# Patient Record
Sex: FEMALE | Race: WHITE | NOT HISPANIC OR LATINO | Employment: FULL TIME | URBAN - METROPOLITAN AREA
[De-identification: names, ages, dates, MRNs, and addresses within clinical notes are randomized per-mention and may not be internally consistent; named-entity substitution may affect disease eponyms.]

---

## 2017-02-02 ENCOUNTER — OFFICE VISIT (OUTPATIENT)
Dept: NEUROLOGY | Facility: MEDICAL CENTER | Age: 32
End: 2017-02-02
Payer: COMMERCIAL

## 2017-02-02 VITALS
WEIGHT: 203 LBS | BODY MASS INDEX: 35.97 KG/M2 | RESPIRATION RATE: 16 BRPM | DIASTOLIC BLOOD PRESSURE: 60 MMHG | SYSTOLIC BLOOD PRESSURE: 100 MMHG | HEART RATE: 73 BPM | TEMPERATURE: 98.8 F | OXYGEN SATURATION: 96 % | HEIGHT: 63 IN

## 2017-02-02 DIAGNOSIS — M54.9 CHRONIC MIDLINE BACK PAIN, UNSPECIFIED BACK LOCATION: ICD-10-CM

## 2017-02-02 DIAGNOSIS — G89.29 CHRONIC MIDLINE BACK PAIN, UNSPECIFIED BACK LOCATION: ICD-10-CM

## 2017-02-02 PROCEDURE — 64615 CHEMODENERV MUSC MIGRAINE: CPT | Performed by: NURSE PRACTITIONER

## 2017-02-02 RX ORDER — GABAPENTIN 100 MG/1
200 CAPSULE ORAL 3 TIMES DAILY
Refills: 11 | COMMUNITY
Start: 2017-01-05 | End: 2018-01-04

## 2017-02-02 ASSESSMENT — ENCOUNTER SYMPTOMS
DEPRESSION: 0
SORE THROAT: 0
DIZZINESS: 0
NERVOUS/ANXIOUS: 0
CONSTITUTIONAL NEGATIVE: 1
VOMITING: 0
HEADACHES: 1
DIARRHEA: 0
BACK PAIN: 1
ABDOMINAL PAIN: 0
COUGH: 0
NECK PAIN: 1
DOUBLE VISION: 0
NAUSEA: 0
SEIZURES: 0

## 2017-02-02 ASSESSMENT — PATIENT HEALTH QUESTIONNAIRE - PHQ9: CLINICAL INTERPRETATION OF PHQ2 SCORE: 2

## 2017-02-02 NOTE — MR AVS SNAPSHOT
"        Rehana Meade   2017 11:00 AM   Office Visit   MRN: [id number]    Department:  [facility]   Dept Phone:  [phone]    Description:  Female : 1985   Provider:  JORDAN Mauro           Reason for Visit     Botox Injection Migraine without aura and with status migrainosus, not intractable      Allergies as of 2017     Allergen Noted Reactions    Biaxin [Clarithromycin] 2014   Hives    Penicillin G Potassium 2007   Vomiting    Tape 10/28/2009   Rash    Ativan 2015   Itching    Imitrex [Sumatriptan] 07/15/2015   Unspecified    Make my head pins an d needles    Latex 2014   Itching    Maxalt [Rizatriptan] 2015       Tremors, confusion      Morphine 2014   Hives    Pt unable to recall if allergic    Reglan [Metoclopramide] 2015       Shaking       You were diagnosed with     Chronic migraine   [724413]         Vital Signs     Blood Pressure Pulse Temperature Respirations Height Weight    100/60 mmHg 73 37.1 °C (98.8 °F) 16 1.6 m (5' 3\") 92.08 kg (203 lb)    Body Mass Index Oxygen Saturation Smoking Status             35.97 kg/m2 96% Never Smoker          Basic Information     Date Of Birth Sex Race Ethnicity Preferred Language    1985 Female White Non- English      Your appointments     2017 11:40 AM   BOTOX with CHUN MauroPANDERS   [facility] (--)    [address], [address]  [facility] [address] [phone]              Problem List              ICD-10-CM Priority Class Noted - Resolved    Migraine without aura G43.009   Unknown - Present    Indication for care in labor or delivery O75.9   2014 - Present    Encounter for sterilization Z30.2   2014 - Present    Paresthesia R20.2   2015 - Present    Aseptic meningitis G03.0   2015 - Present    Anxiety F41.9   2015 - Present    Asthma J45.909   2015 - Present    Headache R51   9/3/2015 - Present  "    Aseptic meningitis G03.0   9/3/2015 - Present    Migraines G43.909   9/3/2015 - Present      Health Maintenance        Date Due Completion Dates    IMM PNEUMOCOCCAL 19-64 (ADULT) MEDIUM RISK SERIES (1 of 1 - PPSV23) 12/10/2004 ---    PAP SMEAR 12/10/2006 ---    IMM INFLUENZA (1) 9/1/2016 ---    IMM DTaP/Tdap/Td Vaccine (2 - Td) 10/11/2022 10/11/2012            Current Immunizations     Tdap Vaccine 10/11/2012  9:00 PM      Below and/or attached are the medications your provider expects you to take. Review all of your home medications and newly ordered medications with your provider and/or pharmacist. Follow medication instructions as directed by your provider and/or pharmacist. Please keep your medication list with you and share with your provider. Update the information when medications are discontinued, doses are changed, or new medications (including over-the-counter products) are added; and carry medication information at all times in the event of emergency situations     Allergies:  BIAXIN - Hives     PENICILLIN G POTASSIUM - Vomiting     TAPE - Rash     ATIVAN - Itching     IMITREX - Unspecified     LATEX - Itching     MAXALT - (reactions not documented)     MORPHINE - Hives     REGLAN - (reactions not documented)               Medications  Valid as of: February 02, 2017 -  1:15 PM    Generic Name Brand Name Tablet Size Instructions for use    Butalbital-APAP-Caffeine (Tab) FIORICET -40 MG Take 1 Tab by mouth every four hours as needed for Headache.        Gabapentin (Cap) NEURONTIN 100 MG Take 100 mg by mouth.        .                 Medicines prescribed today were sent to:     [id number] - [place] - [id number] [place] [facility] [place] [address]    Phone: [phone] Fax: [phone]    Open 24 Hours?: No      Medication refill instructions:       If your prescription bottle indicates you have medication refills left, it is not necessary to call your provider’s office. Please  contact your pharmacy and they will refill your medication.    If your prescription bottle indicates you do not have any refills left, you may request refills at any time through one of the following ways: The online [employer] system (except Urgent Care), by calling your provider’s office, or by asking your pharmacy to contact your provider’s office with a refill request. Medication refills are processed only during regular business hours and may not be available until the next business day. Your provider may request additional information or to have a follow-up visit with you prior to refilling your medication.   *Please Note: Medication refills are assigned a new Rx number when refilled electronically. Your pharmacy may indicate that no refills were authorized even though a new prescription for the same medication is available at the pharmacy. Please request the medicine by name with the pharmacy before contacting your provider for a refill.           [employer] Access Code: [email or web address]  Expires: 2/28/2017 10:25 AM    [employer]  A secure, online tool to manage your health information     [employer]® is a secure, online tool that connects you to your personalized health information from the privacy of your home -- day or night - making it very easy for you to manage your healthcare. Once the activation process is completed, you can even access your medical information using the [employer] minerva, which is available for free in the Apple Minerva store or Google Play store.     [employer] provides the following levels of access (as shown below):   My Chart Features   [facility] Doctor [facility]  Specialists [facility]  Urgent  Care Non-[facility]  Primary Care  Doctor   Email your healthcare team securely and privately 24/7 X X X    Manage appointments: schedule your next appointment; view details of past/upcoming appointments X      Request prescription refills. X      View recent personal medical records, including  lab and immunizations X X X X   View health record, including health history, allergies, medications X X X X   Read reports about your outpatient visits, procedures, consult and ER notes X X X X   See your discharge summary, which is a recap of your hospital and/or ER visit that includes your diagnosis, lab results, and care plan. X X       How to register for [employer]:  1. Go to  [email or web address].  2. Click on the Sign Up Now box, which takes you to the New Member Sign Up page. You will need to provide the following information:  a. Enter your [employer] Access Code exactly as it appears at the top of this page. (You will not need to use this code after you’ve completed the sign-up process. If you do not sign up before the expiration date, you must request a new code.)   b. Enter your date of birth.   c. Enter your home email address.   d. Click Submit, and follow the next screen’s instructions.  3. Create a [employer] ID. This will be your [employer] login ID and cannot be changed, so think of one that is secure and easy to remember.  4. Create a [employer] password. You can change your password at any time.  5. Enter your Password Reset Question and Answer. This can be used at a later time if you forget your password.   6. Enter your e-mail address. This allows you to receive e-mail notifications when new information is available in [employer].  7. Click Sign Up. You can now view your health information.    For assistance activating your [employer] account, call [phone]

## 2017-02-02 NOTE — PROGRESS NOTES
"Subjective:      Rehana Meade is a 31 y.o. female who presents with Botox Injection          HPI  Here today for her 2nd round of Botox.  She wishes to have her back pain evaluated.    Feels that her back pain is a radiating pain from her neck to at least the mid-back.      Current Outpatient Prescriptions   Medication Sig Dispense Refill   • gabapentin (NEURONTIN) 100 MG Cap Take 100 mg by mouth.  11   • acetaminophen/caffeine/butalbital 325-40-50 mg (FIORICET) -40 MG Tab Take 1 Tab by mouth every four hours as needed for Headache. 30 Tab 0     No current facility-administered medications for this visit.       Review of Systems   Constitutional: Negative.    HENT: Negative for hearing loss, nosebleeds and sore throat.         No recent head injury.   Eyes: Negative for double vision.        No new loss of vision.   Respiratory: Negative for cough.         No recent lung infections.   Cardiovascular: Negative for chest pain.   Gastrointestinal: Negative for nausea, vomiting, abdominal pain and diarrhea.   Genitourinary: Negative.    Musculoskeletal: Positive for back pain and neck pain.   Skin: Negative.    Neurological: Positive for headaches. Negative for dizziness and seizures.   Endo/Heme/Allergies:        No history of endocrine dysfunction.  No new problems.   Psychiatric/Behavioral: Negative for depression. The patient is not nervous/anxious.         No recent mood changes.          Objective:     /60 mmHg  Pulse 73  Temp(Src) 37.1 °C (98.8 °F)  Resp 16  Ht 1.6 m (5' 3\")  Wt 92.08 kg (203 lb)  BMI 35.97 kg/m2  SpO2 96%     Physical Exam   Constitutional: She is oriented to person, place, and time. She appears well-developed and well-nourished. No distress.   HENT:   Head: Normocephalic and atraumatic.   Nose: Nose normal.   Eyes: EOM are normal.   Neck: Normal range of motion.   Cardiovascular: Normal rate and regular rhythm.    Pulmonary/Chest: Effort normal.   Neurological: " She is alert and oriented to person, place, and time. She exhibits normal muscle tone. Gait normal.   No observable changes in neurologic status.  See initial new patient examination for details.    Skin: Skin is warm.   Psychiatric: She has a normal mood and affect.           Assessment/Plan:     Chronic Migraine:  Botox therapy has reduced patient’s migraines by more than 7 days and/or 100 hours per month.    I treated this patient in clinic today with BotoxA 155 units according to the dosing/injection paradigm currently mandated by the FDA for the management of chronic migraine. Specifically, I injected 5 units to the procerus, 5 units to the corrugators bilaterally, a total of 20 units to the frontalis musculature, 20 units to the temporalis bilaterally, 15 units to the occipitalis bilaterally, 10 units to the cervical paraspinals bilaterally and 15 units to the trapezius musculature bilaterally. The remainder of the Botox 200 units mixed but not administered was discarded as wastage per FDA guidelines.    Referral placed for evaluation back pain.    Return for follow-up in 3 months for 3rd set of Botox.

## 2017-03-17 LAB
ALBUMIN SERPL BCP-MCNC: 4.4 G/DL (ref 3.2–4.9)
ALBUMIN/GLOB SERPL: 1.2 G/DL
ALP SERPL-CCNC: 104 U/L (ref 30–99)
ALT SERPL-CCNC: 7 U/L (ref 2–50)
ANION GAP SERPL CALC-SCNC: 12 MMOL/L (ref 0–11.9)
AST SERPL-CCNC: 11 U/L (ref 12–45)
BASOPHILS # BLD AUTO: 0.5 % (ref 0–1.8)
BASOPHILS # BLD: 0.06 K/UL (ref 0–0.12)
BILIRUB SERPL-MCNC: 0.4 MG/DL (ref 0.1–1.5)
BUN SERPL-MCNC: 14 MG/DL (ref 8–22)
CALCIUM SERPL-MCNC: 9.3 MG/DL (ref 8.5–10.5)
CHLORIDE SERPL-SCNC: 102 MMOL/L (ref 96–112)
CO2 SERPL-SCNC: 21 MMOL/L (ref 20–33)
CREAT SERPL-MCNC: 0.68 MG/DL (ref 0.5–1.4)
EOSINOPHIL # BLD AUTO: 0.21 K/UL (ref 0–0.51)
EOSINOPHIL NFR BLD: 1.6 % (ref 0–6.9)
ERYTHROCYTE [DISTWIDTH] IN BLOOD BY AUTOMATED COUNT: 45 FL (ref 35.9–50)
GFR SERPL CREATININE-BSD FRML MDRD: >60 ML/MIN/1.73 M 2
GLOBULIN SER CALC-MCNC: 3.6 G/DL (ref 1.9–3.5)
GLUCOSE SERPL-MCNC: 84 MG/DL (ref 65–99)
HCT VFR BLD AUTO: 44.5 % (ref 37–47)
HGB BLD-MCNC: 14.7 G/DL (ref 12–16)
IMM GRANULOCYTES # BLD AUTO: 0.03 K/UL (ref 0–0.11)
IMM GRANULOCYTES NFR BLD AUTO: 0.2 % (ref 0–0.9)
LIPASE SERPL-CCNC: 18 U/L (ref 11–82)
LYMPHOCYTES # BLD AUTO: 3.59 K/UL (ref 1–4.8)
LYMPHOCYTES NFR BLD: 27.4 % (ref 22–41)
MCH RBC QN AUTO: 30 PG (ref 27–33)
MCHC RBC AUTO-ENTMCNC: 33 G/DL (ref 33.6–35)
MCV RBC AUTO: 90.8 FL (ref 81.4–97.8)
MONOCYTES # BLD AUTO: 0.9 K/UL (ref 0–0.85)
MONOCYTES NFR BLD AUTO: 6.9 % (ref 0–13.4)
NEUTROPHILS # BLD AUTO: 8.32 K/UL (ref 2–7.15)
NEUTROPHILS NFR BLD: 63.4 % (ref 44–72)
NRBC # BLD AUTO: 0 K/UL
NRBC BLD AUTO-RTO: 0 /100 WBC
PLATELET # BLD AUTO: 315 K/UL (ref 164–446)
PMV BLD AUTO: 9.5 FL (ref 9–12.9)
POTASSIUM SERPL-SCNC: 3.9 MMOL/L (ref 3.6–5.5)
PROT SERPL-MCNC: 8 G/DL (ref 6–8.2)
RBC # BLD AUTO: 4.9 M/UL (ref 4.2–5.4)
SODIUM SERPL-SCNC: 135 MMOL/L (ref 135–145)
WBC # BLD AUTO: 13.1 K/UL (ref 4.8–10.8)

## 2017-03-17 PROCEDURE — 85025 COMPLETE CBC W/AUTO DIFF WBC: CPT

## 2017-03-17 PROCEDURE — 84703 CHORIONIC GONADOTROPIN ASSAY: CPT

## 2017-03-17 PROCEDURE — 99285 EMERGENCY DEPT VISIT HI MDM: CPT

## 2017-03-17 PROCEDURE — 80053 COMPREHEN METABOLIC PANEL: CPT

## 2017-03-17 PROCEDURE — 83690 ASSAY OF LIPASE: CPT

## 2017-03-18 ENCOUNTER — HOSPITAL ENCOUNTER (EMERGENCY)
Facility: MEDICAL CENTER | Age: 32
End: 2017-03-18
Attending: EMERGENCY MEDICINE
Payer: COMMERCIAL

## 2017-03-18 ENCOUNTER — APPOINTMENT (OUTPATIENT)
Dept: RADIOLOGY | Facility: MEDICAL CENTER | Age: 32
End: 2017-03-18
Attending: EMERGENCY MEDICINE
Payer: COMMERCIAL

## 2017-03-18 VITALS
RESPIRATION RATE: 19 BRPM | DIASTOLIC BLOOD PRESSURE: 74 MMHG | HEART RATE: 80 BPM | HEIGHT: 63 IN | SYSTOLIC BLOOD PRESSURE: 120 MMHG | TEMPERATURE: 98.9 F | OXYGEN SATURATION: 96 % | BODY MASS INDEX: 35.97 KG/M2 | WEIGHT: 203 LBS

## 2017-03-18 DIAGNOSIS — N30.00 ACUTE CYSTITIS WITHOUT HEMATURIA: ICD-10-CM

## 2017-03-18 DIAGNOSIS — R10.32 LEFT LOWER QUADRANT PAIN: ICD-10-CM

## 2017-03-18 DIAGNOSIS — N83.202 CYST OF LEFT OVARY: ICD-10-CM

## 2017-03-18 LAB
APPEARANCE UR: ABNORMAL
BACTERIA #/AREA URNS HPF: ABNORMAL /HPF
BILIRUB UR QL STRIP.AUTO: NEGATIVE
COLOR UR: YELLOW
CULTURE IF INDICATED INDCX: YES UA CULTURE
EPI CELLS #/AREA URNS HPF: ABNORMAL /HPF
GLUCOSE UR STRIP.AUTO-MCNC: NEGATIVE MG/DL
HCG SERPL QL: NEGATIVE
KETONES UR STRIP.AUTO-MCNC: NEGATIVE MG/DL
LEUKOCYTE ESTERASE UR QL STRIP.AUTO: ABNORMAL
MICRO URNS: ABNORMAL
MUCOUS THREADS #/AREA URNS HPF: ABNORMAL /HPF
NITRITE UR QL STRIP.AUTO: NEGATIVE
PH UR STRIP.AUTO: 6 [PH]
PROT UR QL STRIP: NEGATIVE MG/DL
RBC # URNS HPF: ABNORMAL /HPF
RBC UR QL AUTO: NEGATIVE
SP GR UR STRIP.AUTO: 1.03
WBC #/AREA URNS HPF: ABNORMAL /HPF

## 2017-03-18 PROCEDURE — A9270 NON-COVERED ITEM OR SERVICE: HCPCS | Performed by: EMERGENCY MEDICINE

## 2017-03-18 PROCEDURE — 87086 URINE CULTURE/COLONY COUNT: CPT

## 2017-03-18 PROCEDURE — 700102 HCHG RX REV CODE 250 W/ 637 OVERRIDE(OP): Performed by: EMERGENCY MEDICINE

## 2017-03-18 PROCEDURE — 96375 TX/PRO/DX INJ NEW DRUG ADDON: CPT

## 2017-03-18 PROCEDURE — 700111 HCHG RX REV CODE 636 W/ 250 OVERRIDE (IP): Performed by: EMERGENCY MEDICINE

## 2017-03-18 PROCEDURE — 76830 TRANSVAGINAL US NON-OB: CPT

## 2017-03-18 PROCEDURE — 96374 THER/PROPH/DIAG INJ IV PUSH: CPT

## 2017-03-18 PROCEDURE — 81001 URINALYSIS AUTO W/SCOPE: CPT

## 2017-03-18 RX ORDER — CIPROFLOXACIN 500 MG/1
500 TABLET, FILM COATED ORAL ONCE
Status: COMPLETED | OUTPATIENT
Start: 2017-03-18 | End: 2017-03-18

## 2017-03-18 RX ORDER — ONDANSETRON 2 MG/ML
4 INJECTION INTRAMUSCULAR; INTRAVENOUS ONCE
Status: COMPLETED | OUTPATIENT
Start: 2017-03-18 | End: 2017-03-18

## 2017-03-18 RX ORDER — CIPROFLOXACIN 500 MG/1
500 TABLET, FILM COATED ORAL 2 TIMES DAILY
Qty: 14 TAB | Refills: 0 | Status: SHIPPED | OUTPATIENT
Start: 2017-03-18 | End: 2017-03-23

## 2017-03-18 RX ORDER — OXYCODONE HYDROCHLORIDE AND ACETAMINOPHEN 5; 325 MG/1; MG/1
1-2 TABLET ORAL EVERY 4 HOURS PRN
Qty: 20 TAB | Refills: 0 | Status: SHIPPED | OUTPATIENT
Start: 2017-03-18 | End: 2017-04-12

## 2017-03-18 RX ADMIN — CIPROFLOXACIN HYDROCHLORIDE 500 MG: 500 TABLET, FILM COATED ORAL at 08:12

## 2017-03-18 RX ADMIN — ONDANSETRON 4 MG: 2 INJECTION, SOLUTION INTRAMUSCULAR; INTRAVENOUS at 05:03

## 2017-03-18 RX ADMIN — HYDROMORPHONE HYDROCHLORIDE 1 MG: 1 INJECTION, SOLUTION INTRAMUSCULAR; INTRAVENOUS; SUBCUTANEOUS at 05:03

## 2017-03-18 NOTE — DISCHARGE INSTRUCTIONS
Abdominal Pain (Nonspecific)  Your exam might not show the exact reason you have abdominal pain. Since there are many different causes of abdominal pain, another checkup and more tests may be needed. It is very important to follow up for lasting (persistent) or worsening symptoms. A possible cause of abdominal pain in any person who still has his or her appendix is acute appendicitis. Appendicitis is often hard to diagnose. Normal blood tests, urine tests, ultrasound, and CT scans do not completely rule out early appendicitis or other causes of abdominal pain. Sometimes, only the changes that happen over time will allow appendicitis and other causes of abdominal pain to be determined. Other potential problems that may require surgery may also take time to become more apparent. Because of this, it is important that you follow all of the instructions below.  HOME CARE INSTRUCTIONS   · Rest as much as possible.   · Do not eat solid food until your pain is gone.   · While adults or children have pain: A diet of water, weak decaffeinated tea, broth or bouillon, gelatin, oral rehydration solutions (ORS), frozen ice pops, or ice chips may be helpful.   · When pain is gone in adults or children: Start a light diet (dry toast, crackers, applesauce, or white rice). Increase the diet slowly as long as it does not bother you. Eat no dairy products (including cheese and eggs) and no spicy, fatty, fried, or high-fiber foods.   · Use no alcohol, caffeine, or cigarettes.   · Take your regular medicines unless your caregiver told you not to.   · Take any prescribed medicine as directed.   · Only take over-the-counter or prescription medicines for pain, discomfort, or fever as directed by your caregiver. Do not give aspirin to children.   If your caregiver has given you a follow-up appointment, it is very important to keep that appointment. Not keeping the appointment could result in a permanent injury and/or lasting (chronic) pain  and/or disability. If there is any problem keeping the appointment, you must call to reschedule.   SEEK IMMEDIATE MEDICAL CARE IF:   · Your pain is not gone in 24 hours.   · Your pain becomes worse, changes location, or feels different.   · You or your child has an oral temperature above 102° F (38.9° C), not controlled by medicine.   · Your baby is older than 3 months with a rectal temperature of 102° F (38.9° C) or higher.   · Your baby is 3 months old or younger with a rectal temperature of 100.4° F (38° C) or higher.   · You have shaking chills.   · You keep throwing up (vomiting) or cannot drink liquids.   · There is blood in your vomit or you see blood in your bowel movements.   · Your bowel movements become dark or black.   · You have frequent bowel movements.   · Your bowel movements stop (become blocked) or you cannot pass gas.   · You have bloody, frequent, or painful urination.   · You have yellow discoloration in the skin or whites of the eyes.   · Your stomach becomes bloated or bigger.   · You have dizziness or fainting.   · You have chest or back pain.   MAKE SURE YOU:   · Understand these instructions.   · Will watch your condition.   · Will get help right away if you are not doing well or get worse.   Document Released: 12/18/2006 Document Revised: 03/11/2013 Document Reviewed: 11/15/2010  [employer]® Patient Information ©2013 [employer].      Urinary Tract Infection  Urinary tract infections (UTIs) can develop anywhere along your urinary tract. Your urinary tract is your body's drainage system for removing wastes and extra water. Your urinary tract includes two kidneys, two ureters, a bladder, and a urethra. Your kidneys are a pair of bean-shaped organs. Each kidney is about the size of your fist. They are located below your ribs, one on each side of your spine.  CAUSES  Infections are caused by microbes, which are microscopic organisms, including fungi, viruses, and bacteria. These organisms are  so small that they can only be seen through a microscope. Bacteria are the microbes that most commonly cause UTIs.  SYMPTOMS   Symptoms of UTIs may vary by age and gender of the patient and by the location of the infection. Symptoms in young women typically include a frequent and intense urge to urinate and a painful, burning feeling in the bladder or urethra during urination. Older women and men are more likely to be tired, shaky, and weak and have muscle aches and abdominal pain. A fever may mean the infection is in your kidneys. Other symptoms of a kidney infection include pain in your back or sides below the ribs, nausea, and vomiting.  DIAGNOSIS  To diagnose a UTI, your caregiver will ask you about your symptoms. Your caregiver also will ask to provide a urine sample. The urine sample will be tested for bacteria and white blood cells. White blood cells are made by your body to help fight infection.  TREATMENT   Typically, UTIs can be treated with medication. Because most UTIs are caused by a bacterial infection, they usually can be treated with the use of antibiotics. The choice of antibiotic and length of treatment depend on your symptoms and the type of bacteria causing your infection.  HOME CARE INSTRUCTIONS  · If you were prescribed antibiotics, take them exactly as your caregiver instructs you. Finish the medication even if you feel better after you have only taken some of the medication.  · Drink enough water and fluids to keep your urine clear or pale yellow.  · Avoid caffeine, tea, and carbonated beverages. They tend to irritate your bladder.  · Empty your bladder often. Avoid holding urine for long periods of time.  · Empty your bladder before and after sexual intercourse.  · After a bowel movement, women should cleanse from front to back. Use each tissue only once.  SEEK MEDICAL CARE IF:   · You have back pain.  · You develop a fever.  · Your symptoms do not begin to resolve within 3 days.  SEEK  "IMMEDIATE MEDICAL CARE IF:   · You have severe back pain or lower abdominal pain.  · You develop chills.  · You have nausea or vomiting.  · You have continued burning or discomfort with urination.  MAKE SURE YOU:   · Understand these instructions.  · Will watch your condition.  · Will get help right away if you are not doing well or get worse.     This information is not intended to replace advice given to you by your health care provider. Make sure you discuss any questions you have with your health care provider.     Document Released: 09/27/2006 Document Revised: 01/08/2016 Document Reviewed: 01/25/2013  [employer] Patient Education ©2016 [employer].      Ovarian Cyst  An ovarian cyst is a fluid-filled sac that forms on an ovary. The ovaries are small organs that produce eggs in women. Various types of cysts can form on the ovaries. Most are not cancerous. Many do not cause problems, and they often go away on their own. Some may cause symptoms and require treatment. Common types of ovarian cysts include:  · Functional cysts--These cysts may occur every month during the menstrual cycle. This is normal. The cysts usually go away with the next menstrual cycle if the woman does not get pregnant. Usually, there are no symptoms with a functional cyst.  · Endometrioma cysts--These cysts form from the tissue that lines the uterus. They are also called \"chocolate cysts\" because they become filled with blood that turns brown. This type of cyst can cause pain in the lower abdomen during intercourse and with your menstrual period.  · Cystadenoma cysts--This type develops from the cells on the outside of the ovary. These cysts can get very big and cause lower abdomen pain and pain with intercourse. This type of cyst can twist on itself, cut off its blood supply, and cause severe pain. It can also easily rupture and cause a lot of pain.  · Dermoid cysts--This type of cyst is sometimes found in both ovaries. These " cysts may contain different kinds of body tissue, such as skin, teeth, hair, or cartilage. They usually do not cause symptoms unless they get very big.  · Theca lutein cysts--These cysts occur when too much of a certain hormone (human chorionic gonadotropin) is produced and overstimulates the ovaries to produce an egg. This is most common after procedures used to assist with the conception of a baby (in vitro fertilization).  CAUSES   · Fertility drugs can cause a condition in which multiple large cysts are formed on the ovaries. This is called ovarian hyperstimulation syndrome.  · A condition called polycystic ovary syndrome can cause hormonal imbalances that can lead to nonfunctional ovarian cysts.  SIGNS AND SYMPTOMS   Many ovarian cysts do not cause symptoms. If symptoms are present, they may include:  · Pelvic pain or pressure.  · Pain in the lower abdomen.  · Pain during sexual intercourse.  · Increasing girth (swelling) of the abdomen.  · Abnormal menstrual periods.  · Increasing pain with menstrual periods.  · Stopping having menstrual periods without being pregnant.  DIAGNOSIS   These cysts are commonly found during a routine or annual pelvic exam. Tests may be ordered to find out more about the cyst. These tests may include:  · Ultrasound.  · X-ray of the pelvis.  · CT scan.  · MRI.  · Blood tests.  TREATMENT   Many ovarian cysts go away on their own without treatment. Your health care provider may want to check your cyst regularly for 2-3 months to see if it changes. For women in menopause, it is particularly important to monitor a cyst closely because of the higher rate of ovarian cancer in menopausal women. When treatment is needed, it may include any of the following:  · A procedure to drain the cyst (aspiration). This may be done using a long needle and ultrasound. It can also be done through a laparoscopic procedure. This involves using a thin, lighted tube with a tiny camera on the end (laparoscope)  inserted through a small incision.  · Surgery to remove the whole cyst. This may be done using laparoscopic surgery or an open surgery involving a larger incision in the lower abdomen.  · Hormone treatment or birth control pills. These methods are sometimes used to help dissolve a cyst.  HOME CARE INSTRUCTIONS   · Only take over-the-counter or prescription medicines as directed by your health care provider.  · Follow up with your health care provider as directed.  · Get regular pelvic exams and Pap tests.  SEEK MEDICAL CARE IF:   · Your periods are late, irregular, or painful, or they stop.  · Your pelvic pain or abdominal pain does not go away.  · Your abdomen becomes larger or swollen.  · You have pressure on your bladder or trouble emptying your bladder completely.  · You have pain during sexual intercourse.  · You have feelings of fullness, pressure, or discomfort in your stomach.  · You lose weight for no apparent reason.  · You feel generally ill.  · You become constipated.  · You lose your appetite.  · You develop acne.  · You have an increase in body and facial hair.  · You are gaining weight, without changing your exercise and eating habits.  · You think you are pregnant.  SEEK IMMEDIATE MEDICAL CARE IF:   · You have increasing abdominal pain.  · You feel sick to your stomach (nauseous), and you throw up (vomit).  · You develop a fever that comes on suddenly.  · You have abdominal pain during a bowel movement.  · Your menstrual periods become heavier than usual.  MAKE SURE YOU:  · Understand these instructions.  · Will watch your condition.  · Will get help right away if you are not doing well or get worse.     This information is not intended to replace advice given to you by your health care provider. Make sure you discuss any questions you have with your health care provider.     Document Released: 12/18/2006 Document Revised: 12/23/2014 Document Reviewed: 08/25/2014  [employer] Patient  Education ©2016 [employer].

## 2017-03-18 NOTE — ED PROVIDER NOTES
ED Provider Note    Scribed for Jose De La Torre M.D. by Ashley Jorge. 3/18/2017  4:38 AM    Primary care provider: Mack Bernard M.D.  Means of arrival: Walk-in   History obtained from: Patient   History limited by: None     CHIEF COMPLAINT  Chief Complaint   Patient presents with   • Abdominal Pain     lower left, started one hour ago    • Nausea       HPI  Rehana Meade is a 31 y.o. female who presents to the Emergency Department for left lower quadrant abdominal pain onset yesterday afternoon at around 5:00 pm. She describes this pain as sharp in quality. Per patient, she has been vomiting every morning for the past week. She has a history of tubal coagulation and denies known pregnancy. The patient also reports a history of ovarian cysts. Patient denies fevers, chills, diarrhea, dysuria, vaginal bleeding/discharge, chest pain, shortness of breath.      REVIEW OF SYSTEMS  Pertinent positives include abdominal pain, nausea, vomiting.   Pertinent negatives include no fevers, chills, diarrhea, dysuria, vaginal bleeding/discharge, chest pain, shortness of breath.    All other systems reviewed and negative.      C    PAST MEDICAL HISTORY   has a past medical history of Other specified symptom associated with female genital organs; Unspecified disorder of thyroid; ASTHMA; Anxiety associated with depression; Migraine without aura, without mention of intractable migraine without mention of status migrainosus; Anesthesia; and Drug-seeking behavior.    SURGICAL HISTORY   has past surgical history that includes septoplasty (10/28/2009); somnoplasty (10/28/2009); nasal polypectomy (10/28/2009); and tubal coagulation laparoscopic bilateral (7/11/2014).    SOCIAL HISTORY  Social History   Substance Use Topics   • Smoking status: Never Smoker    • Smokeless tobacco: Never Used   • Alcohol Use: Yes      History   Drug Use No       FAMILY HISTORY  Noncontributory.      CURRENT MEDICATIONS  Home Medications   "   No current facility-administered medications on file prior to encounter.     Current Outpatient Prescriptions on File Prior to Encounter   Medication Sig Dispense Refill   • gabapentin (NEURONTIN) 100 MG Cap Take 100 mg by mouth.  11   • acetaminophen/caffeine/butalbital 325-40-50 mg (FIORICET) -40 MG Tab Take 1 Tab by mouth every four hours as needed for Headache. 30 Tab 0              ALLERGIES  Allergies   Allergen Reactions   • Biaxin [Clarithromycin] Hives   • Penicillin G Potassium Vomiting   • Tape Rash   • Ativan Itching   • Imitrex [Sumatriptan] Unspecified     Make my head pins an d needles   • Latex Itching   • Maxalt [Rizatriptan]      Tremors, confusion     • Morphine Hives     Pt unable to recall if allergic   • Reglan [Metoclopramide]      Shaking        PHYSICAL EXAM  VITAL SIGNS: /74 mmHg  Pulse 84  Temp(Src) 37.2 °C (98.9 °F)  Resp 22  Ht 1.6 m (5' 3\")  Wt 92.08 kg (203 lb)  BMI 35.97 kg/m2  SpO2 94%    Nursing note and vitals reviewed.  Constitutional: Well-developed and well-nourished. Mild distress secondary to pain. Obese.    HENT: Head is normocephalic and atraumatic. Oropharynx is clear and moist without exudate or erythema.   Eyes: Pupils are equal, round, and reactive to light. Conjunctiva are normal.   Cardiovascular: Normal rate and regular rhythm. No murmur heard. Normal radial pulses.  Pulmonary/Chest: Breath sounds normal. No wheezes or rales.   Abdominal: Soft, non-distended. Tenderness in the left lower quadrant just above the pelvic rim. Normal active bowel sounds. No guarding or peritoneal signs. No palpable abdominal aortic aneurysm. No masses. No tenderness at McBurney's point.   Musculoskeletal: Extremities exhibit normal range of motion without edema or tenderness.   Neurological: Awake, alert and oriented to person, place, and time. No focal deficits noted.  Skin: Skin is warm and dry. No rash.  Psychiatric: Normal mood and affect. Appropriate for " clinical situation    DIAGNOSTIC STUDIES / PROCEDURES    LABS  Results for orders placed or performed during the hospital encounter of 03/18/17   CBC WITH DIFFERENTIAL   Result Value Ref Range    WBC 13.1 (H) 4.8 - 10.8 K/uL    RBC 4.90 4.20 - 5.40 M/uL    Hemoglobin 14.7 12.0 - 16.0 g/dL    Hematocrit 44.5 37.0 - 47.0 %    MCV 90.8 81.4 - 97.8 fL    MCH 30.0 27.0 - 33.0 pg    MCHC 33.0 (L) 33.6 - 35.0 g/dL    RDW 45.0 35.9 - 50.0 fL    Platelet Count 315 164 - 446 K/uL    MPV 9.5 9.0 - 12.9 fL    Neutrophils-Polys 63.40 44.00 - 72.00 %    Lymphocytes 27.40 22.00 - 41.00 %    Monocytes 6.90 0.00 - 13.40 %    Eosinophils 1.60 0.00 - 6.90 %    Basophils 0.50 0.00 - 1.80 %    Immature Granulocytes 0.20 0.00 - 0.90 %    Nucleated RBC 0.00 /100 WBC    Neutrophils (Absolute) 8.32 (H) 2.00 - 7.15 K/uL    Lymphs (Absolute) 3.59 1.00 - 4.80 K/uL    Monos (Absolute) 0.90 (H) 0.00 - 0.85 K/uL    Eos (Absolute) 0.21 0.00 - 0.51 K/uL    Baso (Absolute) 0.06 0.00 - 0.12 K/uL    Immature Granulocytes (abs) 0.03 0.00 - 0.11 K/uL    NRBC (Absolute) 0.00 K/uL   COMP METABOLIC PANEL   Result Value Ref Range    Sodium 135 135 - 145 mmol/L    Potassium 3.9 3.6 - 5.5 mmol/L    Chloride 102 96 - 112 mmol/L    Co2 21 20 - 33 mmol/L    Anion Gap 12.0 (H) 0.0 - 11.9    Glucose 84 65 - 99 mg/dL    Bun 14 8 - 22 mg/dL    Creatinine 0.68 0.50 - 1.40 mg/dL    Calcium 9.3 8.5 - 10.5 mg/dL    AST(SGOT) 11 (L) 12 - 45 U/L    ALT(SGPT) 7 2 - 50 U/L    Alkaline Phosphatase 104 (H) 30 - 99 U/L    Total Bilirubin 0.4 0.1 - 1.5 mg/dL    Albumin 4.4 3.2 - 4.9 g/dL    Total Protein 8.0 6.0 - 8.2 g/dL    Globulin 3.6 (H) 1.9 - 3.5 g/dL    A-G Ratio 1.2 g/dL   LIPASE   Result Value Ref Range    Lipase 18 11 - 82 U/L   ESTIMATED GFR   Result Value Ref Range    GFR If African American >60 >60 mL/min/1.73 m 2    GFR If Non African American >60 >60 mL/min/1.73 m 2   HCG QUAL SERUM   Result Value Ref Range    Beta-Hcg Qualitative Serum Negative Negative    URINALYSIS,CULTURE IF INDICATED   Result Value Ref Range    Micro Urine Req Microscopic     Color Yellow     Character Sl Cloudy (A)     Specific Gravity 1.026 <1.035    Ph 6.0 5.0-8.0    Glucose Negative Negative mg/dL    Ketones Negative Negative mg/dL    Protein Negative Negative mg/dL    Bilirubin Negative Negative    Nitrite Negative Negative    Leukocyte Esterase Large (A) Negative    Occult Blood Negative Negative    Culture Indicated Yes UA Culture   URINE MICROSCOPIC (W/UA)   Result Value Ref Range    WBC 0-2 /hpf    RBC 2-5 (A) /hpf    Bacteria Moderate (A) None /hpf    Epithelial Cells Few /hpf    Mucous Threads Moderate /hpf   All labs reviewed by me.    RADIOLOGY  US-GYN-PELVIS TRANSVAGINAL   Final Result      2 cm left intraovarian cyst is complex with nodularity and internal echoes. This could be a hemorrhagic cyst but given the nodularity follow-up is recommended in 1.5 cycles to confirm resolution      Small endometrial cavity fluid may indicate hemorrhage. No mass is seen      No sonographic evidence of ovarian torsion      The radiologist's interpretation of all radiological studies have been reviewed by me.    COURSE & MEDICAL DECISION MAKING  Nursing notes, VS, PMSFHx reviewed in chart.     Review of past medical records shows the patient was last seen in the ED in December 2016 for a migraine.     4:38 AM - Patient seen and examined at bedside. Patient will be treated with 1 mg Dilaudid IV and 4 mg Zofran IV. Ordered for a pelvis transvaginal US and labs to evaluate her symptoms. The differential diagnoses include but are not limited to: ovarian cyst, ectopic pregnancy, urinary tract infection, kidney stone. I discussed that the patient can have an ectopic pregnancy even if she has had a tubal ligation. The plan of care was discussed with the patient and I answered all of her questions at this time. The patient understands and is agreeable with this plan of care.       8:10 AM Patient  reevaluated at bedside. Laboratory results reveal a urinary tract infection. The patient also has a left ovarian cyst. She will be prescribed Percocet and Cipro. She is to follow up with her gynecologist. Discussed plan for discharge; I advised the patient to return to the [facility] ED with any new or worsening symptoms. Patient was given the opportunity for questions. I addressed all questions or concerns at this time and they verbalize agreement to the plan of care. R     Narcotics: 150 (Value from NarxCheck System.)    Sedatives: 210 (Value from NarxCheck System.)    Stimulants: 0 (Value from NarxCheck System.)     NARxSCORES can range from 000 to 999. This first two digits represent the composite percentile risk based on an overall analysis of prescription drug use. The third digit represents the number of active prescriptions. The distribution of scores in the population is such that approximately 75% fall below 200, 95% fall below 500 and 99% fall below 650.     SELECT THE LINK BELOW TO REVIEW THE N[employer]xCheck REPORT  or  SELECT THE 'ACCEPT' BUTTON TO CONTINUTE AFTER REVIEWING THE SCORES          I reviewed prescription monitoring program for patient's narcotic use before prescribing a scheduled drug.The patient will not drink alcohol nor drive with prescribed medications. The patient will return for new or worsening symptoms and is stable at the time of discharge.    The patient is referred to a primary physician for blood pressure management, diabetic screening, and for all other preventative health concerns.    DISPOSITION:  Patient will be discharged home in stable condition.    FOLLOW UP:  [facility], Emergency Dept  [address] [address] [phone]    If symptoms worsen    Mack Bernard M.D.  [address] [address]  [phone]    Schedule an appointment as soon as possible for a visit      OUTPATIENT MEDICATIONS:  New Prescriptions    CIPROFLOXACIN  (CIPRO) 500 MG TAB    Take 1 Tab by mouth 2 times a day for 5 days.    OXYCODONE-ACETAMINOPHEN (PERCOCET) 5-325 MG TAB    Take 1-2 Tabs by mouth every four hours as needed.     FINAL IMPRESSION  1. Cyst of left ovary    2. Left lower quadrant pain    3. Acute cystitis without hematuria       Ashley HOLT (Scribarmen), am scribing for, and in the presence of, Jose De La Torre M.D..    Electronically signed by: Ashley Jorge (Justina), 3/18/2017    IJose M.D. personally performed the services described in this documentation, as scribed by Ashley Jorge in my presence, and it is both accurate and complete.    The note accurately reflects work and decisions made by me.  Jose De La Torre  3/18/2017  11:59 AM

## 2017-03-18 NOTE — ED NOTES
Discharge instructions given. Verbalizes understanding. Left with all belongings. Walked to [facility].

## 2017-03-18 NOTE — ED NOTES
30 y/o female to triage   Chief Complaint   Patient presents with   • Abdominal Pain     lower left, started one hour ago    • Nausea     Pt denies dysuria, denies pregnancy

## 2017-03-18 NOTE — ED AVS SNAPSHOT
Tiragiu Access Code: [email or web address]  Expires: 3/29/2017 12:29 PM    Tiragiu  A secure, online tool to manage your health information     [employer]’s Tiragiu® is a secure, online tool that connects you to your personalized health information from the privacy of your home -- day or night - making it very easy for you to manage your healthcare. Once the activation process is completed, you can even access your medical information using the Tiragiu minerva, which is available for free in the [employer] Minerva store or Google Play store.     Tiragiu provides the following levels of access (as shown below):   My Chart Features   [facility] Primary Care Doctor [facility]  Specialists [facility]  Urgent  Care Non-[facility]  Primary Care  Doctor   Email your healthcare team securely and privately 24/7 X X X X   Manage appointments: schedule your next appointment; view details of past/upcoming appointments X      Request prescription refills. X      View recent personal medical records, including lab and immunizations X X X X   View health record, including health history, allergies, medications X X X X   Read reports about your outpatient visits, procedures, consult and ER notes X X X X   See your discharge summary, which is a recap of your hospital and/or ER visit that includes your diagnosis, lab results, and care plan. X X       How to register for Tiragiu:  1. Go to  [email or web address].  2. Click on the Sign Up Now box, which takes you to the New Member Sign Up page. You will need to provide the following information:  a. Enter your Tiragiu Access Code exactly as it appears at the top of this page. (You will not need to use this code after you’ve completed the sign-up process. If you do not sign up before the expiration date, you must request a new code.)   b. Enter your date of birth.   c. Enter your home email address.   d. Click Submit, and follow the next screen’s instructions.  3. Create a Tiragiu ID. This will be your Tiragiu  login ID and cannot be changed, so think of one that is secure and easy to remember.  4. Create a [employer] password. You can change your password at any time.  5. Enter your Password Reset Question and Answer. This can be used at a later time if you forget your password.   6. Enter your e-mail address. This allows you to receive e-mail notifications when new information is available in [employer].  7. Click Sign Up. You can now view your health information.    For assistance activating your [employer] account, call [phone]

## 2017-03-18 NOTE — ED AVS SNAPSHOT
Home Care Instructions                                                                                                                Rehana Meade   MRN: [id number]    Department:  [facility], Emergency Dept   Date of Visit:  3/17/2017            [facility], Emergency Dept    [address] [place] [id number]    Phone:  [phone]      You were seen by     Jose De La Torre M.D.      Your Diagnosis Was     Cyst of left ovary     N83.202       These are the medications you received during your hospitalization from 03/17/2017 1732 to 03/18/2017 0808     Date/Time Order Dose Route Action    03/18/2017 0503 HYDROmorphone (DILAUDID) injection 1 mg 1 mg Intravenous Given    03/18/2017 0503 ondansetron (ZOFRAN) syringe/vial injection 4 mg 4 mg Intravenous Given      Follow-up Information     1. Follow up with [facility], Emergency Dept.    Specialty:  Emergency Medicine    Why:  If symptoms worsen    Contact information    [address] [place] [id number] [phone]        2. Schedule an appointment as soon as possible for a visit with Mack Bernard M.D..    Specialty:  Family Medicine    Contact information    [address] [address]  [phone]        Medication Information     Review all of your home medications and newly ordered medications with your primary doctor and/or pharmacist as soon as possible. Follow medication instructions as directed by your doctor and/or pharmacist.     Please keep your complete medication list with you and share with your physician. Update the information when medications are discontinued, doses are changed, or new medications (including over-the-counter products) are added; and carry medication information at all times in the event of emergency situations.               Medication List      START taking these medications        Instructions    Morning Afternoon Evening Bedtime    ciprofloxacin 500 MG Tabs   Commonly known as:  CIPRO        Take 1 Tab by mouth 2 times a day for 5 days.   Dose:  500 mg                        oxycodone-acetaminophen 5-325 MG Tabs   Commonly known as:  PERCOCET        Take 1-2 Tabs by mouth every four hours as needed.   Dose:  1-2 Tab                          ASK your doctor about these medications        Instructions    Morning Afternoon Evening Bedtime    acetaminophen/caffeine/butalbital 325-40-50 mg -40 MG Tabs   Commonly known as:  FIORICET        Take 1 Tab by mouth every four hours as needed for Headache.   Dose:  1 Tab                        gabapentin 100 MG Caps   Commonly known as:  NEURONTIN        Take 100 mg by mouth.   Dose:  100 mg                             Where to Get Your Medications      You can get these medications from any pharmacy     Bring a paper prescription for each of these medications    - ciprofloxacin 500 MG Tabs  - oxycodone-acetaminophen 5-325 MG Tabs            Procedures and tests performed during your visit     CARDIAC MONITORING    CBC WITH DIFFERENTIAL    COMP METABOLIC PANEL    ESTIMATED GFR    HCG QUAL SERUM    LIPASE    O2 Protocol    SALINE LOCK    URINALYSIS,CULTURE IF INDICATED    URINE CULTURE(NEW)    URINE MICROSCOPIC (W/UA)    US-GYN-PELVIS TRANSVAGINAL        Discharge Instructions       Abdominal Pain (Nonspecific)  Your exam might not show the exact reason you have abdominal pain. Since there are many different causes of abdominal pain, another checkup and more tests may be needed. It is very important to follow up for lasting (persistent) or worsening symptoms. A possible cause of abdominal pain in any person who still has his or her appendix is acute appendicitis. Appendicitis is often hard to diagnose. Normal blood tests, urine tests, ultrasound, and CT scans do not completely rule out early appendicitis or other causes of abdominal pain. Sometimes, only the changes that happen over time will allow  appendicitis and other causes of abdominal pain to be determined. Other potential problems that may require surgery may also take time to become more apparent. Because of this, it is important that you follow all of the instructions below.  HOME CARE INSTRUCTIONS   · Rest as much as possible.   · Do not eat solid food until your pain is gone.   · While adults or children have pain: A diet of water, weak decaffeinated tea, broth or bouillon, gelatin, oral rehydration solutions (ORS), frozen ice pops, or ice chips may be helpful.   · When pain is gone in adults or children: Start a light diet (dry toast, crackers, applesauce, or white rice). Increase the diet slowly as long as it does not bother you. Eat no dairy products (including cheese and eggs) and no spicy, fatty, fried, or high-fiber foods.   · Use no alcohol, caffeine, or cigarettes.   · Take your regular medicines unless your caregiver told you not to.   · Take any prescribed medicine as directed.   · Only take over-the-counter or prescription medicines for pain, discomfort, or fever as directed by your caregiver. Do not give aspirin to children.   If your caregiver has given you a follow-up appointment, it is very important to keep that appointment. Not keeping the appointment could result in a permanent injury and/or lasting (chronic) pain and/or disability. If there is any problem keeping the appointment, you must call to reschedule.   SEEK IMMEDIATE MEDICAL CARE IF:   · Your pain is not gone in 24 hours.   · Your pain becomes worse, changes location, or feels different.   · You or your child has an oral temperature above 102° F (38.9° C), not controlled by medicine.   · Your baby is older than 3 months with a rectal temperature of 102° F (38.9° C) or higher.   · Your baby is 3 months old or younger with a rectal temperature of 100.4° F (38° C) or higher.   · You have shaking chills.   · You keep throwing up (vomiting) or cannot drink liquids.   · There is  blood in your vomit or you see blood in your bowel movements.   · Your bowel movements become dark or black.   · You have frequent bowel movements.   · Your bowel movements stop (become blocked) or you cannot pass gas.   · You have bloody, frequent, or painful urination.   · You have yellow discoloration in the skin or whites of the eyes.   · Your stomach becomes bloated or bigger.   · You have dizziness or fainting.   · You have chest or back pain.   MAKE SURE YOU:   · Understand these instructions.   · Will watch your condition.   · Will get help right away if you are not doing well or get worse.   Document Released: 12/18/2006 Document Revised: 03/11/2013 Document Reviewed: 11/15/2010  ExitCare® Patient Information ©2013 [employer].      Urinary Tract Infection  Urinary tract infections (UTIs) can develop anywhere along your urinary tract. Your urinary tract is your body's drainage system for removing wastes and extra water. Your urinary tract includes two kidneys, two ureters, a bladder, and a urethra. Your kidneys are a pair of bean-shaped organs. Each kidney is about the size of your fist. They are located below your ribs, one on each side of your spine.  CAUSES  Infections are caused by microbes, which are microscopic organisms, including fungi, viruses, and bacteria. These organisms are so small that they can only be seen through a microscope. Bacteria are the microbes that most commonly cause UTIs.  SYMPTOMS   Symptoms of UTIs may vary by age and gender of the patient and by the location of the infection. Symptoms in young women typically include a frequent and intense urge to urinate and a painful, burning feeling in the bladder or urethra during urination. Older women and men are more likely to be tired, shaky, and weak and have muscle aches and abdominal pain. A fever may mean the infection is in your kidneys. Other symptoms of a kidney infection include pain in your back or sides below the ribs,  nausea, and vomiting.  DIAGNOSIS  To diagnose a UTI, your caregiver will ask you about your symptoms. Your caregiver also will ask to provide a urine sample. The urine sample will be tested for bacteria and white blood cells. White blood cells are made by your body to help fight infection.  TREATMENT   Typically, UTIs can be treated with medication. Because most UTIs are caused by a bacterial infection, they usually can be treated with the use of antibiotics. The choice of antibiotic and length of treatment depend on your symptoms and the type of bacteria causing your infection.  HOME CARE INSTRUCTIONS  · If you were prescribed antibiotics, take them exactly as your caregiver instructs you. Finish the medication even if you feel better after you have only taken some of the medication.  · Drink enough water and fluids to keep your urine clear or pale yellow.  · Avoid caffeine, tea, and carbonated beverages. They tend to irritate your bladder.  · Empty your bladder often. Avoid holding urine for long periods of time.  · Empty your bladder before and after sexual intercourse.  · After a bowel movement, women should cleanse from front to back. Use each tissue only once.  SEEK MEDICAL CARE IF:   · You have back pain.  · You develop a fever.  · Your symptoms do not begin to resolve within 3 days.  SEEK IMMEDIATE MEDICAL CARE IF:   · You have severe back pain or lower abdominal pain.  · You develop chills.  · You have nausea or vomiting.  · You have continued burning or discomfort with urination.  MAKE SURE YOU:   · Understand these instructions.  · Will watch your condition.  · Will get help right away if you are not doing well or get worse.     This information is not intended to replace advice given to you by your health care provider. Make sure you discuss any questions you have with your health care provider.     Document Released: 09/27/2006 Document Revised: 01/08/2016 Document Reviewed: 01/25/2013  [employer]  "Interactive Patient Education ©2016 [employer]      Ovarian Cyst  An ovarian cyst is a fluid-filled sac that forms on an ovary. The ovaries are small organs that produce eggs in women. Various types of cysts can form on the ovaries. Most are not cancerous. Many do not cause problems, and they often go away on their own. Some may cause symptoms and require treatment. Common types of ovarian cysts include:  · Functional cysts--These cysts may occur every month during the menstrual cycle. This is normal. The cysts usually go away with the next menstrual cycle if the woman does not get pregnant. Usually, there are no symptoms with a functional cyst.  · Endometrioma cysts--These cysts form from the tissue that lines the uterus. They are also called \"chocolate cysts\" because they become filled with blood that turns brown. This type of cyst can cause pain in the lower abdomen during intercourse and with your menstrual period.  · Cystadenoma cysts--This type develops from the cells on the outside of the ovary. These cysts can get very big and cause lower abdomen pain and pain with intercourse. This type of cyst can twist on itself, cut off its blood supply, and cause severe pain. It can also easily rupture and cause a lot of pain.  · Dermoid cysts--This type of cyst is sometimes found in both ovaries. These cysts may contain different kinds of body tissue, such as skin, teeth, hair, or cartilage. They usually do not cause symptoms unless they get very big.  · Theca lutein cysts--These cysts occur when too much of a certain hormone (human chorionic gonadotropin) is produced and overstimulates the ovaries to produce an egg. This is most common after procedures used to assist with the conception of a baby (in vitro fertilization).  CAUSES   · Fertility drugs can cause a condition in which multiple large cysts are formed on the ovaries. This is called ovarian hyperstimulation syndrome.  · A condition called polycystic ovary " syndrome can cause hormonal imbalances that can lead to nonfunctional ovarian cysts.  SIGNS AND SYMPTOMS   Many ovarian cysts do not cause symptoms. If symptoms are present, they may include:  · Pelvic pain or pressure.  · Pain in the lower abdomen.  · Pain during sexual intercourse.  · Increasing girth (swelling) of the abdomen.  · Abnormal menstrual periods.  · Increasing pain with menstrual periods.  · Stopping having menstrual periods without being pregnant.  DIAGNOSIS   These cysts are commonly found during a routine or annual pelvic exam. Tests may be ordered to find out more about the cyst. These tests may include:  · Ultrasound.  · X-ray of the pelvis.  · CT scan.  · MRI.  · Blood tests.  TREATMENT   Many ovarian cysts go away on their own without treatment. Your health care provider may want to check your cyst regularly for 2-3 months to see if it changes. For women in menopause, it is particularly important to monitor a cyst closely because of the higher rate of ovarian cancer in menopausal women. When treatment is needed, it may include any of the following:  · A procedure to drain the cyst (aspiration). This may be done using a long needle and ultrasound. It can also be done through a laparoscopic procedure. This involves using a thin, lighted tube with a tiny camera on the end (laparoscope) inserted through a small incision.  · Surgery to remove the whole cyst. This may be done using laparoscopic surgery or an open surgery involving a larger incision in the lower abdomen.  · Hormone treatment or birth control pills. These methods are sometimes used to help dissolve a cyst.  HOME CARE INSTRUCTIONS   · Only take over-the-counter or prescription medicines as directed by your health care provider.  · Follow up with your health care provider as directed.  · Get regular pelvic exams and Pap tests.  SEEK MEDICAL CARE IF:   · Your periods are late, irregular, or painful, or they stop.  · Your pelvic pain or  abdominal pain does not go away.  · Your abdomen becomes larger or swollen.  · You have pressure on your bladder or trouble emptying your bladder completely.  · You have pain during sexual intercourse.  · You have feelings of fullness, pressure, or discomfort in your stomach.  · You lose weight for no apparent reason.  · You feel generally ill.  · You become constipated.  · You lose your appetite.  · You develop acne.  · You have an increase in body and facial hair.  · You are gaining weight, without changing your exercise and eating habits.  · You think you are pregnant.  SEEK IMMEDIATE MEDICAL CARE IF:   · You have increasing abdominal pain.  · You feel sick to your stomach (nauseous), and you throw up (vomit).  · You develop a fever that comes on suddenly.  · You have abdominal pain during a bowel movement.  · Your menstrual periods become heavier than usual.  MAKE SURE YOU:  · Understand these instructions.  · Will watch your condition.  · Will get help right away if you are not doing well or get worse.     This information is not intended to replace advice given to you by your health care provider. Make sure you discuss any questions you have with your health care provider.     Document Released: 12/18/2006 Document Revised: 12/23/2014 Document Reviewed: 08/25/2014  [employer] Interactive Patient Education ©2016 [employer]            Patient Information     Patient Information    Following emergency treatment: all patient requiring follow-up care must return either to a private physician or a clinic if your condition worsens before you are able to obtain further medical attention, please return to the emergency room.     Billing Information    At [facility], we work to make the billing process streamlined for our patients.  Our Representatives are here to answer any questions you may have regarding your hospital bill.  If you have insurance coverage and have supplied your insurance information to us, we  will submit a claim to your insurer on your behalf.  Should you have any questions regarding your bill, we can be reached online or by phone as follows:  Online: You are able pay your bills online or live chat with our representatives about any billing questions you may have. We are here to help Monday - Friday from 8:00am to 7:30pm and 9:00am - 12:00pm on Saturdays.  Please visit [email or web address]  for more information.   Phone:  [phone] or [phone]    Please note that your emergency physician, surgeon, pathologist, radiologist, anesthesiologist, and other specialists are not employed by [facility] and will therefore bill separately for their services.  Please contact them directly for any questions concerning their bills at the numbers below:     Emergency Physician Services:  [phone]  [facility]:  [phone]  [facility]:  [phone]  [facility]:  [phone]    1. Your final bill may vary from the amount quoted upon discharge if all procedures are not complete at that time, or if your doctor has additional procedures of which we are not aware. You will receive an additional bill if you return to the Emergency Department at [facility] for suture removal regardless of the facility of which the sutures were placed.     2. Please arrange for settlement of this account at the emergency registration.    3. All self-pay accounts are due in full at the time of treatment.  If you are unable to meet this obligation then payment is expected within 4-5 days.     4. If you have had radiology studies (CT, X-ray, Ultrasound, MRI), you have received a preliminary result during your emergency department visit. Please contact the radiology department [phone] to receive a copy of your final result. Please discuss the Final result with your primary physician or with the follow up physician provided.     Crisis  Hotline:  [place] Crisis Hotline:  8-314-YDJSJOO or 1-718.550.5974  [place] Crisis Hotline:    1-789.675.2418 or 240-902-1726         ED Discharge Follow Up Questions    1. In order to provide you with very good care, we would like to follow up with a phone call in the next few days.  May we have your permission to contact you?     YES /  NO    2. What is the best phone number to call you? (       )_____-__________    3. What is the best time to call you?      Morning  /  Afternoon  /  Evening                   Patient Signature:  ____________________________________________________________    Date:  ____________________________________________________________      Your appointments     Apr 27, 2017 11:40 AM   BOTOX with JORDAN Mauro   [facility] (--)    [address], [address]  [place] [id number] [phone]

## 2017-03-18 NOTE — ED AVS SNAPSHOT
3/18/2017          Rehana Meade  [address]  [place] [address]    Dear Rehana:    Counts include 234 beds at the [facility] wants to ensure your discharge home is safe and you or your loved ones have had all your questions answered regarding your care after you leave the hospital.    You may receive a telephone call within two days of your discharge.  This call is to make certain you understand your discharge instructions as well as ensure we provided you with the best care possible during your stay with us.     The call will only last approximately 3-5 minutes and will be done by a nurse.    Once again, we want to ensure your discharge home is safe and that you have a clear understanding of any next steps in your care.  If you have any questions or concerns, please do not hesitate to contact us, we are here for you.  Thank you for choosing [facility] for your healthcare needs.    Sincerely,    Arben Estes    [facility]

## 2017-03-20 LAB
BACTERIA UR CULT: NORMAL
SIGNIFICANT IND 70042: NORMAL
SITE SITE: NORMAL
SOURCE SOURCE: NORMAL

## 2017-03-22 ENCOUNTER — HOSPITAL ENCOUNTER (OUTPATIENT)
Dept: LAB | Facility: MEDICAL CENTER | Age: 32
End: 2017-03-22
Attending: NURSE PRACTITIONER
Payer: COMMERCIAL

## 2017-03-22 LAB — B-HCG SERPL-ACNC: <0.6 MIU/ML (ref 0–5)

## 2017-03-22 PROCEDURE — 84702 CHORIONIC GONADOTROPIN TEST: CPT

## 2017-03-22 PROCEDURE — 36415 COLL VENOUS BLD VENIPUNCTURE: CPT

## 2017-03-23 ENCOUNTER — HOSPITAL ENCOUNTER (OUTPATIENT)
Dept: RADIOLOGY | Facility: MEDICAL CENTER | Age: 32
End: 2017-03-23
Attending: PHYSICAL MEDICINE & REHABILITATION
Payer: COMMERCIAL

## 2017-03-23 VITALS
WEIGHT: 200 LBS | TEMPERATURE: 98.2 F | OXYGEN SATURATION: 96 % | HEIGHT: 63 IN | BODY MASS INDEX: 35.44 KG/M2 | DIASTOLIC BLOOD PRESSURE: 74 MMHG | HEART RATE: 75 BPM | RESPIRATION RATE: 16 BRPM | SYSTOLIC BLOOD PRESSURE: 108 MMHG

## 2017-03-23 DIAGNOSIS — G43.719 INTRACTABLE CHRONIC MIGRAINE WITHOUT AURA AND WITHOUT STATUS MIGRAINOSUS: ICD-10-CM

## 2017-03-23 PROCEDURE — 700117 HCHG RX CONTRAST REV CODE 255

## 2017-03-23 PROCEDURE — 700101 HCHG RX REV CODE 250

## 2017-03-23 PROCEDURE — A9579 GAD-BASE MR CONTRAST NOS,1ML: HCPCS

## 2017-03-23 PROCEDURE — 700111 HCHG RX REV CODE 636 W/ 250 OVERRIDE (IP)

## 2017-03-23 PROCEDURE — 01922 ANES N-INVAS IMG/RADJ THER: CPT

## 2017-03-23 RX ADMIN — GADODIAMIDE 20 ML: 287 INJECTION INTRAVENOUS at 08:09

## 2017-03-23 ASSESSMENT — PAIN SCALES - GENERAL
PAINLEVEL_OUTOF10: 0

## 2017-03-23 NOTE — DISCHARGE INSTRUCTIONS
MRI ADULT DISCHARGE INSTRUCTIONS    You have been medicated today for your scan. Please follow the instructions below to ensure your safe recovery. If you have any questions or problems, feel free to call us at [phone] or [phone].     1.   Have someone stay with you to assist you as needed.    2.   Do not drive or operate any mechanical devices.    3.   Do not perform any activity that requires concentration. Make no major decisions over the next 24 hours.     4.   Be careful changing positions from laying down to sitting or standing, as you may become dizzy.     5.   Do not drink alcohol for 48 hours.    6.   There are no restrictions for eating your normal meals. Drink fluids.    7.   You may continue your usual medications for pain, tranquilizers, muscle relaxants or sedatives when awake.     8.   Tomorrow, you may continue your normal daily activities.     9.   Pressure dressing on 10 - 15 minutes. If swelling or bleeding occurs when removed, continue placing direct pressure on injection site for another 5 minutes, or until bleeding stops.     I have been informed of and understand the above discharge instructions.

## 2017-03-23 NOTE — IP AVS SNAPSHOT
" <p align=\"LEFT\"><IMG SRC=\"//EMRWB/blob$/Images/[email or web address]\" alt=\"Image\" WIDTH=\"50%\" HEIGHT=\"200\" BORDER=\"\"></p>      `           Rehana Meade   MRN: [id number]    Department:  [facility] MRI [address]   Date of Visit:              `  Discharge Instructions       MRI ADULT DISCHARGE INSTRUCTIONS    You have been medicated today for your scan. Please follow the instructions below to ensure your safe recovery. If you have any questions or problems, feel free to call us at [phone] or [phone].     1.   Have someone stay with you to assist you as needed.    2.   Do not drive or operate any mechanical devices.    3.   Do not perform any activity that requires concentration. Make no major decisions over the next 24 hours.     4.   Be careful changing positions from laying down to sitting or standing, as you may become dizzy.     5.   Do not drink alcohol for 48 hours.    6.   There are no restrictions for eating your normal meals. Drink fluids.    7.   You may continue your usual medications for pain, tranquilizers, muscle relaxants or sedatives when awake.     8.   Tomorrow, you may continue your normal daily activities.     9.   Pressure dressing on 10 - 15 minutes. If swelling or bleeding occurs when removed, continue placing direct pressure on injection site for another 5 minutes, or until bleeding stops.     I have been informed of and understand the above discharge instructions.      `       Diet / Nutrition:    Follow any diet instructions given to you by your doctor or the dietician, including how much salt (sodium) you are allowed each day.    If you are overweight, talk to your doctor about a weight reduction plan.    Activity:    Remain physically active following your doctor's instructions about exercise and activity.    Rest often.     Any time you become even a little tired or short of breath, SIT DOWN and rest.    Worsening Symptoms:    Report any of the following signs and symptoms to the " doctor's office immediately:    *Pain of jaw, arm, or neck  *Chest pain not relieved by medication                               *Dizziness or loss of consciousness  *Difficulty breathing even when at rest   *More tired than usual                                       *Bleeding drainage or swelling of surgical site  *Swelling of feet, ankles, legs or stomach                 *Fever (>100ºF)  *Pink or blood tinged sputum  *Weight gain (3lbs/day or 5lbs /week)           *Shock from internal defibrillator (if applicable)  *Palpitations or irregular heartbeats                *Cool and/or numb extremities    Stroke Awareness    Common Risk Factors for Stroke include:    Age  Atrial Fibrillation  Carotid Artery Stenosis  Diabetes Mellitus  Excessive alcohol consumption  High blood pressure  Overweight   Physical inactivity  Smoking    Warning signs and symptoms of a stroke include:    *Sudden numbness or weakness of the face, arm or leg (especially on one side of the body).  *Sudden confusion, trouble speaking or understanding.  *Sudden trouble seeing in one or both eyes.  *Sudden trouble walking, dizziness, loss of balance or coordination.Sudden severe headache with no known cause.    It is very important to get treatment quickly when a stroke occurs. If you experience any of the above warning signs, call 911 immediately.                    `     Quit Smoking / Tobacco Use:    I understand the use of any tobacco products increases my chance of suffering from future heart disease or stroke and could cause other illnesses which may shorten my life. Quitting the use of tobacco products is the single most important thing I can do to improve my health. For further information on smoking / tobacco cessation call a Toll Free Quit Line at 1-108.788.4858 (*[employer] [place]) or 1-567.910.2178 (American Lung Association) or you can access the web based program at www.lungusa.org.    [place] Tobacco Users Help Line:  (820)  870-4678       Toll Free: 8-207-043-5286  Quit Tobacco Program [facility] (285)[phone]    Crisis Hotline:    [place] Crisis Hotline:  4-314-PPBGLTI or 1-373.436.1604    [place] Crisis Hotline:    1-130.737.2874 or 127-181-7449    Discharge Survey:   Thank you for choosing [facility]. We hope we did everything we could to make your hospital stay a pleasant one. You may be receiving a phone survey and we would appreciate your time and participation in answering the questions. Your input is very valuable to us in our efforts to improve our service to our patients and their families.        My signature on this form indicates that:    1. I have reviewed and understand the above information.  2. My questions regarding this information have been answered to my satisfaction.  3. I have formulated a plan with my discharge nurse to obtain my prescribed medications for home.                   `           Patient or Caregiver Signature:  ____________________________________________________________    Date:  ____________________________________________________________       `

## 2017-04-11 ENCOUNTER — TELEPHONE (OUTPATIENT)
Dept: NEUROLOGY | Facility: MEDICAL CENTER | Age: 32
End: 2017-04-11

## 2017-04-11 ENCOUNTER — HOSPITAL ENCOUNTER (EMERGENCY)
Facility: MEDICAL CENTER | Age: 32
End: 2017-04-12
Attending: EMERGENCY MEDICINE
Payer: COMMERCIAL

## 2017-04-11 DIAGNOSIS — R51.9 ACUTE NONINTRACTABLE HEADACHE, UNSPECIFIED HEADACHE TYPE: Primary | ICD-10-CM

## 2017-04-11 DIAGNOSIS — G89.29 OTHER CHRONIC PAIN: ICD-10-CM

## 2017-04-11 PROCEDURE — 96374 THER/PROPH/DIAG INJ IV PUSH: CPT

## 2017-04-11 PROCEDURE — 99284 EMERGENCY DEPT VISIT MOD MDM: CPT

## 2017-04-11 PROCEDURE — 700111 HCHG RX REV CODE 636 W/ 250 OVERRIDE (IP): Performed by: EMERGENCY MEDICINE

## 2017-04-11 PROCEDURE — 96361 HYDRATE IV INFUSION ADD-ON: CPT

## 2017-04-11 PROCEDURE — 96375 TX/PRO/DX INJ NEW DRUG ADDON: CPT

## 2017-04-11 PROCEDURE — 700105 HCHG RX REV CODE 258: Performed by: EMERGENCY MEDICINE

## 2017-04-11 RX ORDER — KETOROLAC TROMETHAMINE 30 MG/ML
30 INJECTION, SOLUTION INTRAMUSCULAR; INTRAVENOUS ONCE
Status: COMPLETED | OUTPATIENT
Start: 2017-04-11 | End: 2017-04-11

## 2017-04-11 RX ORDER — SODIUM CHLORIDE 9 MG/ML
1000 INJECTION, SOLUTION INTRAVENOUS ONCE
Status: COMPLETED | OUTPATIENT
Start: 2017-04-11 | End: 2017-04-12

## 2017-04-11 RX ORDER — DEXAMETHASONE SODIUM PHOSPHATE 4 MG/ML
10 INJECTION, SOLUTION INTRA-ARTICULAR; INTRALESIONAL; INTRAMUSCULAR; INTRAVENOUS; SOFT TISSUE ONCE
Status: COMPLETED | OUTPATIENT
Start: 2017-04-11 | End: 2017-04-11

## 2017-04-11 RX ORDER — DIPHENHYDRAMINE HYDROCHLORIDE 50 MG/ML
25 INJECTION INTRAMUSCULAR; INTRAVENOUS ONCE
Status: COMPLETED | OUTPATIENT
Start: 2017-04-11 | End: 2017-04-11

## 2017-04-11 RX ADMIN — DIPHENHYDRAMINE HYDROCHLORIDE 25 MG: 50 INJECTION, SOLUTION INTRAMUSCULAR; INTRAVENOUS at 23:27

## 2017-04-11 RX ADMIN — DEXAMETHASONE SODIUM PHOSPHATE 10 MG: 4 INJECTION, SOLUTION INTRAMUSCULAR; INTRAVENOUS at 23:27

## 2017-04-11 RX ADMIN — PROCHLORPERAZINE EDISYLATE 10 MG: 5 INJECTION INTRAMUSCULAR; INTRAVENOUS at 23:27

## 2017-04-11 RX ADMIN — SODIUM CHLORIDE 1000 ML: 9 INJECTION, SOLUTION INTRAVENOUS at 23:27

## 2017-04-11 RX ADMIN — KETOROLAC TROMETHAMINE 30 MG: 30 INJECTION, SOLUTION INTRAMUSCULAR; INTRAVENOUS at 23:27

## 2017-04-11 ASSESSMENT — LIFESTYLE VARIABLES: DO YOU DRINK ALCOHOL: NO

## 2017-04-11 ASSESSMENT — PAIN SCALES - GENERAL: PAINLEVEL_OUTOF10: 9

## 2017-04-11 NOTE — TELEPHONE ENCOUNTER
Message  Received: Today       Patrick Adame M.D.  Lelia Munoz, [employer] Ass't       Caller: Unspecified (Today, 1:10 PM)                     She can go to the ED. There is nothing I can do for her at the office       Pt was informed.

## 2017-04-11 NOTE — TELEPHONE ENCOUNTER
Message  Received: Today       Patrick Adame M.D.  Lelia Munoz, [employer] Ass't       Caller: Unspecified (Today, 1:10 PM)                     I can offer her a nerve block       States does not want to get ONB. States she's had problems with her occipital nerves since she got the ONB the very first time. States ONB are very painful. Wants to know another option.

## 2017-04-11 NOTE — ED AVS SNAPSHOT
NetVision Access Code: [email or web address]  Expires: 4/26/2017 12:11 PM    NetVision  A secure, online tool to manage your health information     [employer]’s NetVision® is a secure, online tool that connects you to your personalized health information from the privacy of your home -- day or night - making it very easy for you to manage your healthcare. Once the activation process is completed, you can even access your medical information using the NetVision minerva, which is available for free in the Apple Minerva store or Google Play store.     NetVision provides the following levels of access (as shown below):   My Chart Features   [facility] Primary Care Doctor [facility]  Specialists [facility]  Urgent  Care Non-[facility]  Primary Care  Doctor   Email your healthcare team securely and privately 24/7 X X X X   Manage appointments: schedule your next appointment; view details of past/upcoming appointments X      Request prescription refills. X      View recent personal medical records, including lab and immunizations X X X X   View health record, including health history, allergies, medications X X X X   Read reports about your outpatient visits, procedures, consult and ER notes X X X X   See your discharge summary, which is a recap of your hospital and/or ER visit that includes your diagnosis, lab results, and care plan. X X       How to register for NetVision:  1. Go to  [email or web address].  2. Click on the Sign Up Now box, which takes you to the New Member Sign Up page. You will need to provide the following information:  a. Enter your NetVision Access Code exactly as it appears at the top of this page. (You will not need to use this code after you’ve completed the sign-up process. If you do not sign up before the expiration date, you must request a new code.)   b. Enter your date of birth.   c. Enter your home email address.   d. Click Submit, and follow the next screen’s instructions.  3. Create a NetVision ID. This will be your NetVision  login ID and cannot be changed, so think of one that is secure and easy to remember.  4. Create a [employer] password. You can change your password at any time.  5. Enter your Password Reset Question and Answer. This can be used at a later time if you forget your password.   6. Enter your e-mail address. This allows you to receive e-mail notifications when new information is available in [employer].  7. Click Sign Up. You can now view your health information.    For assistance activating your [employer] account, call [phone]

## 2017-04-11 NOTE — ED AVS SNAPSHOT
4/12/2017          Rehana Meade  [address]  [place] [address]    Dear Rehana:    [facility] wants to ensure your discharge home is safe and you or your loved ones have had all your questions answered regarding your care after you leave the hospital.    You may receive a telephone call within two days of your discharge.  This call is to make certain you understand your discharge instructions as well as ensure we provided you with the best care possible during your stay with us.     The call will only last approximately 3-5 minutes and will be done by a nurse.    Once again, we want to ensure your discharge home is safe and that you have a clear understanding of any next steps in your care.  If you have any questions or concerns, please do not hesitate to contact us, we are here for you.  Thank you for choosing [facility] for your healthcare needs.    Sincerely,    Arben Estes    [facility]

## 2017-04-11 NOTE — TELEPHONE ENCOUNTER
Pt calling states 9:30 this morning, she started having shooting pain in the head, all occipital having sharp pain. Numbness in the face and both arms. Tingly like sleep feeling. Pt states has had 2 sets of botox injections and has not felt any relief. Can't move her head, look down. Her last botox injection was on 2/2/17.

## 2017-04-11 NOTE — ED AVS SNAPSHOT
Home Care Instructions                                                                                                                Rehana Meade   MRN: [id number]    Department:  [facility], Emergency Dept   Date of Visit:  4/11/2017            [facility], Emergency Dept    [address] [address]    Phone:  [phone]      You were seen by     Mirta Lees D.O.      Your Diagnosis Was     Acute nonintractable headache, unspecified headache type     R51       These are the medications you received during your hospitalization from 04/11/2017 1911 to 04/12/2017 0101     Date/Time Order Dose Route Action    04/11/2017 2327 NS infusion 1,000 mL 1,000 mL Intravenous New Bag    04/11/2017 2327 prochlorperazine (COMPAZINE) injection 10 mg 10 mg Intravenous Given    04/11/2017 2327 diphenhydrAMINE (BENADRYL) injection 25 mg 25 mg Intravenous Given    04/11/2017 2327 dexamethasone (DECADRON) injection 10 mg 10 mg Intravenous Given    04/11/2017 2327 ketorolac (TORADOL) injection 30 mg 30 mg Intravenous Given      Follow-up Information     1. Follow up with Mack Bernard M.D. In 1 day.    Specialty:  Family Medicine    Contact information    [address] [address] [phone]          2. Follow up with JORDAN Mauro In 1 day.    Specialty:  Pediatrics    Contact information    [address] [facility] [address]  [facility] [address] [phone]        Medication Information     Review all of your home medications and newly ordered medications with your primary doctor and/or pharmacist as soon as possible. Follow medication instructions as directed by your doctor and/or pharmacist.     Please keep your complete medication list with you and share with your physician. Update the information when medications are discontinued, doses are changed, or new medications (including over-the-counter products) are added; and carry medication  information at all times in the event of emergency situations.               Medication List      ASK your doctor about these medications        Instructions    Morning Afternoon Evening Bedtime    acetaminophen/caffeine/butalbital 325-40-50 mg -40 MG Tabs   Commonly known as:  FIORICET        Take 1 Tab by mouth every four hours as needed for Headache.   Dose:  1 Tab                        gabapentin 100 MG Caps   Commonly known as:  NEURONTIN        Take 100 mg by mouth.   Dose:  100 mg                        oxycodone-acetaminophen 5-325 MG Tabs   Commonly known as:  PERCOCET        Take 1-2 Tabs by mouth every four hours as needed.   Dose:  1-2 Tab                                Procedures and tests performed during your visit     Cardiac Monitoring    IV Saline Lock    Pulse Ox        Discharge Instructions       Follow-up with primary care and/or neurology this week for reevaluation, medication management and close blood pressure monitoring.    Continue home medications as previously indicated, including Fioricet for breakthrough headaches.  Encourage oral fluid hydration. Activity as tolerated.    Return to the emergency department for intractable headache, altered mental status, focal weakness, fever, vomiting or other new concerns.    General Headache Without Cause  A general headache is pain or discomfort felt around the head or neck area. The cause may not be found.   HOME CARE   · Keep all doctor visits.  · Only take medicines as told by your doctor.  · Lie down in a dark, quiet room when you have a headache.  · Keep a journal to find out if certain things bring on headaches. For example, write down:  ¨ What you eat and drink.  ¨ How much sleep you get.  ¨ Any change to your diet or medicines.  · Relax by getting a massage or doing other relaxing activities.  · Put ice or heat packs on the head and neck area as told by your doctor.  · Lessen stress.  · Sit up straight. Do not tighten (tense) your  muscles.  · Quit smoking if you smoke.  · Lessen how much alcohol you drink.  · Lessen how much caffeine you drink, or stop drinking caffeine.  · Eat and sleep on a regular schedule.  · Get 7 to 9 hours of sleep, or as told by your doctor.  · Keep lights dim if bright lights bother you or make your headaches worse.  GET HELP RIGHT AWAY IF:   · Your headache becomes really bad.  · You have a fever.  · You have a stiff neck.  · You have trouble seeing.  · Your muscles are weak, or you lose muscle control.  · You lose your balance or have trouble walking.  · You feel like you will pass out (faint), or you pass out.  · You have really bad symptoms that are different than your first symptoms.  · You have problems with the medicines given to you by your doctor.  · Your medicines do not work.  · Your headache feels different than the other headaches.  · You feel sick to your stomach (nauseous) or throw up (vomit).     This information is not intended to replace advice given to you by your health care provider. Make sure you discuss any questions you have with your health care provider.     Document Released: 09/26/2009 Document Revised: 05/03/2016 Document Reviewed: 12/07/2012  [employer] Interactive Patient Education ©2016 [employer]            Patient Information     Patient Information    Following emergency treatment: all patient requiring follow-up care must return either to a private physician or a clinic if your condition worsens before you are able to obtain further medical attention, please return to the emergency room.     Billing Information    At [facility], we work to make the billing process streamlined for our patients.  Our Representatives are here to answer any questions you may have regarding your hospital bill.  If you have insurance coverage and have supplied your insurance information to us, we will submit a claim to your insurer on your behalf.  Should you have any questions regarding your bill,  we can be reached online or by phone as follows:  Online: You are able pay your bills online or live chat with our representatives about any billing questions you may have. We are here to help Monday - Friday from 8:00am to 7:30pm and 9:00am - 12:00pm on Saturdays.  Please visit [email or web address]  for more information.   Phone:  [phone] or [phone]    Please note that your emergency physician, surgeon, pathologist, radiologist, anesthesiologist, and other specialists are not employed by [facility] and will therefore bill separately for their services.  Please contact them directly for any questions concerning their bills at the numbers below:     Emergency Physician Services:  [phone]  [facility]:  [phone]  [facility]:  [phone]  [facility]:  [phone]    1. Your final bill may vary from the amount quoted upon discharge if all procedures are not complete at that time, or if your doctor has additional procedures of which we are not aware. You will receive an additional bill if you return to the Emergency Department at [facility] for suture removal regardless of the facility of which the sutures were placed.     2. Please arrange for settlement of this account at the emergency registration.    3. All self-pay accounts are due in full at the time of treatment.  If you are unable to meet this obligation then payment is expected within 4-5 days.     4. If you have had radiology studies (CT, X-ray, Ultrasound, MRI), you have received a preliminary result during your emergency department visit. Please contact the radiology department [phone] to receive a copy of your final result. Please discuss the Final result with your primary physician or with the follow up physician provided.     Crisis Hotline:  [place] Crisis Hotline:  4-659-XISFFHG or 1-849.232.5310  [place] Crisis Hotline:    1-625.573.4864 or  202.155.6893         ED Discharge Follow Up Questions    1. In order to provide you with very good care, we would like to follow up with a phone call in the next few days.  May we have your permission to contact you?     YES /  NO    2. What is the best phone number to call you? (       )_____-__________    3. What is the best time to call you?      Morning  /  Afternoon  /  Evening                   Patient Signature:  ____________________________________________________________    Date:  ____________________________________________________________      Your appointments     Apr 27, 2017 11:40 AM   BOTOX with JORDAN Mauro   [facility]rology (--)    [address], [address]  [facility] [id number] [phone]

## 2017-04-12 ENCOUNTER — HOSPITAL ENCOUNTER (INPATIENT)
Facility: MEDICAL CENTER | Age: 32
LOS: 3 days | DRG: 103 | End: 2017-04-15
Attending: EMERGENCY MEDICINE | Admitting: INTERNAL MEDICINE
Payer: COMMERCIAL

## 2017-04-12 ENCOUNTER — RESOLUTE PROFESSIONAL BILLING HOSPITAL PROF FEE (OUTPATIENT)
Dept: HOSPITALIST | Facility: MEDICAL CENTER | Age: 32
End: 2017-04-12
Payer: COMMERCIAL

## 2017-04-12 ENCOUNTER — TELEPHONE (OUTPATIENT)
Dept: NEUROLOGY | Facility: MEDICAL CENTER | Age: 32
End: 2017-04-12

## 2017-04-12 VITALS
WEIGHT: 205.69 LBS | OXYGEN SATURATION: 91 % | HEIGHT: 63 IN | TEMPERATURE: 98.3 F | BODY MASS INDEX: 36.45 KG/M2 | HEART RATE: 83 BPM | DIASTOLIC BLOOD PRESSURE: 83 MMHG | SYSTOLIC BLOOD PRESSURE: 126 MMHG | RESPIRATION RATE: 15 BRPM

## 2017-04-12 DIAGNOSIS — R51.9 CHRONIC INTRACTABLE HEADACHE, UNSPECIFIED HEADACHE TYPE: ICD-10-CM

## 2017-04-12 DIAGNOSIS — G89.29 CHRONIC INTRACTABLE HEADACHE, UNSPECIFIED HEADACHE TYPE: ICD-10-CM

## 2017-04-12 LAB
ALBUMIN SERPL BCP-MCNC: 4 G/DL (ref 3.2–4.9)
ALBUMIN/GLOB SERPL: 1.1 G/DL
ALP SERPL-CCNC: 103 U/L (ref 30–99)
ALT SERPL-CCNC: 12 U/L (ref 2–50)
ANION GAP SERPL CALC-SCNC: 12 MMOL/L (ref 0–11.9)
AST SERPL-CCNC: 11 U/L (ref 12–45)
BASOPHILS # BLD AUTO: 0.4 % (ref 0–1.8)
BASOPHILS # BLD: 0.06 K/UL (ref 0–0.12)
BILIRUB SERPL-MCNC: 0.4 MG/DL (ref 0.1–1.5)
BUN SERPL-MCNC: 10 MG/DL (ref 8–22)
CALCIUM SERPL-MCNC: 9.6 MG/DL (ref 8.5–10.5)
CHLORIDE SERPL-SCNC: 105 MMOL/L (ref 96–112)
CO2 SERPL-SCNC: 23 MMOL/L (ref 20–33)
CREAT SERPL-MCNC: 0.64 MG/DL (ref 0.5–1.4)
EOSINOPHIL # BLD AUTO: 0.09 K/UL (ref 0–0.51)
EOSINOPHIL NFR BLD: 0.6 % (ref 0–6.9)
ERYTHROCYTE [DISTWIDTH] IN BLOOD BY AUTOMATED COUNT: 45.1 FL (ref 35.9–50)
GFR SERPL CREATININE-BSD FRML MDRD: >60 ML/MIN/1.73 M 2
GLOBULIN SER CALC-MCNC: 3.6 G/DL (ref 1.9–3.5)
GLUCOSE SERPL-MCNC: 84 MG/DL (ref 65–99)
HCT VFR BLD AUTO: 43.1 % (ref 37–47)
HGB BLD-MCNC: 14.3 G/DL (ref 12–16)
IMM GRANULOCYTES # BLD AUTO: 0.05 K/UL (ref 0–0.11)
IMM GRANULOCYTES NFR BLD AUTO: 0.3 % (ref 0–0.9)
LYMPHOCYTES # BLD AUTO: 3.97 K/UL (ref 1–4.8)
LYMPHOCYTES NFR BLD: 26 % (ref 22–41)
MCH RBC QN AUTO: 29.6 PG (ref 27–33)
MCHC RBC AUTO-ENTMCNC: 33.2 G/DL (ref 33.6–35)
MCV RBC AUTO: 89.2 FL (ref 81.4–97.8)
MONOCYTES # BLD AUTO: 0.96 K/UL (ref 0–0.85)
MONOCYTES NFR BLD AUTO: 6.3 % (ref 0–13.4)
NEUTROPHILS # BLD AUTO: 10.13 K/UL (ref 2–7.15)
NEUTROPHILS NFR BLD: 66.4 % (ref 44–72)
NRBC # BLD AUTO: 0 K/UL
NRBC BLD AUTO-RTO: 0 /100 WBC
PLATELET # BLD AUTO: 341 K/UL (ref 164–446)
PMV BLD AUTO: 9.6 FL (ref 9–12.9)
POTASSIUM SERPL-SCNC: 3.4 MMOL/L (ref 3.6–5.5)
PROT SERPL-MCNC: 7.6 G/DL (ref 6–8.2)
RBC # BLD AUTO: 4.83 M/UL (ref 4.2–5.4)
SODIUM SERPL-SCNC: 140 MMOL/L (ref 135–145)
WBC # BLD AUTO: 15.3 K/UL (ref 4.8–10.8)

## 2017-04-12 PROCEDURE — 80053 COMPREHEN METABOLIC PANEL: CPT

## 2017-04-12 PROCEDURE — 99285 EMERGENCY DEPT VISIT HI MDM: CPT

## 2017-04-12 PROCEDURE — 770006 HCHG ROOM/CARE - MED/SURG/GYN SEMI*

## 2017-04-12 PROCEDURE — 700105 HCHG RX REV CODE 258: Performed by: EMERGENCY MEDICINE

## 2017-04-12 PROCEDURE — 700111 HCHG RX REV CODE 636 W/ 250 OVERRIDE (IP): Performed by: EMERGENCY MEDICINE

## 2017-04-12 PROCEDURE — 700101 HCHG RX REV CODE 250: Performed by: EMERGENCY MEDICINE

## 2017-04-12 PROCEDURE — 96374 THER/PROPH/DIAG INJ IV PUSH: CPT

## 2017-04-12 PROCEDURE — 99222 1ST HOSP IP/OBS MODERATE 55: CPT | Performed by: INTERNAL MEDICINE

## 2017-04-12 PROCEDURE — 009U3ZX DRAINAGE OF SPINAL CANAL, PERCUTANEOUS APPROACH, DIAGNOSTIC: ICD-10-PCS | Performed by: EMERGENCY MEDICINE

## 2017-04-12 PROCEDURE — 62270 DX LMBR SPI PNXR: CPT

## 2017-04-12 PROCEDURE — 96375 TX/PRO/DX INJ NEW DRUG ADDON: CPT

## 2017-04-12 PROCEDURE — 85025 COMPLETE CBC W/AUTO DIFF WBC: CPT

## 2017-04-12 RX ORDER — PROMETHAZINE HYDROCHLORIDE 25 MG/1
12.5-25 TABLET ORAL EVERY 4 HOURS PRN
Status: DISCONTINUED | OUTPATIENT
Start: 2017-04-12 | End: 2017-04-15 | Stop reason: HOSPADM

## 2017-04-12 RX ORDER — BISACODYL 10 MG
10 SUPPOSITORY, RECTAL RECTAL
Status: DISCONTINUED | OUTPATIENT
Start: 2017-04-12 | End: 2017-04-15 | Stop reason: HOSPADM

## 2017-04-12 RX ORDER — GABAPENTIN 100 MG/1
100 CAPSULE ORAL 3 TIMES DAILY
Status: DISCONTINUED | OUTPATIENT
Start: 2017-04-13 | End: 2017-04-13

## 2017-04-12 RX ORDER — PROMETHAZINE HYDROCHLORIDE 25 MG/1
12.5-25 SUPPOSITORY RECTAL EVERY 4 HOURS PRN
Status: DISCONTINUED | OUTPATIENT
Start: 2017-04-12 | End: 2017-04-15 | Stop reason: HOSPADM

## 2017-04-12 RX ORDER — BUTALBITAL, ACETAMINOPHEN AND CAFFEINE 50; 325; 40 MG/1; MG/1; MG/1
1 TABLET ORAL EVERY 4 HOURS PRN
Status: DISCONTINUED | OUTPATIENT
Start: 2017-04-12 | End: 2017-04-15 | Stop reason: HOSPADM

## 2017-04-12 RX ORDER — ONDANSETRON 2 MG/ML
4 INJECTION INTRAMUSCULAR; INTRAVENOUS ONCE
Status: COMPLETED | OUTPATIENT
Start: 2017-04-12 | End: 2017-04-12

## 2017-04-12 RX ORDER — ONDANSETRON 4 MG/1
4 TABLET, ORALLY DISINTEGRATING ORAL EVERY 4 HOURS PRN
Status: DISCONTINUED | OUTPATIENT
Start: 2017-04-12 | End: 2017-04-15 | Stop reason: HOSPADM

## 2017-04-12 RX ORDER — ONDANSETRON 2 MG/ML
4 INJECTION INTRAMUSCULAR; INTRAVENOUS EVERY 4 HOURS PRN
Status: DISCONTINUED | OUTPATIENT
Start: 2017-04-12 | End: 2017-04-15 | Stop reason: HOSPADM

## 2017-04-12 RX ORDER — POLYETHYLENE GLYCOL 3350 17 G/17G
1 POWDER, FOR SOLUTION ORAL
Status: DISCONTINUED | OUTPATIENT
Start: 2017-04-12 | End: 2017-04-15 | Stop reason: HOSPADM

## 2017-04-12 RX ORDER — OXYCODONE HYDROCHLORIDE AND ACETAMINOPHEN 5; 325 MG/1; MG/1
1-2 TABLET ORAL EVERY 4 HOURS PRN
Status: DISCONTINUED | OUTPATIENT
Start: 2017-04-12 | End: 2017-04-15 | Stop reason: HOSPADM

## 2017-04-12 RX ORDER — SODIUM CHLORIDE 9 MG/ML
1000 INJECTION, SOLUTION INTRAVENOUS ONCE
Status: COMPLETED | OUTPATIENT
Start: 2017-04-12 | End: 2017-04-12

## 2017-04-12 RX ORDER — LIDOCAINE HYDROCHLORIDE 10 MG/ML
20 INJECTION, SOLUTION INFILTRATION; PERINEURAL ONCE
Status: COMPLETED | OUTPATIENT
Start: 2017-04-13 | End: 2017-04-13

## 2017-04-12 RX ORDER — AMOXICILLIN 250 MG
2 CAPSULE ORAL 2 TIMES DAILY
Status: DISCONTINUED | OUTPATIENT
Start: 2017-04-13 | End: 2017-04-15 | Stop reason: HOSPADM

## 2017-04-12 RX ADMIN — KETAMINE HYDROCHLORIDE 25 MG: 10 INJECTION, SOLUTION INTRAMUSCULAR; INTRAVENOUS at 21:52

## 2017-04-12 RX ADMIN — SODIUM CHLORIDE 1000 ML: 9 INJECTION, SOLUTION INTRAVENOUS at 21:33

## 2017-04-12 RX ADMIN — ONDANSETRON 4 MG: 2 INJECTION INTRAMUSCULAR; INTRAVENOUS at 22:18

## 2017-04-12 ASSESSMENT — PAIN SCALES - GENERAL
PAINLEVEL_OUTOF10: 7
PAINLEVEL_OUTOF10: 9
PAINLEVEL_OUTOF10: 2
PAINLEVEL_OUTOF10: 9

## 2017-04-12 ASSESSMENT — LIFESTYLE VARIABLES: DO YOU DRINK ALCOHOL: NO

## 2017-04-12 NOTE — IP AVS SNAPSHOT
" <p align=\"LEFT\"><IMG SRC=\"//EMRWB/blob$/Images/[email or web address]\" alt=\"Image\" WIDTH=\"50%\" HEIGHT=\"200\" BORDER=\"\"></p>                   Name:Rehana Meade  Medical Record Number:[id number]  CSN: [id number]    YOB: 1985   Age: 31 y.o.  Sex: female  HT:1.6 m (5' 2.99\") WT: 87.9 kg (193 lb 12.6 oz)          Admit Date: 4/12/2017     Discharge Date:   Today's Date: 4/15/2017  Attending Doctor:  Frederic Camarillo M.D.                  Allergies:  Biaxin; Penicillin g potassium; Tape; Ativan; Imitrex; Latex; Maxalt; Morphine; and Reglan          Your appointments     Apr 18, 2017  3:20 PM   Follow Up Visit with Patrick Adame M.D.   [facility] Neurology (--)    [address] [place] [address] [phone]           You will be receiving a confirmation call a few days before your appointment from our automated call confirmation system.            Apr 27, 2017 11:40 AM   BOTOX with JORDAN Mauro   [facility] (--)    [address] [place] [address] [phone]              Follow-up Information     1. Follow up with Mack Bernard M.D. In 2 weeks.    Specialty:  Family Medicine    Contact information    [address] [address] [phone]          2. Follow up with Patrick Adame M.D. In 2 weeks.    Specialty:  Neurology    Contact information    [address] [place] [address] [phone]          3. Follow up with Orthopedics, spine clinic as scheduled. .         Medication List      Take these Medications        Instructions    acetaminophen/caffeine/butalbital 325-40-50 mg -40 MG Tabs   Commonly known as:  FIORICET    Take 1 Tab by mouth every four hours as needed for Headache.   Dose:  1 Tab       acetaZOLAMIDE  MG Cp12   Commonly known as:  DIAMOX    Take 1 Cap by mouth every 12 hours.   Dose:  500 mg       amitriptyline 25 MG Tabs   Commonly known as:  ELAVIL    Take 1 Tab by mouth " every evening.   Dose:  25 mg       gabapentin 100 MG Caps   Commonly known as:  NEURONTIN    Take 200 mg by mouth 3 times a day.   Dose:  200 mg       lidocaine 5 % Ptch   Commonly known as:  LIDODERM    Apply 1 Patch to skin as directed every 24 hours for 3 days.   Dose:  1 Patch       sertraline 50 MG Tabs   Commonly known as:  ZOLOFT    Take 50 mg by mouth every day.   Dose:  50 mg

## 2017-04-12 NOTE — TELEPHONE ENCOUNTER
Message  Received: Today       Patrick Adame M.D.  Lelia Munoz, [employer] Ass't       Caller: Unspecified (Today, 1:40 PM)                     Her only option is to be admitted to the hospital for IV medications. We can do that     Thx       Called [facility] at [address] bed control, to have pt be admitted directly. Pt informed.

## 2017-04-12 NOTE — DISCHARGE INSTRUCTIONS
Follow-up with primary care and/or neurology this week for reevaluation, medication management and close blood pressure monitoring.    Continue home medications as previously indicated, including Fioricet for breakthrough headaches.  Encourage oral fluid hydration. Activity as tolerated.    Return to the emergency department for intractable headache, altered mental status, focal weakness, fever, vomiting or other new concerns.    General Headache Without Cause  A general headache is pain or discomfort felt around the head or neck area. The cause may not be found.   HOME CARE   · Keep all doctor visits.  · Only take medicines as told by your doctor.  · Lie down in a dark, quiet room when you have a headache.  · Keep a journal to find out if certain things bring on headaches. For example, write down:  ¨ What you eat and drink.  ¨ How much sleep you get.  ¨ Any change to your diet or medicines.  · Relax by getting a massage or doing other relaxing activities.  · Put ice or heat packs on the head and neck area as told by your doctor.  · Lessen stress.  · Sit up straight. Do not tighten (tense) your muscles.  · Quit smoking if you smoke.  · Lessen how much alcohol you drink.  · Lessen how much caffeine you drink, or stop drinking caffeine.  · Eat and sleep on a regular schedule.  · Get 7 to 9 hours of sleep, or as told by your doctor.  · Keep lights dim if bright lights bother you or make your headaches worse.  GET HELP RIGHT AWAY IF:   · Your headache becomes really bad.  · You have a fever.  · You have a stiff neck.  · You have trouble seeing.  · Your muscles are weak, or you lose muscle control.  · You lose your balance or have trouble walking.  · You feel like you will pass out (faint), or you pass out.  · You have really bad symptoms that are different than your first symptoms.  · You have problems with the medicines given to you by your doctor.  · Your medicines do not work.  · Your headache feels different than  the other headaches.  · You feel sick to your stomach (nauseous) or throw up (vomit).     This information is not intended to replace advice given to you by your health care provider. Make sure you discuss any questions you have with your health care provider.     Document Released: 09/26/2009 Document Revised: 05/03/2016 Document Reviewed: 12/07/2012  [employer] Interactive Patient Education ©2016 [employer]

## 2017-04-12 NOTE — IP AVS SNAPSHOT
" Home Care Instructions                                                                                                                  Name:Rehana Meade  Medical Record Number:[id number]  CSN: [id number]    YOB: 1985   Age: 31 y.o.  Sex: female  HT:1.6 m (5' 2.99\") WT: 87.9 kg (193 lb 12.6 oz)          Admit Date: 4/12/2017     Discharge Date:   Today's Date: 4/15/2017  Attending Doctor:  Frederic Camarillo M.D.                  Allergies:  Biaxin; Penicillin g potassium; Tape; Ativan; Imitrex; Latex; Maxalt; Morphine; and Reglan            Discharge Instructions       Discharge Instructions    Discharged to home by car with relative. Discharged via wheelchair, hospital escort: Yes.  Special equipment needed: Not Applicable    Be sure to schedule a follow-up appointment with your primary care doctor or any specialists as instructed.     Discharge Plan:   Diet Plan: Discussed  Activity Level: Discussed  Confirmed Follow up Appointment: Patient to Call and Schedule Appointment  Influenza Vaccine Indication: Patient Refuses    I understand that a diet low in cholesterol, fat, and sodium is recommended for good health. Unless I have been given specific instructions below for another diet, I accept this instruction as my diet prescription.   Other diet: Regular    Special Instructions: None    · Is patient discharged on Warfarin / Coumadin?   No     · Is patient Post Blood Transfusion?  No    Depression / Suicide Risk    As you are discharged from this [facility] facility, it is important to learn how to keep safe from harming yourself.    Recognize the warning signs:  · Abrupt changes in personality, positive or negative- including increase in energy   · Giving away possessions  · Change in eating patterns- significant weight changes-  positive or negative  · Change in sleeping patterns- unable to sleep or sleeping all the time   · Unwillingness or inability to communicate  · Depression  · Unusual " sadness, discouragement and loneliness  · Talk of wanting to die  · Neglect of personal appearance   · Rebelliousness- reckless behavior  · Withdrawal from people/activities they love  · Confusion- inability to concentrate     If you or a loved one observes any of these behaviors or has concerns about self-harm, here's what you can do:  · Talk about it- your feelings and reasons for harming yourself  · Remove any means that you might use to hurt yourself (examples: pills, rope, extension cords, firearm)  · Get professional help from the community (Mental Health, Substance Abuse, psychological counseling)  · Do not be alone:Call your Safe Contact- someone whom you trust who will be there for you.  · Call your local CRISIS HOTLINE 765-4709 or 885-992-5463  · Call your local Children's Mobile Crisis Response Team [place] (239) 691-7161 or www.FashionQlub  · Call the toll free National Suicide Prevention Hotlines   · National Suicide Prevention Lifeline 509-911-EECF (6187)  · Proficiency Line Network 800-SUICIDE (371-4187)    Migraine Headache  A migraine headache is an intense, throbbing pain on one or both sides of your head. A migraine can last for 30 minutes to several hours.  CAUSES   The exact cause of a migraine headache is not always known. However, a migraine may be caused when nerves in the brain become irritated and release chemicals that cause inflammation. This causes pain.  Certain things may also trigger migraines, such as:  · Alcohol.  · Smoking.  · Stress.  · Menstruation.  · Aged cheeses.  · Foods or drinks that contain nitrates, glutamate, aspartame, or tyramine.  · Lack of sleep.  · Chocolate.  · Caffeine.  · Hunger.  · Physical exertion.  · Fatigue.  · Medicines used to treat chest pain (nitroglycerine), birth control pills, estrogen, and some blood pressure medicines.  SIGNS AND SYMPTOMS  · Pain on one or both sides of your head.  · Pulsating or throbbing pain.  · Severe pain that  prevents daily activities.  · Pain that is aggravated by any physical activity.  · Nausea, vomiting, or both.  · Dizziness.  · Pain with exposure to bright lights, loud noises, or activity.  · General sensitivity to bright lights, loud noises, or smells.  Before you get a migraine, you may get warning signs that a migraine is coming (aura). An aura may include:  · Seeing flashing lights.  · Seeing bright spots, halos, or zigzag lines.  · Having tunnel vision or blurred vision.  · Having feelings of numbness or tingling.  · Having trouble talking.  · Having muscle weakness.  DIAGNOSIS   A migraine headache is often diagnosed based on:  · Symptoms.  · Physical exam.  · A CT scan or MRI of your head. These imaging tests cannot diagnose migraines, but they can help rule out other causes of headaches.  TREATMENT  Medicines may be given for pain and nausea. Medicines can also be given to help prevent recurrent migraines.   HOME CARE INSTRUCTIONS  · Only take over-the-counter or prescription medicines for pain or discomfort as directed by your health care provider. The use of long-term narcotics is not recommended.  · Lie down in a dark, quiet room when you have a migraine.  · Keep a journal to find out what may trigger your migraine headaches. For example, write down:  ¨ What you eat and drink.  ¨ How much sleep you get.  ¨ Any change to your diet or medicines.  · Limit alcohol consumption.  · Quit smoking if you smoke.  · Get 7-9 hours of sleep, or as recommended by your health care provider.  · Limit stress.  · Keep lights dim if bright lights bother you and make your migraines worse.  SEEK IMMEDIATE MEDICAL CARE IF:   · Your migraine becomes severe.  · You have a fever.  · You have a stiff neck.  · You have vision loss.  · You have muscular weakness or loss of muscle control.  · You start losing your balance or have trouble walking.  · You feel faint or pass out.  · You have severe symptoms that are different from your  first symptoms.  MAKE SURE YOU:   · Understand these instructions.  · Will watch your condition.  · Will get help right away if you are not doing well or get worse.     This information is not intended to replace advice given to you by your health care provider. Make sure you discuss any questions you have with your health care provider.     Document Released: 12/18/2006 Document Revised: 01/08/2016 Document Reviewed: 08/25/2014  [employer] Interactive Patient Education ©2016 [employer]    Pseudotumor Cerebri  Pseudotumor cerebri, also called idiopathic intracranial hypertension, is a condition that occurs due to increased pressure within your skull.  Although some of the symptoms resemble those of a brain tumor, it is not a brain tumor. Symptoms occur when the increased pressure in your skull compresses brain structures. For example, pressure on the nerve responsible for vision (optic nerve) causes it to swell, resulting in visual symptoms.  Pseudotumor cerebri tends to occur in obese women younger than 45 years of age. However, men and children can also develop this condition.  SYMPTOMS   Symptoms of pseudotumor cerebri occur due to increased pressure within the skull. Symptoms may include:   · Headaches.  · Nausea and vomiting.  · Dizziness.  · High blood pressure.    · Ringing in the ears.  · Double or blurred vision.  · Brief episodes of complete loss of vision.  · Pain in the back, neck, or shoulders.  DIAGNOSIS   Pseudotumor cerebri is diagnosed through:  · A detailed eye exam, which can reveal a swollen optic nerve, as well as identifying issues such as blind spots in the vision.  · An MRI or CT scan to rule out other disorders that can cause similar symptoms, such as brain tumors.  · A spinal tap (lumbar puncture), which can demonstrate increased pressure within the skull.  TREATMENT   There are several ways that pseudotumor cerebri is treated, including:  · Medicines to decrease the production of spinal  fluid and lower the pressure within your skull.  · Medicines to prevent or treat headaches.  · Surgery to create an opening in your optic nerve to allow excess fluid to drain out.  · Surgery to place drains (shunts) in your brain to remove excess fluid.  HOME CARE INSTRUCTIONS   · Take all medicines as directed by your health care provider.  · Go to all of your follow-up appointments.  · Lose weight if you are overweight.  SEEK MEDICAL CARE IF:  · Any symptoms come back.  · You develop trouble with hearing, vision, balance, or your sense of smell.  · You cannot eat or drink what you need.  · You are more weak or tired than usual.    · You are losing weight without trying.  SEEK IMMEDIATE MEDICAL CARE IF:  · You have new symptoms such as vision problems or difficulty walking.    · You have a seizure.    · You have trouble breathing.    · You have a fever.        This information is not intended to replace advice given to you by your health care provider. Make sure you discuss any questions you have with your health care provider.     Document Released: 12/23/2014 Document Reviewed: 12/23/2014  [employer] Interactive Patient Education ©2016 [employer]      Discharge Instructions    Discharged to home by car with relative. Discharged via wheelchair, hospital escort: Yes.  Special equipment needed: Not Applicable    Be sure to schedule a follow-up appointment with your primary care doctor or any specialists as instructed.     Discharge Plan:   Diet Plan: Discussed  Activity Level: Discussed  Confirmed Follow up Appointment: Patient to Call and Schedule Appointment  Influenza Vaccine Indication: Patient Refuses    I understand that a diet low in cholesterol, fat, and sodium is recommended for good health. Unless I have been given specific instructions below for another diet, I accept this instruction as my diet prescription.   Other diet: Regular, as tolerated    Special Instructions: None    · Is patient discharged on  Warfarin / Coumadin?   No     · Is patient Post Blood Transfusion?  No    Migraine Headache  A migraine headache is an intense, throbbing pain on one or both sides of your head. A migraine can last for 30 minutes to several hours.  CAUSES   The exact cause of a migraine headache is not always known. However, a migraine may be caused when nerves in the brain become irritated and release chemicals that cause inflammation. This causes pain.  Certain things may also trigger migraines, such as:  · Alcohol.  · Smoking.  · Stress.  · Menstruation.  · Aged cheeses.  · Foods or drinks that contain nitrates, glutamate, aspartame, or tyramine.  · Lack of sleep.  · Chocolate.  · Caffeine.  · Hunger.  · Physical exertion.  · Fatigue.  · Medicines used to treat chest pain (nitroglycerine), birth control pills, estrogen, and some blood pressure medicines.  SIGNS AND SYMPTOMS  · Pain on one or both sides of your head.  · Pulsating or throbbing pain.  · Severe pain that prevents daily activities.  · Pain that is aggravated by any physical activity.  · Nausea, vomiting, or both.  · Dizziness.  · Pain with exposure to bright lights, loud noises, or activity.  · General sensitivity to bright lights, loud noises, or smells.  Before you get a migraine, you may get warning signs that a migraine is coming (aura). An aura may include:  · Seeing flashing lights.  · Seeing bright spots, halos, or zigzag lines.  · Having tunnel vision or blurred vision.  · Having feelings of numbness or tingling.  · Having trouble talking.  · Having muscle weakness.  DIAGNOSIS   A migraine headache is often diagnosed based on:  · Symptoms.  · Physical exam.  · A CT scan or MRI of your head. These imaging tests cannot diagnose migraines, but they can help rule out other causes of headaches.  TREATMENT  Medicines may be given for pain and nausea. Medicines can also be given to help prevent recurrent migraines.   HOME CARE INSTRUCTIONS  · Only take  over-the-counter or prescription medicines for pain or discomfort as directed by your health care provider. The use of long-term narcotics is not recommended.  · Lie down in a dark, quiet room when you have a migraine.  · Keep a journal to find out what may trigger your migraine headaches. For example, write down:  ¨ What you eat and drink.  ¨ How much sleep you get.  ¨ Any change to your diet or medicines.  · Limit alcohol consumption.  · Quit smoking if you smoke.  · Get 7-9 hours of sleep, or as recommended by your health care provider.  · Limit stress.  · Keep lights dim if bright lights bother you and make your migraines worse.  SEEK IMMEDIATE MEDICAL CARE IF:   · Your migraine becomes severe.  · You have a fever.  · You have a stiff neck.  · You have vision loss.  · You have muscular weakness or loss of muscle control.  · You start losing your balance or have trouble walking.  · You feel faint or pass out.  · You have severe symptoms that are different from your first symptoms.  MAKE SURE YOU:   · Understand these instructions.  · Will watch your condition.  · Will get help right away if you are not doing well or get worse.     This information is not intended to replace advice given to you by your health care provider. Make sure you discuss any questions you have with your health care provider.     Document Released: 12/18/2006 Document Revised: 01/08/2016 Document Reviewed: 08/25/2014  [employer] Interactive Patient Education ©2016 [employer]      Depression / Suicide Risk    As you are discharged from this [facility] facility, it is important to learn how to keep safe from harming yourself.    Recognize the warning signs:  · Abrupt changes in personality, positive or negative- including increase in energy   · Giving away possessions  · Change in eating patterns- significant weight changes-  positive or negative  · Change in sleeping patterns- unable to sleep or sleeping all the time   · Unwillingness or  inability to communicate  · Depression  · Unusual sadness, discouragement and loneliness  · Talk of wanting to die  · Neglect of personal appearance   · Rebelliousness- reckless behavior  · Withdrawal from people/activities they love  · Confusion- inability to concentrate     If you or a loved one observes any of these behaviors or has concerns about self-harm, here's what you can do:  · Talk about it- your feelings and reasons for harming yourself  · Remove any means that you might use to hurt yourself (examples: pills, rope, extension cords, firearm)  · Get professional help from the community (Mental Health, Substance Abuse, psychological counseling)  · Do not be alone:Call your Safe Contact- someone whom you trust who will be there for you.  · Call your local CRISIS HOTLINE 065-4232 or 503-409-4612  · Call your local Children's Mobile Crisis Response Team [place] (442) 432-0532 or www.Revolut  · Call the toll free National Suicide Prevention Hotlines   · National Suicide Prevention Lifeline 719-179-UDDK (5144)  · National Simple Lifeforms Line Network 800-SUICIDE (053-9837)        Your appointments     Apr 18, 2017  3:20 PM   Follow Up Visit with Patrick Adame M.D.   [facility] (--)    [address], [address] [address] [phone]           You will be receiving a confirmation call a few days before your appointment from our automated call confirmation system.            Apr 27, 2017 11:40 AM   BOTOX with JORDAN Mauro   [facility] (--)    [address], [address]  [facility] [address] [phone]              Follow-up Information     1. Follow up with Mack Bernard M.D. In 2 weeks.    Specialty:  Family Medicine    Contact information    [address]  [address] [address] [phone]          2. Follow up with Patrick Adame M.D. In 2 weeks.    Specialty:  Neurology    Contact information    [address] [facility] [address] [place] [address]  [address]  [phone]          3. Follow up with Orthopedics, spine clinic as scheduled. .         Discharge Medication Instructions:    Below are the medications your physician expects you to take upon discharge:    Review all your home medications and newly ordered medications with your doctor and/or pharmacist. Follow medication instructions as directed by your doctor and/or pharmacist.    Please keep your medication list with you and share with your physician.               Medication List      START taking these medications        Instructions    Morning Afternoon Evening Bedtime    acetaZOLAMIDE  MG Cp12   Last time this was given:  500 mg on 4/15/2017  9:40 AM   Commonly known as:  DIAMOX        Take 1 Cap by mouth every 12 hours.   Dose:  500 mg                        amitriptyline 25 MG Tabs   Commonly known as:  ELAVIL        Take 1 Tab by mouth every evening.   Dose:  25 mg                        lidocaine 5 % Ptch   Last time this was given:  1 Patch on 4/15/2017 10:55 AM   Commonly known as:  LIDODERM        Apply 1 Patch to skin as directed every 24 hours for 3 days.   Dose:  1 Patch                          CONTINUE taking these medications        Instructions    Morning Afternoon Evening Bedtime    acetaminophen/caffeine/butalbital 325-40-50 mg -40 MG Tabs   Commonly known as:  FIORICET        Take 1 Tab by mouth every four hours as needed for Headache.   Dose:  1 Tab                        gabapentin 100 MG Caps   Last time this was given:  200 mg on 4/15/2017  9:40 AM   Commonly known as:  NEURONTIN        Take 200 mg by mouth 3 times a day.   Dose:  200 mg                        sertraline 50 MG Tabs   Last time this was given:  50 mg on 4/15/2017  9:40 AM   Commonly known as:  ZOLOFT        Take 50 mg by mouth every day.   Dose:  50 mg                             Where to Get Your Medications      Information about where to get these medications is not yet available     ! Ask your  nurse or doctor about these medications    - acetaminophen/caffeine/butalbital 325-40-50 mg -40 MG Tabs  - acetaZOLAMIDE  MG Cp12  - amitriptyline 25 MG Tabs  - lidocaine 5 % Ptch            Orders for after discharge     DISCHARGE PATIENT    Complete by:  As directed              Instructions           Diet / Nutrition:    Follow any diet instructions given to you by your doctor or the dietician, including how much salt (sodium) you are allowed each day.    If you are overweight, talk to your doctor about a weight reduction plan.    Activity:    Remain physically active following your doctor's instructions about exercise and activity.    Rest often.     Any time you become even a little tired or short of breath, SIT DOWN and rest.    Worsening Symptoms:    Report any of the following signs and symptoms to the doctor's office immediately:    *Pain of jaw, arm, or neck  *Chest pain not relieved by medication                               *Dizziness or loss of consciousness  *Difficulty breathing even when at rest   *More tired than usual                                       *Bleeding drainage or swelling of surgical site  *Swelling of feet, ankles, legs or stomach                 *Fever (>100ºF)  *Pink or blood tinged sputum  *Weight gain (3lbs/day or 5lbs /week)           *Shock from internal defibrillator (if applicable)  *Palpitations or irregular heartbeats                *Cool and/or numb extremities    Stroke Awareness    Common Risk Factors for Stroke include:    Age  Atrial Fibrillation  Carotid Artery Stenosis  Diabetes Mellitus  Excessive alcohol consumption  High blood pressure  Overweight   Physical inactivity  Smoking    Warning signs and symptoms of a stroke include:    *Sudden numbness or weakness of the face, arm or leg (especially on one side of the body).  *Sudden confusion, trouble speaking or understanding.  *Sudden trouble seeing in one or both eyes.  *Sudden trouble walking,  dizziness, loss of balance or coordination.Sudden severe headache with no known cause.    It is very important to get treatment quickly when a stroke occurs. If you experience any of the above warning signs, call 911 immediately.                   Disclaimer         Quit Smoking / Tobacco Use:    I understand the use of any tobacco products increases my chance of suffering from future heart disease or stroke and could cause other illnesses which may shorten my life. Quitting the use of tobacco products is the single most important thing I can do to improve my health. For further information on smoking / tobacco cessation call a Toll Free Quit Line at 1-411.448.7850 (*National Cancer Oxly) or 1-662.387.2365 (American Lung Association) or you can access the web based program at www.lungCarZen.org.    [place] Tobacco Users Help Line:  (413) 173-3399       Toll Free: 1-698.311.4619  Quit Tobacco Program [facility] (725)934-9034    Crisis Hotline:    [place] Crisis Hotline:  6-754-KINRULN or 1-967.333.3851    [place] Crisis Hotline:    1-478.610.6365 or 469-664-5426    Discharge Survey:   Thank you for choosing [facility]. We hope we did everything we could to make your hospital stay a pleasant one. You may be receiving a phone survey and we would appreciate your time and participation in answering the questions. Your input is very valuable to us in our efforts to improve our service to our patients and their families.        My signature on this form indicates that:    1. I have reviewed and understand the above information.  2. My questions regarding this information have been answered to my satisfaction.  3. I have formulated a plan with my discharge nurse to obtain my prescribed medications for home.                  Disclaimer         __________________________________                     __________       ________                       Patient Signature                                                  Date                    Time

## 2017-04-12 NOTE — IP AVS SNAPSHOT
4/15/2017    Rehana Meade  [address] [address]  [place] [address]    Dear Rehana:    [facility] wants to ensure your discharge home is safe and you or your loved ones have had all of your questions answered regarding your care after you leave the hospital.    Below is a list of resources and contact information should you have any questions regarding your hospital stay, follow-up instructions, or active medical symptoms.    Questions or Concerns Regarding… Contact   Medical Questions Related to Your Discharge  (7 days a week, 8am-5pm) Contact a Nurse Care Coordinator   [phone]   Medical Questions Not Related to Your Discharge  (24 hours a day / 7 days a week)  Contact the Nurse Health Line   [phone]    Medications or Discharge Instructions Refer to your discharge packet   or contact your [facility] Primary Care Provider   [phone]   Follow-up Appointment(s) Schedule your appointment via [employer]   or contact Scheduling [phone]   Billing Review your statement via [employer]  or contact Billing [phone]   Medical Records Review your records via [employer]   or contact Medical Records [phone]     You may receive a telephone call within two days of discharge. This call is to make certain you understand your discharge instructions and have the opportunity to have any questions answered. You can also easily access your medical information, test results and upcoming appointments via the [employer] free online health management tool. You can learn more and sign up at [employer]. For assistance setting up your [employer] account, please call [phone].    Once again, we want to ensure your discharge home is safe and that you have a clear understanding of any next steps in your care. If you have any questions or concerns, please do not hesitate to contact us, we are here for you. Thank you for choosing [facility] for your healthcare needs.    Sincerely,    Your [facility] Healthcare Team

## 2017-04-12 NOTE — IP AVS SNAPSHOT
[employer] Access Code: [email or web address]  Expires: 4/26/2017 12:11 PM    [employer]  A secure, online tool to manage your health information     [employer]’s [employer]® is a secure, online tool that connects you to your personalized health information from the privacy of your home -- day or night - making it very easy for you to manage your healthcare. Once the activation process is completed, you can even access your medical information using the [employer] minerva, which is available for free in the Apple Minerva store or Google Play store.     [employer] provides the following levels of access (as shown below):   My Chart Features   [facility] Primary Care Doctor [facility]  Specialists [facility]  Urgent  Care Non-[facility]  Primary Care  Doctor   Email your healthcare team securely and privately 24/7 X X X X   Manage appointments: schedule your next appointment; view details of past/upcoming appointments X      Request prescription refills. X      View recent personal medical records, including lab and immunizations X X X X   View health record, including health history, allergies, medications X X X X   Read reports about your outpatient visits, procedures, consult and ER notes X X X X   See your discharge summary, which is a recap of your hospital and/or ER visit that includes your diagnosis, lab results, and care plan. X X       How to register for [employer]:  1. Go to  [email or web address].  2. Click on the Sign Up Now box, which takes you to the New Member Sign Up page. You will need to provide the following information:  a. Enter your [employer] Access Code exactly as it appears at the top of this page. (You will not need to use this code after you’ve completed the sign-up process. If you do not sign up before the expiration date, you must request a new code.)   b. Enter your date of birth.   c. Enter your home email address.   d. Click Submit, and follow the next screen’s instructions.  3. Create a [employer] ID. This will be your [employer]  login ID and cannot be changed, so think of one that is secure and easy to remember.  4. Create a [employer] password. You can change your password at any time.  5. Enter your Password Reset Question and Answer. This can be used at a later time if you forget your password.   6. Enter your e-mail address. This allows you to receive e-mail notifications when new information is available in [employer].  7. Click Sign Up. You can now view your health information.    For assistance activating your [employer] account, call [phone]

## 2017-04-12 NOTE — TELEPHONE ENCOUNTER
Long called back states the hospitalist Dr. Ramos would like the pt to go to the ER. Called pt and informed her. She verbalized understanding.

## 2017-04-12 NOTE — ED PROVIDER NOTES
ED Provider Note    CHIEF COMPLAINT  Chief Complaint   Patient presents with   • Head Ache   • Neck Pain       HPI  Rehana Meade is a 31 y.o. female who presents with headache. Patient with history of chronic daily headaches, migraines for years, worse since having meningitis 2 years ago. Patient is followed by neurology as well as pain management and treatment includes Botox injections. Headache worse while at work today, 8 out of 10, constant, throbbing posterior headache that extends down her neck and upper back. Malaise and fatigue without fever or chills. Nausea without vomiting. No focal weakness despite intermittent extremity paresthesias. Reported slurred speech. Mild photophobia. Patient describes similar symptoms with headaches previously. Fioricet today without relief. Patient denies any new trauma or head injury. Denies fever. Denies recent travel. Denies contacts.    REVIEW OF SYSTEMS  See HPI for further details. All other systems are negative.     PAST MEDICAL HISTORY   has a past medical history of Other specified symptom associated with female genital organs; Unspecified disorder of thyroid; ASTHMA; Anxiety associated with depression; Migraine without aura, without mention of intractable migraine without mention of status migrainosus; Anesthesia; and Drug-seeking behavior.    SOCIAL HISTORY  Social History     Social History Main Topics   • Smoking status: Never Smoker    • Smokeless tobacco: Never Used   • Alcohol Use: Yes   • Drug Use: No   • Sexual Activity:     Partners: Male       SURGICAL HISTORY   has past surgical history that includes septoplasty (10/28/2009); somnoplasty (10/28/2009); nasal polypectomy (10/28/2009); and tubal coagulation laparoscopic bilateral (7/11/2014).    CURRENT MEDICATIONS  Home Medications     Reviewed by Camille Maldonado R.N. (Registered Nurse) on 04/11/17 at 2595  Med List Status: Partial    Medication Last Dose Status     "acetaminophen/caffeine/butalbital 325-40-50 mg (FIORICET) -40 MG Tab 12/8/2016 Active    gabapentin (NEURONTIN) 100 MG Cap  Active    oxycodone-acetaminophen (PERCOCET) 5-325 MG Tab  Active                ALLERGIES  Allergies   Allergen Reactions   • Biaxin [Clarithromycin] Hives   • Penicillin G Potassium Vomiting   • Tape Rash   • Ativan Itching   • Imitrex [Sumatriptan] Unspecified     Make my head pins an d needles   • Latex Itching   • Maxalt [Rizatriptan]      Tremors, confusion     • Morphine Hives     Pt unable to recall if allergic   • Reglan [Metoclopramide]      Shaking        PHYSICAL EXAM  VITAL SIGNS: /83 mmHg  Pulse 83  Temp(Src) 36.8 °C (98.3 °F)  Resp 15  Ht 1.6 m (5' 2.99\")  Wt 93.3 kg (205 lb 11 oz)  BMI 36.45 kg/m2  SpO2 91%  Pulse ox interpretation: I interpret this pulse ox as normal.  Constitutional: Alert in no apparent distress.  HENT: Normocephalic, atraumatic. Bilateral external ears normal, Nose normal. Moist mucous membranes.    Eyes: Pupils are equal and reactive, Conjunctiva normal. EOMI. No nystagmus.  Neck: Normal range of motion, Supple. Diffuse tenderness to palpation bilateral cervical, trapezius and upper thoracic paravertebral musculature. No evidence of meningeal irritation.  Lymphatic: No lymphadenopathy noted.   Cardiovascular: Normal peripheral perfusion.  Thorax & Lungs: Nonlabored respirations.  Skin: Warm, Dry, No erythema, No rash.   Musculoskeletal: Good range of motion in all major joints.   Neurologic: Alert , no gross focal deficit noted. Cranial nerves II through XII intact bilaterally, no slurred speech or facial droop. 5 out of 5 strength in 4 extremities. Sensation intact to light touch. Straight leg raise intact bilaterally. Heel-to-shin intact bilaterally.  Psychiatric: Flat affect. Cooperative. Judgment normal.      COURSE & MEDICAL DECISION MAKING  Nursing notes and vital signs were reviewed. (See chart for details)  The patients records " were reviewed, history was obtained from the patient ;     Medical record review: Multiple previous emergency department visits for headache, resolves with variation of headache cocktail. Patient is followed by neurology, Botox injections February/2016 by Amy Sahu.    ED evaluation is most consistent with acute exacerbation of chronic daily headaches. Physical exam is unremarkable and patient is neurologically intact. Multiple vague neurologic symptoms reported, not reproducible on exam, and reportedly recurrent with such daily headaches. Vital signs are stable without fever or tachycardia. Blood pressure is well controlled. No clinical evidence for meningitis, subarachnoid hemorrhage, stroke or other mass effect. Patient's pain improved significantly after IV fluid, Compazine, Benadryl, Decadron and Toradol. She sleepy but arousable and conversive, remains appropriate and nonfocal. She is tolerating oral fluids without nausea or vomiting. She relates independently.    Patient is stable for discharge at this time, anticipatory guidance provided, presumably indication as previously indicated including Fioricet for breakthrough headache, close follow-up is encouraged with neurology and primary care, and strict ED return instructions have been detailed. Patient is agreeable to the disposition and plan.    Patient's blood pressure was elevated in the emergency department, and has been referred to primary care for close monitoring.      FINAL IMPRESSION  (R51) Acute nonintractable headache, unspecified headache type, acute on chronic  (primary encounter diagnosis)  (G89.29) Other chronic pain      Electronically signed by: Mirta Lees, 4/12/2017 1:00 AM      This dictation was created using voice recognition software. The accuracy of the dictation is limited to the abilities of the software. I expect there may be some errors of grammar and possibly content. The nursing notes were reviewed and certain aspects  of this information were incorporated into this note.

## 2017-04-12 NOTE — ED NOTES
Discharge instructions given to patient, a verbal understanding of all instructions was stated. IV removed, cathlon intact, site without s/s of infection. Pt preferred to walk out accompanied by sister. VSS, all belongings accounted for.

## 2017-04-12 NOTE — ED NOTES
Assumed care of this pt. Pt. Ambulated to [address] with a steady, but slow and cautious gait. Pt. Assisted to the bathroom to obtain urine sample prior to being hooked up to monitor.

## 2017-04-12 NOTE — ED NOTES
Patient ambulatory to ED triage with complaints of headache and neck pain. She states pain started in occipital region today and has now spread to entire head and down neck. She states it is painful to move neck from side to side or turn head. She states pain is radiating down bilateral arm. She is also reporting some tingling in legs. She was told to present to ED by Dr. Adame her neurologist. She has been having issues with chronic pain since having meninginitis 18 months ago. Denies recent fever or illness.   Primary Neurologist Dr. Adame or Rebecca, pain specialist Dr. Barcenas.     Pt educated on ED process and asked to wait in lobby. Patient educated on importance of alerting staff to new or worsening symptoms or concerns.

## 2017-04-12 NOTE — TELEPHONE ENCOUNTER
Pt was seen in the ED yesterday, calling again today. States she was treated for migraine but it did nothing. She is still having pain, feels like it's getting worse. Having pain in spine, neck and head.

## 2017-04-13 PROBLEM — D72.829 LEUKOCYTOSIS: Status: ACTIVE | Noted: 2017-04-13

## 2017-04-13 LAB
BURR CELLS/RBC NFR CSF MANUAL: 0 %
CLARITY CSF: CLEAR
COLOR CSF: COLORLESS
COLOR SPUN CSF: COLORLESS
GLUCOSE CSF-MCNC: 63 MG/DL (ref 40–80)
GRAM STN SPEC: NORMAL
LYMPHOCYTES NFR CSF: 57 %
MONONUC CELLS NFR CSF: 43 %
PROT CSF-MCNC: 38 MG/DL (ref 15–45)
RBC # CSF: 0 CELLS/UL
SIGNIFICANT IND 70042: NORMAL
SITE SITE: NORMAL
SOURCE SOURCE: NORMAL
SPECIMEN VOL CSF: 1.3 ML
TUBE # CSF: 4
TUBE # CSF: 4
WBC # CSF: 1 CELLS/UL (ref 0–10)

## 2017-04-13 PROCEDURE — 99232 SBSQ HOSP IP/OBS MODERATE 35: CPT | Performed by: HOSPITALIST

## 2017-04-13 PROCEDURE — A9270 NON-COVERED ITEM OR SERVICE: HCPCS | Performed by: HOSPITALIST

## 2017-04-13 PROCEDURE — 700102 HCHG RX REV CODE 250 W/ 637 OVERRIDE(OP): Performed by: INTERNAL MEDICINE

## 2017-04-13 PROCEDURE — 84157 ASSAY OF PROTEIN OTHER: CPT

## 2017-04-13 PROCEDURE — 700105 HCHG RX REV CODE 258: Performed by: PSYCHIATRY & NEUROLOGY

## 2017-04-13 PROCEDURE — 700111 HCHG RX REV CODE 636 W/ 250 OVERRIDE (IP): Performed by: INTERNAL MEDICINE

## 2017-04-13 PROCEDURE — 700102 HCHG RX REV CODE 250 W/ 637 OVERRIDE(OP): Performed by: HOSPITALIST

## 2017-04-13 PROCEDURE — 89051 BODY FLUID CELL COUNT: CPT

## 2017-04-13 PROCEDURE — 700111 HCHG RX REV CODE 636 W/ 250 OVERRIDE (IP): Performed by: PSYCHIATRY & NEUROLOGY

## 2017-04-13 PROCEDURE — 770006 HCHG ROOM/CARE - MED/SURG/GYN SEMI*

## 2017-04-13 PROCEDURE — 82945 GLUCOSE OTHER FLUID: CPT

## 2017-04-13 PROCEDURE — 87070 CULTURE OTHR SPECIMN AEROBIC: CPT

## 2017-04-13 PROCEDURE — A9270 NON-COVERED ITEM OR SERVICE: HCPCS | Performed by: INTERNAL MEDICINE

## 2017-04-13 PROCEDURE — 700101 HCHG RX REV CODE 250: Performed by: EMERGENCY MEDICINE

## 2017-04-13 PROCEDURE — 87205 SMEAR GRAM STAIN: CPT

## 2017-04-13 RX ORDER — ACETAZOLAMIDE 500 MG/1
500 CAPSULE, EXTENDED RELEASE ORAL EVERY 12 HOURS
Status: DISCONTINUED | OUTPATIENT
Start: 2017-04-13 | End: 2017-04-15 | Stop reason: HOSPADM

## 2017-04-13 RX ORDER — KETOROLAC TROMETHAMINE 30 MG/ML
30 INJECTION, SOLUTION INTRAMUSCULAR; INTRAVENOUS EVERY 6 HOURS PRN
Status: DISCONTINUED | OUTPATIENT
Start: 2017-04-13 | End: 2017-04-15 | Stop reason: HOSPADM

## 2017-04-13 RX ORDER — GABAPENTIN 100 MG/1
200 CAPSULE ORAL 3 TIMES DAILY
Status: DISCONTINUED | OUTPATIENT
Start: 2017-04-13 | End: 2017-04-15 | Stop reason: HOSPADM

## 2017-04-13 RX ORDER — METHYLPREDNISOLONE SODIUM SUCCINATE 125 MG/2ML
125 INJECTION, POWDER, LYOPHILIZED, FOR SOLUTION INTRAMUSCULAR; INTRAVENOUS DAILY
Status: COMPLETED | OUTPATIENT
Start: 2017-04-13 | End: 2017-04-14

## 2017-04-13 RX ORDER — DIPHENHYDRAMINE HCL 25 MG
50 TABLET ORAL EVERY 6 HOURS PRN
Status: DISCONTINUED | OUTPATIENT
Start: 2017-04-13 | End: 2017-04-15 | Stop reason: HOSPADM

## 2017-04-13 RX ORDER — MAGNESIUM SULFATE HEPTAHYDRATE 40 MG/ML
2 INJECTION, SOLUTION INTRAVENOUS DAILY
Status: COMPLETED | OUTPATIENT
Start: 2017-04-13 | End: 2017-04-14

## 2017-04-13 RX ADMIN — KETOROLAC TROMETHAMINE 30 MG: 30 INJECTION, SOLUTION INTRAMUSCULAR at 17:25

## 2017-04-13 RX ADMIN — OXYCODONE HYDROCHLORIDE AND ACETAMINOPHEN 2 TABLET: 5; 325 TABLET ORAL at 07:09

## 2017-04-13 RX ADMIN — ONDANSETRON 4 MG: 2 INJECTION INTRAMUSCULAR; INTRAVENOUS at 21:27

## 2017-04-13 RX ADMIN — STANDARDIZED SENNA CONCENTRATE AND DOCUSATE SODIUM 2 TABLET: 8.6; 5 TABLET, FILM COATED ORAL at 01:17

## 2017-04-13 RX ADMIN — GABAPENTIN 200 MG: 100 CAPSULE ORAL at 14:51

## 2017-04-13 RX ADMIN — ACETAZOLAMIDE 500 MG: 500 CAPSULE, EXTENDED RELEASE ORAL at 08:12

## 2017-04-13 RX ADMIN — OXYCODONE HYDROCHLORIDE AND ACETAMINOPHEN 2 TABLET: 5; 325 TABLET ORAL at 01:17

## 2017-04-13 RX ADMIN — LIDOCAINE HYDROCHLORIDE 20 ML: 10 INJECTION, SOLUTION INFILTRATION; PERINEURAL at 00:15

## 2017-04-13 RX ADMIN — STANDARDIZED SENNA CONCENTRATE AND DOCUSATE SODIUM 2 TABLET: 8.6; 5 TABLET, FILM COATED ORAL at 20:31

## 2017-04-13 RX ADMIN — DIPHENHYDRAMINE HCL 50 MG: 25 TABLET ORAL at 19:23

## 2017-04-13 RX ADMIN — ACETAZOLAMIDE 500 MG: 500 CAPSULE, EXTENDED RELEASE ORAL at 02:54

## 2017-04-13 RX ADMIN — GABAPENTIN 200 MG: 100 CAPSULE ORAL at 20:31

## 2017-04-13 RX ADMIN — OXYCODONE HYDROCHLORIDE AND ACETAMINOPHEN 2 TABLET: 5; 325 TABLET ORAL at 11:26

## 2017-04-13 RX ADMIN — GABAPENTIN 200 MG: 100 CAPSULE ORAL at 08:12

## 2017-04-13 RX ADMIN — VALPROATE SODIUM 500 MG: 100 INJECTION, SOLUTION INTRAVENOUS at 17:41

## 2017-04-13 RX ADMIN — ACETAZOLAMIDE 500 MG: 500 CAPSULE, EXTENDED RELEASE ORAL at 20:31

## 2017-04-13 RX ADMIN — KETOROLAC TROMETHAMINE 30 MG: 30 INJECTION, SOLUTION INTRAMUSCULAR at 23:46

## 2017-04-13 RX ADMIN — MAGNESIUM SULFATE IN WATER 2 G: 40 INJECTION, SOLUTION INTRAVENOUS at 20:36

## 2017-04-13 RX ADMIN — STANDARDIZED SENNA CONCENTRATE AND DOCUSATE SODIUM 2 TABLET: 8.6; 5 TABLET, FILM COATED ORAL at 08:12

## 2017-04-13 RX ADMIN — METHYLPREDNISOLONE SODIUM SUCCINATE 125 MG: 125 INJECTION, POWDER, FOR SOLUTION INTRAMUSCULAR; INTRAVENOUS at 17:25

## 2017-04-13 RX ADMIN — SERTRALINE 50 MG: 50 TABLET, FILM COATED ORAL at 08:12

## 2017-04-13 ASSESSMENT — LIFESTYLE VARIABLES
ALCOHOL_USE: YES
TOTAL SCORE: 0
EVER FELT BAD OR GUILTY ABOUT YOUR DRINKING: NO
HAVE PEOPLE ANNOYED YOU BY CRITICIZING YOUR DRINKING: NO
SUBSTANCE_ABUSE: 0
EVER HAD A DRINK FIRST THING IN THE MORNING TO STEADY YOUR NERVES TO GET RID OF A HANGOVER: NO
AVERAGE NUMBER OF DAYS PER WEEK YOU HAVE A DRINK CONTAINING ALCOHOL: 1
HAVE YOU EVER FELT YOU SHOULD CUT DOWN ON YOUR DRINKING: NO
ON A TYPICAL DAY WHEN YOU DRINK ALCOHOL HOW MANY DRINKS DO YOU HAVE: 2
TOTAL SCORE: 0
CONSUMPTION TOTAL: NEGATIVE
HOW MANY TIMES IN THE PAST YEAR HAVE YOU HAD 5 OR MORE DRINKS IN A DAY: 0
EVER_SMOKED: NEVER
TOTAL SCORE: 0

## 2017-04-13 ASSESSMENT — ENCOUNTER SYMPTOMS
BACK PAIN: 1
MYALGIAS: 1
PHOTOPHOBIA: 0
ABDOMINAL PAIN: 0
TINGLING: 1
WEAKNESS: 0
HEADACHES: 1
FEVER: 0
ROS GI COMMENTS: DECREASED APPETITE.
HALLUCINATIONS: 0
DOUBLE VISION: 0
SHORTNESS OF BREATH: 0
SENSORY CHANGE: 0
BLURRED VISION: 0
FOCAL WEAKNESS: 0
SPEECH CHANGE: 0
CHILLS: 0
VOMITING: 0
COUGH: 0

## 2017-04-13 ASSESSMENT — PAIN SCALES - GENERAL
PAINLEVEL_OUTOF10: 7
PAINLEVEL_OUTOF10: 5
PAINLEVEL_OUTOF10: 6
PAINLEVEL_OUTOF10: 4
PAINLEVEL_OUTOF10: 6
PAINLEVEL_OUTOF10: 8
PAINLEVEL_OUTOF10: 7
PAINLEVEL_OUTOF10: 9
PAINLEVEL_OUTOF10: 4
PAINLEVEL_OUTOF10: 6
PAINLEVEL_OUTOF10: 7

## 2017-04-13 ASSESSMENT — COPD QUESTIONNAIRES
HAVE YOU SMOKED AT LEAST 100 CIGARETTES IN YOUR ENTIRE LIFE: NO/DON'T KNOW
DO YOU EVER COUGH UP ANY MUCUS OR PHLEGM?: NO/ONLY WITH OCCASIONAL COLDS OR INFECTIONS
COPD SCREENING SCORE: 0
DURING THE PAST 4 WEEKS HOW MUCH DID YOU FEEL SHORT OF BREATH: NONE/LITTLE OF THE TIME

## 2017-04-13 NOTE — PROGRESS NOTES
Assumed patient care at 0700. Received report from night shift. Assessment completed. POC discussed with pt, verbalizes understanding. A&Ox 4. Pt states pain 5/10 pain in head and neck, cold wash clothes provided. Denies nausea. Personal possessions and call light placed within reach. Pt denies any additional needs at this time.

## 2017-04-13 NOTE — PROGRESS NOTES
Hospital Medicine Progress Note, Adult, Complex               Author: Frederic Camarillo Date & Time created: 4/13/2017  9:00 AM     CC: 32 yo female hx chronic HAs,  admitted with severe HAs- worsening, notes present past several months.     Interval History:    LP CSF cell count unremarkable- slightly increased opening pressure noted 27.   Pain 7/10 .    Review of Systems:  Review of Systems   Constitutional: Negative for fever and chills.   HENT: Negative for hearing loss.    Eyes: Negative for blurred vision, double vision and photophobia.   Respiratory: Negative for cough and shortness of breath.    Cardiovascular: Negative for chest pain and leg swelling.   Gastrointestinal: Negative for vomiting and abdominal pain.        Decreased appetite.    Musculoskeletal: Positive for myalgias and back pain.   Neurological: Positive for tingling (extremities. ) and headaches. Negative for sensory change, speech change, focal weakness and weakness.   Psychiatric/Behavioral: Negative for hallucinations and substance abuse.       Physical Exam:  Physical Exam   Constitutional: She is oriented to person, place, and time. No distress.   Eyes: EOM are normal. No scleral icterus.   Neck: Neck supple.   Cardiovascular: Normal rate and regular rhythm.    No murmur heard.  Pulmonary/Chest: No stridor. She has no wheezes. She has no rales. She exhibits no tenderness.   Abdominal: Soft. Bowel sounds are normal. She exhibits no distension. There is no tenderness.   Musculoskeletal: She exhibits no edema.   Neurological: She is alert and oriented to person, place, and time. No cranial nerve deficit.   Skin: Skin is warm and dry. She is not diaphoretic.   Psychiatric: She has a normal mood and affect. Her behavior is normal.       Labs:        Invalid input(s): [id number]      Recent Labs      04/12/17   2100   SODIUM  140   POTASSIUM  3.4*   CHLORIDE  105   CO2  23   BUN  10   CREATININE  0.64   CALCIUM  9.6     Recent Labs       17   2100   ALTSGPT  12   ASTSGOT  11*   ALKPHOSPHAT  103*   TBILIRUBIN  0.4   GLUCOSE  84     Recent Labs      17   2100   RBC  4.83   HEMOGLOBIN  14.3   HEMATOCRIT  43.1   PLATELETCT  341     Recent Labs      17   2100   WBC  15.3*   NEUTSPOLYS  66.40   LYMPHOCYTES  26.00   MONOCYTES  6.30   EOSINOPHILS  0.60   BASOPHILS  0.40   ASTSGOT  11*   ALTSGPT  12   ALKPHOSPHAT  103*   TBILIRUBIN  0.4           Hemodynamics:  Temp (24hrs), Av.9 °C (98.4 °F), Min:36.5 °C (97.7 °F), Max:37.2 °C (98.9 °F)  Temperature: 37.2 °C (98.9 °F)  Pulse  Av.8  Min: 83  Max: 100Heart Rate (Monitored): 87  Blood Pressure: 104/54 mmHg, NIBP: 117/77 mmHg     Respiratory:    Respiration: 17, Pulse Oximetry: 94 %           Fluids:    Intake/Output Summary (Last 24 hours) at 17 0900  Last data filed at 17 0100   Gross per 24 hour   Intake     60 ml   Output      0 ml   Net     60 ml     Weight: 93.9 kg (207 lb 0.2 oz)  GI/Nutrition:  Orders Placed This Encounter   Procedures   • Diet Order     Standing Status: Standing      Number of Occurrences: 1      Standing Expiration Date:      Order Specific Question:  Diet:     Answer:  Regular [1]     Order Specific Question:  Miscellaneous modifications:     Answer:  Gluten Free per PT [10]     Medical Decision Making, by Problem:  Active Hospital Problems    Diagnosis   • *Intractable headache [R51]- suspect migraine HA flare.  CSF - no evid for meningitis.  Mildly elev csf opening pressure- ? Pseudotumor cerebri   Diamox, neurontin prn fioricet, percocet.   Consult neurology    • Leukocytosis [D72.829]-afeb- no acute symptoms of infxn.   • Asthma [J45.909]stable.   • Anxiety [F41.9]  Hypokalemia   Oral kcl.       Labs reviewed, Medications reviewed and Radiology images reviewed  Mahmood catheter: No Mahmood      DVT Prophylaxis: Not indicated at this time, ambulatory

## 2017-04-13 NOTE — ED PROVIDER NOTES
"ED Provider Note    Scribed for Raysa Tse M.D. by Sherly Lewis. 4/12/2017, 9:00 PM.    Primary care provider: Mack Bernard M.D.  Means of arrival: Walk-in  History obtained from: Patient  History limited by: None    CHIEF COMPLAINT  Chief Complaint   Patient presents with   • Headache     seen here last night for the same.  pt called neurologist today and was told to come back here for further testing d/t pain continuing after treatment last night.  pt reports neurologist wants to be consulted today   • Neck Pain     pt report menigitis 18 months ago and under went treatment.     • Back Pain       HPI  Rehana Meade is a 31 y.o. female with a history of viral meningitis who presents to the Emergency Department for headache, neck pain, and pain down her spine. She states that the pain fluctuates between a 6-10 on the pain scale but has been progressively worsening to the point where she was unable to move her head yesterday.The pain that she is feeling at this time is different from her previous history due to the addition of malaise and weakness on top of her other symptoms. The patient presented to the ED yesterday with similar symptoms but was recommended to return by her neurologist after treatment did not help to alleviate the pain.  Her  states that since the viral meningitis, the patient has a \"full body shut down\" when she has headaches and she is totally incapacitated. The patient endorses tingling in all four extremities and intermittent dizziness as well.  She has previously had two rounds of Botox with no alleviation from the pain.     The patient notes intermittent, sharp pains in her flank that feel as though she is being stabbed. However, this pain resolves itself and does not last long enough for the patient to be seen for it.     REVIEW OF SYSTEMS  Pertinent positives include history of viral meningitis, headache, neck pain, back pain, weakness, malaise, dizziness, tingling " "in four extremities. Pertinent negatives include no alleviation from pain.  All other systems reviewed and negative.  C    PAST MEDICAL HISTORY   has a past medical history of Other specified symptom associated with female genital organs; Unspecified disorder of thyroid; ASTHMA; Anxiety associated with depression; Migraine without aura, without mention of intractable migraine without mention of status migrainosus; Anesthesia; and Drug-seeking behavior.    SURGICAL HISTORY   has past surgical history that includes septoplasty (10/28/2009); somnoplasty (10/28/2009); nasal polypectomy (10/28/2009); and tubal coagulation laparoscopic bilateral (7/11/2014).    SOCIAL HISTORY  Social History   Substance Use Topics   • Smoking status: Never Smoker    • Smokeless tobacco: Never Used   • Alcohol Use: Yes      Comment: rarely      History   Drug Use No     FAMILY HISTORY  History reviewed. No pertinent family history.    CURRENT MEDICATIONS  Home Medications     Reviewed by Alysia Stallworth R.N. (Registered Nurse) on 04/13/17 at 0120  Med List Status: Complete    Medication Last Dose Status    acetaminophen/caffeine/butalbital 325-40-50 mg (FIORICET) -40 MG Tab as needed Active    gabapentin (NEURONTIN) 100 MG Cap 4/11/2017 Active    sertraline (ZOLOFT) 50 MG Tab 4/11/2017 Active              ALLERGIES  Allergies   Allergen Reactions   • Biaxin [Clarithromycin] Hives   • Penicillin G Potassium Vomiting   • Tape Rash   • Ativan Itching   • Imitrex [Sumatriptan] Unspecified     Make my head pins an d needles   • Latex Itching   • Maxalt [Rizatriptan]      Tremors, confusion     • Morphine Hives     Pt unable to recall if allergic   • Reglan [Metoclopramide]      Shaking        PHYSICAL EXAM  VITAL SIGNS: /80 mmHg  Pulse 83  Temp(Src) 37 °C (98.6 °F) (Temporal)  Resp 21  Ht 1.6 m (5' 3\")  Wt 93.9 kg (207 lb 0.2 oz)  BMI 36.68 kg/m2  SpO2 97%  LMP 03/31/2017    Constitutional: Alert in no apparent " distress.  HENT: No signs of trauma, Bilateral external ears normal, Nose normal.   Eyes: Pupils are equal and reactive, Conjunctiva normal, Non-icteric.   Neck: Normal range of motion, No tenderness, Supple, No stridor.   Cardiovascular: Regular rate and rhythm, no murmurs.   Thorax & Lungs: Normal breath sounds, No respiratory distress, No wheezing, No chest tenderness.   Abdomen: Bowel sounds normal, Soft, No tenderness, No masses, No peritoneal signs.  Skin: Warm, Dry, No erythema, No rash.   Musculoskeletal:  No major deformities noted.   Neurologic: Alert, moving all extremities without difficulty, no focal deficits.    LABS  Results for orders placed or performed during the hospital encounter of 04/12/17   CBC WITH DIFFERENTIAL   Result Value Ref Range    WBC 15.3 (H) 4.8 - 10.8 K/uL    RBC 4.83 4.20 - 5.40 M/uL    Hemoglobin 14.3 12.0 - 16.0 g/dL    Hematocrit 43.1 37.0 - 47.0 %    MCV 89.2 81.4 - 97.8 fL    MCH 29.6 27.0 - 33.0 pg    MCHC 33.2 (L) 33.6 - 35.0 g/dL    RDW 45.1 35.9 - 50.0 fL    Platelet Count 341 164 - 446 K/uL    MPV 9.6 9.0 - 12.9 fL    Neutrophils-Polys 66.40 44.00 - 72.00 %    Lymphocytes 26.00 22.00 - 41.00 %    Monocytes 6.30 0.00 - 13.40 %    Eosinophils 0.60 0.00 - 6.90 %    Basophils 0.40 0.00 - 1.80 %    Immature Granulocytes 0.30 0.00 - 0.90 %    Nucleated RBC 0.00 /100 WBC    Neutrophils (Absolute) 10.13 (H) 2.00 - 7.15 K/uL    Lymphs (Absolute) 3.97 1.00 - 4.80 K/uL    Monos (Absolute) 0.96 (H) 0.00 - 0.85 K/uL    Eos (Absolute) 0.09 0.00 - 0.51 K/uL    Baso (Absolute) 0.06 0.00 - 0.12 K/uL    Immature Granulocytes (abs) 0.05 0.00 - 0.11 K/uL    NRBC (Absolute) 0.00 K/uL   COMP METABOLIC PANEL   Result Value Ref Range    Sodium 140 135 - 145 mmol/L    Potassium 3.4 (L) 3.6 - 5.5 mmol/L    Chloride 105 96 - 112 mmol/L    Co2 23 20 - 33 mmol/L    Anion Gap 12.0 (H) 0.0 - 11.9    Glucose 84 65 - 99 mg/dL    Bun 10 8 - 22 mg/dL    Creatinine 0.64 0.50 - 1.40 mg/dL    Calcium 9.6 8.5  - 10.5 mg/dL    AST(SGOT) 11 (L) 12 - 45 U/L    ALT(SGPT) 12 2 - 50 U/L    Alkaline Phosphatase 103 (H) 30 - 99 U/L    Total Bilirubin 0.4 0.1 - 1.5 mg/dL    Albumin 4.0 3.2 - 4.9 g/dL    Total Protein 7.6 6.0 - 8.2 g/dL    Globulin 3.6 (H) 1.9 - 3.5 g/dL    A-G Ratio 1.1 g/dL   CSF PROTEIN   Result Value Ref Range    Total Protein, CSF 38 15 - 45 mg/dL   CSF GLUCOSE   Result Value Ref Range    Glucose CSF 63 40 - 80 mg/dL   CSF CELL COUNT   Result Value Ref Range    Number Of Tubes 4     Volume 1.3 mL    Color-Body Fluid Colorless     Character-Body Fluid Clear     Supernatant Appearance Colorless     Total RBC Count 0 cells/uL    Crenated RBC 0 %    Total WBC Count 1 0 - 10 cells/uL    Lymphs 57 %    Mononuclear Cells - CSF 43 %    CSF Tube Number 4    ESTIMATED GFR   Result Value Ref Range    GFR If African American >60 >60 mL/min/1.73 m 2    GFR If Non African American >60 >60 mL/min/1.73 m 2   GRAM STAIN   Result Value Ref Range    Significant Indicator .     Source CSF     Site TAP     Gram Stain Result No organisms seen.      All labs reviewed by me.    COURSE & MEDICAL DECISION MAKING  Pertinent Labs & Imaging studies reviewed. (See chart for details)    9:00 PM - Patient seen and examined at bedside. Patient will be treated with IV fluids secondary to dehydration. Ordered CBC, CMP, CSF Culture, CSF Protein, CSF Glucose, CSF Cell count to evaluate her symptoms.     11:29 PM- I discussed the patient's case and the above findings with Dr. Ponce (hospitalist) who will see and admit the patient. Care is transferred at this time.    11:44 PM I completed a lumbar puncture procedure as detailed below.     Lumbar Puncture Procedure Note    Indication: Severe Headache     Consent: The patient was counseled regarding the procedure, it's indications, risks, potential complications and alternatives and any questions were answered. Consent was obtained.    Procedure: The patient was placed in the left lateral decubitus  position and the appropriate landmarks were identified. The area was prepped and draped in the usual sterile fashion. Anesthesia was obtained using 5 cc of 1% Lidocaine without epinephrine. A spinal needle was inserted at the L2- L3 level with the stylet in place until spinal fluid was returned. Opening pressure was 27cm H2O. At this point 4.0 cc of clear cerebral spinal fluid was obtained and sent for appropriate testing. The stylet was then replaced and the needle was withdrawn. A sterile dressing was placed over the site and the patient was placed in the supine position.    The patient tolerated the procedure well.    Complications: None  Patient reports that her headache felt improved following the LP.       Decision Making:  This is a 31 y.o. year old female who presents with acute on chronic headache. She has had this headache for many months at this point she is followed by neurology. She was seen last night for the same and she has been seen multiple times for this she gets some temporary relief in the hospital but then this comes right back. She does not take narcotic pain medication. She had migraines before she had this viral meningitis since then she has been essentially debilitated by these headaches. She sent by her neurologist office for further pain control and admission for neurology consultation. On exam she is very well-appearing pleasant she does not have any meningismus she is afebrile. Her labs show slightly elevated white blood cell count CMP is essentially normal. She has had prior imaging studies have been negative. LP was performed to look for pseudotumor cerebri or meningitis. She did have a slightly elevated opening pressure 27 cm of water which is 270 mm of water which is slightly high. She also felt significantly better after the LP which is potentially diagnostic for pseudotumor cerebri. She was given a dose of ketamine which also helped momentarily for her headache. Her pain after the  LP went from an 8-4. She is much more comfortable she'll be admitted to the hospital service for neurology consultation in the morning. LP results do not reveal evidence of meningitis.    DISPOSITION:  Patient will be admitted to Dr. Vigil in guarded condition.    FINAL IMPRESSION  1. Chronic intractable headache, unspecified headache type             This dictation has been created using voice recognition software and/or scribes. The accuracy of the dictation is limited by the abilities of the software and the expertise of the scribes. I expect there may be some errors of grammar and possibly content. I made every attempt to manually correct the errors within my dictation. However, errors related to voice recognition software and/or scribes may still exist and should be interpreted within the appropriate context.     ISherly (Scribe), am scribing for, and in the presence of, Raysa Tse M.D..    Electronically signed by: Sherly Lewis (Scribe), 4/12/2017    IRaysa M.D. personally performed the services described in this documentation, as scribed by Sherly Lewis in my presence, and it is both accurate and complete.    The note accurately reflects work and decisions made by me.  Raysa Tse  4/13/2017  3:41 AM

## 2017-04-13 NOTE — PROGRESS NOTES
Pt c/o room being too hot. Room thermostat was reduced to 65F and ice pack was provided to patient. H/A pain is tolerable at 4/10; able to sleep well.

## 2017-04-13 NOTE — CARE PLAN
Problem: Communication  Goal: The ability to communicate needs accurately and effectively will improve  Outcome: PROGRESSING AS EXPECTED  Pt and  were updated on POC and oriented to room. Both verbalized understanding.     Problem: Safety  Goal: Will remain free from falls  Outcome: PROGRESSING AS EXPECTED  Safety measures in place; call light w/in reach, bed alarm on, floor clear, bed locked & low position, and pt educated to call for assistance.

## 2017-04-13 NOTE — H&P
CHIEF COMPLAINT:  Intractable headache.    HISTORY OF PRESENT ILLNESS:  This is a 31-year-old female with history of   migraines and apparently had a viral meningitis in September of 2015 and she   has been having persistent/constant headache since September of 2016 where she   had seen a neurologist as an outpatient.  At some point, she received Botox   injection, which afforded relief, but after that the headache recurs.  She has   daily headaches and migraines for years.  According to her, the pain is   always around 7/10.  Sometimes it goes down to 4/10, but it is pretty much   everyday.  Patient did not notice any triggering factors like lights or   sounds.  She tried all pain medications, but only one that works for her is   Fioricet, but in spite of that just recently it seems like it has not been   working well for her.  Her headache usually was in the occipital part of her   head and down to the cervical spine area, but yesterday she started having a   headache, which starts from her occipital area all the way down to her   lumbosacral spine.  This is something new for her.  So, she decided to come to   the hospital tonight.  It has been persistent throughout the whole day.    Patient denies any fever.  No nausea, no vomiting.  No blurring of vision.  On   top of her headache, she also noticed numbness over the upper extremities,   which has been going on since last year.    PAST MEDICAL HISTORY:  Migraines and viral meningitis.    PAST SURGICAL HISTORY:  She had polypectomy in her nose and tubal ligation and   deviated septum surgery.    SOCIAL HISTORY:  Denies smoking.  Drinks alcohol occasionally.  Denies illicit   drug use.    ALLERGIES:  TO BIAXIN, PENICILLIN, TAPE, ATIVAN, IMITREX, LATEX, MORPHINE,   REGLAN, AND MAXALT.    FAMILY HISTORY:  No diabetes, no cancer.    REVIEW OF SYSTEMS:  All other systems reviewed were negative.    HOME MEDICATIONS:  1.  Fioricet 1 tablet every 4 hours as needed for  headache.  2.  Gabapentin 200 mg 3 times a day.  3.  Zoloft 50 mg daily.    PHYSICAL EXAMINATION:  VITAL SIGNS:  Blood pressure is 122/80, pulse of 92, respiratory rate 16,   temperature of 37 and oxygen is 97% on room air.  GENERAL:  The patient is lying in bed comfortably, not in distress.  She is   cooperative.  HEENT:  Normocephalic and atraumatic.  Eyes:  Pupils are reactive to light,   anicteric sclerae.  Pinkish palpebral conjunctivae.  Oral mucosa, no oral   lesions noted, moist mucosa.  NECK:  No JVD, no lymphadenopathy, no thyromegaly.  CHEST AND LUNGS:  Equal expansion.  Clear to auscultation bilaterally.  No   crackles, no wheezing, no tenderness to palpation.  CARDIOVASCULAR SYSTEM:  Regular rate and rhythm.  S1 and S2 heard.  No murmurs   noted.  GASTROINTESTINAL:  Positive bowel sounds.  No tenderness.  No   hepatosplenomegaly.  EXTREMITIES:  Pulses are palpable in both upper and lower extremities.  No   edema noted.  NEUROLOGIC:  Cranial nerves II-XII are intact.  Alert and oriented x3.  Motor   exam is 5/5 in both upper and lower extremities.  Sensation is intact.    LABORATORY DATA:  WBC is 15.3, hemoglobin 14.3, hematocrit 43.1, and platelet   count of 341.  Sodium 140, potassium 3.4, chloride 105, CO2 of 23, anion gap   of 12, glucose 84, BUN 10, creatinine 0.64, and alkaline phosphatase of 103.    ASSESSMENT AND PLAN:  1.  Intractable headache.  Rule out aseptic meningitis.  LP result is pending.    I will not cover her with any antibiotics yet.  We will repeat CBC in the   morning.  Continue pain control with Fioricet, might need to give some   morphine or Dilaudid or fentanyl tonight if the patient's headache persists.  2.  Deep venous thrombosis prophylaxis.  I will put her on SCDs.    MEDICAL DECISION MAKING:  Less than 2 midnights.       ____________________________________     MD ALENA Alarcon / ZAK    DD:  04/13/2017 00:45:11  DT:  04/13/2017 01:22:28    D#:  [id number]  Job#:   [id number]

## 2017-04-13 NOTE — ED NOTES
Pt educated on LP procedure, pt verbalizes understanding on risks.  MD verbalizes to start ketamine gtt prior to procedure.  RNs at bedside with patient.

## 2017-04-13 NOTE — ED NOTES
Pt ambulated to triage with   Chief Complaint   Patient presents with   • Headache     seen here last night for the same.  pt called neurologist today and was told to come back here for further testing d/t pain continuing after treatment last night.  pt reports neurologist wants to be consulted today   • Neck Pain     pt report menigitis 18 months ago and under went treatment.     • Back Pain     Pt reports dizziness, N/T bilateral arms and legs.  Pt Informed regarding triage process and verbalized understanding to inform triage tech or RN for any changes in condition. Placed in lobby.

## 2017-04-13 NOTE — PROGRESS NOTES
Pt arrived to floor via gurney w/ Transport. Received bedside report from ER RN and assumed care of patient at 0057. Labs and orders noted. Assessment performed. Patient is alert and oriented x4 with no signs of labored breathing or distress. Patient updated on POC and oriented to unit; verbalizes understanding. Safety precautions in place including patient call light within reach, bed alarm on, personal possessions nearby, bed in low position and locked, hourly rounding in practice, and non-skid socks in place.

## 2017-04-13 NOTE — ED NOTES
Pt reports headche, back/spine pain, N/T bilateral UE and LE.  Pt is AOx4.  Pt told by neurologist to f/u in ER for further evaluation.

## 2017-04-14 ENCOUNTER — PATIENT OUTREACH (OUTPATIENT)
Dept: HEALTH INFORMATION MANAGEMENT | Facility: OTHER | Age: 32
End: 2017-04-14

## 2017-04-14 ENCOUNTER — TELEPHONE (OUTPATIENT)
Dept: NEUROLOGY | Facility: MEDICAL CENTER | Age: 32
End: 2017-04-14

## 2017-04-14 PROCEDURE — 99232 SBSQ HOSP IP/OBS MODERATE 35: CPT | Performed by: HOSPITALIST

## 2017-04-14 PROCEDURE — A9270 NON-COVERED ITEM OR SERVICE: HCPCS | Performed by: HOSPITALIST

## 2017-04-14 PROCEDURE — 700111 HCHG RX REV CODE 636 W/ 250 OVERRIDE (IP): Performed by: PSYCHIATRY & NEUROLOGY

## 2017-04-14 PROCEDURE — 700102 HCHG RX REV CODE 250 W/ 637 OVERRIDE(OP): Performed by: INTERNAL MEDICINE

## 2017-04-14 PROCEDURE — 770006 HCHG ROOM/CARE - MED/SURG/GYN SEMI*

## 2017-04-14 PROCEDURE — 700105 HCHG RX REV CODE 258: Performed by: PSYCHIATRY & NEUROLOGY

## 2017-04-14 PROCEDURE — 700111 HCHG RX REV CODE 636 W/ 250 OVERRIDE (IP): Performed by: INTERNAL MEDICINE

## 2017-04-14 PROCEDURE — A9270 NON-COVERED ITEM OR SERVICE: HCPCS | Performed by: INTERNAL MEDICINE

## 2017-04-14 PROCEDURE — 700102 HCHG RX REV CODE 250 W/ 637 OVERRIDE(OP): Performed by: HOSPITALIST

## 2017-04-14 RX ORDER — TOPIRAMATE 25 MG/1
25 TABLET ORAL
Status: DISCONTINUED | OUTPATIENT
Start: 2017-04-14 | End: 2017-04-15

## 2017-04-14 RX ORDER — TOPIRAMATE 25 MG/1
25 TABLET ORAL
Qty: 60 TAB | Refills: 1 | Status: SHIPPED | OUTPATIENT
Start: 2017-04-14 | End: 2017-04-15

## 2017-04-14 RX ORDER — ACETAZOLAMIDE 500 MG/1
500 CAPSULE, EXTENDED RELEASE ORAL EVERY 12 HOURS
Qty: 60 CAP | Refills: 1 | Status: ON HOLD | OUTPATIENT
Start: 2017-04-14 | End: 2018-03-26

## 2017-04-14 RX ORDER — BUTALBITAL, ACETAMINOPHEN AND CAFFEINE 50; 325; 40 MG/1; MG/1; MG/1
1 TABLET ORAL EVERY 4 HOURS PRN
Qty: 30 TAB | Refills: 0 | Status: SHIPPED | OUTPATIENT
Start: 2017-04-14 | End: 2018-01-04

## 2017-04-14 RX ADMIN — GABAPENTIN 200 MG: 100 CAPSULE ORAL at 08:32

## 2017-04-14 RX ADMIN — ACETAZOLAMIDE 500 MG: 500 CAPSULE, EXTENDED RELEASE ORAL at 19:42

## 2017-04-14 RX ADMIN — OXYCODONE HYDROCHLORIDE AND ACETAMINOPHEN 1 TABLET: 5; 325 TABLET ORAL at 21:18

## 2017-04-14 RX ADMIN — ACETAZOLAMIDE 500 MG: 500 CAPSULE, EXTENDED RELEASE ORAL at 08:32

## 2017-04-14 RX ADMIN — KETOROLAC TROMETHAMINE 30 MG: 30 INJECTION, SOLUTION INTRAMUSCULAR at 13:13

## 2017-04-14 RX ADMIN — VALPROATE SODIUM 500 MG: 100 INJECTION, SOLUTION INTRAVENOUS at 09:31

## 2017-04-14 RX ADMIN — TOPIRAMATE 25 MG: 25 TABLET, FILM COATED ORAL at 19:42

## 2017-04-14 RX ADMIN — KETOROLAC TROMETHAMINE 30 MG: 30 INJECTION, SOLUTION INTRAMUSCULAR at 19:42

## 2017-04-14 RX ADMIN — DIPHENHYDRAMINE HCL 50 MG: 25 TABLET ORAL at 08:31

## 2017-04-14 RX ADMIN — METHYLPREDNISOLONE SODIUM SUCCINATE 125 MG: 125 INJECTION, POWDER, FOR SOLUTION INTRAMUSCULAR; INTRAVENOUS at 18:06

## 2017-04-14 RX ADMIN — SERTRALINE 50 MG: 50 TABLET, FILM COATED ORAL at 08:32

## 2017-04-14 RX ADMIN — MAGNESIUM SULFATE IN WATER 2 G: 40 INJECTION, SOLUTION INTRAVENOUS at 18:15

## 2017-04-14 RX ADMIN — KETOROLAC TROMETHAMINE 30 MG: 30 INJECTION, SOLUTION INTRAMUSCULAR at 06:40

## 2017-04-14 RX ADMIN — GABAPENTIN 200 MG: 100 CAPSULE ORAL at 15:59

## 2017-04-14 RX ADMIN — ONDANSETRON 4 MG: 2 INJECTION INTRAMUSCULAR; INTRAVENOUS at 19:52

## 2017-04-14 RX ADMIN — GABAPENTIN 200 MG: 100 CAPSULE ORAL at 19:41

## 2017-04-14 RX ADMIN — STANDARDIZED SENNA CONCENTRATE AND DOCUSATE SODIUM 2 TABLET: 8.6; 5 TABLET, FILM COATED ORAL at 08:31

## 2017-04-14 ASSESSMENT — PAIN SCALES - GENERAL
PAINLEVEL_OUTOF10: 7
PAINLEVEL_OUTOF10: 6
PAINLEVEL_OUTOF10: 7
PAINLEVEL_OUTOF10: 7
PAINLEVEL_OUTOF10: 6
PAINLEVEL_OUTOF10: 7
PAINLEVEL_OUTOF10: 7
PAINLEVEL_OUTOF10: 8
PAINLEVEL_OUTOF10: 6
PAINLEVEL_OUTOF10: 0
PAINLEVEL_OUTOF10: 7

## 2017-04-14 ASSESSMENT — ENCOUNTER SYMPTOMS
CHILLS: 0
BLURRED VISION: 0
PHOTOPHOBIA: 0
COUGH: 0
VOMITING: 0
MYALGIAS: 1
ROS GI COMMENTS: DECREASED APPETITE.
SPEECH CHANGE: 0
TINGLING: 1
DOUBLE VISION: 0
FOCAL WEAKNESS: 0
ABDOMINAL PAIN: 0
HEADACHES: 1
SHORTNESS OF BREATH: 0
BACK PAIN: 1
FEVER: 0
HALLUCINATIONS: 0

## 2017-04-14 ASSESSMENT — LIFESTYLE VARIABLES
EVER_SMOKED: NEVER
SUBSTANCE_ABUSE: 0

## 2017-04-14 NOTE — PROGRESS NOTES
Pt receiving IV Decadron, within 15 min of infusion pt reported feeling itchy and redness around iv site, Infusion was stopped and MD called. Order for Benadryl 50mg PRN was received. MD instructed to continue IV Decadron at 1/2 the infusion rate that was ordered. Benadryl administered, pt stated that itching is resolving, Decadron started at 25mL/ hr.

## 2017-04-14 NOTE — TELEPHONE ENCOUNTER
Message  Received: Today       Patrick Adame M.D.  Lelia Munoz, [facility]       Caller: Unspecified (Today, 1:45 PM)                     She should be seen in clinic and then we decide what to do.     Thx       Pt was informed, I gave pt an appt for the 18th at 3:20 for f/v.

## 2017-04-14 NOTE — TELEPHONE ENCOUNTER
Pt is still admitted, calling states had LP done. Opening pressure of 270 mm of water. Pt is prescribed diamox. But states is still in a lot of pain. The hospitalist told the pt to give it couple of months for the medication to decrease the pressure. But pt states is in a lot pain still. Does not want to put up with all this pain. Asking has an appt with Jana on 4/27/17 for botox. Should she continue with botox treatments ?

## 2017-04-14 NOTE — CARE PLAN
Problem: Communication  Goal: The ability to communicate needs accurately and effectively will improve  RN encouraged pt to verbalize needs, be involved in POC. Pt verbalizes that she is comfortable with current POC.     Problem: Pain Management  Goal: Pain level will decrease to patient’s comfort goal  Pt reports pain decreased with use of ketorolac. Pain went from 7/10 to 3/10. RN reassessing pain at minimum Q2H, medicating as ordered.

## 2017-04-14 NOTE — PROGRESS NOTES
Hospital Medicine Progress Note, Adult, Complex               Author: Frederic Camarillo Date & Time created: 4/14/2017  4:47 PM     CC: 30 yo female hx chronic HAs,  admitted with severe HAs- worsening, notes present past several months.     Interval History:    Feeling little better this morning- worsened HA, back pain this afternoon.     Review of Systems:  Review of Systems   Constitutional: Negative for fever and chills.   HENT: Negative for hearing loss.    Eyes: Negative for blurred vision, double vision and photophobia.   Respiratory: Negative for cough and shortness of breath.    Cardiovascular: Negative for chest pain and leg swelling.   Gastrointestinal: Negative for vomiting and abdominal pain.        Decreased appetite.    Musculoskeletal: Positive for myalgias and back pain.   Neurological: Positive for tingling (extremities. ) and headaches. Negative for speech change and focal weakness.   Psychiatric/Behavioral: Negative for hallucinations and substance abuse.       Physical Exam:  Physical Exam   Constitutional: She is oriented to person, place, and time. No distress.   Neck: Neck supple.   Cardiovascular: Normal rate and regular rhythm.    No murmur heard.  Pulmonary/Chest: No stridor. She has no wheezes. She has no rales.   Abdominal: Soft. Bowel sounds are normal. She exhibits no distension. There is no tenderness.   Musculoskeletal: She exhibits no edema.   Neurological: She is alert and oriented to person, place, and time. No cranial nerve deficit.   Skin: Skin is warm and dry. She is not diaphoretic.       Labs:        Invalid input(s): [id number]      Recent Labs      04/12/17   2100   SODIUM  140   POTASSIUM  3.4*   CHLORIDE  105   CO2  23   BUN  10   CREATININE  0.64   CALCIUM  9.6     Recent Labs      04/12/17   2100   ALTSGPT  12   ASTSGOT  11*   ALKPHOSPHAT  103*   TBILIRUBIN  0.4   GLUCOSE  84     Recent Labs      04/12/17   2100   RBC  4.83   HEMOGLOBIN  14.3   HEMATOCRIT  43.1    PLATELETCT  341     Recent Labs      17   2100   WBC  15.3*   NEUTSPOLYS  66.40   LYMPHOCYTES  26.00   MONOCYTES  6.30   EOSINOPHILS  0.60   BASOPHILS  0.40   ASTSGOT  11*   ALTSGPT  12   ALKPHOSPHAT  103*   TBILIRUBIN  0.4           Hemodynamics:  Temp (24hrs), Av.8 °C (98.2 °F), Min:36.6 °C (97.9 °F), Max:36.9 °C (98.4 °F)  Temperature: 36.6 °C (97.9 °F)  Pulse  Av  Min: 70  Max: 100   Blood Pressure: 110/65 mmHg     Respiratory:    Respiration: 17, Pulse Oximetry: 96 %           Fluids:    Intake/Output Summary (Last 24 hours) at 17 1647  Last data filed at 17 1300   Gross per 24 hour   Intake    360 ml   Output      0 ml   Net    360 ml        GI/Nutrition:  Orders Placed This Encounter   Procedures   • Diet Order     Standing Status: Standing      Number of Occurrences: 1      Standing Expiration Date:      Order Specific Question:  Diet:     Answer:  Regular [1]     Order Specific Question:  Miscellaneous modifications:     Answer:  Gluten Free per PT [10]     Medical Decision Making, by Problem:  Active Hospital Problems    Diagnosis   • *Intractable headache [R51]- suspect migraine HA flare.  CSF - no evid for meningitis.  Mildly elev csf opening pressure- ? Pseudotumor cerebri   Diamox  Iv solumedrol  Add topamax  Neurontin prn fioricet, percocet-- dw dr. Patrick   • Leukocytosis [D72.829]-afeb- no acute symptoms of infxn.   • Asthma [J45.909]stable.   • Anxiety [F41.9]  Hypokalemia   Oral kcl.     Hold discharge due to recurrent worsening HA.     Labs reviewed, Medications reviewed and Radiology images reviewed  Mahmood catheter: No Mahmood      DVT Prophylaxis: Not indicated at this time, ambulatory

## 2017-04-14 NOTE — CONSULTS
DATE OF SERVICE:  04/13/2017    REQUESTING PHYSICIAN:  Dr. Frederic Camarillo.    REASON FOR CONSULTATION:  Intractable headache.    HISTORY OF PRESENT ILLNESS:  The patient is a 31-year-old female with past   medical history significant for migraine headache, who presented to the   emergency room complaining of intractable headache.  She tells me she has   chronic daily headache since September 2016, she has a persistent daily   headache.  She is followed by Dr. Adame and Dr. Fernandez at the outpatient   neurology clinic and apparently has tried different number of preventive   medication as well as Botox injection and a greater occipital nerve block, but   none seems to have worked for her.  She presented to the emergency room last   night with a headache with intensity of 7/10.  Her headaches usually starts   from posterior head region with extension into the frontal head region.  She   does not complain of photophobia or phonophobia, but occasionally has nausea.    She says Fioricet is working, but recently that did not work for her.  She is   aware of analgesic overuse headache and tells me she does not overuse   Fioricet.  In the emergency room, she underwent a spinal tap which revealed   opening pressure of 270 mm of water.  She says after a spinal tap, her   headache improved slightly, but today it returned to baseline, intensity of   7/10.  Patient does not complain of visual impairment or blurriness.    PAST MEDICAL HISTORY:  Significant for chronic migrainous headache and   obesity.    MEDICATIONS:  Home medications include Fioricet as needed, gabapentin for   headache prevention and Zoloft for depression.    ALLERGIES:  BIAXIN, ATIVAN, IMITREX, MORPHINE, REGLAN, MAXALT.    FAMILY HISTORY:  Reviewed and noncontributory.    REVIEW OF SYSTEMS:  As above.  All other systems are normal.  Please also see   Dr. Atkins's review of system in H and P dated 04/13/2017.    PHYSICAL EXAMINATION:  VITAL SIGNS:  On examination  today, heart rate 100, blood pressure 104/54,   respirations 17, O2 sat 94% on room air, temperature 37.2.  NECK:  Supple, no carotid bruit, no stiffness or rigidity.  CARDIOVASCULAR:  Regular rate and rhythm.  LUNGS:  Clear.  ABDOMEN:  Soft, obese, nontender.  EXTREMITIES:  No cyanosis or clubbing.  NEUROLOGIC:  She is awake, alert, fully oriented.  Speech and memory within   normal limits.  Facial motor and sensory are intact.  Extraocular movements   are full.  Visual fields are full to confrontation.  Funduscopic exam revealed   sharp disks bilaterally.  Hearing is intact to finger rub bilaterally.    Tongue in midline and protrudes symmetrically.  Palate elevates symmetrically.    Shoulder shrugs are normal.  Motor examination revealed normal strength to   direct testing of both upper and lower extremity, proximal and distal.    Sensation intact to light touch, temperature, and pinprick.  Coordination   intact to finger-to-nose and heel-to-shin testing.  Deep tendon reflexes are   1+ and equal.  Plantar reflexes are downgoing.    ASSESSMENT AND PLAN:  A 31-year-old female with status migrainosus as well as   possible pseudotumor cerebri.  She had opening pressure of 270 mm of water.    She was started on Diamox that she will continue.  I will start her on Depacon   intravenous 500 mg twice a day, Toradol 30 mg intravenous every 6 hours   p.r.n. pain, magnesium sulfate 2 g daily intravenous as well as Solu-Medrol   125 mg daily for 2 days to hopefully abort her current headache.  She needs to   continue with her Diamox and have formal ophthalmological examination to make   sure she does not develop papilledema and subsequently visual impairment.  At   the present time, her funduscopic exam does not reveal papilledema on my   examination.  She will continue with Percocet as an abortive treatment as well   and if her headache is stable, she can be discharged to have followup with   her primary neurologist with  referral to neurosurgery for possible   ventriculoperitoneal shunt placement if indicated or optic nerve fenestration   if she does not respond to medical treatment with Diamox.       ____________________________________     MD KELSEY Ramires    DD:  04/13/2017 19:19:49  DT:  04/13/2017 20:09:02    D#:  [id number]  Job#:  [id number]

## 2017-04-14 NOTE — PROGRESS NOTES
NEUROLOGY PROGRESS NOTE      Background:  31 y.o. female was admitted on 4/12/2017  8:28 PM for Intractable headache.  She is being followed by Neurology for     SUBJECTIVE: H/A is better, currently rate 3/10. Complaint of back pain where her LP was done.    NEUROLOGICAL EXAM:   NECK:  Supple, no carotid bruit, no stiffness or rigidity.  CARDIOVASCULAR:  Regular rate and rhythm.  LUNGS:  Clear.  ABDOMEN:  Soft, obese, nontender.  EXTREMITIES:  No cyanosis or clubbing.  NEUROLOGIC:  She is awake, alert, fully oriented.  Speech and memory within    normal limits.  Facial motor and sensory are intact.  Extraocular movements    are full.  Visual fields are full to confrontation.  Funduscopic exam revealed   sharp disks bilaterally.  Hearing is intact to finger rub bilaterally.     Tongue in midline and protrudes symmetrically.  Palate elevates symmetrically.    Shoulder shrugs are normal.  Motor examination revealed normal strength to    direct testing of both upper and lower extremity, proximal and distal.     Sensation intact to light touch, temperature, and pinprick.  Coordination    intact to finger-to-nose and heel-to-shin testing.  Deep tendon reflexes are    1+ and equal.  Plantar reflexes are downgoing.      OBJECTIVE:        MEDICATIONS:  Current Facility-Administered Medications   Medication Dose   • topiramate (TOPAMAX) tablet 25 mg  25 mg   • gabapentin (NEURONTIN) capsule 200 mg  200 mg   • sertraline (ZOLOFT) tablet 50 mg  50 mg   • acetaZOLAMIDE SR (DIAMOX) capsule 500 mg  500 mg   • methylPREDNISolone sod succ (SOLU-MEDROL) 125 MG injection 125 mg  125 mg   • magnesium sulfate IVPB premix 2 g  2 g   • ketorolac (TORADOL) injection  30 mg   • diphenhydrAMINE (BENADRYL) tablet/capsule 50 mg  50 mg   • oxycodone-acetaminophen (PERCOCET) 5-325 MG per tablet 1-2 Tab  1-2 Tab   • acetaminophen/caffeine/butalbital 325-40-50 mg (FIORICET) -40 MG per tablet 1 Tab  1 Tab   • senna-docusate (PERICOLACE or  "SENOKOT S) 8.6-50 MG per tablet 2 Tab  2 Tab    And   • polyethylene glycol/lytes (MIRALAX) PACKET 1 Packet  1 Packet    And   • magnesium hydroxide (MILK OF MAGNESIA) suspension 30 mL  30 mL    And   • bisacodyl (DULCOLAX) suppository 10 mg  10 mg   • ondansetron (ZOFRAN) syringe/vial injection 4 mg  4 mg   • ondansetron (ZOFRAN ODT) dispertab 4 mg  4 mg   • promethazine (PHENERGAN) tablet 12.5-25 mg  12.5-25 mg   • promethazine (PHENERGAN) suppository 12.5-25 mg  12.5-25 mg   • prochlorperazine (COMPAZINE) injection 5-10 mg  5-10 mg       Blood pressure 110/65, pulse 70, temperature 36.6 °C (97.9 °F), temperature source Temporal, resp. rate 17, height 1.6 m (5' 2.99\"), weight 93.9 kg (207 lb 0.2 oz), last menstrual period 03/31/2017, SpO2 96 %, not currently breastfeeding.        Recent Labs      04/12/17   2100   WBC  15.3*   RBC  4.83   HEMOGLOBIN  14.3   HEMATOCRIT  43.1   MCV  89.2   MCH  29.6   MCHC  33.2*   RDW  45.1   PLATELETCT  341   MPV  9.6     Recent Labs      04/12/17   2100   SODIUM  140   POTASSIUM  3.4*   CHLORIDE  105   CO2  23   GLUCOSE  84   BUN  10       No results found for this or any previous visit.     ASSESSMENT AND PLAN:   A 31-year-old female with status migrainosus as well as    possible pseudotumor cerebri.  She had opening pressure of 270 mm of water.     She will continue with Diamox. She needs to  have formal ophthalmological examination to make sure she does not develop papilledema and subsequently visual impairment.She can be discharged to have followup with    her primary neurologist with referral to neurosurgery for possible    ventriculoperitoneal shunt placement   if she does not respond to medical treatment with Diamox.      "

## 2017-04-14 NOTE — PROGRESS NOTES
Received report, assumed care of pt at 1900. Pt in bed, fall precautions in place, bed alarm refused, RN educated pt on fall risks, call light within reach. Pt instructed to use call light when assistance is needed. RN and CNA numbers provided. VSS. A&O x4. Pain 4/10, hot pack given till pain meds are due. Hourly rounding in place, will continue to monitor pt closely.

## 2017-04-14 NOTE — PROGRESS NOTES
Assumed patient care at 0700. Received report from night shift. Assessment completed. POC discussed with pt, verbalizes understanding. A&Ox4. Pt states pain 6/10, cold pack provided, denies nausea. Bed alarm refused despite education. Personal possessions and call light placed within reach. Pt denies any additional needs at this time.

## 2017-04-15 VITALS
HEART RATE: 63 BPM | RESPIRATION RATE: 16 BRPM | TEMPERATURE: 98.1 F | OXYGEN SATURATION: 95 % | HEIGHT: 63 IN | SYSTOLIC BLOOD PRESSURE: 98 MMHG | WEIGHT: 193.78 LBS | BODY MASS INDEX: 34.34 KG/M2 | DIASTOLIC BLOOD PRESSURE: 54 MMHG

## 2017-04-15 PROCEDURE — 700111 HCHG RX REV CODE 636 W/ 250 OVERRIDE (IP): Performed by: PSYCHIATRY & NEUROLOGY

## 2017-04-15 PROCEDURE — 99239 HOSP IP/OBS DSCHRG MGMT >30: CPT | Performed by: HOSPITALIST

## 2017-04-15 PROCEDURE — 700101 HCHG RX REV CODE 250: Performed by: HOSPITALIST

## 2017-04-15 PROCEDURE — A9270 NON-COVERED ITEM OR SERVICE: HCPCS | Performed by: INTERNAL MEDICINE

## 2017-04-15 PROCEDURE — 700102 HCHG RX REV CODE 250 W/ 637 OVERRIDE(OP): Performed by: INTERNAL MEDICINE

## 2017-04-15 RX ORDER — AMITRIPTYLINE HYDROCHLORIDE 25 MG/1
25 TABLET, FILM COATED ORAL EVERY EVENING
Status: DISCONTINUED | OUTPATIENT
Start: 2017-04-15 | End: 2017-04-15 | Stop reason: HOSPADM

## 2017-04-15 RX ORDER — LIDOCAINE 50 MG/G
1 PATCH TOPICAL EVERY 24 HOURS
Qty: 3 PATCH | Refills: 0 | Status: SHIPPED | OUTPATIENT
Start: 2017-04-15 | End: 2017-04-18

## 2017-04-15 RX ORDER — AMITRIPTYLINE HYDROCHLORIDE 25 MG/1
25 TABLET, FILM COATED ORAL EVERY EVENING
Qty: 30 TAB | Refills: 1 | Status: SHIPPED | OUTPATIENT
Start: 2017-04-15 | End: 2018-01-04

## 2017-04-15 RX ORDER — LIDOCAINE 50 MG/G
1 PATCH TOPICAL EVERY 24 HOURS
Status: DISCONTINUED | OUTPATIENT
Start: 2017-04-15 | End: 2017-04-15 | Stop reason: HOSPADM

## 2017-04-15 RX ADMIN — KETOROLAC TROMETHAMINE 30 MG: 30 INJECTION, SOLUTION INTRAMUSCULAR at 04:03

## 2017-04-15 RX ADMIN — GABAPENTIN 200 MG: 100 CAPSULE ORAL at 09:40

## 2017-04-15 RX ADMIN — LIDOCAINE 1 PATCH: 50 PATCH CUTANEOUS at 10:55

## 2017-04-15 RX ADMIN — KETOROLAC TROMETHAMINE 30 MG: 30 INJECTION, SOLUTION INTRAMUSCULAR at 10:06

## 2017-04-15 RX ADMIN — ACETAZOLAMIDE 500 MG: 500 CAPSULE, EXTENDED RELEASE ORAL at 09:40

## 2017-04-15 RX ADMIN — SERTRALINE 50 MG: 50 TABLET, FILM COATED ORAL at 09:40

## 2017-04-15 ASSESSMENT — ENCOUNTER SYMPTOMS
SHORTNESS OF BREATH: 0
DOUBLE VISION: 0
ABDOMINAL PAIN: 0
FEVER: 0
BLURRED VISION: 0
COUGH: 0
VOMITING: 0
PHOTOPHOBIA: 0
MYALGIAS: 1
HALLUCINATIONS: 0
BACK PAIN: 1
CHILLS: 0
TINGLING: 1
SPEECH CHANGE: 0
HEADACHES: 1
ROS GI COMMENTS: DECREASED APPETITE.
FOCAL WEAKNESS: 0

## 2017-04-15 ASSESSMENT — PAIN SCALES - GENERAL
PAINLEVEL_OUTOF10: 5
PAINLEVEL_OUTOF10: 7
PAINLEVEL_OUTOF10: 5
PAINLEVEL_OUTOF10: 7

## 2017-04-15 ASSESSMENT — LIFESTYLE VARIABLES: SUBSTANCE_ABUSE: 0

## 2017-04-15 NOTE — PROGRESS NOTES
C/o eye pressure pain, offered pain medicine, cold compress and kept the room dim/dark, needs attended, will continue to monitor.

## 2017-04-15 NOTE — CARE PLAN
Problem: Knowledge Deficit  Goal: Knowledge of disease process/condition, treatment plan, diagnostic tests, and medications will improve  Intervention: Explain information regarding disease process/condition, treatment plan, diagnostic tests, and medications and document in education  Patient educated about medications and potential side effects.      Problem: Pain Management  Goal: Pain level will decrease to patient’s comfort goal  Intervention: Follow pain managment plan developed in collaboration with patient and Interdisciplinary Team  Patient medicated per MAR for pain management.

## 2017-04-15 NOTE — PROGRESS NOTES
Received alert and oriented x 4, v/s stable, still c/o on and off blurry/double vision, head ache all over her head,  denies light headedness, mild dizziness when getting up to the bathroom, call light within reach, up self, instructed to call for assistance especially when getting dizzy ambulating to the bathroom, needs attended, will continue to monitor.

## 2017-04-15 NOTE — CARE PLAN
Problem: Discharge Barriers/Planning  Goal: Patient’s continuum of care needs will be met  Intervention: Involve patient and significant other/support system in setting and prioritizing goals for hospital stay and discharge  Possible discharge home today after seen by her neurologist.      Problem: Pain Management  Goal: Pain level will decrease to patient’s comfort goal  Intervention: Follow pain managment plan developed in collaboration with patient and Interdisciplinary Team  Still c/o head ache and eye pain, due med given and ice compress.

## 2017-04-15 NOTE — PROGRESS NOTES
Received report from night RN, assumed care at 0700. Pt A&OX4, able to specify needs. Pt with complaints of pain to head and lower back, medicated per MAR. Pt with complaints of blurry vision, Q4H neuro checks in place. Pt denies dizziness, N&V. Bed in lowest and locked position, non-skid socks in place. POC discussed, communication board updated. Call light in reach, hourly rounding in place.

## 2017-04-15 NOTE — PROGRESS NOTES
Still noting a headache and spine pain.  Does not appear to be photophobic.  PERRL, VF are full, motor is =.  She has had kidney stones, will d/c topamax and start elavil 25 mg q hs.

## 2017-04-15 NOTE — PROGRESS NOTES
Hospital Medicine Progress Note, Adult, Complex               Author: Frederic Camarillo Date & Time created: 4/15/2017  9:17 AM     CC: 30 yo female hx chronic HAs,  admitted with severe HAs- worsening, notes present past several months.     Interval History:    Feeling little better this morning- worsened HA, back pain this afternoon.     Review of Systems:  Review of Systems   Constitutional: Negative for fever and chills.   HENT: Negative for hearing loss.    Eyes: Negative for blurred vision, double vision and photophobia.   Respiratory: Negative for cough and shortness of breath.    Cardiovascular: Negative for chest pain and leg swelling.   Gastrointestinal: Negative for vomiting and abdominal pain.        Decreased appetite.    Musculoskeletal: Positive for myalgias and back pain.   Neurological: Positive for tingling (extremities. ) and headaches. Negative for speech change and focal weakness.   Psychiatric/Behavioral: Negative for hallucinations and substance abuse.       Physical Exam:  Physical Exam   Constitutional: She is oriented to person, place, and time. No distress.   Neck: Neck supple.   Cardiovascular: Normal rate and regular rhythm.    No murmur heard.  Pulmonary/Chest: No stridor. She has no wheezes. She has no rales.   Abdominal: Soft. Bowel sounds are normal. She exhibits no distension. There is no tenderness.   Musculoskeletal: She exhibits no edema.   Neurological: She is alert and oriented to person, place, and time. No cranial nerve deficit.   Skin: Skin is warm and dry. She is not diaphoretic.       Labs:        Invalid input(s): [id number]      Recent Labs      04/12/17   2100   SODIUM  140   POTASSIUM  3.4*   CHLORIDE  105   CO2  23   BUN  10   CREATININE  0.64   CALCIUM  9.6     Recent Labs      04/12/17   2100   ALTSGPT  12   ASTSGOT  11*   ALKPHOSPHAT  103*   TBILIRUBIN  0.4   GLUCOSE  84     Recent Labs      04/12/17   2100   RBC  4.83   HEMOGLOBIN  14.3   HEMATOCRIT  43.1    PLATELETCT  341     Recent Labs      17   2100   WBC  15.3*   NEUTSPOLYS  66.40   LYMPHOCYTES  26.00   MONOCYTES  6.30   EOSINOPHILS  0.60   BASOPHILS  0.40   ASTSGOT  11*   ALTSGPT  12   ALKPHOSPHAT  103*   TBILIRUBIN  0.4           Hemodynamics:  Temp (24hrs), Av.4 °C (97.6 °F), Min:36.1 °C (97 °F), Max:36.8 °C (98.2 °F)  Temperature: 36.1 °C (97 °F)  Pulse  Av.3  Min: 70  Max: 100   Blood Pressure: 107/55 mmHg     Respiratory:    Respiration: 18, Pulse Oximetry: 95 %           Fluids:    Intake/Output Summary (Last 24 hours) at 04/15/17 0917  Last data filed at 17 1300   Gross per 24 hour   Intake    120 ml   Output      0 ml   Net    120 ml     Weight: 87.9 kg (193 lb 12.6 oz)  GI/Nutrition:  Orders Placed This Encounter   Procedures   • Diet Order     Standing Status: Standing      Number of Occurrences: 1      Standing Expiration Date:      Order Specific Question:  Diet:     Answer:  Regular [1]     Order Specific Question:  Miscellaneous modifications:     Answer:  Gluten Free per PT [10]     Medical Decision Making, by Problem:  Active Hospital Problems    Diagnosis   • *Intractable headache [R51]- suspect migraine HA flare.  CSF - no evid for meningitis.  Mildly elev csf opening pressure- ? Pseudotumor cerebri   Diamox  Iv solumedrol  Add topamax  Neurontin prn fioricet, percocet-- dw dr. Patrick   • Leukocytosis [D72.829]-afeb- no acute symptoms of infxn.   • Asthma [J45.909]stable.   • Anxiety [F41.9]  Hypokalemia   Oral kcl.     Hold discharge due to recurrent worsening HA.     Labs reviewed, Medications reviewed and Radiology images reviewed  Mahmood catheter: No Mahmood      DVT Prophylaxis: Not indicated at this time, ambulatory

## 2017-04-15 NOTE — DISCHARGE INSTRUCTIONS
Discharge Instructions    Discharged to home by car with relative. Discharged via wheelchair, hospital escort: Yes.  Special equipment needed: Not Applicable    Be sure to schedule a follow-up appointment with your primary care doctor or any specialists as instructed.     Discharge Plan:   Diet Plan: Discussed  Activity Level: Discussed  Confirmed Follow up Appointment: Patient to Call and Schedule Appointment  Influenza Vaccine Indication: Patient Refuses    I understand that a diet low in cholesterol, fat, and sodium is recommended for good health. Unless I have been given specific instructions below for another diet, I accept this instruction as my diet prescription.   Other diet: Regular    Special Instructions: None    · Is patient discharged on Warfarin / Coumadin?   No     · Is patient Post Blood Transfusion?  No    Depression / Suicide Risk    As you are discharged from this [facility] facility, it is important to learn how to keep safe from harming yourself.    Recognize the warning signs:  · Abrupt changes in personality, positive or negative- including increase in energy   · Giving away possessions  · Change in eating patterns- significant weight changes-  positive or negative  · Change in sleeping patterns- unable to sleep or sleeping all the time   · Unwillingness or inability to communicate  · Depression  · Unusual sadness, discouragement and loneliness  · Talk of wanting to die  · Neglect of personal appearance   · Rebelliousness- reckless behavior  · Withdrawal from people/activities they love  · Confusion- inability to concentrate     If you or a loved one observes any of these behaviors or has concerns about self-harm, here's what you can do:  · Talk about it- your feelings and reasons for harming yourself  · Remove any means that you might use to hurt yourself (examples: pills, rope, extension cords, firearm)  · Get professional help from the community (Mental Health, Substance Abuse,  psychological counseling)  · Do not be alone:Call your Safe Contact- someone whom you trust who will be there for you.  · Call your local CRISIS HOTLINE 159-0249 or 611-171-1141  · Call your local Children's Mobile Crisis Response Team [place] (032) 148-0055 or www.[employer]  · Call the toll free National Suicide Prevention Hotlines   · National Suicide Prevention Lifeline 707-259-UVKG (4078)  · RPost Line Network 800-SUICIDE (969-7188)    Migraine Headache  A migraine headache is an intense, throbbing pain on one or both sides of your head. A migraine can last for 30 minutes to several hours.  CAUSES   The exact cause of a migraine headache is not always known. However, a migraine may be caused when nerves in the brain become irritated and release chemicals that cause inflammation. This causes pain.  Certain things may also trigger migraines, such as:  · Alcohol.  · Smoking.  · Stress.  · Menstruation.  · Aged cheeses.  · Foods or drinks that contain nitrates, glutamate, aspartame, or tyramine.  · Lack of sleep.  · Chocolate.  · Caffeine.  · Hunger.  · Physical exertion.  · Fatigue.  · Medicines used to treat chest pain (nitroglycerine), birth control pills, estrogen, and some blood pressure medicines.  SIGNS AND SYMPTOMS  · Pain on one or both sides of your head.  · Pulsating or throbbing pain.  · Severe pain that prevents daily activities.  · Pain that is aggravated by any physical activity.  · Nausea, vomiting, or both.  · Dizziness.  · Pain with exposure to bright lights, loud noises, or activity.  · General sensitivity to bright lights, loud noises, or smells.  Before you get a migraine, you may get warning signs that a migraine is coming (aura). An aura may include:  · Seeing flashing lights.  · Seeing bright spots, halos, or zigzag lines.  · Having tunnel vision or blurred vision.  · Having feelings of numbness or tingling.  · Having trouble talking.  · Having muscle weakness.  DIAGNOSIS    A migraine headache is often diagnosed based on:  · Symptoms.  · Physical exam.  · A CT scan or MRI of your head. These imaging tests cannot diagnose migraines, but they can help rule out other causes of headaches.  TREATMENT  Medicines may be given for pain and nausea. Medicines can also be given to help prevent recurrent migraines.   HOME CARE INSTRUCTIONS  · Only take over-the-counter or prescription medicines for pain or discomfort as directed by your health care provider. The use of long-term narcotics is not recommended.  · Lie down in a dark, quiet room when you have a migraine.  · Keep a journal to find out what may trigger your migraine headaches. For example, write down:  ¨ What you eat and drink.  ¨ How much sleep you get.  ¨ Any change to your diet or medicines.  · Limit alcohol consumption.  · Quit smoking if you smoke.  · Get 7-9 hours of sleep, or as recommended by your health care provider.  · Limit stress.  · Keep lights dim if bright lights bother you and make your migraines worse.  SEEK IMMEDIATE MEDICAL CARE IF:   · Your migraine becomes severe.  · You have a fever.  · You have a stiff neck.  · You have vision loss.  · You have muscular weakness or loss of muscle control.  · You start losing your balance or have trouble walking.  · You feel faint or pass out.  · You have severe symptoms that are different from your first symptoms.  MAKE SURE YOU:   · Understand these instructions.  · Will watch your condition.  · Will get help right away if you are not doing well or get worse.     This information is not intended to replace advice given to you by your health care provider. Make sure you discuss any questions you have with your health care provider.     Document Released: 12/18/2006 Document Revised: 01/08/2016 Document Reviewed: 08/25/2014  [employer] Interactive Patient Education ©2016 [employer]    Pseudotumor Cerebri  Pseudotumor cerebri, also called idiopathic intracranial hypertension, is  a condition that occurs due to increased pressure within your skull.  Although some of the symptoms resemble those of a brain tumor, it is not a brain tumor. Symptoms occur when the increased pressure in your skull compresses brain structures. For example, pressure on the nerve responsible for vision (optic nerve) causes it to swell, resulting in visual symptoms.  Pseudotumor cerebri tends to occur in obese women younger than 45 years of age. However, men and children can also develop this condition.  SYMPTOMS   Symptoms of pseudotumor cerebri occur due to increased pressure within the skull. Symptoms may include:   · Headaches.  · Nausea and vomiting.  · Dizziness.  · High blood pressure.    · Ringing in the ears.  · Double or blurred vision.  · Brief episodes of complete loss of vision.  · Pain in the back, neck, or shoulders.  DIAGNOSIS   Pseudotumor cerebri is diagnosed through:  · A detailed eye exam, which can reveal a swollen optic nerve, as well as identifying issues such as blind spots in the vision.  · An MRI or CT scan to rule out other disorders that can cause similar symptoms, such as brain tumors.  · A spinal tap (lumbar puncture), which can demonstrate increased pressure within the skull.  TREATMENT   There are several ways that pseudotumor cerebri is treated, including:  · Medicines to decrease the production of spinal fluid and lower the pressure within your skull.  · Medicines to prevent or treat headaches.  · Surgery to create an opening in your optic nerve to allow excess fluid to drain out.  · Surgery to place drains (shunts) in your brain to remove excess fluid.  HOME CARE INSTRUCTIONS   · Take all medicines as directed by your health care provider.  · Go to all of your follow-up appointments.  · Lose weight if you are overweight.  SEEK MEDICAL CARE IF:  · Any symptoms come back.  · You develop trouble with hearing, vision, balance, or your sense of smell.  · You cannot eat or drink what you  need.  · You are more weak or tired than usual.    · You are losing weight without trying.  SEEK IMMEDIATE MEDICAL CARE IF:  · You have new symptoms such as vision problems or difficulty walking.    · You have a seizure.    · You have trouble breathing.    · You have a fever.        This information is not intended to replace advice given to you by your health care provider. Make sure you discuss any questions you have with your health care provider.     Document Released: 12/23/2014 Document Reviewed: 12/23/2014  [employer] Interactive Patient Education ©2016 [employer]      Discharge Instructions    Discharged to home by car with relative. Discharged via wheelchair, hospital escort: Yes.  Special equipment needed: Not Applicable    Be sure to schedule a follow-up appointment with your primary care doctor or any specialists as instructed.     Discharge Plan:   Diet Plan: Discussed  Activity Level: Discussed  Confirmed Follow up Appointment: Patient to Call and Schedule Appointment  Influenza Vaccine Indication: Patient Refuses    I understand that a diet low in cholesterol, fat, and sodium is recommended for good health. Unless I have been given specific instructions below for another diet, I accept this instruction as my diet prescription.   Other diet: Regular, as tolerated    Special Instructions: None    · Is patient discharged on Warfarin / Coumadin?   No     · Is patient Post Blood Transfusion?  No    Migraine Headache  A migraine headache is an intense, throbbing pain on one or both sides of your head. A migraine can last for 30 minutes to several hours.  CAUSES   The exact cause of a migraine headache is not always known. However, a migraine may be caused when nerves in the brain become irritated and release chemicals that cause inflammation. This causes pain.  Certain things may also trigger migraines, such as:  · Alcohol.  · Smoking.  · Stress.  · Menstruation.  · Aged cheeses.  · Foods or drinks that  contain nitrates, glutamate, aspartame, or tyramine.  · Lack of sleep.  · Chocolate.  · Caffeine.  · Hunger.  · Physical exertion.  · Fatigue.  · Medicines used to treat chest pain (nitroglycerine), birth control pills, estrogen, and some blood pressure medicines.  SIGNS AND SYMPTOMS  · Pain on one or both sides of your head.  · Pulsating or throbbing pain.  · Severe pain that prevents daily activities.  · Pain that is aggravated by any physical activity.  · Nausea, vomiting, or both.  · Dizziness.  · Pain with exposure to bright lights, loud noises, or activity.  · General sensitivity to bright lights, loud noises, or smells.  Before you get a migraine, you may get warning signs that a migraine is coming (aura). An aura may include:  · Seeing flashing lights.  · Seeing bright spots, halos, or zigzag lines.  · Having tunnel vision or blurred vision.  · Having feelings of numbness or tingling.  · Having trouble talking.  · Having muscle weakness.  DIAGNOSIS   A migraine headache is often diagnosed based on:  · Symptoms.  · Physical exam.  · A CT scan or MRI of your head. These imaging tests cannot diagnose migraines, but they can help rule out other causes of headaches.  TREATMENT  Medicines may be given for pain and nausea. Medicines can also be given to help prevent recurrent migraines.   HOME CARE INSTRUCTIONS  · Only take over-the-counter or prescription medicines for pain or discomfort as directed by your health care provider. The use of long-term narcotics is not recommended.  · Lie down in a dark, quiet room when you have a migraine.  · Keep a journal to find out what may trigger your migraine headaches. For example, write down:  ¨ What you eat and drink.  ¨ How much sleep you get.  ¨ Any change to your diet or medicines.  · Limit alcohol consumption.  · Quit smoking if you smoke.  · Get 7-9 hours of sleep, or as recommended by your health care provider.  · Limit stress.  · Keep lights dim if bright lights  bother you and make your migraines worse.  SEEK IMMEDIATE MEDICAL CARE IF:   · Your migraine becomes severe.  · You have a fever.  · You have a stiff neck.  · You have vision loss.  · You have muscular weakness or loss of muscle control.  · You start losing your balance or have trouble walking.  · You feel faint or pass out.  · You have severe symptoms that are different from your first symptoms.  MAKE SURE YOU:   · Understand these instructions.  · Will watch your condition.  · Will get help right away if you are not doing well or get worse.     This information is not intended to replace advice given to you by your health care provider. Make sure you discuss any questions you have with your health care provider.     Document Released: 12/18/2006 Document Revised: 01/08/2016 Document Reviewed: 08/25/2014  [employer] Interactive Patient Education ©2016 [employer]      Depression / Suicide Risk    As you are discharged from this [facility] facility, it is important to learn how to keep safe from harming yourself.    Recognize the warning signs:  · Abrupt changes in personality, positive or negative- including increase in energy   · Giving away possessions  · Change in eating patterns- significant weight changes-  positive or negative  · Change in sleeping patterns- unable to sleep or sleeping all the time   · Unwillingness or inability to communicate  · Depression  · Unusual sadness, discouragement and loneliness  · Talk of wanting to die  · Neglect of personal appearance   · Rebelliousness- reckless behavior  · Withdrawal from people/activities they love  · Confusion- inability to concentrate     If you or a loved one observes any of these behaviors or has concerns about self-harm, here's what you can do:  · Talk about it- your feelings and reasons for harming yourself  · Remove any means that you might use to hurt yourself (examples: pills, rope, extension cords, firearm)  · Get professional help from the  community (Mental Health, Substance Abuse, psychological counseling)  · Do not be alone:Call your Safe Contact- someone whom you trust who will be there for you.  · Call your local CRISIS HOTLINE 093-9067 or 793-007-1818  · Call your local Children's Mobile Crisis Response Team [place] (513) 958-3616 or www.[employer]  · Call the toll free National Suicide Prevention Hotlines   · National Suicide Prevention Lifeline 061-362-OBAD (6401)  · National Hope Line Network 800-SUICIDE (389-0708)

## 2017-04-16 ENCOUNTER — PATIENT OUTREACH (OUTPATIENT)
Dept: HEALTH INFORMATION MANAGEMENT | Facility: OTHER | Age: 32
End: 2017-04-16

## 2017-04-16 LAB
BACTERIA CSF CULT: NORMAL
GRAM STN SPEC: NORMAL
SIGNIFICANT IND 70042: NORMAL
SITE SITE: NORMAL
SOURCE SOURCE: NORMAL

## 2017-04-16 NOTE — PROGRESS NOTES
· Placed discharge outreach phone call to patient s/p hospital discharge 4/15/17.  Left voicemail with my contact information and instructions to return my phone call.    · 4/16/17 at 3:32 PM--Received phone call from patient, discharge outreach completed.

## 2017-04-17 ENCOUNTER — TELEPHONE (OUTPATIENT)
Dept: NEUROLOGY | Facility: MEDICAL CENTER | Age: 32
End: 2017-04-17

## 2017-04-17 PROCEDURE — 99285 EMERGENCY DEPT VISIT HI MDM: CPT

## 2017-04-17 ASSESSMENT — PAIN SCALES - GENERAL: PAINLEVEL_OUTOF10: 8

## 2017-04-17 NOTE — TELEPHONE ENCOUNTER
Message  Received: Today       Patrick Adame M.D.  Lelia Munoz, [facility] [facility]       Caller: Unspecified (Today, 12:03 PM)                     I don't have  any input. Patient can go back to the ED if she is feeling so bad. Nothing I can do      Pt informed, will comply with instructions given.

## 2017-04-17 NOTE — DISCHARGE SUMMARY
DATE OF ADMISSION:  04/12/2017    DATE OF DISCHARGE:  04/15/2017    ADMISSION DIAGNOSES:  1.  Intractable headache, possible migraine headache flare-up, rule out   meningitis.  2.  History of migraines.  3.  History of viral meningitis in 09/2015.    DISCHARGE DIAGNOSES:  1.  Intractable headaches, improved with adjustments made to her migraine   medication regimen.  2.  Chronic low back pain, to follow up with [facility].  3.  Meningitis, ruled out.    IMAGING TESTS:  None.    CONSULTS:  Jaylin Lehman MD and Dr. Fernandez with neurology.    HOSPITAL COURSE:  Patient is a 31-year-old female with history of viral   meningitis in 09/2015, migraine headaches, chronic pain, was admitted with   severe intractable headache.  She reports she has had for the most part   constant headaches since 09/2016, had a follow up with Dr. Adame, neurology,   received Botox injection without sustained relief.  Patient was admitted to   the medical floor, treated with IV fluids, p.r.n. antiemetics.  Dr. Lehman   has consulted, placed on IV Solu-Medrol, Depacon, magnesium and Toradol with   p.r.n. Fioricet.  She was continued on Neurontin for prophylaxis with addition   of Topamax.  Patient did have an elevated opening pressure on lumbar puncture   with a value of 27 cm of water.  There is concern for pseudotumor cerebri.    She was started on oral acetazolamide, it is recommended by neurology to   continue.  She did have complaints of back pain.  Previously had been   recommended to follow up with [facility], worsened following her spinal tap.    The site is without redness, induration, mild bruising, no leakage.  She is   to follow up outpatient with  ___ for repeat spinal tap when her symptoms   improve, to reassess opening pressure.  In addition, future referral   outpatient Dr. Webster, neuro-ophthalmology.    I discussed the case with Dr. Fernandez.    PLAN:  Switch from Topamax to Elavil, given concern for development of  kidney   stones.  We will plan to discharge home with improvement in her headache   symptoms.  To follow up outpatient with Dr. Adame as well as Elvia Car.    DISCHARGE INSTRUCTIONS:  Discharge home.    DIET:  As tolerated.    DISCHARGE MEDICATIONS:  1.  Diamox 500 mg b.i.d.  2.  Elavil 25 mg every evening.  3.  Lidoderm patch to lower back every 24 hours for 3 days as needed.  4.  Fioricet one tab every 4 hours p.r.n. moderate-to-severe headache.  5.  Neurontin 100 mg 2 tabs t.i.d.  6.  Zoloft 50 mg daily.    FOLLOWUP:  Follow up with [facility] as instructed in 2-3 weeks, with Dr. Adame of neurology in approximately 2 weeks and Dr. Mack Bernard in   approximately 2 weeks.    Total discharge time approximately 40 minutes.       ____________________________________     GUERRERO RODRIGES MD    QBV / ZAK    DD:  04/15/2017 16:20:22  DT:  04/16/2017 16:16:51    D#:  [id number]  Job#:  [id number]

## 2017-04-17 NOTE — TELEPHONE ENCOUNTER
Pt calling, states is at work today, but having problems with balance, depth perception, dizziness, running into walls. Feels pressure behind eyes. Is seeing floaties in eyes. Pt was put on Diamox, and Elavil. Was taken off the topamax by Dr. Fernandez while admitted in the hospital as has history of kidney stones. Pt has a f/v scheduled for tomorrow at 3:20 but would like your in put on this matter. Also if requesting a referral to Dr. Webster's office.

## 2017-04-18 ENCOUNTER — HOSPITAL ENCOUNTER (EMERGENCY)
Facility: MEDICAL CENTER | Age: 32
End: 2017-04-18
Attending: EMERGENCY MEDICINE
Payer: COMMERCIAL

## 2017-04-18 ENCOUNTER — TELEPHONE (OUTPATIENT)
Dept: NEUROLOGY | Facility: MEDICAL CENTER | Age: 32
End: 2017-04-18

## 2017-04-18 ENCOUNTER — APPOINTMENT (OUTPATIENT)
Dept: NEUROLOGY | Facility: MEDICAL CENTER | Age: 32
End: 2017-04-18
Payer: COMMERCIAL

## 2017-04-18 VITALS
WEIGHT: 205.69 LBS | DIASTOLIC BLOOD PRESSURE: 72 MMHG | RESPIRATION RATE: 16 BRPM | TEMPERATURE: 98.6 F | HEART RATE: 77 BPM | SYSTOLIC BLOOD PRESSURE: 118 MMHG | OXYGEN SATURATION: 100 % | HEIGHT: 63 IN | BODY MASS INDEX: 36.45 KG/M2

## 2017-04-18 DIAGNOSIS — R51.9 HEADACHE, UNSPECIFIED HEADACHE TYPE: ICD-10-CM

## 2017-04-18 LAB
ALBUMIN SERPL BCP-MCNC: 4 G/DL (ref 3.2–4.9)
ALBUMIN/GLOB SERPL: 1 G/DL
ALP SERPL-CCNC: 102 U/L (ref 30–99)
ALT SERPL-CCNC: 9 U/L (ref 2–50)
ANION GAP SERPL CALC-SCNC: 8 MMOL/L (ref 0–11.9)
AST SERPL-CCNC: 12 U/L (ref 12–45)
BASOPHILS # BLD AUTO: 0.6 % (ref 0–1.8)
BASOPHILS # BLD: 0.09 K/UL (ref 0–0.12)
BILIRUB SERPL-MCNC: 0.7 MG/DL (ref 0.1–1.5)
BUN SERPL-MCNC: 17 MG/DL (ref 8–22)
CALCIUM SERPL-MCNC: 9.3 MG/DL (ref 8.5–10.5)
CHLORIDE SERPL-SCNC: 108 MMOL/L (ref 96–112)
CO2 SERPL-SCNC: 18 MMOL/L (ref 20–33)
CREAT SERPL-MCNC: 0.86 MG/DL (ref 0.5–1.4)
EOSINOPHIL # BLD AUTO: 0.41 K/UL (ref 0–0.51)
EOSINOPHIL NFR BLD: 2.7 % (ref 0–6.9)
ERYTHROCYTE [DISTWIDTH] IN BLOOD BY AUTOMATED COUNT: 45 FL (ref 35.9–50)
GFR SERPL CREATININE-BSD FRML MDRD: >60 ML/MIN/1.73 M 2
GLOBULIN SER CALC-MCNC: 3.9 G/DL (ref 1.9–3.5)
GLUCOSE SERPL-MCNC: 91 MG/DL (ref 65–99)
HCT VFR BLD AUTO: 47.3 % (ref 37–47)
HGB BLD-MCNC: 15.7 G/DL (ref 12–16)
IMM GRANULOCYTES # BLD AUTO: 0.08 K/UL (ref 0–0.11)
IMM GRANULOCYTES NFR BLD AUTO: 0.5 % (ref 0–0.9)
LYMPHOCYTES # BLD AUTO: 3.82 K/UL (ref 1–4.8)
LYMPHOCYTES NFR BLD: 25 % (ref 22–41)
MCH RBC QN AUTO: 29.6 PG (ref 27–33)
MCHC RBC AUTO-ENTMCNC: 33.2 G/DL (ref 33.6–35)
MCV RBC AUTO: 89.2 FL (ref 81.4–97.8)
MONOCYTES # BLD AUTO: 1.1 K/UL (ref 0–0.85)
MONOCYTES NFR BLD AUTO: 7.2 % (ref 0–13.4)
NEUTROPHILS # BLD AUTO: 9.8 K/UL (ref 2–7.15)
NEUTROPHILS NFR BLD: 64 % (ref 44–72)
NRBC # BLD AUTO: 0 K/UL
NRBC BLD AUTO-RTO: 0 /100 WBC
PLATELET # BLD AUTO: 346 K/UL (ref 164–446)
PMV BLD AUTO: 9.9 FL (ref 9–12.9)
POTASSIUM SERPL-SCNC: 4.1 MMOL/L (ref 3.6–5.5)
PROT SERPL-MCNC: 7.9 G/DL (ref 6–8.2)
RBC # BLD AUTO: 5.3 M/UL (ref 4.2–5.4)
SODIUM SERPL-SCNC: 134 MMOL/L (ref 135–145)
WBC # BLD AUTO: 15.3 K/UL (ref 4.8–10.8)

## 2017-04-18 PROCEDURE — 96374 THER/PROPH/DIAG INJ IV PUSH: CPT

## 2017-04-18 PROCEDURE — 700111 HCHG RX REV CODE 636 W/ 250 OVERRIDE (IP): Performed by: EMERGENCY MEDICINE

## 2017-04-18 PROCEDURE — 80053 COMPREHEN METABOLIC PANEL: CPT

## 2017-04-18 PROCEDURE — 96375 TX/PRO/DX INJ NEW DRUG ADDON: CPT

## 2017-04-18 PROCEDURE — 36415 COLL VENOUS BLD VENIPUNCTURE: CPT

## 2017-04-18 PROCEDURE — 85025 COMPLETE CBC W/AUTO DIFF WBC: CPT

## 2017-04-18 RX ORDER — ONDANSETRON 2 MG/ML
4 INJECTION INTRAMUSCULAR; INTRAVENOUS ONCE
Status: COMPLETED | OUTPATIENT
Start: 2017-04-18 | End: 2017-04-18

## 2017-04-18 RX ORDER — KETOROLAC TROMETHAMINE 30 MG/ML
30 INJECTION, SOLUTION INTRAMUSCULAR; INTRAVENOUS ONCE
Status: COMPLETED | OUTPATIENT
Start: 2017-04-18 | End: 2017-04-18

## 2017-04-18 RX ORDER — HYDROMORPHONE HYDROCHLORIDE 2 MG/1
2-4 TABLET ORAL EVERY 4 HOURS PRN
Qty: 11 TAB | Refills: 0 | Status: SHIPPED | OUTPATIENT
Start: 2017-04-18 | End: 2018-01-04

## 2017-04-18 RX ORDER — LIDOCAINE 50 MG/G
1 PATCH TOPICAL EVERY 24 HOURS
COMMUNITY
End: 2018-01-04

## 2017-04-18 RX ADMIN — ONDANSETRON 4 MG: 2 INJECTION INTRAMUSCULAR; INTRAVENOUS at 06:03

## 2017-04-18 RX ADMIN — KETOROLAC TROMETHAMINE 30 MG: 30 INJECTION, SOLUTION INTRAMUSCULAR at 06:03

## 2017-04-18 RX ADMIN — HYDROMORPHONE HYDROCHLORIDE 1 MG: 1 INJECTION, SOLUTION INTRAMUSCULAR; INTRAVENOUS; SUBCUTANEOUS at 06:03

## 2017-04-18 ASSESSMENT — PAIN SCALES - GENERAL: PAINLEVEL_OUTOF10: 7

## 2017-04-18 ASSESSMENT — LIFESTYLE VARIABLES: DO YOU DRINK ALCOHOL: NO

## 2017-04-18 NOTE — ED NOTES
Consent for lumbar puncture signed by pt. Spinal tap procedure kit at bedside. Call light within reach. Will continue to monitor.

## 2017-04-18 NOTE — ED NOTES
"Med rec updated and complete  Allergies reviewed  Pt states \"No antibiotic in the last 30 days\".  Pt states \"No vitamins or OTC'S\".      "

## 2017-04-18 NOTE — ED AVS SNAPSHOT
Home Care Instructions                                                                                                                Rehana Meade   MRN: [id number]    Department:  [facility], Emergency Dept   Date of Visit:  4/17/2017            [facility], Emergency Dept    [address]    [place] [address]    Phone:  [phone]      You were seen by     James Diamond M.D.      Your Diagnosis Was     Headache, unspecified headache type     R51       These are the medications you received during your hospitalization from 04/17/2017 1732 to 04/18/2017 1015     Date/Time Order Dose Route Action    04/18/2017 0603 HYDROmorphone (DILAUDID) injection 1 mg 1 mg Intravenous Given    04/18/2017 0603 ondansetron (ZOFRAN) syringe/vial injection 4 mg 4 mg Intravenous Given    04/18/2017 0603 ketorolac (TORADOL) injection 30 mg Intravenous Given      Follow-up Information     1. Follow up with Patrick Adame M.D.. Schedule an appointment as soon as possible for a visit today.    Specialty:  Neurology    Contact information    [address] [address] [phone]        Medication Information     Review all of your home medications and newly ordered medications with your primary doctor and/or pharmacist as soon as possible. Follow medication instructions as directed by your doctor and/or pharmacist.     Please keep your complete medication list with you and share with your physician. Update the information when medications are discontinued, doses are changed, or new medications (including over-the-counter products) are added; and carry medication information at all times in the event of emergency situations.               Medication List      START taking these medications        Instructions    Morning Afternoon Evening Bedtime    HYDROmorphone 2 MG Tabs   Commonly known as:  DILAUDID        Take 1-2 Tabs by mouth every four hours as needed  (FOR PAIN) for up to 11 doses.   Dose:  2-4 mg                          ASK your doctor about these medications        Instructions    Morning Afternoon Evening Bedtime    acetaminophen/caffeine/butalbital 325-40-50 mg -40 MG Tabs   Commonly known as:  FIORICET        Take 1 Tab by mouth every four hours as needed for Headache.   Dose:  1 Tab                        acetaZOLAMIDE  MG Cp12   Commonly known as:  DIAMOX        Take 1 Cap by mouth every 12 hours.   Dose:  500 mg                        amitriptyline 25 MG Tabs   Commonly known as:  ELAVIL        Take 1 Tab by mouth every evening.   Dose:  25 mg                        gabapentin 100 MG Caps   Commonly known as:  NEURONTIN        Take 200 mg by mouth 3 times a day.   Dose:  200 mg                        lidocaine 5 % Ptch   Commonly known as:  LIDODERM        Apply 1 Patch to skin as directed every 24 hours. Pt uses for lower back   Dose:  1 Patch                        sertraline 50 MG Tabs   Commonly known as:  ZOLOFT        Take 50 mg by mouth every day.   Dose:  50 mg                             Where to Get Your Medications      You can get these medications from any pharmacy     Bring a paper prescription for each of these medications    - HYDROmorphone 2 MG Tabs            Procedures and tests performed during your visit     CBC WITH DIFFERENTIAL    COMP METABOLIC PANEL    CONSENT FOR NON-SURGERY W/ SED    ED CONSULT (OTHER)    ESTIMATED GFR    NURSING COMMUNICATION        Discharge Instructions       General Headache Without Cause  A general headache is pain or discomfort felt around the head or neck area. The cause may not be found.   HOME CARE   · Keep all doctor visits.  · Only take medicines as told by your doctor.  · Lie down in a dark, quiet room when you have a headache.  · Keep a journal to find out if certain things bring on headaches. For example, write down:  ¨ What you eat and drink.  ¨ How much sleep you get.  ¨ Any change  to your diet or medicines.  · Relax by getting a massage or doing other relaxing activities.  · Put ice or heat packs on the head and neck area as told by your doctor.  · Lessen stress.  · Sit up straight. Do not tighten (tense) your muscles.  · Quit smoking if you smoke.  · Lessen how much alcohol you drink.  · Lessen how much caffeine you drink, or stop drinking caffeine.  · Eat and sleep on a regular schedule.  · Get 7 to 9 hours of sleep, or as told by your doctor.  · Keep lights dim if bright lights bother you or make your headaches worse.  GET HELP RIGHT AWAY IF:   · Your headache becomes really bad.  · You have a fever.  · You have a stiff neck.  · You have trouble seeing.  · Your muscles are weak, or you lose muscle control.  · You lose your balance or have trouble walking.  · You feel like you will pass out (faint), or you pass out.  · You have really bad symptoms that are different than your first symptoms.  · You have problems with the medicines given to you by your doctor.  · Your medicines do not work.  · Your headache feels different than the other headaches.  · You feel sick to your stomach (nauseous) or throw up (vomit).     This information is not intended to replace advice given to you by your health care provider. Make sure you discuss any questions you have with your health care provider.     Document Released: 09/26/2009 Document Revised: 05/03/2016 Document Reviewed: 12/07/2012  [employer] Interactive Patient Education ©2016 [employer]  Return if fever, vomiting or if no better in 12 hours.          Patient Information     Patient Information    Following emergency treatment: all patient requiring follow-up care must return either to a private physician or a clinic if your condition worsens before you are able to obtain further medical attention, please return to the emergency room.     Billing Information    At [facility], we work to make the billing process streamlined for our patients.   Our Representatives are here to answer any questions you may have regarding your hospital bill.  If you have insurance coverage and have supplied your insurance information to us, we will submit a claim to your insurer on your behalf.  Should you have any questions regarding your bill, we can be reached online or by phone as follows:  Online: You are able pay your bills online or live chat with our representatives about any billing questions you may have. We are here to help Monday - Friday from 8:00am to 7:30pm and 9:00am - 12:00pm on Saturdays.  Please visit [email or web address]  for more information.   Phone:  [phone] or [phone]    Please note that your emergency physician, surgeon, pathologist, radiologist, anesthesiologist, and other specialists are not employed by [facility] and will therefore bill separately for their services.  Please contact them directly for any questions concerning their bills at the numbers below:     Emergency Physician Services:  [phone]  [facility]:  [phone]  [facility]:  [phone]  [facility]:  [phone]    1. Your final bill may vary from the amount quoted upon discharge if all procedures are not complete at that time, or if your doctor has additional procedures of which we are not aware. You will receive an additional bill if you return to the Emergency Department at [facility] for suture removal regardless of the facility of which the sutures were placed.     2. Please arrange for settlement of this account at the emergency registration.    3. All self-pay accounts are due in full at the time of treatment.  If you are unable to meet this obligation then payment is expected within 4-5 days.     4. If you have had radiology studies (CT, X-ray, Ultrasound, MRI), you have received a preliminary result during your emergency department visit. Please contact the radiology  department [phone] to receive a copy of your final result. Please discuss the Final result with your primary physician or with the follow up physician provided.     Crisis Hotline:  [place] Crisis Hotline:  6-958-ISLOSXG or 1-619.243.1249  [place] Crisis Hotline:    1-687.128.7533 or 643-882-8084         ED Discharge Follow Up Questions    1. In order to provide you with very good care, we would like to follow up with a phone call in the next few days.  May we have your permission to contact you?     YES /  NO    2. What is the best phone number to call you? (       )_____-__________    3. What is the best time to call you?      Morning  /  Afternoon  /  Evening                   Patient Signature:  ____________________________________________________________    Date:  ____________________________________________________________      Your appointments     Apr 18, 2017  3:20 PM   Follow Up Visit with Patrick Adame M.D.   [facility] (--)    [address], [address] [id number] [phone]           You will be receiving a confirmation call a few days before your appointment from our automated call confirmation system.            Apr 27, 2017 11:40 AM   BOTOX with JORDAN Mauro   [facility] (--)    [address], [address]  [facility] [place] [address] [phone]

## 2017-04-18 NOTE — ED AVS SNAPSHOT
RentShare Access Code: [email or web address]  Expires: 4/26/2017 12:11 PM    RentShare  A secure, online tool to manage your health information     [email or web address]’s RentShare® is a secure, online tool that connects you to your personalized health information from the privacy of your home -- day or night - making it very easy for you to manage your healthcare. Once the activation process is completed, you can even access your medical information using the RentShare minerva, which is available for free in the Apple Minerva store or Google Play store.     RentShare provides the following levels of access (as shown below):   My Chart Features   [facility] Primary Care Doctor [facility]  Specialists [facility]  Urgent  Care Non-[facility]  Primary Care  Doctor   Email your healthcare team securely and privately 24/7 X X X X   Manage appointments: schedule your next appointment; view details of past/upcoming appointments X      Request prescription refills. X      View recent personal medical records, including lab and immunizations X X X X   View health record, including health history, allergies, medications X X X X   Read reports about your outpatient visits, procedures, consult and ER notes X X X X   See your discharge summary, which is a recap of your hospital and/or ER visit that includes your diagnosis, lab results, and care plan. X X       How to register for RentShare:  1. Go to  [email or web address].  2. Click on the Sign Up Now box, which takes you to the New Member Sign Up page. You will need to provide the following information:  a. Enter your RentShare Access Code exactly as it appears at the top of this page. (You will not need to use this code after you’ve completed the sign-up process. If you do not sign up before the expiration date, you must request a new code.)   b. Enter your date of birth.   c. Enter your home email address.   d. Click Submit, and follow the next screen’s instructions.  3. Create a RentShare ID. This will be your RentShare  login ID and cannot be changed, so think of one that is secure and easy to remember.  4. Create a [employer] password. You can change your password at any time.  5. Enter your Password Reset Question and Answer. This can be used at a later time if you forget your password.   6. Enter your e-mail address. This allows you to receive e-mail notifications when new information is available in [employer].  7. Click Sign Up. You can now view your health information.    For assistance activating your [employer] account, call [phone]

## 2017-04-18 NOTE — ED NOTES
Pt. Updated on the plan of care to be seen by ERP. Pt. States no additional needs at this time. Will continue to monitor.

## 2017-04-18 NOTE — ED AVS SNAPSHOT
4/18/2017    Rehana Meade  [address]  [place] [address]    Dear Rehana:    [facility] wants to ensure your discharge home is safe and you or your loved ones have had all of your questions answered regarding your care after you leave the hospital.    Below is a list of resources and contact information should you have any questions regarding your hospital stay, follow-up instructions, or active medical symptoms.    Questions or Concerns Regarding… Contact   Medical Questions Related to Your Discharge  (7 days a week, 8am-5pm) Contact a Nurse Care Coordinator   [phone]   Medical Questions Not Related to Your Discharge  (24 hours a day / 7 days a week)  Contact the Nurse Health Line   [phone]    Medications or Discharge Instructions Refer to your discharge packet   or contact your [facility] Primary Care Provider   [phone]   Follow-up Appointment(s) Schedule your appointment via TVtrip   or contact Scheduling [phone]   Billing Review your statement via TVtrip  or contact Billing [phone]   Medical Records Review your records via TVtrip   or contact Medical Records [phone]     You may receive a telephone call within two days of discharge. This call is to make certain you understand your discharge instructions and have the opportunity to have any questions answered. You can also easily access your medical information, test results and upcoming appointments via the TVtrip free online health management tool. You can learn more and sign up at [employer]/TVtrip. For assistance setting up your TVtrip account, please call [phone].    Once again, we want to ensure your discharge home is safe and that you have a clear understanding of any next steps in your care. If you have any questions or concerns, please do not hesitate to contact us, we are here for you. Thank you for choosing [facility] for your healthcare needs.    Sincerely,    Your [facility] Healthcare Team

## 2017-04-18 NOTE — ED NOTES
Amb to triage w/ c/o headache, spine pain, dizziness, ataxia, spots in her vision.  Pt admitted for same on 4/12, discharged on 4/15.  Reports symptoms have worsened.  Spoke w/ her neurologist today and was told to come back to the ER.

## 2017-04-18 NOTE — TELEPHONE ENCOUNTER
Message  Received: Today       Patrick Adame M.D.  Lelia Munoz, [facility]       Caller: Unspecified (Today, 10:42 AM)                     I cannot help her in the clinic. I Have no explanation for her multiple symptoms. Her MRI of the brain was normal. She was admitted to the hospital for several days and she didn't improve. There is nothing I can do for her in the clinic. If money is an issue, she should not come to see me today. She will be unhappy and frustrated.     Thx      Left detailed VM for pt informing her of Dr. Adame's advise. Left my direct # should pt have any further questions.

## 2017-04-18 NOTE — TELEPHONE ENCOUNTER
Pt calling, crying feeling very frustrated. States waited to be seen at the ER for 12 hrs, waited more to see a physician at the ER. States was told by Dr. Adame to go to ER if tests need to be run will be able to get them done. But ER just told her no tests need to be run. Pt paid $125 at the ER and has an appt today with DR. Adame will have to pay $40 co-pay she wants to know if she will get a game plan on what to do next ? She is exhausted and very frustrated.

## 2017-04-18 NOTE — ED NOTES
Pt. Updated on the plan of care for a spinal tap per Dr. Diamond. Call light within reach. Will continue to monitor.

## 2017-04-18 NOTE — ED PROVIDER NOTES
ED Provider Note    CHIEF COMPLAINT  Chief Complaint   Patient presents with   • Dizziness   • Headache   • Back Pain   • Numbness     arms & legs       HPI  Rehana Meade is a 31 y.o. female who presents headache, since September, headaches for the last 6 months, slight neck pain. Headache is been worse for the last week. Pain severe dull gradual. Occasionally gets spots before her eyes. Evidently she's had a long extensive history of headaches and workups in the past. Evidently she had a spinal tap 5 days ago, results of that are unknown. Evidently there was a workup for intracranial hypertension  She has an appointment to see a neurologist Dr. Adame later today.  She     was discharged from here a few days ago with the following dictation:Dr. Lehman    has consulted, placed on IV Solu-Medrol, Depacon, magnesium and Toradol with    p.r.n. Fioricet.  She was continued on Neurontin for prophylaxis with addition   of Topamax.  Patient did have an elevated opening pressure on lumbar puncture   with a value of 27 cm of water.  There is concern for pseudotumor cerebri.     She was started on oral acetazolamide, it is recommended by neurology to    continue.  She did have complaints of back pain      REVIEW OF SYSTEMS  See HPI for further details. History of chronic headaches, asthma and anxiety migraine headaches Denies other G.I., G.U.. endrocine, cardiovascular, respriatory or neurological problems. All other systems are negative.     PAST MEDICAL HISTORY  Past Medical History   Diagnosis Date   • Other specified symptom associated with female genital organs    • Unspecified disorder of thyroid      cyst   • ASTHMA    • Anxiety associated with depression    • Migraine without aura, without mention of intractable migraine without mention of status migrainosus    • Anesthesia      woke up combative   • Drug-seeking behavior        FAMILY HISTORY  History reviewed. No pertinent family history.    SOCIAL  "HISTORY  Social History     Social History   • Marital Status:      Spouse Name: N/A   • Number of Children: N/A   • Years of Education: N/A     Social History Main Topics   • Smoking status: Never Smoker    • Smokeless tobacco: Never Used   • Alcohol Use: Yes      Comment: rarely   • Drug Use: No   • Sexual Activity:     Partners: Male     Other Topics Concern   • None     Social History Narrative       SURGICAL HISTORY  Past Surgical History   Procedure Laterality Date   • Septoplasty  10/28/2009     Performed by ANNETTE FORDE at [facility]   • Somnoplasty  10/28/2009     Performed by ANNETTE FORDE at [facility]   • Nasal polypectomy  10/28/2009     Performed by ANNETTE FORDE at [facility]   • Tubal coagulation laparoscopic bilateral  7/11/2014     Performed by Julian Parker M.D. at SURGERY [facility] ORS       CURRENT MEDICATIONS  Home Medications     Reviewed by Camille Maldonado R.N. (Registered Nurse) on 04/18/17 at 0235  Med List Status: Partial    Medication Last Dose Status    acetaminophen/caffeine/butalbital 325-40-50 mg (FIORICET) -40 MG Tab  Active    acetaZOLAMIDE SR (DIAMOX) 500 MG CAPSULE SR 12 HR  Active    amitriptyline (ELAVIL) 25 MG Tab  Active    gabapentin (NEURONTIN) 100 MG Cap 4/11/2017 Active    lidocaine (LIDODERM) 5 % Patch  Active    sertraline (ZOLOFT) 50 MG Tab 4/11/2017 Active                ALLERGIES  Allergies   Allergen Reactions   • Biaxin [Clarithromycin] Hives   • Penicillin G Potassium Vomiting   • Tape Rash   • Ativan Itching   • Imitrex [Sumatriptan] Unspecified     Make my head pins an d needles   • Latex Itching   • Maxalt [Rizatriptan]      Tremors, confusion     • Morphine Hives     Pt unable to recall if allergic   • Reglan [Metoclopramide]      Shaking        PHYSICAL EXAM  VITAL SIGNS: /72 mmHg  Pulse 90  Temp(Src) 37 °C (98.6 °F)  Resp 16  Ht 1.6 m (5' 3\")  Wt 93.3 kg (205 lb 11 " oz)  BMI 36.45 kg/m2  SpO2 98%  LMP 03/31/2017  Constitutional: Well developed, Well nourished, No acute distress, Non-toxic appearance.   HENT: Normocephalic, Atraumatic, Bilateral external ears normal, Oropharynx moist, No oral exudates, Nose normal.   Eyes: PERRL, EOMI, Conjunctiva normal, No discharge.   Neck: Normal range of motion, No tenderness, Supple, No stridor.   Lymphatic: No lymphadenopathy noted.   Cardiovascular: Normal heart rate, Normal rhythm, No murmurs, No rubs, No gallops.   Thorax & Lungs: Normal breath sounds, No respiratory distress, No wheezing, No chest tenderness.   Abdomen:  No tenderness, no guarding no rigidity and the abdomen is soft.  No masses, No pulsatile masses.  Skin: Warm, Dry, No erythema, No rash.   Back: No tenderness, No CVA tenderness.   Extremities: Intact distal pulses, No edema, No tenderness, No cyanosis, No clubbing.   Musculoskeletal: Good range of motion in all major joints. No tenderness to palpation or major deformities noted.   Neurologic: Alert & oriented x 3, Normal motor function, Normal sensory function, No focal deficits noted. Gait his normal speech is normal vision is normal     Psychiatric: Anxiety depression because her headaches      RADIOLOGY/PROCEDURES      COURSE & MEDICAL DECISION MAKING  Pertinent Labs & Imaging studies reviewed. (See chart for details) and white count elevated to 15.3, hematocrit 47.3 platelet count normal. There is no shift, electrolytes normal renal function and normal liver function normal    She has been having headaches for several months, recent admission here with concern for pseudotumor cerebri, opening pressure on spinal tap was 27 cm of water.. I will talk with her neurologist, Dr. Adame about further disposition. In the emergency room she is given analgesics.   Her white count is significantly elevated today as it was, several days ago. She did have a recent lumbar puncture, possibly causing and meningitis.   Her  white count is elevated to 15.3 however his bed at this level previously.   . She has an appointment today with Dr. Adame at 3:20 PM. I will talk with him about further disposition      I will call her neurologist about further disposition. Grade I have talked with Dr. Fernandez. She has had a extensive workup, is being treated with Acetasol [place]. And has an appointment to see Dr. Adame in a few hours, 3:30 PM today. Meanwhile given further analgesics, I have checked with   FINAL IMPRESSION  1.   1. Headache, unspecified headache type        2.   3.     Disposition  Discharge instructions are understood. This patient is to return if fever vomiting or no better in 12 hours. Follow up with the neurologist, Dr. Adame at 3:30 PM today. Information sheets on headache  Electronically signed by: James Diamond, 4/18/2017 5:54 AM

## 2017-04-18 NOTE — TELEPHONE ENCOUNTER
Pt called back, she's asking if we will put in a referral for her to see Dr. Webster. I informed her to get it from her PCP. Also pt stated she will not be getting botox anymore states it does not help after the injections she is excrusiating pain and she does not think botox is helping with her headaches any way. She does not think it worth her going through all this pain.

## 2017-04-18 NOTE — ED NOTES
Pt. Resting in [facility] at this time. Pt. Updated on the plan of care to be seen by ERP and to have labs resulted. Call light within reach. Will continue to monitor.

## 2017-04-18 NOTE — DISCHARGE INSTRUCTIONS
General Headache Without Cause  A general headache is pain or discomfort felt around the head or neck area. The cause may not be found.   HOME CARE   · Keep all doctor visits.  · Only take medicines as told by your doctor.  · Lie down in a dark, quiet room when you have a headache.  · Keep a journal to find out if certain things bring on headaches. For example, write down:  ¨ What you eat and drink.  ¨ How much sleep you get.  ¨ Any change to your diet or medicines.  · Relax by getting a massage or doing other relaxing activities.  · Put ice or heat packs on the head and neck area as told by your doctor.  · Lessen stress.  · Sit up straight. Do not tighten (tense) your muscles.  · Quit smoking if you smoke.  · Lessen how much alcohol you drink.  · Lessen how much caffeine you drink, or stop drinking caffeine.  · Eat and sleep on a regular schedule.  · Get 7 to 9 hours of sleep, or as told by your doctor.  · Keep lights dim if bright lights bother you or make your headaches worse.  GET HELP RIGHT AWAY IF:   · Your headache becomes really bad.  · You have a fever.  · You have a stiff neck.  · You have trouble seeing.  · Your muscles are weak, or you lose muscle control.  · You lose your balance or have trouble walking.  · You feel like you will pass out (faint), or you pass out.  · You have really bad symptoms that are different than your first symptoms.  · You have problems with the medicines given to you by your doctor.  · Your medicines do not work.  · Your headache feels different than the other headaches.  · You feel sick to your stomach (nauseous) or throw up (vomit).     This information is not intended to replace advice given to you by your health care provider. Make sure you discuss any questions you have with your health care provider.     Document Released: 09/26/2009 Document Revised: 05/03/2016 Document Reviewed: 12/07/2012  [employer] Patient Education ©2016 [employer]  Return if fever,  vomiting or if no better in 12 hours.

## 2017-04-18 NOTE — ED NOTES
Pt. Updated on the plan of care to be seen by ERP. Pt. States no additional needs at this time. Call light within reach. Will continue to monitor.

## 2017-04-18 NOTE — ED NOTES
Assumed care of this pt. Pt. Has been seen multiple times in the ER in the past week. Pt. States she was admitted to the hospital last week as well, but that nothing came of it. Pt. States continued dizziness and issues with vision. Pt. Hooked up to monitoring equipment. Will continue to monitor.

## 2017-04-18 NOTE — ED NOTES
"DC instructions and prescription given to pt; verbalized understanding. Pt crying, states is upset that she \"wasted\" so much time here. Asked pt if there was anything this RN could do for her, pt states that she is just upset.   "

## 2017-04-21 ENCOUNTER — TELEPHONE (OUTPATIENT)
Dept: NEUROLOGY | Facility: MEDICAL CENTER | Age: 32
End: 2017-04-21

## 2017-04-21 DIAGNOSIS — R11.0 NAUSEA: ICD-10-CM

## 2017-04-21 RX ORDER — ONDANSETRON 8 MG/1
8 TABLET, ORALLY DISINTEGRATING ORAL EVERY 8 HOURS PRN
Qty: 15 TAB | Refills: 3 | Status: SHIPPED | OUTPATIENT
Start: 2017-04-21 | End: 2018-01-04

## 2017-04-21 NOTE — TELEPHONE ENCOUNTER
Pt calling, states every time she takes Diamox it was causing her nausea but today she is vomiting. She is requesting something to be called in to her pharmacy. I informed pt that Dr. Adame is travelling and if she can get it from her PCP. Pt states she'd rather not involve her PCP as they try to treat her, she has informed them that she has neurologists that she follows.

## 2017-04-27 ENCOUNTER — HOSPITAL ENCOUNTER (OUTPATIENT)
Dept: LAB | Facility: MEDICAL CENTER | Age: 32
End: 2017-04-27
Attending: SPECIALIST
Payer: COMMERCIAL

## 2017-04-27 LAB
ALBUMIN SERPL BCP-MCNC: 4.2 G/DL (ref 3.2–4.9)
ALBUMIN/GLOB SERPL: 1.2 G/DL
ALP SERPL-CCNC: 102 U/L (ref 30–99)
ALT SERPL-CCNC: 10 U/L (ref 2–50)
ANION GAP SERPL CALC-SCNC: 7 MMOL/L (ref 0–11.9)
AST SERPL-CCNC: 13 U/L (ref 12–45)
BASOPHILS # BLD AUTO: 0.6 % (ref 0–1.8)
BASOPHILS # BLD: 0.06 K/UL (ref 0–0.12)
BILIRUB SERPL-MCNC: 0.4 MG/DL (ref 0.1–1.5)
BUN SERPL-MCNC: 17 MG/DL (ref 8–22)
CALCIUM SERPL-MCNC: 8.9 MG/DL (ref 8.5–10.5)
CHLORIDE SERPL-SCNC: 112 MMOL/L (ref 96–112)
CO2 SERPL-SCNC: 17 MMOL/L (ref 20–33)
CREAT SERPL-MCNC: 0.75 MG/DL (ref 0.5–1.4)
EOSINOPHIL # BLD AUTO: 0.3 K/UL (ref 0–0.51)
EOSINOPHIL NFR BLD: 3.2 % (ref 0–6.9)
ERYTHROCYTE [DISTWIDTH] IN BLOOD BY AUTOMATED COUNT: 47.9 FL (ref 35.9–50)
GFR SERPL CREATININE-BSD FRML MDRD: >60 ML/MIN/1.73 M 2
GLOBULIN SER CALC-MCNC: 3.4 G/DL (ref 1.9–3.5)
GLUCOSE SERPL-MCNC: 82 MG/DL (ref 65–99)
HCT VFR BLD AUTO: 44.8 % (ref 37–47)
HGB BLD-MCNC: 14.4 G/DL (ref 12–16)
IMM GRANULOCYTES # BLD AUTO: 0.04 K/UL (ref 0–0.11)
IMM GRANULOCYTES NFR BLD AUTO: 0.4 % (ref 0–0.9)
LYMPHOCYTES # BLD AUTO: 2.09 K/UL (ref 1–4.8)
LYMPHOCYTES NFR BLD: 22.2 % (ref 22–41)
MCH RBC QN AUTO: 29.6 PG (ref 27–33)
MCHC RBC AUTO-ENTMCNC: 32.1 G/DL (ref 33.6–35)
MCV RBC AUTO: 92 FL (ref 81.4–97.8)
MONOCYTES # BLD AUTO: 0.54 K/UL (ref 0–0.85)
MONOCYTES NFR BLD AUTO: 5.7 % (ref 0–13.4)
NEUTROPHILS # BLD AUTO: 6.4 K/UL (ref 2–7.15)
NEUTROPHILS NFR BLD: 67.9 % (ref 44–72)
NRBC # BLD AUTO: 0 K/UL
NRBC BLD AUTO-RTO: 0 /100 WBC
PLATELET # BLD AUTO: 307 K/UL (ref 164–446)
PMV BLD AUTO: 10 FL (ref 9–12.9)
POTASSIUM SERPL-SCNC: 3.5 MMOL/L (ref 3.6–5.5)
PROT SERPL-MCNC: 7.6 G/DL (ref 6–8.2)
RBC # BLD AUTO: 4.87 M/UL (ref 4.2–5.4)
SODIUM SERPL-SCNC: 136 MMOL/L (ref 135–145)
WBC # BLD AUTO: 9.4 K/UL (ref 4.8–10.8)

## 2017-04-27 PROCEDURE — 36415 COLL VENOUS BLD VENIPUNCTURE: CPT

## 2017-04-27 PROCEDURE — 80053 COMPREHEN METABOLIC PANEL: CPT

## 2017-04-27 PROCEDURE — 85025 COMPLETE CBC W/AUTO DIFF WBC: CPT

## 2017-05-08 ENCOUNTER — HOSPITAL ENCOUNTER (OUTPATIENT)
Dept: LAB | Facility: MEDICAL CENTER | Age: 32
End: 2017-05-08
Attending: NEUROLOGICAL SURGERY
Payer: COMMERCIAL

## 2017-05-08 LAB
APTT PPP: 23.8 SEC (ref 24.7–36)
INR PPP: 1.02 (ref 0.87–1.13)
PLATELET # BLD AUTO: 328 K/UL (ref 164–446)
PROTHROMBIN TIME: 13.7 SEC (ref 12–14.6)

## 2017-05-08 PROCEDURE — 85049 AUTOMATED PLATELET COUNT: CPT

## 2017-05-08 PROCEDURE — 85730 THROMBOPLASTIN TIME PARTIAL: CPT

## 2017-05-08 PROCEDURE — 36415 COLL VENOUS BLD VENIPUNCTURE: CPT

## 2017-05-08 PROCEDURE — 85610 PROTHROMBIN TIME: CPT

## 2017-05-09 ENCOUNTER — APPOINTMENT (OUTPATIENT)
Dept: NEUROLOGY | Facility: MEDICAL CENTER | Age: 32
End: 2017-05-09
Payer: COMMERCIAL

## 2017-05-09 ENCOUNTER — HOSPITAL ENCOUNTER (OUTPATIENT)
Dept: RADIOLOGY | Facility: MEDICAL CENTER | Age: 32
End: 2017-05-09
Attending: NEUROLOGICAL SURGERY
Payer: COMMERCIAL

## 2017-05-09 DIAGNOSIS — G93.2 BENIGN INTRACRANIAL HYPERTENSION: ICD-10-CM

## 2017-05-09 LAB
GRAM STN SPEC: NORMAL
SIGNIFICANT IND 70042: NORMAL
SITE SITE: NORMAL
SOURCE SOURCE: NORMAL

## 2017-05-09 PROCEDURE — 62270 DX LMBR SPI PNXR: CPT

## 2017-05-09 PROCEDURE — 87070 CULTURE OTHR SPECIMN AEROBIC: CPT

## 2017-05-09 PROCEDURE — 87205 SMEAR GRAM STAIN: CPT

## 2017-06-20 ENCOUNTER — HOSPITAL ENCOUNTER (OUTPATIENT)
Dept: RADIOLOGY | Facility: MEDICAL CENTER | Age: 32
End: 2017-06-20
Attending: PSYCHIATRY & NEUROLOGY
Payer: COMMERCIAL

## 2017-06-20 DIAGNOSIS — G93.2 IDIOPATHIC INTRACRANIAL HYPERTENSION: ICD-10-CM

## 2017-06-20 DIAGNOSIS — H47.333 PSEUDOPAPILLEDEMA OF BOTH OPTIC DISCS: ICD-10-CM

## 2017-06-20 PROCEDURE — A9579 GAD-BASE MR CONTRAST NOS,1ML: HCPCS | Performed by: PSYCHIATRY & NEUROLOGY

## 2017-06-20 PROCEDURE — 70543 MRI ORBT/FAC/NCK W/O &W/DYE: CPT

## 2017-06-20 PROCEDURE — 70544 MR ANGIOGRAPHY HEAD W/O DYE: CPT

## 2017-06-20 PROCEDURE — 700117 HCHG RX CONTRAST REV CODE 255: Performed by: PSYCHIATRY & NEUROLOGY

## 2017-06-21 RX ADMIN — GADODIAMIDE 20 ML: 287 INJECTION INTRAVENOUS at 08:32

## 2017-07-06 ENCOUNTER — APPOINTMENT (OUTPATIENT)
Dept: NEUROLOGY | Facility: MEDICAL CENTER | Age: 32
End: 2017-07-06
Payer: COMMERCIAL

## 2017-08-03 ENCOUNTER — HOSPITAL ENCOUNTER (OUTPATIENT)
Facility: MEDICAL CENTER | Age: 32
End: 2017-08-03
Attending: PSYCHIATRY & NEUROLOGY
Payer: COMMERCIAL

## 2017-08-03 ENCOUNTER — HOSPITAL ENCOUNTER (OUTPATIENT)
Dept: RADIOLOGY | Facility: MEDICAL CENTER | Age: 32
End: 2017-08-03
Attending: PSYCHIATRY & NEUROLOGY
Payer: COMMERCIAL

## 2017-08-03 DIAGNOSIS — G93.2 BENIGN INTRACRANIAL HYPERTENSION: ICD-10-CM

## 2017-08-03 LAB
ALBUMIN SERPL BCP-MCNC: 4.4 G/DL (ref 3.2–4.9)
ALBUMIN/GLOB SERPL: 1.3 G/DL
ALP SERPL-CCNC: 102 U/L (ref 30–99)
ALT SERPL-CCNC: 8 U/L (ref 2–50)
ANION GAP SERPL CALC-SCNC: 10 MMOL/L (ref 0–11.9)
AST SERPL-CCNC: 9 U/L (ref 12–45)
BILIRUB SERPL-MCNC: 0.4 MG/DL (ref 0.1–1.5)
BUN SERPL-MCNC: 11 MG/DL (ref 8–22)
BURR CELLS/RBC NFR CSF MANUAL: 0 %
CALCIUM SERPL-MCNC: 9.5 MG/DL (ref 8.5–10.5)
CHLORIDE SERPL-SCNC: 110 MMOL/L (ref 96–112)
CLARITY CSF: CLEAR
CO2 SERPL-SCNC: 16 MMOL/L (ref 20–33)
COLOR CSF: COLORLESS
COLOR SPUN CSF: COLORLESS
CREAT SERPL-MCNC: 0.77 MG/DL (ref 0.5–1.4)
CRYPTOC AG SPEC QL LA: NORMAL
CYTOLOGY REG CYTOL: NORMAL
GFR SERPL CREATININE-BSD FRML MDRD: >60 ML/MIN/1.73 M 2
GLOBULIN SER CALC-MCNC: 3.3 G/DL (ref 1.9–3.5)
GLUCOSE CSF-MCNC: 62 MG/DL (ref 40–80)
GLUCOSE SERPL-MCNC: 78 MG/DL (ref 65–99)
GRAM STN SPEC: NORMAL
LYMPHOCYTES NFR CSF: 72 %
MONONUC CELLS NFR CSF: 22 %
NEUTROPHILS NFR CSF: 6 %
POTASSIUM SERPL-SCNC: 3.3 MMOL/L (ref 3.6–5.5)
PROT CSF-MCNC: 66 MG/DL (ref 15–45)
PROT SERPL-MCNC: 7.7 G/DL (ref 6–8.2)
RBC # CSF: 33 CELLS/UL
SIGNIFICANT IND 70042: NORMAL
SIGNIFICANT IND 70042: NORMAL
SITE SITE: NORMAL
SITE SITE: NORMAL
SODIUM SERPL-SCNC: 136 MMOL/L (ref 135–145)
SOURCE SOURCE: NORMAL
SOURCE SOURCE: NORMAL
SPECIMEN VOL CSF: 11 ML
TUBE # CSF: 3
TUBE # CSF: 4
WBC # CSF: 1 CELLS/UL (ref 0–10)

## 2017-08-03 PROCEDURE — 62270 DX LMBR SPI PNXR: CPT

## 2017-08-03 PROCEDURE — 80053 COMPREHEN METABOLIC PANEL: CPT

## 2017-08-03 PROCEDURE — 82040 ASSAY OF SERUM ALBUMIN: CPT

## 2017-08-03 PROCEDURE — 84157 ASSAY OF PROTEIN OTHER: CPT

## 2017-08-03 PROCEDURE — 88313 SPECIAL STAINS GROUP 2: CPT

## 2017-08-03 PROCEDURE — 88108 CYTOPATH CONCENTRATE TECH: CPT

## 2017-08-03 PROCEDURE — 86592 SYPHILIS TEST NON-TREP QUAL: CPT

## 2017-08-03 PROCEDURE — 82042 OTHER SOURCE ALBUMIN QUAN EA: CPT

## 2017-08-03 PROCEDURE — 86403 PARTICLE AGGLUT ANTBDY SCRN: CPT

## 2017-08-03 PROCEDURE — 87116 MYCOBACTERIA CULTURE: CPT

## 2017-08-03 PROCEDURE — 82945 GLUCOSE OTHER FLUID: CPT

## 2017-08-03 PROCEDURE — 87206 SMEAR FLUORESCENT/ACID STAI: CPT

## 2017-08-03 PROCEDURE — 87102 FUNGUS ISOLATION CULTURE: CPT

## 2017-08-03 PROCEDURE — 82164 ANGIOTENSIN I ENZYME TEST: CPT

## 2017-08-03 PROCEDURE — 82784 ASSAY IGA/IGD/IGG/IGM EACH: CPT

## 2017-08-03 PROCEDURE — 87205 SMEAR GRAM STAIN: CPT

## 2017-08-03 PROCEDURE — 89051 BODY FLUID CELL COUNT: CPT

## 2017-08-03 PROCEDURE — 87070 CULTURE OTHR SPECIMN AEROBIC: CPT

## 2017-08-03 PROCEDURE — 83916 OLIGOCLONAL BANDS: CPT

## 2017-08-04 LAB
RHODAMINE-AURAMINE STN SPEC: NORMAL
SIGNIFICANT IND 70042: NORMAL
SITE SITE: NORMAL
SOURCE SOURCE: NORMAL

## 2017-08-04 NOTE — OR SURGEON
Immediate Post- Operative Note    PostOp Diagnosis: increased intracranial pressure      Procedure(s): fluoro guided lumbar puncture      Estimated Blood Loss: Less than 5 ml        Complications: None            8/4/2017     7:52 AM     Himanshu Martinez

## 2017-08-05 NOTE — CONSULTS
DATE OF SERVICE:  08/03/2017    Dear Fadia:    The patient is a 31-year-old right handed female who returns to [facility] for reevaluation on 08/03/2017.  The patient is 31 years of age and   has a history of clinically improved idiopathic intracranial hypertension and   clinically improved presumed bilateral papilledema.  Currently, the patient is   taking Diamox 500 mg p.o. b.i.d. and Topamax 50 mg p.o. b.i.d.  The patient   complains of a persistent headache graded at 2-3 on a scale of 0-10.  The   patient denies any change in central vision, color vision, and peripheral   vision.    ALLERGIES:  REGLAN, BIAXIN, MORPHINE, ATIVAN, AND PENICILLIN.    MEDICATIONS:  Diamox 500 mg p.o. b.i.d., Topamax 50 mg p.o. b.i.d., gabapentin   200 mg p.o. t.i.d., and Elavil 50 mg p.o. at bedtime.    PAST MEDICAL HISTORY:  There is no history of hypertension, diabetes mellitus,   thyroid disease, malignancy, and stroke.    PAST SURGICAL HISTORY:  Unchanged in comparison to previous examination.    PAST FAMILY HISTORY:  Mother with a history of bipolar disorder.  Father with   a history of hypertension.    SOCIAL HISTORY:  The patient denies smoking or drinking.    REVIEW OF SYSTEMS:  The patient has a history of tinnitus, shortness of   breath, easy fatigability, scalp pain and tenderness, multiple allergies, and   sinusitis.  Patient also takes medications for depression.    PHYSICAL EXAMINATION:  MENTAL STATUS EXAMINATION:  Her speech was fluent.  Insight, comprehension,   and judgment were normal.  Cranial nerves, corneal sensation and facial   sensation were intact. There was symmetric eyelid closure and facial grimace.    The lower cranial nerves were normal.    FUNDUSCOPIC EXAMINATION:  There was no evidence of papilledema.    MOTOR EXAMINATION:  Strength, muscle tone, and bulk were normal in both the   upper and lower extremities.  Deep tendon reflexes were symmetric throughout.    Gait and station, normal gait  and station.    LABORATORY DATA:  Laboratory studies were obtained on 07/31/2017.  The CBC,   comprehensive metabolic panel, hCG, PT, PTT, and INR were all normal.    LUMBAR PUNCTURE:  A lumbar puncture was performed by Dr. Martinez.  The opening   pressure was measured by Dr. Khoury with the patient lying in the left   lateral decubitus position, the opening pressure was low normal at 90 mm of   water.  Approximately 8 mL of clear cerebrospinal fluid was obtained.    ASSESSMENT:  1.  Clinically improved idiopathic intracranial hypertension.  2.  Clinically improved presumed bilateral papilledema.  3.  Clinically improved persistent daily headaches following treatment with   Diamox and Topamax.  4.  Small-angle exotropia.  5.  Mild convergence insufficiency.  6.  Opening pressure 90 mm of water on 08/03/2017.  7.  Nonsmoker.    RECOMMENDATIONS:  1.  Decrease the Diamox to 500 mg p.o. daily.  2.  Maintain the Topamax 50 mg p.o. b.i.d.  3.  Maintain the exercise program.  4.  Maintain the weight-reducing diet.  5.  Risks versus benefits.  6.  Followup in neuro-ophthalmology clinic in 7 months.    ADDENDUM:  The patient notified the office on 08/04/2017, complaining of a   post lumbar puncture headache, and it was worse when the patient stood up and   ameliorated when the patient laid down.  The patient was advised to   discontinue the Diamox and begin treatment with the Excedrin migraine 1 p.o.   t.i.d.  It is hopeful that the post lumbar puncture headache will resolve   within the next 1-2 days.       ____________________________________     HEATHER KHOURY DO    [facility] / [facility]    DD:  08/04/2017 15:11:55  DT:  08/04/2017 16:34:03    D#:  [id number]  Job#:  [id number]

## 2017-08-06 LAB
ACE CSF-CCNC: 1.2 U/L (ref 0–2.5)
ALB CSF/SERPL: 8 RATIO (ref 0–9)
ALBUMIN CSF-MCNC: 30 MG/DL (ref 0–35)
ALBUMIN SERPL-MCNC: 3730 MG/DL (ref 3500–5200)
BACTERIA CSF CULT: NORMAL
GRAM STN SPEC: NORMAL
IGG CSF-MCNC: 3.8 MG/DL (ref 0–6)
IGG SERPL-MCNC: 1270 MG/DL (ref 768–1632)
IGG SYNTH RATE SER+CSF CALC-MRATE: <0 MG/D
IGG/ALB CLEAR SER+CSF-RTO: 0.37 RATIO (ref 0.28–0.66)
IGG/ALB CSF: 0.13 RATIO (ref 0.09–0.25)
OLIGOCLONAL BANDS CSF ELPH-IMP: NEGATIVE
OLIGOCLONAL BANDS CSF ELPH-IMP: NORMAL
OLIGOCLONAL BANDS CSF IEF: 0 BANDS (ref 0–1)
SIGNIFICANT IND 70042: NORMAL
SITE SITE: NORMAL
SOURCE SOURCE: NORMAL
VDRL CSF QL: NON REACTIVE

## 2017-09-06 LAB
FUNGUS SPEC CULT: NORMAL
SIGNIFICANT IND 70042: NORMAL
SITE SITE: NORMAL
SOURCE SOURCE: NORMAL

## 2017-09-29 LAB
MYCOBACTERIUM SPEC CULT: NORMAL
RHODAMINE-AURAMINE STN SPEC: NORMAL
SIGNIFICANT IND 70042: NORMAL
SITE SITE: NORMAL
SOURCE SOURCE: NORMAL

## 2018-01-02 ENCOUNTER — HOSPITAL ENCOUNTER (OUTPATIENT)
Dept: LAB | Facility: MEDICAL CENTER | Age: 33
End: 2018-01-02
Attending: PSYCHIATRY & NEUROLOGY
Payer: COMMERCIAL

## 2018-01-02 LAB
ALBUMIN SERPL BCP-MCNC: 4.2 G/DL (ref 3.2–4.9)
ALBUMIN/GLOB SERPL: 1.3 G/DL
ALP SERPL-CCNC: 80 U/L (ref 30–99)
ALT SERPL-CCNC: 8 U/L (ref 2–50)
ANION GAP SERPL CALC-SCNC: 9 MMOL/L (ref 0–11.9)
AST SERPL-CCNC: 12 U/L (ref 12–45)
BASOPHILS # BLD AUTO: 0.6 % (ref 0–1.8)
BASOPHILS # BLD: 0.07 K/UL (ref 0–0.12)
BILIRUB SERPL-MCNC: 0.3 MG/DL (ref 0.1–1.5)
BUN SERPL-MCNC: 15 MG/DL (ref 8–22)
CALCIUM SERPL-MCNC: 9.2 MG/DL (ref 8.5–10.5)
CHLORIDE SERPL-SCNC: 108 MMOL/L (ref 96–112)
CO2 SERPL-SCNC: 19 MMOL/L (ref 20–33)
CREAT SERPL-MCNC: 0.89 MG/DL (ref 0.5–1.4)
CRP SERPL HS-MCNC: 0.38 MG/DL (ref 0–0.75)
EOSINOPHIL # BLD AUTO: 0.2 K/UL (ref 0–0.51)
EOSINOPHIL NFR BLD: 1.6 % (ref 0–6.9)
ERYTHROCYTE [DISTWIDTH] IN BLOOD BY AUTOMATED COUNT: 45.2 FL (ref 35.9–50)
ERYTHROCYTE [SEDIMENTATION RATE] IN BLOOD BY WESTERGREN METHOD: 12 MM/HOUR (ref 0–20)
FOLATE SERPL-MCNC: 9 NG/ML
GFR SERPL CREATININE-BSD FRML MDRD: >60 ML/MIN/1.73 M 2
GLOBULIN SER CALC-MCNC: 3.2 G/DL (ref 1.9–3.5)
GLUCOSE SERPL-MCNC: 87 MG/DL (ref 65–99)
HCT VFR BLD AUTO: 44 % (ref 37–47)
HGB BLD-MCNC: 14.1 G/DL (ref 12–16)
IMM GRANULOCYTES # BLD AUTO: 0.03 K/UL (ref 0–0.11)
IMM GRANULOCYTES NFR BLD AUTO: 0.2 % (ref 0–0.9)
LYMPHOCYTES # BLD AUTO: 3.41 K/UL (ref 1–4.8)
LYMPHOCYTES NFR BLD: 27.5 % (ref 22–41)
MCH RBC QN AUTO: 29.6 PG (ref 27–33)
MCHC RBC AUTO-ENTMCNC: 32 G/DL (ref 33.6–35)
MCV RBC AUTO: 92.2 FL (ref 81.4–97.8)
MONOCYTES # BLD AUTO: 0.82 K/UL (ref 0–0.85)
MONOCYTES NFR BLD AUTO: 6.6 % (ref 0–13.4)
NEUTROPHILS # BLD AUTO: 7.86 K/UL (ref 2–7.15)
NEUTROPHILS NFR BLD: 63.5 % (ref 44–72)
NRBC # BLD AUTO: 0 K/UL
NRBC BLD-RTO: 0 /100 WBC
PLATELET # BLD AUTO: 294 K/UL (ref 164–446)
PMV BLD AUTO: 9.9 FL (ref 9–12.9)
POTASSIUM SERPL-SCNC: 4 MMOL/L (ref 3.6–5.5)
PROT SERPL-MCNC: 7.4 G/DL (ref 6–8.2)
RBC # BLD AUTO: 4.77 M/UL (ref 4.2–5.4)
SODIUM SERPL-SCNC: 136 MMOL/L (ref 135–145)
TREPONEMA PALLIDUM IGG+IGM AB [PRESENCE] IN SERUM OR PLASMA BY IMMUNOASSAY: NON REACTIVE
VIT B12 SERPL-MCNC: 270 PG/ML (ref 211–911)
WBC # BLD AUTO: 12.4 K/UL (ref 4.8–10.8)

## 2018-01-02 PROCEDURE — 85610 PROTHROMBIN TIME: CPT

## 2018-01-02 PROCEDURE — 82746 ASSAY OF FOLIC ACID SERUM: CPT

## 2018-01-02 PROCEDURE — 85652 RBC SED RATE AUTOMATED: CPT

## 2018-01-02 PROCEDURE — 36415 COLL VENOUS BLD VENIPUNCTURE: CPT

## 2018-01-02 PROCEDURE — 82607 VITAMIN B-12: CPT

## 2018-01-02 PROCEDURE — 86140 C-REACTIVE PROTEIN: CPT

## 2018-01-02 PROCEDURE — 86780 TREPONEMA PALLIDUM: CPT

## 2018-01-02 PROCEDURE — 85730 THROMBOPLASTIN TIME PARTIAL: CPT

## 2018-01-02 PROCEDURE — 80053 COMPREHEN METABOLIC PANEL: CPT

## 2018-01-02 PROCEDURE — 86038 ANTINUCLEAR ANTIBODIES: CPT

## 2018-01-02 PROCEDURE — 85025 COMPLETE CBC W/AUTO DIFF WBC: CPT

## 2018-01-02 PROCEDURE — 85613 RUSSELL VIPER VENOM DILUTED: CPT

## 2018-01-02 PROCEDURE — 86147 CARDIOLIPIN ANTIBODY EA IG: CPT

## 2018-01-03 ENCOUNTER — HOSPITAL ENCOUNTER (OUTPATIENT)
Dept: RADIOLOGY | Facility: MEDICAL CENTER | Age: 33
End: 2018-01-03
Attending: PSYCHIATRY & NEUROLOGY
Payer: COMMERCIAL

## 2018-01-03 ENCOUNTER — HOSPITAL ENCOUNTER (OUTPATIENT)
Dept: CARDIOLOGY | Facility: MEDICAL CENTER | Age: 33
End: 2018-01-03
Attending: PSYCHIATRY & NEUROLOGY
Payer: COMMERCIAL

## 2018-01-03 DIAGNOSIS — G45.3 AMAUROSIS FUGAX: ICD-10-CM

## 2018-01-03 LAB
APTT PPP: 21.9 SEC (ref 24.7–36)
INR PPP: 1.03 (ref 0.87–1.13)
LA PPP-IMP: ABNORMAL
LV EJECT FRACT  99904: 60
LV EJECT FRACT MOD 2C 99903: 62.5
LV EJECT FRACT MOD 4C 99902: 59.02
LV EJECT FRACT MOD BP 99901: 60.13
PROTHROMBIN TIME: 13.2 SEC (ref 12–14.6)
SCREEN DRVVT: 39.8 SEC (ref 28–48)

## 2018-01-03 PROCEDURE — 93880 EXTRACRANIAL BILAT STUDY: CPT

## 2018-01-03 PROCEDURE — 93306 TTE W/DOPPLER COMPLETE: CPT

## 2018-01-03 PROCEDURE — 93306 TTE W/DOPPLER COMPLETE: CPT | Mod: 26 | Performed by: INTERNAL MEDICINE

## 2018-01-04 ENCOUNTER — APPOINTMENT (OUTPATIENT)
Dept: RADIOLOGY | Facility: MEDICAL CENTER | Age: 33
End: 2018-01-04
Attending: EMERGENCY MEDICINE
Payer: COMMERCIAL

## 2018-01-04 ENCOUNTER — HOSPITAL ENCOUNTER (EMERGENCY)
Facility: MEDICAL CENTER | Age: 33
End: 2018-01-05
Attending: EMERGENCY MEDICINE
Payer: COMMERCIAL

## 2018-01-04 ENCOUNTER — APPOINTMENT (OUTPATIENT)
Dept: RADIOLOGY | Facility: MEDICAL CENTER | Age: 33
End: 2018-01-04
Payer: COMMERCIAL

## 2018-01-04 DIAGNOSIS — R06.02 SHORTNESS OF BREATH: ICD-10-CM

## 2018-01-04 LAB
ALBUMIN SERPL BCP-MCNC: 4.6 G/DL (ref 3.2–4.9)
ALBUMIN/GLOB SERPL: 1.2 G/DL
ALP SERPL-CCNC: 102 U/L (ref 30–99)
ALT SERPL-CCNC: 7 U/L (ref 2–50)
ANION GAP SERPL CALC-SCNC: 9 MMOL/L (ref 0–11.9)
APTT PPP: 30.3 SEC (ref 24.7–36)
AST SERPL-CCNC: 10 U/L (ref 12–45)
BASOPHILS # BLD AUTO: 0.7 % (ref 0–1.8)
BASOPHILS # BLD: 0.09 K/UL (ref 0–0.12)
BILIRUB SERPL-MCNC: 0.4 MG/DL (ref 0.1–1.5)
BNP SERPL-MCNC: 6 PG/ML (ref 0–100)
BUN SERPL-MCNC: 14 MG/DL (ref 8–22)
CALCIUM SERPL-MCNC: 9.6 MG/DL (ref 8.5–10.5)
CARDIOLIPIN IGA SER IA-ACNC: 2 APL (ref 0–11)
CARDIOLIPIN IGG SER IA-ACNC: 2 GPL (ref 0–14)
CARDIOLIPIN IGM SER IA-ACNC: 0 MPL (ref 0–12)
CHLORIDE SERPL-SCNC: 109 MMOL/L (ref 96–112)
CO2 SERPL-SCNC: 16 MMOL/L (ref 20–33)
CREAT SERPL-MCNC: 0.68 MG/DL (ref 0.5–1.4)
EOSINOPHIL # BLD AUTO: 0.31 K/UL (ref 0–0.51)
EOSINOPHIL NFR BLD: 2.4 % (ref 0–6.9)
ERYTHROCYTE [DISTWIDTH] IN BLOOD BY AUTOMATED COUNT: 46 FL (ref 35.9–50)
GFR SERPL CREATININE-BSD FRML MDRD: >60 ML/MIN/1.73 M 2
GLOBULIN SER CALC-MCNC: 3.7 G/DL (ref 1.9–3.5)
GLUCOSE SERPL-MCNC: 83 MG/DL (ref 65–99)
HCG SERPL QL: NEGATIVE
HCT VFR BLD AUTO: 48.6 % (ref 37–47)
HGB BLD-MCNC: 15.7 G/DL (ref 12–16)
IMM GRANULOCYTES # BLD AUTO: 0.04 K/UL (ref 0–0.11)
IMM GRANULOCYTES NFR BLD AUTO: 0.3 % (ref 0–0.9)
INR PPP: 0.98 (ref 0.87–1.13)
LIPASE SERPL-CCNC: 30 U/L (ref 11–82)
LYMPHOCYTES # BLD AUTO: 3.58 K/UL (ref 1–4.8)
LYMPHOCYTES NFR BLD: 27.3 % (ref 22–41)
MCH RBC QN AUTO: 29.7 PG (ref 27–33)
MCHC RBC AUTO-ENTMCNC: 32.3 G/DL (ref 33.6–35)
MCV RBC AUTO: 91.9 FL (ref 81.4–97.8)
MONOCYTES # BLD AUTO: 0.86 K/UL (ref 0–0.85)
MONOCYTES NFR BLD AUTO: 6.5 % (ref 0–13.4)
NEUTROPHILS # BLD AUTO: 8.25 K/UL (ref 2–7.15)
NEUTROPHILS NFR BLD: 62.8 % (ref 44–72)
NRBC # BLD AUTO: 0 K/UL
NRBC BLD-RTO: 0 /100 WBC
NUCLEAR IGG SER QL IA: NORMAL
PLATELET # BLD AUTO: 318 K/UL (ref 164–446)
PMV BLD AUTO: 9.6 FL (ref 9–12.9)
POTASSIUM SERPL-SCNC: 3.4 MMOL/L (ref 3.6–5.5)
PROT SERPL-MCNC: 8.3 G/DL (ref 6–8.2)
PROTHROMBIN TIME: 12.7 SEC (ref 12–14.6)
RBC # BLD AUTO: 5.29 M/UL (ref 4.2–5.4)
SODIUM SERPL-SCNC: 134 MMOL/L (ref 135–145)
TROPONIN I SERPL-MCNC: <0.01 NG/ML (ref 0–0.04)
WBC # BLD AUTO: 13.1 K/UL (ref 4.8–10.8)

## 2018-01-04 PROCEDURE — 84703 CHORIONIC GONADOTROPIN ASSAY: CPT

## 2018-01-04 PROCEDURE — 85610 PROTHROMBIN TIME: CPT

## 2018-01-04 PROCEDURE — 93005 ELECTROCARDIOGRAM TRACING: CPT | Performed by: EMERGENCY MEDICINE

## 2018-01-04 PROCEDURE — 71045 X-RAY EXAM CHEST 1 VIEW: CPT | Performed by: EMERGENCY MEDICINE

## 2018-01-04 PROCEDURE — 85730 THROMBOPLASTIN TIME PARTIAL: CPT

## 2018-01-04 PROCEDURE — 87086 URINE CULTURE/COLONY COUNT: CPT

## 2018-01-04 PROCEDURE — 83690 ASSAY OF LIPASE: CPT

## 2018-01-04 PROCEDURE — 80053 COMPREHEN METABOLIC PANEL: CPT

## 2018-01-04 PROCEDURE — 93005 ELECTROCARDIOGRAM TRACING: CPT

## 2018-01-04 PROCEDURE — 87040 BLOOD CULTURE FOR BACTERIA: CPT

## 2018-01-04 PROCEDURE — 36415 COLL VENOUS BLD VENIPUNCTURE: CPT

## 2018-01-04 PROCEDURE — 71045 X-RAY EXAM CHEST 1 VIEW: CPT

## 2018-01-04 PROCEDURE — 99284 EMERGENCY DEPT VISIT MOD MDM: CPT

## 2018-01-04 PROCEDURE — 81001 URINALYSIS AUTO W/SCOPE: CPT

## 2018-01-04 PROCEDURE — 83880 ASSAY OF NATRIURETIC PEPTIDE: CPT

## 2018-01-04 PROCEDURE — 96374 THER/PROPH/DIAG INJ IV PUSH: CPT

## 2018-01-04 PROCEDURE — 700111 HCHG RX REV CODE 636 W/ 250 OVERRIDE (IP): Performed by: EMERGENCY MEDICINE

## 2018-01-04 PROCEDURE — 84484 ASSAY OF TROPONIN QUANT: CPT

## 2018-01-04 PROCEDURE — 85025 COMPLETE CBC W/AUTO DIFF WBC: CPT

## 2018-01-04 RX ORDER — KETOROLAC TROMETHAMINE 30 MG/ML
10 INJECTION, SOLUTION INTRAMUSCULAR; INTRAVENOUS ONCE
Status: COMPLETED | OUTPATIENT
Start: 2018-01-04 | End: 2018-01-04

## 2018-01-04 RX ORDER — TOPIRAMATE 50 MG/1
50 TABLET, FILM COATED ORAL DAILY
COMMUNITY
End: 2018-02-15

## 2018-01-04 RX ORDER — TOPIRAMATE 100 MG/1
100 TABLET, FILM COATED ORAL
COMMUNITY
End: 2018-02-10 | Stop reason: SDUPTHER

## 2018-01-04 RX ADMIN — KETOROLAC TROMETHAMINE 10 MG: 30 INJECTION, SOLUTION INTRAMUSCULAR at 23:23

## 2018-01-04 ASSESSMENT — PAIN SCALES - GENERAL: PAINLEVEL_OUTOF10: 7

## 2018-01-05 VITALS
HEART RATE: 98 BPM | BODY MASS INDEX: 34.22 KG/M2 | OXYGEN SATURATION: 99 % | TEMPERATURE: 97.9 F | RESPIRATION RATE: 17 BRPM | DIASTOLIC BLOOD PRESSURE: 59 MMHG | WEIGHT: 193.12 LBS | HEIGHT: 63 IN | SYSTOLIC BLOOD PRESSURE: 106 MMHG

## 2018-01-05 LAB
APPEARANCE UR: ABNORMAL
BACTERIA #/AREA URNS HPF: ABNORMAL /HPF
BILIRUB UR QL STRIP.AUTO: NEGATIVE
COLOR UR: YELLOW
CULTURE IF INDICATED INDCX: YES UA CULTURE
EKG IMPRESSION: NORMAL
EPI CELLS #/AREA URNS HPF: ABNORMAL /HPF
GLUCOSE UR STRIP.AUTO-MCNC: NEGATIVE MG/DL
HYALINE CASTS #/AREA URNS LPF: ABNORMAL /LPF
KETONES UR STRIP.AUTO-MCNC: NEGATIVE MG/DL
LEUKOCYTE ESTERASE UR QL STRIP.AUTO: ABNORMAL
MICRO URNS: ABNORMAL
NITRITE UR QL STRIP.AUTO: NEGATIVE
PH UR STRIP.AUTO: 5 [PH]
PROT UR QL STRIP: NEGATIVE MG/DL
RBC # URNS HPF: ABNORMAL /HPF
RBC UR QL AUTO: NEGATIVE
SP GR UR STRIP.AUTO: 1.01
UROBILINOGEN UR STRIP.AUTO-MCNC: 0.2 MG/DL
WBC #/AREA URNS HPF: ABNORMAL /HPF

## 2018-01-05 PROCEDURE — 700105 HCHG RX REV CODE 258: Performed by: EMERGENCY MEDICINE

## 2018-01-05 PROCEDURE — 700117 HCHG RX CONTRAST REV CODE 255: Performed by: EMERGENCY MEDICINE

## 2018-01-05 PROCEDURE — 71275 CT ANGIOGRAPHY CHEST: CPT

## 2018-01-05 RX ORDER — SODIUM CHLORIDE 9 MG/ML
1000 INJECTION, SOLUTION INTRAVENOUS ONCE
Status: COMPLETED | OUTPATIENT
Start: 2018-01-05 | End: 2018-01-05

## 2018-01-05 RX ADMIN — IOHEXOL 50 ML: 350 INJECTION, SOLUTION INTRAVENOUS at 00:21

## 2018-01-05 RX ADMIN — SODIUM CHLORIDE 1000 ML: 9 INJECTION, SOLUTION INTRAVENOUS at 00:20

## 2018-01-05 NOTE — ED PROVIDER NOTES
ED Provider Note     Scribed for Bisi Lyons M.D. by Sallie Barrera. 1/4/2018, 9:50 PM.    Primary Care Provider: Mack Bernard M.D.  Means of arrival: walk in   History obtained from: patient   History limited by: none    CHIEF COMPLAINT  Chief Complaint   Patient presents with   • Shortness of Breath   • Back Pain     HPI  Rehana Meade is a 32 y.o. female who presents to the Emergency Department for shortness of breath and back pain onset 5 hours prior to examination.  She notes that her back pain does not radiate but she describes it as sharp and stabbing and 7/10 severity.  The patient denies any smoking, long trips or plane rides.  The patient denies any hormonal birth control.  The patient denies any asthma, chest pain, bruising, leg pain or swelling. She notes that last weekShe had a transient change in her vision that has since resolved, she was evaluated by her primary care physician for coagulopathy out of concern that she may have had a thromboembolic event. She states that her vision is normal at this time. The patient was recently diagnosed with pseudotumor cerebri, endorses chronic headaches that are unchanged today.    REVIEW OF SYSTEMS  Pertinent positives include shortness of breath, back pain, headache. Pertinent negatives include no bruising, leg pain, swelling, chest pain. See HPI for further details. As above, all other systems negative.  C    PAST MEDICAL HISTORY   has a past medical history of Anesthesia; Anxiety associated with depression; ASTHMA; Drug-seeking behavior; Migraine without aura, without mention of intractable migraine without mention of status migrainosus; Other specified symptom associated with female genital organs; and Unspecified disorder of thyroid.    SOCIAL HISTORY  Social History   Substance Use Topics   • Smoking status: Never Smoker   • Smokeless tobacco: Never Used   • Alcohol use Yes      Comment: rarely      History   Drug Use  "No     SURGICAL HISTORY   has a past surgical history that includes septoplasty (10/28/2009); somnoplasty (10/28/2009); nasal polypectomy (10/28/2009); and tubal coagulation laparoscopic bilateral (7/11/2014).     CURRENT MEDICATIONS  Home Medications     Reviewed by Viridiana Torre R.N. (Registered Nurse) on 01/04/18 at 2132  Med List Status: Partial   Medication Last Dose Status   acetaZOLAMIDE SR (DIAMOX) 500 MG CAPSULE SR 12 HR 4/17/2017 Active   topiramate (TOPAMAX) 100 MG Tab  Active   topiramate (TOPAMAX) 50 MG tablet  Active              ALLERGIES  Allergies   Allergen Reactions   • Biaxin [Clarithromycin] Hives   • Penicillin G Potassium Vomiting   • Tape Rash   • Ativan Itching   • Imitrex [Sumatriptan] Unspecified     Make my head pins and needles   • Latex Itching   • Maxalt [Rizatriptan]      Tremors, confusion     • Morphine Hives   • Reglan [Metoclopramide]      Shaking      PHYSICAL EXAM  Vital Signs: /73   Pulse 76   Temp 36.6 °C (97.9 °F)   Resp 18   Ht 1.6 m (5' 3\")   Wt 87.6 kg (193 lb 2 oz)   LMP 01/15/2017 (Exact Date)   SpO2 100%   BMI 34.21 kg/m²   Constitutional: Alert, no acute distress  HENT: Normocephalic, atraumatic, moist mucus membranes  Eyes: Pupils equal and reactive, normal conjunctiva, non-icteric  Neck: Supple, normal range of motion, no stridor  Cardiovascular: Regular rhythm, Normal peripheral perfusion, no cyanosis   Pulmonary: No respiratory distress, normal work of breathing, no accessory muscule usage  Abdomen: Soft, non tender, no peritoneal signs, bowel sounds are present.   Skin: Warm, dry, no rashes or lesions  Back: No pain with active range of motion  Musculoskeletal: Normal range of motion in all extremities, no swelling or deformity noted  Neurologic: Alert, oriented, normal motor function, no speech deficits  Psychiatric: Normal and appropriate mood and affect    LABS  Labs Reviewed   CBC WITH DIFFERENTIAL - Abnormal; Notable for the following:     " "   Result Value    WBC 13.1 (*)     Hematocrit 48.6 (*)     MCHC 32.3 (*)     Neutrophils (Absolute) 8.25 (*)     Monos (Absolute) 0.86 (*)     All other components within normal limits    Narrative:     Indicate which anticoagulants the patient is on:->UNKNOWN   COMP METABOLIC PANEL - Abnormal; Notable for the following:     Sodium 134 (*)     Potassium 3.4 (*)     Co2 16 (*)     AST(SGOT) 10 (*)     Alkaline Phosphatase 102 (*)     Total Protein 8.3 (*)     Globulin 3.7 (*)     All other components within normal limits    Narrative:     Indicate which anticoagulants the patient is on:->UNKNOWN   URINALYSIS,CULTURE IF INDICATED - Abnormal; Notable for the following:     Character Cloudy (*)     Leukocyte Esterase Moderate (*)     All other components within normal limits   URINE MICROSCOPIC (W/UA) - Abnormal; Notable for the following:     WBC 20-50 (*)     Bacteria Moderate (*)     Epithelial Cells Moderate (*)     Hyaline Cast 6-10 (*)     All other components within normal limits   BTYPE NATRIURETIC PEPTIDE    Narrative:     Indicate which anticoagulants the patient is on:->UNKNOWN   BLOOD CULTURE    Narrative:     Per Hospital Policy: Only change Specimen Src: to \"Line\" if  specified by physician order.   TROPONIN    Narrative:     Indicate which anticoagulants the patient is on:->UNKNOWN   PROTHROMBIN TIME    Narrative:     Indicate which anticoagulants the patient is on:->UNKNOWN   APTT    Narrative:     Indicate which anticoagulants the patient is on:->UNKNOWN   LIPASE    Narrative:     Indicate which anticoagulants the patient is on:->UNKNOWN   ESTIMATED GFR    Narrative:     Indicate which anticoagulants the patient is on:->UNKNOWN   HCG QUAL SERUM   URINE CULTURE(NEW)   BLOOD CULTURE     All labs reviewed by me.    Radiology results show:   CT-CTA CHEST PULMONARY ARTERY W/ RECONS   Final Result      Unremarkable findings.            DX-CHEST-LIMITED (1 VIEW)   Final Result      No evidence of acute " cardiopulmonary process.        COURSE & MEDICAL DECISION MAKING  Nursing notes, VS, PMSFHx reviewed in chart.    EKG with a rate of 65, normal sinus rhythm, no ST elevations or depressions, no T-wave inversions, no ectopy    9:50 PM - Patient seen and examined at bedside. Patient will be treated with Toraldol 10mg IV. Ordered chest xray, CT-CTA chest pulmonary artery, urinalysis and culture if indicated, beta HCG qualitative serum, BNP, blood culture, CBC with differential, CMP, troponin, PTT, APTT, lipase, estimated GFR, EKG to evaluate her symptoms.     Fluid bolus initiated for lightheadedness while walking and hypotension with blood pressure of 81/55. On reassessment orthostatic vital signs are within normal limits after receiving fluid bolus, lightheadedness has resolved.    Decision Making:  This is a 32 y.o. year old who presents with chest pain and back pain. She has a normal physical exam, normal pulmonary exam without wheezing or coarse breath sounds. She has no leg swelling, no leg pain. Abdominal exam is benign.    On laboratory evaluation she does have moderate bacteria in her urine, she also has moderate epithelial cells suspect this is a contaminant. HCG is negative ruling out pregnancy and pregnancy related complications. BNP within normal limits without evidence of heart failure. Troponin is undetectable, EKG without acute ischemic changes, chest x-ray within normal limits, no evidence of cardiac etiology. Patient has HEART score of 0 indicating low risk of major adverse cardiac event. No significant electrolyte abnormalities. Lipase within normal limits without evidence of pancreatitis. CBC significant for slightly elevated white blood count at 13.1 without bandemia nor elevated neutrophils.    Due to her complaint of pleuritic chest pain as well as her possible thromboembolic event last week I elected to order a CTA to evaluate for pulmonary embolism. No PE noted, no patchy opacities concerning  for pneumonia, no pleural effusion.    At this time, I do not believe she requires inpatient evaluation or further inpatient diagnostics. She did feel lightheaded with a transient episode of hypotension when she stood abruptly to go to CT. She was treated with a liter of fluids, repeat orthostatic vital signs are within normal limits. Suspect she may have a dehydration component.    Plan at this time is for discharge home. She is instructed to follow-up with [facility] cardiology for complete recheck. Return precautions given including recurrent chest pain, shortness of breath, difficulty walking or any further concerns. Additionally she will contact her primary care physician for complete recheck.    Discharge home in stable condition    FINAL IMPRESSION  1. Shortness of breath          ISallie (Scribe), am scribing for, and in the presence of, Bisi Lyons M.D..    Electronically signed by: Sallie Barrera (Scribe), 1/4/2018    IBisi M.D. personally performed the services described in this documentation, as scribed by aSllie Barrera in my presence, and it is both accurate and complete.    The note accurately reflects work and decisions made by me.  Bisi Lyons  1/5/2018  1:58 AM

## 2018-01-05 NOTE — ED NOTES
Report sudden onset of left upper back and shoulder pain. C/o fo worsening chronic HA and dizziness, noticed blue fingers today. Denies use of hormonal birthcontrol , non smoker, denies leg swelling or pain. Recently evaled by neuro for vision changes.

## 2018-01-05 NOTE — DISCHARGE INSTRUCTIONS
Please follow-up with your primary care physician for complete recheck. Return to the emergency department if he develops any new or worsening symptoms including worsening shortness of breath, worsening pain, lightheadedness, or any further concerns. Please contact the cardiologist for a close follow-up appointment.    Chest Pain, Nonspecific  It is often hard to give a specific diagnosis for the cause of chest pain. There is always a chance that your pain could be related to something serious, like a heart attack or a blood clot in the lungs. You need to follow up with your caregiver for further evaluation. More lab tests or other studies such as X-rays, electrocardiography, stress testing, or cardiac imaging may be needed to find the cause of your pain.  Most of the time, nonspecific chest pain improves within 2 to 3 days with rest and mild pain medicine. For the next few days, avoid physical exertion or activities that bring on pain. Do not smoke. Avoid drinking alcohol. Call your caregiver for routine follow-up as advised.   SEEK IMMEDIATE MEDICAL CARE IF:  · You develop increased chest pain or pain that radiates to the arm, neck, jaw, back, or abdomen.   · You develop shortness of breath, increased coughing, or you start coughing up blood.   · You have severe back or abdominal pain, nausea, or vomiting.   · You develop severe weakness, fainting, fever, or chills.   Document Released: 12/18/2006 Document Revised: 03/11/2013 Document Reviewed: 06/06/2008  [employer]® Patient Information ©2013 [employer]

## 2018-01-05 NOTE — ED NOTES
"PT ambulated to triage c/o sudden onset SOB and upper lt chest/back pain.   Chief Complaint   Patient presents with   • Shortness of Breath   • Back Pain     Blood pressure 108/73, pulse 76, temperature 36.6 °C (97.9 °F), resp. rate 18, height 1.6 m (5' 3\"), weight 87.6 kg (193 lb 2 oz), SpO2 100 %.      "

## 2018-01-07 LAB
BACTERIA UR CULT: ABNORMAL
BACTERIA UR CULT: ABNORMAL
SIGNIFICANT IND 70042: ABNORMAL
SITE SITE: ABNORMAL
SOURCE SOURCE: ABNORMAL

## 2018-01-07 NOTE — ED NOTES
"ED Positive Culture Follow-up/Notification Note:    Date: 1/1/18     Patient seen in the ED on 1/4/2018 for:   1. Shortness of breath       Discharge Medication List as of 1/5/2018 12:59 AM          Allergies: Biaxin [clarithromycin]; Penicillin g potassium; Tape; Ativan; Imitrex [sumatriptan]; Latex; Maxalt [rizatriptan]; Morphine; and Reglan [metoclopramide]     Final cultures:   Results     Procedure Component Value Units Date/Time    URINE CULTURE(NEW) [[id number]]  (Abnormal) Collected:  01/04/18 2350    Order Status:  Completed Specimen:  Urine Updated:  01/07/18 0717     Significant Indicator POS (POS)     Source UR     Site --     Urine Culture Mixed skin [place] >100,000 cfu/mL (A)     Urine Culture -- (A)     Yeast  <10,000 cfu/mL      BLOOD CULTURE [[id number]] Collected:  01/04/18 1810    Order Status:  Completed Specimen:  Blood from Peripheral Updated:  01/05/18 0732     Significant Indicator NEG     Source BLD     Site PERIPHERAL     Blood Culture --     No Growth    Note: Blood cultures are incubated for 5 days and  are monitored continuously.Positive blood cultures  are called to the RN and reported as soon as  they are identified.      Narrative:       Per Hospital Policy: Only change Specimen Src: to \"Line\" if  specified by physician order.    URINALYSIS,CULTURE IF INDICATED [[id number]]  (Abnormal) Collected:  01/04/18 2350    Order Status:  Completed Specimen:  Urine Updated:  01/05/18 0001     Color Yellow     Character Cloudy (A)     Specific Gravity 1.013     Ph 5.0     Glucose Negative mg/dL      Ketones Negative mg/dL      Protein Negative mg/dL      Bilirubin Negative     Urobilinogen, Urine 0.2     Nitrite Negative     Leukocyte Esterase Moderate (A)     Occult Blood Negative     Micro Urine Req Microscopic     Culture Indicated Yes UA Culture     BLOOD CULTURE [[id number]]     Order Status:  Canceled Specimen:  Blood from Peripheral           Plan:   The urine culture has grown yeast.  There " is no clear documentation of signs and symptoms typically associated with urinary tract infection, thus I think this likely represents colonization.  Antimicrobials are not indicated at this time.    Jp Draden

## 2018-01-08 ENCOUNTER — TELEPHONE (OUTPATIENT)
Dept: CARDIOLOGY | Facility: MEDICAL CENTER | Age: 33
End: 2018-01-08

## 2018-01-08 ENCOUNTER — OFFICE VISIT (OUTPATIENT)
Dept: CARDIOLOGY | Facility: MEDICAL CENTER | Age: 33
End: 2018-01-08
Payer: COMMERCIAL

## 2018-01-08 VITALS
SYSTOLIC BLOOD PRESSURE: 112 MMHG | OXYGEN SATURATION: 100 % | HEIGHT: 63 IN | BODY MASS INDEX: 34.2 KG/M2 | DIASTOLIC BLOOD PRESSURE: 78 MMHG | WEIGHT: 193 LBS | HEART RATE: 74 BPM

## 2018-01-08 DIAGNOSIS — R07.89 OTHER CHEST PAIN: ICD-10-CM

## 2018-01-08 DIAGNOSIS — G45.3 AF (AMAUROSIS FUGAX): ICD-10-CM

## 2018-01-08 PROCEDURE — 99205 OFFICE O/P NEW HI 60 MIN: CPT | Performed by: INTERNAL MEDICINE

## 2018-01-08 ASSESSMENT — ENCOUNTER SYMPTOMS
PND: 0
SHORTNESS OF BREATH: 0
MUSCULOSKELETAL NEGATIVE: 1
CLAUDICATION: 0
DIZZINESS: 0
LOSS OF CONSCIOUSNESS: 0
WEAKNESS: 0
CARDIOVASCULAR NEGATIVE: 1
WHEEZING: 0
GASTROINTESTINAL NEGATIVE: 1
HEMOPTYSIS: 0
EYES NEGATIVE: 1
BRUISES/BLEEDS EASILY: 0
RESPIRATORY NEGATIVE: 1
CONSTITUTIONAL NEGATIVE: 1
PALPITATIONS: 0
SPUTUM PRODUCTION: 0
CHILLS: 0
COUGH: 0
FEVER: 0
NEUROLOGICAL NEGATIVE: 1
SORE THROAT: 0
STRIDOR: 0
ORTHOPNEA: 0

## 2018-01-08 NOTE — LETTER
[facility]   [address] [place] [address]  [place] [address]  Phone: [phone]  Fax: [phone]              Rehana Meade  1985    Encounter Date: 1/8/2018    Brayan Paiz M.D.          PROGRESS NOTE:  Subjective:   Rehana Meade is a 32 y.o. female who presents today As a new consultation for chest pain. She developed a chest pain syndrome described as sharp like sensation in her chest with radiation to the back. She was seen in the ER and ruled out by serial enzymes CT PE and EKG. She was discharged with no diagnosis and this resolved after about 24 hours.  Of incidental note and finding she was also diagnosed with a TIA or amaurosis fugax that she lost vision in her right eye. This self resolved. She has been diagnosed with benign idiopathic intracranial hypertension. She is on Diamox for this reason. She had a echocardiogram with bubble study which was negative but no GABY.    Chief Complaint: TIA/Chest pain    Past Medical History:   Diagnosis Date   • Anesthesia     woke up combative   • Anxiety associated with depression    • ASTHMA    • Drug-seeking behavior    • Migraine without aura, without mention of intractable migraine without mention of status migrainosus    • Other specified symptom associated with female genital organs    • Unspecified disorder of thyroid     cyst     Past Surgical History:   Procedure Laterality Date   • TUBAL COAGULATION LAPAROSCOPIC BILATERAL  7/11/2014    Performed by Julian Parker M.D. at SURGERY SAME DAY [facility] ORS   • SEPTOPLASTY  10/28/2009    Performed by ANNETTE FORDE at SURGERY [facility] ORS   • SOMNOPLASTY  10/28/2009    Performed by ANNETTE FORDE at SURGERY [facility] ORS   • NASAL POLYPECTOMY  10/28/2009    Performed by ANNETTE FORDE at SURGERY [facility]     History reviewed. No pertinent family history.  History   Smoking Status   • Never Smoker   Smokeless  "Tobacco   • Never Used     Allergies   Allergen Reactions   • Biaxin [Clarithromycin] Hives   • Penicillin G Potassium Vomiting   • Tape Rash   • Ativan Itching   • Imitrex [Sumatriptan] Unspecified     Make my head pins and needles   • Latex Itching   • Maxalt [Rizatriptan]      Tremors, confusion     • Morphine Hives   • Reglan [Metoclopramide]      Shaking      Outpatient Encounter Prescriptions as of 1/8/2018   Medication Sig Dispense Refill   • topiramate (TOPAMAX) 50 MG tablet Take 50 mg by mouth every day.     • topiramate (TOPAMAX) 100 MG Tab Take 100 mg by mouth every bedtime.     • acetaZOLAMIDE SR (DIAMOX) 500 MG CAPSULE SR 12 HR Take 1 Cap by mouth every 12 hours. 60 Cap 1     No facility-administered encounter medications on file as of 1/8/2018.      Review of Systems   Constitutional: Negative.  Negative for chills, fever and malaise/fatigue.   HENT: Negative.  Negative for sore throat.    Eyes: Negative.    Respiratory: Negative.  Negative for cough, hemoptysis, sputum production, shortness of breath, wheezing and stridor.    Cardiovascular: Negative.  Negative for chest pain, palpitations, orthopnea, claudication, leg swelling and PND.   Gastrointestinal: Negative.    Genitourinary: Negative.    Musculoskeletal: Negative.    Skin: Negative.    Neurological: Negative.  Negative for dizziness, loss of consciousness and weakness.   Endo/Heme/Allergies: Negative.  Does not bruise/bleed easily.   All other systems reviewed and are negative.       Objective:   /78   Pulse 74   Ht 1.6 m (5' 3\")   Wt 87.5 kg (193 lb)   SpO2 100%   BMI 34.19 kg/m²      Physical Exam   Constitutional: She is oriented to person, place, and time. She appears well-developed and well-nourished. No distress.   HENT:   Head: Normocephalic.   Mouth/Throat: Oropharynx is clear and moist.   Eyes: EOM are normal. Pupils are equal, round, and reactive to light. Right eye exhibits no discharge. Left eye exhibits no discharge. " No scleral icterus.   Neck: Normal range of motion. Neck supple. No JVD present. No tracheal deviation present.   Cardiovascular: Normal rate, regular rhythm, S1 normal, S2 normal, normal heart sounds, intact distal pulses and normal pulses.  Exam reveals no gallop, no S3, no S4 and no friction rub.    No murmur heard.   No systolic murmur is present    No diastolic murmur is present   Pulses:       Carotid pulses are 2+ on the right side, and 2+ on the left side.       Radial pulses are 2+ on the right side, and 2+ on the left side.        Dorsalis pedis pulses are 2+ on the right side, and 2+ on the left side.        Posterior tibial pulses are 2+ on the right side, and 2+ on the left side.   Pulmonary/Chest: Effort normal and breath sounds normal. No respiratory distress. She has no wheezes. She has no rales.   Abdominal: Soft. Bowel sounds are normal. She exhibits no distension and no mass. There is no tenderness. There is no rebound and no guarding.   Musculoskeletal: She exhibits no edema.   Neurological: She is alert and oriented to person, place, and time. No cranial nerve deficit.   Skin: Skin is warm and dry. She is not diaphoretic. No pallor.   Psychiatric: She has a normal mood and affect. Her behavior is normal. Judgment and thought content normal.   Nursing note and vitals reviewed.      Assessment:     1. Other chest pain     2. AF (amaurosis fugax)         Medical Decision Making:  Today's Assessment / Status / Plan:     89-year-old female with chest pain which self resolved after 24 hours with negative biomarkers. The  Did offer a treadmill test which she declined at this point which is reasonable. For her  TIA-like presentation we discussed the role of a GABY and need to rule out intracardiac shunts given her age. We will schedule her for a GABY within the next week preferably on Thursday.    Thank for you allowing me to take part in your patient's care, please call should you have any questions or  would like to discuss this patient.      Mack Bernard M.D.  [address] [address]  VIA Facsimile: [phone]

## 2018-01-08 NOTE — PROGRESS NOTES
Subjective:   Rehana Meade is a 32 y.o. female who presents today As a new consultation for chest pain. She developed a chest pain syndrome described as sharp like sensation in her chest with radiation to the back. She was seen in the ER and ruled out by serial enzymes CT PE and EKG. She was discharged with no diagnosis and this resolved after about 24 hours.  Of incidental note and finding she was also diagnosed with a TIA or amaurosis fugax that she lost vision in her right eye. This self resolved. She has been diagnosed with benign idiopathic intracranial hypertension. She is on Diamox for this reason. She had a echocardiogram with bubble study which was negative but no GABY.    Chief Complaint: TIA/Chest pain    Past Medical History:   Diagnosis Date   • Anesthesia     woke up combative   • Anxiety associated with depression    • ASTHMA    • Drug-seeking behavior    • Migraine without aura, without mention of intractable migraine without mention of status migrainosus    • Other specified symptom associated with female genital organs    • Unspecified disorder of thyroid     cyst     Past Surgical History:   Procedure Laterality Date   • TUBAL COAGULATION LAPAROSCOPIC BILATERAL  7/11/2014    Performed by Julian Parker M.D. at SURGERY SAME DAY [facility] ER ORS   • SEPTOPLASTY  10/28/2009    Performed by ANNETTE FORDE at SURGERY [facility] ORS   • SOMNOPLASTY  10/28/2009    Performed by ANNETTE FORDE at SURGERY [facility]   • NASAL POLYPECTOMY  10/28/2009    Performed by ANNETTE FORDE at SURGERY [facility]     History reviewed. No pertinent family history.  History   Smoking Status   • Never Smoker   Smokeless Tobacco   • Never Used     Allergies   Allergen Reactions   • Biaxin [Clarithromycin] Hives   • Penicillin G Potassium Vomiting   • Tape Rash   • Ativan Itching   • Imitrex [Sumatriptan] Unspecified     Make my head pins and needles   • Latex Itching   • Maxalt [Rizatriptan]   "    Tremors, confusion     • Morphine Hives   • Reglan [Metoclopramide]      Shaking      Outpatient Encounter Prescriptions as of 1/8/2018   Medication Sig Dispense Refill   • topiramate (TOPAMAX) 50 MG tablet Take 50 mg by mouth every day.     • topiramate (TOPAMAX) 100 MG Tab Take 100 mg by mouth every bedtime.     • acetaZOLAMIDE SR (DIAMOX) 500 MG CAPSULE SR 12 HR Take 1 Cap by mouth every 12 hours. 60 Cap 1     No facility-administered encounter medications on file as of 1/8/2018.      Review of Systems   Constitutional: Negative.  Negative for chills, fever and malaise/fatigue.   HENT: Negative.  Negative for sore throat.    Eyes: Negative.    Respiratory: Negative.  Negative for cough, hemoptysis, sputum production, shortness of breath, wheezing and stridor.    Cardiovascular: Negative.  Negative for chest pain, palpitations, orthopnea, claudication, leg swelling and PND.   Gastrointestinal: Negative.    Genitourinary: Negative.    Musculoskeletal: Negative.    Skin: Negative.    Neurological: Negative.  Negative for dizziness, loss of consciousness and weakness.   Endo/Heme/Allergies: Negative.  Does not bruise/bleed easily.   All other systems reviewed and are negative.       Objective:   /78   Pulse 74   Ht 1.6 m (5' 3\")   Wt 87.5 kg (193 lb)   SpO2 100%   BMI 34.19 kg/m²     Physical Exam   Constitutional: She is oriented to person, place, and time. She appears well-developed and well-nourished. No distress.   HENT:   Head: Normocephalic.   Mouth/Throat: Oropharynx is clear and moist.   Eyes: EOM are normal. Pupils are equal, round, and reactive to light. Right eye exhibits no discharge. Left eye exhibits no discharge. No scleral icterus.   Neck: Normal range of motion. Neck supple. No JVD present. No tracheal deviation present.   Cardiovascular: Normal rate, regular rhythm, S1 normal, S2 normal, normal heart sounds, intact distal pulses and normal pulses.  Exam reveals no gallop, no S3, no S4 " and no friction rub.    No murmur heard.   No systolic murmur is present    No diastolic murmur is present   Pulses:       Carotid pulses are 2+ on the right side, and 2+ on the left side.       Radial pulses are 2+ on the right side, and 2+ on the left side.        Dorsalis pedis pulses are 2+ on the right side, and 2+ on the left side.        Posterior tibial pulses are 2+ on the right side, and 2+ on the left side.   Pulmonary/Chest: Effort normal and breath sounds normal. No respiratory distress. She has no wheezes. She has no rales.   Abdominal: Soft. Bowel sounds are normal. She exhibits no distension and no mass. There is no tenderness. There is no rebound and no guarding.   Musculoskeletal: She exhibits no edema.   Neurological: She is alert and oriented to person, place, and time. No cranial nerve deficit.   Skin: Skin is warm and dry. She is not diaphoretic. No pallor.   Psychiatric: She has a normal mood and affect. Her behavior is normal. Judgment and thought content normal.   Nursing note and vitals reviewed.      Assessment:     1. Other chest pain     2. AF (amaurosis fugax)         Medical Decision Making:  Today's Assessment / Status / Plan:     89-year-old female with chest pain which self resolved after 24 hours with negative biomarkers. The  Did offer a treadmill test which she declined at this point which is reasonable. For her  TIA-like presentation we discussed the role of a GABY and need to rule out intracardiac shunts given her age. We will schedule her for a GABY within the next week preferably on Thursday.    Thank for you allowing me to take part in your patient's care, please call should you have any questions or would like to discuss this patient.

## 2018-01-08 NOTE — TELEPHONE ENCOUNTER
Patient is scheduled on 1-11-18 for a GABY w/ Dr. Paiz. No meds to stop. Patient was told to check in at 6:00am for a 7:30 procedure. H&P was done on 1-8-18 by Dr. Paiz.

## 2018-01-09 LAB
BACTERIA BLD CULT: NORMAL
SIGNIFICANT IND 70042: NORMAL
SITE SITE: NORMAL
SOURCE SOURCE: NORMAL

## 2018-01-11 ENCOUNTER — HOSPITAL ENCOUNTER (OUTPATIENT)
Facility: MEDICAL CENTER | Age: 33
End: 2018-01-11
Attending: INTERNAL MEDICINE | Admitting: INTERNAL MEDICINE
Payer: COMMERCIAL

## 2018-01-11 ENCOUNTER — TELEPHONE (OUTPATIENT)
Dept: CARDIOLOGY | Facility: MEDICAL CENTER | Age: 33
End: 2018-01-11

## 2018-01-11 VITALS
TEMPERATURE: 98.2 F | HEIGHT: 63 IN | BODY MASS INDEX: 32.43 KG/M2 | RESPIRATION RATE: 14 BRPM | DIASTOLIC BLOOD PRESSURE: 66 MMHG | HEART RATE: 74 BPM | WEIGHT: 183 LBS | SYSTOLIC BLOOD PRESSURE: 110 MMHG | OXYGEN SATURATION: 100 %

## 2018-01-11 LAB — LV EJECT FRACT  99904: 55

## 2018-01-11 PROCEDURE — 93321 DOPPLER ECHO F-UP/LMTD STD: CPT

## 2018-01-11 PROCEDURE — 700111 HCHG RX REV CODE 636 W/ 250 OVERRIDE (IP)

## 2018-01-11 PROCEDURE — 93312 ECHO TRANSESOPHAGEAL: CPT

## 2018-01-11 PROCEDURE — 93325 DOPPLER ECHO COLOR FLOW MAPG: CPT

## 2018-01-11 PROCEDURE — 160002 HCHG RECOVERY MINUTES (STAT)

## 2018-01-11 RX ORDER — SODIUM CHLORIDE, SODIUM LACTATE, POTASSIUM CHLORIDE, CALCIUM CHLORIDE 600; 310; 30; 20 MG/100ML; MG/100ML; MG/100ML; MG/100ML
INJECTION, SOLUTION INTRAVENOUS CONTINUOUS
Status: DISCONTINUED | OUTPATIENT
Start: 2018-01-11 | End: 2018-01-11 | Stop reason: HOSPADM

## 2018-01-11 ASSESSMENT — PAIN SCALES - GENERAL
PAINLEVEL_OUTOF10: 0

## 2018-01-11 NOTE — DISCHARGE INSTRUCTIONS
0820:   ACTIVITY: Rest and take it easy for the first 24 hours.  A responsible adult is recommended to remain with you during that time.  It is normal to feel sleepy.  We encourage you to not do anything that requires balance, judgment or coordination.    MILD FLU-LIKE SYMPTOMS ARE NORMAL. YOU MAY EXPERIENCE GENERALIZED MUSCLE ACHES, THROAT IRRITATION, HEADACHE AND/OR SOME NAUSEA.    FOR 24 HOURS DO NOT:  Drive, operate machinery or run household appliances.  Drink beer or alcoholic beverages.   Make important decisions or sign legal documents.      DIET: To avoid nausea, slowly advance diet as tolerated, avoiding spicy or greasy foods for the first day.  Add more substantial food to your diet according to your physician's instructions.  Babies can be fed formula or breast milk as soon as they are hungry.  INCREASE FLUIDS AND FIBER TO AVOID CONSTIPATION.      FOLLOW-UP APPOINTMENT:  A follow-up appointment should be arranged with your doctor ; call to schedule.    You should CALL YOUR PHYSICIAN if you develop:  Fever greater than 101 degrees F.  Pain not relieved by medication, or persistent nausea or vomiting.  Excessive bleeding (blood soaking through dressing) or unexpected drainage from the wound.  Extreme redness or swelling around the incision site, drainage of pus or foul smelling drainage.  Inability to urinate or empty your bladder within 8 hours.  Problems with breathing or chest pain.    You should call 911 if you develop problems with breathing or chest pain.  If you are unable to contact your doctor or surgical center, you should go to the nearest emergency room or urgent care center.      Physician's telephone #: [phone]    If any questions arise, call your doctor.  If your doctor is not available, please feel free to call the Surgical Center at [phone].  The Center is open Monday through Friday from 7AM to 7PM.  You can also call the [employer] HOTLINE open 24 hours/day, 7 days/week and speak to a  nurse at [phone], or toll free at [phone].    A registered nurse may call you a few days after your surgery to see how you are doing after your procedure.    MEDICATIONS: Resume taking daily medication.  Take prescribed pain medication with food.  If no medication is prescribed, you may take non-aspirin pain medication if needed.  PAIN MEDICATION CAN BE VERY CONSTIPATING.  Take a stool softener or laxative such as senokot, pericolace, or milk of magnesia if needed.    If your physician has prescribed pain medication that includes Acetaminophen (Tylenol), do not take additional Acetaminophen (Tylenol) while taking the prescribed medication.    Depression / Suicide Risk    As you are discharged from this [facility] facility, it is important to learn how to keep safe from harming yourself.    Recognize the warning signs:  · Abrupt changes in personality, positive or negative- including increase in energy   · Giving away possessions  · Change in eating patterns- significant weight changes-  positive or negative  · Change in sleeping patterns- unable to sleep or sleeping all the time   · Unwillingness or inability to communicate  · Depression  · Unusual sadness, discouragement and loneliness  · Talk of wanting to die  · Neglect of personal appearance   · Rebelliousness- reckless behavior  · Withdrawal from people/activities they love  · Confusion- inability to concentrate     If you or a loved one observes any of these behaviors or has concerns about self-harm, here's what you can do:  · Talk about it- your feelings and reasons for harming yourself  · Remove any means that you might use to hurt yourself (examples: pills, rope, extension cords, firearm)  · Get professional help from the community (Mental Health, Substance Abuse, psychological counseling)  · Do not be alone:Call your Safe Contact- someone whom you trust who will be there for you.  · Call your local CRISIS HOTLINE 530-7518 or  443.166.3831  · Call your local Children's Mobile Crisis Response Team [place] (506) 622-2892 or www.[employer]  · Call the toll free National Suicide Prevention Hotlines   · National Suicide Prevention Lifeline 353-723-TXZO (3172)  · [employer] Line Network 800-SUICIDE (121-9885)      Transesophageal Echocardiogram    Transesophageal echocardiography (GABY) is a picture test of your heart using sound waves. The pictures taken can give very detailed pictures of your heart. This can help your doctor see if there are problems with your heart. GABY can check:  · If your heart has blood clots in it.  · How well your heart valves are working.  · If you have an infection on the inside of your heart.  · Some of the major arteries of your heart.  · If your heart valve is working after a repair.  · Your heart before a procedure that uses a shock to your heart to get the rhythm back to normal.  BEFORE THE PROCEDURE  · Do not eat or drink for 6 hours before the procedure or as told by your doctor.  · Make plans to have someone drive you home after the procedure. Do not drive yourself home.  · An IV tube will be put in your arm.  PROCEDURE  · You will be given a medicine to help you relax (sedative). It will be given through the IV tube.  · A numbing medicine will be sprayed or gargled in the back of your throat to help numb it.  · The tip of the probe is placed into the back of your mouth. You will be asked to swallow. This helps to pass the probe into your esophagus.  · Once the tip of the probe is in the right place, your doctor can take pictures of your heart.  · You may feel pressure at the back of your throat.  AFTER THE PROCEDURE  · You will be taken to a recovery area so the sedative can wear off.  · Your throat may be sore and scratchy. This will go away slowly over time.  · You will go home when you are fully awake and able to swallow liquids.  · You should have someone stay with you for the next 24  hours.  · Do not drive or operate machinery for the next 24 hours.     This information is not intended to replace advice given to you by your health care provider. Make sure you discuss any questions you have with your health care provider.     Document Released: 10/15/2010 Document Revised: 12/23/2014 Document Reviewed: 06/19/2014  Elsevier Interactive Patient Education ©2016 [employer]

## 2018-01-11 NOTE — OR NURSING
0812: Patient from cath lab to PPU via gurney S/P GABY. Patient is awake and crying. VSS. No c/o pain or nausea at this time. Will monitor closely. Vital signs per MD order.  0820: VSS. Patient sleeping.  0845: DC instructions given. Questions answered. Family at bedside. No c/o pain or nausea. Patient wide awake. VSS. Patient met criteria for discharge.

## 2018-01-12 NOTE — TELEPHONE ENCOUNTER
recent GABY   Received: Today   Message Contents   Kolby Aly Ass't  MARYAM Doyle RO pt wants to know if she needs to come in on 1/16/18 as she was seen on 1/8/18. She did just have a GABY done today but she is unsure if she needs to come in for the results or if they are normal and can be given over the phone. She did request a call back at your earliest convenience.     Thanks,   Dewayne [phone]      Returned patient call. Pt informed that it is OK to cancel 1/16 appt. Pt reassured I would do that for her. Pt wants to know how she will get the GABY results. Pt advised we would follow up with her once results available.

## 2018-01-23 PROCEDURE — 99284 EMERGENCY DEPT VISIT MOD MDM: CPT

## 2018-01-24 ENCOUNTER — HOSPITAL ENCOUNTER (EMERGENCY)
Facility: MEDICAL CENTER | Age: 33
End: 2018-01-24
Attending: EMERGENCY MEDICINE
Payer: COMMERCIAL

## 2018-01-24 VITALS
HEART RATE: 66 BPM | TEMPERATURE: 98.3 F | RESPIRATION RATE: 16 BRPM | WEIGHT: 193 LBS | DIASTOLIC BLOOD PRESSURE: 70 MMHG | BODY MASS INDEX: 34.19 KG/M2 | OXYGEN SATURATION: 96 % | SYSTOLIC BLOOD PRESSURE: 114 MMHG

## 2018-01-24 DIAGNOSIS — M62.40 ABNORMALLY INCREASED MUSCLE CONTRACTION: ICD-10-CM

## 2018-01-24 DIAGNOSIS — R51.9 CHRONIC INTRACTABLE HEADACHE, UNSPECIFIED HEADACHE TYPE: ICD-10-CM

## 2018-01-24 DIAGNOSIS — G89.29 CHRONIC INTRACTABLE HEADACHE, UNSPECIFIED HEADACHE TYPE: ICD-10-CM

## 2018-01-24 LAB
ALBUMIN SERPL BCP-MCNC: 4.2 G/DL (ref 3.2–4.9)
ALBUMIN/GLOB SERPL: 1.4 G/DL
ALP SERPL-CCNC: 84 U/L (ref 30–99)
ALT SERPL-CCNC: 6 U/L (ref 2–50)
ANION GAP SERPL CALC-SCNC: 11 MMOL/L (ref 0–11.9)
AST SERPL-CCNC: 11 U/L (ref 12–45)
BASOPHILS # BLD AUTO: 0.9 % (ref 0–1.8)
BASOPHILS # BLD: 0.09 K/UL (ref 0–0.12)
BILIRUB SERPL-MCNC: 0.5 MG/DL (ref 0.1–1.5)
BUN SERPL-MCNC: 14 MG/DL (ref 8–22)
CALCIUM SERPL-MCNC: 8.9 MG/DL (ref 8.5–10.5)
CHLORIDE SERPL-SCNC: 111 MMOL/L (ref 96–112)
CK SERPL-CCNC: 34 U/L (ref 0–154)
CO2 SERPL-SCNC: 18 MMOL/L (ref 20–33)
CREAT SERPL-MCNC: 0.75 MG/DL (ref 0.5–1.4)
EOSINOPHIL # BLD AUTO: 0.29 K/UL (ref 0–0.51)
EOSINOPHIL NFR BLD: 2.9 % (ref 0–6.9)
ERYTHROCYTE [DISTWIDTH] IN BLOOD BY AUTOMATED COUNT: 45.8 FL (ref 35.9–50)
GLOBULIN SER CALC-MCNC: 3 G/DL (ref 1.9–3.5)
GLUCOSE SERPL-MCNC: 85 MG/DL (ref 65–99)
HCT VFR BLD AUTO: 42.5 % (ref 37–47)
HGB BLD-MCNC: 14 G/DL (ref 12–16)
IMM GRANULOCYTES # BLD AUTO: 0.03 K/UL (ref 0–0.11)
IMM GRANULOCYTES NFR BLD AUTO: 0.3 % (ref 0–0.9)
LYMPHOCYTES # BLD AUTO: 3.02 K/UL (ref 1–4.8)
LYMPHOCYTES NFR BLD: 30.1 % (ref 22–41)
MCH RBC QN AUTO: 30.1 PG (ref 27–33)
MCHC RBC AUTO-ENTMCNC: 32.9 G/DL (ref 33.6–35)
MCV RBC AUTO: 91.4 FL (ref 81.4–97.8)
MONOCYTES # BLD AUTO: 0.73 K/UL (ref 0–0.85)
MONOCYTES NFR BLD AUTO: 7.3 % (ref 0–13.4)
NEUTROPHILS # BLD AUTO: 5.87 K/UL (ref 2–7.15)
NEUTROPHILS NFR BLD: 58.5 % (ref 44–72)
NRBC # BLD AUTO: 0 K/UL
NRBC BLD-RTO: 0 /100 WBC
PLATELET # BLD AUTO: 295 K/UL (ref 164–446)
PMV BLD AUTO: 10 FL (ref 9–12.9)
POTASSIUM SERPL-SCNC: 3.4 MMOL/L (ref 3.6–5.5)
PROT SERPL-MCNC: 7.2 G/DL (ref 6–8.2)
RBC # BLD AUTO: 4.65 M/UL (ref 4.2–5.4)
SODIUM SERPL-SCNC: 140 MMOL/L (ref 135–145)
WBC # BLD AUTO: 10 K/UL (ref 4.8–10.8)

## 2018-01-24 PROCEDURE — 80053 COMPREHEN METABOLIC PANEL: CPT

## 2018-01-24 PROCEDURE — A9270 NON-COVERED ITEM OR SERVICE: HCPCS | Performed by: EMERGENCY MEDICINE

## 2018-01-24 PROCEDURE — 85025 COMPLETE CBC W/AUTO DIFF WBC: CPT

## 2018-01-24 PROCEDURE — 82550 ASSAY OF CK (CPK): CPT

## 2018-01-24 PROCEDURE — 700102 HCHG RX REV CODE 250 W/ 637 OVERRIDE(OP): Performed by: EMERGENCY MEDICINE

## 2018-01-24 RX ORDER — OXYCODONE HYDROCHLORIDE AND ACETAMINOPHEN 5; 325 MG/1; MG/1
1 TABLET ORAL ONCE
Status: COMPLETED | OUTPATIENT
Start: 2018-01-24 | End: 2018-01-24

## 2018-01-24 RX ADMIN — OXYCODONE HYDROCHLORIDE AND ACETAMINOPHEN 1 TABLET: 5; 325 TABLET ORAL at 05:27

## 2018-01-24 ASSESSMENT — ENCOUNTER SYMPTOMS
SENSORY CHANGE: 0
HEADACHES: 1
COUGH: 0
MYALGIAS: 1
FEVER: 0
VOMITING: 0

## 2018-01-24 NOTE — ED NOTES
Discharged home with  pt to follow up with neurology. Pt has apt on 29 th. Return to ed with worsening symptoms.

## 2018-01-24 NOTE — ED NOTES
Pt states she suffers from neurological disorders, works with a neurologist. 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 States she causes various neurological issues. Pt has equal strength bilaterally, c/o severe headache as a result of this evenings episode.

## 2018-01-24 NOTE — ED PROVIDER NOTES
"ED Provider Note    ED Provider Note      Primary care provider: Mack Bernard M.D.  Means of arrival: EMS  History obtained from: Patient  History limited by: None    CHIEF COMPLAINT  Chief Complaint   Patient presents with   • Other     pt states she was laying in bed and was unable to move, or speak. pt came in by [facility]. pt seen here recently for anxiety. pt upset she \"came in by ambulance and now im sitting in the freaking lobby\"       HPI  Rehana Meade is a 32 y.o. female who presents to the Emergency Department via EMS after a brief concerning episode. Her boyfriend, describes this episode. This episode lasted approximately 10 minutes. He states he went upstairs because he heard her gasping. When he arrived, the patient was curled up in a \"fetal position on the bed. She had her arms and legs contracted in, she was able to look at him and not in answering some of his questions but was unable to speak and unable to move\". He called EMS. Prior to their arrival, patient was able to again to speak, move and release the muscle contractions. This occurred approximately 10:30 PM. She never had it before. She's had a chronic headache for many weeks. She's recently been diagnosed with pseudotumor cerebri. Patient states she's had multiple MRIs and CTs of her brain recently she's been seen by both Dr. Fernandez as well as Dr. Webster. She has an appointment with Angela Wu tomorrow at the headache clinic. She takes Topamax and Diamox for her pseudotumor cerebri. She's lost 20 pounds, intentionally and is still working on it, planning to lose more. She's had multiple strange neurologic events since 2015. Most recently, she was evaluated for sudden transient loss of vision in her right eye. She was seen in the emergency room, complained of chest pain at the time and underwent CTA which was negative. She's also been seen by cardiology and had a transesophageal echocardiogram as well as a standard " echocardiogram and ultrasound of her bilateral carotids already this year. Currently, symptoms are resolved and the patient feels back to normal.    REVIEW OF SYSTEMS  Review of Systems   Constitutional: Negative for fever.   HENT: Negative for congestion.    Respiratory: Negative for cough.    Cardiovascular: Negative for chest pain.   Gastrointestinal: Negative for vomiting.   Musculoskeletal: Positive for myalgias.   Neurological: Positive for headaches. Negative for sensory change.   All other systems reviewed and are negative.      PAST MEDICAL HISTORY   has a past medical history of Anesthesia; Anxiety associated with depression; ASTHMA; Drug-seeking behavior; Migraine without aura, without mention of intractable migraine without mention of status migrainosus; Other specified symptom associated with female genital organs; and Unspecified disorder of thyroid.    SURGICAL HISTORY   has a past surgical history that includes septoplasty (10/28/2009); somnoplasty (10/28/2009); nasal polypectomy (10/28/2009); and tubal coagulation laparoscopic bilateral (7/11/2014).    SOCIAL HISTORY  Social History   Substance Use Topics   • Smoking status: Never Smoker   • Smokeless tobacco: Never Used   • Alcohol use Yes      Comment: rarely      History   Drug Use No       FAMILY HISTORY  No family history on file.    CURRENT MEDICATIONS  Home Medications     Reviewed by Zehra Batres R.N. (Registered Nurse) on 01/24/18 at 0432  Med List Status: <None>   Medication Last Dose Status   acetaZOLAMIDE SR (DIAMOX) 500 MG CAPSULE SR 12 HR 1/10/2018 Active   topiramate (TOPAMAX) 100 MG Tab 1/10/2018 Active   topiramate (TOPAMAX) 50 MG tablet 1/10/2018 Active                ALLERGIES  Allergies   Allergen Reactions   • Biaxin [Clarithromycin] Hives   • Penicillin G Potassium Vomiting   • Tape Rash   • Ativan Itching   • Imitrex [Sumatriptan] Unspecified     Make my head pins and needles   • Latex Itching   • Maxalt [Rizatriptan]       Tremors, confusion     • Morphine Hives   • Reglan [Metoclopramide]      Shaking        PHYSICAL EXAM  VITAL SIGNS: /70   Pulse 66   Temp 36.8 °C (98.3 °F)   Resp 16   Wt 87.5 kg (193 lb)   SpO2 96%   BMI 34.19 kg/m²   Vitals reviewed.  Constitutional: Patient is oriented to person, place, and time. Appears well-developed and well-nourished. No distress.    Head: Normocephalic and atraumatic.   Ears: Normal external ears bilaterally.   Mouth/Throat: Oropharynx is clear and moist  Eyes: Conjunctivae are normal. Pupils are equal, round, and reactive to light.   Neck: Normal range of motion. Neck supple.  Cardiovascular: Normal rate, regular rhythm and normal heart sounds. Normal peripheral pulses.  Pulmonary/Chest: Effort normal and breath sounds normal. No respiratory distress, no wheezes, rhonchi, or rales.   Abdominal: Soft. Bowel sounds are normal. There is no tenderness, rebound or guarding, or peritoneal signs.  Musculoskeletal: No edema and no tenderness.   Lymphadenopathy: No cervical adenopathy.   Neurological: No focal deficits.  normal speech. Normal cognition. He appears to have normal and equal strength of her bilateral upper and lower extremities and normal sensation.  Skin: Skin is warm and dry. No erythema. No pallor.   Psychiatric: Patient has a normal mood and affect.     LABS  Results for orders placed or performed during the hospital encounter of 01/24/18   CREATINE KINASE   Result Value Ref Range    CPK Total 34 0 - 154 U/L   CMP   Result Value Ref Range    Sodium 140 135 - 145 mmol/L    Potassium 3.4 (L) 3.6 - 5.5 mmol/L    Chloride 111 96 - 112 mmol/L    Co2 18 (L) 20 - 33 mmol/L    Anion Gap 11.0 0.0 - 11.9    Glucose 85 65 - 99 mg/dL    Bun 14 8 - 22 mg/dL    Creatinine 0.75 0.50 - 1.40 mg/dL    Calcium 8.9 8.5 - 10.5 mg/dL    AST(SGOT) 11 (L) 12 - 45 U/L    ALT(SGPT) 6 2 - 50 U/L    Alkaline Phosphatase 84 30 - 99 U/L    Total Bilirubin 0.5 0.1 - 1.5 mg/dL    Albumin 4.2 3.2 -  4.9 g/dL    Total Protein 7.2 6.0 - 8.2 g/dL    Globulin 3.0 1.9 - 3.5 g/dL    A-G Ratio 1.4 g/dL   CBC WITH DIFFERENTIAL   Result Value Ref Range    WBC 10.0 4.8 - 10.8 K/uL    RBC 4.65 4.20 - 5.40 M/uL    Hemoglobin 14.0 12.0 - 16.0 g/dL    Hematocrit 42.5 37.0 - 47.0 %    MCV 91.4 81.4 - 97.8 fL    MCH 30.1 27.0 - 33.0 pg    MCHC 32.9 (L) 33.6 - 35.0 g/dL    RDW 45.8 35.9 - 50.0 fL    Platelet Count 295 164 - 446 K/uL    MPV 10.0 9.0 - 12.9 fL    Neutrophils-Polys 58.50 44.00 - 72.00 %    Lymphocytes 30.10 22.00 - 41.00 %    Monocytes 7.30 0.00 - 13.40 %    Eosinophils 2.90 0.00 - 6.90 %    Basophils 0.90 0.00 - 1.80 %    Immature Granulocytes 0.30 0.00 - 0.90 %    Nucleated RBC 0.00 /100 WBC    Neutrophils (Absolute) 5.87 2.00 - 7.15 K/uL    Lymphs (Absolute) 3.02 1.00 - 4.80 K/uL    Monos (Absolute) 0.73 0.00 - 0.85 K/uL    Eos (Absolute) 0.29 0.00 - 0.51 K/uL    Baso (Absolute) 0.09 0.00 - 0.12 K/uL    Immature Granulocytes (abs) 0.03 0.00 - 0.11 K/uL    NRBC (Absolute) 0.00 K/uL   ESTIMATED GFR   Result Value Ref Range    GFR If African American >60 >60 mL/min/1.73 m 2    GFR If Non African American >60 >60 mL/min/1.73 m 2       All labs reviewed by me.    COURSE & MEDICAL DECISION MAKING  Pertinent Labs & Imaging studies reviewed. (See chart for details)    Obtained and reviewed past medical records. Patient last seen in the emergency department on January 4 for shortness of breath and back pain. She underwent CTA which was negative. He's been seen by Dr. Paiz, cardiology as well. Patient's undergone extensive workup including GABY, CTA chest for evaluation of pulmonary embolism, carotid duplex, complete echocardiogram in 2018. Last year, she had a lumbar puncture, MRI of her orbits, face, neck and an MRV of her head. She's had MRI with and without contrast of her brain x2 as well as a pelvic ultrasound. She has spinal headache and subsequently had a blood patch done. He had a CT of her head x2.    4:01 AM -  "Patient seen and examined at bedside. Patient's symptoms are resolved. She has had extensive workup in the last year. It's unclear, was causing her symptoms. It may be related to seizure disorder. I've advised evaluation of electrolytes, CPK and labs prior to talking with her neurologist.     20 3 AM, patient's reevaluated. She continues to have headache which has been a chronic problem for her for the last several weeks. She is requesting that I speak with her \"neurologist\", if they have any recommendations, she would like some addressed before she was discharged.    8:46 AM stress with Dr. Webster who does know this patient. He states that she just saw her on 2 January. States that she \"does not have recurrent pseudotumor cerebri and her papilledema is stable\". She has been seen for nocturnal spasms in the past. At this time, he has no recommendations except for her to follow up with neurology. And for us to call neurology for any recommendations that they might have. Dr. Fernandez paged.    9 AM, discussed with Dr. Fernandez. Presumed, that he turned over care to Dr. Webster. He hasn't seen the patient since July. He has no recommendations at this time. I advised him he has an appointment with Angela Wu this week. He recommends keeping that appointment this is at the headache clinic and if Angela Wu feels seizure evaluation is appropriate, she can intact him.    9:20 AM, patient's reevaluated the bedside. I have discussed my conversation with both of her neurologist. No recommendations at this time. Dr. Fernandez does encourage her to follow up with Angela Wu tomorrow at which point, if she feels seizure workup is necessary, she can contact him. Patient's well-appearing and nontoxic. Her Symptoms continue to be absent here in the ED. She'll be discharged to home in stable condition.        FINAL IMPRESSION  1. Abnormally increased muscle contraction    2. Chronic intractable headache, unspecified headache type     "

## 2018-01-24 NOTE — DISCHARGE INSTRUCTIONS
Abnormal muscle Spasms, contrations  A tremor is trembling or shaking that you cannot control. Most tremors affect the hands or arms. Tremors can also affect the head, vocal cords, face, and other parts of the body. There are many types of tremors. Common types include:   · Essential tremor. These usually occur in people over the age of 40. It may run in families and can happen in otherwise healthy people.    · Resting tremor. These occur when the muscles are at rest, such as when your hands are resting in your lap. People with Parkinson disease often have resting tremors.    · Postural tremor. These occur when you try to hold a pose, such as keeping your hands outstretched.    · Kinetic tremor. These occur during purposeful movement, such as trying to touch a finger to your nose.    · Task-specific tremor. These may occur when you perform tasks such as handwriting, speaking, or standing.    · Psychogenic tremor. These dramatically lessen or disappear when you are distracted. They can happen in people of all ages.    Some types of tremors have no known cause. Tremors can also be a symptom of nervous system problems (neurological disorders) that may occur with aging. Some tremors go away with treatment while others do not.   HOME CARE INSTRUCTIONS  Watch your tremor for any changes. The following actions may help to lessen any discomfort you are feeling:  · Take medicines only as directed by your health care provider.    · Limit alcohol intake to no more than 1 drink per day for nonpregnant women and 2 drinks per day for men. One drink equals 12 oz of beer, 5 oz of wine, or 1½ oz of hard liquor.  · Do not use any tobacco products, including cigarettes, chewing tobacco, or electronic cigarettes. If you need help quitting, ask your health care provider.    · Avoid extreme heat or cold.     · Limit the amount of caffeine you consume as directed by your health care provider.    · Try to get 8 hours of sleep each  night.  · Find ways to manage your stress, such as meditation or yoga.  · Keep all follow-up visits as directed by your health care provider. This is important.  SEEK MEDICAL CARE IF:  · You start having a tremor after starting a new medicine.  · You have tremor with other symptoms such as:  ¨ Numbness.  ¨ Tingling.  ¨ Pain.  ¨ Weakness.  · Your tremor gets worse.  · Your tremor interferes with your day-to-day life.     This information is not intended to replace advice given to you by your health care provider. Make sure you discuss any questions you have with your health care provider.     Document Released: 12/08/2003 Document Revised: 01/08/2016 Document Reviewed: 06/15/2015  [employer] Interactive Patient Education ©2016 [employer]

## 2018-01-24 NOTE — ED NOTES
"Chief Complaint   Patient presents with   • Other     pt states she was laying in bed and was unable to move, or speak. pt came in by [facility]. pt seen here recently for anxiety. pt upset she \"came in by ambulance and now im sitting in the [employer] lobby\"     Pt a&ox4. Pt crying upset that she didn't get a room on arrival. No neuro deficits  "

## 2018-01-24 NOTE — ED NOTES
"Chief Complaint   Patient presents with   • Other     pt states she was laying in bed and was unable to move, or speak. pt came in by [facility]. pt seen here recently for anxiety. pt upset she \"came in by ambulance and now im sitting in the freaking lobby\"       "

## 2018-01-24 NOTE — ED NOTES
Lab called second time for CK results, the tech is looking for the blood sample stating the machine went down. When she locates the sample and has it running she will call this RN.

## 2018-01-25 ENCOUNTER — PATIENT OUTREACH (OUTPATIENT)
Dept: HEALTH INFORMATION MANAGEMENT | Facility: OTHER | Age: 33
End: 2018-01-25

## 2018-01-29 ENCOUNTER — OFFICE VISIT (OUTPATIENT)
Dept: NEUROLOGY | Facility: MEDICAL CENTER | Age: 33
End: 2018-01-29
Payer: COMMERCIAL

## 2018-01-29 VITALS
WEIGHT: 190 LBS | SYSTOLIC BLOOD PRESSURE: 110 MMHG | BODY MASS INDEX: 33.66 KG/M2 | DIASTOLIC BLOOD PRESSURE: 64 MMHG | HEIGHT: 63 IN | TEMPERATURE: 99.9 F | OXYGEN SATURATION: 98 % | HEART RATE: 91 BPM

## 2018-01-29 DIAGNOSIS — R46.89 SPELL OF ABNORMAL BEHAVIOR: ICD-10-CM

## 2018-01-29 PROCEDURE — 99215 OFFICE O/P EST HI 40 MIN: CPT | Performed by: PHYSICIAN ASSISTANT

## 2018-01-29 ASSESSMENT — PATIENT HEALTH QUESTIONNAIRE - PHQ9
SUM OF ALL RESPONSES TO PHQ QUESTIONS 1-9: 15
5. POOR APPETITE OR OVEREATING: 3 - NEARLY EVERY DAY
CLINICAL INTERPRETATION OF PHQ2 SCORE: 3

## 2018-01-29 NOTE — PROGRESS NOTES
Subjective:      Rehana Meade is a 32 y.o. female who presents with Follow-Up (TIA)    Pt has a hx of migraines, meningitis and pseudotumor, however, she is here for TIA and also is complaining of daily headache.  She had a hx of losing vision once which was considered concerning for TIA which is why she originally made appointment.  She does have migraines though and I think as her vision complaints occurred with a severe headache it is possible it was migraine related.    With regard to the episode that just occurred resulting in an ER admission.   Pt remembers  coming in the room asking her if she is OK but not being able to talk.  Prior to event she was reading her tablet - searching for something on the computer - and remembers suddenly feeling like her jaw was clenching and her body began to draw up and tighten.  She remembers having a sensation of shaking around the time too.  She also had a hx a day or two prior to this event of feeling a lot of body jerking - felt like electrical pulse running through her body.  Also notes since event that her jaw is very sore on both sides.     present today to provide some history.  He heard her gasping up in her bed.  He called out to her but there was no response.  When he walked into the room she was lying in a bed staring straight ahead and was breathing heavily.  She didn't respond to questions verbally but she did nod yes or no.  She was frozen in a contracture.  Her eyes were wide open.  Episode lasted about 15 minutes.  She then suddenly took a deep breath, stretched her whole body out and began answering questions.  She was very irritable which lasted several hours after the event.      Possible provoking factors for seizure:  Denies menstruation.  Hospital said she was slightly dehydrated at time of admission but patient had not been doing anything to cause dehydration.  Labs show normal sodium.  No alcohol ingested prior to event.   "Slept  Only about 5 hours the night prior which is unusual for her as she usually sleeps < 8 hours. (she reviewed her ifit sleep log which is on her phone).      PMH:  Pseudotumor cerebri controlled with diamox per Dr. Webster, chronic daily headache, migraines, meningitis (which patient said happened prior to all other problems with headache),    Medications:  topamax 50 mg AM and 100 mg PM, diamox 500 mg BID, diclofenac ER tablet for bad headache/migraine    Allergies reviewed    (PCP) Primary Doctor:  Mack Bernard is PCP    Social: lives in [place] with her  and 4 kids  Works  -  full time              HPI    ROS       Objective:     /64   Pulse 91   Temp 37.7 °C (99.9 °F)   Ht 1.6 m (5' 2.99\")   Wt 86.2 kg (190 lb)   SpO2 98%   BMI 33.67 kg/m²      Physical Exam    Well developed, well nourished - vital signs reviewed  Alert and Oriented x 3, Affect Appropriate, Fund of knowledge within normal limits, Memory intact  Cranial Nerves: PERRL, EOMI without nystagmus or opthalmoplegia, face symmetric in strength and sensation, no facial droop, tongue midline without atrophy or fasiculations, shoulder shrug is normal bilaterally, speech clear and fluent no aphasia  Motor:  Upper and lower extremities 5/5, equal bilaterally.  No drift.  Sensation: Light touch equal and intact in all extremities, No sensory deficits  DTRs: no spasticity  Cerebellar:  Finger to nose intact.  No dysmetria.  No tremor.  Gait: normal gait without ataxia.  Negative Romberg          Assessment/Plan:     Stroke mimic such as seizure much more likely than TIA.      EEg ordered.    F/u with me for migraines and with another provider in our office for seizures.      Total time with this visit:   40  Minutes face-to-face with patient. More than 50% of this visit was spent educating patient on their illness and/or coordinating care, as detailed above          "

## 2018-01-30 ENCOUNTER — HOSPITAL ENCOUNTER (EMERGENCY)
Facility: MEDICAL CENTER | Age: 33
End: 2018-01-31
Attending: EMERGENCY MEDICINE
Payer: COMMERCIAL

## 2018-01-30 ENCOUNTER — TELEPHONE (OUTPATIENT)
Dept: NEUROLOGY | Facility: MEDICAL CENTER | Age: 33
End: 2018-01-30

## 2018-01-30 VITALS
OXYGEN SATURATION: 96 % | DIASTOLIC BLOOD PRESSURE: 64 MMHG | WEIGHT: 190.48 LBS | HEIGHT: 63 IN | SYSTOLIC BLOOD PRESSURE: 110 MMHG | BODY MASS INDEX: 33.75 KG/M2 | TEMPERATURE: 99.1 F | HEART RATE: 74 BPM | RESPIRATION RATE: 16 BRPM

## 2018-01-30 DIAGNOSIS — G43.809 OTHER MIGRAINE WITHOUT STATUS MIGRAINOSUS, NOT INTRACTABLE: ICD-10-CM

## 2018-01-30 LAB
ALBUMIN SERPL BCP-MCNC: 4.3 G/DL (ref 3.2–4.9)
ALBUMIN/GLOB SERPL: 1.3 G/DL
ALP SERPL-CCNC: 102 U/L (ref 30–99)
ALT SERPL-CCNC: <5 U/L (ref 2–50)
ANION GAP SERPL CALC-SCNC: 3 MMOL/L (ref 0–11.9)
AST SERPL-CCNC: 10 U/L (ref 12–45)
BASOPHILS # BLD AUTO: 0.8 % (ref 0–1.8)
BASOPHILS # BLD: 0.1 K/UL (ref 0–0.12)
BILIRUB SERPL-MCNC: 0.4 MG/DL (ref 0.1–1.5)
BUN SERPL-MCNC: 11 MG/DL (ref 8–22)
CALCIUM SERPL-MCNC: 9.1 MG/DL (ref 8.5–10.5)
CHLORIDE SERPL-SCNC: 111 MMOL/L (ref 96–112)
CO2 SERPL-SCNC: 23 MMOL/L (ref 20–33)
CREAT SERPL-MCNC: 0.68 MG/DL (ref 0.5–1.4)
EOSINOPHIL # BLD AUTO: 0.26 K/UL (ref 0–0.51)
EOSINOPHIL NFR BLD: 2 % (ref 0–6.9)
ERYTHROCYTE [DISTWIDTH] IN BLOOD BY AUTOMATED COUNT: 45.9 FL (ref 35.9–50)
GLOBULIN SER CALC-MCNC: 3.4 G/DL (ref 1.9–3.5)
GLUCOSE SERPL-MCNC: 79 MG/DL (ref 65–99)
HCT VFR BLD AUTO: 45.8 % (ref 37–47)
HGB BLD-MCNC: 14.7 G/DL (ref 12–16)
IMM GRANULOCYTES # BLD AUTO: 0.04 K/UL (ref 0–0.11)
IMM GRANULOCYTES NFR BLD AUTO: 0.3 % (ref 0–0.9)
LYMPHOCYTES # BLD AUTO: 3.86 K/UL (ref 1–4.8)
LYMPHOCYTES NFR BLD: 30.1 % (ref 22–41)
MCH RBC QN AUTO: 29.6 PG (ref 27–33)
MCHC RBC AUTO-ENTMCNC: 32.1 G/DL (ref 33.6–35)
MCV RBC AUTO: 92.3 FL (ref 81.4–97.8)
MONOCYTES # BLD AUTO: 0.75 K/UL (ref 0–0.85)
MONOCYTES NFR BLD AUTO: 5.8 % (ref 0–13.4)
NEUTROPHILS # BLD AUTO: 7.83 K/UL (ref 2–7.15)
NEUTROPHILS NFR BLD: 61 % (ref 44–72)
NRBC # BLD AUTO: 0 K/UL
NRBC BLD-RTO: 0 /100 WBC
PLATELET # BLD AUTO: 279 K/UL (ref 164–446)
PMV BLD AUTO: 9.8 FL (ref 9–12.9)
POTASSIUM SERPL-SCNC: 3.2 MMOL/L (ref 3.6–5.5)
PROT SERPL-MCNC: 7.7 G/DL (ref 6–8.2)
RBC # BLD AUTO: 4.96 M/UL (ref 4.2–5.4)
SODIUM SERPL-SCNC: 137 MMOL/L (ref 135–145)
WBC # BLD AUTO: 12.8 K/UL (ref 4.8–10.8)

## 2018-01-30 PROCEDURE — 85025 COMPLETE CBC W/AUTO DIFF WBC: CPT

## 2018-01-30 PROCEDURE — 80053 COMPREHEN METABOLIC PANEL: CPT

## 2018-01-30 PROCEDURE — 36415 COLL VENOUS BLD VENIPUNCTURE: CPT

## 2018-01-30 PROCEDURE — 700111 HCHG RX REV CODE 636 W/ 250 OVERRIDE (IP): Performed by: EMERGENCY MEDICINE

## 2018-01-30 PROCEDURE — 96374 THER/PROPH/DIAG INJ IV PUSH: CPT

## 2018-01-30 PROCEDURE — 99284 EMERGENCY DEPT VISIT MOD MDM: CPT

## 2018-01-30 PROCEDURE — 96375 TX/PRO/DX INJ NEW DRUG ADDON: CPT

## 2018-01-30 PROCEDURE — 700105 HCHG RX REV CODE 258: Performed by: EMERGENCY MEDICINE

## 2018-01-30 RX ORDER — ONDANSETRON 2 MG/ML
4 INJECTION INTRAMUSCULAR; INTRAVENOUS ONCE
Status: COMPLETED | OUTPATIENT
Start: 2018-01-30 | End: 2018-01-30

## 2018-01-30 RX ORDER — DIPHENHYDRAMINE HYDROCHLORIDE 50 MG/ML
25 INJECTION INTRAMUSCULAR; INTRAVENOUS ONCE
Status: COMPLETED | OUTPATIENT
Start: 2018-01-30 | End: 2018-01-30

## 2018-01-30 RX ORDER — SODIUM CHLORIDE 9 MG/ML
1000 INJECTION, SOLUTION INTRAVENOUS ONCE
Status: COMPLETED | OUTPATIENT
Start: 2018-01-30 | End: 2018-01-30

## 2018-01-30 RX ORDER — KETOROLAC TROMETHAMINE 30 MG/ML
30 INJECTION, SOLUTION INTRAMUSCULAR; INTRAVENOUS ONCE
Status: COMPLETED | OUTPATIENT
Start: 2018-01-30 | End: 2018-01-30

## 2018-01-30 RX ADMIN — SODIUM CHLORIDE 1000 ML: 9 INJECTION, SOLUTION INTRAVENOUS at 22:25

## 2018-01-30 RX ADMIN — ONDANSETRON 4 MG: 2 INJECTION INTRAMUSCULAR; INTRAVENOUS at 22:26

## 2018-01-30 RX ADMIN — KETOROLAC TROMETHAMINE 30 MG: 30 INJECTION, SOLUTION INTRAMUSCULAR at 22:26

## 2018-01-30 RX ADMIN — DIPHENHYDRAMINE HYDROCHLORIDE 25 MG: 50 INJECTION, SOLUTION INTRAMUSCULAR; INTRAVENOUS at 22:26

## 2018-01-30 ASSESSMENT — PAIN SCALES - GENERAL: PAINLEVEL_OUTOF10: 8

## 2018-01-30 NOTE — TELEPHONE ENCOUNTER
"Message   Received: Today   Message Contents   Katia Wu P.A.-C.  Lelia Munoz, Med Ass't   Caller: Unspecified (Today, 10:06 AM)             Please call her and advise that she needs to go to ER to be evaluated by neurology.     She was referred to me for \"TIA\" and this is clearly not a TIA     Pt was informed, she states she totally forgot that she was told to do this. She will call her  and have him drive her to the ER.   "

## 2018-01-30 NOTE — TELEPHONE ENCOUNTER
Patient called to report a seizure.      When change was noticed- last night midnight   Frequency- once last night  Type of seizure- unknown    Current Medications- topamax and diamox      Any Medication changes?- no     Illness/Fever? no  Menses? no  Stress? no  Sleep Deprivation? no  Driving? no    Caller: self  Call Back #: [phone]  OK to leave detailed message: yes    Pt calling states had a seizure last night, around midnight. Found self in a fetal position, heard daughter crying tried to get up could not get up or wake up  was stuttering was hard to speak. Was covered in sweat.

## 2018-01-31 ENCOUNTER — TELEPHONE (OUTPATIENT)
Dept: NEUROLOGY | Facility: MEDICAL CENTER | Age: 33
End: 2018-01-31

## 2018-01-31 ASSESSMENT — ENCOUNTER SYMPTOMS
FEVER: 0
SEIZURES: 1
ABDOMINAL PAIN: 0
COUGH: 0

## 2018-01-31 NOTE — ED PROVIDER NOTES
"ED Provider Note    ED Provider Note          CHIEF COMPLAINT  Chief Complaint   Patient presents with   • Seizure     patient states \"I think I had another seizure last night. I woke up last night when I heard my daughter coughing and felt like I couldn't move and was super agitated and couldn't go back to sleep\". patient reports similiar episodes last week.    • Sent by MD Dr. Wu       HPI  Rehana Meade is a 32 y.o. female who presents to the Emergency Department for concern of a seizure. She says she is followed by Angela Wu the physician's assistant and was told to come here for a \"urgent neurology consult and an EEG. She is being worked up for headaches versus TIAs versus seizures. She has been seen both in emergency department as well as her neurologist multiple times. She said that she had any other weird episode last night where she woke up in the middle the night and felt like she could not move. And then couldn't go back to sleep. She had similar episodes earlier in the week. At that time she did see her neurologist and did have a pretty large workup for concern for TIA. She had workup done that ruled out any concern for that. She said she's had a headache for nonstop that she has not had a headache everyday in 2 months.    REVIEW OF SYSTEMS  Review of Systems   Constitutional: Negative for fever.   HENT: Negative for congestion.    Respiratory: Negative for cough.    Cardiovascular: Negative for chest pain.   Gastrointestinal: Negative for abdominal pain.   Genitourinary: Negative for difficulty urinating.   Neurological: Positive for seizures.       PAST MEDICAL HISTORY   has a past medical history of Anesthesia; Anxiety associated with depression; ASTHMA; Drug-seeking behavior; Migraine without aura, without mention of intractable migraine without mention of status migrainosus; Other specified symptom associated with female genital organs; Stroke (CMS-HCC); and Unspecified disorder " "of thyroid.    SURGICAL HISTORY   has a past surgical history that includes septoplasty (10/28/2009); somnoplasty (10/28/2009); nasal polypectomy (10/28/2009); and tubal coagulation laparoscopic bilateral (7/11/2014).    SOCIAL HISTORY  Social History   Substance Use Topics   • Smoking status: Never Smoker   • Smokeless tobacco: Never Used   • Alcohol use Yes      Comment: rarely      History   Drug Use No       FAMILY HISTORY  History reviewed. No pertinent family history.    CURRENT MEDICATIONS  Reviewed.  See Encounter Summary.     ALLERGIES  Allergies   Allergen Reactions   • Biaxin [Clarithromycin] Hives   • Penicillin G Potassium Vomiting   • Tape Rash   • Ativan Itching   • Imitrex [Sumatriptan] Unspecified     Make my head pins and needles   • Latex Itching   • Maxalt [Rizatriptan]      Tremors, confusion     • Morphine Hives   • Reglan [Metoclopramide]      Shaking        PHYSICAL EXAM  VITAL SIGNS: /64   Pulse 74   Temp 37.3 °C (99.1 °F)   Resp 16   Ht 1.6 m (5' 2.99\")   Wt 86.4 kg (190 lb 7.6 oz)   LMP 01/12/2018   SpO2 96%   BMI 33.75 kg/m²   Physical Exam   Constitutional: She is oriented to person, place, and time.   HENT:   Head: Normocephalic and atraumatic.   Eyes: Conjunctivae are normal. Pupils are equal, round, and reactive to light.   Neck: Normal range of motion.   Cardiovascular: Normal rate.    Pulmonary/Chest: Effort normal and breath sounds normal.   Abdominal: Soft.   Musculoskeletal: Normal range of motion.   Neurological: She is oriented to person, place, and time.   Skin: Skin is warm. She is not diaphoretic.   Psychiatric: She has a normal mood and affect.           DIAGNOSTIC STUDIES / PROCEDURES     LABS  Labs Reviewed   CBC WITH DIFFERENTIAL - Abnormal; Notable for the following:        Result Value    WBC 12.8 (*)     MCHC 32.1 (*)     Neutrophils (Absolute) 7.83 (*)     All other components within normal limits   COMP METABOLIC PANEL - Abnormal; Notable for the " following:     Potassium 3.2 (*)     AST(SGOT) 10 (*)     Alkaline Phosphatase 102 (*)     All other components within normal limits   ESTIMATED GFR       All labs were reviewed by me.      COURSE & MEDICAL DECISION MAKING  Pertinent Labs & Imaging studies reviewed. (See chart for details)    9:30 PM - Patient seen and examined at bedside.     Differential Diagnosis: Seizure disorder, night terrors, cataplexy, migraine with aura, atypical headaches    Decision Making:  This is a 32 y.o. year old female who presents with concern of a seizure the other night. She presents here neurologically intact. She says she does have a headache this is her chronic and consistent headache. This is similar to her past headaches. I do not think she needs any advanced imaging that she is otherwise neurologically intact. She'll be given a migraine cocktail this time involving IV fluids as a treatment for a headache, Toradol, Benadryl and Zofran. Reevaluation her headache is feeling much better. I did speak with neurology on-call and they stated that there is no need for her to get an emergent EEG and that they did not instruct her to do that and that she can follow up with them in clinic.    DISPOSITION:  Patient will be discharged home in stable condition.    FOLLOW UP:  Katia Wu P.A.-C.  [address] [place] [address] [id number] [phone]    Schedule an appointment as soon as possible for a visit          FINAL IMPRESSION  1. Other migraine without status migrainosus, not intractable

## 2018-01-31 NOTE — ED NOTES
Upon arrival to room pt A&O x4, smiling and talking with visitors, pt able to recall all events of seizure, NAD at this time.

## 2018-01-31 NOTE — TELEPHONE ENCOUNTER
Pt calling, states she went to the ER yesterday they did nothing and sent her home after waiting in the ER for 8 hours. Pt very upset. She states they told her that she waited too long so now they can not do anything about the episode she had. Pt states was given something for her headache but she still has the headache today. Also she is scheduled for an EEG but not till May.

## 2018-02-01 ENCOUNTER — PATIENT OUTREACH (OUTPATIENT)
Dept: HEALTH INFORMATION MANAGEMENT | Facility: OTHER | Age: 33
End: 2018-02-01

## 2018-02-06 NOTE — TELEPHONE ENCOUNTER
"Message   Received: 6 days ago   Message Contents   Katia Wu P.A.-C.  Lelia Munoz, Med Ass't   Caller: Unspecified (6 days ago, 11:31 AM)             Give her first available appt with Jana and tell her to go to ER immediately if it happens again.     She was supposed to go to ER for \"seizure\" not for headache.      Pt was informed.   "

## 2018-02-07 ENCOUNTER — TELEPHONE (OUTPATIENT)
Dept: NEUROLOGY | Facility: MEDICAL CENTER | Age: 33
End: 2018-02-07

## 2018-02-07 NOTE — TELEPHONE ENCOUNTER
Katia Wu P.A.-C.   You 8 minutes ago (3:41 PM)      Yes - she doesn't need to see me.  Jana and the EEG are the priority. (Routing comment)     Pt was informed.

## 2018-02-07 NOTE — TELEPHONE ENCOUNTER
Pt calling, states for the past 4 days has been having vivid dreams, wakes up and is foggy feels as if still in the dream for about 15 minutes after waking. Every morning is foggy and  says out of breath agitated and feels as if coming out of a seizure last couple of days. thought was dreaming and is sleeping.  Pt has an EEG scheduled for tomorrow. Also has an appt with Jana on 3/8/18 and with you the same day. Would you want pt to cancel the appt with you?

## 2018-02-08 ENCOUNTER — NON-PROVIDER VISIT (OUTPATIENT)
Dept: NEUROLOGY | Facility: MEDICAL CENTER | Age: 33
End: 2018-02-08
Payer: COMMERCIAL

## 2018-02-08 DIAGNOSIS — R46.89 SPELL OF ABNORMAL BEHAVIOR: ICD-10-CM

## 2018-02-08 PROCEDURE — 95951 PR EEG MONITORING/VIDEORECORD: CPT | Mod: 52 | Performed by: PSYCHIATRY & NEUROLOGY

## 2018-02-09 ENCOUNTER — TELEPHONE (OUTPATIENT)
Dept: NEUROLOGY | Facility: MEDICAL CENTER | Age: 33
End: 2018-02-09

## 2018-02-09 NOTE — PROGRESS NOTES
VIDEO ELECTROENCEPHALOGRAM REPORT      Referring provider: YULIA Walters.     DOS:  2/8/2018 (total recording of 25 minutes).     INDICATION:  Rehana Meade 32 y.o. female presenting with migraine, spell of feeling paralyzed. Rule out seizure.     CURRENT ANTIEPILEPTIC REGIMEN: Topiramate 50 mg in am and 100 mg qhs.     TECHNIQUE: 30 channel video electroencephalogram (EEG) was performed in accordance with the international 10-20 system. The study was reviewed in bipolar and referential montages. The recording examined the patient during wakeful and drowsy state(s).     DESCRIPTION OF THE RECORD:  During the wakefulness, the background showed a symmetrical 12 Hz alpha activity posteriorly with amplitude of 70 mV.  There was reactivity to eye closure/opening.  A normal anterior-posterior gradient was noted with faster beta frequencies seen anteriorly.  During drowsiness, theta frequencies were seen.      ACTIVATION PROCEDURES:   Hyperventilation was performed by the patient for a total of 3 minutes. The technician performing the test noted good effort. This technique failed to produce significant electroencephalographic changes.    Intermittent Photic stimulation was performed in a stepwise fashion from 1 to 30 Hz and elicited a normal response (photic driving), most noticeable in the posterior leads.      ICTAL AND/OR INTERICTAL FINDINGS:   Frequent left temporal sharps. Intermittent left temporal slowing. No clinical events or seizures were reported or recorded during the study.     EKG: sampling of the EKG recording demonstrated sinus rhythm.       INTERPRETATION:  This is an abnormal video EEG recording in the awake and drowsy state(s). Frequent left temporal sharps and intermittent left temporal slowing. The findings increase risk for seizures and suggest underlying area of cortical irritability and structural abnormality. Clinical and radiological correlation is recommended.      Judd Austin  MD  Medical Director, Epilepsy and Neurodiagnostics.   Clinical  of Neurology [facility].   Diplomate in Neurology, Epilepsy, and Electrodiagnostic Medicine.   Office: [phone]  Fax: [phone]

## 2018-02-09 NOTE — TELEPHONE ENCOUNTER
Pt left a message with Carie this morning, Jana is not consulting on this pt as she has not seen the pt yet. Pt was asked if she has gone to the ER? She states has not gone as the episodes are happening at night, time goes by in between before she wakes up and she had EEG done so she was waiting on the results.

## 2018-02-09 NOTE — TELEPHONE ENCOUNTER
Patient called to report a seizure.      When change was noticed- 1:40am (apple watch shows a spiked heart rate at this time.)  Frequency- 1  Type of seizure- Unknown    Current Medications- Topamax 50- 100mg, Diamox 500mg BID      Any Medication changes?- no  Date- n/a    Illness/Fever? Not that she's aware of  Menses? nope  Stress? No more than normal  Sleep Deprivation? No, although did have an EEG with lack of sleep yesterday.  Driving? yes    Caller: Rehana  Call Back #: [phone]  OK to leave detailed message: yes

## 2018-02-09 NOTE — PROCEDURES
VIDEO ELECTROENCEPHALOGRAM REPORT        Referring provider: YULIA Walters.      DOS:  2/8/2018 (total recording of 25 minutes).      INDICATION:  Rehana Meade 32 y.o. female presenting with migraine, spell of feeling paralyzed. Rule out seizure.      CURRENT ANTIEPILEPTIC REGIMEN: Topiramate 50 mg in am and 100 mg qhs.      TECHNIQUE: 30 channel video electroencephalogram (EEG) was performed in accordance with the international 10-20 system. The study was reviewed in bipolar and referential montages. The recording examined the patient during wakeful and drowsy state(s).      DESCRIPTION OF THE RECORD:  During the wakefulness, the background showed a symmetrical 12 Hz alpha activity posteriorly with amplitude of 70 mV.  There was reactivity to eye closure/opening.  A normal anterior-posterior gradient was noted with faster beta frequencies seen anteriorly.  During drowsiness, theta frequencies were seen.        ACTIVATION PROCEDURES:   Hyperventilation was performed by the patient for a total of 3 minutes. The technician performing the test noted good effort. This technique failed to produce significant electroencephalographic changes.     Intermittent Photic stimulation was performed in a stepwise fashion from 1 to 30 Hz and elicited a normal response (photic driving), most noticeable in the posterior leads.        ICTAL AND/OR INTERICTAL FINDINGS:   Frequent left temporal sharps. Intermittent left temporal slowing. No clinical events or seizures were reported or recorded during the study.      EKG: sampling of the EKG recording demonstrated sinus rhythm.         INTERPRETATION:  This is an abnormal video EEG recording in the awake and drowsy state(s). Frequent left temporal sharps and intermittent left temporal slowing. The findings increase risk for seizures and suggest underlying area of cortical irritability and structural abnormality. Clinical and radiological correlation is  recommended.        Judd Austin MD  Medical Director, Epilepsy and Neurodiagnostics.   Clinical  of Neurology [facility].   Diplomate in Neurology, Epilepsy, and Electrodiagnostic Medicine.   Office: [phone]  Fax: [phone]       TANYA / ZAK    DD:  02/09/2018 11:37:29  DT:  02/09/2018 11:42:26    D#:  [id number]  Job#:  [id number]    cc: Katia BENDER

## 2018-02-10 DIAGNOSIS — R94.01 ABNORMAL EEG: ICD-10-CM

## 2018-02-10 DIAGNOSIS — R46.89 SPELL OF ABNORMAL BEHAVIOR: ICD-10-CM

## 2018-02-10 RX ORDER — TOPIRAMATE 100 MG/1
100 TABLET, FILM COATED ORAL 2 TIMES DAILY
Qty: 60 TAB | Refills: 3 | Status: SHIPPED | OUTPATIENT
Start: 2018-02-10 | End: 2019-12-04

## 2018-02-10 NOTE — TELEPHONE ENCOUNTER
Message   Received: Today   Message Contents   Katia Wu P.A.-C.  Lelia Munoz, Med Ass't   Caller: Unspecified (Today,  8:51 AM)             While she is waiting to see jana let's increase her topamax from 50 mg AM and 100 mg PM to 100 mg BID.       Let her know the EEG did not show seizure but was abnormal in a way that shows seizure is more likely.      Informed pt, she is requesting a VEEG to be ordered asking if you would like to order it now as she wants this done ASAP or should she wait for her appt with Jana 3/8/18?

## 2018-02-11 ENCOUNTER — HOSPITAL ENCOUNTER (EMERGENCY)
Facility: MEDICAL CENTER | Age: 33
End: 2018-02-11
Attending: EMERGENCY MEDICINE
Payer: COMMERCIAL

## 2018-02-11 VITALS
OXYGEN SATURATION: 99 % | SYSTOLIC BLOOD PRESSURE: 114 MMHG | HEIGHT: 63 IN | RESPIRATION RATE: 16 BRPM | TEMPERATURE: 97.7 F | BODY MASS INDEX: 33.66 KG/M2 | WEIGHT: 190 LBS | DIASTOLIC BLOOD PRESSURE: 82 MMHG | HEART RATE: 76 BPM

## 2018-02-11 DIAGNOSIS — R56.9 NOCTURNAL SEIZURES (HCC): ICD-10-CM

## 2018-02-11 LAB
ANION GAP SERPL CALC-SCNC: 8 MMOL/L (ref 0–11.9)
BASOPHILS # BLD AUTO: 0.7 % (ref 0–1.8)
BASOPHILS # BLD: 0.05 K/UL (ref 0–0.12)
BUN SERPL-MCNC: 14 MG/DL (ref 8–22)
CALCIUM SERPL-MCNC: 9.1 MG/DL (ref 8.5–10.5)
CHLORIDE SERPL-SCNC: 109 MMOL/L (ref 96–112)
CO2 SERPL-SCNC: 19 MMOL/L (ref 20–33)
CREAT SERPL-MCNC: 0.88 MG/DL (ref 0.5–1.4)
EKG IMPRESSION: NORMAL
EOSINOPHIL # BLD AUTO: 0.23 K/UL (ref 0–0.51)
EOSINOPHIL NFR BLD: 3.1 % (ref 0–6.9)
ERYTHROCYTE [DISTWIDTH] IN BLOOD BY AUTOMATED COUNT: 44.4 FL (ref 35.9–50)
GLUCOSE SERPL-MCNC: 88 MG/DL (ref 65–99)
HCT VFR BLD AUTO: 42.4 % (ref 37–47)
HGB BLD-MCNC: 14.2 G/DL (ref 12–16)
IMM GRANULOCYTES # BLD AUTO: 0.03 K/UL (ref 0–0.11)
IMM GRANULOCYTES NFR BLD AUTO: 0.4 % (ref 0–0.9)
LYMPHOCYTES # BLD AUTO: 2.1 K/UL (ref 1–4.8)
LYMPHOCYTES NFR BLD: 28.1 % (ref 22–41)
MCH RBC QN AUTO: 30.5 PG (ref 27–33)
MCHC RBC AUTO-ENTMCNC: 33.5 G/DL (ref 33.6–35)
MCV RBC AUTO: 91 FL (ref 81.4–97.8)
MONOCYTES # BLD AUTO: 0.55 K/UL (ref 0–0.85)
MONOCYTES NFR BLD AUTO: 7.4 % (ref 0–13.4)
NEUTROPHILS # BLD AUTO: 4.51 K/UL (ref 2–7.15)
NEUTROPHILS NFR BLD: 60.3 % (ref 44–72)
NRBC # BLD AUTO: 0 K/UL
NRBC BLD-RTO: 0 /100 WBC
PLATELET # BLD AUTO: 283 K/UL (ref 164–446)
PMV BLD AUTO: 9.8 FL (ref 9–12.9)
POTASSIUM SERPL-SCNC: 3.5 MMOL/L (ref 3.6–5.5)
RBC # BLD AUTO: 4.66 M/UL (ref 4.2–5.4)
SODIUM SERPL-SCNC: 136 MMOL/L (ref 135–145)
WBC # BLD AUTO: 7.5 K/UL (ref 4.8–10.8)

## 2018-02-11 PROCEDURE — 93005 ELECTROCARDIOGRAM TRACING: CPT | Performed by: EMERGENCY MEDICINE

## 2018-02-11 PROCEDURE — 85025 COMPLETE CBC W/AUTO DIFF WBC: CPT

## 2018-02-11 PROCEDURE — 80048 BASIC METABOLIC PNL TOTAL CA: CPT

## 2018-02-11 PROCEDURE — 99284 EMERGENCY DEPT VISIT MOD MDM: CPT

## 2018-02-11 ASSESSMENT — PAIN SCALES - GENERAL: PAINLEVEL_OUTOF10: 0

## 2018-02-11 NOTE — DISCHARGE INSTRUCTIONS
Seizure, Adult  A seizure is abnormal electrical activity in the brain. Seizures usually last from 30 seconds to 2 minutes. There are various types of seizures.  Before a seizure, you may have a warning sensation (aura) that a seizure is about to occur. An aura may include the following symptoms:   · Fear or anxiety.  · Nausea.  · Feeling like the room is spinning (vertigo).  · Vision changes, such as seeing flashing lights or spots.  Common symptoms during a seizure include:  · A change in attention or behavior (altered mental status).  · Convulsions with rhythmic jerking movements.  · Drooling.  · Rapid eye movements.  · Grunting.  · Loss of bladder and bowel control.  · Bitter taste in the mouth.  · Tongue biting.  After a seizure, you may feel confused and sleepy. You may also have an injury resulting from convulsions during the seizure.  HOME CARE INSTRUCTIONS   · If you are given medicines, take them exactly as prescribed by your health care provider.  · Keep all follow-up appointments as directed by your health care provider.  · Do not swim or drive or engage in risky activity during which a seizure could cause further injury to you or others until your health care provider says it is OK.  · Get adequate rest.  · Teach friends and family what to do if you have a seizure. They should:  ¨ Lay you on the ground to prevent a fall.  ¨ Put a cushion under your head.  ¨ Loosen any tight clothing around your neck.  ¨ Turn you on your side. If vomiting occurs, this helps keep your airway clear.  ¨ Stay with you until you recover.  ¨ Know whether or not you need emergency care.  SEEK IMMEDIATE MEDICAL CARE IF:  · The seizure lasts longer than 5 minutes.  · The seizure is severe or you do not wake up immediately after the seizure.  · You have an altered mental status after the seizure.  · You are having more frequent or worsening seizures.  Someone should drive you to the emergency department or call local emergency  services (911 in [place]  MAKE SURE YOU:  · Understand these instructions.  · Will watch your condition.  · Will get help right away if you are not doing well or get worse.     This information is not intended to replace advice given to you by your health care provider. Make sure you discuss any questions you have with your health care provider.     Document Released: 12/15/2001 Document Revised: 01/08/2016 Document Reviewed: 07/30/2014  [employer] Interactive Patient Education ©2016 [employer]

## 2018-02-11 NOTE — ED TRIAGE NOTES
Pt arrived via ems, per ems pt has hx of atypical seizures and has been working with neuro to find out the cause. Today pt believes she had one and under the advise from neuro she called 911. U/a  Pt sitting up caox3 speaking in full sentences no distress, maintaining patent airway, no sob, no cp, no abd.

## 2018-02-12 ENCOUNTER — PATIENT OUTREACH (OUTPATIENT)
Dept: HEALTH INFORMATION MANAGEMENT | Facility: OTHER | Age: 33
End: 2018-02-12

## 2018-02-12 NOTE — TELEPHONE ENCOUNTER
Message   Received: 2 days ago   Message Contents   JEN Strauss, Med Ass't   Caller: Unspecified (3 days ago,  8:51 AM)             ordered      Pt was informed.

## 2018-02-12 NOTE — ED PROVIDER NOTES
ED Provider Note    CHIEF COMPLAINT  Chief Complaint   Patient presents with   • Seizure       HPI  Rehana Meade is a 32 y.o. female who presents to emergency room today with complaints of seizure. A she states she woke up this morning with fogginess and felt like she had her usual nocturnal seizure. She's had workup and seen by neurologist had EEG studies apparently positive for this per patient. She is currently on Topamax recently increased the dose on Friday, 2 days prior to ER visit. Currently her symptoms are resolved she denies chest pain shows restful fever chills or changes to bilateral complexes time. She contacted her physician was told to come to the emergency room.    REVIEW OF SYSTEMS  See HPI for further details. All other systems are negative.     PAST MEDICAL HISTORY  Past Medical History:   Diagnosis Date   • Anesthesia     woke up combative   • Anxiety associated with depression    • ASTHMA    • Drug-seeking behavior    • Migraine without aura, without mention of intractable migraine without mention of status migrainosus    • Other specified symptom associated with female genital organs    • Stroke (CMS-HCC)    • Unspecified disorder of thyroid     cyst       FAMILY HISTORY  [unfilled]    SOCIAL HISTORY  Social History     Social History   • Marital status:      Spouse name: N/A   • Number of children: N/A   • Years of education: N/A     Social History Main Topics   • Smoking status: Never Smoker   • Smokeless tobacco: Never Used   • Alcohol use Yes      Comment: rarely   • Drug use: No   • Sexual activity: Yes     Partners: Male     Other Topics Concern   • Not on file     Social History Narrative   • No narrative on file       SURGICAL HISTORY  Past Surgical History:   Procedure Laterality Date   • TUBAL COAGULATION LAPAROSCOPIC BILATERAL  7/11/2014    Performed by Julian Parker M.D. at SURGERY [facility]   • SEPTOPLASTY  10/28/2009    Performed by ANNETTE FORDE  "at SURGERY [facility]   • SOMNOPLASTY  10/28/2009    Performed by ANNETTE FORDE at SURGERY [facility]   • NASAL POLYPECTOMY  10/28/2009    Performed by ANNETTE FORDE at SURGERY [facility]       CURRENT MEDICATIONS  Home Medications    **Home medications have not yet been reviewed for this encounter**         ALLERGIES  Allergies   Allergen Reactions   • Biaxin [Clarithromycin] Hives   • Penicillin G Potassium Vomiting   • Tape Rash   • Amitriptyline    • Ativan Itching   • Imitrex [Sumatriptan] Unspecified     Make my head pins and needles   • Latex Itching   • Maxalt [Rizatriptan]      Tremors, confusion     • Morphine Hives   • Reglan [Metoclopramide]      Shaking        PHYSICAL EXAM  VITAL SIGNS: /82   Pulse 76   Temp 36.5 °C (97.7 °F)   Resp 16   Ht 1.6 m (5' 3\")   Wt 86.2 kg (190 lb)   LMP 01/12/2018   SpO2 99%   BMI 33.66 kg/m²  Room air O2: 100    Constitutional: Well developed, Well nourished, No acute distress, Non-toxic appearance.   HENT: Normocephalic, Atraumatic, Bilateral external ears normal, Oropharynx moist, No oral exudates, Nose normal.   Eyes: PERRLA, EOMI, Conjunctiva normal, No discharge.   Neck: Normal range of motion, No tenderness, Supple, No stridor.   Lymphatic: No lymphadenopathy noted.   Cardiovascular: Normal heart rate, Normal rhythm, No murmurs, No rubs, No gallops.   Thorax & Lungs: Normal breath sounds, No respiratory distress, No wheezing, No chest tenderness.   Abdomen: Bowel sounds normal, Soft, No tenderness, No masses, No pulsatile masses.   Skin: Warm, Dry, No erythema, No rash.   Back: No tenderness, No CVA tenderness.   Extremities: Intact distal pulses, No edema, No tenderness, No cyanosis, No clubbing.   Musculoskeletal: Good range of motion in all major joints. No tenderness to palpation or major deformities noted.   Neurologic: Alert & oriented x 3, Normal motor function, Normal sensory function, No focal deficits noted. "   Psychiatric: Affect normal, Judgment normal, Mood normal.     EKG  Normal sinus rhythm at 70 beats per minute, no ST elevation or depression, no widening of the QRS complex, good R wave progression, WV intervals are normal, no diagnostic Q waves noted, this is a 12-lead EKG there is no previous EKG available at this time for comparison.    RADIOLOGY/PROCEDURES  No orders to display         COURSE & MEDICAL DECISION MAKING  Pertinent Labs & Imaging studies reviewed. (See chart for details)  Patient is asymptomatic and discussed her case with our neurologist on-call Dr. Paula. Because patient just recently had increase on her seizure medication 2 days ago she should stay the course on this medication at least for the next 7 days if she continues to have symptoms she may need to be increased 150 mg twice a day per recognition neurology. If patient should've increased seizure activity turn promptly to the emergency room. We discussed driving restrictions. Patient will follow up with her primary care physician next 24-48 hrs.. She verbalizes understanding above instructions and discharged in stable and asymptomatic improved condition as above to home.    FINAL IMPRESSION  1. Acute nocturnal seizure  2.   3.         Electronically signed by: Que Sanchez, 2/11/2018 7:35 PM

## 2018-02-12 NOTE — PROGRESS NOTES
Placed discharge outreach phone call to patient s/p ER discharge 2/11/18.  Left voicemail providing my contact information and instructions to call with any questions or concerns.

## 2018-02-14 ENCOUNTER — TELEPHONE (OUTPATIENT)
Dept: NEUROLOGY | Facility: MEDICAL CENTER | Age: 33
End: 2018-02-14

## 2018-02-14 NOTE — TELEPHONE ENCOUNTER
Pt calling today, states had 5 episodes in last 8 days. Yesterday she was confused and brain was foggy. Has a headache. States affecting daily life, unable to function. Every day getting more confused and agitated. Thinks maybe having more seizures at night and is not aware of having them. Pt is scheduled for the amb EEG 6/18,19, and 20. Pt was given a f/v with Dr. Steinberg for 2/15/18 at 11 am.

## 2018-02-15 ENCOUNTER — OFFICE VISIT (OUTPATIENT)
Dept: NEUROLOGY | Facility: MEDICAL CENTER | Age: 33
End: 2018-02-15
Payer: COMMERCIAL

## 2018-02-15 ENCOUNTER — HOSPITAL ENCOUNTER (OUTPATIENT)
Dept: LAB | Facility: MEDICAL CENTER | Age: 33
End: 2018-02-15
Attending: PSYCHIATRY & NEUROLOGY
Payer: COMMERCIAL

## 2018-02-15 VITALS
SYSTOLIC BLOOD PRESSURE: 106 MMHG | HEIGHT: 63 IN | WEIGHT: 188 LBS | OXYGEN SATURATION: 100 % | HEART RATE: 72 BPM | TEMPERATURE: 98.8 F | DIASTOLIC BLOOD PRESSURE: 64 MMHG | BODY MASS INDEX: 33.31 KG/M2

## 2018-02-15 DIAGNOSIS — G45.3 AF (AMAUROSIS FUGAX): ICD-10-CM

## 2018-02-15 DIAGNOSIS — G43.001 MIGRAINE WITHOUT AURA AND WITH STATUS MIGRAINOSUS, NOT INTRACTABLE: ICD-10-CM

## 2018-02-15 DIAGNOSIS — G40.909 SEIZURE CEREBRAL (HCC): ICD-10-CM

## 2018-02-15 LAB
CRP SERPL HS-MCNC: 0.26 MG/DL (ref 0–0.75)
ERYTHROCYTE [SEDIMENTATION RATE] IN BLOOD BY WESTERGREN METHOD: 15 MM/HOUR (ref 0–20)
RHEUMATOID FACT SER IA-ACNC: <10 IU/ML (ref 0–14)
THYROPEROXIDASE AB SERPL-ACNC: 1 IU/ML (ref 0–9)

## 2018-02-15 PROCEDURE — 85652 RBC SED RATE AUTOMATED: CPT

## 2018-02-15 PROCEDURE — 83516 IMMUNOASSAY NONANTIBODY: CPT

## 2018-02-15 PROCEDURE — 86376 MICROSOMAL ANTIBODY EACH: CPT

## 2018-02-15 PROCEDURE — 86431 RHEUMATOID FACTOR QUANT: CPT

## 2018-02-15 PROCEDURE — 84207 ASSAY OF VITAMIN B-6: CPT

## 2018-02-15 PROCEDURE — 86800 THYROGLOBULIN ANTIBODY: CPT

## 2018-02-15 PROCEDURE — 36415 COLL VENOUS BLD VENIPUNCTURE: CPT

## 2018-02-15 PROCEDURE — 86140 C-REACTIVE PROTEIN: CPT

## 2018-02-15 PROCEDURE — 86235 NUCLEAR ANTIGEN ANTIBODY: CPT

## 2018-02-15 PROCEDURE — 99214 OFFICE O/P EST MOD 30 MIN: CPT | Performed by: PSYCHIATRY & NEUROLOGY

## 2018-02-15 RX ORDER — LEVETIRACETAM 500 MG/1
500 TABLET ORAL 2 TIMES DAILY
Qty: 60 TAB | Refills: 3 | Status: SHIPPED | OUTPATIENT
Start: 2018-02-15 | End: 2018-02-28

## 2018-02-15 ASSESSMENT — PATIENT HEALTH QUESTIONNAIRE - PHQ9: CLINICAL INTERPRETATION OF PHQ2 SCORE: 0

## 2018-02-15 NOTE — PROGRESS NOTES
NEUROLOGY NOTE    Referring Physician  Mack Bernard M.D.      CHIEF COMPLAINT:    Started having seizure like spells-- could not move, could not talk in the night-- confused afterwards--Jan 2018  Seizures worsened since then      Chief Complaint   Patient presents with   • Follow-Up     abnormal EEG       PRESENT ILLNESS:         Started having seizure like spells-- could not move, could not talk in the night-- confused afterwards--Jan 2018    Seizures worsened since then      Pseudotumor cerebri?-- following by Dr Webster--- transient right vision loss --- woke up and felt better-- ( Dr Webster did not agree)-- Initial opening pressure 27 cmH2O --- then initial opening pressure  Was around 9cm H2O  Has been on Diamox April 0217      Has bad headache 2016      Rehana Meade is a 32 y.o. female who presents to emergency room today with complaints of seizure. A she states she woke up this morning with fogginess and felt like she had her usual nocturnal seizure. She's had workup and seen by neurologist had EEG studies apparently positive for this per patient. She is currently on Topamax recently increased the dose on Friday, 2 days prior to ER visit. Currently her symptoms are resolved she denies chest pain shows restful fever chills or changes to bilateral complexes time. She contacted her physician was told to come to the emergency room.       PAST MEDICAL HISTORY:  Past Medical History:   Diagnosis Date   • Anesthesia     woke up combative   • Anxiety associated with depression    • ASTHMA    • Drug-seeking behavior    • Migraine without aura, without mention of intractable migraine without mention of status migrainosus    • Other specified symptom associated with female genital organs    • Stroke (CMS-HCC)    • Unspecified disorder of thyroid     cyst       PAST SURGICAL HISTORY:  Past Surgical History:   Procedure Laterality Date   • TUBAL COAGULATION LAPAROSCOPIC BILATERAL  7/11/2014    Performed by Julian Parker M.D. at SURGERY SAME DAY [facility]   • SEPTOPLASTY  10/28/2009    Performed by ANNETTE FORDE at SURGERY [facility]   • SOMNOPLASTY  10/28/2009    Performed by ANNETTE FORDE at SURGERY [facility]   • NASAL POLYPECTOMY  10/28/2009    Performed by ANNETTE FORDE at SURGERY [facility]       FAMILY HISTORY:  History reviewed. No pertinent family history.    SOCIAL HISTORY:  Social History     Social History   • Marital status:      Spouse name: N/A   • Number of children: N/A   • Years of education: N/A     Occupational History   • Not on file.     Social History Main Topics   • Smoking status: Never Smoker   • Smokeless tobacco: Never Used   • Alcohol use Yes      Comment: rarely   • Drug use: No   • Sexual activity: Yes     Partners: Male     Other Topics Concern   • Not on file     Social History Narrative   • No narrative on file     ALLERGIES:  Allergies   Allergen Reactions   • Biaxin [Clarithromycin] Hives   • Penicillin G Potassium Vomiting   • Tape Rash   • Amitriptyline    • Ativan Itching   • Imitrex [Sumatriptan] Unspecified     Make my head pins and needles   • Latex Itching   • Maxalt [Rizatriptan]      Tremors, confusion     • Morphine Hives   • Reglan [Metoclopramide]      Shaking      TOBHX  History   Smoking Status   • Never Smoker   Smokeless Tobacco   • Never Used     ALCHX  History   Alcohol Use   • Yes     Comment: rarely     DRUGHX  History   Drug Use No           MEDICATIONS:  Current Outpatient Prescriptions   Medication   • topiramate (TOPAMAX) 100 MG Tab   • acetaZOLAMIDE SR (DIAMOX) 500 MG CAPSULE SR 12 HR   • topiramate (TOPAMAX) 50 MG tablet     No current facility-administered medications for this visit.        REVIEW OF SYSTEM:    Constitutional: Denies fevers, Denies weight changes   Eyes: Denies changes in vision, no eye pain   Ears/Nose/Throat/Mouth: Denies nasal congestion or sore throat   Cardiovascular: Denies chest  "pain or palpitations   Respiratory: Denies SOB.   Gastrointestinal/Hepatic: Denies abdominal pain, nausea, vomiting, diarrhea, constipation or GI bleeding   Genitourinary: Denies bladder dysfunction, dysuria or frequency   Musculoskeletal/Rheum: Denies joint pain and swelling   Skin/Breast: Denies rash, denies breast lumps or discharge   Neurological: seizure,    Psychiatric: denies mood disorder   Endocrine: denies hx of diabetes or thyroid dysfunction   Heme/Oncology/Lymph Nodes: Denies enlarged lymph nodes, denies brusing or known bleeding disorder   Allergic/Immunologic: Denies hx of allergies   Tubal ligation      PHYSICAL AND NEUROLOGICAL EXMAINATIONS:  VITAL SIGNS: /64   Pulse 72   Temp 37.1 °C (98.8 °F)   Ht 1.6 m (5' 2.99\")   Wt 85.3 kg (188 lb)   SpO2 100%   BMI 33.31 kg/m²   CURRENT WEIGHT:   BMI: Body mass index is 33.31 kg/m².  PREVIOUS WEIGHTS:  Wt Readings from Last 25 Encounters:   02/15/18 85.3 kg (188 lb)   02/11/18 86.2 kg (190 lb)   01/30/18 86.4 kg (190 lb 7.6 oz)   01/29/18 86.2 kg (190 lb)   01/23/18 87.5 kg (193 lb)   01/11/18 83 kg (183 lb)   01/08/18 87.5 kg (193 lb)   01/04/18 87.6 kg (193 lb 2 oz)   04/17/17 93.3 kg (205 lb 11 oz)   04/15/17 87.9 kg (193 lb 12.6 oz)   04/11/17 93.3 kg (205 lb 11 oz)   03/23/17 90.7 kg (200 lb)   03/17/17 92.1 kg (203 lb)   02/02/17 92.1 kg (203 lb)   12/08/16 91.1 kg (200 lb 13.4 oz)   11/10/16 90.7 kg (200 lb)   10/21/16 90.3 kg (199 lb)   10/20/16 90.2 kg (198 lb 12.8 oz)   10/05/16 89.4 kg (197 lb)   09/20/16 89.8 kg (198 lb)   05/10/16 86.2 kg (190 lb 0.6 oz)   12/20/15 81.6 kg (180 lb)   09/02/15 81.6 kg (180 lb)   08/28/15 81.6 kg (180 lb)   08/25/15 81.6 kg (180 lb)       General appearance of patient: WDWN(+) NAD(+)    EYES  o Fundus : Papilledem(-) Exudates(-) Hemorrhage(-)  Nervous System  Orientation to time, place and person(+)  Memory normal(-)  Language: aphasia(-)  Knowledge: past(+) Current(+)  Attention(+)  Cranial Nerves  • " Nerve 2: intact  • Nerve 3,4,6: intact  • Nerve 5 : intact  • Nerve 7: intact  • Nerve 8: intact  • Nerve 9 & 10: intact  • Nerve 11: intact  • Nerve 12: intact  Muscle Power and muscle tone: symmetric, normal in upper and lower  Sensory System: Pin sensation intact(+)  Reflexes: symmetric throughout  Cerebellar Function FNP normal   Gait : Steady(+) TandemGait steady(+)  Heart and Vascular  Peripheral Vasucular system : Edema (-) Swelling(-)  RHB, Breathing sound clear  abdomen bowel sound normoactive  Extremities freely moveable  Joints no contracture       NEUROIMAGING: I reviewed the MRI/CT of brain             IMPRESSION:    1. Seizures like spells since Jan 2018  2. Borderline Pseudotumor cerebri 2017  3. Intractable Headache since Sep 2016  4. Viral meningitis Sep 2015-- and memory problems since       PLAN/RECOMMENDATIONS:      We could offer   5 days inpatient intensive video EEG  MRI of brain  Blood test    Continue   Topamax 100mg bid and Diamox 500mg bid    Will add   Keppra 250mg two times per day for one week, then up to 500mg two times per day        EEG  INTERPRETATION: By Dr Austin  This is an abnormal video EEG recording in the awake and drowsy state(s). Frequent left temporal sharps and intermittent left temporal slowing. The findings increase risk for seizures and suggest underlying area of cortical irritability and structural abnormality. Clinical and radiological correlation is recommended.    SIGNATURE:  Mikayla Steinberg    CC:  Mack Bernard M.D.      2017

## 2018-02-15 NOTE — PATIENT INSTRUCTIONS
IMPRESSION:    1. Seizures like spells since Jan 2018  2. Borderline Pseudotumor cerebri 2017  3. Intractable Headache since Sep 2016  4. Viral meningitis Sep 2015-- and memory problems since       PLAN/RECOMMENDATIONS:      We could offer   5 days inpatient intensive video EEG  MRI of brain  Blood test    Continue   Topamax 100mg bid and Diamox 500mg bid    Will add   Keppra 250mg two times per day for one week, then up to 500mg two times per day        EEG  INTERPRETATION: By Dr Austin  This is an abnormal video EEG recording in the awake and drowsy state(s). Frequent left temporal sharps and intermittent left temporal slowing. The findings increase risk for seizures and suggest underlying area of cortical irritability and structural abnormality. Clinical and radiological correlation is recommended.    SIGNATURE:  Mikayla Steinberg    CC:  Mack Bernard M.D.      2017

## 2018-02-16 ENCOUNTER — TELEPHONE (OUTPATIENT)
Dept: NEUROLOGY | Facility: MEDICAL CENTER | Age: 33
End: 2018-02-16

## 2018-02-16 LAB — THYROGLOB AB SERPL-ACNC: <0.9 IU/ML (ref 0–4)

## 2018-02-17 LAB
ENA SS-B IGG SER IA-ACNC: 0 AU/ML (ref 0–40)
GAD65 AB SER IA-ACNC: <5 IU/ML (ref 0–5)
SSA52 R0ENA AB IGG Q0420: 3 AU/ML (ref 0–40)
SSA60 R0ENA AB IGG Q0419: 2 AU/ML (ref 0–40)

## 2018-02-20 ENCOUNTER — TELEPHONE (OUTPATIENT)
Dept: NEUROLOGY | Facility: MEDICAL CENTER | Age: 33
End: 2018-02-20

## 2018-02-21 NOTE — TELEPHONE ENCOUNTER
Patient called stating was seen on Thursday and started on Keppra 500 mg bid.  Every since she started the medication she has been having full body painful spasms, which are very annoying.    Please advise    Thank you

## 2018-02-22 LAB — VIT B6 SERPL-MCNC: 14.2 NMOL/L (ref 20–125)

## 2018-02-22 RX ORDER — LEVETIRACETAM 250 MG/1
125 TABLET ORAL 2 TIMES DAILY
Qty: 30 TAB | Refills: 3 | Status: SHIPPED | OUTPATIENT
Start: 2018-02-22 | End: 2018-02-28

## 2018-02-22 NOTE — TELEPHONE ENCOUNTER
Message   Received: Today   Message Contents   Mikayla Steinberg M.D. sent to Lelia Munoz, Med Ass't   Caller: Unspecified (2 days ago,  4:01 PM)             Reduce keppra further to 125mg bid then      Message   Received: Today   Message Contents   Mikayla Steinberg M.D. sent to [facility]   Caller: Unspecified (2 days ago,  4:01 PM)             DONE     Pt informed.

## 2018-02-24 ENCOUNTER — HOSPITAL ENCOUNTER (EMERGENCY)
Facility: MEDICAL CENTER | Age: 33
End: 2018-02-24
Attending: EMERGENCY MEDICINE
Payer: COMMERCIAL

## 2018-02-24 ENCOUNTER — HOSPITAL ENCOUNTER (OUTPATIENT)
Dept: RADIOLOGY | Facility: MEDICAL CENTER | Age: 33
End: 2018-02-24
Attending: PSYCHIATRY & NEUROLOGY
Payer: COMMERCIAL

## 2018-02-24 VITALS
DIASTOLIC BLOOD PRESSURE: 58 MMHG | RESPIRATION RATE: 14 BRPM | BODY MASS INDEX: 31.39 KG/M2 | HEIGHT: 67 IN | WEIGHT: 200 LBS | HEART RATE: 68 BPM | OXYGEN SATURATION: 98 % | SYSTOLIC BLOOD PRESSURE: 112 MMHG | TEMPERATURE: 98.2 F

## 2018-02-24 DIAGNOSIS — G43.001 MIGRAINE WITHOUT AURA AND WITH STATUS MIGRAINOSUS, NOT INTRACTABLE: ICD-10-CM

## 2018-02-24 DIAGNOSIS — R56.9 SEIZURE (HCC): ICD-10-CM

## 2018-02-24 DIAGNOSIS — G45.3 AF (AMAUROSIS FUGAX): ICD-10-CM

## 2018-02-24 DIAGNOSIS — G40.909 SEIZURE CEREBRAL (HCC): ICD-10-CM

## 2018-02-24 PROCEDURE — 70551 MRI BRAIN STEM W/O DYE: CPT

## 2018-02-24 PROCEDURE — 99283 EMERGENCY DEPT VISIT LOW MDM: CPT

## 2018-02-24 RX ORDER — LORAZEPAM 1 MG/1
1 TABLET ORAL ONCE
Status: DISCONTINUED | OUTPATIENT
Start: 2018-02-24 | End: 2018-02-24 | Stop reason: HOSPADM

## 2018-02-24 ASSESSMENT — LIFESTYLE VARIABLES: DO YOU DRINK ALCOHOL: NO

## 2018-02-24 ASSESSMENT — PAIN SCALES - GENERAL
PAINLEVEL_OUTOF10: 0
PAINLEVEL_OUTOF10: 8

## 2018-02-24 NOTE — ED TRIAGE NOTES
Pt arrived to ER post MRI less than 20 mins ago reporting she felt like she was going to have a seizure.  Pt AAOx4, not postictal, pt having full body spasms jerking arms and legs sporadically. Pt on Keppra, pt reports new onset of seizure starting in Jan/2018

## 2018-02-25 ENCOUNTER — PATIENT OUTREACH (OUTPATIENT)
Dept: HEALTH INFORMATION MANAGEMENT | Facility: OTHER | Age: 33
End: 2018-02-25

## 2018-02-25 NOTE — PROGRESS NOTES
Placed discharge outreach phone call to patient s/p ER discharge 2/24/18.  Left voicemail providing my contact information and instructions to call with any questions or concerns.

## 2018-02-25 NOTE — ED NOTES
Pt discharged to self, no distress, no prescriptions.  Pt able to teach back follow up care and home safety care.

## 2018-02-25 NOTE — ED PROVIDER NOTES
ED Provider Note    Scribed for Dr. Gabriel Siu M.D. by Mercedes Srinivasan. 2/24/2018  4:13 PM    Primary care provider: Mack Bernard M.D.  Means of arrival: [facility]   History obtained from: patient   History limited by: none     CHIEF COMPLAINT  Chief Complaint   Patient presents with   • Seizure     pt reports being in MRI and feeling like she was about to have a seizure       HPI  Rehana Meade is a 32 y.o. female with a history of cerebral seizures who presents to the Emergency Department via gurney secondary to experiencing a seizure 20 minutes prior to arrival. Patient reports associated tingling in bilateral lower extremities. She reports that she was having an MRI performed when she suddenly experienced a seizure for a short period of time. She reports that she only experiences seizure episodes at night and believes the MRI exacerbated her symptoms. Patient reports taking Keppra 250 mg, Diamox 500 mg, and Topamax 500 mg daily. She began having seizures in January, 2018 and is currently being worked up for them by a Dr. Steinberg (neurology). No complaints of cough, fever, and rhinorrhea. No other acute complaints or concerns.     REVIEW OF SYSTEMS  Pertinent positives include seizures, tingling in bilateral lower extremities. Pertinent negatives include no  cough, fever, and rhinorrhea.   As above, all other systems reviewed and are negative.   See HPI for further details.   C    PAST MEDICAL HISTORY   has a past medical history of Anesthesia; Anxiety associated with depression; ASTHMA; Drug-seeking behavior; Migraine without aura, without mention of intractable migraine without mention of status migrainosus; Other specified symptom associated with female genital organs; Seizure cerebral (2/15/2018); Stroke (CMS-[facility]); and Unspecified disorder of thyroid.    SURGICAL HISTORY   has a past surgical history that includes septoplasty (10/28/2009); somnoplasty (10/28/2009); nasal polypectomy (10/28/2009);  "and tubal coagulation laparoscopic bilateral (7/11/2014).    SOCIAL HISTORY  Social History   Substance Use Topics   • Smoking status: Never Smoker   • Smokeless tobacco: Never Used   • Alcohol use Yes      Comment: rarely      History   Drug Use No       FAMILY HISTORY  No family history noted    CURRENT MEDICATIONS  Keppra 250 mg, Diamox 500 mg, and Topamax 500 mg daily    ALLERGIES  Allergies   Allergen Reactions   • Biaxin [Clarithromycin] Hives   • Penicillin G Potassium Vomiting   • Tape Rash   • Amitriptyline    • Ativan Anxiety     Hallucinations    • Imitrex [Sumatriptan] Unspecified     Make my head pins and needles   • Latex Itching   • Maxalt [Rizatriptan]      Tremors, confusion     • Morphine Hives   • Reglan [Metoclopramide]      Shaking        PHYSICAL EXAM  VITAL SIGNS: /75   Pulse 76   Temp 36.8 °C (98.2 °F)   Resp 16   Ht 1.702 m (5' 7\")   Wt 90.7 kg (200 lb)   LMP 02/12/2018 (Exact Date) Comment: tubal ligation   SpO2 98%   BMI 31.32 kg/m²     Constitutional: Well developed, Well nourished, mild distress, Non-toxic appearance.   HENT: Normocephalic, Atraumatic, Bilateral external ears normal, Oropharynx moist, No oral exudates.   Eyes: PERRLA, EOMI, Conjunctiva normal, No discharge.   Neck: No tenderness, Supple, No stridor.   Lymphatic: No lymphadenopathy noted.   Cardiovascular: Normal heart rate, Normal rhythm.   Thorax & Lungs: Clear to auscultation bilaterally, No respiratory distress, No wheezing, No crackles.   Abdomen: Soft, No tenderness, No masses, No pulsatile masses.   Skin: Warm, Dry, No erythema, No rash.   Extremities:, No edema No cyanosis.   Musculoskeletal: No tenderness to palpation or major deformities noted.  Intact distal pulses  Neurologic: Awake, alert. Moves all extremities spontaneously. Full body spasms for a couple of seconds periodically throughout exam.   Psychiatric: Affect normal, Judgment normal, Mood normal.     COURSE & MEDICAL DECISION " MAKING  Pertinent Labs & Imaging studies reviewed. (See chart for details)    4:13 PM - Patient seen and examined at bedside. Patient will be treated with Ativan 1 mg. The differential diagnoses include but are not limited to: Seizures, anxiety     4:22 PM- Spoke with radiology who reports the patient's preliminary result of her MRI performed earlier today was unremarkable.     4:38 PM- Paged neurology for a consult with the patient.     4:45 PM- Spoke with Dr. Steinberg (neurology) who agrees to follow the patient. He states he would like for the patient to follow the plan they have already established.     5:25 PM- Updated by patient's nurse that the patient has ambulated to the restroom without experiencing a seizure.    6:03 PM- Re-evaluation: Patient is resting comfortably. She is stable for discharge. Instructed the patient on return to ED precautions and she agrees to be discharged home.     Decision Making:  I am uncertain of the significance of the spasmodic activity she is having I don't think this is seizure activity, I think is likely related to an anxiety component perhaps precipitated by MRI she has workup scheduled is to continue with that after discussion with the neurologist.    The patient will return for new or worsening symptoms and is stable at the time of discharge.    DISPOSITION:  Patient will be discharged home in stable condition.    FOLLOW UP:  Mikayla Steinberg M.D.  [address] [id number] [phone]          FINAL IMPRESSION  1. Seizure (CMS-[facility])          Mercedes HOLT (Scribe), am scribing for, and in the presence of, Gabriel Siu M.D..    Electronically signed by: Mercedes Schmidt), 2/24/2018    Gabriel HOLT M.D. personally performed the services described in this documentation, as scribed by Mercedes Srinivasan in my presence, and it is both accurate and complete.    The note accurately reflects work and decisions made by me.  Gabriel Siu  2/24/2018  8:54  PM

## 2018-02-27 ENCOUNTER — TELEPHONE (OUTPATIENT)
Dept: NEUROLOGY | Facility: MEDICAL CENTER | Age: 33
End: 2018-02-27

## 2018-02-27 NOTE — TELEPHONE ENCOUNTER
Results for ETELVINA BROWN (MRN [id number]) as of 2/27/2018 15:12   Ref. Range 2/15/2018 12:10   Vitamin B6 Latest Ref Range: 20.0 - 125.0 nmol/L 14.2 (L)       ________________________________________________________________________    Vit B6 is low please take  Daily 25 milligrams of vitamin B6 by mouth with multivitamins.  Of note, too much B6 could be toxic to the nerve too  ________________________________________________________________________

## 2018-02-28 ENCOUNTER — OFFICE VISIT (OUTPATIENT)
Dept: NEUROLOGY | Facility: MEDICAL CENTER | Age: 33
End: 2018-02-28
Payer: COMMERCIAL

## 2018-02-28 VITALS
DIASTOLIC BLOOD PRESSURE: 64 MMHG | BODY MASS INDEX: 35.48 KG/M2 | HEART RATE: 70 BPM | OXYGEN SATURATION: 99 % | WEIGHT: 192.79 LBS | TEMPERATURE: 98.4 F | HEIGHT: 62 IN | RESPIRATION RATE: 16 BRPM | SYSTOLIC BLOOD PRESSURE: 110 MMHG

## 2018-02-28 DIAGNOSIS — G40.909 SEIZURE CEREBRAL (HCC): ICD-10-CM

## 2018-02-28 DIAGNOSIS — G43.009 MIGRAINE WITHOUT AURA AND WITHOUT STATUS MIGRAINOSUS, NOT INTRACTABLE: ICD-10-CM

## 2018-02-28 PROCEDURE — 99214 OFFICE O/P EST MOD 30 MIN: CPT | Performed by: PSYCHIATRY & NEUROLOGY

## 2018-02-28 RX ORDER — ZONISAMIDE 100 MG/1
100 CAPSULE ORAL DAILY
Qty: 30 CAP | Refills: 3 | Status: ON HOLD | OUTPATIENT
Start: 2018-02-28 | End: 2018-03-26

## 2018-02-28 ASSESSMENT — PATIENT HEALTH QUESTIONNAIRE - PHQ9: CLINICAL INTERPRETATION OF PHQ2 SCORE: 0

## 2018-02-28 NOTE — PROGRESS NOTES
NEUROLOGY NOTE    Referring Physician  Mack Bernard M.D.      CHIEF COMPLAINT:    Started having seizure like spells-- could not move, could not talk in the night-- confused afterwards--Jan 2018  Seizures worsened since then      Another seizure happened after MRI of brain, that was the reason the patient came to us today  Became confused afterward    Chief Complaint   Patient presents with   • Follow-Up     Migraines       PRESENT ILLNESS:         Started having seizure like spells-- could not move, could not talk in the night-- confused afterwards--Jan 2018    Seizures worsened since then      Pseudotumor cerebri?-- following by Dr Webster--- transient right vision loss --- woke up and felt better-- ( Dr Webster did not agree the diagnosis of pseudotumor cereberi)-- Initial opening pressure 27 cmH2O --- then initial opening pressure  Was around 9cm H2O  Has been on Diamox April 0217      Has bad headache 2016      Rehana Meade is a 32 y.o. female who presents to emergency room today with complaints of seizure. A she states she woke up this morning with fogginess and felt like she had her usual nocturnal seizure. She's had workup and seen by neurologist had EEG studies apparently positive for this per patient. She is currently on Topamax recently increased the dose on Friday, 2 days prior to ER visit. Currently her symptoms are resolved she denies chest pain shows restful fever chills or changes to bilateral complexes time. She contacted her physician was told to come to the emergency room.       Rehana Meade is a 32 y.o. female with a history of cerebral seizures who presents to the Emergency Department via gurney secondary to experiencing a seizure 20 minutes prior to arrival. Patient reports associated tingling in bilateral lower extremities. She reports that she was having an MRI performed when she suddenly experienced a seizure for a short period of time. She reports that she  only experiences seizure episodes at night and believes the MRI exacerbated her symptoms. Patient reports taking Keppra 125 mg two times per days, Diamox 500 mg, and Topamax 100 mg bid daily. She began having seizures in January, 2018 and is currently being worked up for them by a Dr. Steinberg (neurology).      PAST MEDICAL HISTORY:  Past Medical History:   Diagnosis Date   • Anesthesia     woke up combative   • Anxiety associated with depression    • ASTHMA    • Drug-seeking behavior    • Migraine without aura, without mention of intractable migraine without mention of status migrainosus    • Other specified symptom associated with female genital organs    • Seizure cerebral 2/15/2018   • Stroke (CMS-[facility])    • Unspecified disorder of thyroid     cyst       PAST SURGICAL HISTORY:  Past Surgical History:   Procedure Laterality Date   • TUBAL COAGULATION LAPAROSCOPIC BILATERAL  7/11/2014    Performed by Julian Parker M.D. at SURGERY SAME DAY [facility] ORS   • SEPTOPLASTY  10/28/2009    Performed by ANNETTE FORDE at SURGERY [facility] ORS   • SOMNOPLASTY  10/28/2009    Performed by ANNETTE FORDE at SURGERY [facility] ORS   • NASAL POLYPECTOMY  10/28/2009    Performed by ANNETTE FORDE at SURGERY [facility] ORS       FAMILY HISTORY:  No family history on file.    SOCIAL HISTORY:  Social History     Social History   • Marital status:      Spouse name: N/A   • Number of children: N/A   • Years of education: N/A     Occupational History   • Not on file.     Social History Main Topics   • Smoking status: Never Smoker   • Smokeless tobacco: Never Used   • Alcohol use Yes      Comment: rarely   • Drug use: No   • Sexual activity: Yes     Partners: Male     Other Topics Concern   • Not on file     Social History Narrative   • No narrative on file     ALLERGIES:  Allergies   Allergen Reactions   • Biaxin [Clarithromycin] Hives   • Penicillin G Potassium Vomiting   • Tape Rash   • Amitriptyline    • Ativan  "Anxiety     Hallucinations    • Imitrex [Sumatriptan] Unspecified     Make my head pins and needles   • Latex Itching   • Maxalt [Rizatriptan]      Tremors, confusion     • Morphine Hives   • Reglan [Metoclopramide]      Shaking      TOBHX  History   Smoking Status   • Never Smoker   Smokeless Tobacco   • Never Used     ALCHX  History   Alcohol Use   • Yes     Comment: rarely     DRUGHX  History   Drug Use No           MEDICATIONS:  Current Outpatient Prescriptions   Medication   • levETIRAcetam (KEPPRA) 250 MG tablet   • topiramate (TOPAMAX) 100 MG Tab   • acetaZOLAMIDE SR (DIAMOX) 500 MG CAPSULE SR 12 HR   • levETIRAcetam (KEPPRA) 500 MG Tab     No current facility-administered medications for this visit.        REVIEW OF SYSTEM:    Constitutional: Denies fevers, Denies weight changes   Eyes: Denies changes in vision, no eye pain   Ears/Nose/Throat/Mouth: Denies nasal congestion or sore throat   Cardiovascular: Denies chest pain or palpitations   Respiratory: Denies SOB.   Gastrointestinal/Hepatic: Denies abdominal pain, nausea, vomiting, diarrhea, constipation or GI bleeding   Genitourinary: Denies bladder dysfunction, dysuria or frequency   Musculoskeletal/Rheum: Denies joint pain and swelling   Skin/Breast: Denies rash, denies breast lumps or discharge   Neurological: seizure,    Psychiatric: denies mood disorder   Endocrine: denies hx of diabetes or thyroid dysfunction   Heme/Oncology/Lymph Nodes: Denies enlarged lymph nodes, denies brusing or known bleeding disorder   Allergic/Immunologic: Denies hx of allergies   Tubal ligation      PHYSICAL AND NEUROLOGICAL EXMAINATIONS:  VITAL SIGNS: /64   Pulse 70   Temp 36.9 °C (98.4 °F)   Resp 16   Ht 1.575 m (5' 2\")   Wt 87.5 kg (192 lb 12.7 oz)   LMP 02/12/2018 (Exact Date) Comment: tubal ligation   SpO2 99%   BMI 35.26 kg/m²   CURRENT WEIGHT:   BMI: Body mass index is 35.26 kg/m².  PREVIOUS WEIGHTS:  Wt Readings from Last 25 Encounters:   02/28/18 87.5 " kg (192 lb 12.7 oz)   02/24/18 90.7 kg (200 lb)   02/15/18 85.3 kg (188 lb)   02/11/18 86.2 kg (190 lb)   01/30/18 86.4 kg (190 lb 7.6 oz)   01/29/18 86.2 kg (190 lb)   01/23/18 87.5 kg (193 lb)   01/11/18 83 kg (183 lb)   01/08/18 87.5 kg (193 lb)   01/04/18 87.6 kg (193 lb 2 oz)   04/17/17 93.3 kg (205 lb 11 oz)   04/15/17 87.9 kg (193 lb 12.6 oz)   04/11/17 93.3 kg (205 lb 11 oz)   03/23/17 90.7 kg (200 lb)   03/17/17 92.1 kg (203 lb)   02/02/17 92.1 kg (203 lb)   12/08/16 91.1 kg (200 lb 13.4 oz)   11/10/16 90.7 kg (200 lb)   10/21/16 90.3 kg (199 lb)   10/20/16 90.2 kg (198 lb 12.8 oz)   10/05/16 89.4 kg (197 lb)   09/20/16 89.8 kg (198 lb)   05/10/16 86.2 kg (190 lb 0.6 oz)   12/20/15 81.6 kg (180 lb)   09/02/15 81.6 kg (180 lb)       General appearance of patient: WDWN(+) NAD(+)    EYES  o Fundus : Papilledem(-) Exudates(-) Hemorrhage(-)  Nervous System  Orientation to time, place and person(+)  Memory normal(-)  Language: aphasia(-)  Knowledge: past(+) Current(+)  Attention(+)  Cranial Nerves  • Nerve 2: intact  • Nerve 3,4,6: intact  • Nerve 5 : intact  • Nerve 7: intact  • Nerve 8: intact  • Nerve 9 & 10: intact  • Nerve 11: intact  • Nerve 12: intact  Muscle Power and muscle tone: symmetric, normal in upper and lower  Sensory System: Pin sensation intact(+)  Reflexes: symmetric throughout  Cerebellar Function FNP normal   Gait : Steady(+) TandemGait steady(+)  Heart and Vascular  Peripheral Vasucular system : Edema (-) Swelling(-)  RHB, Breathing sound clear  abdomen bowel sound normoactive  Extremities freely moveable  Joints no contracture       NEUROIMAGING: I reviewed the MRI/CT of brain             IMPRESSION:    1. Seizures like spells since Jan 2018  2. Borderline Pseudotumor cerebri 2017  3. Intractable Headache since Sep 2016  4. Viral meningitis Sep 2015-- and memory problems since       PLAN/RECOMMENDATIONS:      We could offer   5 days inpatient intensive video EEG      Continue   Topamax  100mg bid and Diamox 500mg bid    Could not tolerate keppra 250mg bid because of spasm  This time, we will try Zonegran 100mg before sleep and stop Keppra        Results for ETELVINA BROWN (MRN [id number]) as of 2/27/2018 15:12    Ref. Range 2/15/2018 12:10   Vitamin B6 Latest Ref Range: 20.0 - 125.0 nmol/L 14.2 (L)         ________________________________________________________________________     Vit B6 is low please take  Daily 25 milligrams of vitamin B6 by mouth with multivitamins.  Of note, too much B6 could be toxic to the nerve too  ________________________________________________________________________         MRI of brain 2/2018  1. 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    2. No evidence of mass lesion, heterotopic gray matter, gross cortical dysplasia, or mesial temporal sclerosis.          EEG  INTERPRETATION: By Dr Austin  This is an abnormal video EEG recording in the awake and drowsy state(s). Frequent left temporal sharps and intermittent left temporal slowing. The findings increase risk for seizures and suggest underlying area of cortical irritability and structural abnormality. Clinical and radiological correlation is recommended.    SIGNATURE:  Mikayla Steinberg    CC:  Mack Bernard M.D.      2017

## 2018-02-28 NOTE — PATIENT INSTRUCTIONS
IMPRESSION:    1. Seizures like spells since Jan 2018  2. Borderline Pseudotumor cerebri 2017  3. Intractable Headache since Sep 2016  4. Viral meningitis Sep 2015-- and memory problems since       PLAN/RECOMMENDATIONS:      We could offer   5 days inpatient intensive video EEG      Continue   Topamax 100mg bid and Diamox 500mg bid    Could not tolerate keppra 250mg bid because of spasm  This time, we will try Zonegran 100mg before sleep and stop Keppra        Results for ETELVINA BROWN (MRN [id number]) as of 2/27/2018 15:12    Ref. Range 2/15/2018 12:10   Vitamin B6 Latest Ref Range: 20.0 - 125.0 nmol/L 14.2 (L)         ________________________________________________________________________     Vit B6 is low please take  Daily 25 milligrams of vitamin B6 by mouth with multivitamins.  Of note, too much B6 could be toxic to the nerve too  ________________________________________________________________________         MRI of brain 2/2018  1. 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    2. No evidence of mass lesion, heterotopic gray matter, gross cortical dysplasia, or mesial temporal sclerosis.

## 2018-03-05 ENCOUNTER — TELEPHONE (OUTPATIENT)
Dept: NEUROLOGY | Facility: MEDICAL CENTER | Age: 33
End: 2018-03-05

## 2018-03-05 NOTE — TELEPHONE ENCOUNTER
Patient called stating Zonegran is not helping her. She still is having full body seizures everyday.    Please advise    Thank you

## 2018-03-06 NOTE — TELEPHONE ENCOUNTER
Correction patient is having full body spasms not seizures. She call this am requesting pain medication for her headaches.    Please advise    Thank you

## 2018-03-07 NOTE — TELEPHONE ENCOUNTER
Mikayla Steinberg M.D.  Acacia Hood, Med Ass't   Caller: Unspecified (2 days ago, 10:41 AM)             We are waiting for 5 days inpatient intensive video EEG   Pain medication is better managed by pain specialist   I could offer the patient referral to pain specialist...if that is fine with him?      Spoke to patient she would like referral to pain management.    Thank you

## 2018-03-09 DIAGNOSIS — G44.011 INTRACTABLE EPISODIC CLUSTER HEADACHE: ICD-10-CM

## 2018-03-15 ENCOUNTER — TELEPHONE (OUTPATIENT)
Dept: NEUROLOGY | Facility: MEDICAL CENTER | Age: 33
End: 2018-03-15

## 2018-03-23 NOTE — PROGRESS NOTES
Patient is a direct admit from home on 3/26/2018 at 0600.   Dr Steinberg is admitting the patient and has written orders.   ADT is signed and held, needs to be released when the patient arrives on the unit.

## 2018-03-26 ENCOUNTER — HOSPITAL ENCOUNTER (INPATIENT)
Facility: MEDICAL CENTER | Age: 33
LOS: 4 days | DRG: 101 | End: 2018-03-30
Attending: PSYCHIATRY & NEUROLOGY | Admitting: PSYCHIATRY & NEUROLOGY
Payer: COMMERCIAL

## 2018-03-26 PROCEDURE — A9270 NON-COVERED ITEM OR SERVICE: HCPCS | Performed by: PSYCHIATRY & NEUROLOGY

## 2018-03-26 PROCEDURE — 95951 EEG: CPT

## 2018-03-26 PROCEDURE — 770020 HCHG ROOM/CARE - TELE (206)

## 2018-03-26 PROCEDURE — 302135 SEQUENTIAL COMPRESSION MACHINE: Performed by: PSYCHIATRY & NEUROLOGY

## 2018-03-26 PROCEDURE — 700102 HCHG RX REV CODE 250 W/ 637 OVERRIDE(OP): Performed by: PSYCHIATRY & NEUROLOGY

## 2018-03-26 RX ORDER — BUTALBITAL, ACETAMINOPHEN AND CAFFEINE 50; 325; 40 MG/1; MG/1; MG/1
1 TABLET ORAL EVERY 6 HOURS PRN
Status: DISCONTINUED | OUTPATIENT
Start: 2018-03-26 | End: 2018-03-28

## 2018-03-26 RX ORDER — ACETAZOLAMIDE 500 MG/1
500 CAPSULE, EXTENDED RELEASE ORAL EVERY EVENING
COMMUNITY
End: 2019-02-01

## 2018-03-26 RX ORDER — ACETAZOLAMIDE 500 MG/1
500 CAPSULE, EXTENDED RELEASE ORAL EVERY 12 HOURS
Status: DISCONTINUED | OUTPATIENT
Start: 2018-03-26 | End: 2018-03-30 | Stop reason: HOSPADM

## 2018-03-26 RX ORDER — ZONISAMIDE 100 MG/1
300 CAPSULE ORAL EVERY EVENING
COMMUNITY
End: 2022-06-01

## 2018-03-26 RX ORDER — TOPIRAMATE 25 MG/1
100 TABLET ORAL 2 TIMES DAILY
Status: DISCONTINUED | OUTPATIENT
Start: 2018-03-26 | End: 2018-03-30 | Stop reason: HOSPADM

## 2018-03-26 RX ADMIN — BUTALBITAL, ACETAMINOPHEN, AND CAFFEINE 1 TABLET: 50; 325; 40 TABLET ORAL at 16:15

## 2018-03-26 RX ADMIN — TOPIRAMATE 100 MG: 25 TABLET, FILM COATED ORAL at 20:32

## 2018-03-26 RX ADMIN — ACETAZOLAMIDE 500 MG: 500 CAPSULE, EXTENDED RELEASE ORAL at 20:33

## 2018-03-26 ASSESSMENT — PAIN SCALES - GENERAL
PAINLEVEL_OUTOF10: 5
PAINLEVEL_OUTOF10: 4

## 2018-03-26 ASSESSMENT — PATIENT HEALTH QUESTIONNAIRE - PHQ9
1. LITTLE INTEREST OR PLEASURE IN DOING THINGS: NOT AT ALL
SUM OF ALL RESPONSES TO PHQ9 QUESTIONS 1 AND 2: 0
2. FEELING DOWN, DEPRESSED, IRRITABLE, OR HOPELESS: NOT AT ALL

## 2018-03-26 ASSESSMENT — LIFESTYLE VARIABLES
EVER_SMOKED: NEVER
ALCOHOL_USE: NO

## 2018-03-26 NOTE — CARE PLAN
Problem: Safety  Goal: Will remain free from falls  Outcome: PROGRESSING AS EXPECTED  Pt is instructed to call when in need of assistance   Bed alarm, treaded socks, and hourly rounding in place       Problem: Knowledge Deficit  Goal: Knowledge of disease process/condition, treatment plan, diagnostic tests, and medications will improve  Outcome: PROGRESSING AS EXPECTED  Pt educated on the POC during her stay.   Pt verbalized understanding.

## 2018-03-26 NOTE — PROGRESS NOTES
Med rec completed by interview with patient at bedside  No abx in past 30 days  Allergies reviewed

## 2018-03-26 NOTE — H&P
NEUROLOGY NOTE      CHIEF COMPLAINT:    Started having seizure like spells-- could not move, could not talk in the night-- confused afterwards--Jan 2018  Seizures worsened since then     one seizure happened after MRI of brain, that was the reason the patient came to us today  Became confused afterward    PRESENT ILLNESS:       Started having seizure like spells-- could not move, could not talk in the night-- confused afterwards--Jan 2018     Seizures worsened since then        Pseudotumor cerebri?-- following by Dr Webster--- transient right vision loss --- woke up and felt better-- ( Dr Webster did not agree the diagnosis of pseudotumor cereberi)-- Initial opening pressure 27 cmH2O ---initial opening pressure  recent opening pressure was around 9cm H2O  Has been on Diamox April 0217        Started having headache 2016        Rehana Meade is a 32 y.o. female who presents to emergency room today with complaints of seizure. A she states she woke up this morning with fogginess and felt like she had her usual nocturnal seizure. She's had workup and seen by neurologist had EEG studies apparently positive for this per patient. She is currently on Topamax recently increased the dose on Friday, 2 days prior to ER visit. Currently her symptoms are resolved she denies chest pain shows restful fever chills or changes to bilateral complexes time. She contacted her physician was told to come to the emergency room.       PAST MEDICAL HISTORY:  Past Medical History:   Diagnosis Date   • Anesthesia     woke up combative   • Anxiety associated with depression    • ASTHMA    • Drug-seeking behavior    • Migraine without aura, without mention of intractable migraine without mention of status migrainosus    • Other specified symptom associated with female genital organs    • Seizure cerebral 2/15/2018   • Stroke (CMS-HCC)    • Unspecified disorder of thyroid     cyst       PAST SURGICAL HISTORY:  Past Surgical History:    Procedure Laterality Date   • TUBAL COAGULATION LAPAROSCOPIC BILATERAL  7/11/2014    Performed by Julian Parker M.D. at SURGERY SAME DAY [facility] ER ORS   • SEPTOPLASTY  10/28/2009    Performed by ANNETTE FORDE at SURGERY [facility] ORS   • SOMNOPLASTY  10/28/2009    Performed by ANNETTE FORDE at SURGERY [facility] ORS   • NASAL POLYPECTOMY  10/28/2009    Performed by ANNETTE FORDE at SURGERY [facility] ORS       FAMILY HISTORY:  No family history on file.    SOCIAL HISTORY:  Social History     Social History   • Marital status:      Spouse name: N/A   • Number of children: N/A   • Years of education: N/A     Occupational History   • Not on file.     Social History Main Topics   • Smoking status: Never Smoker   • Smokeless tobacco: Never Used   • Alcohol use Yes      Comment: rarely   • Drug use: No   • Sexual activity: Yes     Partners: Male     Other Topics Concern   • Not on file     Social History Narrative   • No narrative on file     ALLERGIES:  Allergies   Allergen Reactions   • Biaxin [Clarithromycin] Hives   • Penicillin G Potassium Vomiting   • Tape Rash   • Amitriptyline    • Ativan Anxiety     Hallucinations    • Imitrex [Sumatriptan] Unspecified     Make my head pins and needles   • Latex Itching   • Maxalt [Rizatriptan]      Tremors, confusion     • Morphine Hives   • Reglan [Metoclopramide]      Shaking      TOBHX  History   Smoking Status   • Never Smoker   Smokeless Tobacco   • Never Used     ALCHX  History   Alcohol Use   • Yes     Comment: rarely     DRUGHX  History   Drug Use No           MEDICATIONS:  Current Facility-Administered Medications   Medication Dose   • acetaZOLAMIDE SR (DIAMOX) capsule 500 mg  500 mg   • topiramate (TOPAMAX) tablet 100 mg  100 mg       REVIEW OF SYSTEM:    Constitutional: Denies fevers, Denies weight changes   Eyes: Denies changes in vision, no eye pain   Ears/Nose/Throat/Mouth: Denies nasal congestion or sore throat   Cardiovascular: Denies  "chest pain or palpitations   Respiratory: Denies SOB.   Gastrointestinal/Hepatic: Denies abdominal pain, nausea, vomiting, diarrhea, constipation or GI bleeding   Genitourinary: Denies bladder dysfunction, dysuria or frequency   Musculoskeletal/Rheum: Denies joint pain and swelling   Skin/Breast: Denies rash, denies breast lumps or discharge   Neurological: seizure,    Psychiatric: denies mood disorder   Endocrine: denies hx of diabetes or thyroid dysfunction   Heme/Oncology/Lymph Nodes: Denies enlarged lymph nodes, denies brusing or known bleeding disorder   Allergic/Immunologic: Denies hx of allergies   Tubal ligation    PHYSICAL AND NEUROLOGICAL EXMAINATIONS:  VITAL SIGNS: /73   Pulse 81   Temp 36.2 °C (97.1 °F)   Resp 17   Ht 1.6 m (5' 3\")   Wt 85.7 kg (188 lb 15 oz)   SpO2 99%   BMI 33.47 kg/m²   CURRENT WEIGHT:   BMI: Body mass index is 33.47 kg/m².  PREVIOUS WEIGHTS:  Wt Readings from Last 25 Encounters:   03/26/18 85.7 kg (188 lb 15 oz)   02/28/18 87.5 kg (192 lb 12.7 oz)   02/24/18 90.7 kg (200 lb)   02/15/18 85.3 kg (188 lb)   02/11/18 86.2 kg (190 lb)   01/30/18 86.4 kg (190 lb 7.6 oz)   01/29/18 86.2 kg (190 lb)   01/23/18 87.5 kg (193 lb)   01/11/18 83 kg (183 lb)   01/08/18 87.5 kg (193 lb)   01/04/18 87.6 kg (193 lb 2 oz)   04/17/17 93.3 kg (205 lb 11 oz)   04/15/17 87.9 kg (193 lb 12.6 oz)   04/11/17 93.3 kg (205 lb 11 oz)   03/23/17 90.7 kg (200 lb)   03/17/17 92.1 kg (203 lb)   02/02/17 92.1 kg (203 lb)   12/08/16 91.1 kg (200 lb 13.4 oz)   11/10/16 90.7 kg (200 lb)   10/21/16 90.3 kg (199 lb)   10/20/16 90.2 kg (198 lb 12.8 oz)   10/05/16 89.4 kg (197 lb)   09/20/16 89.8 kg (198 lb)   05/10/16 86.2 kg (190 lb 0.6 oz)   12/20/15 81.6 kg (180 lb)       General appearance of patient: WDWN(+) NAD(+)    EYES  o Fundus : Papilledem(-) Exudates(-) Hemorrhage(-)  Nervous System  Orientation to time, place and person(+)  Memory normal(+)  Language: aphasia(-)  Knowledge: past(+) " Current(+)  Attention(+)  Cranial Nerves  • Nerve 2: intact  • Nerve 3,4,6: intact  • Nerve 5 : intact  • Nerve 7: intact  • Nerve 8: intact  • Nerve 9 & 10: intact  • Nerve 11: intact  • Nerve 12: intact  Muscle Power and muscle tone: symmetric, normal in upper and lower  Sensory System: Pin sensation intact(+)  Reflexes: symmetric throughout  Cerebellar Function FNP normal   Gait : Steady(+) TandemGait steady(+)  Heart and Vascular  Peripheral Vasucular system : Edema (-) Swelling(-)  RHB, Breathing sound clear  abdomen bowel sound normoactive  Extremities freely moveable  Joints no contracture       NEUROIMAGING: I reviewed the MRI/CT of brain       LAB:        IMPRESSION:     1. Seizures like spells since Jan 2018  2. Borderline Pseudotumor cerebri 2017  3. Intractable Headache since Sep 2016  4. Viral meningitis Sep 2015-- and memory problems since         PLAN/RECOMMENDATIONS:        We will start 5 days inpatient intensive video EEG        Continue   Topamax 100mg bid and Diamox 500mg bid     Could not tolerate keppra 250mg bid because of spasm  This time, we will try Zonegran 100mg before sleep and stop Keppra           Results for STEPHANIE ETELVINAJUAN ALBERTO MATTA (MRN [id number]) as of 2/27/2018 15:12    Ref. Range 2/15/2018 12:10   Vitamin B6 Latest Ref Range: 20.0 - 125.0 nmol/L 14.2 (L)         SIGNATURE:  Mikayla Steinberg

## 2018-03-26 NOTE — PROGRESS NOTES
Attention seizure pad order . Neuro staff will need to supply from their own supply as our department does not carry this item.

## 2018-03-27 PROCEDURE — 770020 HCHG ROOM/CARE - TELE (206)

## 2018-03-27 PROCEDURE — 95951 HCHG EEG-VIDEO-24HR: CPT

## 2018-03-27 PROCEDURE — A9270 NON-COVERED ITEM OR SERVICE: HCPCS | Performed by: PSYCHIATRY & NEUROLOGY

## 2018-03-27 PROCEDURE — 700102 HCHG RX REV CODE 250 W/ 637 OVERRIDE(OP): Performed by: PSYCHIATRY & NEUROLOGY

## 2018-03-27 RX ADMIN — ACETAZOLAMIDE 500 MG: 500 CAPSULE, EXTENDED RELEASE ORAL at 21:08

## 2018-03-27 RX ADMIN — BUTALBITAL, ACETAMINOPHEN, AND CAFFEINE 1 TABLET: 50; 325; 40 TABLET ORAL at 21:08

## 2018-03-27 RX ADMIN — TOPIRAMATE 100 MG: 25 TABLET, FILM COATED ORAL at 08:23

## 2018-03-27 RX ADMIN — TOPIRAMATE 100 MG: 25 TABLET, FILM COATED ORAL at 21:08

## 2018-03-27 ASSESSMENT — ENCOUNTER SYMPTOMS
FEVER: 0
FALLS: 1
MEMORY LOSS: 0
HEADACHES: 1
BLOOD IN STOOL: 1
EYE DISCHARGE: 1

## 2018-03-27 ASSESSMENT — PAIN SCALES - GENERAL
PAINLEVEL_OUTOF10: 6
PAINLEVEL_OUTOF10: 5
PAINLEVEL_OUTOF10: 5

## 2018-03-27 NOTE — PROGRESS NOTES
Pt is AAOx4.  Moves all extremities 5/5.  Complains of numbness and tingling in hands and feet.  Up with standby assist, steady gait.  Pt complains of 5/10 pain.  States that this headache is constant and her baseline.  Pt denies nausea/vomiting.  Tolerating regular diet.  Pt voiding without difficulty.  POC discussed, pt verbalizes understanding.  Bed alarm on.  Call light within reach, bed in lowest position.

## 2018-03-27 NOTE — PROGRESS NOTES
Pt is A&Ox4. Denies any n/v. States n/t in hands & feet. Baseline per pt. Pt states headache of 5/10. Medicated appropriately per MAR. Pt updated on POC. All needs met at this time. Pt is instructed to call when in need of assistance. Bed in lowest and locked position. Call light within reach. Bed alarm and hourly rounding in place.

## 2018-03-27 NOTE — PROGRESS NOTES
IMPRESSION:     1. Seizures like spells since Jan 2018  2. Borderline Pseudotumor cerebri 2017  3. Intractable Headache since Sep 2016  4. Viral meningitis Sep 2015-- and memory problems since         PLAN/RECOMMENDATIONS:    This morning, Fiorcet was given PRN for headache   today, patient's request noticed, the patient would like to have something other than fiorcet for her headache  Will address that issue tomorrow    No major events of passing out yet  Will continue video EEG    Vitals:    03/27/18 0400 03/27/18 0715 03/27/18 1100 03/27/18 1401   BP: 113/66 (!) 98/64 (!) 99/66    Pulse: 68 67 (!) 125 95   Resp: 16 18 17    Temp: 36.6 °C (97.8 °F) 36.7 °C (98.1 °F) 36.9 °C (98.4 °F)    SpO2: 95% 97% 98%    Weight:       Height:         Physical Exam   Constitutional: She is oriented to person, place, and time and well-developed, well-nourished, and in no distress.   HENT:   Head: Normocephalic.   Eyes: Pupils are equal, round, and reactive to light.   Abdominal: Soft.   Musculoskeletal: Normal range of motion.   Neurological: She is alert and oriented to person, place, and time.   Skin: Skin is warm.     Review of Systems   Constitutional: Negative for fever.   Eyes: Positive for discharge.   Gastrointestinal: Positive for blood in stool.   Genitourinary: Negative for hematuria.   Musculoskeletal: Positive for falls.   Neurological: Positive for headaches.   Psychiatric/Behavioral: Negative for memory loss.

## 2018-03-28 PROCEDURE — A9270 NON-COVERED ITEM OR SERVICE: HCPCS | Performed by: PSYCHIATRY & NEUROLOGY

## 2018-03-28 PROCEDURE — 95951 HCHG EEG-VIDEO-24HR: CPT

## 2018-03-28 PROCEDURE — 770020 HCHG ROOM/CARE - TELE (206)

## 2018-03-28 PROCEDURE — 700102 HCHG RX REV CODE 250 W/ 637 OVERRIDE(OP): Performed by: PSYCHIATRY & NEUROLOGY

## 2018-03-28 RX ORDER — HYDROCODONE BITARTRATE AND ACETAMINOPHEN 7.5; 325 MG/1; MG/1
1 TABLET ORAL EVERY 6 HOURS PRN
Status: DISCONTINUED | OUTPATIENT
Start: 2018-03-28 | End: 2018-03-30 | Stop reason: HOSPADM

## 2018-03-28 RX ADMIN — ACETAZOLAMIDE 500 MG: 500 CAPSULE, EXTENDED RELEASE ORAL at 20:00

## 2018-03-28 RX ADMIN — TOPIRAMATE 100 MG: 25 TABLET, FILM COATED ORAL at 20:00

## 2018-03-28 RX ADMIN — HYDROCODONE BITARTRATE AND ACETAMINOPHEN 1 TABLET: 7.5; 325 TABLET ORAL at 20:02

## 2018-03-28 RX ADMIN — HYDROCODONE BITARTRATE AND ACETAMINOPHEN 1 TABLET: 7.5; 325 TABLET ORAL at 13:15

## 2018-03-28 RX ADMIN — TOPIRAMATE 100 MG: 25 TABLET, FILM COATED ORAL at 08:21

## 2018-03-28 ASSESSMENT — PAIN SCALES - GENERAL
PAINLEVEL_OUTOF10: 0
PAINLEVEL_OUTOF10: 4
PAINLEVEL_OUTOF10: 6
PAINLEVEL_OUTOF10: 5
PAINLEVEL_OUTOF10: 2
PAINLEVEL_OUTOF10: 0
PAINLEVEL_OUTOF10: 6

## 2018-03-28 ASSESSMENT — ENCOUNTER SYMPTOMS
MEMORY LOSS: 0
EYE DISCHARGE: 1
FALLS: 1
FEVER: 0
BLOOD IN STOOL: 1
HEADACHES: 1

## 2018-03-28 NOTE — PROGRESS NOTES
Received bedside report from [facility] RONAL Martinez at 1905. Plan of care discussed. Safety measures in place. Pt resting in bed, no complaints as of this time.     Assessment completed at 1945. Pt A&Ox4, c/o 6/10 headache and requested if she can have anything besides fioricet, but otherwise denied SOB, N/V, or lightheadedness. Numbness and tingling also baseline per pt report. Assisted pt to the bathroom, gait steady, no complaints of dizziness. Family at bedside.    Dr. Idris woodruff at 1946 as well as called cellphone number at 1958 and left a voicemail. Did not hear a call back from either numbers. Informed the pt, pt said she'll just take the fioricet for now. Fioricet administered along with other evening scheduled medications at 2108. Plan of care discussed, educated pt on the importance of using the call light prior to getting up and when needing assistance. Will reassess pain per unit protocol. No other needs identified by pt at this time. Fall precautions and seizure precautions in place. Personal belongings within reach. Will continue to monitor.

## 2018-03-28 NOTE — PROGRESS NOTES
IMPRESSION:     1. Seizures like spells since Jan 2018  2. Borderline Pseudotumor cerebri 2017  3. Intractable Headache since Sep 2016  4. Viral meningitis Sep 2015-- and memory problems since         PLAN/RECOMMENDATIONS:    Fiorcet did not help her headache, Vicodin PRN for headache was given today    Mentioned that, AM 1-3 , her wrist sleep monitor recorded non-sleep event  However, the patient could not recall what happened to her    We will keep her till Friday Evening in order to record more events    No major events of passing out yet  Will continue video EEG    Vitals:    03/27/18 2000 03/28/18 0000 03/28/18 0400 03/28/18 0800   BP: 101/59 103/62 (!) 96/66 107/57   Pulse: 83 67 (!) 58 76   Resp: 16 16 16 16   Temp: 36.8 °C (98.3 °F) 37.3 °C (99.2 °F) 36.6 °C (97.8 °F) 36.3 °C (97.3 °F)   SpO2: 93% 94% 96% 93%   Weight:       Height:         Physical Exam   Constitutional: She is oriented to person, place, and time and well-developed, well-nourished, and in no distress.   HENT:   Head: Normocephalic.   Eyes: Pupils are equal, round, and reactive to light.   Abdominal: Soft.   Musculoskeletal: Normal range of motion.   Neurological: She is alert and oriented to person, place, and time.   Skin: Skin is warm.     Review of Systems   Constitutional: Negative for fever.   Eyes: Positive for discharge.   Gastrointestinal: Positive for blood in stool.   Genitourinary: Negative for hematuria.   Musculoskeletal: Positive for falls.   Neurological: Positive for headaches.   Psychiatric/Behavioral: Negative for memory loss.

## 2018-03-28 NOTE — PROGRESS NOTES
12 hour chart check done.    Bedside report given to dayshift RONAL Martinez. Plan of care discussed. All questions addressed. No complaints or needs from patient as of this time. Safety measures in place.

## 2018-03-28 NOTE — CARE PLAN
Problem: Communication  Goal: The ability to communicate needs accurately and effectively will improve  Outcome: MET Date Met: 03/28/18

## 2018-03-29 PROCEDURE — 95951 HCHG EEG-VIDEO-24HR: CPT

## 2018-03-29 PROCEDURE — 700102 HCHG RX REV CODE 250 W/ 637 OVERRIDE(OP): Performed by: PSYCHIATRY & NEUROLOGY

## 2018-03-29 PROCEDURE — A9270 NON-COVERED ITEM OR SERVICE: HCPCS | Performed by: PSYCHIATRY & NEUROLOGY

## 2018-03-29 PROCEDURE — 770020 HCHG ROOM/CARE - TELE (206)

## 2018-03-29 RX ADMIN — ACETAZOLAMIDE 500 MG: 500 CAPSULE, EXTENDED RELEASE ORAL at 20:03

## 2018-03-29 RX ADMIN — HYDROCODONE BITARTRATE AND ACETAMINOPHEN 1 TABLET: 7.5; 325 TABLET ORAL at 05:33

## 2018-03-29 RX ADMIN — TOPIRAMATE 100 MG: 25 TABLET, FILM COATED ORAL at 20:02

## 2018-03-29 RX ADMIN — HYDROCODONE BITARTRATE AND ACETAMINOPHEN 1 TABLET: 7.5; 325 TABLET ORAL at 15:18

## 2018-03-29 RX ADMIN — TOPIRAMATE 100 MG: 25 TABLET, FILM COATED ORAL at 08:05

## 2018-03-29 ASSESSMENT — ENCOUNTER SYMPTOMS
HEADACHES: 1
FALLS: 1
FEVER: 0
BLOOD IN STOOL: 1
MEMORY LOSS: 0
EYE DISCHARGE: 1

## 2018-03-29 ASSESSMENT — PAIN SCALES - GENERAL
PAINLEVEL_OUTOF10: 1
PAINLEVEL_OUTOF10: 1
PAINLEVEL_OUTOF10: 0
PAINLEVEL_OUTOF10: 3
PAINLEVEL_OUTOF10: 6
PAINLEVEL_OUTOF10: 1

## 2018-03-29 NOTE — CARE PLAN
Problem: Venous Thromboembolism (VTW)/Deep Vein Thrombosis (DVT) Prevention:  Goal: Patient will participate in Venous Thrombosis (VTE)/Deep Vein Thrombosis (DVT)Prevention Measures    Intervention: Ensure patient wears graduated elastic stockings (DARIANA hose) and/or SCDs, if ordered, when in bed or chair (Remove at least once per shift for skin check)  SCDs on, pt compliant with use      Problem: Mobility  Goal: Risk for activity intolerance will decrease  Outcome: MET Date Met: 03/29/18  No activity intolerance

## 2018-03-30 VITALS
HEART RATE: 74 BPM | TEMPERATURE: 98.7 F | WEIGHT: 188.93 LBS | OXYGEN SATURATION: 97 % | RESPIRATION RATE: 16 BRPM | HEIGHT: 63 IN | SYSTOLIC BLOOD PRESSURE: 106 MMHG | DIASTOLIC BLOOD PRESSURE: 66 MMHG | BODY MASS INDEX: 33.48 KG/M2

## 2018-03-30 PROCEDURE — 700102 HCHG RX REV CODE 250 W/ 637 OVERRIDE(OP): Performed by: PSYCHIATRY & NEUROLOGY

## 2018-03-30 PROCEDURE — A9270 NON-COVERED ITEM OR SERVICE: HCPCS | Performed by: PSYCHIATRY & NEUROLOGY

## 2018-03-30 RX ADMIN — HYDROCODONE BITARTRATE AND ACETAMINOPHEN 1 TABLET: 7.5; 325 TABLET ORAL at 08:34

## 2018-03-30 RX ADMIN — TOPIRAMATE 100 MG: 25 TABLET, FILM COATED ORAL at 08:34

## 2018-03-30 RX ADMIN — HYDROCODONE BITARTRATE AND ACETAMINOPHEN 1 TABLET: 7.5; 325 TABLET ORAL at 14:07

## 2018-03-30 ASSESSMENT — PAIN SCALES - GENERAL
PAINLEVEL_OUTOF10: 9
PAINLEVEL_OUTOF10: 0
PAINLEVEL_OUTOF10: 0
PAINLEVEL_OUTOF10: 7
PAINLEVEL_OUTOF10: 8
PAINLEVEL_OUTOF10: 0

## 2018-03-30 NOTE — CARE PLAN
Problem: Communication  Goal: The ability to communicate needs accurately and effectively will improve  Outcome: PROGRESSING AS EXPECTED  Verbalizes understanding to use call light.

## 2018-03-30 NOTE — CARE PLAN
Problem: Safety  Goal: Will remain free from injury  Outcome: PROGRESSING AS EXPECTED  EEG room with camera monitoring. Alarms on and functioning.Calls out appropriately.

## 2018-03-30 NOTE — DISCHARGE INSTRUCTIONS
Discharge Instructions    Discharged to home by car with relative. Discharged via wheelchair, hospital escort: Yes.  Special equipment needed: Not Applicable    Be sure to schedule a follow-up appointment with your primary care doctor or any specialists as instructed.     Discharge Plan:   Influenza Vaccine Indication: Patient Refuses    I understand that a diet low in cholesterol, fat, and sodium is recommended for good health. Unless I have been given specific instructions below for another diet, I accept this instruction as my diet prescription.   Other diet: regular    Special Instructions: None    · Is patient discharged on Warfarin / Coumadin?   No     Depression / Suicide Risk    As you are discharged from this [facility] facility, it is important to learn how to keep safe from harming yourself.    Recognize the warning signs:  · Abrupt changes in personality, positive or negative- including increase in energy   · Giving away possessions  · Change in eating patterns- significant weight changes-  positive or negative  · Change in sleeping patterns- unable to sleep or sleeping all the time   · Unwillingness or inability to communicate  · Depression  · Unusual sadness, discouragement and loneliness  · Talk of wanting to die  · Neglect of personal appearance   · Rebelliousness- reckless behavior  · Withdrawal from people/activities they love  · Confusion- inability to concentrate     If you or a loved one observes any of these behaviors or has concerns about self-harm, here's what you can do:  · Talk about it- your feelings and reasons for harming yourself  · Remove any means that you might use to hurt yourself (examples: pills, rope, extension cords, firearm)  · Get professional help from the community (Mental Health, Substance Abuse, psychological counseling)  · Do not be alone:Call your Safe Contact- someone whom you trust who will be there for you.  · Call your local CRISIS HOTLINE 048-0491 or  072-895-5202  · Call your local Children's Mobile Crisis Response Team [place] (593) 426-6526 or www.[employer]  · Call the toll free National Suicide Prevention Hotlines   · National Suicide Prevention Lifeline 315-272-OEZO (4196)  · National [facility] Line Network 800-SUICIDE (496-6720)

## 2018-03-30 NOTE — PROGRESS NOTES
IMPRESSION:     1. Seizures like spells since Jan 2018  2. Borderline Pseudotumor cerebri 2017  3. Intractable Headache since Sep 2016  4. Viral meningitis Sep 2015-- and memory problems since         PLAN/RECOMMENDATIONS:    Fiorcet did not help her headache, Vicodin PRN for headache was given today    Mentioned that, AM 1-3 , her wrist sleep monitor recorded non-sleep event  However, the patient could not recall what happened to her-- review of this video EEG, nothing remarkable was found    We will keep her till Friday Evening in order to record more events    No major events of passing out yet  Will continue video EEG    Vitals:    03/29/18 0710 03/29/18 1110 03/29/18 1520 03/29/18 2000   BP: 101/58 101/67 101/54 101/54   Pulse: 85 79 88 76   Resp: 17 16 17 16   Temp: 36.8 °C (98.3 °F) 36.4 °C (97.6 °F) 36.8 °C (98.2 °F) 36.3 °C (97.3 °F)   SpO2: 96% 98% 97% 100%   Weight:       Height:         Physical Exam   Constitutional: She is oriented to person, place, and time and well-developed, well-nourished, and in no distress.   HENT:   Head: Normocephalic.   Eyes: Pupils are equal, round, and reactive to light.   Abdominal: Soft.   Musculoskeletal: Normal range of motion.   Neurological: She is alert and oriented to person, place, and time.   Skin: Skin is warm.     Review of Systems   Constitutional: Negative for fever.   Eyes: Positive for discharge.   Gastrointestinal: Positive for blood in stool.   Genitourinary: Negative for hematuria.   Musculoskeletal: Positive for falls.   Neurological: Positive for headaches.   Psychiatric/Behavioral: Negative for memory loss.

## 2018-03-30 NOTE — PROGRESS NOTES
Patient resting quietly in bed, no S/S of distress, AA&Ox4. Continuous EEG in place, no pain at this time. Bed alarm on, call light within reach,  pt calls appropriately and does not get out of bed. Bed in lowest position, bed locked, RN and CNA numbers provided, no further needs at this time. No changes from EPIC. Hourly rounding in place.

## 2018-03-30 NOTE — CARE PLAN
Problem: Safety  Goal: Will remain free from injury  Outcome: PROGRESSING AS EXPECTED  Patient remains free from injury. Patient uses call light when necessary. Nursing staff assists with ambulation. Patient educated on injury prevention.   Goal: Will remain free from falls  Outcome: PROGRESSING AS EXPECTED  Fall precautions implemented. Treaded socks and bed alarm in place. Patient remains free from falls.

## 2018-03-30 NOTE — PROGRESS NOTES
Patient being discharge home with no needs.Appointments and medications discussed with   during rounding.

## 2018-03-30 NOTE — PROGRESS NOTES
Discussed discharge , appointments and scripts. Patient to call 's office for follow up appointment.

## 2018-03-30 NOTE — PROGRESS NOTES
DISCHARGE DIANGOSIS:     1. Seizures like spells since Jan 2018  2. Borderline Pseudotumor cerebri 2017  3. Intractable Headache since Sep 2016  4. Viral meningitis Sep 2015-- and memory problems since      MANAGEMENT      Hospital Course    video EEG was successfully provided  Patient did not have prolonged passing out spells recorded though      Will resume home mediciation  There were no complication during this video EEG  Patient remained cooperative during this video EEG    Intractable Headache was noticed      Contact us next week in outpatient regarding headache management  Tried botox, routine ONB -- did not help  May try steroid free ONB in outpatient          INTERPRETATION:        ________________________________________________________________________     This intensive video scalp EEG  from 3/26/2018 to 3/30/2018 is consistent with mild nonspecific cortical dysfunction - not localizing      This nonspecific abnormalities could be secondary to metabolic, toxic, polypharmacy or even post-ictal     There are no epileptiform discharges and no electrographic seizures in this record ( despite multiple events of software recorded)     ________________________________________________________________________

## 2018-04-08 NOTE — PROCEDURES
DATE OF SERVICE:  03/30/2018    This is an inpatient video EEG from 03/26/2018 to 03/30/2018.    5 DAYS OF INTENSIVE VIDEO ELECTROENCEPHALOGRAM REPORT        This Video EEG was done from 3/26/2018 to 3/30/2018        INDICATION: 1. Seizures like spells since Jan 2018  2. Borderline Pseudotumor cerebri 2017  3. Intractable Headache since Sep 2016  4. Viral meningitis Sep 2015-- and memory problems since         TECHNIQUE:  Routine electroencephalogram (EEG) was performed in accordance with the international 10-20 system. The study was reviewed in bipolar and referential montages. The recording examined the patient during wakeful and drowsy state(s).      DESCRIPTION OF THE RECORD:     On 3/26/2018 Background rhythm during awake stage shows well-organized, well-developed, average voltage 10 to 11 hertz alpha activity in the posterior regions.  It blocks with eye opening and it is bilaterally synchronous and symmetrical.  No spike-and-wave discharges or any lateralizing abnormalities are seen.  There are some nonspecific generalized sharp bursts. There are sharp  over the right in sleep( probably sleep activities still ) Photic stimulation did not produce any abnormalities. Hyperventilation did not produce any abnormalities.  No abnormalities were found during the procedure. Stage II sleep was achieved.     On 3/27/2018 Background rhythm during awake stage shows well-organized, well-developed, average voltage 10 to 11 hertz alpha activity in the posterior regions.  It blocks with eye opening and it is bilaterally synchronous and symmetrical.  No spike-and-wave discharges or any lateralizing abnormalities are seen.  There are some nonspecific generalized sharp bursts. There are sharp  over the right in sleep( probably sleep activities still ) Stage II sleep was achieved.     On 3/28/2018 Background rhythm during awake stage shows well-organized, well-developed, average voltage 10 to 11 hertz alpha activity in  the posterior regions.  It blocks with eye opening and it is bilaterally synchronous and symmetrical.  No spike-and-wave discharges or any lateralizing abnormalities are seen.  There are some nonspecific generalized sharp bursts. There are sharp  over the right in sleep( probably sleep activities still ) Stage II sleep was achieved.     On 3/29/2018 Background rhythm during awake stage shows well-organized, well-developed, average voltage 10 to 11 hertz alpha activity in the posterior regions.  It blocks with eye opening and it is bilaterally synchronous and symmetrical.  No spike-and-wave discharges or any lateralizing abnormalities are seen.  There are some nonspecific generalized sharp bursts. There are sharp  over the right in sleep( probably sleep activities still ) Stage II sleep was achieved.     On 3/30/2018 Background rhythm during awake stage shows well-organized, well-developed, average voltage 10 to 11 hertz alpha activity in the posterior regions.  It blocks with eye opening and it is bilaterally synchronous and symmetrical.  No spike-and-wave discharges or any lateralizing abnormalities are seen.  There are some nonspecific generalized sharp bursts. There are sharp  over the right in sleep( probably sleep activities still ) Stage II sleep was achieved.     ACTIVATION PROCEDURES:          ICTAL AND/OR INTERICTAL FINDINGS:          EKG: sampling of the EKG recording demonstrated sinus rhythm.          INTERPRETATION:        ________________________________________________________________________     This intensive video scalp EEG  from 3/26/2018 to 3/30/2018 is consistent with mild nonspecific cortical dysfunction - not localizing      This nonspecific abnormalities could be secondary to metabolic, toxic, polypharmacy or even post-ictal     There are no epileptiform discharges and no electrographic seizures in this record ( despite multiple events of software  recorded)     ________________________________________________________________________              ________________________________________________________________________        Billing documentation reflecting total daily recordings:   3/26/2018 (22 hrs and 29 mins)  3/27/2018 (24 hrs)  3/28/2018 (24 hrs)  3/29/2018 (24 hrs)  3/30/2018 (8 hrs and 15 mins)  ________________________________________________________________________     nonspecific generalized sharp bursts.                 Sleep activity           ____________________________________     MD JANET BOWSER    DD:  04/08/2018 12:46:07  DT:  04/08/2018 13:27:28    D#:  [id number]  Job#:  [id number]

## 2018-04-08 NOTE — EEG PROGRESS NOTE
5 DAYS OF INTENSIVE VIDEO ELECTROENCEPHALOGRAM REPORT      This Video EEG was done from 3/26/2018 to 3/30/2018      INDICATION: 1. Seizures like spells since Jan 2018  2. Borderline Pseudotumor cerebri 2017  3. Intractable Headache since Sep 2016  4. Viral meningitis Sep 2015-- and memory problems since       TECHNIQUE:  Routine electroencephalogram (EEG) was performed in accordance with the international 10-20 system. The study was reviewed in bipolar and referential montages. The recording examined the patient during wakeful and drowsy state(s).     DESCRIPTION OF THE RECORD:    On 3/26/2018 Background rhythm during awake stage shows well-organized, well-developed, average voltage 10 to 11 hertz alpha activity in the posterior regions.  It blocks with eye opening and it is bilaterally synchronous and symmetrical.  No spike-and-wave discharges or any lateralizing abnormalities are seen.  There are some nonspecific generalized sharp bursts. There are sharp  over the right in sleep( probably sleep activities still ) Photic stimulation did not produce any abnormalities. Hyperventilation did not produce any abnormalities.  No abnormalities were found during the procedure. Stage II sleep was achieved.    On 3/27/2018 Background rhythm during awake stage shows well-organized, well-developed, average voltage 10 to 11 hertz alpha activity in the posterior regions.  It blocks with eye opening and it is bilaterally synchronous and symmetrical.  No spike-and-wave discharges or any lateralizing abnormalities are seen.  There are some nonspecific generalized sharp bursts. There are sharp  over the right in sleep( probably sleep activities still ) Stage II sleep was achieved.    On 3/28/2018 Background rhythm during awake stage shows well-organized, well-developed, average voltage 10 to 11 hertz alpha activity in the posterior regions.  It blocks with eye opening and it is bilaterally synchronous and symmetrical.  No  spike-and-wave discharges or any lateralizing abnormalities are seen.  There are some nonspecific generalized sharp bursts. There are sharp  over the right in sleep( probably sleep activities still ) Stage II sleep was achieved.    On 3/29/2018 Background rhythm during awake stage shows well-organized, well-developed, average voltage 10 to 11 hertz alpha activity in the posterior regions.  It blocks with eye opening and it is bilaterally synchronous and symmetrical.  No spike-and-wave discharges or any lateralizing abnormalities are seen.  There are some nonspecific generalized sharp bursts. There are sharp  over the right in sleep( probably sleep activities still ) Stage II sleep was achieved.    On 3/30/2018 Background rhythm during awake stage shows well-organized, well-developed, average voltage 10 to 11 hertz alpha activity in the posterior regions.  It blocks with eye opening and it is bilaterally synchronous and symmetrical.  No spike-and-wave discharges or any lateralizing abnormalities are seen.  There are some nonspecific generalized sharp bursts. There are sharp  over the right in sleep( probably sleep activities still ) Stage II sleep was achieved.    ACTIVATION PROCEDURES:        ICTAL AND/OR INTERICTAL FINDINGS:         EKG: sampling of the EKG recording demonstrated sinus rhythm.        INTERPRETATION:      ________________________________________________________________________    This intensive video scalp EEG  from 3/26/2018 to 3/30/2018 is consistent with mild nonspecific cortical dysfunction - not localizing     This nonspecific abnormalities could be secondary to metabolic, toxic, polypharmacy or even post-ictal    There are no epileptiform discharges and no electrographic seizures in this record ( despite multiple events of software  recorded)    ________________________________________________________________________          ________________________________________________________________________      Billing documentation reflecting total daily recordings:   3/26/2018 (22 hrs and 29 mins)  3/27/2018 (24 hrs)  3/28/2018 (24 hrs)  3/29/2018 (24 hrs)  3/30/2018 (8 hrs and 15 mins)  ________________________________________________________________________    nonspecific generalized sharp bursts.               Sleep activity

## 2018-04-16 ENCOUNTER — TELEPHONE (OUTPATIENT)
Dept: NEUROLOGY | Facility: MEDICAL CENTER | Age: 33
End: 2018-04-16

## 2018-04-17 ENCOUNTER — TELEPHONE (OUTPATIENT)
Dept: NEUROLOGY | Facility: MEDICAL CENTER | Age: 33
End: 2018-04-17

## 2018-04-17 NOTE — TELEPHONE ENCOUNTER
Mikayla Steinberg M.D.  Acacia Hood, Med Ass't   Caller: Unspecified (Today, 10:15 AM)             5 days of video EEG-- mildly abnormal   We could discuss more tomorrow     Spoke to patient gave above information.

## 2018-04-17 NOTE — TELEPHONE ENCOUNTER
Patient called frustrated she cannot get her EEG results despite multiple calls. I scheduled her for appointment tomorrow @ 8:00am.

## 2018-04-18 ENCOUNTER — OFFICE VISIT (OUTPATIENT)
Dept: NEUROLOGY | Facility: MEDICAL CENTER | Age: 33
End: 2018-04-18
Payer: COMMERCIAL

## 2018-04-18 VITALS
HEIGHT: 62 IN | DIASTOLIC BLOOD PRESSURE: 60 MMHG | WEIGHT: 187.61 LBS | HEART RATE: 78 BPM | SYSTOLIC BLOOD PRESSURE: 110 MMHG | BODY MASS INDEX: 34.52 KG/M2

## 2018-04-18 DIAGNOSIS — R25.2 SPASM: ICD-10-CM

## 2018-04-18 PROCEDURE — 99214 OFFICE O/P EST MOD 30 MIN: CPT | Performed by: PSYCHIATRY & NEUROLOGY

## 2018-04-18 NOTE — PATIENT INSTRUCTIONS
"    Today, The patient is very emotional and sad that no one in her family understands her  Also, she is very interested in pursuing the issue of Chiari I malformation    IMPRESSION:    1. Spasms, Seizures like spells since Jan 2018-- status post 5 days of video EEG 3/2018-- no definite seizures were recorded  2. Hx of Borderline Pseudotumor cerebri 2017  3. Intractable Headache since Sep 2016  4. Viral meningitis Sep 2015-- and memory problems since 2015      PLAN/RECOMMENDATIONS:              Explain to the patient  I would advise to treat her headache and spasm as migraine variant---   We could offer her more occipital nerve blocks-- without and with steroid, she is not interested at this time, she tried Botox (2 times) and ONB with steroid (3 times) without benefits  We could offer her a referral to pain specialist  We could also offer her a referral to [place] [facility]--- the patient is more interested in this advice  We could also offer her a psychiatric consultation--- the patient is not interested on this    We do explain to the patient   All tests are not perfect, despite uneventful 5 days video EEG, the spasm spells the patient describe might have unremarkable scalp EEG  Regarding the MRI of brain report-- the \"Low-lying cerebellar tonsils consistent with Chiari I malformation\" I explained to the patient, this is a common finding, I do not believe this subtle Chiari I malformation is the cause of her headache--- I do tell the patient that doctors other than me may believe in different ways. It is a good idea to listen to opinions of other doctors too.       2/2018      3/2017               Results for ETELVINA BROWN (MRN [id number]) as of 2/27/2018 15:12    Ref. Range 2/15/2018 12:10   Vitamin B6 Latest Ref Range: 20.0 - 125.0 nmol/L 14.2 (L)         INTERPRETATION OF EEG by me:        ________________________________________________________________________     This intensive video scalp " EEG  from 3/26/2018 to 3/30/2018 is consistent with mild nonspecific cortical dysfunction - not localizing      This nonspecific abnormalities could be secondary to metabolic, toxic, polypharmacy or even post-ictal     There are no epileptiform discharges and no electrographic seizures in this record ( despite multiple events of software recorded)     ________________________________________________________________________             MRI of brain 2/2018  1. 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    2. No evidence of mass lesion, heterotopic gray matter, gross cortical dysplasia, or mesial temporal sclerosis.      Routine EEG  INTERPRETATION: By Dr Austni  This is an abnormal video EEG recording in the awake and drowsy state(s). Frequent left temporal sharps and intermittent left temporal slowing. The findings increase risk for seizures and suggest underlying area of cortical irritability and structural abnormality. Clinical and radiological correlation is recommended.    SIGNATURE:  Mikayla Steinberg    CC:  Mack Bernard M.D.

## 2018-04-18 NOTE — PROGRESS NOTES
NEUROLOGY NOTE    Referring Physician  Mack Bernard M.D.      CHIEF COMPLAINT:    Started having seizure like spells-- could not move, could not talk in the night-- confused afterwards--Jan 2018  Seizures worsened since then      Another seizure happened after MRI of brain, that was the reason the patient came to us today  Became confused afterward    Chief Complaint   Patient presents with   • Migraine     follow up       PRESENT ILLNESS:         Started having seizure like spells-- could not move, could not talk in the night-- confused afterwards--Jan 2018    Seizures worsened since then      Pseudotumor cerebri?-- following by Dr Webster--- transient right vision loss --- woke up and felt better-- ( Dr Webster did not agree the diagnosis of pseudotumor cereberi)-- Initial opening pressure 27 cmH2O --- then initial opening pressure  Was around 9cm H2O  Has been on Diamox April 0217      Has bad headache 2016      Rehana Meade is a 32 y.o. female who presents to emergency room today with complaints of seizure. A she states she woke up this morning with fogginess and felt like she had her usual nocturnal seizure. She's had workup and seen by neurologist had EEG studies apparently positive for this per patient. She is currently on Topamax recently increased the dose on Friday, 2 days prior to ER visit. Currently her symptoms are resolved she denies chest pain shows restful fever chills or changes to bilateral complexes time. She contacted her physician was told to come to the emergency room.       Rehana Meade is a 32 y.o. female with a history of cerebral seizures who presents to the Emergency Department via gurney secondary to experiencing a seizure 20 minutes prior to arrival. Patient reports associated tingling in bilateral lower extremities. She reports that she was having an MRI performed when she suddenly experienced a seizure for a short period of time. She reports that she only  experiences seizure episodes at night and believes the MRI exacerbated her symptoms. Patient reports taking Keppra 125 mg two times per days, Diamox 500 mg, and Topamax 100 mg bid daily. She began having seizures in January, 2018 and is currently being worked up for them by a Dr. Steinberg (neurology).      PAST MEDICAL HISTORY:  Past Medical History:   Diagnosis Date   • Anesthesia     woke up combative   • Anxiety associated with depression    • ASTHMA    • Drug-seeking behavior    • Migraine without aura, without mention of intractable migraine without mention of status migrainosus    • Other specified symptom associated with female genital organs    • Seizure cerebral 2/15/2018   • Stroke (CMS-HCC)    • Unspecified disorder of thyroid     cyst       PAST SURGICAL HISTORY:  Past Surgical History:   Procedure Laterality Date   • TUBAL COAGULATION LAPAROSCOPIC BILATERAL  7/11/2014    Performed by Julian Parker M.D. at SURGERY SAME DAY [facility] ORS   • SEPTOPLASTY  10/28/2009    Performed by ANNETTE FORDE at SURGERY [facility] ORS   • SOMNOPLASTY  10/28/2009    Performed by ANNETTE FORDE at SURGERY [facility] ORS   • NASAL POLYPECTOMY  10/28/2009    Performed by ANNETTE FORDE at SURGERY [facility] ORS       FAMILY HISTORY:  No family history on file.    SOCIAL HISTORY:  Social History     Social History   • Marital status:      Spouse name: N/A   • Number of children: N/A   • Years of education: N/A     Occupational History   • Not on file.     Social History Main Topics   • Smoking status: Never Smoker   • Smokeless tobacco: Never Used   • Alcohol use Yes      Comment: rarely   • Drug use: No   • Sexual activity: Yes     Partners: Male     Other Topics Concern   • Not on file     Social History Narrative   • No narrative on file     ALLERGIES:  Allergies   Allergen Reactions   • Biaxin [Clarithromycin] Hives   • Penicillin G Potassium Vomiting   • Tape Rash   • Amitriptyline      Suicidal   •  "Ativan Anxiety     Hallucinations    • Imitrex [Sumatriptan] Unspecified     \"Makes my head pins and needles\"   • Latex Itching   • Maxalt [Rizatriptan]      Tremors, confusion     • Morphine Hives and Itching   • Reglan [Metoclopramide]      Muscle spasms     TOBHX  History   Smoking Status   • Never Smoker   Smokeless Tobacco   • Never Used     ALCHX  History   Alcohol Use   • Yes     Comment: rarely     DRUGHX  History   Drug Use No           MEDICATIONS:  Current Outpatient Prescriptions   Medication   • acetaZOLAMIDE SR (DIAMOX) 500 MG CAPSULE SR 12 HR   • zonisamide (ZONEGRAN) 100 MG Cap   • multivitamin (THERAGRAN) Tab   • topiramate (TOPAMAX) 100 MG Tab     No current facility-administered medications for this visit.        REVIEW OF SYSTEM:    Constitutional: Denies fevers, Denies weight changes   Eyes: Denies changes in vision, no eye pain   Ears/Nose/Throat/Mouth: Denies nasal congestion or sore throat   Cardiovascular: Denies chest pain or palpitations   Respiratory: Denies SOB.   Gastrointestinal/Hepatic: Denies abdominal pain, nausea, vomiting, diarrhea, constipation or GI bleeding   Genitourinary: Denies bladder dysfunction, dysuria or frequency   Musculoskeletal/Rheum: Denies joint pain and swelling   Skin/Breast: Denies rash, denies breast lumps or discharge   Neurological: seizure,    Psychiatric: denies mood disorder   Endocrine: denies hx of diabetes or thyroid dysfunction   Heme/Oncology/Lymph Nodes: Denies enlarged lymph nodes, denies brusing or known bleeding disorder   Allergic/Immunologic: Denies hx of allergies   Tubal ligation      PHYSICAL AND NEUROLOGICAL EXMAINATIONS:  VITAL SIGNS: /60   Pulse 78   Ht 1.575 m (5' 2\")   Wt 85.1 kg (187 lb 9.8 oz)   BMI 34.31 kg/m²   CURRENT WEIGHT:   BMI: Body mass index is 34.31 kg/m².  PREVIOUS WEIGHTS:  Wt Readings from Last 25 Encounters:   04/18/18 85.1 kg (187 lb 9.8 oz)   03/26/18 85.7 kg (188 lb 15 oz)   02/28/18 87.5 kg (192 lb 12.7 " oz)   02/24/18 90.7 kg (200 lb)   02/15/18 85.3 kg (188 lb)   02/11/18 86.2 kg (190 lb)   01/30/18 86.4 kg (190 lb 7.6 oz)   01/29/18 86.2 kg (190 lb)   01/23/18 87.5 kg (193 lb)   01/11/18 83 kg (183 lb)   01/08/18 87.5 kg (193 lb)   01/04/18 87.6 kg (193 lb 2 oz)   04/17/17 93.3 kg (205 lb 11 oz)   04/15/17 87.9 kg (193 lb 12.6 oz)   04/11/17 93.3 kg (205 lb 11 oz)   03/23/17 90.7 kg (200 lb)   03/17/17 92.1 kg (203 lb)   02/02/17 92.1 kg (203 lb)   12/08/16 91.1 kg (200 lb 13.4 oz)   11/10/16 90.7 kg (200 lb)   10/21/16 90.3 kg (199 lb)   10/20/16 90.2 kg (198 lb 12.8 oz)   10/05/16 89.4 kg (197 lb)   09/20/16 89.8 kg (198 lb)   05/10/16 86.2 kg (190 lb 0.6 oz)       General appearance of patient: WDWN(+) NAD(+)    EYES  o Fundus : Papilledem(-) Exudates(-) Hemorrhage(-)  Nervous System  Orientation to time, place and person(+)  Memory normal(-)  Language: aphasia(-)  Knowledge: past(+) Current(+)  Attention(+)  Cranial Nerves  • Nerve 2: intact  • Nerve 3,4,6: intact  • Nerve 5 : intact  • Nerve 7: intact  • Nerve 8: intact  • Nerve 9 & 10: intact  • Nerve 11: intact  • Nerve 12: intact  Muscle Power and muscle tone: symmetric, normal in upper and lower  Sensory System: Pin sensation intact(+)  Reflexes: symmetric throughout  Cerebellar Function FNP normal   Gait : Steady(+) TandemGait steady(+)  Heart and Vascular  Peripheral Vasucular system : Edema (-) Swelling(-)  RHB, Breathing sound clear  abdomen bowel sound normoactive  Extremities freely moveable  Joints no contracture       NEUROIMAGING: I reviewed the MRI/CT of brain         Today, The patient is very emotional and sad that no one in her family understands her  Also, she is very interested in pursuing the issue of Chiari I malformation    IMPRESSION:    1. Spasms, Seizures like spells since Jan 2018-- status post 5 days of video EEG 3/2018-- no definite seizures were recorded  2. Hx of Borderline Pseudotumor cerebri 2017  3. Intractable Headache since  "Sep 2016  4. Viral meningitis Sep 2015-- and memory problems since 2015      PLAN/RECOMMENDATIONS:              Explain to the patient  I would advise to treat her headache and spasm as migraine variant---   We could offer her more occipital nerve blocks-- without and with steroid, she is not interested at this time, she tried Botox (2 times) and ONB with steroid (3 times) without benefits  We could offer her a referral to pain specialist  We could also offer her a referral to [place] [facility]--- the patient is more interested in this advice  We could also offer her a psychiatric consultation--- the patient is not interested on this    We do explain to the patient   All tests are not perfect, despite uneventful 5 days video EEG, the spasm spells the patient describe might have unremarkable scalp EEG  Regarding the MRI of brain report-- the \"Low-lying cerebellar tonsils consistent with Chiari I malformation\" I explained to the patient, this is a common finding, I do not believe this subtle Chiari I malformation is the cause of her headache--- I do tell the patient that doctors other than me may believe in different ways. It is a good idea to listen to opinions of other doctors too.       2/2018      3/2017               Results for ETELVINA BROWN (MRN [id number]) as of 2/27/2018 15:12    Ref. Range 2/15/2018 12:10   Vitamin B6 Latest Ref Range: 20.0 - 125.0 nmol/L 14.2 (L)         INTERPRETATION OF EEG by me:        ________________________________________________________________________     This intensive video scalp EEG  from 3/26/2018 to 3/30/2018 is consistent with mild nonspecific cortical dysfunction - not localizing      This nonspecific abnormalities could be secondary to metabolic, toxic, polypharmacy or even post-ictal     There are no epileptiform discharges and no electrographic seizures in this record ( despite multiple events of software " recorded)     ________________________________________________________________________             MRI of brain 2/2018  1. 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    2. No evidence of mass lesion, heterotopic gray matter, gross cortical dysplasia, or mesial temporal sclerosis.      Routine EEG  INTERPRETATION: By Dr Austin  This is an abnormal video EEG recording in the awake and drowsy state(s). Frequent left temporal sharps and intermittent left temporal slowing. The findings increase risk for seizures and suggest underlying area of cortical irritability and structural abnormality. Clinical and radiological correlation is recommended.    SIGNATURE:  Mikayla Steinberg    CC:  Mack Bernard M.D.

## 2018-05-13 ENCOUNTER — HOSPITAL ENCOUNTER (OUTPATIENT)
Dept: RADIOLOGY | Facility: MEDICAL CENTER | Age: 33
End: 2018-05-13
Attending: NEUROLOGICAL SURGERY
Payer: COMMERCIAL

## 2018-05-13 DIAGNOSIS — G93.5 COMPRESSION OF BRAIN (HCC): ICD-10-CM

## 2018-05-13 PROCEDURE — 70551 MRI BRAIN STEM W/O DYE: CPT

## 2018-05-13 PROCEDURE — 72141 MRI NECK SPINE W/O DYE: CPT

## 2018-05-13 PROCEDURE — 72148 MRI LUMBAR SPINE W/O DYE: CPT

## 2018-05-13 PROCEDURE — 72146 MRI CHEST SPINE W/O DYE: CPT

## 2018-06-12 ENCOUNTER — TELEPHONE (OUTPATIENT)
Dept: NEUROLOGY | Facility: MEDICAL CENTER | Age: 33
End: 2018-06-12

## 2018-06-12 NOTE — TELEPHONE ENCOUNTER
Patient called  Basilia she does not want to go to [place]. She would like to be referred to a Neurologist out side of [facility].    Please refer     Thank you

## 2018-06-13 DIAGNOSIS — R42 SPELL OF DIZZINESS: ICD-10-CM

## 2018-09-23 ENCOUNTER — OFFICE VISIT (OUTPATIENT)
Dept: URGENT CARE | Facility: PHYSICIAN GROUP | Age: 33
End: 2018-09-23
Payer: COMMERCIAL

## 2018-09-23 VITALS
SYSTOLIC BLOOD PRESSURE: 118 MMHG | OXYGEN SATURATION: 99 % | HEIGHT: 62 IN | HEART RATE: 97 BPM | WEIGHT: 188 LBS | DIASTOLIC BLOOD PRESSURE: 70 MMHG | RESPIRATION RATE: 16 BRPM | TEMPERATURE: 98 F | BODY MASS INDEX: 34.6 KG/M2

## 2018-09-23 DIAGNOSIS — M79.602 LEFT ARM PAIN: ICD-10-CM

## 2018-09-23 PROCEDURE — 99214 OFFICE O/P EST MOD 30 MIN: CPT | Performed by: NURSE PRACTITIONER

## 2018-09-23 ASSESSMENT — ENCOUNTER SYMPTOMS
CHILLS: 0
FEVER: 0

## 2018-09-23 NOTE — PROGRESS NOTES
Subjective:      Rehana Meade is a 32 y.o. female who presents with Arm Pain (left arm pain, down to fingers. x 3 days )     Past Medical History:   Diagnosis Date   • Anesthesia     woke up combative   • Anxiety associated with depression    • ASTHMA    • Drug-seeking behavior    • Migraine without aura, without mention of intractable migraine without mention of status migrainosus    • Other specified symptom associated with female genital organs    • Seizure cerebral 2/15/2018   • Stroke (HCC)    • Unspecified disorder of thyroid     cyst     Social History     Social History   • Marital status:      Spouse name: N/A   • Number of children: N/A   • Years of education: N/A     Occupational History   • Not on file.     Social History Main Topics   • Smoking status: Never Smoker   • Smokeless tobacco: Never Used   • Alcohol use Yes      Comment: rarely   • Drug use: No   • Sexual activity: Yes     Partners: Male     Other Topics Concern   • Not on file     Social History Narrative   • No narrative on file     History reviewed. No pertinent family history.  Allergies: Biaxin [clarithromycin]; Penicillin g potassium; Tape; Amitriptyline; Ativan; Imitrex [sumatriptan]; Latex; Maxalt [rizatriptan]; Morphine; and Reglan [metoclopramide]    Patient is a 32-year-old female who presents with complaint of localized pain to the left upper arm that periodically radiates down the left arm to her fingers. Symptoms started over the last 2 days. Pain is reproduced with ranges of motion such as right ear duction, gripping, and lifting; patient states the arm aches constantly.  She denies any pain in her neck or shoulder, denies any strenuous activity or known injury. Patient is concerned for a DVT.          Arm Pain    Incident onset: 3 days ago. The injury mechanism is unknown. Pain location: left arm. The quality of the pain is described as aching. Radiates to: hand and fingers. The pain is mild. The pain has  "been constant since the incident. Pertinent negatives include no chest pain. Nothing aggravates the symptoms. She has tried nothing for the symptoms.       Review of Systems   Constitutional: Positive for malaise/fatigue. Negative for chills and fever.   Cardiovascular: Negative for chest pain.   Musculoskeletal:        Left arm pain   Skin: Negative.    All other systems reviewed and are negative.         Objective:     /70 (BP Location: Right arm, Patient Position: Sitting, BP Cuff Size: Adult)   Pulse 97   Temp 36.7 °C (98 °F) (Temporal)   Resp 16   Ht 1.575 m (5' 2\")   Wt 85.3 kg (188 lb)   SpO2 99%   BMI 34.39 kg/m²      Physical Exam   Constitutional: She is oriented to person, place, and time. She appears well-developed and well-nourished.   Cardiovascular: Normal rate and regular rhythm.    Pulmonary/Chest: Effort normal and breath sounds normal.   Musculoskeletal:   Full ROM in the left shoulder.  5/5 and equal, strength is 5/5 and equal. Pain is reproduced with Adduction across the midline. There is redness to the upper arm, however patient also has a warm pack on the skin.    Neurological: She is alert and oriented to person, place, and time.   Skin: Skin is warm. Capillary refill takes less than 2 seconds.   Psychiatric: She has a normal mood and affect. Her behavior is normal. Judgment and thought content normal.   Vitals reviewed.    EKG: NSR; no acute ST elevation or depression. No ischemic changes.      US ext: pending     Assessment/Plan:   Left arm pain  Ice  -ibuprofen  -rest  -follow up with PCP for persistent sypmtoms  There are no diagnoses linked to this encounter.      "

## 2018-09-24 ENCOUNTER — HOSPITAL ENCOUNTER (OUTPATIENT)
Dept: RADIOLOGY | Facility: MEDICAL CENTER | Age: 33
End: 2018-09-24
Attending: NURSE PRACTITIONER
Payer: COMMERCIAL

## 2018-09-24 ENCOUNTER — TELEPHONE (OUTPATIENT)
Dept: URGENT CARE | Facility: PHYSICIAN GROUP | Age: 33
End: 2018-09-24

## 2018-09-24 DIAGNOSIS — M79.602 LEFT ARM PAIN: ICD-10-CM

## 2018-09-24 PROCEDURE — 93971 EXTREMITY STUDY: CPT | Mod: LT

## 2018-09-24 NOTE — TELEPHONE ENCOUNTER
Patient notified by phone of ultrasound result. Result was within normal limits. Discussed with patient that I think this is muscular in origin. Recommended ibuprofen, ice, rest, with  Follow up in 5-7 days for persistent or worsening of symptoms. Patient verbalized understanding and agreement with plan of care .

## 2018-09-28 ENCOUNTER — HOSPITAL ENCOUNTER (OUTPATIENT)
Dept: LAB | Facility: MEDICAL CENTER | Age: 33
End: 2018-09-28
Attending: NURSE PRACTITIONER
Payer: COMMERCIAL

## 2018-11-30 ENCOUNTER — OFFICE VISIT (OUTPATIENT)
Dept: URGENT CARE | Facility: PHYSICIAN GROUP | Age: 33
End: 2018-11-30
Payer: COMMERCIAL

## 2018-11-30 VITALS
WEIGHT: 178 LBS | DIASTOLIC BLOOD PRESSURE: 70 MMHG | BODY MASS INDEX: 31.54 KG/M2 | RESPIRATION RATE: 16 BRPM | HEART RATE: 78 BPM | TEMPERATURE: 98.7 F | HEIGHT: 63 IN | OXYGEN SATURATION: 97 % | SYSTOLIC BLOOD PRESSURE: 118 MMHG

## 2018-11-30 DIAGNOSIS — N30.01 ACUTE CYSTITIS WITH HEMATURIA: ICD-10-CM

## 2018-11-30 DIAGNOSIS — R30.0 DYSURIA: ICD-10-CM

## 2018-11-30 LAB
APPEARANCE UR: CLEAR
BILIRUB UR STRIP-MCNC: NEGATIVE MG/DL
COLOR UR AUTO: YELLOW
GLUCOSE UR STRIP.AUTO-MCNC: NEGATIVE MG/DL
INT CON NEG: NEGATIVE
INT CON POS: POSITIVE
KETONES UR STRIP.AUTO-MCNC: NEGATIVE MG/DL
LEUKOCYTE ESTERASE UR QL STRIP.AUTO: NORMAL
NITRITE UR QL STRIP.AUTO: NEGATIVE
PH UR STRIP.AUTO: 7.5 [PH] (ref 5–8)
POC URINE PREGNANCY TEST: NEGATIVE
PROT UR QL STRIP: 30 MG/DL
RBC UR QL AUTO: NORMAL
SP GR UR STRIP.AUTO: 1.02
UROBILINOGEN UR STRIP-MCNC: 0.2 MG/DL

## 2018-11-30 PROCEDURE — 81002 URINALYSIS NONAUTO W/O SCOPE: CPT | Performed by: PHYSICIAN ASSISTANT

## 2018-11-30 PROCEDURE — 81025 URINE PREGNANCY TEST: CPT | Performed by: PHYSICIAN ASSISTANT

## 2018-11-30 PROCEDURE — 99214 OFFICE O/P EST MOD 30 MIN: CPT | Performed by: PHYSICIAN ASSISTANT

## 2018-11-30 RX ORDER — NITROFURANTOIN 25; 75 MG/1; MG/1
100 CAPSULE ORAL EVERY 12 HOURS
Qty: 10 CAP | Refills: 0 | Status: SHIPPED | OUTPATIENT
Start: 2018-11-30 | End: 2018-12-05

## 2018-11-30 ASSESSMENT — ENCOUNTER SYMPTOMS
VOMITING: 0
CONSTITUTIONAL NEGATIVE: 1
ABDOMINAL PAIN: 0
NAUSEA: 0
RESPIRATORY NEGATIVE: 1
DIARRHEA: 0
CHILLS: 0
FLANK PAIN: 1

## 2018-11-30 NOTE — PROGRESS NOTES
"Subjective:      Rehana Meade is a 32 y.o. female who presents with UTI (x 2 days )            Dysuria    This is a new problem. The current episode started in the past 7 days. The problem occurs every urination. The quality of the pain is described as burning. The pain is mild. There has been no fever. The fever has been present for less than 1 day. There is no history of pyelonephritis. Associated symptoms include flank pain, frequency and urgency. Pertinent negatives include no chills, discharge, hematuria, hesitancy, nausea or vomiting. She has tried nothing for the symptoms. The treatment provided no relief. There is no history of kidney stones or recurrent UTIs.       PMH:  has a past medical history of Anesthesia; Anxiety associated with depression; ASTHMA; Drug-seeking behavior; Migraine without aura, without mention of intractable migraine without mention of status migrainosus; Other specified symptom associated with female genital organs; Seizure cerebral (2/15/2018); Stroke (HCC); and Unspecified disorder of thyroid. She also has no past medical history of CAD (coronary artery disease); COPD; or Diabetes.  MEDS:   Current Outpatient Prescriptions:   •  nitrofurantoin monohydr macro (MACROBID) 100 MG Cap, Take 1 Cap by mouth every 12 hours for 5 days., Disp: 10 Cap, Rfl: 0  •  acetaZOLAMIDE SR (DIAMOX) 500 MG CAPSULE SR 12 HR, Take 500 mg by mouth every evening., Disp: , Rfl:   •  zonisamide (ZONEGRAN) 100 MG Cap, Take 100 mg by mouth every evening., Disp: , Rfl:   •  multivitamin (THERAGRAN) Tab, Take 1 Tab by mouth every day., Disp: , Rfl:   •  topiramate (TOPAMAX) 100 MG Tab, Take 1 Tab by mouth 2 times a day., Disp: 60 Tab, Rfl: 3  ALLERGIES:   Allergies   Allergen Reactions   • Biaxin [Clarithromycin] Hives   • Penicillin G Potassium Vomiting   • Tape Rash   • Amitriptyline      Suicidal   • Ativan Anxiety     Hallucinations    • Imitrex [Sumatriptan] Unspecified     \"Makes my head pins " "and needles\"   • Latex Itching   • Maxalt [Rizatriptan]      Tremors, confusion     • Morphine Hives and Itching   • Reglan [Metoclopramide]      Muscle spasms     SURGHX:   Past Surgical History:   Procedure Laterality Date   • TUBAL COAGULATION LAPAROSCOPIC BILATERAL  7/11/2014    Performed by Julian Parker M.D. at SURGERY SAME DAY [facility] ORS   • SEPTOPLASTY  10/28/2009    Performed by ANNETTE FORDE at SURGERY [facility] ORS   • SOMNOPLASTY  10/28/2009    Performed by ANNETTE FORDE at SURGERY [facility] ORS   • NASAL POLYPECTOMY  10/28/2009    Performed by ANNETTE FORDE at SURGERY [facility] ORS     SOCHX:  reports that she has never smoked. She has never used smokeless tobacco. She reports that she drinks alcohol. She reports that she does not use drugs.  FH: family history is not on file.    Review of Systems   Constitutional: Negative.  Negative for chills.   Respiratory: Negative.    Gastrointestinal: Negative for abdominal pain, diarrhea, nausea and vomiting.   Genitourinary: Positive for dysuria, flank pain, frequency and urgency. Negative for hematuria and hesitancy.       Medications, Allergies, and current problem list reviewed today in Epic     Objective:     /70   Pulse 78   Temp 37.1 °C (98.7 °F) (Temporal)   Resp 16   Ht 1.6 m (5' 3\")   Wt 80.7 kg (178 lb)   SpO2 97%   BMI 31.53 kg/m²      Physical Exam   Constitutional: She is oriented to person, place, and time. She appears well-developed and well-nourished. No distress.   HENT:   Head: Normocephalic and atraumatic.   Eyes: Conjunctivae and EOM are normal.   Neck: Normal range of motion. Neck supple.   Cardiovascular: Normal rate, regular rhythm and normal heart sounds.    Pulmonary/Chest: Effort normal and breath sounds normal. No respiratory distress. She has no wheezes.   Abdominal:   No CVA tenderness or flank pain bilaterally   Neurological: She is alert and oriented to person, place, and time.   Skin: Skin is " warm and dry. She is not diaphoretic.   Psychiatric: She has a normal mood and affect. Her behavior is normal. Judgment and thought content normal.   Nursing note and vitals reviewed.              Assessment/Plan:     1. Acute cystitis with hematuria  nitrofurantoin monohydr macro (MACROBID) 100 MG Cap   2. Dysuria  POCT Urinalysis    POCT PREGNANCY     Urinalysis: Positive leukocytes  Pregnancy: Negative  Take full course of antibiotic  Significantly increase fluids  OTC Motrin or Azo as needed  Cranberry as tolerated  Return to clinic or go to ED if symptoms worsen or persist. Indications for ED discussed at length. Patient voices understanding. Follow-up with your primary care provider in 3-5 days. Red flags discussed. All side effects of medication discussed including allergic response, GI upset, tendon injury, etc.    Please note that this dictation was created using voice recognition software. I have made every reasonable attempt to correct obvious errors, but I expect that there are errors of grammar and possibly content that I did not discover before finalizing the note.

## 2019-01-21 ENCOUNTER — HOSPITAL ENCOUNTER (OUTPATIENT)
Dept: HOSPITAL 8 - CFH | Age: 34
Discharge: HOME | End: 2019-01-21
Attending: PSYCHIATRY & NEUROLOGY
Payer: COMMERCIAL

## 2019-01-21 DIAGNOSIS — M47.812: Primary | ICD-10-CM

## 2019-01-21 PROCEDURE — 72141 MRI NECK SPINE W/O DYE: CPT

## 2019-01-29 ENCOUNTER — HOSPITAL ENCOUNTER (OUTPATIENT)
Dept: LAB | Facility: MEDICAL CENTER | Age: 34
End: 2019-01-29
Attending: FAMILY MEDICINE
Payer: COMMERCIAL

## 2019-01-29 LAB
25(OH)D3 SERPL-MCNC: 18 NG/ML (ref 30–100)
ALBUMIN SERPL BCP-MCNC: 4.4 G/DL (ref 3.2–4.9)
ALBUMIN/GLOB SERPL: 1.4 G/DL
ALP SERPL-CCNC: 94 U/L (ref 30–99)
ALT SERPL-CCNC: 12 U/L (ref 2–50)
ANION GAP SERPL CALC-SCNC: 7 MMOL/L (ref 0–11.9)
AST SERPL-CCNC: 13 U/L (ref 12–45)
BASOPHILS # BLD AUTO: 0.8 % (ref 0–1.8)
BASOPHILS # BLD: 0.07 K/UL (ref 0–0.12)
BILIRUB SERPL-MCNC: 0.2 MG/DL (ref 0.1–1.5)
BUN SERPL-MCNC: 13 MG/DL (ref 8–22)
CALCIUM SERPL-MCNC: 9.9 MG/DL (ref 8.5–10.5)
CHLORIDE SERPL-SCNC: 109 MMOL/L (ref 96–112)
CHOLEST SERPL-MCNC: 193 MG/DL (ref 100–199)
CO2 SERPL-SCNC: 22 MMOL/L (ref 20–33)
CREAT SERPL-MCNC: 0.86 MG/DL (ref 0.5–1.4)
EOSINOPHIL # BLD AUTO: 0.39 K/UL (ref 0–0.51)
EOSINOPHIL NFR BLD: 4.4 % (ref 0–6.9)
ERYTHROCYTE [DISTWIDTH] IN BLOOD BY AUTOMATED COUNT: 47.1 FL (ref 35.9–50)
FOLATE SERPL-MCNC: 20.9 NG/ML
GLOBULIN SER CALC-MCNC: 3.1 G/DL (ref 1.9–3.5)
GLUCOSE SERPL-MCNC: 94 MG/DL (ref 65–99)
HCT VFR BLD AUTO: 46.5 % (ref 37–47)
HDLC SERPL-MCNC: 80 MG/DL
HGB BLD-MCNC: 14.5 G/DL (ref 12–16)
IMM GRANULOCYTES # BLD AUTO: 0.02 K/UL (ref 0–0.11)
IMM GRANULOCYTES NFR BLD AUTO: 0.2 % (ref 0–0.9)
LDLC SERPL CALC-MCNC: 105 MG/DL
LYMPHOCYTES # BLD AUTO: 1.85 K/UL (ref 1–4.8)
LYMPHOCYTES NFR BLD: 20.8 % (ref 22–41)
MCH RBC QN AUTO: 29.9 PG (ref 27–33)
MCHC RBC AUTO-ENTMCNC: 31.2 G/DL (ref 33.6–35)
MCV RBC AUTO: 95.9 FL (ref 81.4–97.8)
MONOCYTES # BLD AUTO: 0.64 K/UL (ref 0–0.85)
MONOCYTES NFR BLD AUTO: 7.2 % (ref 0–13.4)
NEUTROPHILS # BLD AUTO: 5.93 K/UL (ref 2–7.15)
NEUTROPHILS NFR BLD: 66.6 % (ref 44–72)
NRBC # BLD AUTO: 0 K/UL
NRBC BLD-RTO: 0 /100 WBC
PLATELET # BLD AUTO: 311 K/UL (ref 164–446)
PMV BLD AUTO: 10 FL (ref 9–12.9)
POTASSIUM SERPL-SCNC: 3.9 MMOL/L (ref 3.6–5.5)
PROT SERPL-MCNC: 7.5 G/DL (ref 6–8.2)
RBC # BLD AUTO: 4.85 M/UL (ref 4.2–5.4)
SODIUM SERPL-SCNC: 138 MMOL/L (ref 135–145)
T3FREE SERPL-MCNC: 2.66 PG/ML (ref 2.4–4.2)
T4 FREE SERPL-MCNC: 0.69 NG/DL (ref 0.53–1.43)
TRIGL SERPL-MCNC: 42 MG/DL (ref 0–149)
TSH SERPL DL<=0.005 MIU/L-ACNC: 1.54 UIU/ML (ref 0.38–5.33)
VIT B12 SERPL-MCNC: 386 PG/ML (ref 211–911)
WBC # BLD AUTO: 8.9 K/UL (ref 4.8–10.8)

## 2019-01-29 PROCEDURE — 84481 FREE ASSAY (FT-3): CPT

## 2019-01-29 PROCEDURE — 82607 VITAMIN B-12: CPT

## 2019-01-29 PROCEDURE — 80061 LIPID PANEL: CPT

## 2019-01-29 PROCEDURE — 85025 COMPLETE CBC W/AUTO DIFF WBC: CPT

## 2019-01-29 PROCEDURE — 84443 ASSAY THYROID STIM HORMONE: CPT

## 2019-01-29 PROCEDURE — 84439 ASSAY OF FREE THYROXINE: CPT

## 2019-01-29 PROCEDURE — 80053 COMPREHEN METABOLIC PANEL: CPT

## 2019-01-29 PROCEDURE — 86800 THYROGLOBULIN ANTIBODY: CPT

## 2019-01-29 PROCEDURE — 82746 ASSAY OF FOLIC ACID SERUM: CPT

## 2019-01-29 PROCEDURE — 36415 COLL VENOUS BLD VENIPUNCTURE: CPT

## 2019-01-29 PROCEDURE — 82306 VITAMIN D 25 HYDROXY: CPT

## 2019-02-01 ENCOUNTER — HOSPITAL ENCOUNTER (EMERGENCY)
Facility: MEDICAL CENTER | Age: 34
End: 2019-02-02
Attending: EMERGENCY MEDICINE
Payer: COMMERCIAL

## 2019-02-01 DIAGNOSIS — T78.40XA ALLERGIC REACTION, INITIAL ENCOUNTER: ICD-10-CM

## 2019-02-01 LAB — THYROGLOB AB SERPL-ACNC: <0.9 IU/ML (ref 0–4)

## 2019-02-01 PROCEDURE — 99284 EMERGENCY DEPT VISIT MOD MDM: CPT

## 2019-02-01 PROCEDURE — 96375 TX/PRO/DX INJ NEW DRUG ADDON: CPT

## 2019-02-01 PROCEDURE — 96374 THER/PROPH/DIAG INJ IV PUSH: CPT

## 2019-02-01 PROCEDURE — 700111 HCHG RX REV CODE 636 W/ 250 OVERRIDE (IP): Performed by: EMERGENCY MEDICINE

## 2019-02-01 RX ORDER — METHYLPREDNISOLONE SODIUM SUCCINATE 125 MG/2ML
125 INJECTION, POWDER, LYOPHILIZED, FOR SOLUTION INTRAMUSCULAR; INTRAVENOUS ONCE
Status: COMPLETED | OUTPATIENT
Start: 2019-02-01 | End: 2019-02-01

## 2019-02-01 RX ORDER — DIPHENHYDRAMINE HYDROCHLORIDE 50 MG/ML
25 INJECTION INTRAMUSCULAR; INTRAVENOUS ONCE
Status: COMPLETED | OUTPATIENT
Start: 2019-02-01 | End: 2019-02-01

## 2019-02-01 RX ORDER — ERENUMAB-AOOE 70 MG/ML
140 INJECTION SUBCUTANEOUS
Refills: 11 | Status: SHIPPED | COMMUNITY
Start: 2018-11-05 | End: 2022-09-21

## 2019-02-01 RX ADMIN — DIPHENHYDRAMINE HYDROCHLORIDE 25 MG: 50 INJECTION, SOLUTION INTRAMUSCULAR; INTRAVENOUS at 23:02

## 2019-02-01 RX ADMIN — METHYLPREDNISOLONE SODIUM SUCCINATE 125 MG: 125 INJECTION, POWDER, FOR SOLUTION INTRAMUSCULAR; INTRAVENOUS at 23:03

## 2019-02-02 VITALS
DIASTOLIC BLOOD PRESSURE: 84 MMHG | BODY MASS INDEX: 34.49 KG/M2 | SYSTOLIC BLOOD PRESSURE: 132 MMHG | HEIGHT: 63 IN | TEMPERATURE: 97 F | HEART RATE: 79 BPM | OXYGEN SATURATION: 92 % | WEIGHT: 194.67 LBS | RESPIRATION RATE: 16 BRPM

## 2019-02-02 RX ORDER — PREDNISONE 20 MG/1
60 TABLET ORAL DAILY
Qty: 3 TAB | Refills: 0 | Status: SHIPPED | OUTPATIENT
Start: 2019-02-02 | End: 2019-02-03

## 2019-02-02 RX ORDER — CIPROFLOXACIN 500 MG/1
500 TABLET, FILM COATED ORAL 2 TIMES DAILY
Qty: 14 TAB | Refills: 0 | Status: SHIPPED | OUTPATIENT
Start: 2019-02-02 | End: 2019-02-09

## 2019-02-02 NOTE — ED NOTES
Pt appears to be resting quietly. Even, easy respirations noted. Does not appear to be distressed, will continue to monitor.

## 2019-02-02 NOTE — ED TRIAGE NOTES
"Chief Complaint   Patient presents with   • Allergic Reaction     diagnosed with UTI by PCP, taking unknown antibiotic x24 hours, now \"tongue hurts and having a hard time swallowing\"     Patient reports recently diagnosed with UTI, PCP prescribed \"some sulfa based antibiotic\" patient reports above symptoms.  Patient A&O, speaking in full sentences, managing oral secretions.      Explained wait time and triage process to pt. Pt placed back out in lobby, told to notify ED tech or triage RN of any changes, verbalized understanding.    "

## 2019-02-02 NOTE — ED PROVIDER NOTES
ED Provider Note    HPI: Patient is a 33-year-old female who presented to the emergency department February 1, 2019 at 9:22 PM with a chief complaint of tongue swelling.    Patient started on a sulfa containing antibiotic (possibly Bactrim) for UTI.  The patient notes she is having a hard time swallowing and feels like the base of her tongue is somewhat swollen.  No shortness of breath no nausea no vomiting no abdominal pain.  No change in bladder or bowel habits.  No rash or itching no other somatic complaints    Review of Systems: Positive for dysphagia/difficulty swallowing, tongue swelling negative for change in bladder or bowel habits chest pain shortness of breath nausea vomiting abdominal pain.  Review of systems reviewed with patient, all other systems negative    Past medical/surgical history: Allergic reaction    Medications:     Allergies: Biaxin penicillin G tape amitriptyline and Ativan Imitrex latex Maxalt morphine Reglan    Social History:       Physical exam: Constitutional: Pleasant female awake alert appears slightly anxious  Vital signs: Temperature 97.0 pulse 90 respirations 16 blood pressure 104/65 pulse oximetry 100%  EYES: PERRL, EOMI, Conjunctivae and sclera normal, eyelids normal bilaterally.  Neck: Trachea midline. No cervical masses seen or palpated. Normal range of motion, supple. No meningeal signs elicited.  Cardiac: Regular rate and rhythm. S1-S2 present. No S3 or S4 present. No murmurs, rubs, or gallops heard. No edema or varicosities were seen.   Lungs: Clear to auscultation with good aeration throughout. No wheezes, rales, or rhonchi heard. Patient's chest wall moved symmetrically with each respiratory effort. Patient was not making use of accessory muscles of respiration in breathing.  Abdomen: Soft nontender to palpation. No rebound or guarding elicited. No organomegaly identified. No pulsatile abdominal masses identified.   Musculoskeletal:  no  pain with palpitation or movement  of muscle, bone or joint , no obvious musculoskeletal deformities identified.  Neurologic: alert and awake answers questions appropriately. Moves all four extremities independently, no gross focal abnormalities identified. Normal strength and motor.  Skin: no rash or lesion seen, no palpable dermatologic lesions identified.  ENT exam: Based the patient's tongue appears to be minimally swollen.  Airway appeared to be widely patent.  No evidence of retropharyngeal or parapharyngeal swelling is seen.  No ear drainage seen.  Mastoids normal bilateral    Medical decision making: Patient immediately given 125 mg of Solu-Medrol and 25 mg of diphenhydramine IV.    Patient held in the department for 3 hours.  She gradually felt better.  Repeat exam showed the apparent swelling at the base of the tongue markedly diminished with the airway widely patent and the anatomy appear normal.  Patient wished to be discharged.  She will be discharged with a prescription for 1 more dose of steroids to be taken tomorrow.  I also wrote the patient for ciprofloxacin and told her to discontinue the other antibiotic.  The patient will follow up with her primary care provider for general care.  She is given usual discharge instructions for allergic reaction.  She is carefully counseled return to ED immediately for any shortness of breath difficulty swallowing or other problems    Patient verbalized understanding of these instructions and states she will comply    Impression allergic reaction

## 2019-05-29 ENCOUNTER — OFFICE VISIT (OUTPATIENT)
Dept: URGENT CARE | Facility: PHYSICIAN GROUP | Age: 34
End: 2019-05-29
Payer: COMMERCIAL

## 2019-05-29 VITALS
HEIGHT: 63 IN | WEIGHT: 184 LBS | SYSTOLIC BLOOD PRESSURE: 102 MMHG | HEART RATE: 78 BPM | OXYGEN SATURATION: 98 % | BODY MASS INDEX: 32.6 KG/M2 | TEMPERATURE: 98.6 F | DIASTOLIC BLOOD PRESSURE: 60 MMHG | RESPIRATION RATE: 16 BRPM

## 2019-05-29 DIAGNOSIS — H00.012 HORDEOLUM EXTERNUM OF RIGHT LOWER EYELID: ICD-10-CM

## 2019-05-29 DIAGNOSIS — H10.31 ACUTE CONJUNCTIVITIS OF RIGHT EYE, UNSPECIFIED ACUTE CONJUNCTIVITIS TYPE: ICD-10-CM

## 2019-05-29 PROCEDURE — 99214 OFFICE O/P EST MOD 30 MIN: CPT | Performed by: NURSE PRACTITIONER

## 2019-05-29 RX ORDER — POLYMYXIN B SULFATE AND TRIMETHOPRIM 1; 10000 MG/ML; [USP'U]/ML
1 SOLUTION OPHTHALMIC EVERY 4 HOURS
Qty: 10 ML | Refills: 0 | Status: SHIPPED | OUTPATIENT
Start: 2019-05-29 | End: 2019-05-29

## 2019-05-29 RX ORDER — TOBRAMYCIN 3 MG/ML
1 SOLUTION/ DROPS OPHTHALMIC EVERY 4 HOURS
Qty: 1 BOTTLE | Refills: 0 | Status: SHIPPED | OUTPATIENT
Start: 2019-05-29 | End: 2019-11-29

## 2019-05-29 ASSESSMENT — ENCOUNTER SYMPTOMS
FOREIGN BODY SENSATION: 0
NAUSEA: 0
SORE THROAT: 0
EYE DISCHARGE: 0
EYE ITCHING: 1
PHOTOPHOBIA: 0
BLURRED VISION: 0
SHORTNESS OF BREATH: 0
MYALGIAS: 0
EYE REDNESS: 1
VOMITING: 0
DIZZINESS: 0
FEVER: 0
EYE PAIN: 1
CHILLS: 0

## 2019-05-29 NOTE — PROGRESS NOTES
"Subjective:   Rehana Meade is a 33 y.o. female who presents for Eye Problem (Eyelid edema and pain right eye x 3 days)        Eye Problem    The right eye is affected. This is a new problem. Episode onset: 3 days. The problem occurs constantly. The problem has been unchanged. There was no injury mechanism. The pain is at a severity of 1/10. The pain is mild (when blinking). There is no known exposure to pink eye. She does not wear contacts. Associated symptoms include eye redness and itching. Pertinent negatives include no blurred vision, eye discharge, fever, foreign body sensation, nausea, photophobia, recent URI or vomiting. She has tried nothing for the symptoms. The treatment provided no relief.     Review of Systems   Constitutional: Negative for chills and fever.   HENT: Negative for sore throat.    Eyes: Positive for pain (with blinking), redness and itching. Negative for blurred vision, photophobia and discharge.   Respiratory: Negative for shortness of breath.    Cardiovascular: Negative for chest pain.   Gastrointestinal: Negative for nausea and vomiting.   Genitourinary: Negative for hematuria.   Musculoskeletal: Negative for myalgias.   Skin: Negative for rash.   Neurological: Negative for dizziness.     Allergies   Allergen Reactions   • Biaxin [Clarithromycin] Hives   • Penicillin G Potassium Vomiting   • Tape Rash   • Amitriptyline      Suicidal   • Ativan Anxiety     Hallucinations    • Imitrex [Sumatriptan] Unspecified     \"Makes my head pins and needles\"   • Latex Itching   • Maxalt [Rizatriptan]      Tremors, confusion     • Morphine Hives and Itching   • Reglan [Metoclopramide]      Muscle spasms      Objective:   /60   Pulse 78   Temp 37 °C (98.6 °F) (Temporal)   Resp 16   Ht 1.6 m (5' 3\")   Wt 83.5 kg (184 lb)   SpO2 98%   BMI 32.59 kg/m²   Physical Exam   Constitutional: She is oriented to person, place, and time. She appears well-developed and well-nourished. No " distress.   HENT:   Head: Normocephalic and atraumatic.   Right Ear: Tympanic membrane normal.   Left Ear: Tympanic membrane normal.   Nose: Nose normal. Right sinus exhibits no maxillary sinus tenderness and no frontal sinus tenderness. Left sinus exhibits no maxillary sinus tenderness and no frontal sinus tenderness.   Mouth/Throat: Uvula is midline, oropharynx is clear and moist and mucous membranes are normal. No posterior oropharyngeal edema, posterior oropharyngeal erythema or tonsillar abscesses. No tonsillar exudate.   Eyes: Pupils are equal, round, and reactive to light. EOM and lids are normal. Right eye exhibits hordeolum. Right eye exhibits no discharge. Left eye exhibits no discharge. Right conjunctiva is injected. Right eye exhibits normal extraocular motion and no nystagmus.   Cardiovascular: Normal rate and regular rhythm.    No murmur heard.  Pulmonary/Chest: Effort normal and breath sounds normal. No respiratory distress.   Abdominal: Soft. She exhibits no distension. There is no tenderness.   Neurological: She is alert and oriented to person, place, and time. She has normal reflexes. No sensory deficit.   Skin: Skin is warm, dry and intact.   Psychiatric: She has a normal mood and affect.         Assessment/Plan:   /  1. Hordeolum externum of right lower eyelid  tobramycin (TOBREX) 0.3 % Solution ophthalmic solution    DISCONTINUED: polymixin-trimethoprim (POLYTRIM) 83658-6.1 UNIT/ML-% Solution   2. Acute conjunctivitis of right eye, unspecified acute conjunctivitis type  tobramycin (TOBREX) 0.3 % Solution ophthalmic solution    DISCONTINUED: polymixin-trimethoprim (POLYTRIM) 15372-2.1 UNIT/ML-% Solution     Warm compresses to affected eye(s) 3 times daily  Hand hygiene discussed  Wash all recently used linens and towels in hot water  Do not touch dropper to eye (s)  Differential diagnosis, natural history, supportive care, and indications for immediate follow-up discussed.

## 2019-11-29 ENCOUNTER — OFFICE VISIT (OUTPATIENT)
Dept: URGENT CARE | Facility: PHYSICIAN GROUP | Age: 34
End: 2019-11-29
Payer: COMMERCIAL

## 2019-11-29 VITALS
DIASTOLIC BLOOD PRESSURE: 70 MMHG | TEMPERATURE: 97.9 F | HEART RATE: 89 BPM | BODY MASS INDEX: 31.82 KG/M2 | RESPIRATION RATE: 16 BRPM | HEIGHT: 63 IN | SYSTOLIC BLOOD PRESSURE: 118 MMHG | OXYGEN SATURATION: 97 % | WEIGHT: 179.6 LBS

## 2019-11-29 DIAGNOSIS — J32.4 PANSINUSITIS, UNSPECIFIED CHRONICITY: ICD-10-CM

## 2019-11-29 DIAGNOSIS — R09.82 PND (POST-NASAL DRIP): ICD-10-CM

## 2019-11-29 PROCEDURE — 99214 OFFICE O/P EST MOD 30 MIN: CPT | Performed by: NURSE PRACTITIONER

## 2019-11-29 RX ORDER — FLUTICASONE PROPIONATE 50 MCG
2 SPRAY, SUSPENSION (ML) NASAL DAILY
Qty: 1 BOTTLE | Refills: 0 | Status: SHIPPED | OUTPATIENT
Start: 2019-11-29 | End: 2019-12-04

## 2019-11-29 ASSESSMENT — ENCOUNTER SYMPTOMS
SORE THROAT: 0
SWOLLEN GLANDS: 0
HEADACHES: 0
COUGH: 0
NECK PAIN: 0
SHORTNESS OF BREATH: 0
CHILLS: 1
SINUS PRESSURE: 1
DIAPHORESIS: 0
HOARSE VOICE: 0

## 2019-11-29 NOTE — PATIENT INSTRUCTIONS

## 2019-11-29 NOTE — PROGRESS NOTES
"Subjective:      Rehana Meade is a 33 y.o. female who presents with Nasal Congestion (sinus infection, ear aches, sinus pressure, x3 days )    Reviewed past medical, surgical and family history. Reviewed prescription and OTC medications with patient in electronic health record today      Allergies   Allergen Reactions   • Sulfa Drugs Anaphylaxis   • Biaxin [Clarithromycin] Hives   • Penicillin G Potassium Vomiting   • Tape Rash   • Amitriptyline      Suicidal   • Ativan Anxiety     Hallucinations    • Imitrex [Sumatriptan] Unspecified     \"Makes my head pins and needles\"   • Latex Itching   • Maxalt [Rizatriptan]      Tremors, confusion     • Morphine Hives and Itching   • Reglan [Metoclopramide]      Muscle spasms             Sinusitis   This is a new (\" I have a sinus infection and need antibiotics to get better\" ) problem. Episode onset: 3 days. The problem has been gradually worsening since onset. Maximum temperature: fever - 3 days ago prior to onset of symptoms. no fever since then. The fever has been present for 1 to 2 days. Her pain is at a severity of 7/10. The pain is severe. Associated symptoms include chills, congestion and sinus pressure. Pertinent negatives include no coughing, diaphoresis, ear pain, headaches, hoarse voice, neck pain, shortness of breath, sneezing, sore throat or swollen glands. Past treatments include acetaminophen. The treatment provided mild relief.       Review of Systems   Constitutional: Positive for chills. Negative for diaphoresis.   HENT: Positive for congestion and sinus pressure. Negative for ear pain, hoarse voice, sneezing and sore throat.    Respiratory: Negative for cough and shortness of breath.    Musculoskeletal: Negative for neck pain.   Neurological: Negative for headaches.          Objective:     /70 (BP Location: Right arm, Patient Position: Sitting, BP Cuff Size: Adult)   Pulse 89   Temp 36.6 °C (97.9 °F) (Temporal)   Resp 16   Ht 1.6 m (5' " "3\")   Wt 81.5 kg (179 lb 9.6 oz)   SpO2 97%   BMI 31.81 kg/m²      Physical Exam  Vitals signs and nursing note reviewed.   Constitutional:       General: She is not in acute distress.     Appearance: She is well-developed. She is not diaphoretic.   HENT:      Head: Normocephalic.      Right Ear: Hearing, tympanic membrane, ear canal and external ear normal.      Left Ear: Hearing, tympanic membrane, ear canal and external ear normal.      Nose: No mucosal edema or rhinorrhea.      Right Sinus: Frontal sinus tenderness present. No maxillary sinus tenderness.      Left Sinus: Frontal sinus tenderness present. No maxillary sinus tenderness.      Mouth/Throat:      Pharynx: Uvula midline. Oropharyngeal exudate present.   Eyes:      Conjunctiva/sclera:      Right eye: Right conjunctiva is injected.      Left eye: Left conjunctiva is injected.      Pupils: Pupils are equal, round, and reactive to light.   Neck:      Musculoskeletal: Full passive range of motion without pain, normal range of motion and neck supple.      Trachea: Trachea and phonation normal.   Cardiovascular:      Rate and Rhythm: Normal rate and regular rhythm.      Chest Wall: PMI is not displaced.      Pulses: Normal pulses.   Pulmonary:      Effort: Pulmonary effort is normal.      Breath sounds: Normal breath sounds.   Lymphadenopathy:      Cervical: No cervical adenopathy.      Upper Body:      Right upper body: No supraclavicular adenopathy.      Left upper body: No supraclavicular adenopathy.   Skin:     General: Skin is warm and dry.   Neurological:      Mental Status: She is alert and oriented to person, place, and time.      Gait: Gait normal.   Psychiatric:         Speech: Speech normal.         Behavior: Behavior normal.                 Assessment/Plan:     1. Pansinusitis, unspecified chronicity  fluticasone (FLONASE) 50 MCG/ACT nasal spray   2. PND (post-nasal drip)         FU with PCP or return to Urgent Care in 5-7 days if no " improvement in symptoms, sooner if increased or worsening symptoms.   Humidifier at noc may be beneficial.   OTC antihistamine of choice - non-drowsy. Take as directed by   Keep well hydrated  Discussed that I felt this was viral in nature. Did not see any evidence of a bacterial process.     Return for reevaluation if she has any of the following symptoms or if current symptoms persist > 10 days:   Fever higher than 102.5°F (39.2°C)   Sudden and severe pain in the face and head   Trouble seeing or seeing double   Trouble thinking clearly   Swelling or redness around 1 or both eyes   Trouble breathing or a stiff neck

## 2019-11-30 ENCOUNTER — OFFICE VISIT (OUTPATIENT)
Dept: URGENT CARE | Facility: PHYSICIAN GROUP | Age: 34
End: 2019-11-30
Payer: COMMERCIAL

## 2019-11-30 VITALS
SYSTOLIC BLOOD PRESSURE: 100 MMHG | HEIGHT: 63 IN | TEMPERATURE: 98.9 F | HEART RATE: 97 BPM | WEIGHT: 182.4 LBS | BODY MASS INDEX: 32.32 KG/M2 | RESPIRATION RATE: 20 BRPM | DIASTOLIC BLOOD PRESSURE: 50 MMHG | OXYGEN SATURATION: 100 %

## 2019-11-30 DIAGNOSIS — K04.7 DENTAL ABSCESS: ICD-10-CM

## 2019-11-30 PROCEDURE — 99214 OFFICE O/P EST MOD 30 MIN: CPT | Performed by: PHYSICIAN ASSISTANT

## 2019-11-30 RX ORDER — DOXYCYCLINE HYCLATE 100 MG
100 TABLET ORAL 2 TIMES DAILY
Qty: 20 TAB | Refills: 0 | Status: SHIPPED | OUTPATIENT
Start: 2019-11-30 | End: 2019-12-04

## 2019-11-30 RX ORDER — CLINDAMYCIN HYDROCHLORIDE 300 MG/1
300 CAPSULE ORAL 3 TIMES DAILY
Refills: 0 | Status: CANCELLED | OUTPATIENT
Start: 2019-11-30

## 2019-11-30 NOTE — PROGRESS NOTES
Subjective:      Rehana Meade is a 33 y.o. female who presents with Sinus Problem (lump on upper left gums, L side of face hurts, slight facial swelling, x4 days )            HPI  33-year old female presents to urgent care with new problem of dental abscess worsening since onset about 4 days ago.   Patient was seen in urgent care yesterday and diagnosed with pansinusitis.  She was not started on antibiotic treatment.  Reports subjective fevers, chills, and generalized fatigue. Denies history of same.  Denies other associated aggravating or alleviating factors.     Review of Systems   Constitutional: Positive for chills, fever and malaise/fatigue.   HENT: Negative for congestion, ear pain, sinus pain and sore throat.    Eyes: Negative for discharge and redness.   Respiratory: Negative for cough, sputum production, shortness of breath and wheezing.    Gastrointestinal: Negative for abdominal pain, nausea and vomiting.   Musculoskeletal: Negative for myalgias and neck pain.   Skin: Negative for rash.   Neurological: Negative for dizziness and headaches.   Endo/Heme/Allergies: Negative for environmental allergies.   Psychiatric/Behavioral: Negative for depression.       Past Medical History:   Diagnosis Date   • Anesthesia     woke up combative   • Anxiety associated with depression    • ASTHMA    • Drug-seeking behavior    • Migraine without aura, without mention of intractable migraine without mention of status migrainosus    • Other specified symptom associated with female genital organs    • Seizure cerebral 2/15/2018   • Stroke (HCC)    • Unspecified disorder of thyroid     cyst     Current Outpatient Medications on File Prior to Visit   Medication Sig Dispense Refill   • fluticasone (FLONASE) 50 MCG/ACT nasal spray Spray 2 Sprays in nose every day. 1 Bottle 0   • AIMOVIG 70 MG/ML Solution Auto-injector Inject 70 mg as instructed Q30 DAYS.  11   • zonisamide (ZONEGRAN) 100 MG Cap Take 200 mg by mouth  "every evening.     • multivitamin (THERAGRAN) Tab Take 1 Tab by mouth every day.     • topiramate (TOPAMAX) 100 MG Tab Take 1 Tab by mouth 2 times a day. 60 Tab 3     No current facility-administered medications on file prior to visit.         Objective:     /50 (BP Location: Right arm, Patient Position: Sitting, BP Cuff Size: Adult)   Pulse 97   Temp 37.2 °C (98.9 °F) (Temporal)   Resp 20   Ht 1.6 m (5' 3\")   Wt 82.7 kg (182 lb 6.4 oz)   SpO2 100%   BMI 32.31 kg/m²      Physical Exam  Vitals signs reviewed.   Constitutional:       Appearance: Normal appearance. She is well-developed.   HENT:      Head: Normocephalic and atraumatic.      Mouth/Throat:      Mouth: Mucous membranes are moist.      Dentition: Dental tenderness, gingival swelling and dental abscesses present. No dental caries.     Eyes:      Extraocular Movements: Extraocular movements intact.      Conjunctiva/sclera: Conjunctivae normal.   Neck:      Musculoskeletal: Normal range of motion and neck supple.   Cardiovascular:      Rate and Rhythm: Normal rate and regular rhythm.   Pulmonary:      Effort: Pulmonary effort is normal. No respiratory distress.   Skin:     General: Skin is warm and dry.   Neurological:      Mental Status: She is alert and oriented to person, place, and time.   Psychiatric:         Mood and Affect: Mood normal.         Behavior: Behavior normal.         Thought Content: Thought content normal.         Judgment: Judgment normal.                 Assessment/Plan:     1. Dental abscess  doxycycline (VIBRAMYCIN) 100 MG Tab     PT should follow up with dentistry as scheduled. Discussed red flags and reasons to return to UC or ED.  Pt and/or family verbalized understanding of diagnosis and follow up instructions and was offered informational handout on diagnosis.  PT discharged.     "

## 2019-12-04 ENCOUNTER — APPOINTMENT (OUTPATIENT)
Dept: CARDIOLOGY | Facility: MEDICAL CENTER | Age: 34
End: 2019-12-04
Attending: INTERNAL MEDICINE
Payer: COMMERCIAL

## 2019-12-04 ENCOUNTER — APPOINTMENT (OUTPATIENT)
Dept: RADIOLOGY | Facility: MEDICAL CENTER | Age: 34
End: 2019-12-04
Attending: EMERGENCY MEDICINE
Payer: COMMERCIAL

## 2019-12-04 ENCOUNTER — APPOINTMENT (OUTPATIENT)
Dept: RADIOLOGY | Facility: MEDICAL CENTER | Age: 34
End: 2019-12-04
Attending: INTERNAL MEDICINE
Payer: COMMERCIAL

## 2019-12-04 ENCOUNTER — HOSPITAL ENCOUNTER (OUTPATIENT)
Facility: MEDICAL CENTER | Age: 34
End: 2019-12-05
Attending: EMERGENCY MEDICINE | Admitting: INTERNAL MEDICINE
Payer: COMMERCIAL

## 2019-12-04 DIAGNOSIS — R20.2 PARESTHESIA: ICD-10-CM

## 2019-12-04 DIAGNOSIS — R53.1 WEAKNESS: ICD-10-CM

## 2019-12-04 DIAGNOSIS — R51.9 ACUTE NONINTRACTABLE HEADACHE, UNSPECIFIED HEADACHE TYPE: ICD-10-CM

## 2019-12-04 PROBLEM — G40.909 SEIZURE DISORDER (HCC): Status: ACTIVE | Noted: 2019-12-04

## 2019-12-04 PROBLEM — R20.0 LEFT SIDED NUMBNESS: Status: ACTIVE | Noted: 2019-12-04

## 2019-12-04 LAB
ABO + RH BLD: NORMAL
ABO GROUP BLD: NORMAL
ALBUMIN SERPL BCP-MCNC: 4.2 G/DL (ref 3.2–4.9)
ALBUMIN/GLOB SERPL: 1.4 G/DL
ALP SERPL-CCNC: 79 U/L (ref 30–99)
ALT SERPL-CCNC: 10 U/L (ref 2–50)
AMPHET UR QL SCN: NEGATIVE
ANION GAP SERPL CALC-SCNC: 10 MMOL/L (ref 0–11.9)
APPEARANCE UR: CLEAR
APTT PPP: 27.2 SEC (ref 24.7–36)
AST SERPL-CCNC: 10 U/L (ref 12–45)
BARBITURATES UR QL SCN: NEGATIVE
BASOPHILS # BLD AUTO: 0.6 % (ref 0–1.8)
BASOPHILS # BLD: 0.06 K/UL (ref 0–0.12)
BENZODIAZ UR QL SCN: NEGATIVE
BILIRUB SERPL-MCNC: 0.3 MG/DL (ref 0.1–1.5)
BILIRUB UR QL STRIP.AUTO: NEGATIVE
BLD GP AB SCN SERPL QL: NORMAL
BUN SERPL-MCNC: 15 MG/DL (ref 8–22)
BZE UR QL SCN: NEGATIVE
CALCIUM SERPL-MCNC: 9.2 MG/DL (ref 8.5–10.5)
CANNABINOIDS UR QL SCN: NEGATIVE
CHLORIDE SERPL-SCNC: 108 MMOL/L (ref 96–112)
CO2 SERPL-SCNC: 22 MMOL/L (ref 20–33)
COLOR UR: YELLOW
CREAT SERPL-MCNC: 0.76 MG/DL (ref 0.5–1.4)
EKG IMPRESSION: NORMAL
EOSINOPHIL # BLD AUTO: 0.3 K/UL (ref 0–0.51)
EOSINOPHIL NFR BLD: 3.1 % (ref 0–6.9)
ERYTHROCYTE [DISTWIDTH] IN BLOOD BY AUTOMATED COUNT: 44.6 FL (ref 35.9–50)
EST. AVERAGE GLUCOSE BLD GHB EST-MCNC: 108 MG/DL
GLOBULIN SER CALC-MCNC: 3 G/DL (ref 1.9–3.5)
GLUCOSE BLD-MCNC: 83 MG/DL (ref 65–99)
GLUCOSE SERPL-MCNC: 86 MG/DL (ref 65–99)
GLUCOSE UR STRIP.AUTO-MCNC: NEGATIVE MG/DL
HBA1C MFR BLD: 5.4 % (ref 0–5.6)
HCT VFR BLD AUTO: 41.5 % (ref 37–47)
HGB BLD-MCNC: 13.8 G/DL (ref 12–16)
IMM GRANULOCYTES # BLD AUTO: 0.04 K/UL (ref 0–0.11)
IMM GRANULOCYTES NFR BLD AUTO: 0.4 % (ref 0–0.9)
INR PPP: 0.99 (ref 0.87–1.13)
KETONES UR STRIP.AUTO-MCNC: NEGATIVE MG/DL
LEUKOCYTE ESTERASE UR QL STRIP.AUTO: NEGATIVE
LV EJECT FRACT  99904: 65
LV EJECT FRACT MOD 2C 99903: 72.73
LV EJECT FRACT MOD 4C 99902: 70.74
LV EJECT FRACT MOD BP 99901: 71.86
LYMPHOCYTES # BLD AUTO: 3.05 K/UL (ref 1–4.8)
LYMPHOCYTES NFR BLD: 31.4 % (ref 22–41)
MAGNESIUM SERPL-MCNC: 2.2 MG/DL (ref 1.5–2.5)
MCH RBC QN AUTO: 30.8 PG (ref 27–33)
MCHC RBC AUTO-ENTMCNC: 33.3 G/DL (ref 33.6–35)
MCV RBC AUTO: 92.6 FL (ref 81.4–97.8)
METHADONE UR QL SCN: NEGATIVE
MICRO URNS: NORMAL
MONOCYTES # BLD AUTO: 0.69 K/UL (ref 0–0.85)
MONOCYTES NFR BLD AUTO: 7.1 % (ref 0–13.4)
NEUTROPHILS # BLD AUTO: 5.58 K/UL (ref 2–7.15)
NEUTROPHILS NFR BLD: 57.4 % (ref 44–72)
NITRITE UR QL STRIP.AUTO: NEGATIVE
NRBC # BLD AUTO: 0 K/UL
NRBC BLD-RTO: 0 /100 WBC
OPIATES UR QL SCN: NEGATIVE
OXYCODONE UR QL SCN: NEGATIVE
PCP UR QL SCN: NEGATIVE
PH UR STRIP.AUTO: 7 [PH] (ref 5–8)
PLATELET # BLD AUTO: 381 K/UL (ref 164–446)
PMV BLD AUTO: 9.9 FL (ref 9–12.9)
POTASSIUM SERPL-SCNC: 3.8 MMOL/L (ref 3.6–5.5)
PROPOXYPH UR QL SCN: NEGATIVE
PROT SERPL-MCNC: 7.2 G/DL (ref 6–8.2)
PROT UR QL STRIP: NEGATIVE MG/DL
PROTHROMBIN TIME: 13.3 SEC (ref 12–14.6)
RBC # BLD AUTO: 4.48 M/UL (ref 4.2–5.4)
RBC UR QL AUTO: NEGATIVE
RH BLD: NORMAL
SODIUM SERPL-SCNC: 140 MMOL/L (ref 135–145)
SP GR UR STRIP.AUTO: 1.04
TROPONIN T SERPL-MCNC: <6 NG/L (ref 6–19)
TROPONIN T SERPL-MCNC: <6 NG/L (ref 6–19)
UROBILINOGEN UR STRIP.AUTO-MCNC: 0.2 MG/DL
WBC # BLD AUTO: 9.7 K/UL (ref 4.8–10.8)

## 2019-12-04 PROCEDURE — 82962 GLUCOSE BLOOD TEST: CPT

## 2019-12-04 PROCEDURE — 96375 TX/PRO/DX INJ NEW DRUG ADDON: CPT

## 2019-12-04 PROCEDURE — 86901 BLOOD TYPING SEROLOGIC RH(D): CPT

## 2019-12-04 PROCEDURE — 700102 HCHG RX REV CODE 250 W/ 637 OVERRIDE(OP): Performed by: INTERNAL MEDICINE

## 2019-12-04 PROCEDURE — 700101 HCHG RX REV CODE 250: Performed by: INTERNAL MEDICINE

## 2019-12-04 PROCEDURE — 95951 EEG: CPT | Mod: 52 | Performed by: PSYCHIATRY & NEUROLOGY

## 2019-12-04 PROCEDURE — 94760 N-INVAS EAR/PLS OXIMETRY 1: CPT

## 2019-12-04 PROCEDURE — 96374 THER/PROPH/DIAG INJ IV PUSH: CPT

## 2019-12-04 PROCEDURE — 80307 DRUG TEST PRSMV CHEM ANLYZR: CPT

## 2019-12-04 PROCEDURE — G0378 HOSPITAL OBSERVATION PER HR: HCPCS

## 2019-12-04 PROCEDURE — 72141 MRI NECK SPINE W/O DYE: CPT

## 2019-12-04 PROCEDURE — A9270 NON-COVERED ITEM OR SERVICE: HCPCS | Performed by: INTERNAL MEDICINE

## 2019-12-04 PROCEDURE — 70551 MRI BRAIN STEM W/O DYE: CPT

## 2019-12-04 PROCEDURE — 93010 ELECTROCARDIOGRAM REPORT: CPT | Performed by: INTERNAL MEDICINE

## 2019-12-04 PROCEDURE — 83036 HEMOGLOBIN GLYCOSYLATED A1C: CPT

## 2019-12-04 PROCEDURE — 93306 TTE W/DOPPLER COMPLETE: CPT

## 2019-12-04 PROCEDURE — 85610 PROTHROMBIN TIME: CPT

## 2019-12-04 PROCEDURE — 86900 BLOOD TYPING SEROLOGIC ABO: CPT

## 2019-12-04 PROCEDURE — 99220 PR INITIAL OBSERVATION CARE,LEVL III: CPT | Performed by: INTERNAL MEDICINE

## 2019-12-04 PROCEDURE — 83735 ASSAY OF MAGNESIUM: CPT

## 2019-12-04 PROCEDURE — 70450 CT HEAD/BRAIN W/O DYE: CPT

## 2019-12-04 PROCEDURE — 70498 CT ANGIOGRAPHY NECK: CPT

## 2019-12-04 PROCEDURE — 93306 TTE W/DOPPLER COMPLETE: CPT | Mod: 26 | Performed by: INTERNAL MEDICINE

## 2019-12-04 PROCEDURE — 84484 ASSAY OF TROPONIN QUANT: CPT

## 2019-12-04 PROCEDURE — 93005 ELECTROCARDIOGRAM TRACING: CPT | Performed by: INTERNAL MEDICINE

## 2019-12-04 PROCEDURE — 80053 COMPREHEN METABOLIC PANEL: CPT

## 2019-12-04 PROCEDURE — 0042T CT-CEREBRAL PERFUSION ANALYSIS: CPT

## 2019-12-04 PROCEDURE — 700117 HCHG RX CONTRAST REV CODE 255: Performed by: EMERGENCY MEDICINE

## 2019-12-04 PROCEDURE — 71045 X-RAY EXAM CHEST 1 VIEW: CPT

## 2019-12-04 PROCEDURE — 36415 COLL VENOUS BLD VENIPUNCTURE: CPT

## 2019-12-04 PROCEDURE — 85730 THROMBOPLASTIN TIME PARTIAL: CPT

## 2019-12-04 PROCEDURE — 95951 EEG: CPT | Mod: 26,52 | Performed by: PSYCHIATRY & NEUROLOGY

## 2019-12-04 PROCEDURE — 93005 ELECTROCARDIOGRAM TRACING: CPT | Performed by: EMERGENCY MEDICINE

## 2019-12-04 PROCEDURE — 700111 HCHG RX REV CODE 636 W/ 250 OVERRIDE (IP): Performed by: INTERNAL MEDICINE

## 2019-12-04 PROCEDURE — 99285 EMERGENCY DEPT VISIT HI MDM: CPT

## 2019-12-04 PROCEDURE — 306588 SLEEVE,VASO CALF MED: Performed by: INTERNAL MEDICINE

## 2019-12-04 PROCEDURE — 700111 HCHG RX REV CODE 636 W/ 250 OVERRIDE (IP): Performed by: EMERGENCY MEDICINE

## 2019-12-04 PROCEDURE — 70496 CT ANGIOGRAPHY HEAD: CPT

## 2019-12-04 PROCEDURE — 85025 COMPLETE CBC W/AUTO DIFF WBC: CPT

## 2019-12-04 PROCEDURE — 86850 RBC ANTIBODY SCREEN: CPT

## 2019-12-04 PROCEDURE — 81003 URINALYSIS AUTO W/O SCOPE: CPT

## 2019-12-04 RX ORDER — ACETAMINOPHEN 325 MG/1
650 TABLET ORAL EVERY 6 HOURS PRN
Status: DISCONTINUED | OUTPATIENT
Start: 2019-12-04 | End: 2019-12-05 | Stop reason: HOSPADM

## 2019-12-04 RX ORDER — FLUTICASONE PROPIONATE 50 MCG
2 SPRAY, SUSPENSION (ML) NASAL EVERY EVENING
COMMUNITY
End: 2020-01-21

## 2019-12-04 RX ORDER — ZONISAMIDE 50 MG/1
200 CAPSULE ORAL EVERY EVENING
Status: DISCONTINUED | OUTPATIENT
Start: 2019-12-04 | End: 2019-12-05 | Stop reason: HOSPADM

## 2019-12-04 RX ORDER — BISACODYL 10 MG
10 SUPPOSITORY, RECTAL RECTAL
Status: DISCONTINUED | OUTPATIENT
Start: 2019-12-04 | End: 2019-12-05 | Stop reason: HOSPADM

## 2019-12-04 RX ORDER — FLUTICASONE PROPIONATE 50 MCG
2 SPRAY, SUSPENSION (ML) NASAL EVERY EVENING
Status: DISCONTINUED | OUTPATIENT
Start: 2019-12-04 | End: 2019-12-05 | Stop reason: HOSPADM

## 2019-12-04 RX ORDER — ONDANSETRON 2 MG/ML
4 INJECTION INTRAMUSCULAR; INTRAVENOUS EVERY 4 HOURS PRN
Status: DISCONTINUED | OUTPATIENT
Start: 2019-12-04 | End: 2019-12-05 | Stop reason: HOSPADM

## 2019-12-04 RX ORDER — ASPIRIN 81 MG/1
324 TABLET, CHEWABLE ORAL DAILY
Status: DISCONTINUED | OUTPATIENT
Start: 2019-12-04 | End: 2019-12-05 | Stop reason: HOSPADM

## 2019-12-04 RX ORDER — ONDANSETRON 4 MG/1
4 TABLET, ORALLY DISINTEGRATING ORAL EVERY 4 HOURS PRN
Status: DISCONTINUED | OUTPATIENT
Start: 2019-12-04 | End: 2019-12-05 | Stop reason: HOSPADM

## 2019-12-04 RX ORDER — POLYETHYLENE GLYCOL 3350 17 G/17G
1 POWDER, FOR SOLUTION ORAL
Status: DISCONTINUED | OUTPATIENT
Start: 2019-12-04 | End: 2019-12-05 | Stop reason: HOSPADM

## 2019-12-04 RX ORDER — HYDRALAZINE HYDROCHLORIDE 20 MG/ML
10 INJECTION INTRAMUSCULAR; INTRAVENOUS
Status: DISCONTINUED | OUTPATIENT
Start: 2019-12-04 | End: 2019-12-05 | Stop reason: HOSPADM

## 2019-12-04 RX ORDER — BUTALBITAL, ACETAMINOPHEN AND CAFFEINE 50; 325; 40 MG/1; MG/1; MG/1
1 TABLET ORAL EVERY 6 HOURS PRN
Status: DISCONTINUED | OUTPATIENT
Start: 2019-12-04 | End: 2019-12-05 | Stop reason: HOSPADM

## 2019-12-04 RX ORDER — TOPIRAMATE 100 MG/1
100 TABLET, FILM COATED ORAL 2 TIMES DAILY
Status: DISCONTINUED | OUTPATIENT
Start: 2019-12-04 | End: 2019-12-05 | Stop reason: HOSPADM

## 2019-12-04 RX ORDER — ATORVASTATIN CALCIUM 80 MG/1
80 TABLET, FILM COATED ORAL EVERY EVENING
Status: DISCONTINUED | OUTPATIENT
Start: 2019-12-04 | End: 2019-12-05 | Stop reason: HOSPADM

## 2019-12-04 RX ORDER — PROCHLORPERAZINE EDISYLATE 5 MG/ML
5-10 INJECTION INTRAMUSCULAR; INTRAVENOUS EVERY 4 HOURS PRN
Status: DISCONTINUED | OUTPATIENT
Start: 2019-12-04 | End: 2019-12-05 | Stop reason: HOSPADM

## 2019-12-04 RX ORDER — TOPIRAMATE 100 MG/1
100 TABLET, FILM COATED ORAL DAILY
Status: SHIPPED | COMMUNITY
End: 2022-01-28

## 2019-12-04 RX ORDER — DOXYCYCLINE HYCLATE 100 MG
100 TABLET ORAL 2 TIMES DAILY
COMMUNITY
Start: 2019-11-30 | End: 2020-01-21

## 2019-12-04 RX ORDER — AMOXICILLIN 250 MG
2 CAPSULE ORAL 2 TIMES DAILY
Status: DISCONTINUED | OUTPATIENT
Start: 2019-12-04 | End: 2019-12-05 | Stop reason: HOSPADM

## 2019-12-04 RX ORDER — PROMETHAZINE HYDROCHLORIDE 25 MG/1
12.5-25 SUPPOSITORY RECTAL EVERY 4 HOURS PRN
Status: DISCONTINUED | OUTPATIENT
Start: 2019-12-04 | End: 2019-12-05 | Stop reason: HOSPADM

## 2019-12-04 RX ORDER — HYDROMORPHONE HYDROCHLORIDE 1 MG/ML
0.5 INJECTION, SOLUTION INTRAMUSCULAR; INTRAVENOUS; SUBCUTANEOUS
Status: DISCONTINUED | OUTPATIENT
Start: 2019-12-04 | End: 2019-12-04

## 2019-12-04 RX ORDER — DIAZEPAM 5 MG/ML
2.5 INJECTION, SOLUTION INTRAMUSCULAR; INTRAVENOUS
Status: DISCONTINUED | OUTPATIENT
Start: 2019-12-04 | End: 2019-12-05 | Stop reason: HOSPADM

## 2019-12-04 RX ORDER — ASPIRIN 300 MG/1
300 SUPPOSITORY RECTAL DAILY
Status: DISCONTINUED | OUTPATIENT
Start: 2019-12-04 | End: 2019-12-05 | Stop reason: HOSPADM

## 2019-12-04 RX ORDER — PROMETHAZINE HYDROCHLORIDE 25 MG/1
12.5-25 TABLET ORAL EVERY 4 HOURS PRN
Status: DISCONTINUED | OUTPATIENT
Start: 2019-12-04 | End: 2019-12-05 | Stop reason: HOSPADM

## 2019-12-04 RX ORDER — LABETALOL HYDROCHLORIDE 5 MG/ML
10 INJECTION, SOLUTION INTRAVENOUS EVERY 4 HOURS PRN
Status: DISCONTINUED | OUTPATIENT
Start: 2019-12-04 | End: 2019-12-05 | Stop reason: HOSPADM

## 2019-12-04 RX ORDER — ASPIRIN 325 MG
325 TABLET ORAL DAILY
Status: DISCONTINUED | OUTPATIENT
Start: 2019-12-04 | End: 2019-12-05 | Stop reason: HOSPADM

## 2019-12-04 RX ADMIN — BUTALBITAL, ACETAMINOPHEN, AND CAFFEINE 1 TABLET: 50; 325; 40 TABLET ORAL at 19:28

## 2019-12-04 RX ADMIN — ASPIRIN 325 MG: 325 TABLET, FILM COATED ORAL at 18:47

## 2019-12-04 RX ADMIN — FLUTICASONE PROPIONATE 100 MCG: 50 SPRAY, METERED NASAL at 18:42

## 2019-12-04 RX ADMIN — ZONISAMIDE 200 MG: 50 CAPSULE ORAL at 18:46

## 2019-12-04 RX ADMIN — DIAZEPAM 2.5 MG: 5 INJECTION, SOLUTION INTRAMUSCULAR; INTRAVENOUS at 19:35

## 2019-12-04 RX ADMIN — IOHEXOL 80 ML: 350 INJECTION, SOLUTION INTRAVENOUS at 14:49

## 2019-12-04 RX ADMIN — TOPIRAMATE 100 MG: 100 TABLET, FILM COATED ORAL at 18:45

## 2019-12-04 RX ADMIN — IOHEXOL 40 ML: 350 INJECTION, SOLUTION INTRAVENOUS at 14:48

## 2019-12-04 RX ADMIN — ONDANSETRON 4 MG: 2 INJECTION INTRAMUSCULAR; INTRAVENOUS at 22:54

## 2019-12-04 RX ADMIN — ATORVASTATIN CALCIUM 80 MG: 80 TABLET, FILM COATED ORAL at 19:27

## 2019-12-04 RX ADMIN — HYDROMORPHONE HYDROCHLORIDE 0.5 MG: 1 INJECTION, SOLUTION INTRAMUSCULAR; INTRAVENOUS; SUBCUTANEOUS at 15:11

## 2019-12-04 ASSESSMENT — COPD QUESTIONNAIRES
DURING THE PAST 4 WEEKS HOW MUCH DID YOU FEEL SHORT OF BREATH: NONE/LITTLE OF THE TIME
COPD SCREENING SCORE: 0
HAVE YOU SMOKED AT LEAST 100 CIGARETTES IN YOUR ENTIRE LIFE: NO/DON'T KNOW
DO YOU EVER COUGH UP ANY MUCUS OR PHLEGM?: NO/ONLY WITH OCCASIONAL COLDS OR INFECTIONS

## 2019-12-04 ASSESSMENT — LIFESTYLE VARIABLES
ON A TYPICAL DAY WHEN YOU DRINK ALCOHOL HOW MANY DRINKS DO YOU HAVE: 0
HAVE PEOPLE ANNOYED YOU BY CRITICIZING YOUR DRINKING: NO
CONSUMPTION TOTAL: NEGATIVE
EVER FELT BAD OR GUILTY ABOUT YOUR DRINKING: NO
ALCOHOL_USE: NO
TOTAL SCORE: 0
EVER_SMOKED: NEVER
HOW MANY TIMES IN THE PAST YEAR HAVE YOU HAD 5 OR MORE DRINKS IN A DAY: 0
AVERAGE NUMBER OF DAYS PER WEEK YOU HAVE A DRINK CONTAINING ALCOHOL: 0
TOTAL SCORE: 0
TOTAL SCORE: 0
EVER_SMOKED: NEVER
EVER HAD A DRINK FIRST THING IN THE MORNING TO STEADY YOUR NERVES TO GET RID OF A HANGOVER: NO
HAVE YOU EVER FELT YOU SHOULD CUT DOWN ON YOUR DRINKING: NO

## 2019-12-04 ASSESSMENT — PATIENT HEALTH QUESTIONNAIRE - PHQ9
SUM OF ALL RESPONSES TO PHQ9 QUESTIONS 1 AND 2: 0
2. FEELING DOWN, DEPRESSED, IRRITABLE, OR HOPELESS: NOT AT ALL
1. LITTLE INTEREST OR PLEASURE IN DOING THINGS: NOT AT ALL

## 2019-12-04 NOTE — ED NOTES
Pt ambulates well to restroom. Urine specimen collection cup and hygiene wipe provided to pt with instruction for clean catch urine sample.

## 2019-12-04 NOTE — ASSESSMENT & PLAN NOTE
-Suspect this is related to completed migraine.  However, would not want to rule out stroke.  She is out of tpa therapeutic window  -start empiric full dose aspirin, along with high-intensity statin with Lipitor 80 mg daily until stroke is ruled out.  Check lipid profile and hemoglobin A1c for further risk stratification.  -will obtain MRI of the brain to rule out acute CVA.  Will also get echocardiogram with bubble study for completion.  Monitor on telemetry to rule out occult arrhythmia/A. Fib.  I would also obtain an MRI of the cervical spine to rule out cervical radiculopathy.  -Given her history of seizure disorder, I will also obtain an EEG.  Resume Zonegran.  -Monitor closely, with neuro checks every 4 hours per CVA protocol.  -Start permissive hypertension in the acute phase in the next 24-48 hour, and allow blood pressure to go as high as 220/120 to preserve cerebral perfusion. Hold home antihypertensives for now.   -will get PT/OT/SLP to evaluate.   -For her likely migraine headache, I will start her on PRN fioricet.  She is intolerant of Reglan.  Continue PRN antiemetics.  If headaches persist, will consider giving a dose of steroids.  Resume home Topamax.

## 2019-12-04 NOTE — ED TRIAGE NOTES
Rehana Meade  Chief Complaint   Patient presents with   • Headache   • Blurred Vision   • Tingling     left sided.       Pt ambulatory to triage with above complaint.  VSS.  Pt states has hx of neurological problems and migraines.  Today increasing HA to back of head that radiates from neck and then started to have blurred vision.  Pt states has intermittent right sided tingling, but today it it her left arm.  AGUILLON does not feel like normal migraines.     Charge RN notified.

## 2019-12-04 NOTE — ED PROVIDER NOTES
ED Provider Note    Scribed for Randolph Rogers M.D. by Tati Boo. 12/4/2019  2:31 PM    Primary care provider: Mack Bernard M.D.  Means of arrival: Wheel Chair  History obtained from: Patient  History limited by: None    CHIEF COMPLAINT  Chief Complaint   Patient presents with   • Headache   • Blurred Vision   • Tingling     left sided.         HPI  Rehana Meade is a 33 y.o. female, with a history of migraines, who presents to the Emergency Department as a Trauma Green accompanied by her spouse for sudden onset occipital headache that began at 6:00 AM this morning. Patient reports that she woke up this morning with left-sided posterior neck pain initially, which then radiated to her occiput shortly after waking. Three hours after her headache began, the patient states she started experiencing associated bilateral blurred vision with left arm tingling and numbness. Her blurred vision is improving, but has not resolved to her baseline vision. She denies any fever, chest pain, shortness of breath, or cough. She denies any trauma, injuries, or falls leading up to her complaint today. Patient notes that her headache today is not similar in sensation to the headaches she had in the past, in that her episode today is more left-sided and in her occipital region, and in the past her headache would be localized the right side. Patient notes that 2 years ago she was diagnosed with a possible stroke. She denies taking any birth control medications, history of hypertension, or diabetes. She reports an allergy to Reglan, and states she has muscle spams with this medication. Patient is followed by a neurologist, Dr. Boo.     REVIEW OF SYSTEMS  Pertinent positives include headache, left-sided posterior neck pain, blurred vision, left arm tingling, and numbness. Pertinent negatives include no fever, chest pain, shortness of breath, or cough.  All other systems reviewed and negative.    PAST MEDICAL HISTORY    "has a past medical history of Anesthesia, Anxiety associated with depression, ASTHMA, Drug-seeking behavior, Migraine without aura, without mention of intractable migraine without mention of status migrainosus, Other specified symptom associated with female genital organs, Seizure cerebral (2/15/2018), Stroke (HCC), and Unspecified disorder of thyroid.    SURGICAL HISTORY   has a past surgical history that includes septoplasty (10/28/2009); somnoplasty (10/28/2009); nasal polypectomy (10/28/2009); and tubal coagulation laparoscopic bilateral (7/11/2014).    SOCIAL HISTORY  Social History     Tobacco Use   • Smoking status: Never Smoker   • Smokeless tobacco: Never Used   Substance Use Topics   • Alcohol use: Not Currently     Comment: rarely   • Drug use: No      Social History     Substance and Sexual Activity   Drug Use No       FAMILY HISTORY  None noted    CURRENT MEDICATIONS  Home Medications     Reviewed by Bernarda Trevino (Pharmacy Tech) on 12/04/19 at 1544  Med List Status: Complete   Medication Last Dose Status   AIMOVIG 70 MG/ML Solution Auto-injector 11/16/2019 Active   doxycycline (VIBRAMYCIN) 100 MG Tab 12/4/2019 Active   fluticasone (FLONASE) 50 MCG/ACT nasal spray 12/3/2019 Active   topiramate (TOPAMAX) 100 MG Tab 12/4/2019 Active   zonisamide (ZONEGRAN) 100 MG Cap 12/3/2019 Active                ALLERGIES  Allergies   Allergen Reactions   • Amitriptyline Unspecified     Suicidal   • Clarithromycin Hives   • Sulfa Drugs Anaphylaxis   • Latex Itching   • Lorazepam Unspecified     Hallucinations     • Metoclopramide Unspecified     Muscle spasms   • Penicillin G Potassium Vomiting   • Rizatriptan Unspecified     Tremors, confusion     • Sumatriptan Unspecified     \"Makes my head pins and needles\"   • Tape Rash   • Morphine Hives and Itching       PHYSICAL EXAM  VITAL SIGNS: /85   Pulse 90   Temp 36.4 °C (97.5 °F) (Temporal)   Resp 17   Ht 1.6 m (5' 3\")   Wt 81.6 kg (180 lb)   SpO2 100%  "  BMI 31.89 kg/m²     Constitutional: Well developed, Well nourished, Mild distress, Non-toxic appearance.   HENT: Normocephalic, Atraumatic, Bilateral external ears normal, Oropharynx moist, No oral exudates.   Eyes: PERRLA, EOMI, Conjunctiva normal, No discharge.   Neck: Slight tenderness to left posterior neck. Supple, No stridor.   Lymphatic: No lymphadenopathy noted.   Cardiovascular: Normal heart rate, Normal rhythm.   Thorax & Lungs: Clear to auscultation bilaterally, No respiratory distress, No wheezing, No crackles.   Abdomen: Soft, No tenderness, No masses, No pulsatile masses.   Skin: Warm, Dry, No erythema, No rash.   Extremities: No edema No cyanosis.   Musculoskeletal: No tenderness to palpation or major deformities noted.  Intact distal pulses  Neurologic: Weakness in left arm, decreased sensation in left arm and face. NIH score 2. Awake, alert. Moves all extremities spontaneously.  Psychiatric: Affect normal, Judgment normal, Mood normal.     LABS  Results for orders placed or performed during the hospital encounter of 12/04/19   CBC WITH DIFFERENTIAL   Result Value Ref Range    WBC 9.7 4.8 - 10.8 K/uL    RBC 4.48 4.20 - 5.40 M/uL    Hemoglobin 13.8 12.0 - 16.0 g/dL    Hematocrit 41.5 37.0 - 47.0 %    MCV 92.6 81.4 - 97.8 fL    MCH 30.8 27.0 - 33.0 pg    MCHC 33.3 (L) 33.6 - 35.0 g/dL    RDW 44.6 35.9 - 50.0 fL    Platelet Count 381 164 - 446 K/uL    MPV 9.9 9.0 - 12.9 fL    Neutrophils-Polys 57.40 44.00 - 72.00 %    Lymphocytes 31.40 22.00 - 41.00 %    Monocytes 7.10 0.00 - 13.40 %    Eosinophils 3.10 0.00 - 6.90 %    Basophils 0.60 0.00 - 1.80 %    Immature Granulocytes 0.40 0.00 - 0.90 %    Nucleated RBC 0.00 /100 WBC    Neutrophils (Absolute) 5.58 2.00 - 7.15 K/uL    Lymphs (Absolute) 3.05 1.00 - 4.80 K/uL    Monos (Absolute) 0.69 0.00 - 0.85 K/uL    Eos (Absolute) 0.30 0.00 - 0.51 K/uL    Baso (Absolute) 0.06 0.00 - 0.12 K/uL    Immature Granulocytes (abs) 0.04 0.00 - 0.11 K/uL    NRBC  (Absolute) 0.00 K/uL   COMP METABOLIC PANEL   Result Value Ref Range    Sodium 140 135 - 145 mmol/L    Potassium 3.8 3.6 - 5.5 mmol/L    Chloride 108 96 - 112 mmol/L    Co2 22 20 - 33 mmol/L    Anion Gap 10.0 0.0 - 11.9    Glucose 86 65 - 99 mg/dL    Bun 15 8 - 22 mg/dL    Creatinine 0.76 0.50 - 1.40 mg/dL    Calcium 9.2 8.5 - 10.5 mg/dL    AST(SGOT) 10 (L) 12 - 45 U/L    ALT(SGPT) 10 2 - 50 U/L    Alkaline Phosphatase 79 30 - 99 U/L    Total Bilirubin 0.3 0.1 - 1.5 mg/dL    Albumin 4.2 3.2 - 4.9 g/dL    Total Protein 7.2 6.0 - 8.2 g/dL    Globulin 3.0 1.9 - 3.5 g/dL    A-G Ratio 1.4 g/dL   PROTHROMBIN TIME   Result Value Ref Range    PT 13.3 12.0 - 14.6 sec    INR 0.99 0.87 - 1.13   APTT   Result Value Ref Range    APTT 27.2 24.7 - 36.0 sec   COD (ADULT)   Result Value Ref Range    ABO Grouping Only A     Rh Grouping Only POS     Antibody Screen-Cod NEG    TROPONIN   Result Value Ref Range    Troponin T <6 6 - 19 ng/L   URINALYSIS CULTURE, IF INDICATED   Result Value Ref Range    Color Yellow     Character Clear     Specific Gravity 1.036 <1.035    Ph 7.0 5.0 - 8.0    Glucose Negative Negative mg/dL    Ketones Negative Negative mg/dL    Protein Negative Negative mg/dL    Bilirubin Negative Negative    Urobilinogen, Urine 0.2 Negative    Nitrite Negative Negative    Leukocyte Esterase Negative Negative    Occult Blood Negative Negative    Micro Urine Req see below    ESTIMATED GFR   Result Value Ref Range    GFR If African American >60 >60 mL/min/1.73 m 2    GFR If Non African American >60 >60 mL/min/1.73 m 2   EKG (NOW)   Result Value Ref Range    Report       [facility] Emergency Dept.    Test Date:  2019  Pt Name:    ETELVINA BROWN             Department: ER  MRN:        [id number]                      Room:        04  Gender:     Female                       Technician: CIRILO VILLEDA  :        1985                   Requested By:LEONORA PERALTA  Order #:    [id number]                     Reading MD: LEONORA PERALTA MD    Measurements  Intervals                                Axis  Rate:       63                           P:          -7  CT:         144                          QRS:        4  QRSD:       102                          T:          39  QT:         436  QTc:        447    Interpretive Statements  SINUS RHYTHM  Compared to ECG 02/11/2018 09:50:45  No significant changes  Electronically Signed On 12-4-2019 15:29:00 PST by LEONORA PERALTA MD           RADIOLOGY  DX-CHEST-PORTABLE (1 VIEW)   Final Result      No acute cardiopulmonary findings.      CT-CTA HEAD WITH & W/O-POST PROCESS   Final Result      1.  CT angiogram of the [place] within normal limits.      CT-CTA NECK WITH & W/O-POST PROCESSING   Final Result      1.  No atherosclerotic disease or significant stenosis is identified. No section is identified.      CT-CEREBRAL PERFUSION ANALYSIS   Final Result      1.  Cerebral blood flow less than 30% likely representing completed infarct = 0 mL.      2.  T Max more than 6 seconds likely representing combination of completed infarct and ischemia = 0 mL.      3.  Mismatched volume likely representing ischemic brain/penumbra = None      4.  Please note that the cerebral perfusion was performed on the limited brain tissue around the basal ganglia region. Infarct/ischemia outside the CT perfusion sections can be missed in this study.      CT-HEAD W/O   Final Result      1.  Head CT without contrast within normal limits. No evidence of acute cerebral infarction, hemorrhage or mass lesion.   2.  There has been interval progression of pansinusitis.      EC-ECHOCARDIOGRAM COMPLETE W/O CONT    (Results Pending)   MR-BRAIN-W/O    (Results Pending)     The radiologist's interpretation of all radiological studies have been reviewed by me.      COURSE & MEDICAL DECISION MAKING  Pertinent Labs & Imaging studies reviewed. (See chart for details)    2:15 PM - Patient seen and  examined at bedside. Patient will be treated with Dilaudid 0.5 mg. Ordered EKG, GFR, Urinalysis, Troponin, COD, APTT, Prothrombin Time, CMP, CBC, CT-CTA neck, CT-CTA head, CT-Cerebral profusion analysis, CT-head, DX chest to evaluate her symptoms. The differential diagnoses include but are not limited to: Complex migraine, acute CVA, this TIA.        Decision Making:  Patient is coming in secondary to headache, neck pain, blurred vision, weakness in the left arm, numbness tingling in the left face and left arm.  Patient's NIH is 2, the patient was out of the alteplase window due to length of time.  CTAs were performed, this did not show a large vessel occlusion.  The patient continues to have the paresthesias, discussed the case with Dr. Ramos for hospitalization.        FINAL IMPRESSION  1. Acute nonintractable headache, unspecified headache type    2. Weakness    3. Paresthesia          ITati (Justina), am scribing for, and in the presence of, Randolhp Rogers M.D..    Electronically signed by: Tati Boo (Justina), 12/4/2019    IRandolph M.D. personally performed the services described in this documentation, as scribed by Tati Boo in my presence, and it is both accurate and complete.    C    The note accurately reflects work and decisions made by me.  Randolph Rogers  12/4/2019  4:01 PM

## 2019-12-04 NOTE — ED NOTES
Med rec updated and complete per pt at bedside  Allergies have been verified and updated  Pt home pharmacy:[facility]      Pt reports that she is on a 10 day course of VIBRAMYCIN that was started on 11/30/19

## 2019-12-05 ENCOUNTER — PATIENT OUTREACH (OUTPATIENT)
Dept: HEALTH INFORMATION MANAGEMENT | Facility: OTHER | Age: 34
End: 2019-12-05

## 2019-12-05 VITALS
SYSTOLIC BLOOD PRESSURE: 97 MMHG | RESPIRATION RATE: 16 BRPM | HEIGHT: 63 IN | OXYGEN SATURATION: 96 % | HEART RATE: 64 BPM | WEIGHT: 183.64 LBS | DIASTOLIC BLOOD PRESSURE: 60 MMHG | TEMPERATURE: 97.9 F | BODY MASS INDEX: 32.54 KG/M2

## 2019-12-05 PROBLEM — R20.0 LEFT SIDED NUMBNESS: Status: RESOLVED | Noted: 2019-12-04 | Resolved: 2019-12-05

## 2019-12-05 LAB
ANION GAP SERPL CALC-SCNC: 9 MMOL/L (ref 0–11.9)
BASOPHILS # BLD AUTO: 0.9 % (ref 0–1.8)
BASOPHILS # BLD: 0.07 K/UL (ref 0–0.12)
BUN SERPL-MCNC: 16 MG/DL (ref 8–22)
CALCIUM SERPL-MCNC: 8.8 MG/DL (ref 8.5–10.5)
CHLORIDE SERPL-SCNC: 109 MMOL/L (ref 96–112)
CHOLEST SERPL-MCNC: 160 MG/DL (ref 100–199)
CO2 SERPL-SCNC: 22 MMOL/L (ref 20–33)
CREAT SERPL-MCNC: 0.7 MG/DL (ref 0.5–1.4)
EKG IMPRESSION: NORMAL
EOSINOPHIL # BLD AUTO: 0.3 K/UL (ref 0–0.51)
EOSINOPHIL NFR BLD: 3.8 % (ref 0–6.9)
ERYTHROCYTE [DISTWIDTH] IN BLOOD BY AUTOMATED COUNT: 44.2 FL (ref 35.9–50)
GLUCOSE SERPL-MCNC: 96 MG/DL (ref 65–99)
HCT VFR BLD AUTO: 39.4 % (ref 37–47)
HDLC SERPL-MCNC: 55 MG/DL
HGB BLD-MCNC: 13 G/DL (ref 12–16)
IMM GRANULOCYTES # BLD AUTO: 0.03 K/UL (ref 0–0.11)
IMM GRANULOCYTES NFR BLD AUTO: 0.4 % (ref 0–0.9)
LDLC SERPL CALC-MCNC: 95 MG/DL
LYMPHOCYTES # BLD AUTO: 2.98 K/UL (ref 1–4.8)
LYMPHOCYTES NFR BLD: 37.5 % (ref 22–41)
MCH RBC QN AUTO: 30.7 PG (ref 27–33)
MCHC RBC AUTO-ENTMCNC: 33 G/DL (ref 33.6–35)
MCV RBC AUTO: 93.1 FL (ref 81.4–97.8)
MONOCYTES # BLD AUTO: 0.75 K/UL (ref 0–0.85)
MONOCYTES NFR BLD AUTO: 9.4 % (ref 0–13.4)
NEUTROPHILS # BLD AUTO: 3.82 K/UL (ref 2–7.15)
NEUTROPHILS NFR BLD: 48 % (ref 44–72)
NRBC # BLD AUTO: 0 K/UL
NRBC BLD-RTO: 0 /100 WBC
PLATELET # BLD AUTO: 337 K/UL (ref 164–446)
PMV BLD AUTO: 10 FL (ref 9–12.9)
POTASSIUM SERPL-SCNC: 3.5 MMOL/L (ref 3.6–5.5)
RBC # BLD AUTO: 4.23 M/UL (ref 4.2–5.4)
SODIUM SERPL-SCNC: 140 MMOL/L (ref 135–145)
TRIGL SERPL-MCNC: 48 MG/DL (ref 0–149)
WBC # BLD AUTO: 8 K/UL (ref 4.8–10.8)

## 2019-12-05 PROCEDURE — A9270 NON-COVERED ITEM OR SERVICE: HCPCS | Performed by: INTERNAL MEDICINE

## 2019-12-05 PROCEDURE — G0378 HOSPITAL OBSERVATION PER HR: HCPCS

## 2019-12-05 PROCEDURE — 80061 LIPID PANEL: CPT

## 2019-12-05 PROCEDURE — 700102 HCHG RX REV CODE 250 W/ 637 OVERRIDE(OP): Performed by: INTERNAL MEDICINE

## 2019-12-05 PROCEDURE — A9270 NON-COVERED ITEM OR SERVICE: HCPCS | Performed by: NURSE PRACTITIONER

## 2019-12-05 PROCEDURE — 96372 THER/PROPH/DIAG INJ SC/IM: CPT

## 2019-12-05 PROCEDURE — 85025 COMPLETE CBC W/AUTO DIFF WBC: CPT

## 2019-12-05 PROCEDURE — 80048 BASIC METABOLIC PNL TOTAL CA: CPT

## 2019-12-05 PROCEDURE — 700101 HCHG RX REV CODE 250: Performed by: INTERNAL MEDICINE

## 2019-12-05 PROCEDURE — 700102 HCHG RX REV CODE 250 W/ 637 OVERRIDE(OP): Performed by: NURSE PRACTITIONER

## 2019-12-05 PROCEDURE — 99217 PR OBSERVATION CARE DISCHARGE: CPT | Performed by: INTERNAL MEDICINE

## 2019-12-05 PROCEDURE — 700111 HCHG RX REV CODE 636 W/ 250 OVERRIDE (IP): Performed by: INTERNAL MEDICINE

## 2019-12-05 RX ORDER — POTASSIUM CHLORIDE 20 MEQ/1
20 TABLET, EXTENDED RELEASE ORAL DAILY
Status: DISCONTINUED | OUTPATIENT
Start: 2019-12-05 | End: 2019-12-05 | Stop reason: HOSPADM

## 2019-12-05 RX ADMIN — ACETAMINOPHEN 650 MG: 325 TABLET, FILM COATED ORAL at 08:44

## 2019-12-05 RX ADMIN — BUTALBITAL, ACETAMINOPHEN, AND CAFFEINE 1 TABLET: 50; 325; 40 TABLET ORAL at 04:44

## 2019-12-05 RX ADMIN — ENOXAPARIN SODIUM 40 MG: 100 INJECTION SUBCUTANEOUS at 04:46

## 2019-12-05 RX ADMIN — TOPIRAMATE 100 MG: 100 TABLET, FILM COATED ORAL at 04:44

## 2019-12-05 RX ADMIN — POTASSIUM CHLORIDE 20 MEQ: 1500 TABLET, EXTENDED RELEASE ORAL at 08:44

## 2019-12-05 NOTE — CARE PLAN
Problem: Communication  Goal: The ability to communicate needs accurately and effectively will improve  Outcome: PROGRESSING AS EXPECTED     Problem: Safety  Goal: Will remain free from injury  Outcome: PROGRESSING AS EXPECTED  Goal: Will remain free from falls  Outcome: PROGRESSING AS EXPECTED     Problem: Pain Management  Goal: Pain level will decrease to patient's comfort goal  Outcome: PROGRESSING AS EXPECTED     Problem: Knowledge Deficit  Goal: Knowledge of disease process/condition, treatment plan, diagnostic tests, and medications will improve  Outcome: PROGRESSING AS EXPECTED  Goal: Knowledge of the prescribed therapeutic regimen will improve  Outcome: PROGRESSING AS EXPECTED

## 2019-12-05 NOTE — PROGRESS NOTES
Pt resting in bed with no c/o pain at this time. Resp even and unlabored. No ss of distress noted. Will CTM.

## 2019-12-05 NOTE — CARE PLAN
Problem: Discharge Barriers/Planning  Goal: Patient's continuum of care needs will be met  Outcome: PROGRESSING AS EXPECTED     Problem: Safety  Goal: Will remain free from injury  Outcome: PROGRESSING AS EXPECTED     Problem: Pain Management  Goal: Pain level will decrease to patient's comfort goal  Outcome: PROGRESSING AS EXPECTED     Problem: Knowledge Deficit  Goal: Knowledge of disease process/condition, treatment plan, diagnostic tests, and medications will improve  Outcome: PROGRESSING AS EXPECTED

## 2019-12-05 NOTE — THERAPY
PT Contact Note:    LIP stated the patient did not have a stroke and that PT is not indicated; pt at baseline.    Indira Bone, PT   Pager [phone]

## 2019-12-05 NOTE — PROCEDURES
VIDEO ELECTROENCEPHALOGRAM REPORT      Referring provider: Dr. Ramos.     DOS: 12/4/2019 (total recording of 36 minutes).     INDICATION:  Rehana Meade 33 y.o. female presenting with headache, visual changes, paresthesias.     CURRENT ANTIEPILEPTIC REGIMEN: None.     TECHNIQUE: 30 channel video electroencephalogram (EEG) was performed in accordance with the international 10-20 system. The study was reviewed in bipolar and referential montages. The recording examined the patient during wakeful and drowsy/sleep state(s).     DESCRIPTION OF THE RECORD:  During the wakefulness, the background showed a symmetrical 9 Hz alpha activity posteriorly with amplitude of 70 mV.  There was reactivity to eye closure/opening.  A normal anterior-posterior gradient was noted with faster beta frequencies seen anteriorly.  During drowsiness, theta/delta frequencies were seen.    During the sleep state, background shows diffuse high-amplitude 4-5 Hz delta activity.  Symmetrical high-amplitude sleep spindles and vertex sharps were seen in the leads over the central regions.     ACTIVATION PROCEDURES:   Hyperventilation was performed by the patient for a total of 3 minutes. The technician performing the test noted good effort. This technique produced electroencephalographic changes in keeping with the expected bilaterally synchronous, frontally predominant, high amplitude slow waves build up.     Intermittent Photic stimulation was performed in a stepwise fashion from 1 to 30 Hz and elicited a normal response (photic driving), most noticeable in the posterior leads.      ICTAL AND/OR INTERICTAL FINDINGS:   No focal or generalized epileptiform activity noted. No regional slowing was seen during this routine study.  No clinical events or seizures were reported or recorded during the study.     EKG: sampling of the EKG recording demonstrated sinus rhythm.     EVENTS:      INTERPRETATION:  This is a normal video EEG recording in  the awake, drowsy, and sleep state(s).  Clinical correlation is recommended.    Note: A normal EEG does not rule out epilepsy.  If the clinical suspicion remains high for seizures, a prolonged recording to capture clinical or subclinical events may be helpful.        Judd Austin MD   Epilepsy and Neurodiagnostics.   Clinical  of Neurology [facility].   Diplomate in Neurology, Epilepsy, and Electrodiagnostic Medicine.   Office: [phone]  Fax: [phone]

## 2019-12-05 NOTE — DISCHARGE SUMMARY
"Discharge Summary    CHIEF COMPLAINT ON ADMISSION  Chief Complaint   Patient presents with   • Headache   • Blurred Vision   • Tingling     left sided.         Reason for Admission  Headache; Vision changes; L arm nu*     Admission Date  12/4/2019    CODE STATUS  Full Code    HPI & HOSPITAL COURSE  This is a 33 y.o. female here with complex migraine.  Please see the dictated HPI 12/4/2019 for complete details.  In short, patient has a complex medical history despite her young age including chronic migraines, seizure disorder, possible pseudotumor cerebri, and low-lying cerebellar tonsils suggestive of Chiari malformation type I.  She presented with left neck, which developed into left-sided headache.  She also started to notice transient episodes of sudden onset generalized blurring of vision on both eyes, with no note of scotoma or amaurosis fugax.  By noontime, she started to notice that her left side of her face, and arm felt tingly and heavy like \"limb falling asleep\".    She was worked up for possible CVA including CTA of the head and neck, echocardiogram, MRI brain, MRI cervical spine, lab studies and allowed for permissive hypertension.  All of her studies have essentially been negative for acute changes.    She was admitted to the CDU and monitored overnight.  She exhibited no acute events.  Her neurologic exam following morning was nonacute.  She says she cannot take triptan's for abortive therapy.  She is tried Botox injections and is intolerant to the serum.  She is intolerant to gabapentin.  She is tried tricyclic antidepressants and is intolerant.  She has been in counseling.  She is on antiepileptic medication and topiramate.  She follows with Dr. Peralta of neurology and Dr. Us a neurosurgery.  She says she intends to follow-up with both.  She has seen neuro-ophthalmology in the past.  At the present time she has no acute pathology that warrants continued inpatient stay and I will defer further care " "to her outpatient specialist.    Therefore, she is discharged in good and stable condition to home with close outpatient follow-up.    Discharge Date  12/5/2019    FOLLOW UP ITEMS POST DISCHARGE  Specialist follow-up--neurology December 16 per her report  Patient intends to follow-up with [facility]    DISCHARGE DIAGNOSES  Principal Problem (Resolved):    Left sided numbness, headache, blurry vision POA: Yes  Active Problems:    Seizure disorder (HCC) POA: Yes      FOLLOW UP  No future appointments.  Mack Bernard M.D.  [address] [address]  [phone]            MEDICATIONS ON DISCHARGE     Medication List      CONTINUE taking these medications      Instructions   AIMOVIG 70 MG/ML Soaj  Generic drug:  Erenumab   Inject 70 mg as instructed Q30 DAYS.  Dose:  70 mg     doxycycline 100 MG Tabs  Commonly known as:  VIBRAMYCIN   Take 100 mg by mouth 2 Times a Day. 10 day course  Dose:  100 mg     fluticasone 50 MCG/ACT nasal spray  Commonly known as:  FLONASE   Spray 2 Sprays in nose every evening.  Dose:  2 Spray     topiramate 100 MG Tabs  Commonly known as:  TOPAMAX   Take 100 mg by mouth 2 times a day.  Dose:  100 mg     zonisamide 100 MG Caps  Commonly known as:  ZONEGRAN   Take 200 mg by mouth every evening.  Dose:  200 mg            Allergies  Allergies   Allergen Reactions   • Amitriptyline Unspecified     Suicidal   • Clarithromycin Hives   • Sulfa Drugs Anaphylaxis   • Latex Itching   • Lorazepam Unspecified     Hallucinations     • Metoclopramide Unspecified     Muscle spasms   • Penicillin G Potassium Vomiting   • Rizatriptan Unspecified     Tremors, confusion     • Sumatriptan Unspecified     \"Makes my head pins and needles\"   • Tape Rash   • Morphine Hives and Itching       DIET  Orders Placed This Encounter   Procedures   • Diet Order Regular     Standing Status:   Standing     Number of Occurrences:   1     Order Specific Question:   Diet:     Answer:   Regular [1] "       ACTIVITY  As tolerated.  Weight bearing as tolerated    LABORATORY  Lab Results   Component Value Date    SODIUM 140 12/05/2019    POTASSIUM 3.5 (L) 12/05/2019    CHLORIDE 109 12/05/2019    CO2 22 12/05/2019    GLUCOSE 96 12/05/2019    BUN 16 12/05/2019    CREATININE 0.70 12/05/2019    CREATININE 0.9 05/04/2009        Lab Results   Component Value Date    WBC 8.0 12/05/2019    HEMOGLOBIN 13.0 12/05/2019    HEMATOCRIT 39.4 12/05/2019    PLATELETCT 337 12/05/2019        Total time of the discharge process exceeds 40 minutes.

## 2019-12-05 NOTE — CARE PLAN
Problem: Discharge Barriers/Planning  Goal: Patient's continuum of care needs will be met  Outcome: PROGRESSING AS EXPECTED     Admitted for left side numbness, headache, blurry vision.    Seen pt, AOx 4. On cardiac monitor with SR. No vision issue, slight headache 2-3/10, numbness on left arm/face still persist. Plan of care discussed includes Safety, labs, cardiac/neuro monitoring, ECHO, EEG, MRI, PT/OT and pt understands.

## 2019-12-05 NOTE — DISCHARGE INSTRUCTIONS
Discharge Instructions    Discharged to home by car with relative. Discharged via wheelchair, hospital escort: Yes.  Special equipment needed: Not Applicable    Be sure to schedule a follow-up appointment with your primary care doctor or any specialists as instructed.     Discharge Plan:   Diet Plan: Discussed  Activity Level: Discussed  Confirmed Follow up Appointment: Appointment Scheduled  Confirmed Symptoms Management: Discussed  Medication Reconciliation Updated: Yes  Influenza Vaccine Indication: Patient Refuses    I understand that a diet low in cholesterol, fat, and sodium is recommended for good health. Unless I have been given specific instructions below for another diet, I accept this instruction as my diet prescription.   Other diet: REG    Special Instructions: None    · Is patient discharged on Warfarin / Coumadin?   No     Depression / Suicide Risk    As you are discharged from this [facility] facility, it is important to learn how to keep safe from harming yourself.    Recognize the warning signs:  · Abrupt changes in personality, positive or negative- including increase in energy   · Giving away possessions  · Change in eating patterns- significant weight changes-  positive or negative  · Change in sleeping patterns- unable to sleep or sleeping all the time   · Unwillingness or inability to communicate  · Depression  · Unusual sadness, discouragement and loneliness  · Talk of wanting to die  · Neglect of personal appearance   · Rebelliousness- reckless behavior  · Withdrawal from people/activities they love  · Confusion- inability to concentrate     If you or a loved one observes any of these behaviors or has concerns about self-harm, here's what you can do:  · Talk about it- your feelings and reasons for harming yourself  · Remove any means that you might use to hurt yourself (examples: pills, rope, extension cords, firearm)  · Get professional help from the community (Mental Health, Substance  Abuse, psychological counseling)  · Do not be alone:Call your Safe Contact- someone whom you trust who will be there for you.  · Call your local CRISIS HOTLINE 274-1620 or 842-750-3277  · Call your local Children's Mobile Crisis Response Team [place] (367) 623-8263 or www.[employer]  · Call the toll free National Suicide Prevention Hotlines   · National Suicide Prevention Lifeline 786-375-JAOL (3156)  · [place] Hope Line Network 800-SUICIDE (476-1815)      Discharge Instructions per Daquan El A.P.N.    1.  Review your discharge Medication Reconciliation form as you may be provided with new prescriptions or changes to previously prescribed medications.  Be sure to take all of your prescribed medications exactly as directed.  Further questions can be directed to your local pharmacist or primary care provider (PCP).    2. Follow-up with your PCP.  An appointment may have already been scheduled for you.  Please review your discharge paperwork and locate this information.  Please be sure to call your PCP to cancel if you are unable to make this appointment or require schedule changes.  It is critical to to follow-up with your PCP to review hospital records and ensure any further diagnostic work-up, prescription refills, or referrals.     3. ACTIVITIES: After discharge from the hospital, you may resume routine activities including walking and going u/down stairs. However, no strenuous activity. For further clarification, call your PCP     4. DRIVING: You may drive whenever you are off pain medications and are able to perform the activities needed to drive, i.e. turning, bending, twisting, etc.     5. BOWEL FUNCTION: A few patients, after hospitalizations, will develop bowel irregularity. You could also experience constipation due to pain medication and decreased activity level. If you feel this is occurring, please take an over-the-counter laxative (Milk of Magnesia, Ex-Lax, Senokot, etc.) until the  problem has resolved.     Return to ER if you develop recurrent chest pain, shortness of breath, bloody sputum, fever of 101.5 or greater, intractable nausea or vomiting, blood in the vomit or urine, intractable pain, new onset inability to ambulate, focal motor weakness, acute vision disturbance, acute speech disturbance, intractable abdominal pain, or any other worrisome symptoms.

## 2019-12-05 NOTE — PROGRESS NOTES
"After giving lovenox inj pt c/o it \"hurting really bad\". Site is cdi no redness or swelling at this time. Gave ice pack for pt to put on abd inj site. Will CTM.   "

## 2019-12-05 NOTE — H&P
"Hospital Medicine History & Physical Note    Date of Service  12/4/2019    Primary Care Physician  Mack Bernard M.D.    Consultants  none    Code Status  Full    Chief Complaint  Headache, neck pain, blurring of vision, left-sided arm tingling and numbness    History of Presenting Illness  33 y.o. female with history of migraine headache, seizure disorder, pseudotumor cerebri, who presented 12/4/2019 with above symptoms.    Patient states that she was doing well until this morning when she woke up, when she started to notice pain on the left neck, which is significantly developed into left-sided headache.  She also started to notice transient episodes of sudden onset generalized blurring of vision on both eyes, with no note of scotoma or covering curtains.  By noontime, she started to notice that her left side of her face, and arm felt tingly and heavy like \"limb falling asleep\".  She denies any leg weakness or numbness.  She denies any speech changes. Throughout the day, she felt tired and fatigued.  The tingling improved, but the numbness persisted.  Otherwise she denied any other complaint such as chest pain, shortness of breath, nausea or vomiting, fevers or chills, dysuria, or bowel movement changes.  She denied sleeping in a weird or unusual position last night, and did not use any new pillows or mattress.  She added that she usually gets migraine headache accompanied by numbness but usually occurs on the right side, and usually occur spontaneously with headache.       ED course:  The patient was initially evaluated.  Vital signs were stable.  Initial blood work-up were unremarkable.  CBC and BMP were unimpressive troponin was negative.  Urinalysis was clean.  Perfusion head CT was negative, as with her head and neck CT angiogram.  Chest x-ray (personally reviewed) did not show any acute infiltrates, consolidation, effusion, or pneumothorax.  Patient was given Dilaudid which improved her pain.  She was " "subsequently admitted to the hospitalist service for further work-up.    Review of Systems  ROS     Pertinent positives/negatives as mentioned above.     A complete review of systems was personally done by me. All other systems were negative.       Past Medical History   has a past medical history of Anesthesia, Anxiety associated with depression, ASTHMA, Drug-seeking behavior, Migraine without aura, without mention of intractable migraine without mention of status migrainosus, Other specified symptom associated with female genital organs, Seizure cerebral (2/15/2018), Stroke (HCC), and Unspecified disorder of thyroid. She also has no past medical history of CAD (coronary artery disease), COPD, or Diabetes.    Surgical History   has a past surgical history that includes septoplasty (10/28/2009); somnoplasty (10/28/2009); nasal polypectomy (10/28/2009); and tubal coagulation laparoscopic bilateral (7/11/2014).     Family History  Reviewed and not pertinent.      Social History   reports that she has never smoked. She has never used smokeless tobacco. She reports previous alcohol use. She reports that she does not use drugs.    Allergies  Allergies   Allergen Reactions   • Amitriptyline Unspecified     Suicidal   • Clarithromycin Hives   • Sulfa Drugs Anaphylaxis   • Latex Itching   • Lorazepam Unspecified     Hallucinations     • Metoclopramide Unspecified     Muscle spasms   • Penicillin G Potassium Vomiting   • Rizatriptan Unspecified     Tremors, confusion     • Sumatriptan Unspecified     \"Makes my head pins and needles\"   • Tape Rash   • Morphine Hives and Itching       Medications  Prior to Admission Medications   Prescriptions Last Dose Informant Patient Reported? Taking?   AIMOVIG 70 MG/ML Solution Auto-injector 11/16/2019 at unk Patient Yes No   Sig: Inject 70 mg as instructed Q30 DAYS.   doxycycline (VIBRAMYCIN) 100 MG Tab 12/4/2019 at am Patient Yes Yes   Sig: Take 100 mg by mouth 2 Times a Day. 10 day " course   fluticasone (FLONASE) 50 MCG/ACT nasal spray 12/3/2019 at pm Patient Yes Yes   Sig: Spray 2 Sprays in nose every evening.   topiramate (TOPAMAX) 100 MG Tab 12/4/2019 at am Patient Yes Yes   Sig: Take 100 mg by mouth 2 times a day.   zonisamide (ZONEGRAN) 100 MG Cap 12/3/2019 at pm Patient Yes No   Sig: Take 200 mg by mouth every evening.      Facility-Administered Medications: None       Physical Exam  Temp:  [36.4 °C (97.5 °F)] 36.4 °C (97.5 °F)  Pulse:  [58-90] 58  Resp:  [16-20] 16  BP: ()/(37-85) 99/50  SpO2:  [96 %-100 %] 96 %    Physical Exam  Vitals signs reviewed.   Constitutional:       General: She is not in acute distress.     Appearance: Normal appearance. She is normal weight. She is not ill-appearing or diaphoretic.   HENT:      Head: Normocephalic and atraumatic.      Mouth/Throat:      Mouth: Mucous membranes are moist.      Pharynx: No oropharyngeal exudate or posterior oropharyngeal erythema.   Eyes:      General: No scleral icterus.     Extraocular Movements: Extraocular movements intact.      Conjunctiva/sclera: Conjunctivae normal.      Pupils: Pupils are equal, round, and reactive to light.   Neck:      Musculoskeletal: Normal range of motion and neck supple. No neck rigidity or muscular tenderness.   Cardiovascular:      Rate and Rhythm: Normal rate and regular rhythm.      Heart sounds: Normal heart sounds. No murmur.   Pulmonary:      Effort: Pulmonary effort is normal. No respiratory distress.      Breath sounds: Normal breath sounds. No stridor. No wheezing, rhonchi or rales.   Chest:      Chest wall: No tenderness.   Abdominal:      General: Bowel sounds are normal. There is no distension.      Palpations: Abdomen is soft. There is no mass.      Tenderness: There is no tenderness. There is no guarding or rebound.   Musculoskeletal: Normal range of motion.         General: No swelling.      Right lower leg: No edema.      Left lower leg: No edema.   Lymphadenopathy:       Cervical: No cervical adenopathy.   Skin:     General: Skin is warm and dry.      Coloration: Skin is not jaundiced.      Findings: No rash.   Neurological:      General: No focal deficit present.      Mental Status: She is alert and oriented to person, place, and time. Mental status is at baseline.      Cranial Nerves: No cranial nerve deficit.      Comments: No pronator drift  No facial droop  Motor 5/5 in all 4 extremities  15% sensory deficit on the left side of the face, and left arm, with no sensory deficit on the leg by light touch.     Psychiatric:         Mood and Affect: Mood normal.         Behavior: Behavior normal.         Thought Content: Thought content normal.         Judgment: Judgment normal.         Laboratory:  Recent Labs     12/04/19  1416   WBC 9.7   RBC 4.48   HEMOGLOBIN 13.8   HEMATOCRIT 41.5   MCV 92.6   MCH 30.8   MCHC 33.3*   RDW 44.6   PLATELETCT 381   MPV 9.9     Recent Labs     12/04/19  1416   SODIUM 140   POTASSIUM 3.8   CHLORIDE 108   CO2 22   GLUCOSE 86   BUN 15   CREATININE 0.76   CALCIUM 9.2     Recent Labs     12/04/19  1416   ALTSGPT 10   ASTSGOT 10*   ALKPHOSPHAT 79   TBILIRUBIN 0.3   GLUCOSE 86     Recent Labs     12/04/19  1416   APTT 27.2   INR 0.99     No results for input(s): NTPROBNP in the last 72 hours.      Recent Labs     12/04/19  1416   TROPONINT <6       Urinalysis:    Recent Labs     12/04/19  1456   SPECGRAVITY 1.036   GLUCOSEUR Negative   KETONES Negative   NITRITE Negative   LEUKESTERAS Negative        Imaging:  DX-CHEST-PORTABLE (1 VIEW)   Final Result      No acute cardiopulmonary findings.      CT-CTA HEAD WITH & W/O-POST PROCESS   Final Result      1.  CT angiogram of the [place] within normal limits.      CT-CTA NECK WITH & W/O-POST PROCESSING   Final Result      1.  No atherosclerotic disease or significant stenosis is identified. No section is identified.      CT-CEREBRAL PERFUSION ANALYSIS   Final Result      1.  Cerebral blood flow less than  30% likely representing completed infarct = 0 mL.      2.  T Max more than 6 seconds likely representing combination of completed infarct and ischemia = 0 mL.      3.  Mismatched volume likely representing ischemic brain/penumbra = None      4.  Please note that the cerebral perfusion was performed on the limited brain tissue around the basal ganglia region. Infarct/ischemia outside the CT perfusion sections can be missed in this study.      CT-HEAD W/O   Final Result      1.  Head CT without contrast within normal limits. No evidence of acute cerebral infarction, hemorrhage or mass lesion.   2.  There has been interval progression of pansinusitis.      EC-ECHOCARDIOGRAM COMPLETE W/O CONT    (Results Pending)   MR-BRAIN-W/O    (Results Pending)   MR-CERVICAL SPINE-W/O    (Results Pending)         Assessment/Plan:  I anticipate this patient is appropriate for observation status at this time.    * Left sided numbness, headache, blurry vision- (present on admission)  Assessment & Plan  -Suspect this is related to completed migraine.  However, would not want to rule out stroke.  She is out of tpa therapeutic window  -start empiric full dose aspirin, along with high-intensity statin with Lipitor 80 mg daily until stroke is ruled out.  Check lipid profile and hemoglobin A1c for further risk stratification.  -will obtain MRI of the brain to rule out acute CVA.  Will also get echocardiogram with bubble study for completion.  Monitor on telemetry to rule out occult arrhythmia/A. Fib.  I would also obtain an MRI of the cervical spine to rule out cervical radiculopathy.  -Given her history of seizure disorder, I will also obtain an EEG.  Resume Zonegran.  -Monitor closely, with neuro checks every 4 hours per CVA protocol.  -Start permissive hypertension in the acute phase in the next 24-48 hour, and allow blood pressure to go as high as 220/120 to preserve cerebral perfusion. Hold home antihypertensives for now.   -will get  PT/OT/SLP to evaluate.   -For her likely migraine headache, I will start her on PRN fioricet.  She is intolerant of Reglan.  Continue PRN antiemetics.  If headaches persist, will consider giving a dose of steroids.  Resume home Topamax.      Seizure disorder (HCC)- (present on admission)  Assessment & Plan  -Resume Zonegran.  Check EEG as above.      VTE prophylaxis: lovenox SQ

## 2019-12-06 ASSESSMENT — ENCOUNTER SYMPTOMS
CHILLS: 1
SINUS PAIN: 0
SPUTUM PRODUCTION: 0
DEPRESSION: 0
NECK PAIN: 0
NAUSEA: 0
COUGH: 0
ABDOMINAL PAIN: 0
FEVER: 1
SORE THROAT: 0
MYALGIAS: 0
VOMITING: 0
DIZZINESS: 0
SHORTNESS OF BREATH: 0
WHEEZING: 0
EYE REDNESS: 0
EYE DISCHARGE: 0
HEADACHES: 0

## 2019-12-28 ENCOUNTER — HOSPITAL ENCOUNTER (OUTPATIENT)
Dept: RADIOLOGY | Facility: MEDICAL CENTER | Age: 34
End: 2019-12-28
Attending: NURSE PRACTITIONER
Payer: COMMERCIAL

## 2019-12-28 DIAGNOSIS — G93.5 COMPRESSION OF BRAIN (HCC): ICD-10-CM

## 2019-12-28 PROCEDURE — 72141 MRI NECK SPINE W/O DYE: CPT

## 2020-01-21 DIAGNOSIS — Z01.812 PRE-PROCEDURAL LABORATORY EXAMINATION: ICD-10-CM

## 2020-01-21 LAB
ABO GROUP BLD: NORMAL
ALBUMIN SERPL BCP-MCNC: 4.2 G/DL (ref 3.2–4.9)
ALBUMIN/GLOB SERPL: 1.2 G/DL
ALP SERPL-CCNC: 83 U/L (ref 30–99)
ALT SERPL-CCNC: 12 U/L (ref 2–50)
AMORPH CRY #/AREA URNS HPF: PRESENT /HPF
ANION GAP SERPL CALC-SCNC: 7 MMOL/L (ref 0–11.9)
APPEARANCE UR: ABNORMAL
AST SERPL-CCNC: 11 U/L (ref 12–45)
B-HCG SERPL-ACNC: <0.6 MIU/ML (ref 0–5)
BACTERIA #/AREA URNS HPF: NEGATIVE /HPF
BASOPHILS # BLD AUTO: 0.6 % (ref 0–1.8)
BASOPHILS # BLD: 0.07 K/UL (ref 0–0.12)
BILIRUB SERPL-MCNC: 0.4 MG/DL (ref 0.1–1.5)
BILIRUB UR QL STRIP.AUTO: NEGATIVE
BLD GP AB SCN SERPL QL: NORMAL
BUN SERPL-MCNC: 14 MG/DL (ref 8–22)
CALCIUM SERPL-MCNC: 9.7 MG/DL (ref 8.5–10.5)
CHLORIDE SERPL-SCNC: 107 MMOL/L (ref 96–112)
CO2 SERPL-SCNC: 25 MMOL/L (ref 20–33)
COLOR UR: YELLOW
CREAT SERPL-MCNC: 0.98 MG/DL (ref 0.5–1.4)
EOSINOPHIL # BLD AUTO: 0.26 K/UL (ref 0–0.51)
EOSINOPHIL NFR BLD: 2.4 % (ref 0–6.9)
EPI CELLS #/AREA URNS HPF: NORMAL /HPF
ERYTHROCYTE [DISTWIDTH] IN BLOOD BY AUTOMATED COUNT: 49.2 FL (ref 35.9–50)
GLOBULIN SER CALC-MCNC: 3.6 G/DL (ref 1.9–3.5)
GLUCOSE SERPL-MCNC: 81 MG/DL (ref 65–99)
GLUCOSE UR STRIP.AUTO-MCNC: NEGATIVE MG/DL
HCT VFR BLD AUTO: 44 % (ref 37–47)
HGB BLD-MCNC: 13.9 G/DL (ref 12–16)
HYALINE CASTS #/AREA URNS LPF: NORMAL /LPF
IMM GRANULOCYTES # BLD AUTO: 0.04 K/UL (ref 0–0.11)
IMM GRANULOCYTES NFR BLD AUTO: 0.4 % (ref 0–0.9)
KETONES UR STRIP.AUTO-MCNC: NEGATIVE MG/DL
LEUKOCYTE ESTERASE UR QL STRIP.AUTO: NEGATIVE
LYMPHOCYTES # BLD AUTO: 2.63 K/UL (ref 1–4.8)
LYMPHOCYTES NFR BLD: 24.1 % (ref 22–41)
MCH RBC QN AUTO: 30.2 PG (ref 27–33)
MCHC RBC AUTO-ENTMCNC: 31.6 G/DL (ref 33.6–35)
MCV RBC AUTO: 95.7 FL (ref 81.4–97.8)
MICRO URNS: ABNORMAL
MONOCYTES # BLD AUTO: 0.73 K/UL (ref 0–0.85)
MONOCYTES NFR BLD AUTO: 6.7 % (ref 0–13.4)
NEUTROPHILS # BLD AUTO: 7.2 K/UL (ref 2–7.15)
NEUTROPHILS NFR BLD: 65.8 % (ref 44–72)
NITRITE UR QL STRIP.AUTO: NEGATIVE
NRBC # BLD AUTO: 0 K/UL
NRBC BLD-RTO: 0 /100 WBC
PH UR STRIP.AUTO: 7.5 [PH] (ref 5–8)
PLATELET # BLD AUTO: 296 K/UL (ref 164–446)
PMV BLD AUTO: 9.8 FL (ref 9–12.9)
POTASSIUM SERPL-SCNC: 3.6 MMOL/L (ref 3.6–5.5)
PROT SERPL-MCNC: 7.8 G/DL (ref 6–8.2)
PROT UR QL STRIP: NEGATIVE MG/DL
RBC # BLD AUTO: 4.6 M/UL (ref 4.2–5.4)
RBC # URNS HPF: NORMAL /HPF
RBC UR QL AUTO: NEGATIVE
RH BLD: NORMAL
SODIUM SERPL-SCNC: 139 MMOL/L (ref 135–145)
SP GR UR STRIP.AUTO: 1.02
UROBILINOGEN UR STRIP.AUTO-MCNC: 0.2 MG/DL
WBC # BLD AUTO: 10.9 K/UL (ref 4.8–10.8)
WBC #/AREA URNS HPF: NORMAL /HPF

## 2020-01-21 PROCEDURE — 86900 BLOOD TYPING SEROLOGIC ABO: CPT

## 2020-01-21 PROCEDURE — 81001 URINALYSIS AUTO W/SCOPE: CPT

## 2020-01-21 PROCEDURE — 36415 COLL VENOUS BLD VENIPUNCTURE: CPT

## 2020-01-21 PROCEDURE — 86901 BLOOD TYPING SEROLOGIC RH(D): CPT

## 2020-01-21 PROCEDURE — 86850 RBC ANTIBODY SCREEN: CPT

## 2020-01-21 PROCEDURE — 85025 COMPLETE CBC W/AUTO DIFF WBC: CPT

## 2020-01-21 PROCEDURE — 80053 COMPREHEN METABOLIC PANEL: CPT

## 2020-01-21 PROCEDURE — 84702 CHORIONIC GONADOTROPIN TEST: CPT

## 2020-01-24 NOTE — H&P
DATE OF SURGERY:  2020    IDENTIFICATION:  This is a 34-year-old  3, para 3-0-0-3, with last   menstrual period of 2020 who presents with a chief complaint of   menorrhagia and dysmenorrhea.    HISTORY OF PRESENT ILLNESS:  This is a longstanding patient of mine who has   had 3 healthy vaginal deliveries.  She has had a bilateral salpingectomy in   the past for sterilization.  She complains of pelvic pain, worsening with her   periods, which are heavy and severe dysmenorrhea to the point where NSAIDs are   not working.  She states that her periods are debilitating.  She is not able   to exercise, have intercourse or really function when she has her periods and   that they are also heavy for the most part.  She had negative endometrial   biopsy.  Her most recent Pap smear is normal.  She has no other complaints of   nausea, vomiting, fever, chills, change in bowel or bladder habits.  An   ultrasound performed in November shows anteflexed uterus measuring   10.2x4.3x7.3, mildly enlarged with no adnexal masses.  Given the fact that she   has done bearing children and that medical therapy has not been of benefit   for her, she would like to proceed with hysterectomy at this time.    OBSTETRIC HISTORY:  Significant for 3 previous vaginal deliveries.    GYNECOLOGIC HISTORY:  History of bilateral salpingectomy.  No STDs or abnormal   Paps.  Most recent Pap normal.    MEDICAL HISTORY:  ALLERGIES:  INCLUDE ATIVAN, BIAXIN, LATEX, PENICILLIN, REGLAN, AND TAPE.    CURRENT MEDICATIONS:  Topamax, zonisamide and Aimovig autoinjector.    MEDICAL PROBLEMS:  Asthma, Chiari malformation type 1, depression, anxiety,   GERD, migraine headaches and seizure disorder, not currently active.    SURGICAL HISTORY:  Nasal surgery, bilateral salpingectomy.    SOCIAL HISTORY:  Denies alcohol, tobacco or drug use.    FAMILY HISTORY:  Noncontributory.    REVIEW OF SYSTEMS:  Review of systems x12 is negative per AMA standards    available in chart.    LABORATORY DATA:  Pending.    PHYSICAL EXAMINATION:  VITAL SIGNS:  Patient is afebrile.  Vital signs within normal limits.  GENERAL:  She is awake, alert, in no apparent distress.  NECK:  Supple.  HEART:  Regular.  CHEST:  Clear.  BREASTS:  Symmetrical.  ABDOMEN:  Soft, pannus, nontender, positive bowel sounds x4.  EXTREMITIES:  No cyanosis, clubbing, or edema, 2+ DTRs.  GYNECOLOGIC:  EGBUS within normal limits.  No perineal lesions.  Vagina is   pink and moist.  Cervix is midline without discharge or cervical motion   tenderness.  Uterus midline, anteverted, mildly enlarged, mobile, nontender,   no adnexal masses.    ASSESSMENT:  At this time is menorrhagia, dysmenorrhea, refractory to medical   therapy, desires hysterectomy.    PLAN:  At this time, patient has been extensively counseled on risks,   benefits, complications, and alternatives to surgery, which include but are   not limited to risk of anesthesia, risk of injury (to bowel, bladder, ureters,   major vessels, nerves, pelvis), risk of blood clots in her legs and lungs,   risk of subsequent postop pneumonia.  She accepts these risks.  She realizes   that by removing her uterus, she will no longer have periods or be able to   carry children.  She is comfortable with this as well.  She also understands   that we will leave her ovaries behind.  She is comfortable with this as well.    Plan subsequently is total vaginal hysterectomy with probable cystoscopy.             ____________________________________     MD ARIANA Galvin / ZAK    DD:  01/24/2020 08:19:53  DT:  01/24/2020 08:34:34    D#:  [id number]  Job#:  [id number]

## 2020-01-27 ENCOUNTER — ANESTHESIA EVENT (OUTPATIENT)
Dept: SURGERY | Facility: MEDICAL CENTER | Age: 35
End: 2020-01-27
Payer: COMMERCIAL

## 2020-01-27 ENCOUNTER — HOSPITAL ENCOUNTER (OUTPATIENT)
Facility: MEDICAL CENTER | Age: 35
End: 2020-01-27
Attending: OBSTETRICS & GYNECOLOGY | Admitting: OBSTETRICS & GYNECOLOGY
Payer: COMMERCIAL

## 2020-01-27 ENCOUNTER — ANESTHESIA (OUTPATIENT)
Dept: SURGERY | Facility: MEDICAL CENTER | Age: 35
End: 2020-01-27
Payer: COMMERCIAL

## 2020-01-27 VITALS
HEIGHT: 63 IN | TEMPERATURE: 97.9 F | HEART RATE: 57 BPM | SYSTOLIC BLOOD PRESSURE: 99 MMHG | RESPIRATION RATE: 18 BRPM | DIASTOLIC BLOOD PRESSURE: 59 MMHG | BODY MASS INDEX: 33.09 KG/M2 | OXYGEN SATURATION: 96 % | WEIGHT: 186.73 LBS

## 2020-01-27 DIAGNOSIS — G89.18 POST-OP PAIN: ICD-10-CM

## 2020-01-27 LAB — PATHOLOGY CONSULT NOTE: NORMAL

## 2020-01-27 PROCEDURE — 160009 HCHG ANES TIME/MIN: Performed by: OBSTETRICS & GYNECOLOGY

## 2020-01-27 PROCEDURE — 700105 HCHG RX REV CODE 258: Performed by: OBSTETRICS & GYNECOLOGY

## 2020-01-27 PROCEDURE — 88307 TISSUE EXAM BY PATHOLOGIST: CPT

## 2020-01-27 PROCEDURE — 500892 HCHG PACK, PERI-GYN: Performed by: OBSTETRICS & GYNECOLOGY

## 2020-01-27 PROCEDURE — 700101 HCHG RX REV CODE 250: Performed by: OBSTETRICS & GYNECOLOGY

## 2020-01-27 PROCEDURE — 160025 RECOVERY II MINUTES (STATS): Performed by: OBSTETRICS & GYNECOLOGY

## 2020-01-27 PROCEDURE — A6402 STERILE GAUZE <= 16 SQ IN: HCPCS | Performed by: OBSTETRICS & GYNECOLOGY

## 2020-01-27 PROCEDURE — 501411 HCHG SPONGE, BABY LAP W/O RINGS: Performed by: OBSTETRICS & GYNECOLOGY

## 2020-01-27 PROCEDURE — 501330 HCHG SET, CYSTO IRRIG TUBING: Performed by: OBSTETRICS & GYNECOLOGY

## 2020-01-27 PROCEDURE — 160048 HCHG OR STATISTICAL LEVEL 1-5: Performed by: OBSTETRICS & GYNECOLOGY

## 2020-01-27 PROCEDURE — 501838 HCHG SUTURE GENERAL: Performed by: OBSTETRICS & GYNECOLOGY

## 2020-01-27 PROCEDURE — 160041 HCHG SURGERY MINUTES - EA ADDL 1 MIN LEVEL 4: Performed by: OBSTETRICS & GYNECOLOGY

## 2020-01-27 PROCEDURE — 160046 HCHG PACU - 1ST 60 MINS PHASE II: Performed by: OBSTETRICS & GYNECOLOGY

## 2020-01-27 PROCEDURE — A4338 INDWELLING CATHETER LATEX: HCPCS | Performed by: OBSTETRICS & GYNECOLOGY

## 2020-01-27 PROCEDURE — A9270 NON-COVERED ITEM OR SERVICE: HCPCS | Performed by: ANESTHESIOLOGY

## 2020-01-27 PROCEDURE — 700111 HCHG RX REV CODE 636 W/ 250 OVERRIDE (IP): Performed by: ANESTHESIOLOGY

## 2020-01-27 PROCEDURE — 160002 HCHG RECOVERY MINUTES (STAT): Performed by: OBSTETRICS & GYNECOLOGY

## 2020-01-27 PROCEDURE — 160036 HCHG PACU - EA ADDL 30 MINS PHASE I: Performed by: OBSTETRICS & GYNECOLOGY

## 2020-01-27 PROCEDURE — 700102 HCHG RX REV CODE 250 W/ 637 OVERRIDE(OP): Performed by: ANESTHESIOLOGY

## 2020-01-27 PROCEDURE — 700101 HCHG RX REV CODE 250: Performed by: ANESTHESIOLOGY

## 2020-01-27 PROCEDURE — 160029 HCHG SURGERY MINUTES - 1ST 30 MINS LEVEL 4: Performed by: OBSTETRICS & GYNECOLOGY

## 2020-01-27 PROCEDURE — 160035 HCHG PACU - 1ST 60 MINS PHASE I: Performed by: OBSTETRICS & GYNECOLOGY

## 2020-01-27 RX ORDER — BUPIVACAINE HYDROCHLORIDE AND EPINEPHRINE 2.5; 5 MG/ML; UG/ML
INJECTION, SOLUTION EPIDURAL; INFILTRATION; INTRACAUDAL; PERINEURAL
Status: DISCONTINUED | OUTPATIENT
Start: 2020-01-27 | End: 2020-01-27 | Stop reason: HOSPADM

## 2020-01-27 RX ORDER — HYDROMORPHONE HYDROCHLORIDE 1 MG/ML
0.1 INJECTION, SOLUTION INTRAMUSCULAR; INTRAVENOUS; SUBCUTANEOUS
Status: DISCONTINUED | OUTPATIENT
Start: 2020-01-27 | End: 2020-01-27 | Stop reason: HOSPADM

## 2020-01-27 RX ORDER — HYDROMORPHONE HYDROCHLORIDE 1 MG/ML
0.2 INJECTION, SOLUTION INTRAMUSCULAR; INTRAVENOUS; SUBCUTANEOUS
Status: DISCONTINUED | OUTPATIENT
Start: 2020-01-27 | End: 2020-01-27 | Stop reason: HOSPADM

## 2020-01-27 RX ORDER — HYDROMORPHONE HYDROCHLORIDE 1 MG/ML
0.4 INJECTION, SOLUTION INTRAMUSCULAR; INTRAVENOUS; SUBCUTANEOUS
Status: DISCONTINUED | OUTPATIENT
Start: 2020-01-27 | End: 2020-01-27 | Stop reason: HOSPADM

## 2020-01-27 RX ORDER — DIPHENHYDRAMINE HYDROCHLORIDE 50 MG/ML
12.5 INJECTION INTRAMUSCULAR; INTRAVENOUS
Status: DISCONTINUED | OUTPATIENT
Start: 2020-01-27 | End: 2020-01-27 | Stop reason: HOSPADM

## 2020-01-27 RX ORDER — OXYCODONE HCL 5 MG/5 ML
10 SOLUTION, ORAL ORAL
Status: COMPLETED | OUTPATIENT
Start: 2020-01-27 | End: 2020-01-27

## 2020-01-27 RX ORDER — SODIUM CHLORIDE, SODIUM LACTATE, POTASSIUM CHLORIDE, CALCIUM CHLORIDE 600; 310; 30; 20 MG/100ML; MG/100ML; MG/100ML; MG/100ML
INJECTION, SOLUTION INTRAVENOUS CONTINUOUS
Status: DISCONTINUED | OUTPATIENT
Start: 2020-01-27 | End: 2020-01-27 | Stop reason: HOSPADM

## 2020-01-27 RX ORDER — OXYCODONE HCL 5 MG/5 ML
5 SOLUTION, ORAL ORAL
Status: COMPLETED | OUTPATIENT
Start: 2020-01-27 | End: 2020-01-27

## 2020-01-27 RX ORDER — HALOPERIDOL 5 MG/ML
1 INJECTION INTRAMUSCULAR
Status: DISCONTINUED | OUTPATIENT
Start: 2020-01-27 | End: 2020-01-27 | Stop reason: HOSPADM

## 2020-01-27 RX ORDER — BUPIVACAINE HYDROCHLORIDE AND EPINEPHRINE 2.5; 5 MG/ML; UG/ML
INJECTION, SOLUTION EPIDURAL; INFILTRATION; INTRACAUDAL; PERINEURAL
Status: DISCONTINUED
Start: 2020-01-27 | End: 2020-01-27 | Stop reason: HOSPADM

## 2020-01-27 RX ORDER — IBUPROFEN 600 MG/1
600 TABLET ORAL EVERY 6 HOURS PRN
Status: DISCONTINUED | OUTPATIENT
Start: 2020-01-27 | End: 2020-01-27 | Stop reason: HOSPADM

## 2020-01-27 RX ORDER — CEFAZOLIN SODIUM 1 G/3ML
INJECTION, POWDER, FOR SOLUTION INTRAMUSCULAR; INTRAVENOUS PRN
Status: DISCONTINUED | OUTPATIENT
Start: 2020-01-27 | End: 2020-01-27 | Stop reason: SURG

## 2020-01-27 RX ORDER — NEOSTIGMINE METHYLSULFATE 1 MG/ML
INJECTION, SOLUTION INTRAVENOUS PRN
Status: DISCONTINUED | OUTPATIENT
Start: 2020-01-27 | End: 2020-01-27 | Stop reason: SURG

## 2020-01-27 RX ORDER — OXYCODONE HYDROCHLORIDE AND ACETAMINOPHEN 5; 325 MG/1; MG/1
1 TABLET ORAL EVERY 4 HOURS PRN
Status: DISCONTINUED | OUTPATIENT
Start: 2020-01-27 | End: 2020-01-27 | Stop reason: HOSPADM

## 2020-01-27 RX ORDER — MEPERIDINE HYDROCHLORIDE 25 MG/ML
12.5 INJECTION INTRAMUSCULAR; INTRAVENOUS; SUBCUTANEOUS
Status: DISCONTINUED | OUTPATIENT
Start: 2020-01-27 | End: 2020-01-27 | Stop reason: HOSPADM

## 2020-01-27 RX ORDER — OXYCODONE HYDROCHLORIDE AND ACETAMINOPHEN 5; 325 MG/1; MG/1
1 TABLET ORAL EVERY 4 HOURS PRN
Qty: 20 TAB | Refills: 0 | Status: SHIPPED | OUTPATIENT
Start: 2020-01-27 | End: 2020-02-01

## 2020-01-27 RX ORDER — MIDAZOLAM HYDROCHLORIDE 1 MG/ML
INJECTION INTRAMUSCULAR; INTRAVENOUS PRN
Status: DISCONTINUED | OUTPATIENT
Start: 2020-01-27 | End: 2020-01-27 | Stop reason: SURG

## 2020-01-27 RX ORDER — IBUPROFEN 600 MG/1
600 TABLET ORAL EVERY 6 HOURS PRN
Qty: 30 TAB | Refills: 1 | Status: ON HOLD | OUTPATIENT
Start: 2020-01-27 | End: 2021-01-07

## 2020-01-27 RX ORDER — ONDANSETRON 2 MG/ML
4 INJECTION INTRAMUSCULAR; INTRAVENOUS
Status: DISCONTINUED | OUTPATIENT
Start: 2020-01-27 | End: 2020-01-27 | Stop reason: HOSPADM

## 2020-01-27 RX ADMIN — MIDAZOLAM HYDROCHLORIDE 2 MG: 1 INJECTION, SOLUTION INTRAMUSCULAR; INTRAVENOUS at 07:48

## 2020-01-27 RX ADMIN — CEFAZOLIN 2 G: 330 INJECTION, POWDER, FOR SOLUTION INTRAMUSCULAR; INTRAVENOUS at 07:48

## 2020-01-27 RX ADMIN — FENTANYL CITRATE 25 MCG: 0.05 INJECTION, SOLUTION INTRAMUSCULAR; INTRAVENOUS at 10:00

## 2020-01-27 RX ADMIN — NEOSTIGMINE METHYLSULFATE 3 MG: 1 INJECTION INTRAVENOUS at 08:41

## 2020-01-27 RX ADMIN — SODIUM CHLORIDE, POTASSIUM CHLORIDE, SODIUM LACTATE AND CALCIUM CHLORIDE: 600; 310; 30; 20 INJECTION, SOLUTION INTRAVENOUS at 07:30

## 2020-01-27 RX ADMIN — PROPOFOL 150 MG: 10 INJECTION, EMULSION INTRAVENOUS at 07:50

## 2020-01-27 RX ADMIN — FENTANYL CITRATE 50 MCG: 50 INJECTION, SOLUTION INTRAMUSCULAR; INTRAVENOUS at 07:48

## 2020-01-27 RX ADMIN — OXYCODONE HYDROCHLORIDE 10 MG: 5 SOLUTION ORAL at 09:34

## 2020-01-27 RX ADMIN — FENTANYL CITRATE 25 MCG: 0.05 INJECTION, SOLUTION INTRAMUSCULAR; INTRAVENOUS at 11:53

## 2020-01-27 RX ADMIN — FENTANYL CITRATE 25 MCG: 50 INJECTION, SOLUTION INTRAMUSCULAR; INTRAVENOUS at 07:51

## 2020-01-27 RX ADMIN — FENTANYL CITRATE 25 MCG: 0.05 INJECTION, SOLUTION INTRAMUSCULAR; INTRAVENOUS at 09:43

## 2020-01-27 RX ADMIN — SODIUM CHLORIDE, POTASSIUM CHLORIDE, SODIUM LACTATE AND CALCIUM CHLORIDE: 600; 310; 30; 20 INJECTION, SOLUTION INTRAVENOUS at 07:48

## 2020-01-27 RX ADMIN — FENTANYL CITRATE 50 MCG: 0.05 INJECTION, SOLUTION INTRAMUSCULAR; INTRAVENOUS at 09:08

## 2020-01-27 RX ADMIN — ROCURONIUM BROMIDE 30 MG: 10 INJECTION, SOLUTION INTRAVENOUS at 07:49

## 2020-01-27 RX ADMIN — GLYCOPYRROLATE 0.3 MG: 0.2 INJECTION INTRAMUSCULAR; INTRAVENOUS at 08:41

## 2020-01-27 RX ADMIN — FENTANYL CITRATE 50 MCG: 50 INJECTION, SOLUTION INTRAMUSCULAR; INTRAVENOUS at 07:55

## 2020-01-27 RX ADMIN — FLUORESCEIN SODIUM 100 MG: 100 INJECTION INTRAVENOUS at 08:40

## 2020-01-27 ASSESSMENT — PAIN SCALES - GENERAL: PAIN_LEVEL: 2

## 2020-01-27 NOTE — OR SURGEON
Immediate Post OP Note    PreOp Diagnosis: menorrhagia; dysmenorrhea    PostOp Diagnosis: same    Procedure(s):  HYSTERECTOMY, TOTAL, VAGINAL - Wound Class: Clean Contaminated  CYSTOSCOPY - Wound Class: Clean Contaminated    Surgeon(s):  JORGE L Barragan M.D.    Anesthesiologist/Type of Anesthesia:  Anesthesiologist: Dimitri Oviedo M.D./General    Surgical Staff:  Circulator: Desirae Cook R.N.; Cathy Whaley R.N.  Relief Circulator: Isabelle Akers R.N.  Scrub Person: Nilesh Virk    Specimens removed if any:  ID Type Source Tests Collected by Time Destination   A : Uterus and cervix Tissue Uterus PATHOLOGY SPECIMEN Julian Parker M.D. 1/27/2020 0823        Estimated Blood Loss: less than 50    Findings: enlarged uterus; normal appearing ovaries; efflux of flouresine and intact bladder dome    Complications: none        1/27/2020 8:48 AM Julian Parker M.D.

## 2020-01-27 NOTE — ANESTHESIA TIME REPORT
Anesthesia Start and Stop Event Times     Date Time Event    1/27/2020 0721 Ready for Procedure     0748 Anesthesia Start     0900 Anesthesia Stop        Responsible Staff  01/27/20    Name Role Begin End    Dimitri Oviedo M.D. Anesth 0748 0900        Preop Diagnosis (Free Text):  Pre-op Diagnosis     PELVIC PAIN, DYSMENORRHEA        Preop Diagnosis (Codes):    Post op Diagnosis  Pelvic pain      Premium Reason  Non-Premium    Comments:

## 2020-01-27 NOTE — DISCHARGE INSTRUCTIONS
ACTIVITY: Rest and take it easy for the first 24 hours.  A responsible adult is recommended to remain with you during that time.  It is normal to feel sleepy.  We encourage you to not do anything that requires balance, judgment or coordination.    MILD FLU-LIKE SYMPTOMS ARE NORMAL. YOU MAY EXPERIENCE GENERALIZED MUSCLE ACHES, THROAT IRRITATION, HEADACHE AND/OR SOME NAUSEA.    FOR 24 HOURS DO NOT:  Drive, operate machinery or run household appliances.  Drink beer or alcoholic beverages.   Make important decisions or sign legal documents.    SPECIAL INSTRUCTIONS: *See Handout**    DIET: To avoid nausea, slowly advance diet as tolerated, avoiding spicy or greasy foods for the first day.  Add more substantial food to your diet according to your physician's instructions.  Babies can be fed formula or breast milk as soon as they are hungry.  INCREASE FLUIDS AND FIBER TO AVOID CONSTIPATION.    SURGICAL DRESSING/BATHING: *May shower in 24 hours, no baths, hot tubs, or swimming pools**    FOLLOW-UP APPOINTMENT:  A follow-up appointment should be arranged with your doctor in *keep as scheduled, or call to schedule **    You should CALL YOUR PHYSICIAN if you develop:  Fever greater than 101 degrees F.  Pain not relieved by medication, or persistent nausea or vomiting.  Excessive bleeding (blood soaking through dressing) or unexpected drainage from the wound.  Extreme redness or swelling around the incision site, drainage of pus or foul smelling drainage.  Inability to urinate or empty your bladder within 8 hours.  Problems with breathing or chest pain.    You should call 911 if you develop problems with breathing or chest pain.  If you are unable to contact your doctor or surgical center, you should go to the nearest emergency room or urgent care center.  Physician's telephone #: ** [phone]*    If any questions arise, call your doctor.  If your doctor is not available, please feel free to call the Surgical Center at  [phone].  The Center is open Monday through Friday from 7AM to 7PM.  You can also call the HEALTH HOTLINE open 24 hours/day, 7 days/week and speak to a nurse at [phone], or toll free at [phone].    A registered nurse may call you a few days after your surgery to see how you are doing after your procedure.    MEDICATIONS: Resume taking daily medication.  Take prescribed pain medication with food.  If no medication is prescribed, you may take non-aspirin pain medication if needed.  PAIN MEDICATION CAN BE VERY CONSTIPATING.  Take a stool softener or laxative such as senokot, pericolace, or milk of magnesia if needed.    Prescription given for **Ibuprofen and Oxycodone *.  Last pain medication given at *9:30AM, next dose at 1:30 PM**.    If your physician has prescribed pain medication that includes Acetaminophen (Tylenol), do not take additional Acetaminophen (Tylenol) while taking the prescribed medication.    Depression / Suicide Risk    As you are discharged from this [facility] facility, it is important to learn how to keep safe from harming yourself.    Recognize the warning signs:  · Abrupt changes in personality, positive or negative- including increase in energy   · Giving away possessions  · Change in eating patterns- significant weight changes-  positive or negative  · Change in sleeping patterns- unable to sleep or sleeping all the time   · Unwillingness or inability to communicate  · Depression  · Unusual sadness, discouragement and loneliness  · Talk of wanting to die  · Neglect of personal appearance   · Rebelliousness- reckless behavior  · Withdrawal from people/activities they love  · Confusion- inability to concentrate     If you or a loved one observes any of these behaviors or has concerns about self-harm, here's what you can do:  · Talk about it- your feelings and reasons for harming yourself  · Remove any means that you might use to hurt yourself (examples: pills, rope,  extension cords, firearm)  · Get professional help from the community (Mental Health, Substance Abuse, psychological counseling)  · Do not be alone:Call your Safe Contact- someone whom you trust who will be there for you.  · Call your local CRISIS HOTLINE 914-7902 or 050-589-7720  · Call your local Children's Mobile Crisis Response Team [place] (521) 873-0251 or www.[employer]  · Call the toll free National Suicide Prevention Hotlines   · National Suicide Prevention Lifeline 679-285-URJY (2600)  · National Hope Line Network 800-SUICIDE (462-8899)

## 2020-01-27 NOTE — ANESTHESIA QCDR
2019 [facility] Clinical Data Registry (for Quality Improvement)     Postoperative nausea/vomiting risk protocol (Adult = 18 yrs and Pediatric 3-17 yrs)- (430 and 463)  General inhalation anesthetic (NOT TIVA) with PONV risk factors: Yes  Provision of anti-emetic therapy with at least 2 different classes of agents: Yes   Patient DID NOT receive anti-emetic therapy and reason is documented in Medical Record:  N/A    Multimodal Pain Management- (477)  Non-emergent surgery AND patient age >= 18:   Use of Multimodal Pain Management, two or more drugs and/or interventions, NOT including systemic opioids:   Exception: Documented allergy to multiple classes of analgesics:     Smoking Abstinence (404)  Patient is current smoker (cigarette, pipe, e-cig, marijuanna):   Elective Surgery:   Abstinence instructions provided prior to day of surgery:   Patient abstained from smoking on day of surgery:     Pre-Op Beta-Blocker in Isolated CABG (44)  Isolated CABG AND patient age >= 18:   Beta-blocker admin within 24 hours of surgical incision:   Exception:of medical reason(s) for not administering beta blocker within 24 hours prior to surgical incision (e.g., not  indicated,other medical reason):     PACU assessment of acute postoperative pain prior to Anesthesia Care End- Applies to Patients Age = 18- (ABG7)  Initial PACU pain score is which of the following: < 7/10  Patient unable to report pain score: N/A    Post-anesthetic transfer of care checklist/protocol to PACU/ICU- (426 and 427)  Upon conclusion of case, patient transferred to which of the following locations: PACU/Non-ICU  Use of transfer checklist/protocol: Yes  Exclusion: Service Performed in Patient Hospital Room (and thus did not require transfer): N/A  Unplanned admission to ICU related to anesthesia service up through end of PACU care- (MD51)  Unplanned admission to ICU (not initially anticipated at anesthesia start time): Yes

## 2020-01-27 NOTE — ANESTHESIA POSTPROCEDURE EVALUATION
Patient: Rehana Meade    Procedure Summary     Date:  01/27/20 Room / Location:  [facility] ROOM 25 / SURGERY SAME DAY [facility]    Anesthesia Start:  0748 Anesthesia Stop:  0900    Procedures:       HYSTERECTOMY, TOTAL, VAGINAL (Vagina )      CYSTOSCOPY (Bladder) Diagnosis:  (PELVIC PAIN, DYSMENORRHEA)    Surgeon:  Julian Parker M.D. Responsible Provider:  Dimitri Oviedo M.D.    Anesthesia Type:  general ASA Status:  3          Final Anesthesia Type: general  Last vitals  BP   Blood Pressure: 118/70    Temp   36.6 °C (97.9 °F)    Pulse   Pulse: 70   Resp   18    SpO2   97 %      Anesthesia Post Evaluation    Patient location during evaluation: PACU  Patient participation: complete - patient participated  Level of consciousness: awake and alert  Pain score: 2    Airway patency: patent  Anesthetic complications: no  Cardiovascular status: hemodynamically stable  Respiratory status: acceptable  Hydration status: euvolemic    PONV: none           Nurse Pain Score: 0 (NPRS)

## 2020-01-27 NOTE — OR NURSING
Pt. Is difficult to obtain IV access, VAT team called to insert IV. Dr. Oviedo and Dr. Parker at bedside they both agreed to not perform HCG as pt has had a tubal ligation and a negative HCG on 1/21/2020. VAT team was successful in obtaining IV access.

## 2020-01-27 NOTE — OR NURSING
"0901 Pt arrived from OR, rec'd report from . Oral airway in place, d/c'd on arrival. Pt's spouse brought to bedside. Pt stated in preop that she \"wakes up violent\". Pt's spouse brought to bedside right away. Pt moaning, restless, pt not easily comforted. Pt states \"it hurts\". See MAR. Peripad in place, CDI.     0934 Given sips of water and oral pain medication.  Covered with warm blankets    1020 Pt states she is not getting any pain relief.Pt also states, \"pain medicine doesn't work for me until the anesthesia is out of my system\". Discussed alternative methods. Gave pt heat pack for comfort. Repositioned.       1100 Pt OOB to bathroom. Pt voided without difficulty. Pt sitting in recliner. Spouse at bedside. Pt back to sleep between care.     1115 Pt sleeping. Appears comfortable.     1130 Pt sleeping, appears calm, appears comfortable.     1150 Pt's spouse back to bedside. Discussed d/c instructions with spouse and pt. Answered all questions. Pt verbalized understanding. Pt states her \"pain is killing her\". Pt requesting additonal dose of pain medication. See MAR. Pt back to sleep right away.     1210 Report to RONAL Schwartz  "

## 2020-01-27 NOTE — OR NURSING
1210Pt awake desires to go home.  Up to dress.  Vs Stable.  IV out up to BR  Voids.. dc home at 1225

## 2020-01-27 NOTE — ANESTHESIA PROCEDURE NOTES
Airway  Date/Time: 1/27/2020 7:49 AM  Performed by: Dimitri Oviedo M.D.  Authorized by: Dimitri Oviedo M.D.     Location:  OR  Urgency:  Elective  Indications for Airway Management:  Anesthesia  Spontaneous Ventilation: absent    Sedation Level:  Deep  Preoxygenated: Yes    Patient Position:  Sniffing  Final Airway Type:  Endotracheal airway  Final Endotracheal Airway:  ETT  Cuffed: Yes    Technique Used for Successful ETT Placement:  Direct laryngoscopy  Insertion Site:  Oral  Blade Type:  Lyndsey  Laryngoscope Blade/Videolaryngoscope Blade Size:  3  ETT Size (mm):  7.0  Measured from:  Teeth  ETT to Teeth (cm):  22  Placement Verified by: auscultation and capnometry    Cormack-Lehane Classification:  Grade IIa - partial view of glottis  Number of Attempts at Approach:  1

## 2020-01-27 NOTE — ANESTHESIA PREPROCEDURE EVALUATION
Relevant Problems   PULMONARY   (+) Asthma      NEURO   (+) AF (amaurosis fugax)   (+) Headache   (+) Intractable headache   (+) Seizure cerebral   (+) Seizure disorder (HCC)      CARDIAC   (+) AF (amaurosis fugax)   (+) Migraine without aura   (+) Migraines       Physical Exam    Airway   Mallampati: II  TM distance: >3 FB  Neck ROM: full       Cardiovascular - normal exam  Rhythm: regular  Rate: normal  (-) murmur     Dental - normal exam         Pulmonary - normal exam  Breath sounds clear to auscultation     Abdominal   Abdomen: soft  Bowel sounds: normal   Neurological - normal exam                 Anesthesia Plan    ASA 3       Plan - general       Airway plan will be ETT        Induction: intravenous    Postoperative Plan: Postoperative administration of opioids is intended.    Pertinent diagnostic labs and testing reviewed    Informed Consent:    Anesthetic plan and risks discussed with patient.    Use of blood products discussed with: patient whom consented to blood products.

## 2020-01-27 NOTE — OP REPORT
DATE OF SERVICE:  01/27/2020    PREOPERATIVE DIAGNOSES:  Menorrhagia, dysmenorrhea, desires hysterectomy.    POSTOPERATIVE DIAGNOSES:  Menorrhagia, dysmenorrhea, desires hysterectomy.    PROCEDURES:  Total vaginal hysterectomy and cystoscopy.    SURGEON:  Julian Redd MD    ASSISTANT:  Lis Lyon MD    ANESTHESIOLOGIST:  Dimitri Oviedo MD    ANESTHESIA:  General.    SPECIMENS:  Uterus with cervix.    ESTIMATED BLOOD LOSS:  Less than 50 mL    FINDINGS:  1.  Enlarged uterus, normal-appearing ovaries.  2.  Efflux of fluorescein from the ureteral orifices and intact bladder dome.    COMPLICATIONS:  None.    DISPOSITION:  Stable.    PROCEDURE IN DETAIL:  After informed consent was obtained, the patient was   taken to the operating room where general anesthesia was found be adequate.    She was then prepped and draped in normal sterile fashion in dorsal lithotomy   position.  A short-weighted speculum was placed in her vagina.  Conner was   placed anteriorly.  Her cervix was grasped with a Erik retractor and   circumferentially anesthetized with 0.25% Marcaine with epinephrine.  We then   used the Bovie to carve all the way around the cervix, creating an initial   dissection plane.  We tented up on the vesicouterine peritoneum and developed   our plane here with a curved Metzenbaum scissors, being careful to dissect up   sharply.  Posteriorly, we retracted down on the cul-de-sac with Kocher and   obtained intraperitoneal access with curved Madera scissors.  The posterior   cul-de-sac was then protected with a long-weighted speculum.  We continued to   develop our anterior plane sharply.  We then bilaterally clamped, cut, and   suture ligated the uterosacral ligaments, being careful to incorporate   anterior and posterior peritoneum and tagging these with 0 Vicryl sutures.  We   continued to develop our anterior dissection sharply with Metzenbaums until   we had obtained intraperitoneal access to vesicouterine  peritoneum.  This was   protected with a narrow right angle retractor.  At this point, we had anterior   and posterior access.  We began serially clamping, cutting, and suture   ligating the cardinal ligaments with a curved Shaw and a 0 Vicryl suture   bilaterally and then worked our way up to the uterine vessels, which were   bilaterally clamped, cut, and suture ligated with 0 Vicryl.  Finally, working   our way up through the broad ligaments, clamping, cutting, and suture ligating   serially, and then to the level of the round ligaments, clamping, cutting,   and suture ligating and also clamping, cutting, and suture ligating the   utero-ovarian ligaments serially with curved Shaw clamps and 0 Vicryl   suture.  At this point, the uterus was completely free, it was removed intact,   sent for permanent specimen.  Cervix was also intact.  Moist baby lap was   placed and the pedicles were examined.  The right side was found to be   hemostatic.  Left side had a small area of bleeding, which was clamped with a   right angle clamp and then suture ligated with 3-0 Vicryl.  Hemostasis was   assured here.  We then ran the posterior cuff, peritoneum to the vagina with a   running stitch of 0 Vicryl.  Again, hemostasis was assured here.  We   reexamined our pedicles, found very good hemostasis.  The mini lap was   removed.  Again, we saw good hemostasis.  The mini lap was replaced and we   were able to examine her ovaries up high.  They appeared normal.  Mini lap was   removed again.  Again, hemostasis was assured and then the vaginal cuff was   closed in a mass closure, being careful to incorporate the uterosacral   ligaments, and then the anterior and posterior vaginal cuff including   peritoneum in a running stitch from uterosacral to uterosacral.  At this   point, the Mahmood was deflated.  All instruments were removed from the vagina   after tags were trimmed, and using a 30-degree cystoscope for directed   cystoscopy,  cystoscopy was performed through the urethra into the bladder.    Bladder had good distention and bladder dome was found to be intact.  We also   saw jets of fluorescein from both ureteral orifices.  The cystoscope was   removed.  The Mahmood was not replaced.  All instruments were removed from the   vagina.  The patient tolerated the procedure well.  Sponge, lap, and needle   counts were correct x3.  The patient was taken to recovery in stable   condition.  There were no complications.       ____________________________________     MD ARIANA Galvin / NTS    DD:  01/27/2020 08:56:36  DT:  01/27/2020 09:08:58    D#:  [id number]  Job#:  [id number]    cc: NOHEMI HUITRON MD

## 2020-06-22 NOTE — ED NOTES
Pt ambulate to room, able to speak in full sentences, no distress. Breathing normally. States her tongue feels like it is burning and can feel the back of her tongue hitting her throat.    Never smoker

## 2021-01-02 ENCOUNTER — APPOINTMENT (OUTPATIENT)
Dept: RADIOLOGY | Facility: MEDICAL CENTER | Age: 36
DRG: 060 | End: 2021-01-02
Attending: EMERGENCY MEDICINE
Payer: COMMERCIAL

## 2021-01-02 ENCOUNTER — HOSPITAL ENCOUNTER (INPATIENT)
Facility: MEDICAL CENTER | Age: 36
LOS: 4 days | DRG: 060 | End: 2021-01-07
Attending: EMERGENCY MEDICINE | Admitting: STUDENT IN AN ORGANIZED HEALTH CARE EDUCATION/TRAINING PROGRAM
Payer: COMMERCIAL

## 2021-01-02 DIAGNOSIS — R20.2 PARESTHESIA: ICD-10-CM

## 2021-01-02 DIAGNOSIS — G35 MULTIPLE SCLEROSIS (HCC): ICD-10-CM

## 2021-01-02 DIAGNOSIS — G43.009 MIGRAINE WITHOUT AURA AND WITHOUT STATUS MIGRAINOSUS, NOT INTRACTABLE: ICD-10-CM

## 2021-01-02 DIAGNOSIS — R51.9 NONINTRACTABLE HEADACHE, UNSPECIFIED CHRONICITY PATTERN, UNSPECIFIED HEADACHE TYPE: ICD-10-CM

## 2021-01-02 DIAGNOSIS — G35 MS (MULTIPLE SCLEROSIS) (HCC): ICD-10-CM

## 2021-01-02 DIAGNOSIS — Z86.69 HISTORY OF CHIARI MALFORMATION: ICD-10-CM

## 2021-01-02 DIAGNOSIS — R29.898 LEG WEAKNESS, BILATERAL: ICD-10-CM

## 2021-01-02 LAB
ALBUMIN SERPL BCP-MCNC: 4.2 G/DL (ref 3.2–4.9)
ALBUMIN/GLOB SERPL: 1.4 G/DL
ALP SERPL-CCNC: 89 U/L (ref 30–99)
ALT SERPL-CCNC: 11 U/L (ref 2–50)
AMPHET UR QL SCN: NEGATIVE
ANION GAP SERPL CALC-SCNC: 11 MMOL/L (ref 7–16)
APPEARANCE UR: CLEAR
APTT PPP: 24.7 SEC (ref 24.7–36)
AST SERPL-CCNC: 11 U/L (ref 12–45)
BARBITURATES UR QL SCN: NEGATIVE
BASOPHILS # BLD AUTO: 0.6 % (ref 0–1.8)
BASOPHILS # BLD: 0.06 K/UL (ref 0–0.12)
BENZODIAZ UR QL SCN: NEGATIVE
BILIRUB SERPL-MCNC: 0.3 MG/DL (ref 0.1–1.5)
BILIRUB UR QL STRIP.AUTO: NEGATIVE
BUN SERPL-MCNC: 11 MG/DL (ref 8–22)
BZE UR QL SCN: NEGATIVE
CALCIUM SERPL-MCNC: 9.1 MG/DL (ref 8.5–10.5)
CANNABINOIDS UR QL SCN: NEGATIVE
CHLORIDE SERPL-SCNC: 108 MMOL/L (ref 96–112)
CO2 SERPL-SCNC: 19 MMOL/L (ref 20–33)
COLOR UR: YELLOW
COVID ORDER STATUS COVID19: NORMAL
CREAT SERPL-MCNC: 0.77 MG/DL (ref 0.5–1.4)
CRP SERPL HS-MCNC: 0.16 MG/DL (ref 0–0.75)
EKG IMPRESSION: NORMAL
EOSINOPHIL # BLD AUTO: 0.18 K/UL (ref 0–0.51)
EOSINOPHIL NFR BLD: 1.9 % (ref 0–6.9)
ERYTHROCYTE [DISTWIDTH] IN BLOOD BY AUTOMATED COUNT: 46.9 FL (ref 35.9–50)
ERYTHROCYTE [SEDIMENTATION RATE] IN BLOOD BY WESTERGREN METHOD: 3 MM/HOUR (ref 0–20)
FLUAV RNA SPEC QL NAA+PROBE: NEGATIVE
FLUBV RNA SPEC QL NAA+PROBE: NEGATIVE
GLOBULIN SER CALC-MCNC: 3 G/DL (ref 1.9–3.5)
GLUCOSE SERPL-MCNC: 89 MG/DL (ref 65–99)
GLUCOSE UR STRIP.AUTO-MCNC: NEGATIVE MG/DL
HCT VFR BLD AUTO: 44.7 % (ref 37–47)
HGB BLD-MCNC: 14.8 G/DL (ref 12–16)
IMM GRANULOCYTES # BLD AUTO: 0.04 K/UL (ref 0–0.11)
IMM GRANULOCYTES NFR BLD AUTO: 0.4 % (ref 0–0.9)
INR PPP: 1.03 (ref 0.87–1.13)
KETONES UR STRIP.AUTO-MCNC: NEGATIVE MG/DL
LEUKOCYTE ESTERASE UR QL STRIP.AUTO: NEGATIVE
LYMPHOCYTES # BLD AUTO: 2.32 K/UL (ref 1–4.8)
LYMPHOCYTES NFR BLD: 24.4 % (ref 22–41)
MAGNESIUM SERPL-MCNC: 2.4 MG/DL (ref 1.5–2.5)
MCH RBC QN AUTO: 31.9 PG (ref 27–33)
MCHC RBC AUTO-ENTMCNC: 33.1 G/DL (ref 33.6–35)
MCV RBC AUTO: 96.3 FL (ref 81.4–97.8)
METHADONE UR QL SCN: NEGATIVE
MICRO URNS: NORMAL
MONOCYTES # BLD AUTO: 0.71 K/UL (ref 0–0.85)
MONOCYTES NFR BLD AUTO: 7.5 % (ref 0–13.4)
NEUTROPHILS # BLD AUTO: 6.19 K/UL (ref 2–7.15)
NEUTROPHILS NFR BLD: 65.2 % (ref 44–72)
NITRITE UR QL STRIP.AUTO: NEGATIVE
NRBC # BLD AUTO: 0 K/UL
NRBC BLD-RTO: 0 /100 WBC
OPIATES UR QL SCN: NEGATIVE
OXYCODONE UR QL SCN: NEGATIVE
PCP UR QL SCN: NEGATIVE
PH UR STRIP.AUTO: 5.5 [PH] (ref 5–8)
PHOSPHATE SERPL-MCNC: 2.5 MG/DL (ref 2.5–4.5)
PLATELET # BLD AUTO: 261 K/UL (ref 164–446)
PMV BLD AUTO: 10 FL (ref 9–12.9)
POTASSIUM SERPL-SCNC: 4.1 MMOL/L (ref 3.6–5.5)
PROCALCITONIN SERPL-MCNC: <0.05 NG/ML
PROPOXYPH UR QL SCN: NEGATIVE
PROT SERPL-MCNC: 7.2 G/DL (ref 6–8.2)
PROT UR QL STRIP: NEGATIVE MG/DL
PROTHROMBIN TIME: 13.9 SEC (ref 12–14.6)
RBC # BLD AUTO: 4.64 M/UL (ref 4.2–5.4)
RBC UR QL AUTO: NEGATIVE
RSV RNA SPEC QL NAA+PROBE: NEGATIVE
SARS-COV-2 RNA RESP QL NAA+PROBE: NOTDETECTED
SODIUM SERPL-SCNC: 138 MMOL/L (ref 135–145)
SP GR UR STRIP.AUTO: 1.02
SPECIMEN SOURCE: NORMAL
TSH SERPL DL<=0.005 MIU/L-ACNC: 0.97 UIU/ML (ref 0.38–5.33)
UROBILINOGEN UR STRIP.AUTO-MCNC: 0.2 MG/DL
WBC # BLD AUTO: 9.5 K/UL (ref 4.8–10.8)

## 2021-01-02 PROCEDURE — 80307 DRUG TEST PRSMV CHEM ANLYZR: CPT

## 2021-01-02 PROCEDURE — 700117 HCHG RX CONTRAST REV CODE 255: Performed by: EMERGENCY MEDICINE

## 2021-01-02 PROCEDURE — 80053 COMPREHEN METABOLIC PANEL: CPT

## 2021-01-02 PROCEDURE — A9576 INJ PROHANCE MULTIPACK: HCPCS | Performed by: EMERGENCY MEDICINE

## 2021-01-02 PROCEDURE — 96376 TX/PRO/DX INJ SAME DRUG ADON: CPT

## 2021-01-02 PROCEDURE — 51798 US URINE CAPACITY MEASURE: CPT

## 2021-01-02 PROCEDURE — 86140 C-REACTIVE PROTEIN: CPT

## 2021-01-02 PROCEDURE — 84443 ASSAY THYROID STIM HORMONE: CPT

## 2021-01-02 PROCEDURE — 85610 PROTHROMBIN TIME: CPT

## 2021-01-02 PROCEDURE — 83735 ASSAY OF MAGNESIUM: CPT

## 2021-01-02 PROCEDURE — 96375 TX/PRO/DX INJ NEW DRUG ADDON: CPT

## 2021-01-02 PROCEDURE — 85652 RBC SED RATE AUTOMATED: CPT

## 2021-01-02 PROCEDURE — 700111 HCHG RX REV CODE 636 W/ 250 OVERRIDE (IP): Performed by: STUDENT IN AN ORGANIZED HEALTH CARE EDUCATION/TRAINING PROGRAM

## 2021-01-02 PROCEDURE — C9803 HOPD COVID-19 SPEC COLLECT: HCPCS | Performed by: EMERGENCY MEDICINE

## 2021-01-02 PROCEDURE — A9270 NON-COVERED ITEM OR SERVICE: HCPCS | Performed by: STUDENT IN AN ORGANIZED HEALTH CARE EDUCATION/TRAINING PROGRAM

## 2021-01-02 PROCEDURE — 84100 ASSAY OF PHOSPHORUS: CPT

## 2021-01-02 PROCEDURE — 700111 HCHG RX REV CODE 636 W/ 250 OVERRIDE (IP): Performed by: EMERGENCY MEDICINE

## 2021-01-02 PROCEDURE — 84145 PROCALCITONIN (PCT): CPT

## 2021-01-02 PROCEDURE — 85730 THROMBOPLASTIN TIME PARTIAL: CPT

## 2021-01-02 PROCEDURE — 99285 EMERGENCY DEPT VISIT HI MDM: CPT

## 2021-01-02 PROCEDURE — 700102 HCHG RX REV CODE 250 W/ 637 OVERRIDE(OP): Performed by: STUDENT IN AN ORGANIZED HEALTH CARE EDUCATION/TRAINING PROGRAM

## 2021-01-02 PROCEDURE — 96374 THER/PROPH/DIAG INJ IV PUSH: CPT

## 2021-01-02 PROCEDURE — 99220 PR INITIAL OBSERVATION CARE,LEVL III: CPT | Performed by: STUDENT IN AN ORGANIZED HEALTH CARE EDUCATION/TRAINING PROGRAM

## 2021-01-02 PROCEDURE — 70553 MRI BRAIN STEM W/O & W/DYE: CPT

## 2021-01-02 PROCEDURE — 700101 HCHG RX REV CODE 250: Performed by: STUDENT IN AN ORGANIZED HEALTH CARE EDUCATION/TRAINING PROGRAM

## 2021-01-02 PROCEDURE — 72157 MRI CHEST SPINE W/O & W/DYE: CPT

## 2021-01-02 PROCEDURE — G0378 HOSPITAL OBSERVATION PER HR: HCPCS

## 2021-01-02 PROCEDURE — 72156 MRI NECK SPINE W/O & W/DYE: CPT

## 2021-01-02 PROCEDURE — 93005 ELECTROCARDIOGRAM TRACING: CPT | Performed by: EMERGENCY MEDICINE

## 2021-01-02 PROCEDURE — 0241U HCHG SARS-COV-2 COVID-19 NFCT DS RESP RNA 4 TRGT MIC: CPT

## 2021-01-02 PROCEDURE — 85025 COMPLETE CBC W/AUTO DIFF WBC: CPT

## 2021-01-02 PROCEDURE — 72158 MRI LUMBAR SPINE W/O & W/DYE: CPT

## 2021-01-02 PROCEDURE — 81003 URINALYSIS AUTO W/O SCOPE: CPT

## 2021-01-02 RX ORDER — POLYETHYLENE GLYCOL 3350 17 G/17G
1 POWDER, FOR SOLUTION ORAL
Status: DISCONTINUED | OUTPATIENT
Start: 2021-01-02 | End: 2021-01-07 | Stop reason: HOSPADM

## 2021-01-02 RX ORDER — ONDANSETRON 2 MG/ML
4 INJECTION INTRAMUSCULAR; INTRAVENOUS ONCE
Status: COMPLETED | OUTPATIENT
Start: 2021-01-02 | End: 2021-01-02

## 2021-01-02 RX ORDER — HYDROMORPHONE HYDROCHLORIDE 1 MG/ML
1 INJECTION, SOLUTION INTRAMUSCULAR; INTRAVENOUS; SUBCUTANEOUS ONCE
Status: COMPLETED | OUTPATIENT
Start: 2021-01-02 | End: 2021-01-02

## 2021-01-02 RX ORDER — PROMETHAZINE HYDROCHLORIDE 25 MG/1
12.5-25 TABLET ORAL EVERY 4 HOURS PRN
Status: DISCONTINUED | OUTPATIENT
Start: 2021-01-02 | End: 2021-01-07 | Stop reason: HOSPADM

## 2021-01-02 RX ORDER — METHYLPREDNISOLONE SODIUM SUCCINATE 500 MG/8ML
1000 INJECTION INTRAMUSCULAR; INTRAVENOUS ONCE
Status: COMPLETED | OUTPATIENT
Start: 2021-01-02 | End: 2021-01-02

## 2021-01-02 RX ORDER — ACETAMINOPHEN 325 MG/1
650 TABLET ORAL EVERY 6 HOURS PRN
Status: DISCONTINUED | OUTPATIENT
Start: 2021-01-02 | End: 2021-01-07 | Stop reason: HOSPADM

## 2021-01-02 RX ORDER — PROMETHAZINE HYDROCHLORIDE 12.5 MG/1
12.5-25 SUPPOSITORY RECTAL EVERY 4 HOURS PRN
Status: DISCONTINUED | OUTPATIENT
Start: 2021-01-02 | End: 2021-01-07 | Stop reason: HOSPADM

## 2021-01-02 RX ORDER — TOPIRAMATE 100 MG/1
100 TABLET, FILM COATED ORAL 2 TIMES DAILY
Status: DISCONTINUED | OUTPATIENT
Start: 2021-01-02 | End: 2021-01-07 | Stop reason: HOSPADM

## 2021-01-02 RX ORDER — ONDANSETRON 4 MG/1
4 TABLET, ORALLY DISINTEGRATING ORAL EVERY 4 HOURS PRN
Status: DISCONTINUED | OUTPATIENT
Start: 2021-01-02 | End: 2021-01-07 | Stop reason: HOSPADM

## 2021-01-02 RX ORDER — LABETALOL HYDROCHLORIDE 5 MG/ML
10 INJECTION, SOLUTION INTRAVENOUS EVERY 4 HOURS PRN
Status: DISCONTINUED | OUTPATIENT
Start: 2021-01-02 | End: 2021-01-07 | Stop reason: HOSPADM

## 2021-01-02 RX ORDER — ZONISAMIDE 50 MG/1
200 CAPSULE ORAL EVERY EVENING
Status: DISCONTINUED | OUTPATIENT
Start: 2021-01-02 | End: 2021-01-07 | Stop reason: HOSPADM

## 2021-01-02 RX ORDER — DIPHENHYDRAMINE HYDROCHLORIDE 50 MG/ML
25 INJECTION INTRAMUSCULAR; INTRAVENOUS ONCE
Status: COMPLETED | OUTPATIENT
Start: 2021-01-02 | End: 2021-01-02

## 2021-01-02 RX ORDER — BISACODYL 10 MG
10 SUPPOSITORY, RECTAL RECTAL
Status: DISCONTINUED | OUTPATIENT
Start: 2021-01-02 | End: 2021-01-07 | Stop reason: HOSPADM

## 2021-01-02 RX ORDER — AMOXICILLIN 250 MG
2 CAPSULE ORAL 2 TIMES DAILY
Status: DISCONTINUED | OUTPATIENT
Start: 2021-01-02 | End: 2021-01-07 | Stop reason: HOSPADM

## 2021-01-02 RX ORDER — PROCHLORPERAZINE EDISYLATE 5 MG/ML
5-10 INJECTION INTRAMUSCULAR; INTRAVENOUS EVERY 4 HOURS PRN
Status: DISCONTINUED | OUTPATIENT
Start: 2021-01-02 | End: 2021-01-07 | Stop reason: HOSPADM

## 2021-01-02 RX ORDER — HYDROCODONE BITARTRATE AND ACETAMINOPHEN 5; 325 MG/1; MG/1
1-2 TABLET ORAL EVERY 6 HOURS PRN
Status: DISCONTINUED | OUTPATIENT
Start: 2021-01-02 | End: 2021-01-03

## 2021-01-02 RX ORDER — ONDANSETRON 2 MG/ML
4 INJECTION INTRAMUSCULAR; INTRAVENOUS EVERY 4 HOURS PRN
Status: DISCONTINUED | OUTPATIENT
Start: 2021-01-02 | End: 2021-01-07 | Stop reason: HOSPADM

## 2021-01-02 RX ORDER — HYDROXYZINE HYDROCHLORIDE 25 MG/1
25 TABLET, FILM COATED ORAL 3 TIMES DAILY PRN
Status: DISCONTINUED | OUTPATIENT
Start: 2021-01-02 | End: 2021-01-07 | Stop reason: HOSPADM

## 2021-01-02 RX ORDER — MIDAZOLAM HYDROCHLORIDE 1 MG/ML
3 INJECTION INTRAMUSCULAR; INTRAVENOUS ONCE
Status: COMPLETED | OUTPATIENT
Start: 2021-01-02 | End: 2021-01-02

## 2021-01-02 RX ADMIN — HYDROXYZINE HYDROCHLORIDE 25 MG: 25 TABLET, FILM COATED ORAL at 22:16

## 2021-01-02 RX ADMIN — METHYLPREDNISOLONE SODIUM SUCCINATE 1000 MG: 1 INJECTION, POWDER, FOR SOLUTION INTRAMUSCULAR; INTRAVENOUS at 20:08

## 2021-01-02 RX ADMIN — ZONISAMIDE 200 MG: 50 CAPSULE ORAL at 21:05

## 2021-01-02 RX ADMIN — DOCUSATE SODIUM 50 MG AND SENNOSIDES 8.6 MG 2 TABLET: 8.6; 5 TABLET, FILM COATED ORAL at 21:06

## 2021-01-02 RX ADMIN — TOPIRAMATE 100 MG: 100 TABLET, FILM COATED ORAL at 21:06

## 2021-01-02 RX ADMIN — HYDROMORPHONE HYDROCHLORIDE 1 MG: 1 INJECTION, SOLUTION INTRAMUSCULAR; INTRAVENOUS; SUBCUTANEOUS at 12:39

## 2021-01-02 RX ADMIN — ONDANSETRON 4 MG: 2 INJECTION INTRAMUSCULAR; INTRAVENOUS at 22:17

## 2021-01-02 RX ADMIN — HYDROCODONE BITARTRATE AND ACETAMINOPHEN 2 TABLET: 5; 325 TABLET ORAL at 23:30

## 2021-01-02 RX ADMIN — MIDAZOLAM HYDROCHLORIDE 3 MG: 1 INJECTION, SOLUTION INTRAMUSCULAR; INTRAVENOUS at 15:24

## 2021-01-02 RX ADMIN — ONDANSETRON 4 MG: 2 INJECTION INTRAMUSCULAR; INTRAVENOUS at 12:38

## 2021-01-02 RX ADMIN — GADOTERIDOL 15 ML: 279.3 INJECTION, SOLUTION INTRAVENOUS at 16:35

## 2021-01-02 RX ADMIN — DIPHENHYDRAMINE HYDROCHLORIDE 25 MG: 50 INJECTION INTRAMUSCULAR; INTRAVENOUS at 12:38

## 2021-01-02 RX ADMIN — ACETAMINOPHEN 650 MG: 325 TABLET, FILM COATED ORAL at 22:16

## 2021-01-02 ASSESSMENT — ENCOUNTER SYMPTOMS
SHORTNESS OF BREATH: 0
HEARTBURN: 0
EYE PAIN: 0
VOMITING: 0
HEADACHES: 1
WEAKNESS: 1
SPUTUM PRODUCTION: 0
FEVER: 0
HALLUCINATIONS: 0
BACK PAIN: 1
ABDOMINAL PAIN: 0
DIZZINESS: 1
PALPITATIONS: 0
SINUS PAIN: 0
BLURRED VISION: 1
NAUSEA: 1
NERVOUS/ANXIOUS: 1
MYALGIAS: 1
COUGH: 0
TINGLING: 1
SENSORY CHANGE: 1
CHILLS: 0

## 2021-01-02 ASSESSMENT — COGNITIVE AND FUNCTIONAL STATUS - GENERAL
SUGGESTED CMS G CODE MODIFIER MOBILITY: CK
CLIMB 3 TO 5 STEPS WITH RAILING: A LOT
DAILY ACTIVITIY SCORE: 24
STANDING UP FROM CHAIR USING ARMS: A LITTLE
MOVING FROM LYING ON BACK TO SITTING ON SIDE OF FLAT BED: A LITTLE
MOBILITY SCORE: 19
WALKING IN HOSPITAL ROOM: A LITTLE
SUGGESTED CMS G CODE MODIFIER DAILY ACTIVITY: CH

## 2021-01-02 ASSESSMENT — LIFESTYLE VARIABLES
TOTAL SCORE: 0
EVER FELT BAD OR GUILTY ABOUT YOUR DRINKING: NO
AVERAGE NUMBER OF DAYS PER WEEK YOU HAVE A DRINK CONTAINING ALCOHOL: 1
TOTAL SCORE: 0
HAVE YOU EVER FELT YOU SHOULD CUT DOWN ON YOUR DRINKING: NO
TOTAL SCORE: 0
HAVE PEOPLE ANNOYED YOU BY CRITICIZING YOUR DRINKING: NO
ALCOHOL_USE: YES
EVER HAD A DRINK FIRST THING IN THE MORNING TO STEADY YOUR NERVES TO GET RID OF A HANGOVER: NO
HOW MANY TIMES IN THE PAST YEAR HAVE YOU HAD 5 OR MORE DRINKS IN A DAY: 0
ON A TYPICAL DAY WHEN YOU DRINK ALCOHOL HOW MANY DRINKS DO YOU HAVE: 2
CONSUMPTION TOTAL: NEGATIVE

## 2021-01-02 ASSESSMENT — PATIENT HEALTH QUESTIONNAIRE - PHQ9
1. LITTLE INTEREST OR PLEASURE IN DOING THINGS: NOT AT ALL
2. FEELING DOWN, DEPRESSED, IRRITABLE, OR HOPELESS: NOT AT ALL
SUM OF ALL RESPONSES TO PHQ9 QUESTIONS 1 AND 2: 0

## 2021-01-02 ASSESSMENT — PAIN DESCRIPTION - PAIN TYPE
TYPE: ACUTE PAIN

## 2021-01-02 ASSESSMENT — FIBROSIS 4 INDEX: FIB4 SCORE: 0.38

## 2021-01-02 NOTE — ED PROVIDER NOTES
ED Provider Note    CHIEF COMPLAINT  Chief Complaint   Patient presents with   • Other     Pt complains of loss of vision in Right eye (1 week ago, now resolved) difficulty walking, and migraines x1 month. Pt consulted her Neuro Surgeon who has treated her for chiari malformation and was told to come to ER.    • Back Pain   • Head Pain       HPI  Rehana Meade is a 35 y.o. female who presents to the emergency department with various complaints.  The patient has a very complicated history in the past has had multiple evaluations with neurology and neurosurgery for her various complaints.  Primarily, the patient does have a history of migraine headaches that she has had intermittent migraine headache for the last several weeks has not dissipated.  The patient states is dull in nature in the right side of her head, is consistent with her previous headaches, increases with light, noise, smells and decreases with laying still.  She has had multiple episodes of strokelike symptoms, vision loss, memory lapse with her migraines in the past.  Currently she is complaining that she lost vision in her right eye for approximately 2 hours on December 25, 2020 that was not associated with a headache.  Since that time she has had normal vision.  She states that she cannot take any amitriptyline or any medications for her headaches, she has had a history of seizures and last seizure was in 2019 and denies seizure-like activity.  In addition, she is complaining of lower extremity weakness bilaterally.  She states that when she walks she drags her right foot and leg on occasion her left leg gives out.  This has been increasing severity for the last 3 days and she has been bumping into the walls recently and walking down the hallway.  She also endorses the fact that the last several days she has been extremely fatigued and cannot carry out with normal the activities such as cleaning a table or cleaning house.  The patient  states after she tries to do that she becomes extremely fatigued and needs to sit down.  She denies any focal weakness to her upper extremities but overall just global weakness.  In addition she endorses low back pain without trauma.  The pain is in the lumbar region with no radiation of pain.  She does endorse decreased sensation throughout bilateral legs as well including in a saddle distribution but denies complete loss of sensation in the saddle distribution as such as her vagina and anal/perineal region.  She states her legs feel heavy and dull to the touch and abnormal.  She denies fever, cough, nausea, vomiting, shortness of breath or Covid exposure.  She also endorses the fact that she has had a difficult time urinating.  She states that she not really can urinate but does not feel like she needs to urinate and has not been urinating.  Denies rash, use of drugs, IV drugs, new medications, heavy alcohol use, anticoagulant use, history of multiple sclerosis, Guillain-Barré syndrome.  Patient does see Dr. 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  She states that Dr. Padilla states that her symptoms may be contributed to her Budd Chiarri condition.  Patient states she had a hysterectomy in January 2020, denies vaginal bleeding or vaginal discharge, dysuria, hematochezia, melena, hematemesis.  She also endorses the fact that the medications really help with her headache are Dilaudid, Zofran and Benadryl when she comes to the hospital.  She is allergic to Reglan, triptan's and ergotamine's.  She states that Toradol does not help with her headaches as well.  Is  is at bedside and he is concerned for her wellbeing as well.  REVIEW OF SYSTEMS  Positives as above. Pertinent negatives include fever, shakes, chills, sweats, nausea, vomiting  All other 10 review of systems are negative    PAST MEDICAL  HISTORY  Past Medical History:   Diagnosis Date   • Anesthesia     woke up combative   • Anxiety associated with depression    • ASTHMA    • Drug-seeking behavior    • Migraine without aura, without mention of intractable migraine without mention of status migrainosus    • Other specified symptom associated with female genital organs    • Seizure (HCC)     Last seizure was 1/2019   • Seizure cerebral (HCC) 2/15/2018   • Stroke (HCC)    • Unspecified disorder of thyroid     cyst       FAMILY HISTORY  Noncontributory    SOCIAL HISTORY  Social History     Socioeconomic History   • Marital status:      Spouse name: Not on file   • Number of children: Not on file   • Years of education: Not on file   • Highest education level: Not on file   Occupational History   • Not on file   Social Needs   • Financial resource strain: Not on file   • Food insecurity     Worry: Not on file     Inability: Not on file   • Transportation needs     Medical: Not on file     Non-medical: Not on file   Tobacco Use   • Smoking status: Never Smoker   • Smokeless tobacco: Never Used   Substance and Sexual Activity   • Alcohol use: Not Currently     Comment: rarely   • Drug use: No   • Sexual activity: Yes     Partners: Male   Lifestyle   • Physical activity     Days per week: Not on file     Minutes per session: Not on file   • Stress: Not on file   Relationships   • Social connections     Talks on phone: Not on file     Gets together: Not on file     Attends Sabianism service: Not on file     Active member of club or organization: Not on file     Attends meetings of clubs or organizations: Not on file     Relationship status: Not on file   • Intimate partner violence     Fear of current or ex partner: Not on file     Emotionally abused: Not on file     Physically abused: Not on file     Forced sexual activity: Not on file   Other Topics Concern   • Not on file   Social History Narrative   • Not on file       SURGICAL HISTORY  Past  "Surgical History:   Procedure Laterality Date   • VAGINAL HYSTERECTOMY TOTAL  1/27/2020    Procedure: HYSTERECTOMY, TOTAL, VAGINAL;  Surgeon: Julian Parker M.D.;  Location: SURGERY SAME DAY [facility];  Service: Gynecology   • CYSTOSCOPY  1/27/2020    Procedure: CYSTOSCOPY;  Surgeon: Julian Parker M.D.;  Location: SURGERY SAME DAY [facility];  Service: Gynecology   • TUBAL COAGULATION LAPAROSCOPIC BILATERAL  7/11/2014    Performed by Julian Parker M.D. at SURGERY SAME DAY [facility]   • SEPTOPLASTY  10/28/2009    Performed by ANNETTE FORDE at SURGERY [facility]   • SOMNOPLASTY  10/28/2009    Performed by ANNETTE FORDE at SURGERY [facility]   • NASAL POLYPECTOMY  10/28/2009    Performed by ANNETTE FORDE at SURGERY [facility]       CURRENT MEDICATIONS  Home Medications    **Home medications have not yet been reviewed for this encounter**         ALLERGIES  Allergies   Allergen Reactions   • Amitriptyline Unspecified     Suicidal   • Clarithromycin Hives   • Sulfa Drugs Anaphylaxis   • Latex Itching   • Lorazepam Unspecified     Hallucinations     • Metoclopramide Unspecified     Muscle spasms   • Penicillin G Potassium Vomiting   • Rizatriptan Unspecified     Tremors, confusion     • Sumatriptan Unspecified     \"Makes my head pins and needles\"   • Tape Rash   • Morphine Hives and Itching       PHYSICAL EXAM  VITAL SIGNS: BP (!) 95/60   Pulse (!) 58   Temp 36.2 °C (97.2 °F) (Temporal)   Resp 18   Wt 84.7 kg (186 lb 11.7 oz)   LMP 11/16/2019   SpO2 98%   BMI 33.08 kg/m²      Constitutional: Well developed, Well nourished, No acute distress, Non-toxic appearance.   Eyes: PERRLA, EOMI, Conjunctiva normal, No discharge.   Neck: No nuchal rigidity  Cardiovascular: Normal heart rate, Normal rhythm, No murmurs, No rubs, No gallops, and intact distal pulses.   Thorax & Lungs:  No respiratory distress, no rales, no rhonchi, No wheezing, No chest wall tenderness.   Abdomen: Bowel " sounds normal, Soft, No tenderness, No guarding, No rebound, No pulsatile masses.   Skin: Warm, Dry, No erythema, No rash.   Extremities: Full range of motion, no deformity, distal pulses intact throughout  Neurologic: Alert & oriented x 3, upper extremity  strength 5/5, push and pull 5/5, abduction 5/5, negative finger-to-nose, lower extremity is 5/5 but she is straining significantly with slight leg tremor to keep her leg up for 10 seconds bilaterally, plantar flexion dorsiflexion 5/5.  DTRs are 2/4 upper and lower extremities bilaterally, she has slightly diminished sensation in the lower extremities from her waist down and circumferential down to her toes, she has no complete saddle anesthesia  Psychiatric: Anxious with pressured speech      RADIOLOGY/PROCEDURES  MR-BRAIN-WITH & W/O    (Results Pending)   MR-CERVICAL SPINE-WITH & W/O    (Results Pending)   MR-LUMBAR SPINE-WITH & W/O    (Results Pending)   MR-THORACIC SPINE-WITH & W/O    (Results Pending)     Results for orders placed or performed during the hospital encounter of 01/02/21   CBC WITH DIFFERENTIAL   Result Value Ref Range    WBC 9.5 4.8 - 10.8 K/uL    RBC 4.64 4.20 - 5.40 M/uL    Hemoglobin 14.8 12.0 - 16.0 g/dL    Hematocrit 44.7 37.0 - 47.0 %    MCV 96.3 81.4 - 97.8 fL    MCH 31.9 27.0 - 33.0 pg    MCHC 33.1 (L) 33.6 - 35.0 g/dL    RDW 46.9 35.9 - 50.0 fL    Platelet Count 261 164 - 446 K/uL    MPV 10.0 9.0 - 12.9 fL    Neutrophils-Polys 65.20 44.00 - 72.00 %    Lymphocytes 24.40 22.00 - 41.00 %    Monocytes 7.50 0.00 - 13.40 %    Eosinophils 1.90 0.00 - 6.90 %    Basophils 0.60 0.00 - 1.80 %    Immature Granulocytes 0.40 0.00 - 0.90 %    Nucleated RBC 0.00 /100 WBC    Neutrophils (Absolute) 6.19 2.00 - 7.15 K/uL    Lymphs (Absolute) 2.32 1.00 - 4.80 K/uL    Monos (Absolute) 0.71 0.00 - 0.85 K/uL    Eos (Absolute) 0.18 0.00 - 0.51 K/uL    Baso (Absolute) 0.06 0.00 - 0.12 K/uL    Immature Granulocytes (abs) 0.04 0.00 - 0.11 K/uL    NRBC  (Absolute) 0.00 K/uL   COMP METABOLIC PANEL   Result Value Ref Range    Sodium 138 135 - 145 mmol/L    Potassium 4.1 3.6 - 5.5 mmol/L    Chloride 108 96 - 112 mmol/L    Co2 19 (L) 20 - 33 mmol/L    Anion Gap 11.0 7.0 - 16.0    Glucose 89 65 - 99 mg/dL    Bun 11 8 - 22 mg/dL    Creatinine 0.77 0.50 - 1.40 mg/dL    Calcium 9.1 8.5 - 10.5 mg/dL    AST(SGOT) 11 (L) 12 - 45 U/L    ALT(SGPT) 11 2 - 50 U/L    Alkaline Phosphatase 89 30 - 99 U/L    Total Bilirubin 0.3 0.1 - 1.5 mg/dL    Albumin 4.2 3.2 - 4.9 g/dL    Total Protein 7.2 6.0 - 8.2 g/dL    Globulin 3.0 1.9 - 3.5 g/dL    A-G Ratio 1.4 g/dL   PROTHROMBIN TIME   Result Value Ref Range    PT 13.9 12.0 - 14.6 sec    INR 1.03 0.87 - 1.13   APTT   Result Value Ref Range    APTT 24.7 24.7 - 36.0 sec   URINALYSIS    Specimen: Urine   Result Value Ref Range    Color Yellow     Character Clear     Specific Gravity 1.023 <1.035    Ph 5.5 5.0 - 8.0    Glucose Negative Negative mg/dL    Ketones Negative Negative mg/dL    Protein Negative Negative mg/dL    Bilirubin Negative Negative    Urobilinogen, Urine 0.2 Negative    Nitrite Negative Negative    Leukocyte Esterase Negative Negative    Occult Blood Negative Negative    Micro Urine Req see below    MAGNESIUM   Result Value Ref Range    Magnesium 2.4 1.5 - 2.5 mg/dL   PHOSPHORUS   Result Value Ref Range    Phosphorus 2.5 2.5 - 4.5 mg/dL   COVID/SARS CoV-2 PCR    Specimen: Nasopharyngeal; Respirate   Result Value Ref Range    COVID Order Status Received    URINE DRUG SCREEN   Result Value Ref Range    Amphetamines Urine Negative Negative    Barbiturates Negative Negative    Benzodiazepines Negative Negative    Cocaine Metabolite Negative Negative    Methadone Negative Negative    Opiates Negative Negative    Oxycodone Negative Negative    Phencyclidine -Pcp Negative Negative    Propoxyphene Negative Negative    Cannabinoid Metab Negative Negative   TSH   Result Value Ref Range    TSH 0.971 0.380 - 5.330 uIU/mL   Sed Rate    Result Value Ref Range    Sed Rate Westergren 3 0 - 20 mm/hour   CRP QUANTITIVE (NON-CARDIAC)   Result Value Ref Range    Stat C-Reactive Protein 0.16 0.00 - 0.75 mg/dL   PROCALCITONIN   Result Value Ref Range    Procalcitonin <0.05 <0.25 ng/mL   ESTIMATED GFR   Result Value Ref Range    GFR If African American >60 >60 mL/min/1.73 m 2    GFR If Non African American >60 >60 mL/min/1.73 m 2   CoV-2, Flu A/B, And RSV by PCR   Result Value Ref Range    Influenza virus A RNA Negative Negative    Influenza virus B, PCR Negative Negative    RSV, PCR Negative Negative    SARS-CoV-2 by PCR NotDetected     SARS-CoV-2 Source NP Swab    EKG   Result Value Ref Range    Report       [facility].    Test Date:  2021  Pt Name:    ETELVINA BROWN             Department: ER  MRN:        [id number]                      Room:       [facility]  Gender:     Female                       Technician: [id number]  :        [id number]                   Requested By:SARAH KAN  Order #:    [id number]                    Reading MD: SARAH KAN DO    Measurements  Intervals                                Axis  Rate:       58                           P:          49  AZ:         160                          QRS:        26  QRSD:       94                           T:          27  QT:         448  QTc:        441    Interpretive Statements  SINUS BRADYCARDIA  Compared to ECG 2019 22:42:11  Sinus rhythm no longer present  T-wave abnormality no longer present  Electronically Signed On 2021 14:52:02 PST by SARAH KAN DO           COURSE & MEDICAL DECISION MAKING  Pertinent Labs & Imaging studies reviewed. (See chart for details)  This is a 35-year-old female with multiple medical complaints.  It is concerning that she is having such weakness as well as decreased sensation and strength in her lower extremities.  As for the visual change, she has normal visual acuity here and no  evidence of visual defect.  She is afebrile, she does not have a leukocytosis and her inflammatory markers are negative I do not believe she has meningitis, encephalitis or other significant infectious etiology for her condition.  She does not have evidence of urinary symptoms, drug screen is negative, electrolytes are all normal, TSH is negative.  I believe she has known hypothyroid presentation.  I discussed the patient with Dr. Acosta, who is on for Dr. Boo her neurologist, who recommended MRIs of the head, cervical, thoracic and lumbar spine with and without contrast to exclude significant lesion.  I did discuss this with Dr. Padilla he does agree the patient should undergo MR imaging if negative and his view the patient is a candidate for discharge.  Dr. Acosta does not believe the patient is experiencing Case Barré or other significant etiology secondary to chronicity of the condition as well as the vagueness of her presentation.  For treatment, the patient received Dilaudid 1 mg IV, Benadryl 25 mg IV and Zofran 4 mg IV.  She required slight anxiolysis in the form of Versed for MRIs.  I discussed this with MRI and they state that she will be the next candidate for MRI and probably be completed at 1600.  The patient is resting comfortably this point time.  I discussed the patient my partner, Dr. Zhou, who graciously excepts the transfer the patient to follow her care.  I discussed the patient with Dr. Zhou at 1430 for transfer care.  He has graciously accepted transfer of care.  FINAL IMPRESSION  Global weakness  Migraine headache         Electronically signed by: Raza Lanier D.O., 1/2/2021 11:40 AM

## 2021-01-02 NOTE — ED PROVIDER NOTES
ED Provider Note    Patient care continuation note      This 35-year-old female was signed out to me pending results of multiple MRIs, numerous complaints including back pain and headache with vision loss and leg weakness, during her evaluation here in the emergency department neurology was consulted as well as neurosurgery, laboratory data was thorough and completely unremarkable.  Signed out to me pending results of the MRI.  The MRI of the head is significant for active multiple sclerosis.  Patient does not have this diagnosis.  Otherwise there is findings suggestive of Chiari I malformation which the patient follow with neurosurgery for.  Otherwise there are no acute abnormalities.  I discussed these findings with a neurologist on-call for the [facility] group Dr. Kovacs, he recommends 1000 mg of Solu-Medrol be infused here in emergency department and the patient be admitted to the hospital for further evaluation.  I informed the patient of these findings as well as her  at the bedside, I answered their questions to the best of my ability.    Admitted to the hospitalist in guarded condition.      Diagnosis:  1. Leg weakness, bilateral    2. Multiple sclerosis (HCC)        MR-BRAIN-WITH & W/O   Final Result      1.  Borderline low-lying cerebellar tonsils extending about 2 mm below the foramen magnum. This does not satisfy criteria for Chiari I malformation.   2.  Multiple supratentorial white matter lesions with morphology and enhancement most consistent with active demyelinating disease such as multiple sclerosis.   3.  Posterior fossa demyelinating lesions noted in the left middle cerebellar peduncle (enhancing) and left paramedian upper medulla near the pontomedullary junction (nonenhancing).      MR-CERVICAL SPINE-WITH & W/O   Final Result         1. BORDERLINE LOW-LYING CEREBELLAR TONSILS EXTENDING ABOUT 2 MM BELOW THE FORAMEN MAGNUM. THIS DOES NOT SATISFY CRITERIA FOR CHIARI I MALFORMATION.      2.  DEMYELINATING LESION AGAIN SEEN IN THE LEFT MIDDLE CEREBELLAR PEDUNCLE AND ADDITIONAL SUBTLE DEMYELINATING LESION IN THE LEFT PARAMEDIAN UPPER MEDULLA NEAR THE PONTOMEDULLARY JUNCTION. (SEE ALSO MRI BRAIN FROM TODAY'S DATE).      3. OTHERWISE, MRI OF THE CERVICAL SPINE WITHOUT AND WITH CONTRAST WITHIN NORMAL LIMITS. NO EVIDENCE OF DEMYELINATING DISEASE IN THE CERVICAL SPINAL CORD.      MR-LUMBAR SPINE-WITH & W/O   Final Result      1.  MRI lumbar spine without and with contrast within normal limits.      MR-THORACIC SPINE-WITH & W/O   Final Result      1.  T9 vertebral body hemangioma with no change from prior exam. No clinical significance.   2.  Otherwise, unremarkable MRI thoracic spine with no significant disc bulge or protrusion, central stenosis, foraminal stenosis, or myelopathic cord signal abnormality. No evidence of demyelinating disease in the thoracic spinal cord.

## 2021-01-02 NOTE — ED TRIAGE NOTES
Rehana Meade  Chief Complaint   Patient presents with   • Other     Pt complains of loss of vision in Right eye (1 week ago, now resolved) difficulty walking, and migraines x1 month. Pt consulted her Neuro Surgeon who has treated her for chiari malformation and was told to come to ER.    • Back Pain   • Head Pain     Pt wheeled to triage with above complaint.     /79   Pulse 91   Temp 36.2 °C (97.2 °F) (Temporal)   Resp 18   Wt 84.7 kg (186 lb 11.7 oz)   LMP 11/16/2019   SpO2 99%   BMI 33.08 kg/m²     Pt informed of triage process and encouraged to notify staff of any changes or concerns. Pt verbalized understanding of instructions. Apologized for long wait time. Pt placed back in lobby.

## 2021-01-03 LAB
ANION GAP SERPL CALC-SCNC: 11 MMOL/L (ref 7–16)
BASOPHILS # BLD AUTO: 0.1 % (ref 0–1.8)
BASOPHILS # BLD: 0.01 K/UL (ref 0–0.12)
BUN SERPL-MCNC: 12 MG/DL (ref 8–22)
CALCIUM SERPL-MCNC: 9.6 MG/DL (ref 8.5–10.5)
CHLORIDE SERPL-SCNC: 110 MMOL/L (ref 96–112)
CO2 SERPL-SCNC: 18 MMOL/L (ref 20–33)
CREAT SERPL-MCNC: 0.72 MG/DL (ref 0.5–1.4)
EOSINOPHIL # BLD AUTO: 0 K/UL (ref 0–0.51)
EOSINOPHIL NFR BLD: 0 % (ref 0–6.9)
ERYTHROCYTE [DISTWIDTH] IN BLOOD BY AUTOMATED COUNT: 45.8 FL (ref 35.9–50)
GLUCOSE SERPL-MCNC: 142 MG/DL (ref 65–99)
HCT VFR BLD AUTO: 46.7 % (ref 37–47)
HGB BLD-MCNC: 15.3 G/DL (ref 12–16)
IMM GRANULOCYTES # BLD AUTO: 0.01 K/UL (ref 0–0.11)
IMM GRANULOCYTES NFR BLD AUTO: 0.1 % (ref 0–0.9)
LYMPHOCYTES # BLD AUTO: 0.7 K/UL (ref 1–4.8)
LYMPHOCYTES NFR BLD: 9.5 % (ref 22–41)
MCH RBC QN AUTO: 31.3 PG (ref 27–33)
MCHC RBC AUTO-ENTMCNC: 32.8 G/DL (ref 33.6–35)
MCV RBC AUTO: 95.5 FL (ref 81.4–97.8)
MONOCYTES # BLD AUTO: 0.04 K/UL (ref 0–0.85)
MONOCYTES NFR BLD AUTO: 0.5 % (ref 0–13.4)
NEUTROPHILS # BLD AUTO: 6.61 K/UL (ref 2–7.15)
NEUTROPHILS NFR BLD: 89.8 % (ref 44–72)
NRBC # BLD AUTO: 0 K/UL
NRBC BLD-RTO: 0 /100 WBC
PLATELET # BLD AUTO: 260 K/UL (ref 164–446)
PMV BLD AUTO: 10.4 FL (ref 9–12.9)
POTASSIUM SERPL-SCNC: 4 MMOL/L (ref 3.6–5.5)
RBC # BLD AUTO: 4.89 M/UL (ref 4.2–5.4)
SODIUM SERPL-SCNC: 139 MMOL/L (ref 135–145)
WBC # BLD AUTO: 7.4 K/UL (ref 4.8–10.8)

## 2021-01-03 PROCEDURE — 700102 HCHG RX REV CODE 250 W/ 637 OVERRIDE(OP): Performed by: STUDENT IN AN ORGANIZED HEALTH CARE EDUCATION/TRAINING PROGRAM

## 2021-01-03 PROCEDURE — 80048 BASIC METABOLIC PNL TOTAL CA: CPT

## 2021-01-03 PROCEDURE — 700111 HCHG RX REV CODE 636 W/ 250 OVERRIDE (IP): Performed by: FAMILY MEDICINE

## 2021-01-03 PROCEDURE — 84157 ASSAY OF PROTEIN OTHER: CPT

## 2021-01-03 PROCEDURE — 82042 OTHER SOURCE ALBUMIN QUAN EA: CPT

## 2021-01-03 PROCEDURE — 82040 ASSAY OF SERUM ALBUMIN: CPT

## 2021-01-03 PROCEDURE — 700101 HCHG RX REV CODE 250: Performed by: STUDENT IN AN ORGANIZED HEALTH CARE EDUCATION/TRAINING PROGRAM

## 2021-01-03 PROCEDURE — 97162 PT EVAL MOD COMPLEX 30 MIN: CPT

## 2021-01-03 PROCEDURE — A9270 NON-COVERED ITEM OR SERVICE: HCPCS | Performed by: FAMILY MEDICINE

## 2021-01-03 PROCEDURE — 99221 1ST HOSP IP/OBS SF/LOW 40: CPT | Performed by: PSYCHIATRY & NEUROLOGY

## 2021-01-03 PROCEDURE — 99233 SBSQ HOSP IP/OBS HIGH 50: CPT | Performed by: FAMILY MEDICINE

## 2021-01-03 PROCEDURE — 96376 TX/PRO/DX INJ SAME DRUG ADON: CPT

## 2021-01-03 PROCEDURE — 85025 COMPLETE CBC W/AUTO DIFF WBC: CPT

## 2021-01-03 PROCEDURE — A9270 NON-COVERED ITEM OR SERVICE: HCPCS | Performed by: STUDENT IN AN ORGANIZED HEALTH CARE EDUCATION/TRAINING PROGRAM

## 2021-01-03 PROCEDURE — 82784 ASSAY IGA/IGD/IGG/IGM EACH: CPT

## 2021-01-03 PROCEDURE — 700102 HCHG RX REV CODE 250 W/ 637 OVERRIDE(OP): Performed by: FAMILY MEDICINE

## 2021-01-03 PROCEDURE — 82945 GLUCOSE OTHER FLUID: CPT

## 2021-01-03 PROCEDURE — 83916 OLIGOCLONAL BANDS: CPT

## 2021-01-03 PROCEDURE — 770006 HCHG ROOM/CARE - MED/SURG/GYN SEMI*

## 2021-01-03 PROCEDURE — 51798 US URINE CAPACITY MEASURE: CPT

## 2021-01-03 PROCEDURE — 36415 COLL VENOUS BLD VENIPUNCTURE: CPT

## 2021-01-03 PROCEDURE — 700111 HCHG RX REV CODE 636 W/ 250 OVERRIDE (IP): Performed by: STUDENT IN AN ORGANIZED HEALTH CARE EDUCATION/TRAINING PROGRAM

## 2021-01-03 PROCEDURE — 84166 PROTEIN E-PHORESIS/URINE/CSF: CPT

## 2021-01-03 PROCEDURE — 700105 HCHG RX REV CODE 258: Performed by: FAMILY MEDICINE

## 2021-01-03 RX ORDER — BUTALBITAL, ACETAMINOPHEN AND CAFFEINE 50; 325; 40 MG/1; MG/1; MG/1
2 TABLET ORAL EVERY 6 HOURS PRN
Status: DISCONTINUED | OUTPATIENT
Start: 2021-01-03 | End: 2021-01-06

## 2021-01-03 RX ORDER — HYDROCODONE BITARTRATE AND ACETAMINOPHEN 5; 325 MG/1; MG/1
1-2 TABLET ORAL EVERY 4 HOURS PRN
Status: DISCONTINUED | OUTPATIENT
Start: 2021-01-03 | End: 2021-01-07 | Stop reason: HOSPADM

## 2021-01-03 RX ORDER — CHOLECALCIFEROL (VITAMIN D3) 125 MCG
5 CAPSULE ORAL ONCE
Status: COMPLETED | OUTPATIENT
Start: 2021-01-03 | End: 2021-01-03

## 2021-01-03 RX ADMIN — HYDROCODONE BITARTRATE AND ACETAMINOPHEN 1 TABLET: 5; 325 TABLET ORAL at 05:39

## 2021-01-03 RX ADMIN — BUTALBITAL, ACETAMINOPHEN, AND CAFFEINE 2 TABLET: 50; 325; 40 TABLET ORAL at 21:21

## 2021-01-03 RX ADMIN — ONDANSETRON 4 MG: 2 INJECTION INTRAMUSCULAR; INTRAVENOUS at 08:25

## 2021-01-03 RX ADMIN — TOPIRAMATE 100 MG: 100 TABLET, FILM COATED ORAL at 21:21

## 2021-01-03 RX ADMIN — ZONISAMIDE 200 MG: 50 CAPSULE ORAL at 18:06

## 2021-01-03 RX ADMIN — SODIUM CHLORIDE 1000 MG: 9 INJECTION, SOLUTION INTRAVENOUS at 11:38

## 2021-01-03 RX ADMIN — ONDANSETRON 4 MG: 2 INJECTION INTRAMUSCULAR; INTRAVENOUS at 18:16

## 2021-01-03 RX ADMIN — DOCUSATE SODIUM 50 MG AND SENNOSIDES 8.6 MG 2 TABLET: 8.6; 5 TABLET, FILM COATED ORAL at 05:39

## 2021-01-03 RX ADMIN — TOPIRAMATE 100 MG: 100 TABLET, FILM COATED ORAL at 09:55

## 2021-01-03 RX ADMIN — HYDROCODONE BITARTRATE AND ACETAMINOPHEN 2 TABLET: 5; 325 TABLET ORAL at 09:55

## 2021-01-03 RX ADMIN — BUTALBITAL, ACETAMINOPHEN, AND CAFFEINE 2 TABLET: 50; 325; 40 TABLET ORAL at 14:45

## 2021-01-03 RX ADMIN — Medication 5 MG: at 21:22

## 2021-01-03 RX ADMIN — DOCUSATE SODIUM 50 MG AND SENNOSIDES 8.6 MG 2 TABLET: 8.6; 5 TABLET, FILM COATED ORAL at 18:07

## 2021-01-03 ASSESSMENT — ENCOUNTER SYMPTOMS
FOCAL WEAKNESS: 1
WEAKNESS: 1
PALPITATIONS: 0
HEMOPTYSIS: 0
COUGH: 0
EYE PAIN: 0
SPUTUM PRODUCTION: 0
BACK PAIN: 1
FEVER: 0
TINGLING: 0
NAUSEA: 0
ORTHOPNEA: 0
HEARTBURN: 0
EYE DISCHARGE: 0
CHILLS: 0
VOMITING: 0
TREMORS: 0
PHOTOPHOBIA: 0

## 2021-01-03 ASSESSMENT — PAIN DESCRIPTION - PAIN TYPE
TYPE: ACUTE PAIN

## 2021-01-03 ASSESSMENT — COGNITIVE AND FUNCTIONAL STATUS - GENERAL
MOVING FROM LYING ON BACK TO SITTING ON SIDE OF FLAT BED: A LITTLE
CLIMB 3 TO 5 STEPS WITH RAILING: TOTAL
MOBILITY SCORE: 15
STANDING UP FROM CHAIR USING ARMS: A LOT
WALKING IN HOSPITAL ROOM: A LOT
SUGGESTED CMS G CODE MODIFIER MOBILITY: CK
MOVING TO AND FROM BED TO CHAIR: A LITTLE

## 2021-01-03 ASSESSMENT — GAIT ASSESSMENTS
DISTANCE (FEET): 20
DEVIATION: ATAXIC;BRADYKINETIC;SHUFFLED GAIT
GAIT LEVEL OF ASSIST: MODERATE ASSIST
ASSISTIVE DEVICE: FRONT WHEEL WALKER

## 2021-01-03 NOTE — PROGRESS NOTES
[facility] Medicine Daily Progress Note    Date of Service  1/3/2021    Chief Complaint  35 y.o. female admitted 1/2/2021 with the vision in her right eye for about 2 hours     Hospital Course  35 y.o. female past medical history of viral meningitis 2 years ago complicated by seizures, Budd-Chiari malformation, new diagnosis of CNS demyelinating disease suspected to be multiple sclerosis who presented 1/2/2021 with complaints of headaches, weakness, vision changes, falls.     Ms. Meade is a 35-year-old female with complex past medical history including Budd-Chiari formation following with Dr. Padilla (neurosurgery), Dr. Espinoza at [place] (neurology), aseptic meningitis 2 years ago with subsequent seizure disorder, complex migraines.  She has been having migraine headaches for the past several weeks.  Additionally on December 25, 2020 she had lost the vision in her right eye for about 2 hours that is subsequently resolved.  She is complaining of bilateral lower extremity weakness as well as paresthesias of her entire bilateral lower extremities and saddle anesthesia. She states that her left leg collapses at times and her right leg has been dragging.  She has had falls and has been bumping into walls recently.  She has had increased fatigue and malaise unable to perform all of her normal activities at home.  She has been expressing difficulty urinating as well and denies incontinence of urine or feces.  She currently denies chest pain, vision difficulty, nausea, diarrhea, vomiting, dysuria, shortness of breath or problems swallowing.     ERP discussed case with patient's neurological team Dr. Acosta covering for Dr. Espinoza as well as Dr. Padilla who recommended MRIs of the head cervical thoracic and lumbar spine with and without contrast.  MRI brain revealed borderline low-lying cerebellar tonsils extending almost 2 mm below foramen magnum does not satisfy criteria for Chiari I malformation.  Also shows multiple  supratentorial white matter lesions with morphology and has been most consistent with active demyelinating disease such as multiple sclerosis, posterior fossa demyelinating lesions also noted in the left middle cerebellar peduncle and left paramedian upper medulla near pontomedullary junction.  ERP discussed case with [facility] [facility] group for Dr. Acosta, who recommended 1000 milligrams of Solu-Medrol and have patient be admitted with neurological evaluation in the morning.      Interval Problem Update  Mri results are noted    Consultants/Specialty  neurology    Code Status  Full Code    Disposition  pending    Review of Systems  Review of Systems   Constitutional: Positive for malaise/fatigue. Negative for chills and fever.   HENT: Negative for ear discharge, ear pain and tinnitus.    Eyes: Negative for photophobia, pain and discharge.   Respiratory: Negative for cough, hemoptysis and sputum production.    Cardiovascular: Negative for chest pain, palpitations and orthopnea.   Gastrointestinal: Negative for heartburn, nausea and vomiting.   Genitourinary: Negative for dysuria, frequency and urgency.   Musculoskeletal: Positive for back pain.   Skin: Negative for itching and rash.   Neurological: Positive for focal weakness and weakness. Negative for tingling and tremors.        Physical Exam  Temp:  [36.1 °C (97 °F)-37.1 °C (98.8 °F)] 36.6 °C (97.9 °F)  Pulse:  [48-91] 60  Resp:  [16-18] 16  BP: ()/(54-79) 99/60  SpO2:  [93 %-99 %] 95 %    Physical Exam  Constitutional:       Appearance: Normal appearance.   HENT:      Head: Normocephalic and atraumatic.      Nose: Nose normal.      Mouth/Throat:      Mouth: Mucous membranes are dry.   Eyes:      Extraocular Movements: Extraocular movements intact.      Pupils: Pupils are equal, round, and reactive to light.   Neck:      Musculoskeletal: Normal range of motion and neck supple.   Cardiovascular:      Rate and Rhythm: Normal rate and regular rhythm.       Pulses: Normal pulses.      Heart sounds: Normal heart sounds.   Pulmonary:      Effort: Pulmonary effort is normal.      Breath sounds: Normal breath sounds.   Abdominal:      General: Abdomen is flat.      Palpations: Abdomen is soft.   Musculoskeletal: Normal range of motion.   Skin:     General: Skin is warm and dry.   Neurological:      Mental Status: She is alert and oriented to person, place, and time.      Comments: Has weakness in both lower exts         Fluids  No intake or output data in the 24 hours ending 01/03/21 1016    Laboratory  Recent Labs     01/02/21  1224 01/03/21  0728   WBC 9.5 7.4   RBC 4.64 4.89   HEMOGLOBIN 14.8 15.3   HEMATOCRIT 44.7 46.7   MCV 96.3 95.5   MCH 31.9 31.3   MCHC 33.1* 32.8*   RDW 46.9 45.8   PLATELETCT 261 260   MPV 10.0 10.4     Recent Labs     01/02/21  1224 01/03/21  0728   SODIUM 138 139   POTASSIUM 4.1 4.0   CHLORIDE 108 110   CO2 19* 18*   GLUCOSE 89 142*   BUN 11 12   CREATININE 0.77 0.72   CALCIUM 9.1 9.6     Recent Labs     01/02/21  1224   APTT 24.7   INR 1.03               Imaging       Assessment/Plan  * MS (multiple sclerosis) (HCC)- (present on admission)  Assessment & Plan  -MRI Brain With & W/o: Multiple supratentorial white matter lesions with morphology and enhancement most consistent with active demyelinating disease.  -ERP discussed the case with her her Neurology team from [place] (Dr. Espinoza and Dr. Acosta) recommended 1g of solumedrol to treat her MS and admit the patient to the hospital with neurology follow up in the morning.   -Symptoms of bilateral leg weakness, paresthesias of bilateral lower extremities, feelings of urine retention, occasionally upper extremity numbness and tingling, right vision loss on Deana that has resolved, cognitive issues such as inattentiveness and forgetfulness.  -PT/OT   On solumedrol 1 gram daily  D/w neurology today and will  Consult on the pt  Neuro checks q 4 hrs      Seizure disorder (HCC)- (present on  "admission)  Assessment & Plan  -Continue home regimen Topamax, zonisamide  -States last definite seizure was 2 years ago however there has been some discussion with her neurologist that she may be having seizures while sleeping since then.  -Seizure precautions    History of Chiari malformation- (present on admission)  Assessment & Plan  -Follows with Dr. Padilla, has been determined to be non-surgical in the past per chart review.  -MRI brain performed on 1/2/2021 shows \"borderline low-lying cerebellar tonsils extending about 2 mm below the foramen magnum. This does not satisfy criteria for Chiari I malformation.\"  Mri results are noted    Migraines- (present on admission)  Assessment & Plan  -History of complex migraines with presentations of stroke-like symptoms with right-sided facial droop, numbness and paresthesias in the past.         VTE prophylaxis: lovenox      "

## 2021-01-03 NOTE — ASSESSMENT & PLAN NOTE
"-Follows with Dr. Padilla, has been determined to be non-surgical in the past per chart review.  -MRI brain performed on 1/2/2021 shows \"borderline low-lying cerebellar tonsils extending about 2 mm below the foramen magnum. This does not satisfy criteria for Chiari I malformation.\"  Mri results are noted  outpt f/u  "

## 2021-01-03 NOTE — CARE PLAN
Problem: Communication  Goal: The ability to communicate needs accurately and effectively will improve  Outcome: PROGRESSING AS EXPECTED  Note: Education provided to call when needing assistance. Call light is within reach. Hourly rounding in place.      Problem: Safety  Goal: Will remain free from falls  Outcome: PROGRESSING AS EXPECTED  Note: Education provided to call before ambulating. Call light is within reach. Bed is in a low and locked position with the bed alarm on. Hourly rounding in place.

## 2021-01-03 NOTE — CONSULTS
"[facility] Neurology Consult:    Referring Physician: STEPHY Orozco M.D.    Reason for consultation: CNS demyelination    HPI: Rehana Meade is a 35 y.o. female with history of anxiety, depression, hx of \"drug seeking behavior\", hx of intractable migraines/chronic migraines, hx of a \"chiari malformation\" and hx of \"seizures\" presenting to the hospital for weakness and consulted for CNS demyelination. Patient had a hx of viral meningitis some years ago. The patient presents w/ worsening headache, weakness, vision changes and falls. The patient sates that a few weeks ago she started having worsening of her headaches. She then had some vision problem on the R eye, however this was associated w/ some orbital discomfort but no bettina orbital pain. Visual symptoms are fluctuating but have improved. She additionally complains of bilateral lower extremity weakness and difficulty walking. She had been having some falls and her \"left leg buckled while he R leg dragged\". She came to the E.R. for further evaluation and an MRI Brain W/WO CST was performed with various enhancing lesions. MRI T and L spine were WNL. Neurology consulted for demyelination.    Of note, patient states she had some \"disagreements with Dr. Adame\" (a previous neurologist at [facility]) and states she is \"banned from the clinic\". I am unable to verify these statements or what actually transpired.     ROS:     As above. All other systems reviewed and are negative.    Past Medical History:    has a past medical history of Anesthesia, Anxiety associated with depression, ASTHMA, Drug-seeking behavior, Migraine without aura, without mention of intractable migraine without mention of status migrainosus, Other specified symptom associated with female genital organs, Seizure (HCC), Seizure cerebral (HCC) (2/15/2018), Stroke (HCC), and Unspecified disorder of thyroid. She also has no past medical history of CAD (coronary artery disease) or " "COPD.    FHx:  family history is not on file.    SHx:   reports that she has never smoked. She has never used smokeless tobacco. She reports previous alcohol use. She reports that she does not use drugs.    Allergies:  Allergies   Allergen Reactions   • Amitriptyline Unspecified     Suicidal   • Clarithromycin Hives   • Sulfa Drugs Anaphylaxis   • Latex Itching   • Lorazepam Unspecified     Hallucinations     • Metoclopramide Unspecified     Muscle spasms   • Penicillin G Potassium Vomiting   • Rizatriptan Unspecified     Tremors, confusion     • Sumatriptan Unspecified     \"Makes my head pins and needles\"   • Tape Rash   • Morphine Hives and Itching       Medications:    Current Facility-Administered Medications:   •  HYDROcodone-acetaminophen (NORCO) 5-325 MG per tablet 1-2 Tab, 1-2 Tab, Oral, Q4HRS PRN, STEPHY Orozco M.D., 2 Tab at 01/03/21 0955  •  methylPREDNISolone sod succ (SOLU-MEDROL) 1,000 mg in  mL IVPB, 1 g, Intravenous, DAILY, STEPHY Orozco M.D., Stopped at 01/03/21 1238  •  butalbital/apap/caffeine -40 mg (Fioricet) per tablet 2 Tab, 2 Tab, Oral, Q6HRS PRN, STEPHY Orozco M.D., 2 Tab at 01/03/21 1445  •  topiramate (TOPAMAX) tablet 100 mg, 100 mg, Oral, BID, Mack Reeder M.D., 100 mg at 01/03/21 0955  •  zonisamide (ZONEGRAN) capsule 200 mg, 200 mg, Oral, Q EVENING, Mack Reeder M.D., 200 mg at 01/02/21 2105  •  senna-docusate (PERICOLACE or SENOKOT S) 8.6-50 MG per tablet 2 Tab, 2 Tab, Oral, BID, 2 Tab at 01/03/21 0539 **AND** polyethylene glycol/lytes (MIRALAX) PACKET 1 Packet, 1 Packet, Oral, QDAY PRN **AND** magnesium hydroxide (MILK OF MAGNESIA) suspension 30 mL, 30 mL, Oral, QDAY PRN **AND** bisacodyl (DULCOLAX) suppository 10 mg, 10 mg, Rectal, QDAY PRN, Mack Reeder M.D.  •  enoxaparin (LOVENOX) inj 40 mg, 40 mg, Subcutaneous, DAILY, Mack Reeder M.D.  •  acetaminophen (Tylenol) tablet 650 mg, 650 mg, Oral, Q6HRS PRN, Mack Reeder M.D., 650 mg " "at 01/02/21 2216  •  labetalol (NORMODYNE/TRANDATE) injection 10 mg, 10 mg, Intravenous, Q4HRS PRN, Mack Reeder M.D.  •  ondansetron (ZOFRAN) syringe/vial injection 4 mg, 4 mg, Intravenous, Q4HRS PRN, Mack Reeder M.D., 4 mg at 01/03/21 0825  •  ondansetron (ZOFRAN ODT) dispertab 4 mg, 4 mg, Oral, Q4HRS PRN, Mack Reeder M.D.  •  promethazine (PHENERGAN) tablet 12.5-25 mg, 12.5-25 mg, Oral, Q4HRS PRN, Mack Reeder M.D.  •  promethazine (PHENERGAN) suppository 12.5-25 mg, 12.5-25 mg, Rectal, Q4HRS PRN, Mack Reeder M.D.  •  prochlorperazine (COMPAZINE) injection 5-10 mg, 5-10 mg, Intravenous, Q4HRS PRN, Mack Reeder M.D.  •  hydrOXYzine HCl (ATARAX) tablet 25 mg, 25 mg, Oral, TID PRN, Don Giles M.D., 25 mg at 01/02/21 2216    Vitals:   Vitals:    01/03/21 0400 01/03/21 0700 01/03/21 0800 01/03/21 1252   BP: (!) 98/54  (!) 99/60 (!) 99/55   Pulse: 62  60 70   Resp: 16  16 18   Temp: 36.1 °C (97 °F)  36.6 °C (97.9 °F) 36.6 °C (97.9 °F)   TempSrc: Temporal  Temporal Temporal   SpO2: 93%  95% 95%   Weight:       Height:  1.6 m (5' 3\")         Labs:  Lab Results   Component Value Date/Time    PROTHROMBTM 13.9 01/02/2021 12:24 PM    INR 1.03 01/02/2021 12:24 PM      Lab Results   Component Value Date/Time    WBC 7.4 01/03/2021 07:28 AM    RBC 4.89 01/03/2021 07:28 AM    HEMOGLOBIN 15.3 01/03/2021 07:28 AM    HEMATOCRIT 46.7 01/03/2021 07:28 AM    MCV 95.5 01/03/2021 07:28 AM    MCH 31.3 01/03/2021 07:28 AM    MCHC 32.8 (L) 01/03/2021 07:28 AM    MPV 10.4 01/03/2021 07:28 AM    NEUTSPOLYS 89.80 (H) 01/03/2021 07:28 AM    LYMPHOCYTES 9.50 (L) 01/03/2021 07:28 AM    MONOCYTES 0.50 01/03/2021 07:28 AM    EOSINOPHILS 0.00 01/03/2021 07:28 AM    BASOPHILS 0.10 01/03/2021 07:28 AM    HYPOCHROMIA 1+ 06/06/2014 10:49 AM      Lab Results   Component Value Date/Time    SODIUM 139 01/03/2021 07:28 AM    POTASSIUM 4.0 01/03/2021 07:28 AM    CHLORIDE 110 01/03/2021 07:28 AM    CO2 18 (L) 01/03/2021 " 07:28 AM    GLUCOSE 142 (H) 01/03/2021 07:28 AM    BUN 12 01/03/2021 07:28 AM    CREATININE 0.72 01/03/2021 07:28 AM    CREATININE 0.9 05/04/2009 09:58 PM      Lab Results   Component Value Date/Time    CHOLSTRLTOT 160 12/05/2019 04:02 AM    LDL 95 12/05/2019 04:02 AM    HDL 55 12/05/2019 04:02 AM    TRIGLYCERIDE 48 12/05/2019 04:02 AM       Lab Results   Component Value Date/Time    ALKPHOSPHAT 89 01/02/2021 12:24 PM    ASTSGOT 11 (L) 01/02/2021 12:24 PM    ALTSGPT 11 01/02/2021 12:24 PM    TBILIRUBIN 0.3 01/02/2021 12:24 PM      No results found for: TSH  Lab Results   Component Value Date/Time    FREET4 0.69 01/29/2019 09:34 AM     Lab Results   Component Value Date/Time    FREET3 2.66 01/29/2019 09:34 AM       Imaging/Testing:  MRI Brain W/WO CST personally reviewed w/ some pontine and periventricular enhancing white matter lesions, with a small non-enhancing one in the medulla    MRI C/T/L spine reviewed in chart.     Physical Exam:     General: 36 y/o female in bed in NAD  Cardio: Normal S1/S2. No peripheral edema.   Pulm: CTAX2. No respiratory distress.   Skin: Warm, dry, no rashes or lesions   Psychiatric: Appropriate affect. No active psychosis.  HEENT: Atraumatic head, normal sclera and conjunctiva, moist oral mucosa. No lid lag.  Abdomen: Soft, non tender. No masses or hepatosplenomegaly.    Neurologic:  Mental Status:  AAOx4. Able to follow commands/cross midline. Speech fluent/nondysarthric. Language functions intact. No neglect/apraxia.  Cranial Nerves:  PERRL. EOMi. Face symmetric, palate/tongue midline. Visual fields full to confrontation. Facial sensation intact.   Motor:  Normal muscle tone and bulk. Strength is 4+/5 on the LLE w/ some give-way weakness. Otherwise 5/5.  Reflexes: Deferred  Coordination: Finger-nose w/o ataxia.   Sensation: Symmetrically diminished to light touch over the bilateral Les up to the level of the navel.   Gait/Station: Deferred    Assessment/Plan:    Rehana Thomson  "Deshaun is a 35 y.o. female with history of anxiety, depression, hx of \"drug seeking behavior\", hx of intractable migraines/chronic migraines, hx of a \"chiari malformation\" and hx of \"seizures\" presenting to the hospital for weakness and consulted for CNS demyelination.  The patient has evidence of some enhancing lesions in the brain which are suspicious for demyelination. While these lesions can be seen in MS they all take up contrast meaning that they are all fairly recent/same age with the exception of a lesion in the medulla. While she has one non-enhancing lesion and this may satisfy the criteria of separation in time and space it is atypical she has various other enhancing lesions. Will proceed with evaluation for demyelination.    Plan:  -D/C Solu-medrol for now (patient got 2 doses) as it may confound results of LP  -Will need an LP. CSF labs in chart.  -Further recommendations pending results of CSF.  -Plan discussed with consulting physician and patient's nurse.       Roger Vigil M.D., Diplomat of the American Board of Psychiatry and Neurology  Diplomat of [employer] Epilepsy Subspecialty   Assistant Clinical Professor, [facility] Neurology Consultant        "

## 2021-01-03 NOTE — THERAPY
"Physical Therapy   Initial Evaluation     Patient Name: Rehana Meade  Age:  35 y.o., Sex:  female  Medical Record #: [id number]  Today's Date: 1/3/2021     Precautions: (P) Fall Risk    Assessment  Ms. Meade is 35 y.o. female with a diagnosis of bilateral lower extremity weakness with cortical white matter lesions.  She has functional weakness contributing to abnormal gait, decreased transfers, and decreased tolerance for functional activities. She is being worked up for multiple sclerosis. She has been educated on avoiding central fatigue. She would benefit from skilled physical therapy to help minimize loss of function and help restore strength to the level possible.     Plan    Recommend Physical Therapy 4 times per week until therapy goals are met for the following treatments:  Gait Training, Neuro Re-Education / Balance, Stair Training, Therapeutic Activities and Therapeutic Exercises    DC Equipment Recommendations: (P) Front-Wheel Walker  Discharge Recommendations: (P) Other -(unable to determine at this time.)       Subjective    Regarding her weakness and possible diagnosis, \"I don't have time for this.\"     Objective       01/03/21 0556   Precautions   Precautions Fall Risk   Comments Possible diagnosis of multiple sclerosis. Lumbar puncture apparently planned for tomorrow.   Pain 0 - 10 Group   Therapist Pain Assessment Post Activity Pain Same as Prior to Activity   Prior Living Situation   Prior Services None   Housing / Facility 2 Story House   Steps Into Home 2   Steps In Home   (Flight)   Equipment Owned None   Lives with - Patient's Self Care Capacity Spouse;Child Less than 18 Years of Age  (4 children 6-12y.o.)   Comments She works from home. Children are in school.  works in the [employer] but is only currently working half days.   Prior Level of Functional Mobility   Bed Mobility Independent   Transfer Status Independent   Ambulation Independent   Distance Ambulation (Feet) "   (Community)   Assistive Devices Used None   Stairs Required Assist  (when symptoms presented.)   Comments States when she has had symptoms in the past her  would help her go up the stairs.    Passive ROM Lower Body   Passive ROM Lower Body WDL   Active ROM Lower Body    Active ROM Lower Body  WDL   Strength Lower Body   Lower Body Strength  X   Gross Strength Generalized Weakness, Equal Bilaterally   Neurological Concerns   Neurological Concerns Yes   Comments Current work up for multiple sclerosis.   Coordination Lower Body    Coordination Lower Body  X   Apraxia Right Lower Extremity Present   Apraxia Left Lower Extremity Present   Comments with gait.   Balance Assessment   Sitting Balance (Static) Fair   Sitting Balance (Dynamic) Fair   Standing Balance (Static) Poor +   Standing Balance (Dynamic) Poor +   Weight Shift Sitting Fair   Weight Shift Standing Fair   Gait Analysis   Gait Level Of Assist Moderate Assist   Assistive Device Front Wheel Walker   Distance (Feet) 20   # of Times Distance was Traveled 2   Deviation Ataxic;Bradykinetic;Shuffled Gait   # of Stairs Climbed 0   Level of Assist with Stairs Unable to Participate   Weight Bearing Status No restrictions.   Bed Mobility    Supine to Sit Modified Independent   Sit to Supine Modified Independent   Scooting Modified Independent   Rolling Modified Independent   Functional Mobility   Sit to Stand Moderate Assist  (for safety)   Toilet Transfers Minimal Assist   Activity Tolerance   Sitting in Chair 3min+   Standing 2-3min   Patient / Family Goals    Patient / Family Goal #1 Be able to care for her children.   Short Term Goals    Short Term Goal # 1 Will ambulate 50' with Trevor and front wheeled walker in 6tx to progress towards community ambulation.   Short Term Goal # 2 Will go up/down 2 stairs with Trevor and bilateral hand rails in 6tx to progress towards being able to enter/exit her home.    Education Group   Exercises - Seated Patient Response  * * Learner * *;Patient;* * Readiness * *;Acceptance;* * Method * *;Explanation;Demonstration;* * Response * *;Verbal Demonstration  (tremayne Herman. )   Additional Comments Education on avoiding central fatigue.

## 2021-01-03 NOTE — ASSESSMENT & PLAN NOTE
-History of complex migraines with presentations of stroke-like symptoms with right-sided facial droop, numbness and paresthesias in the past.  Continue topamax    1/6: Patient has been receiving fioricet q6 hr prn around the clock, we will try to wean off pain meds. Patient mentions she will try to see how she does with fioricet q12 hrs prn. We will modify pain medications as needed. The patient will probably have to go home on fioricet with a tapering dose over the next couple of weeks.

## 2021-01-03 NOTE — ASSESSMENT & PLAN NOTE
-MRI Brain With & W/o: Multiple supratentorial white matter lesions with morphology and enhancement most consistent with active demyelinating disease.  -ERP discussed the case with her her Neurology team from [place] (Dr. Espinoza and Dr. Acosta) recommended 1g of solumedrol to treat her MS and admit the patient to the hospital with neurology follow up in the morning.   -Symptoms of bilateral leg weakness, paresthesias of bilateral lower extremities, feelings of urine retention, occasionally upper extremity numbness and tingling, right vision loss on Deana that has resolved, cognitive issues such as inattentiveness and forgetfulness.  -PT/OT     1/5: Continue Iv solumedrol as per neurology for 5 days, currently on day 4, we appreciate any further recommendations.  1/6: Patient finished today IV solumedrol and she will be followed up by MS clinic as outpatient to follow up on LP results.

## 2021-01-03 NOTE — ASSESSMENT & PLAN NOTE
-Continue home regimen Topamax, zonisamide  -States last definite seizure was 2 years ago however there has been some discussion with her neurologist that she may be having seizures while sleeping since then.  -Seizure precautions

## 2021-01-03 NOTE — ED NOTES
Patient calm and in room at this time. Significant Other at bedside. Hospitalist also at bedside to examine patient. Respirations even and unlabored. High dose steroids initiated. Patient to be admitted. Awaiting bed placement.

## 2021-01-03 NOTE — H&P
Hospital Medicine History & Physical Note    Date of Service  1/2/2021    Primary Care Physician  Mack Bernard M.D.    Consultants      Code Status  Full Code    Chief Complaint  Chief Complaint   Patient presents with   • Other     Pt complains of loss of vision in Right eye (1 week ago, now resolved) difficulty walking, and migraines x1 month. Pt consulted her Neuro Surgeon who has treated her for chiari malformation and was told to come to ER.    • Back Pain   • Head Pain       History of Presenting Illness  35 y.o. female past medical history of viral meningitis 2 years ago complicated by seizures, Budd-Chiari malformation, new diagnosis of CNS demyelinating disease suspected to be multiple sclerosis who presented 1/2/2021 with complaints of headaches, weakness, vision changes, falls.    Ms. Meade is a 35-year-old female with complex past medical history including Budd-Chiari formation following with Dr. Padilla (neurosurgery), Dr. Espinoza at [place] (neurology), aseptic meningitis 2 years ago with subsequent seizure disorder, complex migraines.  She has been having migraine headaches for the past several weeks.  Additionally on December 25, 2020 she had lost the vision in her right eye for about 2 hours that is subsequently resolved.  She is complaining of bilateral lower extremity weakness as well as paresthesias of her entire bilateral lower extremities and saddle anesthesia. She states that her left leg collapses at times and her right leg has been dragging.  She has had falls and has been bumping into walls recently.  She has had increased fatigue and malaise unable to perform all of her normal activities at home.  She has been expressing difficulty urinating as well and denies incontinence of urine or feces.  She currently denies chest pain, vision difficulty, nausea, diarrhea, vomiting, dysuria, shortness of breath or problems swallowing.    ERP discussed case with patient's neurological team   Dave covering for Dr. Espinoza as well as Dr. Padilla who recommended MRIs of the head cervical thoracic and lumbar spine with and without contrast.  MRI brain revealed borderline low-lying cerebellar tonsils extending almost 2 mm below foramen magnum does not satisfy criteria for Chiari I malformation.  Also shows multiple supratentorial white matter lesions with morphology and has been most consistent with active demyelinating disease such as multiple sclerosis, posterior fossa demyelinating lesions also noted in the left middle cerebellar peduncle and left paramedian upper medulla near pontomedullary junction.  ERP discussed case with [place] neurology group for Dr. Acosta, who recommended 1000 milligrams of Solu-Medrol and have patient be admitted with neurological evaluation in the morning.      Review of Systems  Review of Systems   Constitutional: Positive for malaise/fatigue. Negative for chills and fever.   HENT: Negative for congestion and sinus pain.    Eyes: Positive for blurred vision. Negative for pain.   Respiratory: Negative for cough, sputum production and shortness of breath.    Cardiovascular: Negative for chest pain and palpitations.   Gastrointestinal: Positive for nausea. Negative for abdominal pain, heartburn and vomiting.   Genitourinary: Negative for dysuria, frequency and urgency.   Musculoskeletal: Positive for back pain and myalgias.   Neurological: Positive for dizziness, tingling, sensory change, weakness and headaches.   Psychiatric/Behavioral: Negative for hallucinations. The patient is nervous/anxious.        Past Medical History   has a past medical history of Anesthesia, Anxiety associated with depression, ASTHMA, Drug-seeking behavior, Migraine without aura, without mention of intractable migraine without mention of status migrainosus, Other specified symptom associated with female genital organs, Seizure (HCC), Seizure cerebral (HCC) (2/15/2018), Stroke ([facility]), and Unspecified  "disorder of thyroid.    Surgical History   has a past surgical history that includes septoplasty (10/28/2009); somnoplasty (10/28/2009); nasal polypectomy (10/28/2009); tubal coagulation laparoscopic bilateral (7/11/2014); vaginal hysterectomy total (1/27/2020); and cystoscopy (1/27/2020).     Family History  family history is not on file.     Social History   reports that she has never smoked. She has never used smokeless tobacco. She reports previous alcohol use. She reports that she does not use drugs.    Allergies  Allergies   Allergen Reactions   • Amitriptyline Unspecified     Suicidal   • Clarithromycin Hives   • Sulfa Drugs Anaphylaxis   • Latex Itching   • Lorazepam Unspecified     Hallucinations     • Metoclopramide Unspecified     Muscle spasms   • Penicillin G Potassium Vomiting   • Rizatriptan Unspecified     Tremors, confusion     • Sumatriptan Unspecified     \"Makes my head pins and needles\"   • Tape Rash   • Morphine Hives and Itching       Medications  Prior to Admission Medications   Prescriptions Last Dose Informant Patient Reported? Taking?   AIMOVIG 70 MG/ML Solution Auto-injector 12/16/2020 at 1300 Patient Yes No   Sig: Inject 70 mg as instructed Q30 DAYS.   ibuprofen (MOTRIN) 600 MG Tab Not Taking at Unknown time Patient No No   Sig: Take 1 Tab by mouth every 6 hours as needed.   Patient not taking: Reported on 1/2/2021   topiramate (TOPAMAX) 100 MG Tab 1/2/2021 at 0900 Patient Yes No   Sig: Take 100 mg by mouth 2 times a day.   zonisamide (ZONEGRAN) 100 MG Cap 1/1/2021 at 1930 Patient Yes No   Sig: Take 200 mg by mouth every evening.      Facility-Administered Medications: None       Physical Exam  Temp:  [36.2 °C (97.2 °F)-36.3 °C (97.4 °F)] 36.3 °C (97.4 °F)  Pulse:  [48-91] 68  Resp:  [16-18] 16  BP: ()/(58-79) 105/68  SpO2:  [98 %-99 %] 99 %    Physical Exam  Vitals signs and nursing note reviewed. Exam conducted with a chaperone present.   Constitutional:       General: She is not " in acute distress.     Appearance: She is normal weight. She is not ill-appearing.   HENT:      Head: Normocephalic and atraumatic.      Nose: Nose normal. No congestion or rhinorrhea.      Mouth/Throat:      Mouth: Mucous membranes are moist.      Pharynx: Oropharynx is clear. No oropharyngeal exudate.   Eyes:      General: No scleral icterus.     Extraocular Movements: Extraocular movements intact.      Conjunctiva/sclera: Conjunctivae normal.      Pupils: Pupils are equal, round, and reactive to light.      Comments: Horizontal nystagmus noted   Neck:      Musculoskeletal: Normal range of motion and neck supple. No neck rigidity.   Cardiovascular:      Rate and Rhythm: Normal rate and regular rhythm.      Pulses: Normal pulses.      Heart sounds: No murmur. No gallop.    Pulmonary:      Effort: Pulmonary effort is normal. No respiratory distress.      Breath sounds: No wheezing, rhonchi or rales.   Abdominal:      General: Bowel sounds are normal. There is no distension.      Palpations: Abdomen is soft.      Tenderness: There is no abdominal tenderness. There is no guarding.   Musculoskeletal: Normal range of motion.         General: No signs of injury.   Skin:     General: Skin is warm and dry.      Capillary Refill: Capillary refill takes less than 2 seconds.      Findings: No rash.   Neurological:      General: No focal deficit present.      Mental Status: She is alert and oriented to person, place, and time.      Cranial Nerves: No cranial nerve deficit.      Sensory: Sensory deficit present.      Motor: Weakness present.      Coordination: Coordination abnormal.      Gait: Gait abnormal.      Comments: Mild Dysdiadochokinesia present, horizontal nystagmus noted, decreased sensation bilateral lower extremities, motor function is 4-5/5 at hips, 5/5 knee and feet b/l.    Psychiatric:         Mood and Affect: Mood normal.         Behavior: Behavior normal.         Thought Content: Thought content normal.          Judgment: Judgment normal.         Laboratory:  Recent Labs     01/02/21  1224   WBC 9.5   RBC 4.64   HEMOGLOBIN 14.8   HEMATOCRIT 44.7   MCV 96.3   MCH 31.9   MCHC 33.1*   RDW 46.9   PLATELETCT 261   MPV 10.0     Recent Labs     01/02/21  1224   SODIUM 138   POTASSIUM 4.1   CHLORIDE 108   CO2 19*   GLUCOSE 89   BUN 11   CREATININE 0.77   CALCIUM 9.1     Recent Labs     01/02/21  1224   ALTSGPT 11   ASTSGOT 11*   ALKPHOSPHAT 89   TBILIRUBIN 0.3   GLUCOSE 89     Recent Labs     01/02/21  1224   APTT 24.7   INR 1.03     No results for input(s): NTPROBNP in the last 72 hours.      No results for input(s): TROPONINT in the last 72 hours.    Imaging:  MR-BRAIN-WITH & W/O   Final Result      1.  Borderline low-lying cerebellar tonsils extending about 2 mm below the foramen magnum. This does not satisfy criteria for Chiari I malformation.   2.  Multiple supratentorial white matter lesions with morphology and enhancement most consistent with active demyelinating disease such as multiple sclerosis.   3.  Posterior fossa demyelinating lesions noted in the left middle cerebellar peduncle (enhancing) and left paramedian upper medulla near the pontomedullary junction (nonenhancing).      MR-CERVICAL SPINE-WITH & W/O   Final Result         1. BORDERLINE LOW-LYING CEREBELLAR TONSILS EXTENDING ABOUT 2 MM BELOW THE FORAMEN MAGNUM. THIS DOES NOT SATISFY CRITERIA FOR CHIARI I MALFORMATION.      2. DEMYELINATING LESION AGAIN SEEN IN THE LEFT MIDDLE CEREBELLAR PEDUNCLE AND ADDITIONAL SUBTLE DEMYELINATING LESION IN THE LEFT PARAMEDIAN UPPER MEDULLA NEAR THE PONTOMEDULLARY JUNCTION. (SEE ALSO MRI BRAIN FROM TODAY'S DATE).      3. OTHERWISE, MRI OF THE CERVICAL SPINE WITHOUT AND WITH CONTRAST WITHIN NORMAL LIMITS. NO EVIDENCE OF DEMYELINATING DISEASE IN THE CERVICAL SPINAL CORD.      MR-LUMBAR SPINE-WITH & W/O   Final Result      1.  MRI lumbar spine without and with contrast within normal limits.      MR-THORACIC SPINE-WITH & W/O   Final  "Result      1.  T9 vertebral body hemangioma with no change from prior exam. No clinical significance.   2.  Otherwise, unremarkable MRI thoracic spine with no significant disc bulge or protrusion, central stenosis, foraminal stenosis, or myelopathic cord signal abnormality. No evidence of demyelinating disease in the thoracic spinal cord.            Assessment/Plan:  I anticipate this patient is appropriate for observation status at this time.    * MS (multiple sclerosis) ([facility])- (present on admission)  Assessment & Plan  -MRI Brain With & W/o: Multiple supratentorial white matter lesions with morphology and enhancement most consistent with active demyelinating disease.  -ERP discussed the case with her her Neurology team from [place] (Dr. Espinoza and Dr. Acosta) recommended 1g of solumedrol to treat her MS and admit the patient to the hospital with neurology follow up in the morning.   -Symptoms of bilateral leg weakness, paresthesias of bilateral lower extremities, feelings of urine retention, occasionally upper extremity numbness and tingling, right vision loss on [place] that has resolved, cognitive issues such as inattentiveness and forgetfulness.  -PT/OT consult      Seizure disorder ([facility])- (present on admission)  Assessment & Plan  -Continue home regimen Topamax, zonisamide  -States last definite seizure was 2 years ago however there has been some discussion with her neurologist that she may be having seizures while sleeping since then.  -Seizure precautions    History of Chiari malformation- (present on admission)  Assessment & Plan  -Follows with Dr. Padilla, has been determined to be non-surgical in the past per chart review.  -MRI brain performed on 1/2/2021 shows \"borderline low-lying cerebellar tonsils extending about 2 mm below the foramen magnum. This does not satisfy criteria for Chiari I malformation.\"    Migraines- (present on admission)  Assessment & Plan  -History of complex migraines with " presentations of stroke-like symptoms with right-sided facial droop, numbness and paresthesias in the past.

## 2021-01-03 NOTE — ED NOTES
Med rec updated and complete. Allergies reviewed. Pt denies antibiotic use in last 14 days.     Home pharmacy [facility]

## 2021-01-04 ENCOUNTER — APPOINTMENT (OUTPATIENT)
Dept: RADIOLOGY | Facility: MEDICAL CENTER | Age: 36
DRG: 060 | End: 2021-01-04
Attending: FAMILY MEDICINE
Payer: COMMERCIAL

## 2021-01-04 LAB
ALBUMIN SERPL BCP-MCNC: 3.7 G/DL (ref 3.2–4.9)
ALBUMIN/GLOB SERPL: 1.3 G/DL
ALP SERPL-CCNC: 82 U/L (ref 30–99)
ALT SERPL-CCNC: 7 U/L (ref 2–50)
ANION GAP SERPL CALC-SCNC: 11 MMOL/L (ref 7–16)
AST SERPL-CCNC: 13 U/L (ref 12–45)
BASOPHILS # BLD AUTO: 0.2 % (ref 0–1.8)
BASOPHILS # BLD: 0.03 K/UL (ref 0–0.12)
BILIRUB SERPL-MCNC: 0.2 MG/DL (ref 0.1–1.5)
BUN SERPL-MCNC: 16 MG/DL (ref 8–22)
BURR CELLS/RBC NFR CSF MANUAL: 0 %
CALCIUM SERPL-MCNC: 9 MG/DL (ref 8.5–10.5)
CHLORIDE SERPL-SCNC: 107 MMOL/L (ref 96–112)
CLARITY CSF: CLEAR
CO2 SERPL-SCNC: 18 MMOL/L (ref 20–33)
COLOR CSF: COLORLESS
COLOR SPUN CSF: COLORLESS
CREAT SERPL-MCNC: 0.93 MG/DL (ref 0.5–1.4)
EOSINOPHIL # BLD AUTO: 0.02 K/UL (ref 0–0.51)
EOSINOPHIL NFR BLD: 0.1 % (ref 0–6.9)
ERYTHROCYTE [DISTWIDTH] IN BLOOD BY AUTOMATED COUNT: 44.8 FL (ref 35.9–50)
GLOBULIN SER CALC-MCNC: 2.8 G/DL (ref 1.9–3.5)
GLUCOSE CSF-MCNC: 81 MG/DL (ref 40–80)
GLUCOSE SERPL-MCNC: 122 MG/DL (ref 65–99)
GRAM STN SPEC: NORMAL
HCT VFR BLD AUTO: 41.9 % (ref 37–47)
HGB BLD-MCNC: 13.9 G/DL (ref 12–16)
IMM GRANULOCYTES # BLD AUTO: 0.14 K/UL (ref 0–0.11)
IMM GRANULOCYTES NFR BLD AUTO: 0.8 % (ref 0–0.9)
LYMPHOCYTES # BLD AUTO: 3.67 K/UL (ref 1–4.8)
LYMPHOCYTES NFR BLD: 20.5 % (ref 22–41)
LYMPHOCYTES NFR CSF: 89 %
MAGNESIUM SERPL-MCNC: 2.2 MG/DL (ref 1.5–2.5)
MCH RBC QN AUTO: 30.9 PG (ref 27–33)
MCHC RBC AUTO-ENTMCNC: 33.2 G/DL (ref 33.6–35)
MCV RBC AUTO: 93.1 FL (ref 81.4–97.8)
MONOCYTES # BLD AUTO: 1.23 K/UL (ref 0–0.85)
MONOCYTES NFR BLD AUTO: 6.9 % (ref 0–13.4)
MONONUC CELLS NFR CSF: 7 %
NEUTROPHILS # BLD AUTO: 12.84 K/UL (ref 2–7.15)
NEUTROPHILS NFR BLD: 71.5 % (ref 44–72)
NEUTROPHILS NFR CSF: 4 %
NRBC # BLD AUTO: 0 K/UL
NRBC BLD-RTO: 0 /100 WBC
PLATELET # BLD AUTO: 254 K/UL (ref 164–446)
PMV BLD AUTO: 10.9 FL (ref 9–12.9)
POTASSIUM SERPL-SCNC: 3.3 MMOL/L (ref 3.6–5.5)
PROT CSF-MCNC: 58 MG/DL (ref 15–45)
PROT SERPL-MCNC: 6.5 G/DL (ref 6–8.2)
RBC # BLD AUTO: 4.5 M/UL (ref 4.2–5.4)
RBC # CSF: 394 CELLS/UL
SIGNIFICANT IND 70042: NORMAL
SITE SITE: NORMAL
SODIUM SERPL-SCNC: 136 MMOL/L (ref 135–145)
SOURCE SOURCE: NORMAL
SPECIMEN VOL CSF: 9.5 ML
TUBE # CSF: 3
TUBE # CSF: 4
WBC # BLD AUTO: 17.9 K/UL (ref 4.8–10.8)
WBC # CSF: 6 CELLS/UL (ref 0–10)

## 2021-01-04 PROCEDURE — 770006 HCHG ROOM/CARE - MED/SURG/GYN SEMI*

## 2021-01-04 PROCEDURE — 62270 DX LMBR SPI PNXR: CPT

## 2021-01-04 PROCEDURE — 83519 RIA NONANTIBODY: CPT | Mod: 91

## 2021-01-04 PROCEDURE — 700102 HCHG RX REV CODE 250 W/ 637 OVERRIDE(OP): Performed by: STUDENT IN AN ORGANIZED HEALTH CARE EDUCATION/TRAINING PROGRAM

## 2021-01-04 PROCEDURE — 99233 SBSQ HOSP IP/OBS HIGH 50: CPT | Mod: 25 | Performed by: FAMILY MEDICINE

## 2021-01-04 PROCEDURE — 62270 DX LMBR SPI PNXR: CPT | Performed by: INTERNAL MEDICINE

## 2021-01-04 PROCEDURE — 700105 HCHG RX REV CODE 258: Performed by: PSYCHIATRY & NEUROLOGY

## 2021-01-04 PROCEDURE — 700111 HCHG RX REV CODE 636 W/ 250 OVERRIDE (IP): Performed by: PSYCHIATRY & NEUROLOGY

## 2021-01-04 PROCEDURE — A9270 NON-COVERED ITEM OR SERVICE: HCPCS | Performed by: FAMILY MEDICINE

## 2021-01-04 PROCEDURE — 89051 BODY FLUID CELL COUNT: CPT

## 2021-01-04 PROCEDURE — 009U3ZX DRAINAGE OF SPINAL CANAL, PERCUTANEOUS APPROACH, DIAGNOSTIC: ICD-10-PCS | Performed by: INTERNAL MEDICINE

## 2021-01-04 PROCEDURE — 36415 COLL VENOUS BLD VENIPUNCTURE: CPT

## 2021-01-04 PROCEDURE — 83516 IMMUNOASSAY NONANTIBODY: CPT

## 2021-01-04 PROCEDURE — 700111 HCHG RX REV CODE 636 W/ 250 OVERRIDE (IP): Performed by: STUDENT IN AN ORGANIZED HEALTH CARE EDUCATION/TRAINING PROGRAM

## 2021-01-04 PROCEDURE — 86255 FLUORESCENT ANTIBODY SCREEN: CPT | Mod: 91

## 2021-01-04 PROCEDURE — 76937 US GUIDE VASCULAR ACCESS: CPT

## 2021-01-04 PROCEDURE — 80053 COMPREHEN METABOLIC PANEL: CPT

## 2021-01-04 PROCEDURE — 83520 IMMUNOASSAY QUANT NOS NONAB: CPT

## 2021-01-04 PROCEDURE — 87070 CULTURE OTHR SPECIMN AEROBIC: CPT

## 2021-01-04 PROCEDURE — 05HY33Z INSERTION OF INFUSION DEVICE INTO UPPER VEIN, PERCUTANEOUS APPROACH: ICD-10-PCS | Performed by: HOSPITALIST

## 2021-01-04 PROCEDURE — 85025 COMPLETE CBC W/AUTO DIFF WBC: CPT

## 2021-01-04 PROCEDURE — 700102 HCHG RX REV CODE 250 W/ 637 OVERRIDE(OP): Performed by: FAMILY MEDICINE

## 2021-01-04 PROCEDURE — 700101 HCHG RX REV CODE 250: Performed by: STUDENT IN AN ORGANIZED HEALTH CARE EDUCATION/TRAINING PROGRAM

## 2021-01-04 PROCEDURE — 99232 SBSQ HOSP IP/OBS MODERATE 35: CPT | Performed by: PSYCHIATRY & NEUROLOGY

## 2021-01-04 PROCEDURE — 83735 ASSAY OF MAGNESIUM: CPT

## 2021-01-04 PROCEDURE — A9270 NON-COVERED ITEM OR SERVICE: HCPCS | Performed by: STUDENT IN AN ORGANIZED HEALTH CARE EDUCATION/TRAINING PROGRAM

## 2021-01-04 PROCEDURE — 87205 SMEAR GRAM STAIN: CPT

## 2021-01-04 RX ADMIN — BUTALBITAL, ACETAMINOPHEN, AND CAFFEINE 2 TABLET: 50; 325; 40 TABLET ORAL at 20:18

## 2021-01-04 RX ADMIN — ONDANSETRON 4 MG: 2 INJECTION INTRAMUSCULAR; INTRAVENOUS at 09:23

## 2021-01-04 RX ADMIN — ONDANSETRON 4 MG: 2 INJECTION INTRAMUSCULAR; INTRAVENOUS at 20:19

## 2021-01-04 RX ADMIN — ONDANSETRON 4 MG: 2 INJECTION INTRAMUSCULAR; INTRAVENOUS at 14:10

## 2021-01-04 RX ADMIN — ZONISAMIDE 200 MG: 50 CAPSULE ORAL at 18:35

## 2021-01-04 RX ADMIN — SODIUM CHLORIDE 1000 MG: 9 INJECTION, SOLUTION INTRAVENOUS at 18:27

## 2021-01-04 RX ADMIN — TOPIRAMATE 100 MG: 100 TABLET, FILM COATED ORAL at 20:19

## 2021-01-04 RX ADMIN — BUTALBITAL, ACETAMINOPHEN, AND CAFFEINE 2 TABLET: 50; 325; 40 TABLET ORAL at 11:20

## 2021-01-04 RX ADMIN — DOCUSATE SODIUM 50 MG AND SENNOSIDES 8.6 MG 2 TABLET: 8.6; 5 TABLET, FILM COATED ORAL at 04:26

## 2021-01-04 RX ADMIN — ONDANSETRON 4 MG: 2 INJECTION INTRAMUSCULAR; INTRAVENOUS at 05:13

## 2021-01-04 RX ADMIN — TOPIRAMATE 100 MG: 100 TABLET, FILM COATED ORAL at 09:23

## 2021-01-04 RX ADMIN — BUTALBITAL, ACETAMINOPHEN, AND CAFFEINE 2 TABLET: 50; 325; 40 TABLET ORAL at 05:13

## 2021-01-04 ASSESSMENT — PAIN DESCRIPTION - PAIN TYPE: TYPE: ACUTE PAIN

## 2021-01-04 ASSESSMENT — ENCOUNTER SYMPTOMS
BACK PAIN: 1
EYE PAIN: 0
ABDOMINAL PAIN: 0
ORTHOPNEA: 0
CLAUDICATION: 0
NAUSEA: 0
VOMITING: 0
CHILLS: 0
FOCAL WEAKNESS: 1
PHOTOPHOBIA: 0
DOUBLE VISION: 0
SPUTUM PRODUCTION: 0
DIAPHORESIS: 0
HEADACHES: 0
HEMOPTYSIS: 0
SHORTNESS OF BREATH: 0
PALPITATIONS: 0
WEAKNESS: 1
TINGLING: 0

## 2021-01-04 NOTE — PROCEDURES
Vascular Access Team    Date of Insertion: 1/4/21  Arm Circumference: n/a  Line Length: 10  Line Size: 20  Vein Occupancy %: 39  Reason for Midline: Access  Labs: WBC 7.4, , BUN 12, Cr 0.72, GFR >60, INR NA    Orders confirmed, vessel patency confirmed with ultrasound. Risks and benefits of procedure explained to patient and education regarding line associated bloodstream infections provided. Questions answered.     PowerGlide Midline placed in RUE per licensed provider order with ultrasound guidance. 20g, 10 cm line placed in Bacilic vein after 1 attempt(s).  Catheter inserted with brisk blood return. Secured with 0 cm external from insertion site.  Line flushed without resistance with 10 mL 0.9% normal saline.  Midline secured with Biopatch and Tegaderm.     Midline placement is confirmed by nurse using ultrasound and ability to flush and draw blood. Midline is appropriate for use at this time.  No X-ray is needed for placement confirmation. Pt tolerated procedure well.  Patient condition relayed to unit RN or ordering physician via this post procedure note in the EMR.    Ultrasound images uploaded to PACS and viewable in the EMR - no  Ultrasound imaged printed and placed in paper chart - yes      BARD PowerGlide Midline ref # [id number], Lot # [id number], Expiration Date 10/31/21

## 2021-01-04 NOTE — PROGRESS NOTES
Neurology Progress Note  Neurohospitalist Service, [facility] Neurosciences    Referring Physician: STEPHY Orozco M.D.    Chief Complaint   Patient presents with   • Other     Pt complains of loss of vision in Right eye (1 week ago, now resolved) difficulty walking, and migraines x1 month. Pt consulted her Neuro Surgeon who has treated her for chiari malformation and was told to come to ER.    • Back Pain   • Head Pain       HPI: Refer to initial documented Neurology H&P, as detailed in the patient's chart.    Interval History January 4, 2021: No new issues overnight.    Review of systems: In addition to what is detailed in the HPI and/or updated in the interval history, all other systems reviewed and are negative.    Past Medical History:    has a past medical history of Anesthesia, Anxiety associated with depression, ASTHMA, Drug-seeking behavior, Migraine without aura, without mention of intractable migraine without mention of status migrainosus, Other specified symptom associated with female genital organs, Seizure (HCC), Seizure cerebral (HCC) (2/15/2018), Stroke (HCC), and Unspecified disorder of thyroid. She also has no past medical history of CAD (coronary artery disease) or COPD.    FHx:  family history is not on file.    SHx:   reports that she has never smoked. She has never used smokeless tobacco. She reports previous alcohol use. She reports that she does not use drugs.    Medications:    Current Facility-Administered Medications:   •  HYDROcodone-acetaminophen (NORCO) 5-325 MG per tablet 1-2 Tab, 1-2 Tab, Oral, Q4HRS PRN, STEPHY Orozco M.D., 2 Tab at 01/03/21 0955  •  butalbital/apap/caffeine -40 mg (Fioricet) per tablet 2 Tab, 2 Tab, Oral, Q6HRS PRN, STEPHY Orozco M.D., 2 Tab at 01/04/21 0513  •  topiramate (TOPAMAX) tablet 100 mg, 100 mg, Oral, BID, Mack Reeder M.D., 100 mg at 01/03/21 2121  •  zonisamide (ZONEGRAN) capsule 200 mg, 200 mg, Oral, Q EVENING, Mack  LIAM Reeder M.D., 200 mg at 01/03/21 1806  •  senna-docusate (PERICOLACE or SENOKOT S) 8.6-50 MG per tablet 2 Tab, 2 Tab, Oral, BID, 2 Tab at 01/04/21 0426 **AND** polyethylene glycol/lytes (MIRALAX) PACKET 1 Packet, 1 Packet, Oral, QDAY PRN **AND** magnesium hydroxide (MILK OF MAGNESIA) suspension 30 mL, 30 mL, Oral, QDAY PRN **AND** bisacodyl (DULCOLAX) suppository 10 mg, 10 mg, Rectal, QDAY PRN, Mack Reeder M.D.  •  enoxaparin (LOVENOX) inj 40 mg, 40 mg, Subcutaneous, DAILY, Mack Reeder M.D.  •  acetaminophen (Tylenol) tablet 650 mg, 650 mg, Oral, Q6HRS PRN, Mack Reeder M.D., 650 mg at 01/02/21 2216  •  labetalol (NORMODYNE/TRANDATE) injection 10 mg, 10 mg, Intravenous, Q4HRS PRN, Mack Reeder M.D.  •  ondansetron (ZOFRAN) syringe/vial injection 4 mg, 4 mg, Intravenous, Q4HRS PRN, Mack Reeder M.D., 4 mg at 01/04/21 0513  •  ondansetron (ZOFRAN ODT) dispertab 4 mg, 4 mg, Oral, Q4HRS PRN, Mack Reeder M.D.  •  promethazine (PHENERGAN) tablet 12.5-25 mg, 12.5-25 mg, Oral, Q4HRS PRN, Mack Reeder M.D.  •  promethazine (PHENERGAN) suppository 12.5-25 mg, 12.5-25 mg, Rectal, Q4HRS PRN, Mack Reeder M.D.  •  prochlorperazine (COMPAZINE) injection 5-10 mg, 5-10 mg, Intravenous, Q4HRS PRN, Mack Reeder M.D.  •  hydrOXYzine HCl (ATARAX) tablet 25 mg, 25 mg, Oral, TID PRN, Don Giles M.D., 25 mg at 01/02/21 2216    Physical Examination:     Vitals:    01/03/21 2000 01/03/21 2300 01/04/21 0427 01/04/21 0801   BP: (!) 90/56 101/53 (!) 92/58 (!) 97/55   Pulse: 91  66 (!) 54   Resp: 17 20 17 17   Temp: 36.6 °C (97.9 °F) 36.1 °C (97 °F) 36.7 °C (98 °F) 36.4 °C (97.6 °F)   TempSrc: Temporal  Temporal Temporal   SpO2: 95%  97% 99%   Weight:       Height:           General: Patient is awake and in no acute distress  Eyes: examination of optic disks not indicated at this time  CV: RRR    NEUROLOGICAL EXAM:     Mental status: Awake, alert and fully oriented, follows commands  Speech  and language: speech is clear and fluent. The patient is able to name and repeat.  Cranial nerve exam: Pupils are equal, round and reactive to light bilaterally. Visual fields are full. Extraocular muscles are intact. Sensation in the face is intact to light touch. Face is symmetric. Hearing to finger rub equal. Palate elevates symmetrically. Shoulder shrug is full. Tongue is midline.  Motor exam: S some giveaway weakness in bilateral lower extremities however still 4+ out of 5.  Sensory exam: Decreased sensation to soft touch bilateral lower extremities  Deep tendon reflexes:  2+ and symmetric. Toes down-going bilaterally.  Coordination: no ataxia       Objective Data:    Labs:  Lab Results   Component Value Date/Time    PROTHROMBTM 13.9 01/02/2021 12:24 PM    INR 1.03 01/02/2021 12:24 PM      Lab Results   Component Value Date/Time    WBC 7.4 01/03/2021 07:28 AM    RBC 4.89 01/03/2021 07:28 AM    HEMOGLOBIN 15.3 01/03/2021 07:28 AM    HEMATOCRIT 46.7 01/03/2021 07:28 AM    MCV 95.5 01/03/2021 07:28 AM    MCH 31.3 01/03/2021 07:28 AM    MCHC 32.8 (L) 01/03/2021 07:28 AM    MPV 10.4 01/03/2021 07:28 AM    NEUTSPOLYS 89.80 (H) 01/03/2021 07:28 AM    LYMPHOCYTES 9.50 (L) 01/03/2021 07:28 AM    MONOCYTES 0.50 01/03/2021 07:28 AM    EOSINOPHILS 0.00 01/03/2021 07:28 AM    BASOPHILS 0.10 01/03/2021 07:28 AM    HYPOCHROMIA 1+ 06/06/2014 10:49 AM      Lab Results   Component Value Date/Time    SODIUM 139 01/03/2021 07:28 AM    POTASSIUM 4.0 01/03/2021 07:28 AM    CHLORIDE 110 01/03/2021 07:28 AM    CO2 18 (L) 01/03/2021 07:28 AM    GLUCOSE 142 (H) 01/03/2021 07:28 AM    BUN 12 01/03/2021 07:28 AM    CREATININE 0.72 01/03/2021 07:28 AM    CREATININE 0.9 05/04/2009 09:58 PM      Lab Results   Component Value Date/Time    CHOLSTRLTOT 160 12/05/2019 04:02 AM    LDL 95 12/05/2019 04:02 AM    HDL 55 12/05/2019 04:02 AM    TRIGLYCERIDE 48 12/05/2019 04:02 AM       Lab Results   Component Value Date/Time    ALKPHOSPHAT 89  01/02/2021 12:24 PM    ASTSGOT 11 (L) 01/02/2021 12:24 PM    ALTSGPT 11 01/02/2021 12:24 PM    TBILIRUBIN 0.3 01/02/2021 12:24 PM        Imaging/Testing:  MR-BRAIN-WITH & W/O   Final Result      1.  Borderline low-lying cerebellar tonsils extending about 2 mm below the foramen magnum. This does not satisfy criteria for Chiari I malformation.   2.  Multiple supratentorial white matter lesions with morphology and enhancement most consistent with active demyelinating disease such as multiple sclerosis.   3.  Posterior fossa demyelinating lesions noted in the left middle cerebellar peduncle (enhancing) and left paramedian upper medulla near the pontomedullary junction (nonenhancing).      MR-CERVICAL SPINE-WITH & W/O   Final Result         1. BORDERLINE LOW-LYING CEREBELLAR TONSILS EXTENDING ABOUT 2 MM BELOW THE FORAMEN MAGNUM. THIS DOES NOT SATISFY CRITERIA FOR CHIARI I MALFORMATION.      2. DEMYELINATING LESION AGAIN SEEN IN THE LEFT MIDDLE CEREBELLAR PEDUNCLE AND ADDITIONAL SUBTLE DEMYELINATING LESION IN THE LEFT PARAMEDIAN UPPER MEDULLA NEAR THE PONTOMEDULLARY JUNCTION. (SEE ALSO MRI BRAIN FROM TODAY'S DATE).      3. OTHERWISE, MRI OF THE CERVICAL SPINE WITHOUT AND WITH CONTRAST WITHIN NORMAL LIMITS. NO EVIDENCE OF DEMYELINATING DISEASE IN THE CERVICAL SPINAL CORD.      MR-LUMBAR SPINE-WITH & W/O   Final Result      1.  MRI lumbar spine without and with contrast within normal limits.      MR-THORACIC SPINE-WITH & W/O   Final Result      1.  T9 vertebral body hemangioma with no change from prior exam. No clinical significance.   2.  Otherwise, unremarkable MRI thoracic spine with no significant disc bulge or protrusion, central stenosis, foraminal stenosis, or myelopathic cord signal abnormality. No evidence of demyelinating disease in the thoracic spinal cord.              Assessment and Plan:    35-year-old female has had a past no history of questionable meningitis viral.  Seizures.  Chiari malformation.  She  presents with some vague complaints of weakness.  MRI brain on this admission was very surprising shows multiple small enhancing lesions.  Not very typical for MS.  She had an MRI brain back in 2019 was unremarkable.  Is also had multiple lumbar punctures done in 2015 in 2017.  Bands were negative in the status.  No white cell count.  2 of them had elevated protein.  Overall is very confusing picture.  The past year and a half patient is gone for normal brain to have multiple enhancing lesions.  Did not see any old lesions.  Agree with getting lumbar puncture today.  We will plan on restarting Solu-Medrol 1 g daily after tap.      Plan:  1.  Lumbar puncture  2.  Continue Solu-Medrol 1 g daily after lumbar puncture completed         the evaluation of the patient, and recommended management, was discussed with the resident staff. I have performed a physical exam and reviewed and updated ROS and Plan today (1/4/2021). In review of yesterday's note (1/3/2021), there are no changes except as documented above.    This chart was partially generated using voice recognition technology and sound alike word replacement may be present, best efforts were made to make the chart accurate.    Jp Hearn MD  Board Certified Neurology, ABPN  (t) [phone]

## 2021-01-04 NOTE — PROGRESS NOTES
[facility] Medicine Daily Progress Note    Date of Service  1/4/2021    Chief Complaint  35 y.o. female admitted 1/2/2021 with the vision in her right eye for about 2 hours     Hospital Course  35 y.o. female past medical history of viral meningitis 2 years ago complicated by seizures, Budd-Chiari malformation, new diagnosis of CNS demyelinating disease suspected to be multiple sclerosis who presented 1/2/2021 with complaints of headaches, weakness, vision changes, falls.     Ms. Meade is a 35-year-old female with complex past medical history including Budd-Chiari formation following with Dr. Padilla (neurosurgery), Dr. Espinoza at [facility] (neurology), aseptic meningitis 2 years ago with subsequent seizure disorder, complex migraines.  She has been having migraine headaches for the past several weeks.  Additionally on December 25, 2020 she had lost the vision in her right eye for about 2 hours that is subsequently resolved.  She is complaining of bilateral lower extremity weakness as well as paresthesias of her entire bilateral lower extremities and saddle anesthesia. She states that her left leg collapses at times and her right leg has been dragging.  She has had falls and has been bumping into walls recently.  She has had increased fatigue and malaise unable to perform all of her normal activities at home.  She has been expressing difficulty urinating as well and denies incontinence of urine or feces.  She currently denies chest pain, vision difficulty, nausea, diarrhea, vomiting, dysuria, shortness of breath or problems swallowing.     ERP discussed case with patient's neurological team Dr. Acosta covering for Dr. Espinoza as well as Dr. Padilla who recommended MRIs of the head cervical thoracic and lumbar spine with and without contrast.  MRI brain revealed borderline low-lying cerebellar tonsils extending almost 2 mm below foramen magnum does not satisfy criteria for Chiari I malformation.  Also shows multiple  supratentorial white matter lesions with morphology and has been most consistent with active demyelinating disease such as multiple sclerosis, posterior fossa demyelinating lesions also noted in the left middle cerebellar peduncle and left paramedian upper medulla near pontomedullary junction.  ERP discussed case with [place] [facility] group for Dr. Acosta, who recommended 1000 milligrams of Solu-Medrol and have patient be admitted with neurological evaluation in the morning.      Interval Problem Update  Mri results are noted    Consultants/Specialty  neurology    Code Status  Full Code    Disposition  pending    Review of Systems  Review of Systems   Constitutional: Negative for chills, diaphoresis and malaise/fatigue.   HENT: Negative for ear discharge, ear pain and nosebleeds.    Eyes: Negative for double vision, photophobia and pain.   Respiratory: Negative for hemoptysis, sputum production and shortness of breath.    Cardiovascular: Negative for palpitations, orthopnea and claudication.   Gastrointestinal: Negative for abdominal pain, nausea and vomiting.   Genitourinary: Negative for frequency, hematuria and urgency.   Musculoskeletal: Positive for back pain.   Skin: Negative for itching and rash.   Neurological: Positive for focal weakness and weakness. Negative for tingling and headaches.        Physical Exam  Temp:  [36.1 °C (97 °F)-36.7 °C (98 °F)] 36.4 °C (97.6 °F)  Pulse:  [54-91] 54  Resp:  [17-20] 17  BP: ()/(53-60) 97/55  SpO2:  [95 %-99 %] 99 %    Physical Exam  Constitutional:       General: She is not in acute distress.     Appearance: Normal appearance. She is not toxic-appearing.   HENT:      Head: Normocephalic and atraumatic.      Nose: No congestion or rhinorrhea.      Mouth/Throat:      Mouth: Mucous membranes are dry.   Eyes:      Conjunctiva/sclera: Conjunctivae normal.      Pupils: Pupils are equal, round, and reactive to light.   Neck:      Musculoskeletal: No neck rigidity or  muscular tenderness.   Cardiovascular:      Rate and Rhythm: Normal rate and regular rhythm.      Heart sounds: No murmur. No friction rub.   Pulmonary:      Effort: No respiratory distress.      Breath sounds: No stridor.   Abdominal:      General: Abdomen is flat. Bowel sounds are normal.   Musculoskeletal:         General: No swelling or tenderness.   Skin:     Coloration: Skin is not jaundiced or pale.   Neurological:      Mental Status: She is alert and oriented to person, place, and time.      Comments: Has weakness in both lower exts         Fluids  No intake or output data in the 24 hours ending 01/04/21 0955    Laboratory  Recent Labs     01/02/21  1224 01/03/21  0728   WBC 9.5 7.4   RBC 4.64 4.89   HEMOGLOBIN 14.8 15.3   HEMATOCRIT 44.7 46.7   MCV 96.3 95.5   MCH 31.9 31.3   MCHC 33.1* 32.8*   RDW 46.9 45.8   PLATELETCT 261 260   MPV 10.0 10.4     Recent Labs     01/02/21  1224 01/03/21  0728   SODIUM 138 139   POTASSIUM 4.1 4.0   CHLORIDE 108 110   CO2 19* 18*   GLUCOSE 89 142*   BUN 11 12   CREATININE 0.77 0.72   CALCIUM 9.1 9.6     Recent Labs     01/02/21  1224   APTT 24.7   INR 1.03               Imaging       Assessment/Plan  * MS (multiple sclerosis) (HCC)- (present on admission)  Assessment & Plan  -MRI Brain With & W/o: Multiple supratentorial white matter lesions with morphology and enhancement most consistent with active demyelinating disease.  -ERP discussed the case with her her Neurology team from [place] (Dr. Espinoza and Dr. Acosta) recommended 1g of solumedrol to treat her MS and admit the patient to the hospital with neurology follow up in the morning.   -Symptoms of bilateral leg weakness, paresthesias of bilateral lower extremities, feelings of urine retention, occasionally upper extremity numbness and tingling, right vision loss on [facility] that has resolved, cognitive issues such as inattentiveness and forgetfulness.  -PT/OT   Neurology input is noted  LP  Solumedrol is on hold  dced  "lovenox  Neuro checks q 4 hrs      Seizure disorder (HCC)- (present on admission)  Assessment & Plan  -Continue home regimen Topamax, zonisamide  -States last definite seizure was 2 years ago however there has been some discussion with her neurologist that she may be having seizures while sleeping since then.  -Seizure precautions    History of Chiari malformation- (present on admission)  Assessment & Plan  -Follows with Dr. Padilla, has been determined to be non-surgical in the past per chart review.  -MRI brain performed on 1/2/2021 shows \"borderline low-lying cerebellar tonsils extending about 2 mm below the foramen magnum. This does not satisfy criteria for Chiari I malformation.\"  Mri results are noted  outpt f/u    Migraines- (present on admission)  Assessment & Plan  -History of complex migraines with presentations of stroke-like symptoms with right-sided facial droop, numbness and paresthesias in the past.         VTE prophylaxis: lovenox      "

## 2021-01-04 NOTE — PROCEDURES
Procedure Lumbar Puncture    Date/Time: 1/4/2021 3:01 PM  Performed by: Lin Billy M.D.  Authorized by: Lin Billy M.D.     Consent:     Consent obtained:  Verbal and written    Consent given by:  Patient    Risks discussed:  Bleeding, infection, pain, headache, nerve damage and repeat procedure    Alternatives discussed:  No treatment, delayed treatment and alternative treatment  Pre-procedure details:     Procedure purpose:  Diagnostic    Preparation: Patient was prepped and draped in usual sterile fashion    Sedation:     Sedation type:  None  Anesthesia:     Anesthesia method:  None  Procedure details:     Lumbar space:  L4-L5 interspace    Needle gauge:  20    Needle type:  Spinal needle - Quincke tip    Needle length (in):  3.5    Ultrasound guidance: no      Number of attempts:  4    Fluid appearance:  Clear and blood-tinged then clearing    Tubes of fluid:  4    Total volume (ml):  10.5  Post-procedure:     Puncture site:  Adhesive bandage applied    Patient tolerance of procedure:  Tolerated well, no immediate complications  Comments:      Space localized with some difficulty. Slightly blood tinged fluid in the first 2 tubes with subsequent tubes being clear.   Procedure assisted in its entirety by MAILE Cha   Patient advised bedrest for 2 hours

## 2021-01-04 NOTE — DIETARY
"Nutrition services: Day 1 of admit.  Rehana Meade is a 35 y.o. female with admitting DX of multiple sclerosis.  Consult received for MST 2 (Poor PO, 2-13 lb wt loss over >1 year)    Spoke with patient at bedside. Patient relays intentional attempts to lose weight over the past year; reports she has \"cut sweets out of diet\" and started having a protein shake at breakfast meal.    See weight history per chart review below- patient noted with 4.7% weight loss over the past year which is not clinically significant.      Patient mentioned appetite \"comes and goes\" attributed to migranes, other medical challenges but reports overall PO intake, no recent or dramatic changes to intake. Interview cut off early as pt headed to lumbar puncture; however, no glaring nutritional concerns present to RD at this time.     Assessment:  Height: 160 cm (5' 3\")  Weight: 84.7 kg (186 lb 11.7 oz) via stand up scale 1/2.   Body mass index is 31.53 kg/m²., BMI classification: Obesity Class I  Diet/Intake: Regular diet. PO % x1 meal.     Evaluation:   1. PMH: viral meningitis, seizures, Budd-Chiari malformation, CNS demyelinating disease suspected to be MS   2. Weight history: 4.7% wt loss x1 yr  · 84.7 kg 1/27/20  3. MAR: zofran  4. Glu 142 - prednisone last administered yesterday, suspect contributing to elevation.   5. Pending lumbar puncture this afternoon.     Malnutrition Risk: Patient does not meet criteria at this time.     Recommendations/Plan:   1. Encourage intake of meals  2. Document intake of all meals as % taken in ADL's to provide interdisciplinary communication across all shifts.   3. Monitor weight.  4. Nutrition rep will continue to see patient for ongoing meal and snack preferences.     RD available PRN; to monitor weekly at this time.           "

## 2021-01-05 DIAGNOSIS — G35 MS (MULTIPLE SCLEROSIS) (HCC): ICD-10-CM

## 2021-01-05 PROCEDURE — A9270 NON-COVERED ITEM OR SERVICE: HCPCS | Performed by: FAMILY MEDICINE

## 2021-01-05 PROCEDURE — 700101 HCHG RX REV CODE 250: Performed by: STUDENT IN AN ORGANIZED HEALTH CARE EDUCATION/TRAINING PROGRAM

## 2021-01-05 PROCEDURE — A9270 NON-COVERED ITEM OR SERVICE: HCPCS | Performed by: STUDENT IN AN ORGANIZED HEALTH CARE EDUCATION/TRAINING PROGRAM

## 2021-01-05 PROCEDURE — 99232 SBSQ HOSP IP/OBS MODERATE 35: CPT | Performed by: PSYCHIATRY & NEUROLOGY

## 2021-01-05 PROCEDURE — 700111 HCHG RX REV CODE 636 W/ 250 OVERRIDE (IP): Performed by: STUDENT IN AN ORGANIZED HEALTH CARE EDUCATION/TRAINING PROGRAM

## 2021-01-05 PROCEDURE — 99231 SBSQ HOSP IP/OBS SF/LOW 25: CPT | Performed by: INTERNAL MEDICINE

## 2021-01-05 PROCEDURE — 700111 HCHG RX REV CODE 636 W/ 250 OVERRIDE (IP): Performed by: INTERNAL MEDICINE

## 2021-01-05 PROCEDURE — 700102 HCHG RX REV CODE 250 W/ 637 OVERRIDE(OP): Performed by: FAMILY MEDICINE

## 2021-01-05 PROCEDURE — 97530 THERAPEUTIC ACTIVITIES: CPT

## 2021-01-05 PROCEDURE — 770006 HCHG ROOM/CARE - MED/SURG/GYN SEMI*

## 2021-01-05 PROCEDURE — 97116 GAIT TRAINING THERAPY: CPT

## 2021-01-05 PROCEDURE — 700105 HCHG RX REV CODE 258: Performed by: INTERNAL MEDICINE

## 2021-01-05 PROCEDURE — 700102 HCHG RX REV CODE 250 W/ 637 OVERRIDE(OP): Performed by: STUDENT IN AN ORGANIZED HEALTH CARE EDUCATION/TRAINING PROGRAM

## 2021-01-05 RX ADMIN — PROCHLORPERAZINE EDISYLATE 10 MG: 5 INJECTION INTRAMUSCULAR; INTRAVENOUS at 21:22

## 2021-01-05 RX ADMIN — BUTALBITAL, ACETAMINOPHEN, AND CAFFEINE 2 TABLET: 50; 325; 40 TABLET ORAL at 23:24

## 2021-01-05 RX ADMIN — SODIUM CHLORIDE 1000 MG: 9 INJECTION, SOLUTION INTRAVENOUS at 13:58

## 2021-01-05 RX ADMIN — PROMETHAZINE HYDROCHLORIDE 25 MG: 25 TABLET ORAL at 05:18

## 2021-01-05 RX ADMIN — TOPIRAMATE 100 MG: 100 TABLET, FILM COATED ORAL at 09:32

## 2021-01-05 RX ADMIN — ONDANSETRON 4 MG: 2 INJECTION INTRAMUSCULAR; INTRAVENOUS at 00:34

## 2021-01-05 RX ADMIN — PROCHLORPERAZINE EDISYLATE 5 MG: 5 INJECTION INTRAMUSCULAR; INTRAVENOUS at 16:55

## 2021-01-05 RX ADMIN — DOCUSATE SODIUM 50 MG AND SENNOSIDES 8.6 MG 2 TABLET: 8.6; 5 TABLET, FILM COATED ORAL at 16:56

## 2021-01-05 RX ADMIN — TOPIRAMATE 100 MG: 100 TABLET, FILM COATED ORAL at 21:22

## 2021-01-05 RX ADMIN — PROCHLORPERAZINE EDISYLATE 5 MG: 5 INJECTION INTRAMUSCULAR; INTRAVENOUS at 07:51

## 2021-01-05 RX ADMIN — BUTALBITAL, ACETAMINOPHEN, AND CAFFEINE 2 TABLET: 50; 325; 40 TABLET ORAL at 09:32

## 2021-01-05 RX ADMIN — BUTALBITAL, ACETAMINOPHEN, AND CAFFEINE 2 TABLET: 50; 325; 40 TABLET ORAL at 16:56

## 2021-01-05 RX ADMIN — BUTALBITAL, ACETAMINOPHEN, AND CAFFEINE 2 TABLET: 50; 325; 40 TABLET ORAL at 02:36

## 2021-01-05 RX ADMIN — ZONISAMIDE 200 MG: 50 CAPSULE ORAL at 16:56

## 2021-01-05 ASSESSMENT — PAIN DESCRIPTION - PAIN TYPE
TYPE: ACUTE PAIN

## 2021-01-05 ASSESSMENT — COGNITIVE AND FUNCTIONAL STATUS - GENERAL
MOVING FROM LYING ON BACK TO SITTING ON SIDE OF FLAT BED: A LITTLE
STANDING UP FROM CHAIR USING ARMS: A LOT
SUGGESTED CMS G CODE MODIFIER MOBILITY: CK
WALKING IN HOSPITAL ROOM: A LOT
MOBILITY SCORE: 15
MOVING TO AND FROM BED TO CHAIR: A LITTLE
CLIMB 3 TO 5 STEPS WITH RAILING: TOTAL

## 2021-01-05 ASSESSMENT — ENCOUNTER SYMPTOMS
VOMITING: 0
DEPRESSION: 0
ABDOMINAL PAIN: 0
DOUBLE VISION: 0
TINGLING: 0
SHORTNESS OF BREATH: 0
CLAUDICATION: 0
HALLUCINATIONS: 0
FOCAL WEAKNESS: 1
NAUSEA: 0
SPUTUM PRODUCTION: 0
HEADACHES: 0
HEMOPTYSIS: 0
CHILLS: 0
EYE PAIN: 0
DIAPHORESIS: 0
BACK PAIN: 1
PALPITATIONS: 0
PHOTOPHOBIA: 0
ORTHOPNEA: 0
WEAKNESS: 1

## 2021-01-05 ASSESSMENT — GAIT ASSESSMENTS
GAIT LEVEL OF ASSIST: MODERATE ASSIST
ASSISTIVE DEVICE: FRONT WHEEL WALKER
DISTANCE (FEET): 60

## 2021-01-05 ASSESSMENT — LIFESTYLE VARIABLES: SUBSTANCE_ABUSE: 0

## 2021-01-05 NOTE — PROGRESS NOTES
Neurology Progress Note  Neurohospitalist Service, [facility]    Referring Physician: STEPHY Orozco M.D.    Chief Complaint   Patient presents with   • Other     Pt complains of loss of vision in Right eye (1 week ago, now resolved) difficulty walking, and migraines x1 month. Pt consulted her Neuro Surgeon who has treated her for chiari malformation and was told to come to ER.    • Back Pain   • Head Pain       HPI: Refer to initial documented Neurology H&P, as detailed in the patient's chart.    Interval History January 5, 2021: No new issues overnight.    Review of systems: In addition to what is detailed in the HPI and/or updated in the interval history, all other systems reviewed and are negative.    Past Medical History:    has a past medical history of Anesthesia, Anxiety associated with depression, ASTHMA, Drug-seeking behavior, Migraine without aura, without mention of intractable migraine without mention of status migrainosus, Other specified symptom associated with female genital organs, Seizure (HCC), Seizure cerebral (HCC) (2/15/2018), Stroke (HCC), and Unspecified disorder of thyroid. She also has no past medical history of CAD (coronary artery disease) or COPD.    FHx:  family history is not on file.    SHx:   reports that she has never smoked. She has never used smokeless tobacco. She reports previous alcohol use. She reports that she does not use drugs.    Medications:    Current Facility-Administered Medications:   •  methylPREDNISolone sod succ (SOLU-MEDROL) 1,000 mg in  mL IVPB, 1 g, Intravenous, DAILY, Jp Hearn M.D.  •  HYDROcodone-acetaminophen (NORCO) 5-325 MG per tablet 1-2 Tab, 1-2 Tab, Oral, Q4HRS PRN, STEPHY Orozco M.D., 2 Tab at 01/03/21 0955  •  butalbital/apap/caffeine -40 mg (Fioricet) per tablet 2 Tab, 2 Tab, Oral, Q6HRS PRN, STEPHY Orozco M.D., 2 Tab at 01/05/21 0236  •  topiramate (TOPAMAX) tablet 100 mg, 100 mg, Oral, BID,  Mack Reeder M.D., 100 mg at 01/04/21 2019  •  zonisamide (ZONEGRAN) capsule 200 mg, 200 mg, Oral, Q EVENING, Mack Reeder M.D., 200 mg at 01/04/21 1835  •  senna-docusate (PERICOLACE or SENOKOT S) 8.6-50 MG per tablet 2 Tab, 2 Tab, Oral, BID, 2 Tab at 01/04/21 0426 **AND** polyethylene glycol/lytes (MIRALAX) PACKET 1 Packet, 1 Packet, Oral, QDAY PRN **AND** magnesium hydroxide (MILK OF MAGNESIA) suspension 30 mL, 30 mL, Oral, QDAY PRN **AND** bisacodyl (DULCOLAX) suppository 10 mg, 10 mg, Rectal, QDAY PRN, Mack Reeder M.D.  •  acetaminophen (Tylenol) tablet 650 mg, 650 mg, Oral, Q6HRS PRN, Mack Reeder M.D., 650 mg at 01/02/21 2216  •  labetalol (NORMODYNE/TRANDATE) injection 10 mg, 10 mg, Intravenous, Q4HRS PRN, Mack Reeder M.D.  •  ondansetron (ZOFRAN) syringe/vial injection 4 mg, 4 mg, Intravenous, Q4HRS PRN, Mack Reeder M.D., 4 mg at 01/05/21 0034  •  ondansetron (ZOFRAN ODT) dispertab 4 mg, 4 mg, Oral, Q4HRS PRN, Mack Reeder M.D.  •  promethazine (PHENERGAN) tablet 12.5-25 mg, 12.5-25 mg, Oral, Q4HRS PRN, Mack Reeder M.D., 25 mg at 01/05/21 0518  •  promethazine (PHENERGAN) suppository 12.5-25 mg, 12.5-25 mg, Rectal, Q4HRS PRN, Mack Reeder M.D.  •  prochlorperazine (COMPAZINE) injection 5-10 mg, 5-10 mg, Intravenous, Q4HRS PRN, Mack Reeder M.D., 5 mg at 01/05/21 0751  •  hydrOXYzine HCl (ATARAX) tablet 25 mg, 25 mg, Oral, TID PRN, Don Giles M.D., 25 mg at 01/02/21 5476    Physical Examination:     Vitals:    01/04/21 1644 01/04/21 2000 01/05/21 0400 01/05/21 0800   BP: (!) 94/53 102/59 101/59 104/59   Pulse: 62 72 (!) 54 (!) 53   Resp: 17 17 17 16   Temp: 36.4 °C (97.5 °F) 36.3 °C (97.4 °F) 36.3 °C (97.3 °F) 36 °C (96.8 °F)   TempSrc: Temporal Temporal Temporal Temporal   SpO2: 98% 95% 98% 99%   Weight:       Height:           General: Patient is awake and in no acute distress  Eyes: examination of optic disks not indicated at this time  CV:  RRR    NEUROLOGICAL EXAM:     Mental status: Awake, alert and fully oriented, follows commands  Speech and language: speech is clear and fluent. The patient is able to name and repeat.  Cranial nerve exam: Pupils are equal, round and reactive to light bilaterally. Visual fields are full. Extraocular muscles are intact. Sensation in the face is intact to light touch. Face is symmetric. Hearing to finger rub equal. Palate elevates symmetrically. Shoulder shrug is full. Tongue is midline.  Motor exam: S some giveaway weakness in bilateral lower extremities however still 4+ out of 5.  Sensory exam: Decreased sensation to soft touch bilateral lower extremities  Deep tendon reflexes:  2+ and symmetric. Toes down-going bilaterally.  Coordination: no ataxia       Objective Data:    Labs:  Lab Results   Component Value Date/Time    PROTHROMBTM 13.9 01/02/2021 12:24 PM    INR 1.03 01/02/2021 12:24 PM      Lab Results   Component Value Date/Time    WBC 17.9 (H) 01/04/2021 12:45 PM    RBC 4.50 01/04/2021 12:45 PM    HEMOGLOBIN 13.9 01/04/2021 12:45 PM    HEMATOCRIT 41.9 01/04/2021 12:45 PM    MCV 93.1 01/04/2021 12:45 PM    MCH 30.9 01/04/2021 12:45 PM    MCHC 33.2 (L) 01/04/2021 12:45 PM    MPV 10.9 01/04/2021 12:45 PM    NEUTSPOLYS 71.50 01/04/2021 12:45 PM    LYMPHOCYTES 20.50 (L) 01/04/2021 12:45 PM    MONOCYTES 6.90 01/04/2021 12:45 PM    EOSINOPHILS 0.10 01/04/2021 12:45 PM    BASOPHILS 0.20 01/04/2021 12:45 PM    HYPOCHROMIA 1+ 06/06/2014 10:49 AM      Lab Results   Component Value Date/Time    SODIUM 136 01/04/2021 12:45 PM    POTASSIUM 3.3 (L) 01/04/2021 12:45 PM    CHLORIDE 107 01/04/2021 12:45 PM    CO2 18 (L) 01/04/2021 12:45 PM    GLUCOSE 122 (H) 01/04/2021 12:45 PM    BUN 16 01/04/2021 12:45 PM    CREATININE 0.93 01/04/2021 12:45 PM    CREATININE 0.9 05/04/2009 09:58 PM      Lab Results   Component Value Date/Time    CHOLSTRLTOT 160 12/05/2019 04:02 AM    LDL 95 12/05/2019 04:02 AM    HDL 55 12/05/2019 04:02 AM     TRIGLYCERIDE 48 12/05/2019 04:02 AM       Lab Results   Component Value Date/Time    ALKPHOSPHAT 82 01/04/2021 12:45 PM    ASTSGOT 13 01/04/2021 12:45 PM    ALTSGPT 7 01/04/2021 12:45 PM    TBILIRUBIN 0.2 01/04/2021 12:45 PM        Imaging/Testing:  IR-MIDLINE CATHETER INSERTION WO GUIDANCE > AGE 3   Final Result                  Ultrasound-guided midline placement performed by qualified nursing staff    as above.          MR-BRAIN-WITH & W/O   Final Result      1.  Borderline low-lying cerebellar tonsils extending about 2 mm below the foramen magnum. This does not satisfy criteria for Chiari I malformation.   2.  Multiple supratentorial white matter lesions with morphology and enhancement most consistent with active demyelinating disease such as multiple sclerosis.   3.  Posterior fossa demyelinating lesions noted in the left middle cerebellar peduncle (enhancing) and left paramedian upper medulla near the pontomedullary junction (nonenhancing).      MR-CERVICAL SPINE-WITH & W/O   Final Result         1. BORDERLINE LOW-LYING CEREBELLAR TONSILS EXTENDING ABOUT 2 MM BELOW THE FORAMEN MAGNUM. THIS DOES NOT SATISFY CRITERIA FOR CHIARI I MALFORMATION.      2. DEMYELINATING LESION AGAIN SEEN IN THE LEFT MIDDLE CEREBELLAR PEDUNCLE AND ADDITIONAL SUBTLE DEMYELINATING LESION IN THE LEFT PARAMEDIAN UPPER MEDULLA NEAR THE PONTOMEDULLARY JUNCTION. (SEE ALSO MRI BRAIN FROM TODAY'S DATE).      3. OTHERWISE, MRI OF THE CERVICAL SPINE WITHOUT AND WITH CONTRAST WITHIN NORMAL LIMITS. NO EVIDENCE OF DEMYELINATING DISEASE IN THE CERVICAL SPINAL CORD.      MR-LUMBAR SPINE-WITH & W/O   Final Result      1.  MRI lumbar spine without and with contrast within normal limits.      MR-THORACIC SPINE-WITH & W/O   Final Result      1.  T9 vertebral body hemangioma with no change from prior exam. No clinical significance.   2.  Otherwise, unremarkable MRI thoracic spine with no significant disc bulge or protrusion, central stenosis, foraminal  stenosis, or myelopathic cord signal abnormality. No evidence of demyelinating disease in the thoracic spinal cord.              Assessment and Plan:    35-year-old female has had a past no history of questionable meningitis viral.  Seizures.  Chiari malformation.  She presents with some vague complaints of weakness.  MRI brain on this admission was very surprising shows multiple small enhancing lesions.  Not very typical for MS.  She had an MRI brain back in 2019 was unremarkable.  Is also had multiple lumbar punctures done in 2015 in 2017.  Bands were negative in the status.  No white cell count.  2 of them had elevated protein.  Overall is very confusing picture.  The past year and a half patient is gone for normal brain to have multiple enhancing lesions.  Did not see any old lesions.  Agree with getting lumbar puncture today.  We will plan on restarting Solu-Medrol 1 g daily after tap.    Cell count yesterday was unremarkable.  Still pending many labs though.    I am thinking we will complete the 5-day course Solu-Medrol today be day 4.  Patient will follow up in the MS clinic afterwards.      Plan:  1.  Lumbar puncture-cell counts are unremarkable.  No signs of infection.  2.  Continue Solu-Medrol 1 g daily for total of 5 doses today's today's day #4        the evaluation of the patient, and recommended management, was discussed with the resident staff. I have performed a physical exam and reviewed and updated ROS and Plan today (1/5/2021). In review of yesterday's note (1/4/2021), there are no changes except as documented above.    This chart was partially generated using voice recognition technology and sound alike word replacement may be present, best efforts were made to make the chart accurate.    Jp Hearn MD  Board Certified Neurology, ABPN  (t) [phone]

## 2021-01-05 NOTE — CARE PLAN
Problem: Safety  Goal: Will remain free from falls  Outcome: PROGRESSING AS EXPECTED  Note: Treaded slipper socks on, bed in lowest and locked position, call light within reach, educated on the need to call for assistance     Problem: Knowledge Deficit  Goal: Knowledge of disease process/condition, treatment plan, diagnostic tests, and medications will improve  Outcome: PROGRESSING AS EXPECTED  Note: POC discussed with pt, understand Dx, asks appropriate questions and actively participates in care

## 2021-01-05 NOTE — THERAPY
Occupational Therapy Contact Note    Attempted OT evaluation, RN requesting to let patient sleep as she has not been able to sleep very well. Will attempt later as able.    Mely Dupree, OTR/L

## 2021-01-06 ENCOUNTER — PATIENT OUTREACH (OUTPATIENT)
Dept: HEALTH INFORMATION MANAGEMENT | Facility: OTHER | Age: 36
End: 2021-01-06

## 2021-01-06 PROBLEM — R11.0 NAUSEA: Status: ACTIVE | Noted: 2021-01-06

## 2021-01-06 LAB
ALBUMIN SERPL BCP-MCNC: 3.9 G/DL (ref 3.2–4.9)
ALBUMIN/GLOB SERPL: 1.4 G/DL
ALP SERPL-CCNC: 80 U/L (ref 30–99)
ALT SERPL-CCNC: 6 U/L (ref 2–50)
ANION GAP SERPL CALC-SCNC: 9 MMOL/L (ref 7–16)
AST SERPL-CCNC: 6 U/L (ref 12–45)
BASOPHILS # BLD AUTO: 0.1 % (ref 0–1.8)
BASOPHILS # BLD: 0.01 K/UL (ref 0–0.12)
BILIRUB SERPL-MCNC: 0.3 MG/DL (ref 0.1–1.5)
BUN SERPL-MCNC: 13 MG/DL (ref 8–22)
CALCIUM SERPL-MCNC: 8.8 MG/DL (ref 8.5–10.5)
CHLORIDE SERPL-SCNC: 111 MMOL/L (ref 96–112)
CO2 SERPL-SCNC: 17 MMOL/L (ref 20–33)
CREAT SERPL-MCNC: 0.73 MG/DL (ref 0.5–1.4)
EOSINOPHIL # BLD AUTO: 0 K/UL (ref 0–0.51)
EOSINOPHIL NFR BLD: 0 % (ref 0–6.9)
ERYTHROCYTE [DISTWIDTH] IN BLOOD BY AUTOMATED COUNT: 45.2 FL (ref 35.9–50)
GLOBULIN SER CALC-MCNC: 2.7 G/DL (ref 1.9–3.5)
GLUCOSE SERPL-MCNC: 127 MG/DL (ref 65–99)
HCT VFR BLD AUTO: 42.6 % (ref 37–47)
HGB BLD-MCNC: 14 G/DL (ref 12–16)
IMM GRANULOCYTES # BLD AUTO: 0.26 K/UL (ref 0–0.11)
IMM GRANULOCYTES NFR BLD AUTO: 1.9 % (ref 0–0.9)
LYMPHOCYTES # BLD AUTO: 2.04 K/UL (ref 1–4.8)
LYMPHOCYTES NFR BLD: 15 % (ref 22–41)
MCH RBC QN AUTO: 30.8 PG (ref 27–33)
MCHC RBC AUTO-ENTMCNC: 32.9 G/DL (ref 33.6–35)
MCV RBC AUTO: 93.6 FL (ref 81.4–97.8)
MOG AB SER QL CBA IFA: NORMAL
MONOCYTES # BLD AUTO: 0.9 K/UL (ref 0–0.85)
MONOCYTES NFR BLD AUTO: 6.6 % (ref 0–13.4)
NEUTROPHILS # BLD AUTO: 10.39 K/UL (ref 2–7.15)
NEUTROPHILS NFR BLD: 76.4 % (ref 44–72)
NRBC # BLD AUTO: 0 K/UL
NRBC BLD-RTO: 0 /100 WBC
PLATELET # BLD AUTO: 262 K/UL (ref 164–446)
PMV BLD AUTO: 10.7 FL (ref 9–12.9)
POTASSIUM SERPL-SCNC: 3.6 MMOL/L (ref 3.6–5.5)
PROT SERPL-MCNC: 6.6 G/DL (ref 6–8.2)
RBC # BLD AUTO: 4.55 M/UL (ref 4.2–5.4)
SODIUM SERPL-SCNC: 137 MMOL/L (ref 135–145)
WBC # BLD AUTO: 13.6 K/UL (ref 4.8–10.8)

## 2021-01-06 PROCEDURE — 97165 OT EVAL LOW COMPLEX 30 MIN: CPT

## 2021-01-06 PROCEDURE — 700102 HCHG RX REV CODE 250 W/ 637 OVERRIDE(OP): Performed by: STUDENT IN AN ORGANIZED HEALTH CARE EDUCATION/TRAINING PROGRAM

## 2021-01-06 PROCEDURE — 700105 HCHG RX REV CODE 258: Performed by: INTERNAL MEDICINE

## 2021-01-06 PROCEDURE — 700102 HCHG RX REV CODE 250 W/ 637 OVERRIDE(OP): Performed by: FAMILY MEDICINE

## 2021-01-06 PROCEDURE — 700111 HCHG RX REV CODE 636 W/ 250 OVERRIDE (IP): Performed by: STUDENT IN AN ORGANIZED HEALTH CARE EDUCATION/TRAINING PROGRAM

## 2021-01-06 PROCEDURE — 700111 HCHG RX REV CODE 636 W/ 250 OVERRIDE (IP): Performed by: INTERNAL MEDICINE

## 2021-01-06 PROCEDURE — 770006 HCHG ROOM/CARE - MED/SURG/GYN SEMI*

## 2021-01-06 PROCEDURE — 99232 SBSQ HOSP IP/OBS MODERATE 35: CPT | Performed by: INTERNAL MEDICINE

## 2021-01-06 PROCEDURE — A9270 NON-COVERED ITEM OR SERVICE: HCPCS | Performed by: FAMILY MEDICINE

## 2021-01-06 PROCEDURE — 85025 COMPLETE CBC W/AUTO DIFF WBC: CPT

## 2021-01-06 PROCEDURE — A9270 NON-COVERED ITEM OR SERVICE: HCPCS | Performed by: STUDENT IN AN ORGANIZED HEALTH CARE EDUCATION/TRAINING PROGRAM

## 2021-01-06 PROCEDURE — 700101 HCHG RX REV CODE 250: Performed by: STUDENT IN AN ORGANIZED HEALTH CARE EDUCATION/TRAINING PROGRAM

## 2021-01-06 PROCEDURE — 80053 COMPREHEN METABOLIC PANEL: CPT

## 2021-01-06 PROCEDURE — 99232 SBSQ HOSP IP/OBS MODERATE 35: CPT | Performed by: PSYCHIATRY & NEUROLOGY

## 2021-01-06 RX ORDER — BUTALBITAL, ACETAMINOPHEN AND CAFFEINE 50; 325; 40 MG/1; MG/1; MG/1
2 TABLET ORAL EVERY 12 HOURS PRN
Status: DISCONTINUED | OUTPATIENT
Start: 2021-01-06 | End: 2021-01-07 | Stop reason: HOSPADM

## 2021-01-06 RX ADMIN — SODIUM CHLORIDE 1000 MG: 9 INJECTION, SOLUTION INTRAVENOUS at 14:47

## 2021-01-06 RX ADMIN — BUTALBITAL, ACETAMINOPHEN, AND CAFFEINE 2 TABLET: 50; 325; 40 TABLET ORAL at 05:42

## 2021-01-06 RX ADMIN — DOCUSATE SODIUM 50 MG AND SENNOSIDES 8.6 MG 2 TABLET: 8.6; 5 TABLET, FILM COATED ORAL at 05:42

## 2021-01-06 RX ADMIN — ZONISAMIDE 200 MG: 50 CAPSULE ORAL at 17:51

## 2021-01-06 RX ADMIN — BUTALBITAL, ACETAMINOPHEN, AND CAFFEINE 2 TABLET: 50; 325; 40 TABLET ORAL at 08:39

## 2021-01-06 RX ADMIN — PROCHLORPERAZINE EDISYLATE 5 MG: 5 INJECTION INTRAMUSCULAR; INTRAVENOUS at 19:44

## 2021-01-06 RX ADMIN — PROCHLORPERAZINE EDISYLATE 5 MG: 5 INJECTION INTRAMUSCULAR; INTRAVENOUS at 08:39

## 2021-01-06 RX ADMIN — TOPIRAMATE 100 MG: 100 TABLET, FILM COATED ORAL at 19:39

## 2021-01-06 RX ADMIN — TOPIRAMATE 100 MG: 100 TABLET, FILM COATED ORAL at 08:39

## 2021-01-06 RX ADMIN — DOCUSATE SODIUM 50 MG AND SENNOSIDES 8.6 MG 2 TABLET: 8.6; 5 TABLET, FILM COATED ORAL at 17:51

## 2021-01-06 ASSESSMENT — COGNITIVE AND FUNCTIONAL STATUS - GENERAL
HELP NEEDED FOR BATHING: A LITTLE
DAILY ACTIVITIY SCORE: 18
DRESSING REGULAR UPPER BODY CLOTHING: A LITTLE
SUGGESTED CMS G CODE MODIFIER DAILY ACTIVITY: CK
TOILETING: A LITTLE
DRESSING REGULAR LOWER BODY CLOTHING: A LITTLE
PERSONAL GROOMING: A LITTLE
EATING MEALS: A LITTLE

## 2021-01-06 ASSESSMENT — ENCOUNTER SYMPTOMS
CHILLS: 0
FOCAL WEAKNESS: 1
VOMITING: 0
BACK PAIN: 1
PHOTOPHOBIA: 0
HEADACHES: 0
TINGLING: 0
NAUSEA: 0
SHORTNESS OF BREATH: 0
HEMOPTYSIS: 0
DEPRESSION: 0
HALLUCINATIONS: 0
PALPITATIONS: 0
ABDOMINAL PAIN: 0
ORTHOPNEA: 0
CLAUDICATION: 0
DOUBLE VISION: 0
SPUTUM PRODUCTION: 0
EYE PAIN: 0
DIAPHORESIS: 0
WEAKNESS: 1

## 2021-01-06 ASSESSMENT — PAIN DESCRIPTION - PAIN TYPE
TYPE: ACUTE PAIN
TYPE: ACUTE PAIN

## 2021-01-06 ASSESSMENT — LIFESTYLE VARIABLES: SUBSTANCE_ABUSE: 0

## 2021-01-06 ASSESSMENT — FIBROSIS 4 INDEX: FIB4 SCORE: 0.33

## 2021-01-06 ASSESSMENT — ACTIVITIES OF DAILY LIVING (ADL): TOILETING: INDEPENDENT

## 2021-01-06 NOTE — THERAPY
"Occupational Therapy   Initial Evaluation     Patient Name: Rehana Meade  Age:  35 y.o., Sex:  female  Medical Record #: [id number]  Today's Date: 1/6/2021     Precautions  Precautions: Fall Risk  Comments: possible diagnosis MS    Assessment  Patient is 35 y.o. female with a diagnosis of possible MS.  Additional factors influencing patient status / progress: weakness, fatigue, impaired balance. Most limiting factor is impaired balance during ADLs and functional mobility. Pt very self aware, and receptive to education.    Plan    Recommend Occupational Therapy 4 times per week until therapy goals are met for the following treatments:  Adaptive Equipment, Neuro Re-Education / Balance, Self Care/Activities of Daily Living, Therapeutic Activities and Therapeutic Exercises.    DC Equipment Recommendations: Unable to determine at this time, Front-Wheel Walker, Tub Transfer Bench  Discharge Recommendations: Recommend home health for continued occupational therapy services(Depending on progress)     Subjective    \"I googled how to get around at [facility], I'm a [place] nut\"     Objective       01/06/21 0831   Prior Living Situation   Prior Services Home-Independent   Housing / Facility 2 Story House  (bed/bath upstairs, has option of staying downstairs)   Bathroom Set up Bathtub / Shower Combination;Shower Curtain;Shower Glass Doors   Equipment Owned None   Lives with - Patient's Self Care Capacity Spouse;Child Less than 18 Years of Age   Comments Pt lives with her  who has split shifts so he has the ability to be there for some ADLs. 2 kids are in school, 1 distance learning, 1 in [place]. Pt normally works from home.   Prior Level of ADL Function   Self Feeding Independent   Grooming / Hygiene Independent   Bathing Independent   Dressing Independent   Toileting Independent   Prior Level of IADL Function   Medication Management Independent   Laundry Independent   Kitchen Mobility Independent   Finances " Independent   Home Management Independent   Shopping Independent   Prior Level Of Mobility Independent Without Device in Community;Independent Without Device in Home   Driving / Transportation Driving Independent   Occupation (Pre-Hospital Vocational) Employed Full Time;Requires Sitting, Desk, Computer Tasks  (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)   Leisure Interests   ([employer])   Cognition    Cognition / Consciousness WDL   Level of Consciousness Alert   Comments pleasant and motivated   Balance Assessment   Sitting Balance (Static) Fair +   Sitting Balance (Dynamic) Fair   Standing Balance (Static) Fair -   Standing Balance (Dynamic) Poor +   Weight Shift Sitting Fair   Weight Shift Standing Fair   Comments w/ FWW, one mild LOB but pt able to self correct   Bed Mobility    Supine to Sit Supervised   Sit to Supine Supervised   Scooting Supervised   ADL Assessment   Grooming Minimal Assist;Standing  (oral care, wash hands - steadying assist)   Toileting Minimal Assist  (during clothing mgmt before she sat down)   Functional Mobility   Sit to Stand Supervised   Bed, Chair, Wheelchair Transfer Minimal Assist   Toilet Transfers Minimal Assist   Mobility EOB>BR>Chair   Comments w/ FWW   Visual Perception   Visual Perception  WDL   Patient / Family Goals   Patient / Family Goal #1 to get back home   Short Term Goals   Short Term Goal # 1 pt will demo toilet txf with supv   Short Term Goal # 2 pt will demo tub txf with supv and DME   Short Term Goal # 3 pt will dress full LB with supv   Short Term Goal # 4 pt will groom in stance w/ supv

## 2021-01-06 NOTE — THERAPY
Physical Therapy   Daily Treatment     Patient Name: Rehana Meade  Age:  35 y.o., Sex:  female  Medical Record #: [id number]  Today's Date: 1/5/2021     Precautions: Fall Risk    Assessment  Patient was seen for PT follow up session. Focus of session was increasing activity tolerance, progressing strength, and improving gait kinematics. Patient with notable B foot drop, able to compensate with increased B hip and knee flexion requiring heavy reliance on FWW. Patient was educated on modified Tameka Scale and the importance of pacing and energy conservation strategies. Patient verbalized understanding. Patient will continue to benefit from skilled IP PT while in house and will benefit from intensive multidisciplinary therapy at discharge. If discharges home will require assist from , FWW, SC, and w/c and HHPT to minimize loss of function and restore functional independence.       Plan    Continue current treatment plan.    DC Equipment Recommendations: (P) FWW, wheelchair, shower chair   Discharge Recommendations: (P) Recommend home health for continued physical therapy services           Objective     01/05/21 1515   Gait Analysis   Gait Level Of Assist Moderate Assist   Assistive Device Front Wheel Walker   Distance (Feet) 60   # of Times Distance was Traveled 2   Deviation Ataxic;Bradykinetic;Shuffled Gait;Decreased Heel Strike   # of Stairs Climbed 0   Level of Assist with Stairs Unable to Participate   Weight Bearing Status No restrictions.   Skilled Intervention Compensatory Strategies;Tactile Cuing;Verbal Cuing;Sequencing;Postural Facilitation   Comments B foot drag, able to compensate with increased hip and knee flexion    Bed Mobility    Supine to Sit Modified Independent   Sit to Supine Modified Independent   Scooting Modified Independent   Rolling Modified Independent   Functional Mobility   Sit to Stand Minimal Assist   Transfer Method Stand Step   Mobility w/ FWW   Skilled Intervention  Verbal Cuing;Tactile Cuing;Sequencing   Comments cues for hand placement    Short Term Goals    Short Term Goal # 1 Will ambulate 50' with Trevor and front wheeled walker in 6tx to progress towards community ambulation.   Short Term Goal # 2 Will go up/down 2 stairs with Trevor and bilateral hand rails in 6tx to progress towards being able to enter/exit her home.    Anticipated Discharge Equipment and Recommendations   DC Equipment Recommendations Front-Wheel Walker   Discharge Recommendations Recommend home health for continued physical therapy services

## 2021-01-06 NOTE — PROGRESS NOTES
Neurology Progress Note  Neurohospitalist Service, [facility]    Referring Physician: STEPHY Orozco M.D.    Chief Complaint   Patient presents with   • Other     Pt complains of loss of vision in Right eye (1 week ago, now resolved) difficulty walking, and migraines x1 month. Pt consulted her Neuro Surgeon who has treated her for chiari malformation and was told to come to ER.    • Back Pain   • Head Pain       HPI: Refer to initial documented Neurology H&P, as detailed in the patient's chart.    Interval History January 6, 2021: No new issues overnight.    Review of systems: In addition to what is detailed in the HPI and/or updated in the interval history, all other systems reviewed and are negative.    Past Medical History:    has a past medical history of Anesthesia, Anxiety associated with depression, ASTHMA, Drug-seeking behavior, Migraine without aura, without mention of intractable migraine without mention of status migrainosus, Other specified symptom associated with female genital organs, Seizure (HCC), Seizure cerebral (HCC) (2/15/2018), Stroke (HCC), and Unspecified disorder of thyroid. She also has no past medical history of CAD (coronary artery disease) or COPD.    FHx:  family history is not on file.    SHx:   reports that she has never smoked. She has never used smokeless tobacco. She reports previous alcohol use. She reports that she does not use drugs.    Medications:    Current Facility-Administered Medications:   •  methylPREDNISolone sod succ (SOLU-MEDROL) 1,000 mg in  mL IVPB, 1 g, Intravenous, DAILY, Roderick Parikh M.D., Stopped at 01/05/21 1897  •  HYDROcodone-acetaminophen (NORCO) 5-325 MG per tablet 1-2 Tab, 1-2 Tab, Oral, Q4HRS PRN, STEPHY Orozco M.D., 2 Tab at 01/03/21 0955  •  butalbital/apap/caffeine -40 mg (Fioricet) per tablet 2 Tab, 2 Tab, Oral, Q6HRS PRN, STEPHY Orozco M.D., 2 Tab at 01/06/21 0542  •  topiramate (TOPAMAX)  tablet 100 mg, 100 mg, Oral, BID, Mack Reeder M.D., 100 mg at 01/05/21 2122  •  zonisamide (ZONEGRAN) capsule 200 mg, 200 mg, Oral, Q EVENING, Mack Reeder M.D., 200 mg at 01/05/21 1656  •  senna-docusate (PERICOLACE or SENOKOT S) 8.6-50 MG per tablet 2 Tab, 2 Tab, Oral, BID, 2 Tab at 01/06/21 0542 **AND** polyethylene glycol/lytes (MIRALAX) PACKET 1 Packet, 1 Packet, Oral, QDAY PRN **AND** magnesium hydroxide (MILK OF MAGNESIA) suspension 30 mL, 30 mL, Oral, QDAY PRN **AND** bisacodyl (DULCOLAX) suppository 10 mg, 10 mg, Rectal, QDAY PRN, Mack Reeder M.D.  •  acetaminophen (Tylenol) tablet 650 mg, 650 mg, Oral, Q6HRS PRN, Mack Reeder M.D., 650 mg at 01/02/21 2216  •  labetalol (NORMODYNE/TRANDATE) injection 10 mg, 10 mg, Intravenous, Q4HRS PRN, Mack Reeder M.D.  •  ondansetron (ZOFRAN) syringe/vial injection 4 mg, 4 mg, Intravenous, Q4HRS PRN, Mack Reeder M.D., 4 mg at 01/05/21 0034  •  ondansetron (ZOFRAN ODT) dispertab 4 mg, 4 mg, Oral, Q4HRS PRN, Mack Reeder M.D.  •  promethazine (PHENERGAN) tablet 12.5-25 mg, 12.5-25 mg, Oral, Q4HRS PRN, Mack Reeder M.D., 25 mg at 01/05/21 0518  •  promethazine (PHENERGAN) suppository 12.5-25 mg, 12.5-25 mg, Rectal, Q4HRS PRN, Mack Reeder M.D.  •  prochlorperazine (COMPAZINE) injection 5-10 mg, 5-10 mg, Intravenous, Q4HRS PRN, Mack Reeder M.D., 10 mg at 01/05/21 2122  •  hydrOXYzine HCl (ATARAX) tablet 25 mg, 25 mg, Oral, TID PRN, Don Giles M.D., 25 mg at 01/02/21 2216    Physical Examination:     Vitals:    01/05/21 0800 01/05/21 1600 01/05/21 2000 01/06/21 0353   BP: 104/59 107/64 105/59 (!) 92/52   Pulse: (!) 53 60 76 80   Resp: 16 16 17 17   Temp: 36 °C (96.8 °F) 36.7 °C (98 °F) 37.2 °C (98.9 °F) 36.7 °C (98 °F)   TempSrc: Temporal Temporal Temporal Temporal   SpO2: 99% 100% 97% 95%   Weight:       Height:           General: Patient is awake and in no acute distress  Eyes: examination of optic disks not  indicated at this time  CV: RRR    NEUROLOGICAL EXAM:     Mental status: Awake, alert and fully oriented, follows commands  Speech and language: speech is clear and fluent. The patient is able to name and repeat.  Cranial nerve exam: Pupils are equal, round and reactive to light bilaterally. Visual fields are full. Extraocular muscles are intact. Sensation in the face is intact to light touch. Face is symmetric. Hearing to finger rub equal. Palate elevates symmetrically. Shoulder shrug is full. Tongue is midline.  Motor exam: S some giveaway weakness in bilateral lower extremities however still 4+ out of 5.  Sensory exam: Decreased sensation to soft touch bilateral lower extremities  Deep tendon reflexes:  2+ and symmetric. Toes down-going bilaterally.  Coordination: no ataxia       Objective Data:    Labs:  Lab Results   Component Value Date/Time    PROTHROMBTM 13.9 01/02/2021 12:24 PM    INR 1.03 01/02/2021 12:24 PM      Lab Results   Component Value Date/Time    WBC 13.6 (H) 01/06/2021 06:00 AM    RBC 4.55 01/06/2021 06:00 AM    HEMOGLOBIN 14.0 01/06/2021 06:00 AM    HEMATOCRIT 42.6 01/06/2021 06:00 AM    MCV 93.6 01/06/2021 06:00 AM    MCH 30.8 01/06/2021 06:00 AM    MCHC 32.9 (L) 01/06/2021 06:00 AM    MPV 10.7 01/06/2021 06:00 AM    NEUTSPOLYS 76.40 (H) 01/06/2021 06:00 AM    LYMPHOCYTES 15.00 (L) 01/06/2021 06:00 AM    MONOCYTES 6.60 01/06/2021 06:00 AM    EOSINOPHILS 0.00 01/06/2021 06:00 AM    BASOPHILS 0.10 01/06/2021 06:00 AM    HYPOCHROMIA 1+ 06/06/2014 10:49 AM      Lab Results   Component Value Date/Time    SODIUM 137 01/06/2021 06:00 AM    POTASSIUM 3.6 01/06/2021 06:00 AM    CHLORIDE 111 01/06/2021 06:00 AM    CO2 17 (L) 01/06/2021 06:00 AM    GLUCOSE 127 (H) 01/06/2021 06:00 AM    BUN 13 01/06/2021 06:00 AM    CREATININE 0.73 01/06/2021 06:00 AM    CREATININE 0.9 05/04/2009 09:58 PM      Lab Results   Component Value Date/Time    CHOLSTRLTOT 160 12/05/2019 04:02 AM    LDL 95 12/05/2019 04:02 AM     HDL 55 12/05/2019 04:02 AM    TRIGLYCERIDE 48 12/05/2019 04:02 AM       Lab Results   Component Value Date/Time    ALKPHOSPHAT 80 01/06/2021 06:00 AM    ASTSGOT 6 (L) 01/06/2021 06:00 AM    ALTSGPT 6 01/06/2021 06:00 AM    TBILIRUBIN 0.3 01/06/2021 06:00 AM        Imaging/Testing:  IR-MIDLINE CATHETER INSERTION WO GUIDANCE > AGE 3   Final Result                  Ultrasound-guided midline placement performed by qualified nursing staff    as above.          MR-BRAIN-WITH & W/O   Final Result      1.  Borderline low-lying cerebellar tonsils extending about 2 mm below the foramen magnum. This does not satisfy criteria for Chiari I malformation.   2.  Multiple supratentorial white matter lesions with morphology and enhancement most consistent with active demyelinating disease such as multiple sclerosis.   3.  Posterior fossa demyelinating lesions noted in the left middle cerebellar peduncle (enhancing) and left paramedian upper medulla near the pontomedullary junction (nonenhancing).      MR-CERVICAL SPINE-WITH & W/O   Final Result         1. BORDERLINE LOW-LYING CEREBELLAR TONSILS EXTENDING ABOUT 2 MM BELOW THE FORAMEN MAGNUM. THIS DOES NOT SATISFY CRITERIA FOR CHIARI I MALFORMATION.      2. DEMYELINATING LESION AGAIN SEEN IN THE LEFT MIDDLE CEREBELLAR PEDUNCLE AND ADDITIONAL SUBTLE DEMYELINATING LESION IN THE LEFT PARAMEDIAN UPPER MEDULLA NEAR THE PONTOMEDULLARY JUNCTION. (SEE ALSO MRI BRAIN FROM TODAY'S DATE).      3. OTHERWISE, MRI OF THE CERVICAL SPINE WITHOUT AND WITH CONTRAST WITHIN NORMAL LIMITS. NO EVIDENCE OF DEMYELINATING DISEASE IN THE CERVICAL SPINAL CORD.      MR-LUMBAR SPINE-WITH & W/O   Final Result      1.  MRI lumbar spine without and with contrast within normal limits.      MR-THORACIC SPINE-WITH & W/O   Final Result      1.  T9 vertebral body hemangioma with no change from prior exam. No clinical significance.   2.  Otherwise, unremarkable MRI thoracic spine with no significant disc bulge or  protrusion, central stenosis, foraminal stenosis, or myelopathic cord signal abnormality. No evidence of demyelinating disease in the thoracic spinal cord.              Assessment and Plan:    35-year-old female has had a past no history of questionable meningitis viral.  Seizures.  Chiari malformation.  She presents with some vague complaints of weakness.  MRI brain on this admission was very surprising shows multiple small enhancing lesions.  Not very typical for MS.  She had an MRI brain back in 2019 was unremarkable.  Is also had multiple lumbar punctures done in 2015 in 2017.  Bands were negative in the status.  No white cell count.  2 of them had elevated protein.  Overall is very confusing picture.  The past year and a half patient is gone from normal brain to have multiple enhancing lesions.  Did not see any old lesions.          Cell count yesterday was unremarkable.  Still pending many labs though.    I am thinking we will complete the 5-day course Solu-Medrol today be day 5.  Patient will follow up in the MS clinic afterwards.      Plan:  1.  Lumbar puncture-cell counts are unremarkable.  No signs of infection.  2.  Continue Solu-Medrol 1 g daily for total of 5 doses today's today's day #5    We will sign off today.  Patient can be discharged home today after Solu-Medrol dose.  Follow-up in the MS clinic.    the evaluation of the patient, and recommended management, was discussed with the resident staff. I have performed a physical exam and reviewed and updated ROS and Plan today (1/6/2021). In review of yesterday's note (1/5/2021), there are no changes except as documented above.    This chart was partially generated using voice recognition technology and sound alike word replacement may be present, best efforts were made to make the chart accurate.    Jp Hearn MD  Board Certified Neurology, ABPN  (t) [phone]

## 2021-01-06 NOTE — CARE PLAN
Problem: Communication  Goal: The ability to communicate needs accurately and effectively will improve  Outcome: PROGRESSING AS EXPECTED  Patient uses the call light appropriately to communicate needs.      Problem: Safety  Goal: Will remain free from injury  Outcome: PROGRESSING AS EXPECTED  Patient is aware of the risks with ambulating alone; calls appropriately when needing to use the bathroom and to address pain level.      Problem: Pain Management  Goal: Pain level will decrease to patient's comfort goal  Outcome: PROGRESSING AS EXPECTED  Patient takes medications as scheduled; that she states are the same as she takes at home and it allows her pain to remain controlled.

## 2021-01-06 NOTE — PROGRESS NOTES
Pt arrived to floor from [facility] 195-1. Pt A+Ox4, able to make needs known, right upper mid line patent and Sl at this time. Pain 0/10 to head.   CSMT's and ASHISH's BLE weakness more so on LLE, Educated pt on call light and TV remote and Incentive Spirometer.  Pt appears to have a positive attitude, proactive in care and compliant with education and plan of care. Call light with in reach of pt, pt uses appropriately.

## 2021-01-06 NOTE — PROGRESS NOTES
[facility] Medicine Daily Progress Note    Date of Service  1/5/2021    Chief Complaint  35 y.o. female admitted 1/2/2021 with the vision in her right eye for about 2 hours     Hospital Course  35 y.o. female past medical history of viral meningitis 2 years ago complicated by seizures, Budd-Chiari malformation, new diagnosis of CNS demyelinating disease suspected to be multiple sclerosis who presented 1/2/2021 with complaints of headaches, weakness, vision changes, falls.     Ms. Meade is a 35-year-old female with complex past medical history including Budd-Chiari formation following with Dr. Padilla (neurosurgery), Dr. Espinoza at [place] (neurology), aseptic meningitis 2 years ago with subsequent seizure disorder, complex migraines.  She has been having migraine headaches for the past several weeks.  Additionally on December 25, 2020 she had lost the vision in her right eye for about 2 hours that is subsequently resolved.  She is complaining of bilateral lower extremity weakness as well as paresthesias of her entire bilateral lower extremities and saddle anesthesia. She states that her left leg collapses at times and her right leg has been dragging.  She has had falls and has been bumping into walls recently.  She has had increased fatigue and malaise unable to perform all of her normal activities at home.  She has been expressing difficulty urinating as well and denies incontinence of urine or feces.  She currently denies chest pain, vision difficulty, nausea, diarrhea, vomiting, dysuria, shortness of breath or problems swallowing.     ERP discussed case with patient's neurological team Dr. Acosta covering for Dr. Espinoza as well as Dr. Padilla who recommended MRIs of the head cervical thoracic and lumbar spine with and without contrast.  MRI brain revealed borderline low-lying cerebellar tonsils extending almost 2 mm below foramen magnum does not satisfy criteria for Chiari I malformation.  Also shows multiple  supratentorial white matter lesions with morphology and has been most consistent with active demyelinating disease such as multiple sclerosis, posterior fossa demyelinating lesions also noted in the left middle cerebellar peduncle and left paramedian upper medulla near pontomedullary junction.  ERP discussed case with [facility] group for Dr. Acosta, who recommended 1000 milligrams of Solu-Medrol and have patient be admitted with neurological evaluation in the morning.      Interval Problem Update  1/5: Patient seen at bedside. Currently on day 4 of 5 of IV solumedrol. Patient currently not complaining of any pain. As per nursing no other over night events reported. We appreciate further recommendations by neurology.    Consultants/Specialty  neurology    Code Status  Full Code    Disposition  pending    Review of Systems  Review of Systems   Constitutional: Negative for chills, diaphoresis and malaise/fatigue.   HENT: Negative for ear discharge, ear pain and nosebleeds.    Eyes: Negative for double vision, photophobia and pain.   Respiratory: Negative for hemoptysis, sputum production and shortness of breath.    Cardiovascular: Negative for palpitations, orthopnea and claudication.   Gastrointestinal: Negative for abdominal pain, nausea and vomiting.   Genitourinary: Negative for frequency, hematuria and urgency.   Musculoskeletal: Positive for back pain.   Skin: Negative for itching and rash.   Neurological: Positive for focal weakness and weakness. Negative for tingling and headaches.   Psychiatric/Behavioral: Negative for depression, hallucinations, substance abuse and suicidal ideas.        Physical Exam  Temp:  [36 °C (96.8 °F)-36.4 °C (97.5 °F)] 36 °C (96.8 °F)  Pulse:  [53-72] 53  Resp:  [16-17] 16  BP: ()/(53-59) 104/59  SpO2:  [95 %-99 %] 99 %    Physical Exam  Constitutional:       General: She is not in acute distress.     Appearance: Normal appearance. She is not toxic-appearing.    HENT:      Head: Normocephalic and atraumatic.      Nose: No congestion or rhinorrhea.      Mouth/Throat:      Mouth: Mucous membranes are dry.   Eyes:      General:         Right eye: No discharge.         Left eye: No discharge.      Conjunctiva/sclera: Conjunctivae normal.      Pupils: Pupils are equal, round, and reactive to light.   Neck:      Musculoskeletal: No neck rigidity or muscular tenderness.   Cardiovascular:      Rate and Rhythm: Normal rate and regular rhythm.      Heart sounds: No murmur. No friction rub.   Pulmonary:      Effort: No respiratory distress.      Breath sounds: No stridor.   Abdominal:      General: Abdomen is flat. Bowel sounds are normal.      Tenderness: There is no abdominal tenderness.   Musculoskeletal:         General: No swelling or tenderness.   Skin:     General: Skin is warm and dry.      Coloration: Skin is not jaundiced or pale.   Neurological:      Mental Status: She is alert and oriented to person, place, and time.      Cranial Nerves: No cranial nerve deficit.      Comments: Has weakness in both lower exts         Fluids    Intake/Output Summary (Last 24 hours) at 1/5/2021 1635  Last data filed at 1/4/2021 1927  Gross per 24 hour   Intake 100 ml   Output --   Net 100 ml       Laboratory  Recent Labs     01/03/21  0728 01/04/21  1245   WBC 7.4 17.9*   RBC 4.89 4.50   HEMOGLOBIN 15.3 13.9   HEMATOCRIT 46.7 41.9   MCV 95.5 93.1   MCH 31.3 30.9   MCHC 32.8* 33.2*   RDW 45.8 44.8   PLATELETCT 260 254   MPV 10.4 10.9     Recent Labs     01/03/21  0728 01/04/21  1245   SODIUM 139 136   POTASSIUM 4.0 3.3*   CHLORIDE 110 107   CO2 18* 18*   GLUCOSE 142* 122*   BUN 12 16   CREATININE 0.72 0.93   CALCIUM 9.6 9.0                   Imaging       Assessment/Plan  * MS (multiple sclerosis) (HCC)- (present on admission)  Assessment & Plan  -MRI Brain With & W/o: Multiple supratentorial white matter lesions with morphology and enhancement most consistent with active demyelinating  "disease.  -ERP discussed the case with her her Neurology team from [place] (Dr. Espinoza and Dr. Acosta) recommended 1g of solumedrol to treat her MS and admit the patient to the hospital with neurology follow up in the morning.   -Symptoms of bilateral leg weakness, paresthesias of bilateral lower extremities, feelings of urine retention, occasionally upper extremity numbness and tingling, right vision loss on Deana that has resolved, cognitive issues such as inattentiveness and forgetfulness.  -PT/OT     1/5: Continue Iv solumedrol as per neurology for 5 days, currently on day 4, we appreciate any further recommendations.      Seizure disorder (HCC)- (present on admission)  Assessment & Plan  -Continue home regimen Topamax, zonisamide  -States last definite seizure was 2 years ago however there has been some discussion with her neurologist that she may be having seizures while sleeping since then.  -Seizure precautions    Leukocytosis- (present on admission)  Assessment & Plan  Most likely reactive, patient currently on solumedrol  Monitor for any signs of infection  Repeat CBC    History of Chiari malformation- (present on admission)  Assessment & Plan  -Follows with Dr. Padilla, has been determined to be non-surgical in the past per chart review.  -MRI brain performed on 1/2/2021 shows \"borderline low-lying cerebellar tonsils extending about 2 mm below the foramen magnum. This does not satisfy criteria for Chiari I malformation.\"  Mri results are noted  outpt f/u    Migraines- (present on admission)  Assessment & Plan  -History of complex migraines with presentations of stroke-like symptoms with right-sided facial droop, numbness and paresthesias in the past.  Continue topamax         VTE prophylaxis: lovenox      "

## 2021-01-06 NOTE — PROGRESS NOTES
[facility] Medicine Daily Progress Note    Date of Service  1/6/2021    Chief Complaint  35 y.o. female admitted 1/2/2021 with the vision in her right eye for about 2 hours     Hospital Course  35 y.o. female past medical history of viral meningitis 2 years ago complicated by seizures, Budd-Chiari malformation, new diagnosis of CNS demyelinating disease suspected to be multiple sclerosis who presented 1/2/2021 with complaints of headaches, weakness, vision changes, falls.     Ms. Meade is a 35-year-old female with complex past medical history including Budd-Chiari formation following with Dr. Padilla (neurosurgery), Dr. Espinoza at [place] (neurology), aseptic meningitis 2 years ago with subsequent seizure disorder, complex migraines.  She has been having migraine headaches for the past several weeks.  Additionally on December 25, 2020 she had lost the vision in her right eye for about 2 hours that is subsequently resolved.  She is complaining of bilateral lower extremity weakness as well as paresthesias of her entire bilateral lower extremities and saddle anesthesia. She states that her left leg collapses at times and her right leg has been dragging.  She has had falls and has been bumping into walls recently.  She has had increased fatigue and malaise unable to perform all of her normal activities at home.  She has been expressing difficulty urinating as well and denies incontinence of urine or feces.  She currently denies chest pain, vision difficulty, nausea, diarrhea, vomiting, dysuria, shortness of breath or problems swallowing.     ERP discussed case with patient's neurological team Dr. Acosta covering for Dr. Espinoza as well as Dr. Padilla who recommended MRIs of the head cervical thoracic and lumbar spine with and without contrast.  MRI brain revealed borderline low-lying cerebellar tonsils extending almost 2 mm below foramen magnum does not satisfy criteria for Chiari I malformation.  Also shows multiple  supratentorial white matter lesions with morphology and has been most consistent with active demyelinating disease such as multiple sclerosis, posterior fossa demyelinating lesions also noted in the left middle cerebellar peduncle and left paramedian upper medulla near pontomedullary junction.  ERP discussed case with [facility] for Dr. Acosta, who recommended 1000 milligrams of Solu-Medrol and have patient be admitted with neurological evaluation in the morning.      Interval Problem Update  1/5: Patient seen at bedside. Currently on day 4 of 5 of IV solumedrol. Patient currently not complaining of any pain. As per nursing no other over night events reported. We appreciate further recommendations by neurology.    1/6: Patient seen at bedside this morning. Patient has been taking fioricet q6 hr prn for her migraine around the clock, we will try to wean down dose prior to discharge and modify pain medications. Patient mentions toradol or triptans do not work for her for breakthrough pain. Patient mentions her legs strength is actually better today. She has been scheduled for MS clinic follow up as outpatient. We are also pending HH placement by .     Consultants/Specialty  neurology    Code Status  Full Code    Disposition  Pending HHC placement.    Review of Systems  Review of Systems   Constitutional: Negative for chills, diaphoresis and malaise/fatigue.   HENT: Negative for ear discharge, ear pain and nosebleeds.    Eyes: Negative for double vision, photophobia and pain.   Respiratory: Negative for hemoptysis, sputum production and shortness of breath.    Cardiovascular: Negative for palpitations, orthopnea and claudication.   Gastrointestinal: Negative for abdominal pain, nausea and vomiting.   Genitourinary: Negative for frequency, hematuria and urgency.   Musculoskeletal: Positive for back pain.   Skin: Negative for itching and rash.   Neurological: Positive for focal weakness and weakness.  Negative for tingling and headaches.   Psychiatric/Behavioral: Negative for depression, hallucinations, substance abuse and suicidal ideas.        Physical Exam  Temp:  [36.6 °C (97.9 °F)-37.2 °C (98.9 °F)] 36.6 °C (97.9 °F)  Pulse:  [60-80] 62  Resp:  [16-17] 17  BP: ()/(52-67) 107/67  SpO2:  [95 %-100 %] 100 %    Physical Exam  Constitutional:       General: She is not in acute distress.     Appearance: Normal appearance. She is not toxic-appearing.   HENT:      Head: Normocephalic and atraumatic.      Nose: No congestion or rhinorrhea.      Mouth/Throat:      Mouth: Mucous membranes are dry.   Eyes:      General:         Right eye: No discharge.         Left eye: No discharge.      Conjunctiva/sclera: Conjunctivae normal.      Pupils: Pupils are equal, round, and reactive to light.   Neck:      Musculoskeletal: No neck rigidity or muscular tenderness.   Cardiovascular:      Rate and Rhythm: Normal rate and regular rhythm.      Heart sounds: No murmur. No friction rub.   Pulmonary:      Effort: No respiratory distress.      Breath sounds: No stridor.   Abdominal:      General: Abdomen is flat. Bowel sounds are normal.      Tenderness: There is no abdominal tenderness.   Musculoskeletal:         General: No swelling or tenderness.   Skin:     General: Skin is warm and dry.      Coloration: Skin is not jaundiced or pale.   Neurological:      Mental Status: She is alert and oriented to person, place, and time.      Cranial Nerves: No cranial nerve deficit.      Comments: Has weakness in both lower exts         Fluids    Intake/Output Summary (Last 24 hours) at 1/6/2021 1220  Last data filed at 1/6/2021 0400  Gross per 24 hour   Intake 500 ml   Output --   Net 500 ml       Laboratory  Recent Labs     01/04/21  1245 01/06/21  0600   WBC 17.9* 13.6*   RBC 4.50 4.55   HEMOGLOBIN 13.9 14.0   HEMATOCRIT 41.9 42.6   MCV 93.1 93.6   MCH 30.9 30.8   MCHC 33.2* 32.9*   RDW 44.8 45.2   PLATELETCT 254 262   MPV 10.9 10.7      Recent Labs     01/04/21  1245 01/06/21  0600   SODIUM 136 137   POTASSIUM 3.3* 3.6   CHLORIDE 107 111   CO2 18* 17*   GLUCOSE 122* 127*   BUN 16 13   CREATININE 0.93 0.73   CALCIUM 9.0 8.8                   Imaging       Assessment/Plan  * MS (multiple sclerosis) (HCC)- (present on admission)  Assessment & Plan  -MRI Brain With & W/o: Multiple supratentorial white matter lesions with morphology and enhancement most consistent with active demyelinating disease.  -ERP discussed the case with her her Neurology team from [place] (Dr. Espinoza and Dr. Acosta) recommended 1g of solumedrol to treat her MS and admit the patient to the hospital with neurology follow up in the morning.   -Symptoms of bilateral leg weakness, paresthesias of bilateral lower extremities, feelings of urine retention, occasionally upper extremity numbness and tingling, right vision loss on Deana that has resolved, cognitive issues such as inattentiveness and forgetfulness.  -PT/OT     1/5: Continue Iv solumedrol as per neurology for 5 days, currently on day 4, we appreciate any further recommendations.  1/6: Patient finished today IV solumedrol and she will be followed up by MS clinic as outpatient to follow up on LP results.      Migraines- (present on admission)  Assessment & Plan  -History of complex migraines with presentations of stroke-like symptoms with right-sided facial droop, numbness and paresthesias in the past.  Continue topamax    1/6: Patient has been receiving fioricet q6 hr prn around the clock, we will try to wean off pain meds. Patient mentions she will try to see how she does with fioricet q12 hrs prn. We will modify pain medications as needed. The patient will probably have to go home on fioricet with a tapering dose over the next couple of weeks.    Seizure disorder (HCC)- (present on admission)  Assessment & Plan  -Continue home regimen Topamax, zonisamide  -States last definite seizure was 2 years ago however there  "has been some discussion with her neurologist that she may be having seizures while sleeping since then.  -Seizure precautions    Leukocytosis- (present on admission)  Assessment & Plan  Improving.  Most likely reactive, patient currently on solumedrol  Monitor for any signs of infection  Repeat CBC    Nausea  Assessment & Plan  Patient has also been complaining of nausea. Continue PRN nausea medications as needed. She denies any vomiting.    History of Chiari malformation- (present on admission)  Assessment & Plan  -Follows with Dr. Padilla, has been determined to be non-surgical in the past per chart review.  -MRI brain performed on 1/2/2021 shows \"borderline low-lying cerebellar tonsils extending about 2 mm below the foramen magnum. This does not satisfy criteria for Chiari I malformation.\"  Mri results are noted  outpt f/u       VTE prophylaxis: lovenox      "

## 2021-01-06 NOTE — FACE TO FACE
Face to Face Note  -  Durable Medical Equipment    Roderick Parikh M.D. - NPI: [id number]  I certify that this patient is under my care and that they had a durable medical equipment(DME)face to face encounter by myself that meets the physician DME face-to-face encounter requirements with this patient on:    Date of encounter:   Patient:                    MRN:                       YOB: 2021  Rehana Meade  [id number]  1985     The encounter with the patient was in whole, or in part, for the following medical condition, which is the primary reason for durable medical equipment:  Other - Exacerbation of MS    I certify that, based on my findings, the following durable medical equipment is medically necessary:  Walkers.  Wheelchair  Shower chair    HOME O2 Saturation Measurements:(Values must be present for Home Oxygen orders)         ,     ,         My Clinical findings support the need for the above equipment due to:  Abnormal Gait    Supporting Symptoms: does not need home o2.    If patient feels more short of breath, they can go up to 6 liters per minute and contact healthcare provider.

## 2021-01-06 NOTE — FACE TO FACE
Face to Face Supporting Documentation - Home Health    The encounter with this patient was in whole or in part the primary reason for home health admission.    Date of encounter:   Patient:                    MRN:                       YOB: 2021  Rehana Meade  [id number]  1985     Home health to see patient for:  Physical Therapy evaluation and treatment   And Occupational Therapy evaluation and treatment.    Skilled need for:  Recent Deterioration of Health Status MS    Skilled nursing interventions to include:  Comment: as indicated by PT/OT    Homebound status evidenced by:  Need the aid of supportive devices such as crutches, canes, wheelchairs or walkers. Leaving home requires a considerable and taxing effort. There is a normal inability to leave the home.    Community Physician to provide follow up care: Mack Bernard M.D.     Optional Interventions? Yes, Details: as per PT/OT      I certify the face to face encounter for this home health care referral meets the CMS requirements and the encounter/clinical assessment with the patient was, in whole, or in part, for the medical condition(s) listed above, which is the primary reason for home health care. Based on my clinical findings: the service(s) are medically necessary, support the need for home health care, and the homebound criteria are met.  I certify that this patient has had a face to face encounter by myself.  Roderick Parikh M.D. - NPI: [id number]

## 2021-01-06 NOTE — ASSESSMENT & PLAN NOTE
Patient has also been complaining of nausea. Continue PRN nausea medications as needed. She denies any vomiting.

## 2021-01-06 NOTE — ASSESSMENT & PLAN NOTE
Improving.  Most likely reactive, patient currently on solumedrol  Monitor for any signs of infection  Repeat CBC

## 2021-01-07 ENCOUNTER — PATIENT OUTREACH (OUTPATIENT)
Dept: HEALTH INFORMATION MANAGEMENT | Facility: OTHER | Age: 36
End: 2021-01-07

## 2021-01-07 ENCOUNTER — OFFICE VISIT (OUTPATIENT)
Dept: NEUROLOGY | Facility: MEDICAL CENTER | Age: 36
End: 2021-01-07
Attending: PSYCHIATRY & NEUROLOGY
Payer: COMMERCIAL

## 2021-01-07 VITALS
SYSTOLIC BLOOD PRESSURE: 127 MMHG | OXYGEN SATURATION: 97 % | HEIGHT: 63 IN | DIASTOLIC BLOOD PRESSURE: 77 MMHG | HEART RATE: 82 BPM | RESPIRATION RATE: 17 BRPM | TEMPERATURE: 98 F | BODY MASS INDEX: 30.82 KG/M2 | WEIGHT: 173.94 LBS

## 2021-01-07 VITALS
HEIGHT: 63 IN | WEIGHT: 174 LBS | TEMPERATURE: 98.1 F | BODY MASS INDEX: 30.83 KG/M2 | OXYGEN SATURATION: 98 % | SYSTOLIC BLOOD PRESSURE: 112 MMHG | HEART RATE: 85 BPM | DIASTOLIC BLOOD PRESSURE: 64 MMHG

## 2021-01-07 DIAGNOSIS — G35 MS (MULTIPLE SCLEROSIS) (HCC): ICD-10-CM

## 2021-01-07 LAB
ALB CSF/SERPL: 10.2 RATIO (ref 0–9)
ALBUMIN CSF-MCNC: 31 MG/DL (ref 0–35)
ALBUMIN SERPL-MCNC: 3050 MG/DL (ref 3500–5200)
BACTERIA CSF CULT: NORMAL
GRAM STN SPEC: NORMAL
IGG CSF-MCNC: 5.8 MG/DL (ref 0–6)
IGG SERPL-MCNC: 910 MG/DL (ref 768–1632)
IGG SYNTH RATE SER+CSF CALC-MRATE: 5 MG/D
IGG/ALB CLEAR SER+CSF-RTO: 0.63 RATIO (ref 0.28–0.66)
IGG/ALB CSF: 0.19 RATIO (ref 0.09–0.25)
SIGNIFICANT IND 70042: NORMAL
SITE SITE: NORMAL
SOURCE SOURCE: NORMAL

## 2021-01-07 PROCEDURE — 700111 HCHG RX REV CODE 636 W/ 250 OVERRIDE (IP): Performed by: HOSPITALIST

## 2021-01-07 PROCEDURE — 99239 HOSP IP/OBS DSCHRG MGMT >30: CPT | Performed by: HOSPITALIST

## 2021-01-07 PROCEDURE — 700101 HCHG RX REV CODE 250: Performed by: STUDENT IN AN ORGANIZED HEALTH CARE EDUCATION/TRAINING PROGRAM

## 2021-01-07 PROCEDURE — 99214 OFFICE O/P EST MOD 30 MIN: CPT | Performed by: PSYCHIATRY & NEUROLOGY

## 2021-01-07 PROCEDURE — 99223 1ST HOSP IP/OBS HIGH 75: CPT | Performed by: PHYSICAL MEDICINE & REHABILITATION

## 2021-01-07 PROCEDURE — 700111 HCHG RX REV CODE 636 W/ 250 OVERRIDE (IP): Performed by: STUDENT IN AN ORGANIZED HEALTH CARE EDUCATION/TRAINING PROGRAM

## 2021-01-07 RX ORDER — DIPHENHYDRAMINE HYDROCHLORIDE 50 MG/ML
25 INJECTION INTRAMUSCULAR; INTRAVENOUS ONCE
Status: COMPLETED | OUTPATIENT
Start: 2021-01-07 | End: 2021-01-07

## 2021-01-07 RX ORDER — BUTALBITAL, ACETAMINOPHEN AND CAFFEINE 50; 325; 40 MG/1; MG/1; MG/1
2 TABLET ORAL EVERY 12 HOURS PRN
Qty: 20 TAB | Refills: 0 | Status: SHIPPED | OUTPATIENT
Start: 2021-01-07 | End: 2021-01-12

## 2021-01-07 RX ADMIN — PROCHLORPERAZINE EDISYLATE 5 MG: 5 INJECTION INTRAMUSCULAR; INTRAVENOUS at 03:17

## 2021-01-07 RX ADMIN — TOPIRAMATE 100 MG: 100 TABLET, FILM COATED ORAL at 08:13

## 2021-01-07 RX ADMIN — DIPHENHYDRAMINE HYDROCHLORIDE 25 MG: 50 INJECTION INTRAMUSCULAR; INTRAVENOUS at 09:01

## 2021-01-07 ASSESSMENT — FIBROSIS 4 INDEX: FIB4 SCORE: 0.33

## 2021-01-07 ASSESSMENT — PATIENT HEALTH QUESTIONNAIRE - PHQ9: CLINICAL INTERPRETATION OF PHQ2 SCORE: 0

## 2021-01-07 NOTE — CONSULTS
Physical Medicine and Rehabilitation Consultation              Date of initial consultation: 1/7/2021  Consulting provider: Richy Byrd MD  Reason for consultation: assess for acute inpatient rehab appropriateness  LOS: 4 Day(s)    Chief complaint: Neurologic     HPI: The patient is a 35 y.o. right hand dominant female with a past medical history of viral meningitis 2 years ago complicated by seizures, Budd-Chiari malformation, new onset CNS demyelinating disease suspected to be multiple sclerosis;  who presented on 1/2/2021 11:24 AM with headaches, weakness, vision changes and falls.  Patient has been complaining of bilateral lower extremity weakness and paresthesias of the entire bilateral lower extremities and saddle anesthesia.  Patient complaining of gait disturbances, left leg collapses and right leg has been dragging.  She has an ataxic gait with falls, increased fatigue and malaise, inability to perform ADLs, difficulty urinating.     Advanced imaging has been significant for borderline low-lying cerebellar tonsils, multiple supratentorial white matter lesions with morphology consistent with active demyelinating disease, posterior fossa demyelinating lesions.     Neurology sees a confusing picture not entirely consistent with MS.  Cites history of viral meningitis, seizures, Chiari malformation, vague complaints of weakness, MRI showing multiple small enhancing lesions not typical of MS, normal MRI brain 2019, series of elevated protein but otherwise normal lumbar punctures, including normal LP yesterday.       The patient currently reports a long history of neurologic symptoms starting 5 years ago with aseptic viral meningitis September 2015.  After which she developed migraines.  She states one lasted for 2 years, and still bother her today.  She had a right eye loss in December 2017, then seizure episode which she describes as retaining consciousness but curled up in a ball and could not move, was  placed on zonisamide which has been helpful.  Patient found to have marginally low lying cerebellar tonsils not meeting criteria for Chiari, was referred to Dr. Us who she continues to follow with, and Dr. Espinoza at [place], then last year had strokelike symptoms December 2019 affecting her left side and lasting for 2 to 3 days.  Her migraines were thought to be due to her low-lying tonsils, and she was placed on a once a month shot and Topamax.    Patient denies exposure to Covid, but states her ex  had it however her children tested negative.  She endorses ataxia on the right, decreased sensation on the right face and right arm.  States whenever she gets headaches she loses dexterity on the right, has confusion, has difficulty with word finding, and develops paresthesias on the right.    Social Hx:  2 SH  3 JOSE, 15 steps inside  With: Spouse and child    Employment: , works from home  Tobacco: Denies  Alcohol: Minimal  Drugs: None    THERAPY:  PT: Functional mobility   1/5: Walking 60 feet x 2 with front wheel walker at moderate assist.  Gait is ataxic, bradykinetic, shuffled, decreased heel strike, bilateral foot drag, able to compensate with increased hip and knee flexion.  Unable to do stairs    OT: ADLs  1/6: Min assist for grooming and toileting    SLP:   None    IMAGING:  MR Brain 1/2/21  1.  Borderline low-lying cerebellar tonsils extending about 2 mm below the foramen magnum. This does not satisfy criteria for Chiari I malformation.  2.  Multiple supratentorial white matter lesions with morphology and enhancement most consistent with active demyelinating disease such as multiple sclerosis.  3.  Posterior fossa demyelinating lesions noted in the left middle cerebellar peduncle (enhancing) and left paramedian upper medulla near the pontomedullary junction (nonenhancing).    PROCEDURES:  Lin Billy MD 1/4  Lumbar puncture    PMH:  Past Medical History:   Diagnosis Date   • Anesthesia      woke up combative   • Anxiety associated with depression    • ASTHMA    • Drug-seeking behavior    • Migraine without aura, without mention of intractable migraine without mention of status migrainosus    • Other specified symptom associated with female genital organs    • Seizure (HCC)     Last seizure was 1/2019   • Seizure cerebral (HCC) 2/15/2018   • Stroke (HCC)    • Unspecified disorder of thyroid     cyst       PSH:  Past Surgical History:   Procedure Laterality Date   • VAGINAL HYSTERECTOMY TOTAL  1/27/2020    Procedure: HYSTERECTOMY, TOTAL, VAGINAL;  Surgeon: Julian Parker M.D.;  Location: SURGERY SAME DAY [facility];  Service: Gynecology   • CYSTOSCOPY  1/27/2020    Procedure: CYSTOSCOPY;  Surgeon: Julian Parker M.D.;  Location: SURGERY SAME DAY [facility];  Service: Gynecology   • TUBAL COAGULATION LAPAROSCOPIC BILATERAL  7/11/2014    Performed by Julian Parker M.D. at SURGERY SAME DAY [facility]   • SEPTOPLASTY  10/28/2009    Performed by ANNETTE FORDE at SURGERY [facility]   • SOMNOPLASTY  10/28/2009    Performed by ANNETTE FORDE at SURGERY [facility]   • NASAL POLYPECTOMY  10/28/2009    Performed by ANNETTE FORDE at SURGERY [facility]S       FHX:  Non-pertinent to today's issues    Medications:  Current Facility-Administered Medications   Medication Dose   • butalbital/apap/caffeine -40 mg (Fioricet) per tablet 2 Tab  2 Tab   • HYDROcodone-acetaminophen (NORCO) 5-325 MG per tablet 1-2 Tab  1-2 Tab   • topiramate (TOPAMAX) tablet 100 mg  100 mg   • zonisamide (ZONEGRAN) capsule 200 mg  200 mg   • senna-docusate (PERICOLACE or SENOKOT S) 8.6-50 MG per tablet 2 Tab  2 Tab    And   • polyethylene glycol/lytes (MIRALAX) PACKET 1 Packet  1 Packet    And   • magnesium hydroxide (MILK OF MAGNESIA) suspension 30 mL  30 mL    And   • bisacodyl (DULCOLAX) suppository 10 mg  10 mg   • acetaminophen (Tylenol) tablet 650 mg  650 mg   • labetalol (NORMODYNE/TRANDATE)  "injection 10 mg  10 mg   • ondansetron (ZOFRAN) syringe/vial injection 4 mg  4 mg   • ondansetron (ZOFRAN ODT) dispertab 4 mg  4 mg   • promethazine (PHENERGAN) tablet 12.5-25 mg  12.5-25 mg   • promethazine (PHENERGAN) suppository 12.5-25 mg  12.5-25 mg   • prochlorperazine (COMPAZINE) injection 5-10 mg  5-10 mg   • hydrOXYzine HCl (ATARAX) tablet 25 mg  25 mg       Allergies:  Allergies   Allergen Reactions   • Amitriptyline Unspecified     Suicidal   • Clarithromycin Hives   • Sulfa Drugs Anaphylaxis   • Cantaloupe Itching   • Honey Dew Itching   • Latex Itching   • Lorazepam Unspecified     Hallucinations     • Metoclopramide Unspecified     Muscle spasms   • Penicillin G Potassium Vomiting   • Rizatriptan Unspecified     Tremors, confusion     • Sumatriptan Unspecified     \"Makes my head pins and needles\"   • Tape Rash   • Morphine Hives and Itching       Physical Exam:  Vitals: /77   Pulse 60   Temp 36.7 °C (98 °F) (Temporal)   Resp 17   Ht 1.6 m (5' 3\")   Wt 78.9 kg (173 lb 15.1 oz)   SpO2 96%   Gen: NAD  Head: NC/AT  Eyes/ Nose/ Mouth: PERRLA, moist mucous membranes  Cardio: RRR, good distal perfusion, warm extremities  Pulm: normal respiratory effort, no cyanosis   Abd: Soft NTND, negative borborygmi   Ext: No peripheral edema. No calf tenderness. No clubbing.    Mental status: answers questions appropriately follows commands  Speech: Slight difficulty noticed with word finding    CRANIAL NERVES:  2,3: visual acuity grossly intact, PERRL  3,4,6: EOMI bilaterally, no nystagmus or diplopia  5: Sensation on right face altered to light touch  7: no facial asymmetry  8: hearing grossly intact  9,10: symmetric palate elevation  11: SCM/Trapezius strength 5/5 bilaterally  12: tongue protrudes midline      Motor:      Upper Extremity  Myotome R L   Shoulder flexion C5 5 5   Elbow flexion C5 5 5   Wrist extension C6 5 5   Elbow extension C7 5 5   Finger flexion C8 5 5   Finger abduction T1 5 5     Lower " Extremity Myotome R L   Hip flexion L2 5 5   Knee extension L3 5 5   Ankle dorsiflexion L4 5 5   Toe extension L5 5 5   Ankle plantarflexion S1 5 5       Sensory:   Altered sensation to light touch in right arm and right face      DTRs:  Right  Left    Brachioradialis  2+  2+   Patella tendon  3+ 3+     No clonus at bilateral ankles  Negative babinski b/l  Positive Shanks b/l     Tone: no spasticity noted, no cogwheeling noted    Coordination:   Altered finger gabbie on right      Labs: Reviewed and significant for   Recent Labs     01/04/21  1245 01/06/21  0600   RBC 4.50 4.55   HEMOGLOBIN 13.9 14.0   HEMATOCRIT 41.9 42.6   PLATELETCT 254 262     Recent Labs     01/04/21  1245 01/06/21  0600   SODIUM 136 137   POTASSIUM 3.3* 3.6   CHLORIDE 107 111   CO2 18* 17*   GLUCOSE 122* 127*   BUN 16 13   CREATININE 0.93 0.73   CALCIUM 9.0 8.8     No results found for this or any previous visit (from the past 24 hour(s)).      ASSESSMENT:  Patient is a 35 y.o. female admitted with MS versus viral encephalitis now s/p high-dose IV steroids x5.  She has regained 5 out of 5 strength throughout, reports increased independence with mobility and ADLs since last evaluation.     [facility] Code / Diagnosis to Support: 0003.1 - Neurologic Conditions: Multiple Sclerosis    Rehabilitation: Impaired ADLs and mobility  Patient refusing IPR    Additional Recommendations:  -Patient has improved enough for discharge, however needs 24/7 supervision at least for 2 weeks and if symptoms are continuing for another 2 weeks.  Patient strongly counseled to avoid going up or down stairs alone, not to drive, no heavy lifting, and have close follow-up with both neurology and PMR clinics  -Exam significant for bilateral Radha sign, altered sensation on right face and arm, and ataxia on finger gabbie on the right.  -Etiology unclear, concern for underlying viral infection causing seizures versus MS. Patient has follow-up with MS specialist aRjeev Aggarwal  today  -Follow-up in outpatient PMR clinic with Dr. Jacki Gallegos MD for DME needs and functional tracking    Thank you for allowing us to participate in the care of this patient.     Patient was seen for 113 minutes on unit/floor of which > 50% of time was spent on counseling and coordination of care regarding the above, including prognosis, risk reduction, benefits of treatment, and options for next stage of care.    Richmond Castro, DO   Physical Medicine and Rehabilitation

## 2021-01-07 NOTE — PROGRESS NOTES
"Pt complaining of midline site itching. Slight redness at site. Pt requesting something to help with the itching. Dr. Gabino Garrett notified and stating \"it's a surgical issue.\" Provider not wanting to place any new orders at this time.     Offered to change midline dressing and offered ice packs to the pt. Pt refusing at this time.   "

## 2021-01-07 NOTE — DISCHARGE PLANNING
"Anticipated Discharge Disposition:   Home  Outpatient rehab  DME:  FWW    Action:    Pt admitted with active demyelinating disease, migraines, seizure do, nausea, hx of Chiari malformation.    PT/OT recommending home health and DME.    Voalte msg to Dr. Byrd for physiatrist consult please.    RN CM met with patient this a.m. she lives in a two story house with her  and 4 children ages 6,8, 10, & 12.  She stated she is getting around with the walker at hospital without problems.  Informed her that insurance will only pay for one a walker and not for both a walker and wheel chair.  Pt frustrated and said I know and I don't need a wheel chair.  I don't need a shower chair either.  I can sit in the bath tub and my  can help me.   Explained home health.  Pt angry and stated, \"I don't want a nurse in my house every week.  It will freak out my kids.  This is going to fast and implying I'm bed ridden already.  I was advised to get a rolling walker with a seat and I'm looking on [employer] for that.\"  RN CM explained physiatrist consult and patient agreeable.    Voalte msg to Dr. Byrd, Dr. Escobar and CHUY Mirza.    Choice form sent to [facility] for [facility] for walker.    Barriers to Discharge:    None    Plan:    Wait for physiatrist consult.    Care Transition Team Assessment    Information Source  Orientation : Oriented x 4  Information Given By: Patient  Who is responsible for making decisions for patient? : Patient    Readmission Evaluation  Is this a readmission?: No    Elopement Risk  Legal Hold: No  Ambulatory or Self Mobile in Wheelchair: Yes  Disoriented: No  Psychiatric Symptoms: None  History of Wandering: No  Elopement this Admit: No  Vocalizing Wanting to Leave: No  Displays Behaviors, Body Language Wanting to Leave: No-Not at Risk for Elopement  Elopement Risk: Not at Risk for Elopement    Interdisciplinary Discharge Planning  Lives with - Patient's Self Care Capacity: Spouse, Child Less than 18 " Years of Age  Patient or legal guardian wants to designate a caregiver: No  Housing / Facility: 2 Story House(bed/bath upstairs, has option of staying downstairs)  Prior Services: Home-Independent    Discharge Preparedness  What is your plan after discharge?: Home with help, Other (comment)  What are your discharge supports?: Child, Spouse  Prior Functional Level: Ambulatory, Drives Self, Independent with Activities of Daily Living, Independent with Medication Management  Difficulity with ADLs: Walking  Difficulity with IADLs: Cooking, Laundry, Shopping    Functional Assesment  Prior Functional Level: Ambulatory, Drives Self, Independent with Activities of Daily Living, Independent with Medication Management    Finances  Financial Barriers to Discharge: No  Prescription Coverage: Yes              Advance Directive  Advance Directive?: None    Domestic Abuse  Have you ever been the victim of abuse or violence?: No  Was the violence by:: Other  Is this happening now?: No  Has the violence increased in frequency and severity?: No  Are you afraid to go home today?: No  Did you have pets at the time of Abuse?: No  Do you know Where to get Help?: No  Physical Abuse or Sexual Abuse: No  Verbal Abuse or Emotional Abuse: No  Possible Abuse/Neglect Reported to:: Not Applicable         Discharge Risks or Barriers  Discharge risks or barriers?: Complex medical needs    Anticipated Discharge Information  Discharge Disposition: Still a Patient (30)  Discharge Address: [address] [place] [address] [place]  [address]  Discharge Contact Phone Number: [phone]

## 2021-01-07 NOTE — DISCHARGE PLANNING
[facility] Transitional Care Coordination     Referral from: Dr Byrd   Facesheet indicates: Parveen   Potential Rehab Diagnosis: MS     Chart review indicates patient limited on going medical management and limited therapy needs to possibly meet inpatient rehab facility criteria with the goal of returning to community.    D/C support:   Spouse   Physiatry consultation per protocol.        Last Covid test date.  Results for ETELVINA BROWN (MRN [id number]) as of 1/7/2021 09:43   Ref. Range 1/2/2021 12:37   COVID Order Status Unknown Received   Influenza virus A RNA Latest Ref Range: Negative  Negative   Influenza virus B, PCR Latest Ref Range: Negative  Negative   RSV, PCR Latest Ref Range: Negative  Negative   SARS-CoV-2 by PCR Unknown NotDetected   SARS-CoV-2 Source Unknown NP Swab           Thank you for the referral.

## 2021-01-07 NOTE — PROGRESS NOTES
"[facility]  MULTIPLE SCLEROSIS & NEUROIMMUNOLOGY  NEW PATIENT VISIT    Referral source: Richmond Castro DO    DISEASE SUMMARY:  Principal neurologic diagnosis: MS  Diagnosis of MS: 1/7/2021  Disease History:  - 12/5/2019: blurry vision, left facial numbness, left upper- and lower extremity numbness; MRI head unremarkable; improved over the course of 1 week  - 12/25/2020: right monocular visual loss; returned to normal over ~2 hours  - 12/29/2020: onset of bilateral lower extremity weakness  - 1/2/202021: presented to hospital; MRI w/ lesions, LP w/ OCBs present; treated with IVMP with good response  Disease course at onset: MS  Current disease course: MS  Previous disease therapies:  - none  Current disease therapies:  - none  Symptomatic therapies:  - none  CSF (1/3/2021)):  - RBCs: 394  - WBCs: 6  - protein: 58  - glucose: 81  - oligoclonal bands: positive (8)  - paraneoplastic autoantibody eval: IN PROCESS  Other Testing:  - anti-AQP4 Ab (1/3/2021): IN PROCESS  - anti-MOG IgG (1/3/2021): negative  - paraneoplastic autoantibody eval, serum (1/4/2020): IN PROCESS  MRI head:  - 1/2/2021: juxta-cortical, periventricular, and infratentorial lesions w/ enhancement  - 12/5/2019: no visible lesions  - 5/13/2018: no visible lesions  MRI cervical spine:  - 1/2/2021: no visible lesions  MRI thoracic spine:  - 1/2/2021: no visible lesions    CC: \"MS\"    HISTORY OF ILLNESS:  Rehana Meade is a 35 y.o. woman with a history most notable for migraines, aseptic meningitis, AF, \"MS,\" and seizure disorder.  Today, she was accompanied by her , and she provided the following history:    Rehana has struggled with migraines her entire life.    8/2015:  Rehana developed \"viral meningitis.\"  She was having a \"water fight\" with her , and suddenly she couldn't breathe.  She fell asleep on the couch and then couldn't breathe again.  She couldn't seem to get air into her lungs.  She was gasping.  She went to " "the ED where she was diagnosed with bronchial spasms.  The following day she was \"exhausted.\"  She had a severe headache.  She went to work and couldn't function.  She went back to the ED and a LP was performed.  CSF analysis showed \"viral meningitis.\"  She was hospitalized for several days and treated conservatively.  She recovered completely.    Since that time Rehana struggled with bouts of fatigue.    2016:  Rehana developed a severe migraine.  The pain localized to the posterior base of the skull, and when severe it would wrap around the right side of the head.  This headache lasted for ~2 years straight.  She was treated with occipital nerve blocks and Botox.    1/2018:  Rehnaa had a \"seizure.\"  She was lying in bed reading when she dropped her tablet.  She curled into the fetal position and \"locked.\"  She couldn't breathe or speak.  She maintained awareness the entire time.  Her  heard her because it sounded like she was crying.  She couldn't make herself move or speak.  If she stopped telling herself to breathe she would stop breathing.  She underwent EEG.  She was started on Keppra but developed mood side effects (barker, anger).  This caused insomnia, so she was switched to zonisamide.    Since the headache in 2016 Rehana has experienced \"spasms\" (the entire body will tense up).  These are large amplitude twitches of her entire body rather than prolonged muscle contractions.  If only involving one part of the body the right side will be effected.    When Rehana is fatigued she becomes confused.  She has difficulty remembering time and maintaining a train of thought.    2018:  Rehana started Aimovig.  Her headaches have been much better since starting this drug.    12/5/2019:  Rehana experienced a migraine which mimicked a stroke.  Her vision became blurry (doesn't recall the pattern of visual symptoms), and the left side of her face became numb.  The numbness spread to involve the left " "upper- and later the left lower extremities.  She went to the ED and had a stroke workup.  She was ultimately diagnosed with migraine with aura.  It took her approximately 1 week to recover fully.    Fall 2020:  Maggies migraines became more frequent.  They went from once monthly to twice monthly.    12/10/2020:  Rehana had a severe migraine.  She developed blurry vision and nausea.  She became very confused.    12/25/2020:  Rehana lost vision in the right eye.  The vision was \"completely black\" in the right eye.  There was pressure behind the right eye.  She covered each eye in succession.  After two hours her vision returned to normal.  The following morning she felt better.    12/29-30/2020:  Rehana's legs became weak.  The left leg \"gave out.\"  The right leg started to \"drag.\"  This worsened over a period of days.    1/2/2021:  Rehana presented to the hospital.  Workup included MRIs and a LP.  She was diagnosed with MS and treated with high-dose IVMP.  Now, her legs have regained function, though she still requires a walker due to fatigue.    MEDICAL AND SURGICAL HISTORY:  Past Medical History:   Diagnosis Date   • Anesthesia     woke up combative   • Anxiety associated with depression    • ASTHMA    • Drug-seeking behavior    • Migraine without aura, without mention of intractable migraine without mention of status migrainosus    • Other specified symptom associated with female genital organs    • Seizure (HCC)     Last seizure was 1/2019   • Seizure cerebral (HCC) 2/15/2018   • Stroke ([facility])    • Unspecified disorder of thyroid     cyst     Past Surgical History:   Procedure Laterality Date   • VAGINAL HYSTERECTOMY TOTAL  1/27/2020    Procedure: HYSTERECTOMY, TOTAL, VAGINAL;  Surgeon: Julian Parker M.D.;  Location: SURGERY SAME DAY [facility];  Service: Gynecology   • CYSTOSCOPY  1/27/2020    Procedure: CYSTOSCOPY;  Surgeon: Julian Parker M.D.;  Location: SURGERY SAME DAY [facility];  Service: " Gynecology   • TUBAL COAGULATION LAPAROSCOPIC BILATERAL  7/11/2014    Performed by Julian Parker M.D. at SURGERY SAME DAY [facility] ORS   • SEPTOPLASTY  10/28/2009    Performed by ANNETTE FORDE at SURGERY [facility] ORS   • SOMNOPLASTY  10/28/2009    Performed by ANNETTE FORDE at SURGERY [facility] ORS   • NASAL POLYPECTOMY  10/28/2009    Performed by ANNETTE FORDE at SURGERY [facility] ORS     MEDICATIONS:  Current Outpatient Medications   Medication Sig   • butalbital/apap/caffeine -40 mg (FIORICET) -40 MG Tab Take 2 Tabs by mouth every 12 hours as needed for Headache or Migraine for up to 5 days.     SOCIAL HISTORY:  Social History     Tobacco Use   • Smoking status: Never Smoker   • Smokeless tobacco: Never Used   Substance Use Topics   • Alcohol use: Not Currently     Comment: rarely     Social History     Social History Narrative   • Not on file     FAMILY HISTORY:  No family history on file.  REVIEW OF SYSTEMS:  A ROS was completed.  Pertinent positives and negatives were included in the HPI, above.  All other systems were reviewed and are negative.    PHYSICAL EXAM:  General/Medical:  - NAD  - hair, skin, nails, and joints were normal  - neck was supple without Lhermitte's phenomenon  - heart rate and rhythm were regular    Neuro:  MENTAL STATUS: awake and alert; no deficits of speech or language; oriented to person, place, and time; affect was appropriate to situation    CRANIAL NERVES:    II: acuity was: J1+/J1+; fields intact to confrontation; pupils 3/3 to 2/2 without a relative afferent pupillary defect; discs sharp; no red desaturation noted    III/IV/VI: versions intact without nystagmus    V: facial sensation symmetric to light touch    VII: facial expression symmetric    VIII: hearing intact to finger rub    IX/X: palate elevates symmetrically    XI: shoulder shrug symmetric    XII: tongue midline    MOTOR:  - bulk and tone were normal throughout  Upper Extremity  "Strength  (R/L)    5/5   Elbow flexion 5/5   Elbow extension 5/5   Shoulder abduction 5/5     Lower Extremity Strength  (R/L)   Hip flexion 5/5   Knee extension 5/5   Knee flexion 5/5   Ankle plantarflexion 5/5   Ankle dorsiflexion 5/5     - can walk on toes and heels  - no pronator drift; no abnormal movements    SENSATION:  - light touch: grossly intact in the upper and lower extremities  - vibration (R/L, seconds): 15/12 at the great toes  - pinprick: NT  - proprioception: NT  - Romberg: absent    COORDINATION:  - finger to nose was normal, no ataxia on exam  - finger tapping was rapid and accurate, bilaterally    REFLEXES:  Reflex Right Left   BR 2+ 2+   Biceps 2+ 2+   Triceps 2+ 2+   Patellae 2+ 2+   Achilles 2+ 2+   Toes down down     GAIT:  - narrow base and normal  - heel-raised and toe-raised gait: intact  - tandem gait: intact    QUANTITATIVE SCORES:  Timed 25-foot walk (sec): 4.8 on 1/7/2021.  Assistive device: none    REVIEW OF IMAGING STUDIES: I reviewed the following studies:  MRI Brain:  Date: 1/2/2021  W/o and w/ contrast?: yes  Indication: \"headache, chronic, with new features; loss of vision in the right eye one week ago, now resolved; difficulty walking; migraine headaches for one month\"  Comparison: MRI brain 12/4/2019  Impression:  \"1) Borderline low-lying cerebellar tonsils extending about 2 mm below the foramen magnum. This does not satisfy criteria for Chiari I malformation.  2) Multiple supratentorial white matter lesions with morphology and enhancement most consistent with active demyelinating disease such as multiple sclerosis.  3) Posterior fossa demyelinating lesions noted in the left middle cerebellar peduncle (enhancing) and left paramedian upper medulla near the pontomedullary junction (nonenhancing).\"    Date: 12/5/2019  W/o and w/ contrast?: without  Indication: \"facial numbness, r/o stroke\"  Comparison: 5/3/2018  Impression:  \"1) Low-lying cerebellar tonsils consistent with " "Chiari I malformation.There has been no significant interval change.  2) There is no evidence of hippocampal sclerosis.  3) No acute abnormality.  4) Paranasal sinus inflammatory disease.\"    Date: 5/13/2018  W/o and w/ contrast?: without  Indication: \"follow-up fori Chiari I malformation\"  Comparison: 2/24/2018  Impression:  \"1) Redemonstrated findings consistent with Chiari I malformation.  2) Findings of sinusitis as described above.\"    MRI Cervical Spine:  Date: 1/2/2021  W/o and w/ contrast?: yes  Indication: \"neck pain, abnormal neuro exam\"  Comparison: MRI thoracic spine from today's date  Impression:  \"1) BORDERLINE LOW-LYING CEREBELLAR TONSILS EXTENDING ABOUT 2 MM BELOW THE FORAMEN MAGNUM. THIS DOES NOT SATISFY CRITERIA FOR CHIARI I MALFORMATION.  2) DEMYELINATING LESION AGAIN SEEN IN THE LEFT MIDDLE CEREBELLAR PEDUNCLE AND ADDITIONAL SUBTLE DEMYELINATING LESION IN THE LEFT PARAMEDIAN UPPER MEDULLA NEAR THE PONTOMEDULLARY JUNCTION.  3) OTHERWISE, MRI OF THE CERVICAL SPINE WITHOUT AND WITH CONTRAST WITHIN NORMAL LIMITS. NO EVIDENCE OF DEMYELINATING DISEASE IN THE CERVICAL SPINAL CORD.\"    MRI Thoracic Spine:  Date: 1/2/2021  W/o and w/ contrast?: yes  Indication: \"mid-back pain; leg and arm weakness\"  Comparison: MRI thoracic spine 5/13/2018  Impression:  \"1) T9 vertebral body hemangioma with no change from prior exam. No clinical significance.  2) Otherwise, unremarkable MRI thoracic spine with no significant disc bulge or protrusion, central stenosis, foraminal stenosis, or myelopathic cord signal abnormality. No evidence of demyelinating disease in the thoracic spinal cord.\"    REVIEW OF LABORATORY STUDIES:  Summarized above.    ASSESSMENT:  Rehana Meade is a 35 y.o. woman with CIS (early MS).  Rehana has had a large number and a wide range of symptoms over the years.  Interestingly, MRI of the brain was normal on 12/5/2019 and only first demonstrated MS-typical lesions on 1/2/2021.  In " other words, I don't think that all of Rehana's symptoms can be attributed to MS.  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  Given the presence of oligoclonal bands, Rehana's risk for conversion to clinically definite MS within the next 5 years is likely quite high (approaching 90%).  I will see her back in clinic in approximately 1 month in order to discuss immunotherapy options.  I will likely recommend one of the oral agents.  Also plan to follow up the results of outstanding CSF and serum studies, as outlined above.    PLAN:  Clinically Isolated Syndrome/Early MS:  - follow up results of outstanding CSF and serum studies    Follow-Up:  - Return in about 4 weeks (around 2/4/2021).    Signed: Rajeev Aggarwal M.D. at 12:48 PM on 01/07/21

## 2021-01-07 NOTE — DISCHARGE INSTRUCTIONS
Discharge Instructions    Discharged to home by car with relative. Discharged via wheelchair, hospital escort: Yes.  Special equipment needed:Walker at bedside    Be sure to schedule a follow-up appointment with your primary care doctor or any specialists as instructed.     Discharge Plan:   Diet Plan: Discussed  Activity Level: Discussed  Confirmed Follow up Appointment: Patient to Call and Schedule Appointment  Confirmed Symptoms Management: Discussed  Medication Reconciliation Updated: Yes  Influenza Vaccine Indication: Patient Refuses    I understand that a diet low in cholesterol, fat, and sodium is recommended for good health. Unless I have been given specific instructions below for another diet, I accept this instruction as my diet prescription.   Other diet: Regular diet    Special Instructions: None    · Is patient discharged on Warfarin / Coumadin?   No       Multiple Sclerosis  Multiple sclerosis (MS) is a disease of the brain, spinal cord, and optic nerves (central nervous system). It causes the body's disease-fighting (immune) system to destroy the protective covering (myelin sheath) around nerves in the brain. When this happens, signals (nerve impulses) going to and from the brain and spinal cord do not get sent properly or may not get sent at all.  There are several types of MS:  · Relapsing-remitting MS. This is the most common type. This causes sudden attacks of symptoms. After an attack, you may recover completely until the next attack, or some symptoms may remain permanently.  · Secondary progressive MS. This usually develops after the onset of relapsing-remitting MS. Similar to relapsing-remitting MS, this type also causes sudden attacks of symptoms. Attacks may be less frequent, but symptoms slowly get worse (progress) over time.  · Primary progressive MS. This causes symptoms that steadily progress over time. This type of MS does not cause sudden attacks of symptoms.  The age of onset of MS  varies, but it often develops between 20-40 years of age. MS is a lifelong (chronic) condition. There is no cure, but treatment can help slow down the progression of the disease.  What are the causes?  The cause of this condition is not known.  What increases the risk?  You are more likely to develop this condition if:  · You are a woman.  · You have a relative with MS. However, the condition is not passed from parent to child (inherited).  · You have a lack (deficiency) of vitamin D.  · You smoke.  MS is more common in the [place] than in the [place].  What are the signs or symptoms?  Relapsing-remitting and secondary progressive MS cause symptoms to occur in episodes or attacks that may last weeks to months. There may be long periods between attacks in which there are almost no symptoms. Primary progressive MS causes symptoms to steadily progress after they develop.  Symptoms of MS vary because of the many different ways it affects the central nervous system. The main symptoms include:  · Vision problems and eye pain.  · Numbness.  · Weakness.  · Inability to move your arms, hands, feet, or legs (paralysis).  · Balance problems.  · Shaking that you cannot control (tremors).  · Muscle spasms.  · Problems with thinking (cognitive changes).  MS can also cause symptoms that are associated with the disease, but are not always the direct result of an MS attack. They may include:  · Inability to control urination or bowel movements (incontinence).  · Headaches.  · Fatigue.  · Inability to tolerate heat.  · Emotional changes.  · Depression.  · Pain.  How is this diagnosed?  This condition is diagnosed based on:  · Your symptoms.  · A neurological exam. This involves checking central nervous system function, such as nerve function, reflexes, and coordination.  · MRIs of the brain and spinal cord.  · Lab tests, including a lumbar puncture that tests the fluid that surrounds the brain and spinal  cord (cerebrospinal fluid).  · Tests to measure the electrical activity of the brain in response to stimulation (evoked potentials).  How is this treated?  There is no cure for MS, but medicines can help decrease the number and frequency of attacks and help relieve nuisance symptoms. Treatment options may include:  · Medicines that reduce the frequency of attacks. These medicines may be given by injection, by mouth (orally), or through an IV.  · Medicines that reduce inflammation (steroids). These may provide short-term relief of symptoms.  · Medicines to help control pain, depression, fatigue, or incontinence.  · Vitamin D, if you have a deficiency.  · Using devices to help you move around (assistive devices), such as braces, a cane, or a walker.  · Physical therapy to strengthen and stretch your muscles.  · Occupational therapy to help you with everyday tasks.  · Alternative or complementary treatments such as exercise, massage, or acupuncture.  Follow these instructions at home:  · Take over-the-counter and prescription medicines only as told by your health care provider.  · Do not drive or use heavy machinery while taking prescription pain medicine.  · Use assistive devices as recommended by your physical therapist or your health care provider.  · Exercise as directed by your health care provider.  · Return to your normal activities as told by your health care provider. Ask your health care provider what activities are safe for you.  · Reach out for support. Share your feelings with friends, family, or a support group.  · Keep all follow-up visits as told by your health care provider and therapists. This is important.  Where to find more information  · National Multiple Sclerosis Society: https://www.nationalmssociety.org  Contact a health care provider if:  · You feel depressed.  · You develop new pain or numbness.  · You have tremors.  · You have problems with sexual function.  Get help right away if:  · You  develop paralysis.  · You develop numbness.  · You have problems with your bladder or bowel function.  · You develop double vision.  · You lose vision in one or both eyes.  · You develop suicidal thoughts.  · You develop severe confusion.  If you ever feel like you may hurt yourself or others, or have thoughts about taking your own life, get help right away. You can go to your nearest emergency department or call:  · Your local emergency services (911 in the [place]  · A suicide crisis helpline, such as the National Suicide Prevention Lifeline at 1-678.367.6472. This is open 24 hours a day.  Summary  · Multiple sclerosis (MS) is a disease of the central nervous system that causes the body's immune system to destroy the protective covering (myelin sheath) around nerves in the brain.  · There are 3 types of MS: relapsing-remitting, secondary progressive, and primary progressive. Relapsing-remitting and secondary progressive MS cause symptoms to occur in episodes or attacks that may last weeks to months. Primary progressive MS causes symptoms to steadily progress after they develop.  · There is no cure for MS, but medicines can help decrease the number and frequency of attacks and help relieve nuisance symptoms. Treatment may also include physical or occupational therapy.  · If you develop numbness, paralysis, vision problems, or other neurological symptoms, get help right away.  This information is not intended to replace advice given to you by your health care provider. Make sure you discuss any questions you have with your health care provider.  Document Released: 12/15/2001 Document Revised: 11/30/2018 Document Reviewed: 02/26/2018  Elsevier Patient Education © 2020 Elsevier Inc.      Depression / Suicide Risk    As you are discharged from this [facility] facility, it is important to learn how to keep safe from harming yourself.    Recognize the warning signs:  · Abrupt changes in personality, positive or  negative- including increase in energy   · Giving away possessions  · Change in eating patterns- significant weight changes-  positive or negative  · Change in sleeping patterns- unable to sleep or sleeping all the time   · Unwillingness or inability to communicate  · Depression  · Unusual sadness, discouragement and loneliness  · Talk of wanting to die  · Neglect of personal appearance   · Rebelliousness- reckless behavior  · Withdrawal from people/activities they love  · Confusion- inability to concentrate     If you or a loved one observes any of these behaviors or has concerns about self-harm, here's what you can do:  · Talk about it- your feelings and reasons for harming yourself  · Remove any means that you might use to hurt yourself (examples: pills, rope, extension cords, firearm)  · Get professional help from the community (Mental Health, Substance Abuse, psychological counseling)  · Do not be alone:Call your Safe Contact- someone whom you trust who will be there for you.  · Call your local CRISIS HOTLINE 240-2158 or 468-905-7885  · Call your local Children's Mobile Crisis Response Team [place] (980) 181-3753 or www.[employer]  · Call the toll free National Suicide Prevention Hotlines   · National Suicide Prevention Lifeline 520-980-QPSN (7881)  · National Hope Line Network 800-SUICIDE (564-1459)

## 2021-01-07 NOTE — DISCHARGE SUMMARY
Discharge Summary    CHIEF COMPLAINT ON ADMISSION  Chief Complaint   Patient presents with   • Other     Pt complains of loss of vision in Right eye (1 week ago, now resolved) difficulty walking, and migraines x1 month. Pt consulted her Neuro Surgeon who has treated her for chiari malformation and was told to come to ER.    • Back Pain   • Head Pain       Reason for Admission  leg weakness, migraines, right eye*     Admission Date  1/2/2021    CODE STATUS  Full Code    HPI & HOSPITAL COURSE  35 y.o. female past medical history of viral meningitis 2 years ago complicated by seizures, Budd-Chiari malformation, new diagnosis of CNS demyelinating disease suspected to be multiple sclerosis who presented 1/2/2021 with complaints of headaches, weakness, vision changes, falls.    MRI brain revealed borderline low-lying cerebellar tonsils extending almost 2 mm below foramen magnum does not satisfy criteria for Chiari I malformation.  Also shows multiple supratentorial white matter lesions with morphology and has been most consistent with active demyelinating disease such as multiple sclerosis, posterior fossa demyelinating lesions also noted in the left middle cerebellar peduncle and left paramedian upper medulla near pontomedullary junction.    Neurology was consulted and she was started on Solu-Medrol and has completed a 5-day course of this.  LP was done which was unremarkable.  She has been evaluated by PT/OT and home health was recommended however patient declined this.  She has been seen by physiatry and is cleared for discharge home with 24/7 supervision which patient states her  can provide.    Therefore, she is discharged in good and stable condition to home with close outpatient follow-up.    The patient met 2-midnight criteria for an inpatient stay at the time of discharge.    Discharge Date  1/7/2021    FOLLOW UP ITEMS POST DISCHARGE  Follow-up with neurology and outpatient physiatry    DISCHARGE  DIAGNOSES  Principal Problem:    MS (multiple sclerosis) ([facility]) POA: Yes  Active Problems:    Migraines POA: Yes    Leukocytosis POA: Yes    Seizure disorder (HCC) POA: Yes    Nausea POA: Unknown    History of Chiari malformation POA: Yes  Resolved Problems:    * No resolved hospital problems. *      FOLLOW UP  Future Appointments   Date Time Provider Department Center   1/7/2021  4:20 PM Rajeev Aggarwal M.D. [facility] None     Mack Bernard M.D.  [address] [facility]  [address]  [facility] [address]  [phone]    Go on 1/15/2021  Please arrive @8:15 for your 8:30am apt. Please bring your discharge paper work to visit. The office will be sending you paper work via email & text msg, please complete prior to visit. Thank you.    Richmond Castro D.O.  [address]  [facility] [address] [phone]    Call in 2 days        MEDICATIONS ON DISCHARGE     Medication List      START taking these medications      Instructions   butalbital/apap/caffeine -40 mg -40 MG Tabs  Commonly known as: Fioricet   Take 2 Tabs by mouth every 12 hours as needed for Headache or Migraine for up to 5 days.  Dose: 2 Tab        CONTINUE taking these medications      Instructions   Aimovig 70 MG/ML Soaj  Generic drug: Erenumab   Inject 70 mg as instructed Q30 DAYS.  Dose: 70 mg     topiramate 100 MG Tabs  Commonly known as: TOPAMAX   Take 100 mg by mouth 2 times a day.  Dose: 100 mg     zonisamide 100 MG Caps  Commonly known as: ZONEGRAN   Take 200 mg by mouth every evening.  Dose: 200 mg        STOP taking these medications    ibuprofen 600 MG Tabs  Commonly known as: MOTRIN            Allergies  Allergies   Allergen Reactions   • Amitriptyline Unspecified     Suicidal   • Clarithromycin Hives   • Sulfa Drugs Anaphylaxis   • Cantaloupe Itching   • Honey Dew Itching   • Latex Itching   • Lorazepam Unspecified     Hallucinations     • Metoclopramide Unspecified     Muscle spasms   • Penicillin G Potassium Vomiting   • Rizatriptan Unspecified     " Tremors, confusion     • Sumatriptan Unspecified     \"Makes my head pins and needles\"   • Tape Rash   • Morphine Hives and Itching       DIET  Orders Placed This Encounter   Procedures   • Diet Order Diet: Regular     Standing Status:   Standing     Number of Occurrences:   1     Order Specific Question:   Diet:     Answer:   Regular [1]       ACTIVITY  As tolerated.  Weight bearing as tolerated    CONSULTATIONS  Neurology  Physiatry    PROCEDURES  LP    LABORATORY  Lab Results   Component Value Date    SODIUM 137 01/06/2021    POTASSIUM 3.6 01/06/2021    CHLORIDE 111 01/06/2021    CO2 17 (L) 01/06/2021    GLUCOSE 127 (H) 01/06/2021    BUN 13 01/06/2021    CREATININE 0.73 01/06/2021    CREATININE 0.9 05/04/2009        Lab Results   Component Value Date    WBC 13.6 (H) 01/06/2021    HEMOGLOBIN 14.0 01/06/2021    HEMATOCRIT 42.6 01/06/2021    PLATELETCT 262 01/06/2021        Total time of the discharge process exceeds 42 minutes.  "

## 2021-01-07 NOTE — DISCHARGE PLANNING
[facility] Coordination     Received request from CHRISTINA Srinivasan to review for IRF potential.  Current documentation reflects limited therapy need for IRF level therapy regimen

## 2021-01-07 NOTE — DISCHARGE PLANNING
Received Choice form at [phone]  Agency/Facility Name: [facility]  Referral sent per Choice form @ [phone]

## 2021-01-07 NOTE — PROGRESS NOTES
D/C'd.  Discharge instructions provided to pt.  Pt states understanding.  Pt states all questions have been answered.  Copy of discharge provided to pt.  Signed copy in chart.  Awaiting pt's ride home.

## 2021-01-08 LAB
OLIGOCLONAL BANDS CSF ELPH-IMP: ABNORMAL
OLIGOCLONAL BANDS CSF IEF: 8 BANDS (ref 0–1)
OLIGOCLONAL BANDS.IT SER+CSF QL: POSITIVE

## 2021-01-08 NOTE — PROGRESS NOTES
Midline removed. Physiatry will call patient and make appt and pt has neurology appt this afternoon. Pt's  picked up patient and pt wheeled off unit with walker.

## 2021-01-11 LAB — AQP4 H2O CHANNEL AB SERPL IA-ACNC: <1.5 U/ML

## 2021-01-12 ENCOUNTER — TELEPHONE (OUTPATIENT)
Dept: NEUROLOGY | Facility: MEDICAL CENTER | Age: 36
End: 2021-01-12

## 2021-01-12 NOTE — TELEPHONE ENCOUNTER
----- Message from Isabelle Mi, Med Ass't sent at 1/12/2021  8:43 AM PST -----  Regarding: Patient Call  Hey,  This pt called yesterday and said that she is still experiencing right facial numbness, left hand numbness, and a tingling sensation in her right hand. She finished her steroids but while taking them she wasn't sure if she was having a reaction to it but the skin on her chest felt bruised and her back hurt between her shoulder blades. She isn't sure what to do next.     Let me know what you want me to tell this patient.    Thanks  Isabelle

## 2021-01-12 NOTE — TELEPHONE ENCOUNTER
I spoke to Rehana.    She has new sensory symptoms involving the face and upper extremity.  These are not bothersome enough to warrant re-admission to the hospital.    We discussed treatment approaches, including:  - observation  - oral steroids (prednisone 1,000 mg PO x3-5 days)  - IVMP (1,000 mg x3-5 days) administered in the infusion center    We agreed to monitor symptoms for now.  Some of Rehana's symptoms may be a consequence of steroid discontinuation.    Rajeev Aggarwal M.D.

## 2021-01-13 LAB
TEST NAME 95000: NORMAL
TEST NAME 95000: NORMAL

## 2021-01-14 DIAGNOSIS — G35 MS (MULTIPLE SCLEROSIS) (HCC): Primary | ICD-10-CM

## 2021-01-14 RX ORDER — PREDNISONE 50 MG/1
500 TABLET ORAL DAILY
Qty: 100 TAB | Refills: 0 | Status: SHIPPED | OUTPATIENT
Start: 2021-01-14 | End: 2021-01-24

## 2021-01-15 NOTE — PROGRESS NOTES
"I spoke to Rehana.  Her lower extremity symptoms have worsened to the extent that her legs are \"giving out.\"  We agreed upon another short course of steroids.  She will try prednisone 500 mg daily x2 days.  If she is feeling better after that, she will take 100 mg daily x2 days, and we will discuss additional plans on Monday.    Rajeev Aggarwal M.D.  "

## 2021-01-19 ENCOUNTER — TELEPHONE (OUTPATIENT)
Dept: NEUROLOGY | Facility: MEDICAL CENTER | Age: 36
End: 2021-01-19

## 2021-01-19 DIAGNOSIS — G35 MS (MULTIPLE SCLEROSIS) (HCC): Primary | ICD-10-CM

## 2021-01-20 NOTE — TELEPHONE ENCOUNTER
I spoke to Rehana.  I suspect her symptoms (diffuse pain, tenderness where lines had been placed in the hospital) are a consequence of coming down on steroids.  She should feel better as time passes.    We will be following up in clinic soon.  I ordered pre-treatment testing labs which will guide our choice of immunotherapy.    Rajeev Aggarwal M.D.

## 2021-01-20 NOTE — TELEPHONE ENCOUNTER
----- Message from Isabelle Mi Med Ass't sent at 1/19/2021  8:22 AM PST -----  Regarding: FW: Prescription Question  Contact: [phone]  Please advise  ----- Message -----  From: Rehana Alix Ortizier  Sent: 1/17/2021   9:10 AM PST  To: Neurology Mas  Subject: Prescription Question                            So my legs are better during the day but evening hits once I'm tired from the day and I need help around the house. I'm am sleeping ok during the night but getting up to use the bathroom frequently and my legs almost at the worst they were when I was in the hospital. I'm torn on if I should do 100mg or 1000mg of the steriods. I'm thinking maybe try going down to 100mg and see how I am tomorrow during the day as they are only really bad at this point when I am tired which would make sense as my brain is drained. Hoping you see this if not, I'll try the 100mg and get with you on Monday.

## 2021-01-26 ENCOUNTER — HOSPITAL ENCOUNTER (OUTPATIENT)
Dept: LAB | Facility: MEDICAL CENTER | Age: 36
End: 2021-01-26
Attending: PSYCHIATRY & NEUROLOGY
Payer: COMMERCIAL

## 2021-01-26 DIAGNOSIS — G35 MS (MULTIPLE SCLEROSIS) (HCC): ICD-10-CM

## 2021-01-26 LAB
HAV IGM SERPL QL IA: NORMAL
HBV CORE IGM SER QL: NORMAL
HBV SURFACE AG SER QL: NORMAL
HCV AB SER QL: NORMAL

## 2021-01-26 PROCEDURE — 86355 B CELLS TOTAL COUNT: CPT

## 2021-01-26 PROCEDURE — 82784 ASSAY IGA/IGD/IGG/IGM EACH: CPT

## 2021-01-26 PROCEDURE — 86711 JOHN CUNNINGHAM ANTIBODY: CPT

## 2021-01-26 PROCEDURE — 86480 TB TEST CELL IMMUN MEASURE: CPT

## 2021-01-26 PROCEDURE — 86357 NK CELLS TOTAL COUNT: CPT

## 2021-01-26 PROCEDURE — 86787 VARICELLA-ZOSTER ANTIBODY: CPT

## 2021-01-26 PROCEDURE — 86359 T CELLS TOTAL COUNT: CPT

## 2021-01-26 PROCEDURE — 80074 ACUTE HEPATITIS PANEL: CPT

## 2021-01-26 PROCEDURE — 36415 COLL VENOUS BLD VENIPUNCTURE: CPT

## 2021-01-26 PROCEDURE — 86360 T CELL ABSOLUTE COUNT/RATIO: CPT

## 2021-01-26 PROCEDURE — 369999 HCHG MISC LAB CHARGE

## 2021-01-27 LAB — VZV IGG SER IA-ACNC: 1.81

## 2021-01-27 PROCEDURE — 36415 COLL VENOUS BLD VENIPUNCTURE: CPT

## 2021-01-28 LAB
ANNOTATION COMMENT IMP: NORMAL
CD19 CELLS NFR SPEC: 18 % (ref 6–23)
CD3 CELLS # BLD: 1991 CELLS/UL (ref 570–2400)
CD3 CELLS NFR SPEC: 69 % (ref 62–87)
CD3+CD4+ CELLS # BLD: 1100 CELLS/UL (ref 430–1800)
CD3+CD4+ CELLS NFR BLD: 38 % (ref 32–64)
CD3+CD4+ CELLS/CD3+CD8+ CLL BLD: 1.58 RATIO (ref 0.8–3.9)
CD3+CD8+ CELLS # BLD: 700 CELLS/UL (ref 210–1200)
CD3+CD8+ CELLS NFR SPEC: 24 % (ref 15–46)
CD3-CD16+CD56+ CELLS # SPEC: 341 CELLS/UL (ref 78–470)
CD3-CD16+CD56+ CELLS NFR SPEC: 12 % (ref 4–26)
CELLS.CD3-CD19+ [#/VOLUME] IN BLOOD: 526 CELLS/UL (ref 91–610)
IGA SERPL-MCNC: 315 MG/DL (ref 68–408)
IGG SERPL-MCNC: 1060 MG/DL (ref 768–1632)
IGM SERPL-MCNC: 130 MG/DL (ref 35–263)

## 2021-01-29 ENCOUNTER — OFFICE VISIT (OUTPATIENT)
Dept: NEUROLOGY | Facility: MEDICAL CENTER | Age: 36
End: 2021-01-29
Attending: PSYCHIATRY & NEUROLOGY
Payer: COMMERCIAL

## 2021-01-29 VITALS
TEMPERATURE: 97.9 F | HEART RATE: 85 BPM | OXYGEN SATURATION: 98 % | HEIGHT: 63 IN | SYSTOLIC BLOOD PRESSURE: 110 MMHG | WEIGHT: 176.37 LBS | DIASTOLIC BLOOD PRESSURE: 74 MMHG | BODY MASS INDEX: 31.25 KG/M2

## 2021-01-29 DIAGNOSIS — G43.901 MIGRAINE WITH STATUS MIGRAINOSUS, NOT INTRACTABLE, UNSPECIFIED MIGRAINE TYPE: Primary | ICD-10-CM

## 2021-01-29 LAB
GAMMA INTERFERON BACKGROUND BLD IA-ACNC: 0.05 IU/ML
M TB IFN-G BLD-IMP: NEGATIVE
M TB IFN-G CD4+ BCKGRND COR BLD-ACNC: 0 IU/ML
MITOGEN IGNF BCKGRD COR BLD-ACNC: >10 IU/ML
QFT TB2 - NIL TBQ2: 0.01 IU/ML

## 2021-01-29 PROCEDURE — 99417 PROLNG OP E/M EACH 15 MIN: CPT | Performed by: PSYCHIATRY & NEUROLOGY

## 2021-01-29 PROCEDURE — 99211 OFF/OP EST MAY X REQ PHY/QHP: CPT | Performed by: PSYCHIATRY & NEUROLOGY

## 2021-01-29 PROCEDURE — 99215 OFFICE O/P EST HI 40 MIN: CPT | Performed by: PSYCHIATRY & NEUROLOGY

## 2021-01-29 RX ORDER — PROCHLORPERAZINE MALEATE 10 MG
10 TABLET ORAL EVERY 6 HOURS PRN
Qty: 30 TAB | Refills: 5 | Status: SHIPPED | OUTPATIENT
Start: 2021-01-29 | End: 2021-02-28

## 2021-01-29 ASSESSMENT — FIBROSIS 4 INDEX: FIB4 SCORE: 0.33

## 2021-01-29 NOTE — PROGRESS NOTES
[facility]  MULTIPLE SCLEROSIS & NEUROIMMUNOLOGY  FOLLOW-UP VISIT    DISEASE SUMMARY:  Principal neurologic diagnosis: MS  Diagnosis of MS: 1/7/2021  Disease History:  - 12/5/2019: blurry vision, left facial numbness, left upper- and lower extremity numbness; MRI head unremarkable; improved over the course of 1 week  - 12/25/2020: right monocular visual loss; returned to normal over ~2 hours  - 12/29/2020: onset of bilateral lower extremity weakness  - 1/2/202021: presented to hospital; MRI w/ lesions, LP w/ OCBs present; treated with IVMP with good response  Disease course at onset: MS  Current disease course: MS  Previous disease therapies:  - none  Current disease therapies:  - none  Symptomatic therapies:  - none  CSF (1/3/2021)):  - RBCs: 394  - WBCs: 6  - protein: 58  - glucose: 81  - oligoclonal bands: positive (8)  - paraneoplastic autoantibody eval: negative  Other Testing:  - anti-AQP4 Ab (1/3/2021): negative  - anti-MOG IgG (1/3/2021): negative  - paraneoplastic autoantibody eval, serum (1/4/2020): negative  MRI head:  - 1/2/2021: juxta-cortical, periventricular, and infratentorial lesions w/ enhancement  - 12/5/2019: no visible lesions  - 5/13/2018: no visible lesions  MRI cervical spine:  - 1/2/2021: no visible lesions  MRI thoracic spine:  - 1/2/2021: no visible lesions    CC: MS    INTERVAL HISTORY:  Rehana Meade is a 35 y.o. woman with MS and a history otherwise notable for migraines, aseptic meningitis, and seizure disorder.  I last saw her in the MS Clinic on 1/7/2021.  At that time I recommended following up the results of pending blood work (OCBs and paraneoplastic panels).  Today, she was accompanied by her , and she provided the following interval history:    Rehana has not experienced any new or worsened neurologic symptoms since we last spoke on the phone.  She completed blood work a few days ago.    MEDICATIONS:  Current Outpatient Medications   Medication Sig   •  prochlorperazine (COMPAZINE) 10 MG Tab Take 1 Tab by mouth every 6 hours as needed for up to 30 days.   • topiramate (TOPAMAX) 100 MG Tab Take 100 mg by mouth 2 times a day.   • AIMOVIG 70 MG/ML Solution Auto-injector Inject 70 mg as instructed Q30 DAYS.   • zonisamide (ZONEGRAN) 100 MG Cap Take 200 mg by mouth every evening.     MEDICAL, SOCIAL, AND FAMILY HISTORY:  There is no change in the patient's ROS or PFSH from their previous visit on 1/7/2021.    REVIEW OF SYSTEMS:  A ROS was completed.  Pertinent positives and negatives were included in the HPI, above.  All other systems were reviewed and are negative.    PHYSICAL EXAM:  General/Medical:  - NAD    Neuro:  MENTAL STATUS: awake and alert; no deficits of speech or language; oriented to person, place, and time; affect was appropriate to situation; pleasant, cooperative     CRANIAL NERVES:    II: acuity was: NT; fields: NT; pupils 3/3 to 2/2 without a relative afferent pupillary defect; discs: NT    III/IV/VI: versions intact without nystagmus    V: facial sensation symmetric to light touch    VII: facial expression symmetric    VIII: hearing intact to finger rub    IX/X: palate elevates symmetrically    XI: shoulder shrug symmetric    XII: tongue midline     MOTOR:  - bulk and tone were normal throughout  Upper Extremity Strength  (R/L)    NT   Elbow flexion NT   Elbow extension NT   Shoulder abduction NT      Lower Extremity Strength  (R/L)   Hip flexion NT   Knee extension NT   Knee flexion NT   Ankle plantarflexion NT   Ankle dorsiflexion NT      - no pronator drift; no abnormal movements     SENSATION:  - light touch: grossly intact in the upper and lower extremities  - vibration (R/L, seconds): NT at the great toes  - pinprick: NT  - proprioception: NT  - Romberg: absent     COORDINATION:  - finger to nose was normal, no ataxia on exam  - finger tapping was rapid and accurate, bilaterally     REFLEXES:  Reflex Right Left   BR NT NT   Biceps NT NT    Triceps NT NT   Patellae NT NT   Achilles NT NT   Toes NT NT      GAIT:  - narrow base and normal     QUANTITATIVE SCORES:  Timed 25-foot walk (sec): 4.8 on 1/7/2021.  Assistive device: none    REVIEW OF IMAGING STUDIES:  No additional images since the last visit.    REVIEW OF LABORATORY STUDIES:  Summarized above.    ASSESSMENT:  Rehana Meade is a 35 y.o. woman with multiple sclerosis and migraine.  The focus of today's visit was on immunotherapy selection.  I provided Rehana with an overview of the various immunotherapies.  I explained that the goal of treatment is to prevent new lesions and not to reverse current symptoms.  We discussed the S1P modulators (Gilenya and Mayzent), Tysabri, and Ocrevus in detail.  I explained the various routes of administration, dosing schedule, risks (including PML), required pre-treatment workup, and monitoring.  Ultimately, Rehana decided upon Gilenya.  She completed the enrollment form.  She has already completed the required pre-treatment labs and EKG. I will search for Dr. Webster's notes in order to confirm normal maculae.  Rehana should see a dermatologist for routine skin exams.  I completed paperwork for a handicapped placard.  I prescribed prochlorperazine for nausea associated with headaches.    PLAN:  Multiple Sclerosis:  - plan to start Gilenya  - enrolment form completed  - pre-treatment labs completed (liver function normal, VZV immune)  - EKG completed (and QTc is acceptable)  - follow up with ophthalmology for assessment of maculae  - referral to dermatology  - handicapped placard paperwork completed  - prochlorperazine 10 mg tablets PRN    Follow-Up:  - Return in about 3 months (around 4/29/2021).    Signed: Rajeev Aggarwal M.D. at 6:39 AM on 01/29/21    BILLING DOCUMENTATION:   I spent 56 minutes face-to-face with this patient.  Over 50% of this time was spent on counseling and/or coordination of care wtih the patient and/or family.  Please  "see \"assessment\" above for details of our discussion.  "

## 2021-02-04 DIAGNOSIS — G35 MS (MULTIPLE SCLEROSIS) (HCC): Primary | ICD-10-CM

## 2021-02-08 LAB — TEST NAME 95000: NORMAL

## 2021-02-16 ENCOUNTER — TELEPHONE (OUTPATIENT)
Dept: NEUROLOGY | Facility: MEDICAL CENTER | Age: 36
End: 2021-02-16

## 2021-02-16 DIAGNOSIS — G35 MS (MULTIPLE SCLEROSIS) (HCC): Primary | ICD-10-CM

## 2021-02-16 RX ORDER — PREDNISONE 50 MG/1
500 TABLET ORAL DAILY
Qty: 30 TABLET | Refills: 0 | Status: SHIPPED | OUTPATIENT
Start: 2021-02-16 | End: 2021-02-19

## 2021-02-16 RX ORDER — PREDNISONE 10 MG/1
TABLET ORAL
Qty: 21 TABLET | Refills: 0 | Status: SHIPPED | OUTPATIENT
Start: 2021-02-16 | End: 2021-02-22

## 2021-02-16 NOTE — TELEPHONE ENCOUNTER
"I spoke to Rehana.    She is experiencing the following NEW symptoms:    - numbness which began over the chest and has been spreading to involve the face  - \"deep pain\" involving the lower extremity    We agreed to treat these symptoms under the assumption that this is an MS flair as follows:    - prednisone 500 mg x3 days followed by an oral prednisone taper starting at 60 mg/day and decreasing by 10 mg/day until off.    Rehana anticipates starting Gilenya within the next few weeks.    Rajeev Aggarwal M.D.  "

## 2021-02-18 ENCOUNTER — TELEPHONE (OUTPATIENT)
Dept: NEUROLOGY | Facility: MEDICAL CENTER | Age: 36
End: 2021-02-18

## 2021-02-18 NOTE — TELEPHONE ENCOUNTER
2/18/2021  Pt called to let me know she is going to fax a sheet, that we need to sign confirming her Dx to help get her bathroom remodeled.

## 2021-02-23 ENCOUNTER — APPOINTMENT (OUTPATIENT)
Dept: PHYSICAL MEDICINE AND REHAB | Facility: MEDICAL CENTER | Age: 36
End: 2021-02-23
Payer: COMMERCIAL

## 2021-03-02 ENCOUNTER — TELEPHONE (OUTPATIENT)
Dept: NEUROLOGY | Facility: MEDICAL CENTER | Age: 36
End: 2021-03-02

## 2021-03-02 NOTE — TELEPHONE ENCOUNTER
3/2/2021  pt called to let Dr. Aggarwal know that she started Gilenya on Sunday. Tuesday night she has had severe lower back pains, and she said her heart rate is dropping to the low 60s-50s. Pt also explained that she felt so tired and slept most of the day away. She is wondering if she can get a call from .     Please advise   Thanks

## 2021-03-03 NOTE — TELEPHONE ENCOUNTER
I spoke to Rehana.  She will continue on the Gilenya.  No additional workup or treatment (e.g., steroids) at this time.    Rajeev Aggarwal M.D.

## 2021-03-05 ENCOUNTER — APPOINTMENT (OUTPATIENT)
Dept: RADIOLOGY | Facility: MEDICAL CENTER | Age: 36
End: 2021-03-05
Attending: NURSE PRACTITIONER
Payer: COMMERCIAL

## 2021-03-05 ENCOUNTER — APPOINTMENT (OUTPATIENT)
Dept: RADIOLOGY | Facility: MEDICAL CENTER | Age: 36
End: 2021-03-05
Attending: EMERGENCY MEDICINE
Payer: COMMERCIAL

## 2021-03-05 ENCOUNTER — HOSPITAL ENCOUNTER (OUTPATIENT)
Facility: MEDICAL CENTER | Age: 36
End: 2021-03-09
Attending: EMERGENCY MEDICINE | Admitting: HOSPITALIST
Payer: COMMERCIAL

## 2021-03-05 ENCOUNTER — NURSE TRIAGE (OUTPATIENT)
Dept: HEALTH INFORMATION MANAGEMENT | Facility: OTHER | Age: 36
End: 2021-03-05

## 2021-03-05 DIAGNOSIS — G35 MS (MULTIPLE SCLEROSIS) (HCC): ICD-10-CM

## 2021-03-05 DIAGNOSIS — R20.0 LEFT SIDED NUMBNESS: ICD-10-CM

## 2021-03-05 DIAGNOSIS — R29.898 LEFT LEG WEAKNESS: ICD-10-CM

## 2021-03-05 LAB
25(OH)D3 SERPL-MCNC: 14 NG/ML (ref 30–100)
ALBUMIN SERPL BCP-MCNC: 4 G/DL (ref 3.2–4.9)
ALBUMIN/GLOB SERPL: 1.3 G/DL
ALP SERPL-CCNC: 75 U/L (ref 30–99)
ALT SERPL-CCNC: 11 U/L (ref 2–50)
ANION GAP SERPL CALC-SCNC: 11 MMOL/L (ref 7–16)
AST SERPL-CCNC: 9 U/L (ref 12–45)
BASOPHILS # BLD AUTO: 0.4 % (ref 0–1.8)
BASOPHILS # BLD: 0.03 K/UL (ref 0–0.12)
BILIRUB SERPL-MCNC: 0.3 MG/DL (ref 0.1–1.5)
BUN SERPL-MCNC: 13 MG/DL (ref 8–22)
CALCIUM SERPL-MCNC: 9.1 MG/DL (ref 8.5–10.5)
CHLORIDE SERPL-SCNC: 108 MMOL/L (ref 96–112)
CO2 SERPL-SCNC: 18 MMOL/L (ref 20–33)
CREAT SERPL-MCNC: 0.72 MG/DL (ref 0.5–1.4)
EOSINOPHIL # BLD AUTO: 0.25 K/UL (ref 0–0.51)
EOSINOPHIL NFR BLD: 3.7 % (ref 0–6.9)
ERYTHROCYTE [DISTWIDTH] IN BLOOD BY AUTOMATED COUNT: 49 FL (ref 35.9–50)
GLOBULIN SER CALC-MCNC: 3 G/DL (ref 1.9–3.5)
GLUCOSE BLD-MCNC: 87 MG/DL (ref 65–99)
GLUCOSE SERPL-MCNC: 76 MG/DL (ref 65–99)
HCT VFR BLD AUTO: 47.5 % (ref 37–47)
HGB BLD-MCNC: 15.9 G/DL (ref 12–16)
IMM GRANULOCYTES # BLD AUTO: 0.02 K/UL (ref 0–0.11)
IMM GRANULOCYTES NFR BLD AUTO: 0.3 % (ref 0–0.9)
LYMPHOCYTES # BLD AUTO: 0.73 K/UL (ref 1–4.8)
LYMPHOCYTES NFR BLD: 10.7 % (ref 22–41)
MAGNESIUM SERPL-MCNC: 2.4 MG/DL (ref 1.5–2.5)
MCH RBC QN AUTO: 32 PG (ref 27–33)
MCHC RBC AUTO-ENTMCNC: 33.5 G/DL (ref 33.6–35)
MCV RBC AUTO: 95.6 FL (ref 81.4–97.8)
MONOCYTES # BLD AUTO: 0.74 K/UL (ref 0–0.85)
MONOCYTES NFR BLD AUTO: 10.8 % (ref 0–13.4)
NEUTROPHILS # BLD AUTO: 5.07 K/UL (ref 2–7.15)
NEUTROPHILS NFR BLD: 74.1 % (ref 44–72)
NRBC # BLD AUTO: 0 K/UL
NRBC BLD-RTO: 0 /100 WBC
PLATELET # BLD AUTO: 259 K/UL (ref 164–446)
PMV BLD AUTO: 10.7 FL (ref 9–12.9)
POTASSIUM SERPL-SCNC: 3.7 MMOL/L (ref 3.6–5.5)
PROT SERPL-MCNC: 7 G/DL (ref 6–8.2)
RBC # BLD AUTO: 4.97 M/UL (ref 4.2–5.4)
SARS-COV-2 RNA RESP QL NAA+PROBE: NOTDETECTED
SODIUM SERPL-SCNC: 137 MMOL/L (ref 135–145)
SPECIMEN SOURCE: NORMAL
WBC # BLD AUTO: 6.8 K/UL (ref 4.8–10.8)

## 2021-03-05 PROCEDURE — 70450 CT HEAD/BRAIN W/O DYE: CPT

## 2021-03-05 PROCEDURE — 70496 CT ANGIOGRAPHY HEAD: CPT

## 2021-03-05 PROCEDURE — A9270 NON-COVERED ITEM OR SERVICE: HCPCS | Performed by: HOSPITALIST

## 2021-03-05 PROCEDURE — 96365 THER/PROPH/DIAG IV INF INIT: CPT

## 2021-03-05 PROCEDURE — 96366 THER/PROPH/DIAG IV INF ADDON: CPT

## 2021-03-05 PROCEDURE — 96367 TX/PROPH/DG ADDL SEQ IV INF: CPT

## 2021-03-05 PROCEDURE — 99214 OFFICE O/P EST MOD 30 MIN: CPT | Performed by: PSYCHIATRY & NEUROLOGY

## 2021-03-05 PROCEDURE — 96374 THER/PROPH/DIAG INJ IV PUSH: CPT

## 2021-03-05 PROCEDURE — U0003 INFECTIOUS AGENT DETECTION BY NUCLEIC ACID (DNA OR RNA); SEVERE ACUTE RESPIRATORY SYNDROME CORONAVIRUS 2 (SARS-COV-2) (CORONAVIRUS DISEASE [COVID-19]), AMPLIFIED PROBE TECHNIQUE, MAKING USE OF HIGH THROUGHPUT TECHNOLOGIES AS DESCRIBED BY CMS-2020-01-R: HCPCS

## 2021-03-05 PROCEDURE — 70498 CT ANGIOGRAPHY NECK: CPT

## 2021-03-05 PROCEDURE — 99220 PR INITIAL OBSERVATION CARE,LEVL III: CPT | Performed by: HOSPITALIST

## 2021-03-05 PROCEDURE — 72156 MRI NECK SPINE W/O & W/DYE: CPT

## 2021-03-05 PROCEDURE — 99285 EMERGENCY DEPT VISIT HI MDM: CPT

## 2021-03-05 PROCEDURE — G0378 HOSPITAL OBSERVATION PER HR: HCPCS

## 2021-03-05 PROCEDURE — 700111 HCHG RX REV CODE 636 W/ 250 OVERRIDE (IP): Performed by: NURSE PRACTITIONER

## 2021-03-05 PROCEDURE — A9576 INJ PROHANCE MULTIPACK: HCPCS | Performed by: NURSE PRACTITIONER

## 2021-03-05 PROCEDURE — 36415 COLL VENOUS BLD VENIPUNCTURE: CPT

## 2021-03-05 PROCEDURE — 70553 MRI BRAIN STEM W/O & W/DYE: CPT

## 2021-03-05 PROCEDURE — 82962 GLUCOSE BLOOD TEST: CPT

## 2021-03-05 PROCEDURE — 82306 VITAMIN D 25 HYDROXY: CPT

## 2021-03-05 PROCEDURE — 83735 ASSAY OF MAGNESIUM: CPT

## 2021-03-05 PROCEDURE — 85025 COMPLETE CBC W/AUTO DIFF WBC: CPT

## 2021-03-05 PROCEDURE — 700101 HCHG RX REV CODE 250: Performed by: HOSPITALIST

## 2021-03-05 PROCEDURE — 72157 MRI CHEST SPINE W/O & W/DYE: CPT

## 2021-03-05 PROCEDURE — 700117 HCHG RX CONTRAST REV CODE 255: Performed by: EMERGENCY MEDICINE

## 2021-03-05 PROCEDURE — U0005 INFEC AGEN DETEC AMPLI PROBE: HCPCS

## 2021-03-05 PROCEDURE — 0042T CT-CEREBRAL PERFUSION ANALYSIS: CPT

## 2021-03-05 PROCEDURE — 700102 HCHG RX REV CODE 250 W/ 637 OVERRIDE(OP): Performed by: HOSPITALIST

## 2021-03-05 PROCEDURE — 80053 COMPREHEN METABOLIC PANEL: CPT

## 2021-03-05 PROCEDURE — 93005 ELECTROCARDIOGRAM TRACING: CPT | Performed by: EMERGENCY MEDICINE

## 2021-03-05 PROCEDURE — 700117 HCHG RX CONTRAST REV CODE 255: Performed by: NURSE PRACTITIONER

## 2021-03-05 PROCEDURE — 71045 X-RAY EXAM CHEST 1 VIEW: CPT

## 2021-03-05 PROCEDURE — 96375 TX/PRO/DX INJ NEW DRUG ADDON: CPT

## 2021-03-05 RX ORDER — POLYETHYLENE GLYCOL 3350 17 G/17G
1 POWDER, FOR SOLUTION ORAL
Status: DISCONTINUED | OUTPATIENT
Start: 2021-03-05 | End: 2021-03-06

## 2021-03-05 RX ORDER — ASPIRIN 325 MG
325 TABLET ORAL DAILY
Status: DISCONTINUED | OUTPATIENT
Start: 2021-03-05 | End: 2021-03-05

## 2021-03-05 RX ORDER — TOPIRAMATE 100 MG/1
100 TABLET, FILM COATED ORAL 2 TIMES DAILY
Status: DISCONTINUED | OUTPATIENT
Start: 2021-03-05 | End: 2021-03-09 | Stop reason: HOSPADM

## 2021-03-05 RX ORDER — PROCHLORPERAZINE EDISYLATE 5 MG/ML
10 INJECTION INTRAMUSCULAR; INTRAVENOUS ONCE
Status: COMPLETED | OUTPATIENT
Start: 2021-03-05 | End: 2021-03-05

## 2021-03-05 RX ORDER — ERGOCALCIFEROL 1.25 MG/1
50000 CAPSULE ORAL
Status: DISCONTINUED | OUTPATIENT
Start: 2021-03-05 | End: 2021-03-09 | Stop reason: HOSPADM

## 2021-03-05 RX ORDER — SODIUM CHLORIDE 9 MG/ML
INJECTION, SOLUTION INTRAVENOUS CONTINUOUS
Status: DISCONTINUED | OUTPATIENT
Start: 2021-03-05 | End: 2021-03-09 | Stop reason: HOSPADM

## 2021-03-05 RX ORDER — LABETALOL HYDROCHLORIDE 5 MG/ML
10 INJECTION, SOLUTION INTRAVENOUS EVERY 4 HOURS PRN
Status: DISCONTINUED | OUTPATIENT
Start: 2021-03-05 | End: 2021-03-09 | Stop reason: HOSPADM

## 2021-03-05 RX ORDER — VITAMIN B COMPLEX
1000 TABLET ORAL DAILY
Status: DISCONTINUED | OUTPATIENT
Start: 2021-03-06 | End: 2021-03-09 | Stop reason: HOSPADM

## 2021-03-05 RX ORDER — ZONISAMIDE 50 MG/1
200 CAPSULE ORAL EVERY EVENING
Status: DISCONTINUED | OUTPATIENT
Start: 2021-03-05 | End: 2021-03-09 | Stop reason: HOSPADM

## 2021-03-05 RX ORDER — MAGNESIUM SULFATE HEPTAHYDRATE 40 MG/ML
2 INJECTION, SOLUTION INTRAVENOUS ONCE
Status: COMPLETED | OUTPATIENT
Start: 2021-03-05 | End: 2021-03-05

## 2021-03-05 RX ORDER — FINGOLIMOD HCL 0.5 MG/1
0.5 CAPSULE ORAL DAILY
COMMUNITY
End: 2021-05-12

## 2021-03-05 RX ORDER — AMOXICILLIN 250 MG
2 CAPSULE ORAL 2 TIMES DAILY
Status: DISCONTINUED | OUTPATIENT
Start: 2021-03-05 | End: 2021-03-06

## 2021-03-05 RX ORDER — FAMOTIDINE 20 MG/1
10 TABLET, FILM COATED ORAL 2 TIMES DAILY
Status: DISCONTINUED | OUTPATIENT
Start: 2021-03-05 | End: 2021-03-09 | Stop reason: HOSPADM

## 2021-03-05 RX ORDER — DIPHENHYDRAMINE HYDROCHLORIDE 50 MG/ML
25 INJECTION INTRAMUSCULAR; INTRAVENOUS ONCE
Status: COMPLETED | OUTPATIENT
Start: 2021-03-05 | End: 2021-03-05

## 2021-03-05 RX ORDER — BISACODYL 10 MG
10 SUPPOSITORY, RECTAL RECTAL
Status: DISCONTINUED | OUTPATIENT
Start: 2021-03-05 | End: 2021-03-06

## 2021-03-05 RX ORDER — MIDAZOLAM HYDROCHLORIDE 1 MG/ML
1 INJECTION INTRAMUSCULAR; INTRAVENOUS
Status: COMPLETED | OUTPATIENT
Start: 2021-03-05 | End: 2021-03-05

## 2021-03-05 RX ORDER — HALOPERIDOL 5 MG/ML
1 INJECTION INTRAMUSCULAR
Status: DISCONTINUED | OUTPATIENT
Start: 2021-03-05 | End: 2021-03-05

## 2021-03-05 RX ORDER — ASPIRIN 81 MG/1
324 TABLET, CHEWABLE ORAL DAILY
Status: DISCONTINUED | OUTPATIENT
Start: 2021-03-05 | End: 2021-03-05

## 2021-03-05 RX ORDER — HYDRALAZINE HYDROCHLORIDE 20 MG/ML
10 INJECTION INTRAMUSCULAR; INTRAVENOUS
Status: DISCONTINUED | OUTPATIENT
Start: 2021-03-05 | End: 2021-03-09 | Stop reason: HOSPADM

## 2021-03-05 RX ORDER — ASPIRIN 300 MG/1
300 SUPPOSITORY RECTAL DAILY
Status: DISCONTINUED | OUTPATIENT
Start: 2021-03-05 | End: 2021-03-05

## 2021-03-05 RX ORDER — ACETAMINOPHEN 325 MG/1
650 TABLET ORAL EVERY 6 HOURS PRN
Status: DISCONTINUED | OUTPATIENT
Start: 2021-03-05 | End: 2021-03-09 | Stop reason: HOSPADM

## 2021-03-05 RX ADMIN — ZONISAMIDE 200 MG: 50 CAPSULE ORAL at 19:00

## 2021-03-05 RX ADMIN — MAGNESIUM SULFATE 2 G: 2 INJECTION INTRAVENOUS at 17:21

## 2021-03-05 RX ADMIN — IOHEXOL 70 ML: 350 INJECTION, SOLUTION INTRAVENOUS at 15:38

## 2021-03-05 RX ADMIN — PROCHLORPERAZINE EDISYLATE 10 MG: 5 INJECTION INTRAMUSCULAR; INTRAVENOUS at 17:21

## 2021-03-05 RX ADMIN — IOHEXOL 40 ML: 350 INJECTION, SOLUTION INTRAVENOUS at 15:42

## 2021-03-05 RX ADMIN — TOPIRAMATE 100 MG: 100 TABLET, FILM COATED ORAL at 19:00

## 2021-03-05 RX ADMIN — MIDAZOLAM HYDROCHLORIDE 1 MG: 1 INJECTION, SOLUTION INTRAMUSCULAR; INTRAVENOUS at 21:28

## 2021-03-05 RX ADMIN — GADOTERIDOL 15 ML: 279.3 INJECTION, SOLUTION INTRAVENOUS at 23:46

## 2021-03-05 RX ADMIN — DIPHENHYDRAMINE HYDROCHLORIDE 25 MG: 50 INJECTION INTRAMUSCULAR; INTRAVENOUS at 17:21

## 2021-03-05 ASSESSMENT — LIFESTYLE VARIABLES
CONSUMPTION TOTAL: NEGATIVE
TOTAL SCORE: 0
AVERAGE NUMBER OF DAYS PER WEEK YOU HAVE A DRINK CONTAINING ALCOHOL: 0
TOTAL SCORE: 0
EVER FELT BAD OR GUILTY ABOUT YOUR DRINKING: NO
TOTAL SCORE: 0
ALCOHOL_USE: NO
ON A TYPICAL DAY WHEN YOU DRINK ALCOHOL HOW MANY DRINKS DO YOU HAVE: 0
HAVE YOU EVER FELT YOU SHOULD CUT DOWN ON YOUR DRINKING: NO
HOW MANY TIMES IN THE PAST YEAR HAVE YOU HAD 5 OR MORE DRINKS IN A DAY: 0
HAVE PEOPLE ANNOYED YOU BY CRITICIZING YOUR DRINKING: NO
EVER HAD A DRINK FIRST THING IN THE MORNING TO STEADY YOUR NERVES TO GET RID OF A HANGOVER: NO

## 2021-03-05 ASSESSMENT — FIBROSIS 4 INDEX
FIB4 SCORE: 0.37
FIB4 SCORE: 0.33

## 2021-03-05 ASSESSMENT — ENCOUNTER SYMPTOMS
FOCAL WEAKNESS: 1
DEPRESSION: 0
BACK PAIN: 0
COUGH: 0
BRUISES/BLEEDS EASILY: 0
PND: 0
WHEEZING: 0
CHILLS: 0
HEADACHES: 0
FEVER: 0
MYALGIAS: 0
CLAUDICATION: 0
HEMOPTYSIS: 0
VOMITING: 0
NAUSEA: 0
BLURRED VISION: 0
DOUBLE VISION: 0
PALPITATIONS: 0
SENSORY CHANGE: 1
HEARTBURN: 0
NECK PAIN: 0
DIZZINESS: 0

## 2021-03-05 ASSESSMENT — PAIN DESCRIPTION - PAIN TYPE: TYPE: ACUTE PAIN

## 2021-03-05 NOTE — ED TRIAGE NOTES
Rehana Meade  35 y.o.  Chief Complaint   Patient presents with   • Numbness     on left foot begining last night at 2130, then moving up to left arm and left face this morning.  Left leg weakness   • Headache     upon wakening this morning     Patient to triage with above complaint.  Pt reports having left leg weakness this morning as well.  LKW 2130 yesterday.      Vitals:    03/05/21 1437   BP: 108/72   Pulse: 60   Resp: 16   Temp: 37.6 °C (99.6 °F)   SpO2: 98%     Charge notified, stroke IR activated.

## 2021-03-05 NOTE — TELEPHONE ENCOUNTER
"Diagnosed w/MS 2 months ago, under the care of Neurologist Jasen, started on Gilenya on 2/28.  Now cannot feel left arm, left leg & left side of head.  Started w/foot last night & progressed.  Everything moves correctly but cannot feel it. Tingling in those areas.    PMH:  Kiare malformation (birth defect).          Reason for Disposition  • New neurologic deficit that is present NOW, sudden onset of ANY of the following: * Weakness of the face, arm, or leg on one side of the body * Numbness of the face, arm, or leg on one side of the body * Loss of speech or garbled speech    Answer Assessment - Initial Assessment Questions  1. SYMPTOM: \"What is the main symptom you are concerned about?\" (e.g., weakness, numbness)      Tingling & numbness in left arm, leg & head.  2. ONSET: \"When did this start?\" (minutes, hours, days; while sleeping)      930 p.m. yesterday  3. LAST NORMAL: \"When was the last time you were normal (no symptoms)?\"      Just before 930 p.m. last night  4. PATTERN \"Does this come and go, or has it been constant since it started?\"  \"Is it present now?\"      Constant, progressed  5. CARDIAC SYMPTOMS: \"Have you had any of the following symptoms: chest pain, difficulty breathing, palpitations?\"      no  6. NEUROLOGIC SYMPTOMS: \"Have you had any of the following symptoms: headache, dizziness, vision loss, double vision, changes in speech, unsteady on your feet?\"      HA, pain in left side of neck  7. OTHER SYMPTOMS: \"Do you have any other symptoms?\"      no  8. PREGNANCY: \"Is there any chance you are pregnant?\" \"When was your last menstrual period?\"      No, hysterectomy    Protocols used: NEUROLOGIC DEFICIT-A-[place]      "

## 2021-03-06 LAB
ALBUMIN SERPL BCP-MCNC: 4 G/DL (ref 3.2–4.9)
ALBUMIN/GLOB SERPL: 1.5 G/DL
ALP SERPL-CCNC: 74 U/L (ref 30–99)
ALT SERPL-CCNC: 11 U/L (ref 2–50)
AMPHET UR QL SCN: NEGATIVE
ANION GAP SERPL CALC-SCNC: 12 MMOL/L (ref 7–16)
APPEARANCE UR: ABNORMAL
AST SERPL-CCNC: 10 U/L (ref 12–45)
BACTERIA #/AREA URNS HPF: ABNORMAL /HPF
BARBITURATES UR QL SCN: NEGATIVE
BENZODIAZ UR QL SCN: NEGATIVE
BILIRUB SERPL-MCNC: 0.6 MG/DL (ref 0.1–1.5)
BILIRUB UR QL STRIP.AUTO: NEGATIVE
BUN SERPL-MCNC: 12 MG/DL (ref 8–22)
BZE UR QL SCN: NEGATIVE
CALCIUM SERPL-MCNC: 8.9 MG/DL (ref 8.5–10.5)
CANNABINOIDS UR QL SCN: NEGATIVE
CHLORIDE SERPL-SCNC: 107 MMOL/L (ref 96–112)
CO2 SERPL-SCNC: 18 MMOL/L (ref 20–33)
COLOR UR: YELLOW
CREAT SERPL-MCNC: 0.67 MG/DL (ref 0.5–1.4)
EPI CELLS #/AREA URNS HPF: ABNORMAL /HPF
ERYTHROCYTE [DISTWIDTH] IN BLOOD BY AUTOMATED COUNT: 49.1 FL (ref 35.9–50)
EST. AVERAGE GLUCOSE BLD GHB EST-MCNC: 100 MG/DL
GLOBULIN SER CALC-MCNC: 2.7 G/DL (ref 1.9–3.5)
GLUCOSE BLD-MCNC: 143 MG/DL (ref 65–99)
GLUCOSE BLD-MCNC: 169 MG/DL (ref 65–99)
GLUCOSE BLD-MCNC: 225 MG/DL (ref 65–99)
GLUCOSE SERPL-MCNC: 113 MG/DL (ref 65–99)
GLUCOSE UR STRIP.AUTO-MCNC: 250 MG/DL
HBA1C MFR BLD: 5.1 % (ref 4–5.6)
HCT VFR BLD AUTO: 45.3 % (ref 37–47)
HGB BLD-MCNC: 14.6 G/DL (ref 12–16)
HYALINE CASTS #/AREA URNS LPF: ABNORMAL /LPF
KETONES UR STRIP.AUTO-MCNC: 15 MG/DL
LEUKOCYTE ESTERASE UR QL STRIP.AUTO: ABNORMAL
MCH RBC QN AUTO: 30.9 PG (ref 27–33)
MCHC RBC AUTO-ENTMCNC: 32.2 G/DL (ref 33.6–35)
MCV RBC AUTO: 96 FL (ref 81.4–97.8)
METHADONE UR QL SCN: NEGATIVE
MICRO URNS: ABNORMAL
NITRITE UR QL STRIP.AUTO: POSITIVE
OPIATES UR QL SCN: NEGATIVE
OXYCODONE UR QL SCN: NEGATIVE
PCP UR QL SCN: NEGATIVE
PH UR STRIP.AUTO: 8 [PH] (ref 5–8)
PLATELET # BLD AUTO: 245 K/UL (ref 164–446)
PMV BLD AUTO: 10.1 FL (ref 9–12.9)
POTASSIUM SERPL-SCNC: 3.8 MMOL/L (ref 3.6–5.5)
PROPOXYPH UR QL SCN: NEGATIVE
PROT SERPL-MCNC: 6.7 G/DL (ref 6–8.2)
PROT UR QL STRIP: NEGATIVE MG/DL
RBC # BLD AUTO: 4.72 M/UL (ref 4.2–5.4)
RBC # URNS HPF: ABNORMAL /HPF
RBC UR QL AUTO: NEGATIVE
SODIUM SERPL-SCNC: 137 MMOL/L (ref 135–145)
SP GR UR STRIP.AUTO: 1.02
UROBILINOGEN UR STRIP.AUTO-MCNC: 0.2 MG/DL
WBC # BLD AUTO: 7.5 K/UL (ref 4.8–10.8)
WBC #/AREA URNS HPF: ABNORMAL /HPF

## 2021-03-06 PROCEDURE — 82962 GLUCOSE BLOOD TEST: CPT | Mod: 91

## 2021-03-06 PROCEDURE — G0378 HOSPITAL OBSERVATION PER HR: HCPCS

## 2021-03-06 PROCEDURE — 80053 COMPREHEN METABOLIC PANEL: CPT

## 2021-03-06 PROCEDURE — A9270 NON-COVERED ITEM OR SERVICE: HCPCS | Performed by: NURSE PRACTITIONER

## 2021-03-06 PROCEDURE — 700102 HCHG RX REV CODE 250 W/ 637 OVERRIDE(OP): Performed by: NURSE PRACTITIONER

## 2021-03-06 PROCEDURE — A9270 NON-COVERED ITEM OR SERVICE: HCPCS | Performed by: HOSPITALIST

## 2021-03-06 PROCEDURE — 700105 HCHG RX REV CODE 258: Performed by: HOSPITALIST

## 2021-03-06 PROCEDURE — 700111 HCHG RX REV CODE 636 W/ 250 OVERRIDE (IP): Performed by: PSYCHIATRY & NEUROLOGY

## 2021-03-06 PROCEDURE — 81001 URINALYSIS AUTO W/SCOPE: CPT

## 2021-03-06 PROCEDURE — 700102 HCHG RX REV CODE 250 W/ 637 OVERRIDE(OP): Performed by: PSYCHIATRY & NEUROLOGY

## 2021-03-06 PROCEDURE — 97162 PT EVAL MOD COMPLEX 30 MIN: CPT

## 2021-03-06 PROCEDURE — 700101 HCHG RX REV CODE 250: Performed by: HOSPITALIST

## 2021-03-06 PROCEDURE — 700102 HCHG RX REV CODE 250 W/ 637 OVERRIDE(OP): Performed by: HOSPITALIST

## 2021-03-06 PROCEDURE — 700105 HCHG RX REV CODE 258: Performed by: PSYCHIATRY & NEUROLOGY

## 2021-03-06 PROCEDURE — 83036 HEMOGLOBIN GLYCOSYLATED A1C: CPT

## 2021-03-06 PROCEDURE — 99214 OFFICE O/P EST MOD 30 MIN: CPT | Performed by: NURSE PRACTITIONER

## 2021-03-06 PROCEDURE — 80307 DRUG TEST PRSMV CHEM ANLYZR: CPT

## 2021-03-06 PROCEDURE — A9270 NON-COVERED ITEM OR SERVICE: HCPCS | Performed by: PSYCHIATRY & NEUROLOGY

## 2021-03-06 PROCEDURE — 85027 COMPLETE CBC AUTOMATED: CPT

## 2021-03-06 PROCEDURE — 700105 HCHG RX REV CODE 258: Performed by: NURSE PRACTITIONER

## 2021-03-06 PROCEDURE — 99226 PR SUBSEQUENT OBSERVATION CARE,LEVEL III: CPT | Performed by: STUDENT IN AN ORGANIZED HEALTH CARE EDUCATION/TRAINING PROGRAM

## 2021-03-06 PROCEDURE — 96367 TX/PROPH/DG ADDL SEQ IV INF: CPT

## 2021-03-06 PROCEDURE — 96366 THER/PROPH/DIAG IV INF ADDON: CPT

## 2021-03-06 RX ORDER — DEXTROSE MONOHYDRATE 25 G/50ML
50 INJECTION, SOLUTION INTRAVENOUS
Status: DISCONTINUED | OUTPATIENT
Start: 2021-03-06 | End: 2021-03-09 | Stop reason: HOSPADM

## 2021-03-06 RX ORDER — POLYETHYLENE GLYCOL 3350 17 G/17G
1 POWDER, FOR SOLUTION ORAL
Status: DISCONTINUED | OUTPATIENT
Start: 2021-03-06 | End: 2021-03-09 | Stop reason: HOSPADM

## 2021-03-06 RX ORDER — FINGOLIMOD HYDROCHLORIDE 0.5 MG/1
0.5 CAPSULE ORAL EVERY MORNING
Status: DISCONTINUED | OUTPATIENT
Start: 2021-03-06 | End: 2021-03-09 | Stop reason: HOSPADM

## 2021-03-06 RX ORDER — BISACODYL 10 MG
10 SUPPOSITORY, RECTAL RECTAL
Status: DISCONTINUED | OUTPATIENT
Start: 2021-03-06 | End: 2021-03-09 | Stop reason: HOSPADM

## 2021-03-06 RX ORDER — BUTALBITAL, ACETAMINOPHEN AND CAFFEINE 50; 325; 40 MG/1; MG/1; MG/1
1 TABLET ORAL EVERY 6 HOURS PRN
Status: DISCONTINUED | OUTPATIENT
Start: 2021-03-06 | End: 2021-03-07

## 2021-03-06 RX ORDER — AMOXICILLIN 250 MG
2 CAPSULE ORAL 2 TIMES DAILY PRN
Status: DISCONTINUED | OUTPATIENT
Start: 2021-03-06 | End: 2021-03-09 | Stop reason: HOSPADM

## 2021-03-06 RX ADMIN — Medication 1000 UNITS: at 06:05

## 2021-03-06 RX ADMIN — TOPIRAMATE 100 MG: 100 TABLET, FILM COATED ORAL at 06:05

## 2021-03-06 RX ADMIN — SODIUM CHLORIDE: 9 INJECTION, SOLUTION INTRAVENOUS at 12:00

## 2021-03-06 RX ADMIN — ERGOCALCIFEROL 50000 UNITS: 1.25 CAPSULE ORAL at 01:51

## 2021-03-06 RX ADMIN — BUTALBITAL, ACETAMINOPHEN, AND CAFFEINE 1 TABLET: 50; 325; 40 TABLET, COATED ORAL at 22:56

## 2021-03-06 RX ADMIN — SODIUM CHLORIDE: 9 INJECTION, SOLUTION INTRAVENOUS at 01:11

## 2021-03-06 RX ADMIN — TOPIRAMATE 100 MG: 100 TABLET, FILM COATED ORAL at 18:41

## 2021-03-06 RX ADMIN — FINGOLIMOD HYDROCHLORIDE 0.5 MG: 0.5 CAPSULE ORAL at 10:00

## 2021-03-06 RX ADMIN — SODIUM CHLORIDE 1000 MG: 9 INJECTION, SOLUTION INTRAVENOUS at 12:31

## 2021-03-06 RX ADMIN — ZONISAMIDE 200 MG: 50 CAPSULE ORAL at 18:41

## 2021-03-06 RX ADMIN — FAMOTIDINE 10 MG: 20 TABLET ORAL at 01:50

## 2021-03-06 RX ADMIN — BUTALBITAL, ACETAMINOPHEN, AND CAFFEINE 1 TABLET: 50; 325; 40 TABLET, COATED ORAL at 16:41

## 2021-03-06 RX ADMIN — SODIUM CHLORIDE 1000 MG: 9 INJECTION, SOLUTION INTRAVENOUS at 01:11

## 2021-03-06 ASSESSMENT — GAIT ASSESSMENTS
GAIT LEVEL OF ASSIST: SUPERVISED
DISTANCE (FEET): 80

## 2021-03-06 ASSESSMENT — COGNITIVE AND FUNCTIONAL STATUS - GENERAL
CLIMB 3 TO 5 STEPS WITH RAILING: A LITTLE
MOBILITY SCORE: 24
SUGGESTED CMS G CODE MODIFIER MOBILITY: CH
DAILY ACTIVITIY SCORE: 24
SUGGESTED CMS G CODE MODIFIER DAILY ACTIVITY: CH
SUGGESTED CMS G CODE MODIFIER MOBILITY: CI
MOBILITY SCORE: 23

## 2021-03-06 ASSESSMENT — ENCOUNTER SYMPTOMS
VOMITING: 0
INSOMNIA: 0
FEVER: 0
CONSTIPATION: 0
NAUSEA: 0
NERVOUS/ANXIOUS: 0
SENSORY CHANGE: 1
SHORTNESS OF BREATH: 0
FOCAL WEAKNESS: 1
SPEECH CHANGE: 0
DEPRESSION: 0
DIZZINESS: 0
ABDOMINAL PAIN: 0
COUGH: 0
HEADACHES: 1
TINGLING: 1
DIARRHEA: 0

## 2021-03-06 ASSESSMENT — PAIN DESCRIPTION - PAIN TYPE
TYPE: ACUTE PAIN
TYPE: ACUTE PAIN

## 2021-03-06 NOTE — PROGRESS NOTES
Received bedside shift change report and assumed care of pt from day shift nurse RONAL Tobar. Pt is awake, alert and verbally responsive, c/o pain & discomfort  at MONI IV site. No visible signs of infiltration or infection, no redness, swelling or discoloration. Site is warm to touch, however pt has a warm compress under her arm. Scheduled for an MRI with pre-med ([facility]), Will attempt to place another line to administer meds because pt refuses to allow meds to be administered via current IV access.  Bed at low level, call bell within reach, standard and safety precautions maintained.

## 2021-03-06 NOTE — ASSESSMENT & PLAN NOTE
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  Concerning for rapidly progressive multiple sclerosis  -Symptoms suggestive of MS exacerbation  -Continue steroids per neuro, currently day 4 of 5  -Pepcid while on steroids.   -Accuchecks ACHS with SSI as required while on steroids.   -Continue home MS meds.   -Neurology following, appreciate recommendations

## 2021-03-06 NOTE — PROGRESS NOTES
Brief Neurology Note    The patient's chart has been reviewed.    MRI brain reviewed showing multiple enhancing lesions on T1 imaging post contrast sequencing suggestive of active demyelinating plaques.    Orders placed for:  Solumedrol 1g IV qd for 3 days  Famotidine 10mg BID  accu checks  Primary team to order SSI; nursing notified    Labs reviewed showing low Vitamin D levels  Orders placed for repletion    Neurology will continue to closely follow  Anticipate official radiology reads of imaging in the AM 3/6/21.    Alhaji Moeller MD  Neurohospitalist, Acute Care Services   of Neurology

## 2021-03-06 NOTE — THERAPY
"Physical Therapy   Initial Evaluation     Patient Name: Rehana Meade  Age:  35 y.o., Sex:  female  Medical Record #: [id number]  Today's Date: 3/6/2021          Assessment  Patient is 35 y.o. female with recent diagnosis of MS (January 2021) admitted for workup of L-sided weakness and sensory changes presenting to PT with L-sided weakness and impaired fine touch (able to accurately localize pressure, but reports it to feel \"weird\"). While pt is able to perform all mobility at [facility], she does demonstrate inconsistent L foot clearance during swing and ease of fatigue. She reports good insight into managing her fatigue as well as into temperature regulation. Pt plans on purchasing a 4WW for increased stability and availability for rest. At this time, pt does not require further acute PT intervention. If she worsens functionally, PT can return.       Plan    Recommend Physical Therapy for Evaluation only     DC Equipment Recommendations: None  Discharge Recommendations: Other -(can follow up with OP if she desires)        Objective       03/06/21 1001   Prior Living Situation   Prior Services Intermittent Physical Support for ADL Per Family   Housing / Facility 2 Story House   Steps Into Home 0   Steps In Home   (flight)   Equipment Owned Front-Wheel Walker   Lives with - Patient's Self Care Capacity Spouse;Unrelated Adult   Comments pt reports her spouse and roommate have been assisting her prn. Plan is to remain on first level. She reports if she needs to manage stairs that her spouse and roommate can carry her.    Prior Level of Functional Mobility   Bed Mobility Independent   Transfer Status Independent   Ambulation Independent   Distance Ambulation (Feet)   (community)   Assistive Devices Used None   Stairs Independent   Comments pt reports if she has difficulty she uses FWW or spouse assists.    Balance Assessment   Comments no device in standing   Gait Analysis   Gait Level Of Assist Supervised   Assistive " Device None   Distance (Feet) 80   Weight Bearing Status FWB   Comments inconsistent clearance of L foot during swing. Mostly flat-foot contact due to excessive hip flexion. Discussed use of 4WW for rest and stability. Pt reporting she is already looking into purchasing.    Bed Mobility    Supine to Sit Supervised   Sit to Supine Supervised   Scooting Supervised   Rolling Supervised   Functional Mobility   Sit to Stand Supervised   Bed, Chair, Wheelchair Transfer Supervised   Transfer Method Stand Step   Anticipated Discharge Equipment and Recommendations   DC Equipment Recommendations None   Discharge Recommendations Other -  (can follow up with OP if she desires)

## 2021-03-06 NOTE — CARE PLAN
Problem: Communication  Goal: The ability to communicate needs accurately and effectively will improve  Outcome: PROGRESSING AS EXPECTED     Problem: Safety  Goal: Will remain free from injury  Outcome: PROGRESSING AS EXPECTED  Goal: Will remain free from falls  Outcome: PROGRESSING AS EXPECTED     Problem: Pain Management  Goal: Pain level will decrease to patient's comfort goal  Outcome: PROGRESSING AS EXPECTED     Problem: Infection  Goal: Will remain free from infection  Outcome: PROGRESSING AS EXPECTED     Problem: Venous Thromboembolism (VTW)/Deep Vein Thrombosis (DVT) Prevention:  Goal: Patient will participate in Venous Thrombosis (VTE)/Deep Vein Thrombosis (DVT)Prevention Measures  Outcome: PROGRESSING AS EXPECTED     Problem: Psychosocial Needs:  Goal: Level of anxiety will decrease  Outcome: PROGRESSING AS EXPECTED     Problem: Fluid Volume:  Goal: Will maintain balanced intake and output  Outcome: PROGRESSING AS EXPECTED     Problem: Respiratory:  Goal: Respiratory status will improve  Outcome: PROGRESSING AS EXPECTED     Problem: Bowel/Gastric:  Goal: Will not experience complications related to bowel motility  Outcome: PROGRESSING AS EXPECTED     Problem: Urinary Elimination:  Goal: Ability to reestablish a normal urinary elimination pattern will improve  Outcome: PROGRESSING AS EXPECTED

## 2021-03-06 NOTE — DISCHARGE PLANNING
[facility] Transitional Care Coordination     Referral from:  Dr. Dalal  Facesheet indicates:  [employer]  Potential Rehab Diagnosis:  Multiple Sclerosis    Chart review indicates patient has on going medical management and may have therapy needs to possibly meet inpatient rehab facility criteria with the goal of returning to community.    D/C support:  To be determined     Physiatry consultation pended while waiting for additional information.   Multiple sclerosis.  MRI Brain - Numerous supra and infratentorial enhancing and nonenhancing lesions.   Would welcome PT/OT evals as clinically appropriate.  TCC following.  Please reach out sooner if Physiatry consult requested for medical management.      Last Covid test date:  3/05/2021 - COVID negative      Thank you for the referral.

## 2021-03-06 NOTE — DISCHARGE PLANNING
Care Transition Team Assessment    Information Source  Orientation : Oriented x 4  Information Given By: Spouse  Informant's Name: Miguel Meade  Who is responsible for making decisions for patient? : Patient    Readmission Evaluation  Is this a readmission?: No    Elopement Risk  Legal Hold: No  Ambulatory or Self Mobile in Wheelchair: No-Not an Elopement Risk    Interdisciplinary Discharge Planning  Does Admitting Nurse Feel This Could be a Complex Discharge?: No  Primary Care Physician: Dr. Bernard  Lives with - Patient's Self Care Capacity: Spouse  Support Systems: Spouse / Significant Other  Housing / Facility: 2 Story House(converting downstairs)  Do You Take your Prescribed Medications Regularly: Yes([facility] in [place] )  Able to Return to Previous ADL's: Yes  Mobility Issues: No  Prior Services: None, Home-Independent  Patient Prefers to be Discharged to:: TBD  Assistance Needed: Yes  Durable Medical Equipment: Walker    Discharge Preparedness  What is your plan after discharge?: Home with help  What are your discharge supports?: Spouse  Prior Functional Level: Ambulatory, Independent with Activities of Daily Living, Independent with Medication Management  Difficulity with ADLs: None  Difficulity with IADLs: Driving    Functional Assesment  Prior Functional Level: Ambulatory, Independent with Activities of Daily Living, Independent with Medication Management    Finances  Financial Barriers to Discharge: No  Prescription Coverage: Yes    Vision / Hearing Impairment  Vision Impairment : No  Hearing Impairment : No         Advance Directive  Advance Directive?: None  Advance Directive offered?: AD Booklet given    Domestic Abuse  Have you ever been the victim of abuse or violence?: No              Anticipated Discharge Information  Discharge Disposition: Still a Patient (30)

## 2021-03-06 NOTE — PROGRESS NOTES
"Chief Complaint   Patient presents with   • Numbness     on left foot begining last night at 2130, then moving up to left arm and left face this morning.  Left leg weakness   • Headache     upon wakening this morning       Problem List Items Addressed This Visit     * (Principal) MS (multiple sclerosis) ([facility])     Follows with neurologist as outpatient  Probably exacerbation   Neuro consulted, MRI brain and c spine pending.   If MRI + for active lesion will start iv steroids           Relevant Medications    fingolimod (GILENYA) 0.5 MG Cap capsule    fingolimod (GILENYA) capsule 0.5 mg    Other Relevant Orders    REFERRAL TO PHYSIATRY (PMR)    REFERRAL TO NEUROLOGY      Other Visit Diagnoses     Left sided numbness        Left leg weakness            Neurology Progress Note     History of present illness:  This is a 35-year old female with PMhx significant for multiple sclerosis (diagnosed January 2021; patient of Dr. 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 The patient reports she had been in her usual state of health until yesterday evening; she was on the couch when she suddenly noted Left foot numbness. She did not immediately seek further medical evaluation as she thought symptoms would resolve spontaneously. This morning when she awoke, patient noted (in addition to Left foot numbness), left upper extremity and Left face numbness (\"like I just went to the dentist\"). She thus presented to the ED for further evaluation. Currently, patient is sitting up in stretcher; awake and alert. In additon to the above noted symptoms, patient admits to increasing LLE weakness (worse than baseline) and Left sided headache. She admits to similar symptoms in the past with " "complex migraine. She denies problem with vision (double vision, blurred vision, loss of vision, or photophobia), speech, swallowing, or hoarse voice/voice changes. She denies recent falls or trauma.     Neurology has been consulted by Dr. Gabriel Kramer to further evaluate the findings noted above.     Interval, 3/6/21:  Patient sitting up in bed; preparing to ambulate in hallway with PT. Patient admits to additional numbness involving Left chest and \"half of my right chest\" as well. Denies new weakness. Admits to mild persisted headache; improved overall.     MRI Brain w/wo contrast revealed new/enhancing lesions involving Right frontal lobe, among others. Patient received first dose 1g IV solumedrol last night, 3/5/21.     No changes to HPI as was previously documented.    Past medical history:   Past Medical History:   Diagnosis Date   • Anesthesia     woke up combative   • Anxiety associated with depression    • ASTHMA    • Drug-seeking behavior    • Migraine without aura, without mention of intractable migraine without mention of status migrainosus    • Other specified symptom associated with female genital organs    • Seizure (HCC)     Last seizure was 1/2019   • Seizure cerebral (HCC) 2/15/2018   • Stroke (HCC)    • Unspecified disorder of thyroid     cyst       Past surgical history:   Past Surgical History:   Procedure Laterality Date   • VAGINAL HYSTERECTOMY TOTAL  1/27/2020    Procedure: HYSTERECTOMY, TOTAL, VAGINAL;  Surgeon: Julian Parker M.D.;  Location: SURGERY SAME DAY [facility];  Service: Gynecology   • CYSTOSCOPY  1/27/2020    Procedure: CYSTOSCOPY;  Surgeon: Julian Parker M.D.;  Location: SURGERY SAME DAY [facility];  Service: Gynecology   • TUBAL COAGULATION LAPAROSCOPIC BILATERAL  7/11/2014    Performed by Julian Parker M.D. at SURGERY SAME DAY [facility] ORS   • SEPTOPLASTY  10/28/2009    Performed by ANNETTE FORDE at SURGERY [facility] ORS   • SOMNOPLASTY  10/28/2009    " Performed by ANNETTE FORDE at SURGERY [facility] ORS   • NASAL POLYPECTOMY  10/28/2009    Performed by ANNETTE FORDE at SURGERY [facility] ORS       Family history:   History reviewed. No pertinent family history.    Social history:   Social History     Socioeconomic History   • Marital status:      Spouse name: Not on file   • Number of children: Not on file   • Years of education: Not on file   • Highest education level: Not on file   Occupational History   • Not on file   Tobacco Use   • Smoking status: Never Smoker   • Smokeless tobacco: Never Used   Substance and Sexual Activity   • Alcohol use: Not Currently     Comment: rarely   • Drug use: No   • Sexual activity: Yes     Partners: Male   Other Topics Concern   • Not on file   Social History Narrative   • Not on file     Social Determinants of Health     Financial Resource Strain:    • Difficulty of Paying Living Expenses:    Food Insecurity:    • Worried About Running Out of Food in the Last Year:    • Ran Out of Food in the Last Year:    Transportation Needs:    • Lack of Transportation (Medical):    • Lack of Transportation (Non-Medical):    Physical Activity:    • Days of Exercise per Week:    • Minutes of Exercise per Session:    Stress:    • Feeling of Stress :    Social Connections:    • Frequency of Communication with Friends and Family:    • Frequency of Social Gatherings with Friends and Family:    • Attends [employer] Services:    • Active Member of Clubs or Organizations:    • Attends Club or Organization Meetings:    • Marital Status:    Intimate Partner Violence:    • Fear of Current or Ex-Partner:    • Emotionally Abused:    • Physically Abused:    • Sexually Abused:        Current medications:   Current Facility-Administered Medications   Medication Dose   • senna-docusate (PERICOLACE or SENOKOT S) 8.6-50 MG per tablet 2 tablet  2 tablet    And   • polyethylene glycol/lytes (MIRALAX) PACKET 1 Packet  1 Packet    And   •  "magnesium hydroxide (MILK OF MAGNESIA) suspension 30 mL  30 mL    And   • bisacodyl (DULCOLAX) suppository 10 mg  10 mg   • fingolimod (GILENYA) capsule 0.5 mg  0.5 mg   • topiramate (TOPAMAX) tablet 100 mg  100 mg   • zonisamide (ZONEGRAN) capsule 200 mg  200 mg   • NS infusion     • acetaminophen (Tylenol) tablet 650 mg  650 mg   • labetalol (NORMODYNE/TRANDATE) injection 10 mg  10 mg    Or   • hydrALAZINE (APRESOLINE) injection 10 mg  10 mg   • vitamin D (Ergocalciferol) (DRISDOL) capsule 50,000 Units  50,000 Units   • vitamin D (cholecalciferol) tablet 1,000 Units  1,000 Units   • methylPREDNISolone sod succ (SOLU-MEDROL) 1,000 mg in  mL IVPB  1 g   • famotidine (PEPCID) tablet 10 mg  10 mg       Medication Allergy:  Allergies   Allergen Reactions   • Amitriptyline Unspecified     Suicidal   • Clarithromycin Hives   • Sulfa Drugs Anaphylaxis   • Cantaloupe Itching   • Honey Dew Itching   • Latex Itching   • Lorazepam Unspecified     Hallucinations     • Metoclopramide Unspecified     Muscle spasms   • Penicillin G Potassium Vomiting   • Rizatriptan Unspecified     Tremors, confusion     • Sumatriptan Unspecified     \"Makes my head pins and needles\"   • Tape Rash   • Morphine Hives and Itching       Review of systems:   Constitutional: denies fever, night sweats, weight loss.   Eyes: denies acute vision change, eye pain or secretion.   Ears, Nose, Mouth, Throat: denies nasal secretion, nasal bleeding, difficulty swallowing, hearing loss, tinnitus, vertigo, ear pain, acute dental problems, oral ulcers or lesions.   Endocrine: denies recent weight changes, heat or cold intolerance, polyuria, polydypsia, polyphagia,abnormal hair growth.  Cardiovascular: denies new onset of chest pain, palpitations, syncope, or dyspnea of exertion.  Pulmonary: denies shortness of breath, new onset of cough, hemoptysis, wheezing, chest pain or flu-like symptoms.   GI: denies nausea, vomiting, diarrhea, GI bleeding, change in " "appetite, abdominal pain, and change in bowel habits.  : denies dysuria, urinary incontinence, hematuria.  Heme/oncology: denies history of easy bruising or bleeding. No history of cancer, DVTor PE.  Allergy/immunology: denies hives/urticaria, or itching.   Dermatologic: denies new rash, or new skin lesions.  Musculoskeletal:denies joint swelling or pain, muscle pain, neck and back pain.   Neurologic: As noted in detail above.   Psychiatric: denies symptoms of depression, anxiety, hallucinations, mood swings or changes, suicidal or homicidal thoughts.     Physical examination:   Vitals:    03/05/21 1730 03/05/21 1752 03/05/21 1930 03/06/21 0801   BP: 114/67 101/62 102/59 107/64   Pulse: 65 (!) 54 65 80   Resp: 14 16 18 12   Temp:  36.8 °C (98.3 °F) 36.6 °C (97.8 °F) 36.9 °C (98.4 °F)   TempSrc:  Temporal Temporal Temporal   SpO2: 100% 98% 98% 96%   Weight:  79 kg (174 lb 2.6 oz)     Height:  1.6 m (5' 3\")       General: Patient in no acute distress, pleasant and cooperative.  HEENT: Normocephalic, no signs of acute trauma.   Neck: supple, no meningeal signs or carotid bruits. There is normal range of motion. No tenderness on exam.   Chest: clear to auscultation. No cough.   CV: RRR, no murmurs.   Skin: no signs of acute rashes or trauma.   Musculoskeletal: joints exhibit full range of motion, without any pain to palpation. There are no signs of joint or muscle swelling. There is no tenderness to deep palpation of muscles.   Psychiatric: No hallucinatory behavior. Denies symptoms of depression or suicidal ideation. Mood and affect appear normal on exam.     NEUROLOGICAL EXAM:   Mental status, orientation: Awake, alert and fully oriented.   Speech and language: speech is clear and fluent. The patient is able to name, repeat and comprehend.   Memory: There is intact recollection of recent and remote events.   Cranial nerve exam: Pupils are 3-4 mm bilaterally and equally reactive to light. Visual fields are intact by " confrontation. There is no nystagmus on primary or secondary gaze. Intact full EOM in all directions of gaze. Face appears symmetric. Decreased/reported absent sensation to left upper and lower face (V1/V2 distribution). Uvula is midline. Palate elevates symmetrically. Tongue is midline and without any signs of tongue biting or fasciculations. Sternocleidomastoid muscles exhibit is normal strength bilaterally. Shoulder shrug is intact bilaterally; somewhat weaker on the Left.    Motor exam: Strength is 5/5 in RUE/RLE; 4+/5 to LUE proximal and distal; 4/5 to LLE proximally, 4+/5 distally. Tone is normal. No overt bradykinesia. No abnormal movements were seen on exam.   Sensory exam Right reveals normal sense of light touch, proprioception, vibration and pinprick in all extremities. Left with decreased sensation to light touch and nox stim (UE/LE)  Deep tendon reflexes:  3+ throughout. Plantar responses are mute bilaterally. There is no clonus.   Coordination: shows a normal finger-nose-finger. No ataxia/dysmetria.   Gait: Not assessed at this time as patient is a fall risk.     No significant changes to exam as was documented on 3/5/21.     ANCILLARY DATA REVIEWED:     Lab Data Review:  Recent Results (from the past 24 hour(s))   ACCU-CHEK GLUCOSE    Collection Time: 03/05/21  3:02 PM   Result Value Ref Range    Glucose - Accu-Ck 87 65 - 99 mg/dL   CBC WITH DIFFERENTIAL    Collection Time: 03/05/21  3:02 PM   Result Value Ref Range    WBC 6.8 4.8 - 10.8 K/uL    RBC 4.97 4.20 - 5.40 M/uL    Hemoglobin 15.9 12.0 - 16.0 g/dL    Hematocrit 47.5 (H) 37.0 - 47.0 %    MCV 95.6 81.4 - 97.8 fL    MCH 32.0 27.0 - 33.0 pg    MCHC 33.5 (L) 33.6 - 35.0 g/dL    RDW 49.0 35.9 - 50.0 fL    Platelet Count 259 164 - 446 K/uL    MPV 10.7 9.0 - 12.9 fL    Neutrophils-Polys 74.10 (H) 44.00 - 72.00 %    Lymphocytes 10.70 (L) 22.00 - 41.00 %    Monocytes 10.80 0.00 - 13.40 %    Eosinophils 3.70 0.00 - 6.90 %    Basophils 0.40 0.00 - 1.80 %     Immature Granulocytes 0.30 0.00 - 0.90 %    Nucleated RBC 0.00 /100 WBC    Neutrophils (Absolute) 5.07 2.00 - 7.15 K/uL    Lymphs (Absolute) 0.73 (L) 1.00 - 4.80 K/uL    Monos (Absolute) 0.74 0.00 - 0.85 K/uL    Eos (Absolute) 0.25 0.00 - 0.51 K/uL    Baso (Absolute) 0.03 0.00 - 0.12 K/uL    Immature Granulocytes (abs) 0.02 0.00 - 0.11 K/uL    NRBC (Absolute) 0.00 K/uL   VITAMIN D,25 HYDROXY    Collection Time: 03/05/21  3:02 PM   Result Value Ref Range    25-Hydroxy   Vitamin D 25 14 (L) 30 - 100 ng/mL   Comp Metabolic Panel    Collection Time: 03/05/21  4:45 PM   Result Value Ref Range    Sodium 137 135 - 145 mmol/L    Potassium 3.7 3.6 - 5.5 mmol/L    Chloride 108 96 - 112 mmol/L    Co2 18 (L) 20 - 33 mmol/L    Anion Gap 11.0 7.0 - 16.0    Glucose 76 65 - 99 mg/dL    Bun 13 8 - 22 mg/dL    Creatinine 0.72 0.50 - 1.40 mg/dL    Calcium 9.1 8.5 - 10.5 mg/dL    AST(SGOT) 9 (L) 12 - 45 U/L    ALT(SGPT) 11 2 - 50 U/L    Alkaline Phosphatase 75 30 - 99 U/L    Total Bilirubin 0.3 0.1 - 1.5 mg/dL    Albumin 4.0 3.2 - 4.9 g/dL    Total Protein 7.0 6.0 - 8.2 g/dL    Globulin 3.0 1.9 - 3.5 g/dL    A-G Ratio 1.3 g/dL   MAGNESIUM    Collection Time: 03/05/21  4:45 PM   Result Value Ref Range    Magnesium 2.4 1.5 - 2.5 mg/dL   ESTIMATED GFR    Collection Time: 03/05/21  4:45 PM   Result Value Ref Range    GFR If African American >60 >60 mL/min/1.73 m 2    GFR If Non African American >60 >60 mL/min/1.73 m 2   SARS-CoV-2 PCR (24 hour In-House): Collect NP swab in VTM    Collection Time: 03/05/21  5:15 PM    Specimen: Respirate   Result Value Ref Range    SARS-CoV-2 Source NP Swab     SARS-CoV-2 by PCR NotDetected    CBC without Differential    Collection Time: 03/06/21  3:50 AM   Result Value Ref Range    WBC 7.5 4.8 - 10.8 K/uL    RBC 4.72 4.20 - 5.40 M/uL    Hemoglobin 14.6 12.0 - 16.0 g/dL    Hematocrit 45.3 37.0 - 47.0 %    MCV 96.0 81.4 - 97.8 fL    MCH 30.9 27.0 - 33.0 pg    MCHC 32.2 (L) 33.6 - 35.0 g/dL    RDW 49.1  35.9 - 50.0 fL    Platelet Count 245 164 - 446 K/uL    MPV 10.1 9.0 - 12.9 fL   Comp Metabolic Panel (CMP)    Collection Time: 03/06/21  3:50 AM   Result Value Ref Range    Sodium 137 135 - 145 mmol/L    Potassium 3.8 3.6 - 5.5 mmol/L    Chloride 107 96 - 112 mmol/L    Co2 18 (L) 20 - 33 mmol/L    Anion Gap 12.0 7.0 - 16.0    Glucose 113 (H) 65 - 99 mg/dL    Bun 12 8 - 22 mg/dL    Creatinine 0.67 0.50 - 1.40 mg/dL    Calcium 8.9 8.5 - 10.5 mg/dL    AST(SGOT) 10 (L) 12 - 45 U/L    ALT(SGPT) 11 2 - 50 U/L    Alkaline Phosphatase 74 30 - 99 U/L    Total Bilirubin 0.6 0.1 - 1.5 mg/dL    Albumin 4.0 3.2 - 4.9 g/dL    Total Protein 6.7 6.0 - 8.2 g/dL    Globulin 2.7 1.9 - 3.5 g/dL    A-G Ratio 1.5 g/dL   HEMOGLOBIN A1C    Collection Time: 03/06/21  3:50 AM   Result Value Ref Range    Glycohemoglobin 5.1 4.0 - 5.6 %    Est Avg Glucose 100 mg/dL   ESTIMATED GFR    Collection Time: 03/06/21  3:50 AM   Result Value Ref Range    GFR If African American >60 >60 mL/min/1.73 m 2    GFR If Non African American >60 >60 mL/min/1.73 m 2       Labs reviewed by me.     Records reviewed:   Reviewed record in detailed/reviewed previous encounters at [facility].       Imaging reviewed by me:     MR-THORACIC SPINE-WITH & W/O   Final Result      No significant abnormality is seen in the MR scan of the thoracic spine. No evidence of demyelinating disease in the thoracic cord.      MR-CERVICAL SPINE-WITH & W/O   Final Result      Focal enhancing cord lesion on the RIGHT at C5-C6, in keeping with the provided clinical history of multiple sclerosis      MR-BRAIN-WITH & W/O   Final Result      1.  Numerous supra and infratentorial enhancing and nonenhancing lesions, in keeping with multiple sclerosis   2.  Mild Chiari I malformation. No hydrocephalus.      DX-CHEST-LIMITED (1 VIEW)   Final Result         No acute cardiopulmonary abnormalities are identified.      CT-CEREBRAL PERFUSION ANALYSIS   Final Result      1.  Cerebral blood flow less than  30% likely representing completed infarct = 0 mL.      2.  T Max more than 6 seconds likely representing combination of completed infarct and ischemia = 0 mL.      3.  Mismatched volume likely representing ischemic brain/penumbra = None      4.  Please note that the cerebral perfusion was performed on the limited brain tissue around the basal ganglia region. Infarct/ischemia outside the CT perfusion sections can be missed in this study.      CT-CTA NECK WITH & W/O-POST PROCESSING   Final Result      CT angiogram of the neck within normal limits.      CT-CTA HEAD WITH & W/O-POST PROCESS   Final Result      1.  CT angiogram of the [place] within normal limits.      2.  CT head without and with contrast within normal limits      CT-HEAD W/O   Final Result      No acute intracranial abnormality is identified.          Modified Billings Scale (MRS): 0 = No symptoms      ASSESSMENT AND PLAN:  35-year old female with PMhx significant for multiple sclerosis (diagnosed January 2021; patient of Dr. 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 She additionally admits to LLE weakness as well; unchanged this morning (though note, I observed patient ambulate with minimal assistance.)  MRI Brain, C and T spine w/wo contrast revealed numerous active/enhancing lesions consistent with exacerbation of multiple sclerosis. Patient received dose #1 of 3-5 IV solumedrol yesterday evening; exam is essentially unchanged today.     Recommendations/Plan:     -q4h and PRN neuro assessment.VS per nursing/unit protocol.  -Continue IV  Solumedrol, 1 g daily x 3-5 days, dependent upon response to treatment. Today (3/6/21) is day #2.  -Continue home dose Fingolimod 0.5 mg PO q day.   -Repeat one time doses of Mg 2 g IV, Benedryl 25 mg IV, and Compazine 10 mg IV for migraine as needed.   -PT/OT eval and treat.   -All other medical management per primary team.   -DVT PPX: SCDs.     The plan of care above has been discussed with Dr. Moeller.     KUNAL Morales.P.RBARRY.  [facility]

## 2021-03-06 NOTE — H&P
Hospital Medicine History & Physical Note    Date of Service  3/5/2021    Primary Care Physician  Mack Bernard M.D.    Consultants  neurology    Code Status  Full Code    Chief Complaint  Chief Complaint   Patient presents with   • Numbness     on left foot begining last night at 2130, then moving up to left arm and left face this morning.  Left leg weakness   • Headache     upon wakening this morning       History of Presenting Illness  35 y.o. female who presented 3/5/2021 with pmh of MS, migraines is coming today c/o left sided weakness started last night in her left foot, she thought it will resolve but now she is having numbness on her left leg, arm and face, she also complains to left sided headache, no n/v/d/c, she has have similar symptoms with complex migraine in the past, no vision changes, no slurred speech, no abdominal pain no sob, no dysuria no rash, no flu like symptoms no fever, initial workup in ER is neg, ct head neg, neuro consulted and recommended mri brain and c spine to start iv steroids if MRI positive.   Patient is alert and oriented follows commands, she expressed understanding of her plan of care and agreed with it, all questions answered.     Review of Systems  Review of Systems   Constitutional: Negative for chills and fever.   HENT: Negative for congestion and nosebleeds.    Eyes: Negative for blurred vision and double vision.   Respiratory: Negative for cough, hemoptysis and wheezing.    Cardiovascular: Negative for chest pain, palpitations, claudication, leg swelling and PND.   Gastrointestinal: Negative for heartburn, nausea and vomiting.   Genitourinary: Negative for hematuria and urgency.   Musculoskeletal: Negative for back pain, myalgias and neck pain.   Skin: Negative for rash.   Neurological: Positive for sensory change and focal weakness. Negative for dizziness and headaches.   Endo/Heme/Allergies: Does not bruise/bleed easily.   Psychiatric/Behavioral: Negative for  "depression and suicidal ideas.       Past Medical History   has a past medical history of Anesthesia, Anxiety associated with depression, ASTHMA, Drug-seeking behavior, Migraine without aura, without mention of intractable migraine without mention of status migrainosus, Other specified symptom associated with female genital organs, Seizure (HCC), Seizure cerebral (HCC) (2/15/2018), Stroke (HCC), and Unspecified disorder of thyroid.    Surgical History   has a past surgical history that includes septoplasty (10/28/2009); somnoplasty (10/28/2009); nasal polypectomy (10/28/2009); tubal coagulation laparoscopic bilateral (7/11/2014); vaginal hysterectomy total (1/27/2020); and cystoscopy (1/27/2020).     Family History  No family h/o cardiac disease.     Social History   reports that she has never smoked. She has never used smokeless tobacco. She reports previous alcohol use. She reports that she does not use drugs.    Allergies  Allergies   Allergen Reactions   • Amitriptyline Unspecified     Suicidal   • Clarithromycin Hives   • Sulfa Drugs Anaphylaxis   • Cantaloupe Itching   • Honey Dew Itching   • Latex Itching   • Lorazepam Unspecified     Hallucinations     • Metoclopramide Unspecified     Muscle spasms   • Penicillin G Potassium Vomiting   • Rizatriptan Unspecified     Tremors, confusion     • Sumatriptan Unspecified     \"Makes my head pins and needles\"   • Tape Rash   • Morphine Hives and Itching       Medications  Prior to Admission Medications   Prescriptions Last Dose Informant Patient Reported? Taking?   AIMOVIG 70 MG/ML Solution Auto-injector 2/16/2021 at unknown Patient Yes No   Sig: Inject 70 mg as instructed Q30 DAYS.   fingolimod (GILENYA) 0.5 MG Cap capsule 3/5/2021 at 0745 Patient Yes Yes   Sig: Take 0.5 mg by mouth every morning as needed.   topiramate (TOPAMAX) 100 MG Tab 3/5/2021 at 0745 Patient Yes No   Sig: Take 100 mg by mouth 2 times a day.   zonisamide (ZONEGRAN) 100 MG Cap 3/4/2021 at 2100 " Patient Yes No   Sig: Take 200 mg by mouth every evening.      Facility-Administered Medications: None       Physical Exam  Temp:  [37.6 °C (99.6 °F)] 37.6 °C (99.6 °F)  Pulse:  [60-61] 61  Resp:  [16-18] 17  BP: (104-108)/(66-72) 104/67  SpO2:  [98 %-99 %] 99 %    Physical Exam  Vitals and nursing note reviewed.   Constitutional:       Appearance: Normal appearance.   HENT:      Head: Normocephalic.      Nose: Nose normal.      Mouth/Throat:      Mouth: Mucous membranes are moist.      Pharynx: Oropharynx is clear.   Eyes:      General:         Right eye: No discharge.         Left eye: No discharge.      Conjunctiva/sclera: Conjunctivae normal.      Pupils: Pupils are equal, round, and reactive to light.   Cardiovascular:      Rate and Rhythm: Normal rate and regular rhythm.      Pulses: Normal pulses.      Heart sounds: Normal heart sounds.   Pulmonary:      Effort: Pulmonary effort is normal. No respiratory distress.      Breath sounds: Normal breath sounds. No wheezing or rales.   Abdominal:      General: Bowel sounds are normal. There is no distension.      Palpations: Abdomen is soft.      Tenderness: There is no abdominal tenderness. There is no guarding.   Musculoskeletal:         General: No swelling. Normal range of motion.      Cervical back: Normal range of motion and neck supple. No rigidity.   Skin:     General: Skin is warm and dry.      Capillary Refill: Capillary refill takes less than 2 seconds.      Coloration: Skin is not jaundiced.   Neurological:      General: No focal deficit present.      Mental Status: She is alert and oriented to person, place, and time.      Sensory: Sensory deficit (left sided numbness. ) present.   Psychiatric:         Mood and Affect: Mood normal.         Laboratory:  Recent Labs     03/05/21  1502   WBC 6.8   RBC 4.97   HEMOGLOBIN 15.9   HEMATOCRIT 47.5*   MCV 95.6   MCH 32.0   MCHC 33.5*   RDW 49.0   PLATELETCT 259   MPV 10.7         No results for input(s): ALTSGPT,  ASTSGOT, ALKPHOSPHAT, TBILIRUBIN, DBILIRUBIN, GAMMAGT, AMYLASE, LIPASE, ALB, PREALBUMIN, GLUCOSE in the last 72 hours.      No results for input(s): NTPROBNP in the last 72 hours.      No results for input(s): TROPONINT in the last 72 hours.    Imaging:  CT-CEREBRAL PERFUSION ANALYSIS   Final Result      1.  Cerebral blood flow less than 30% likely representing completed infarct = 0 mL.      2.  T Max more than 6 seconds likely representing combination of completed infarct and ischemia = 0 mL.      3.  Mismatched volume likely representing ischemic brain/penumbra = None      4.  Please note that the cerebral perfusion was performed on the limited brain tissue around the basal ganglia region. Infarct/ischemia outside the CT perfusion sections can be missed in this study.      CT-CTA NECK WITH & W/O-POST PROCESSING   Final Result      CT angiogram of the neck within normal limits.      CT-CTA HEAD WITH & W/O-POST PROCESS   Final Result      1.  CT angiogram of the [place] within normal limits.      2.  CT head without and with contrast within normal limits      CT-HEAD W/O   Final Result      No acute intracranial abnormality is identified.      MR-BRAIN-WITH & W/O    (Results Pending)   MR-CERVICAL SPINE-WITH & W/O    (Results Pending)   MR-THORACIC SPINE-WITH & W/O    (Results Pending)   DX-CHEST-LIMITED (1 VIEW)    (Results Pending)         Assessment/Plan:  I anticipate this patient is appropriate for observation status at this time.    * MS (multiple sclerosis) (HCC)- (present on admission)  Assessment & Plan  Follows with neurologist as outpatient  Probably exacerbation   Neuro consulted, MRI brain and c spine pending.   If MRI + for active lesion will start iv steroids      Migraines- (present on admission)  Assessment & Plan  Continue topiramate   F/u MRI brain.     Seizure disorder (HCC)- (present on admission)  Assessment & Plan  Monitor.     Asthma- (present on admission)  Assessment &  Plan  Stable, continue monitoring.   Rt per protocol    Anxiety- (present on admission)  Assessment & Plan  Monitor.       dvt prophylaxis scd's.

## 2021-03-06 NOTE — ED PROVIDER NOTES
ED Provider Note    CHIEF COMPLAINT  Chief Complaint   Patient presents with   • Numbness     on left foot begining last night at 2130, then moving up to left arm and left face this morning.  Left leg weakness   • Headache     upon wakening this morning       HPI  Rehana Meade is a 35 y.o. female who presents with left-sided numbness, face, arm, leg and left leg weakness, having difficulty walking this morning.  She stated her foot started to feel numb last night approximately 7 PM, she woke with left-sided numbness this morning in the left leg weakness this morning.  Patient has multiple sclerosis, states her medication was recently changed by Dr. Aggarwal per neurologist.  Patient states she takes Zonegran, Topamax, and Aimovig.  She has mild headache, states this is not unusual.  No change in vision.  No speech difficulty.  She denies trauma.  No acute neck pain.    REVIEW OF SYSTEMS    Constitutional: No fever  Respiratory: Shortness of breath  Cardiac: No chest pain or syncope  Gastrointestinal: No abdominal pain  Musculoskeletal: No acute neck or back pain  Neurologic: Left-sided numbness, left leg weakness, history of multiple sclerosis       All other systems are negative.       PAST MEDICAL HISTORY  Past Medical History:   Diagnosis Date   • Anesthesia     woke up combative   • Anxiety associated with depression    • ASTHMA    • Drug-seeking behavior    • Migraine without aura, without mention of intractable migraine without mention of status migrainosus    • Other specified symptom associated with female genital organs    • Seizure (HCC)     Last seizure was 1/2019   • Seizure cerebral (HCC) 2/15/2018   • Stroke (HCC)    • Unspecified disorder of thyroid     cyst       FAMILY HISTORY  No family history on file.    SOCIAL HISTORY  Social History     Socioeconomic History   • Marital status:      Spouse name: Not on file   • Number of children: Not on file   • Years of education: Not on  file   • Highest education level: Not on file   Occupational History   • Not on file   Tobacco Use   • Smoking status: Never Smoker   • Smokeless tobacco: Never Used   Substance and Sexual Activity   • Alcohol use: Not Currently     Comment: rarely   • Drug use: No   • Sexual activity: Yes     Partners: Male   Other Topics Concern   • Not on file   Social History Narrative   • Not on file     Social Determinants of Health     Financial Resource Strain:    • Difficulty of Paying Living Expenses:    Food Insecurity:    • Worried About Running Out of Food in the Last Year:    • Ran Out of Food in the Last Year:    Transportation Needs:    • Lack of Transportation (Medical):    • Lack of Transportation (Non-Medical):    Physical Activity:    • Days of Exercise per Week:    • Minutes of Exercise per Session:    Stress:    • Feeling of Stress :    Social Connections:    • Frequency of Communication with Friends and Family:    • Frequency of Social Gatherings with Friends and Family:    • Attends [employer] Services:    • Active Member of Clubs or Organizations:    • Attends Club or Organization Meetings:    • Marital Status:    Intimate Partner Violence:    • Fear of Current or Ex-Partner:    • Emotionally Abused:    • Physically Abused:    • Sexually Abused:        SURGICAL HISTORY  Past Surgical History:   Procedure Laterality Date   • VAGINAL HYSTERECTOMY TOTAL  1/27/2020    Procedure: HYSTERECTOMY, TOTAL, VAGINAL;  Surgeon: Julian Parker M.D.;  Location: SURGERY SAME DAY [facility];  Service: Gynecology   • CYSTOSCOPY  1/27/2020    Procedure: CYSTOSCOPY;  Surgeon: Julian Parker M.D.;  Location: SURGERY SAME DAY [facility];  Service: Gynecology   • TUBAL COAGULATION LAPAROSCOPIC BILATERAL  7/11/2014    Performed by Julian Parker M.D. at SURGERY SAME DAY [facility] ORS   • SEPTOPLASTY  10/28/2009    Performed by ANNETTE FORDE at SURGERY [facility] ORS   • SOMNOPLASTY  10/28/2009    Performed by MAURISIO  "ANNETTE CALVIN at SURGERY [facility] ORS   • NASAL POLYPECTOMY  10/28/2009    Performed by ANNETTE FORDE at SURGERY [facility] ORS       CURRENT MEDICATIONS  Home Medications    **Home medications have not yet been reviewed for this encounter**         ALLERGIES  Allergies   Allergen Reactions   • Amitriptyline Unspecified     Suicidal   • Clarithromycin Hives   • Sulfa Drugs Anaphylaxis   • Cantaloupe Itching   • Honey Dew Itching   • Latex Itching   • Lorazepam Unspecified     Hallucinations     • Metoclopramide Unspecified     Muscle spasms   • Penicillin G Potassium Vomiting   • Rizatriptan Unspecified     Tremors, confusion     • Sumatriptan Unspecified     \"Makes my head pins and needles\"   • Tape Rash   • Morphine Hives and Itching       PHYSICAL EXAM  VITAL SIGNS: /72   Pulse 60   Temp 37.6 °C (99.6 °F) (Temporal)   Resp 16   Ht 1.6 m (5' 3\")   Wt 79.3 kg (174 lb 13.2 oz)   LMP 11/16/2019   SpO2 98%   BMI 30.97 kg/m²   Constitutional:  Non-toxic appearance.   HENT: No facial swelling, no facial droop  Eyes: Anicteric, no conjunctivitis.     Cardiovascular: Normal heart rate, Normal rhythm  Pulmonary:  No wheezing, No rales.   Gastrointestinal: Soft, No tenderness, No masses  Skin: Warm, Dry, No cyanosis.   Neurologic: Speech is clear, follows commands, facial expression is symmetrical.  Sensation left face, left arm and left leg compared to the right.  Very mild drift left leg.  Right leg.  Upper extremity strength equal and intact  Psychiatric: Affect normal,  Mood normal.  Patient is calm and cooperative  Musculoskeletal: No chest wall tenderness    EKG/Labs  Results for orders placed or performed during the hospital encounter of 03/05/21   CBC WITH DIFFERENTIAL   Result Value Ref Range    WBC 6.8 4.8 - 10.8 K/uL    RBC 4.97 4.20 - 5.40 M/uL    Hemoglobin 15.9 12.0 - 16.0 g/dL    Hematocrit 47.5 (H) 37.0 - 47.0 %    MCV 95.6 81.4 - 97.8 fL    MCH 32.0 27.0 - 33.0 pg    MCHC 33.5 (L) 33.6 - " 35.0 g/dL    RDW 49.0 35.9 - 50.0 fL    Platelet Count 259 164 - 446 K/uL    MPV 10.7 9.0 - 12.9 fL    Neutrophils-Polys 74.10 (H) 44.00 - 72.00 %    Lymphocytes 10.70 (L) 22.00 - 41.00 %    Monocytes 10.80 0.00 - 13.40 %    Eosinophils 3.70 0.00 - 6.90 %    Basophils 0.40 0.00 - 1.80 %    Immature Granulocytes 0.30 0.00 - 0.90 %    Nucleated RBC 0.00 /100 WBC    Neutrophils (Absolute) 5.07 2.00 - 7.15 K/uL    Lymphs (Absolute) 0.73 (L) 1.00 - 4.80 K/uL    Monos (Absolute) 0.74 0.00 - 0.85 K/uL    Eos (Absolute) 0.25 0.00 - 0.51 K/uL    Baso (Absolute) 0.03 0.00 - 0.12 K/uL    Immature Granulocytes (abs) 0.02 0.00 - 0.11 K/uL    NRBC (Absolute) 0.00 K/uL   ACCU-CHEK GLUCOSE   Result Value Ref Range    Glucose - Accu-Ck 87 65 - 99 mg/dL         RADIOLOGY/PROCEDURES  CT-CEREBRAL PERFUSION ANALYSIS   Final Result      1.  Cerebral blood flow less than 30% likely representing completed infarct = 0 mL.      2.  T Max more than 6 seconds likely representing combination of completed infarct and ischemia = 0 mL.      3.  Mismatched volume likely representing ischemic brain/penumbra = None      4.  Please note that the cerebral perfusion was performed on the limited brain tissue around the basal ganglia region. Infarct/ischemia outside the CT perfusion sections can be missed in this study.      CT-CTA NECK WITH & W/O-POST PROCESSING   Final Result      CT angiogram of the neck within normal limits.      CT-CTA HEAD WITH & W/O-POST PROCESS   Final Result      1.  CT angiogram of the [place] within normal limits.      2.  CT head without and with contrast within normal limits      CT-HEAD W/O   Final Result      No acute intracranial abnormality is identified.      MR-BRAIN-WITH & W/O    (Results Pending)         COURSE & MEDICAL DECISION MAKING  Pertinent Labs & Imaging studies reviewed. (See chart for details)  NIH stroke scale score is 2.  She was within protocol for consideration for IR clot removal however CT  angiograms were negative.  Case was discussed with on-call neurology, they are planning for MRI of the brain with and without contrast, consideration for underlying stroke or MS exacerbation.  Patient be hospitalized for ongoing evaluation and treatment.  During her stay in the ER, repeat exam, NIH stroke scale score remains 2 with weakness in the left leg, left-sided numbness.    FINAL IMPRESSION     1. Left sided numbness     2. Left leg weakness                     Electronically signed by: Gabriel Kramer M.D., 3/5/2021 4:25 PM

## 2021-03-06 NOTE — CONSULTS
"Chief Complaint   Patient presents with   • Numbness     on left foot begining last night at 2130, then moving up to left arm and left face this morning.  Left leg weakness   • Headache     upon wakening this morning       Problem List Items Addressed This Visit     MS (multiple sclerosis) (HCC)    Relevant Medications    fingolimod (GILENYA) 0.5 MG Cap capsule    Other Relevant Orders    REFERRAL TO PHYSIATRY (PMR)    REFERRAL TO NEUROLOGY      Other Visit Diagnoses     Left sided numbness        Left leg weakness            Neurology Consultation     History of present illness:  This is a 35-year old female with PMhx significant for multiple sclerosis (diagnosed January 2021; patient of Dr. 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 on 3/5/21 for a chief complaint of Left sided numbness. The patient reports she had been in her usual state of health until yesterday evening; she was on the couch when she suddenly noted Left foot numbness. She did not immediately seek further medical evaluation as she thought symptoms would resolve spontaneously. This morning when she awoke, patient noted (in addition to Left foot numbness), left upper extremity and Left face numbness (\"like I just went to the dentist\"). She thus presented to the ED for further evaluation. Currently, patient is sitting up in stretcher; awake and alert. In additon to the above noted symptoms, patient admits to increasing LLE weakness (worse than baseline) and Left sided headache. She admits to similar symptoms in the past with complex migraine. She denies problem with vision (double vision, blurred vision, loss of vision, or photophobia), speech, swallowing, or hoarse voice/voice changes. She denies recent falls or trauma.     Neurology has been " consulted by Dr. Gabriel Kramer to further evaluate the findings noted above.     Past medical history:   Past Medical History:   Diagnosis Date   • Anesthesia     woke up combative   • Anxiety associated with depression    • ASTHMA    • Drug-seeking behavior    • Migraine without aura, without mention of intractable migraine without mention of status migrainosus    • Other specified symptom associated with female genital organs    • Seizure (HCC)     Last seizure was 1/2019   • Seizure cerebral (HCC) 2/15/2018   • Stroke (HCC)    • Unspecified disorder of thyroid     cyst       Past surgical history:   Past Surgical History:   Procedure Laterality Date   • VAGINAL HYSTERECTOMY TOTAL  1/27/2020    Procedure: HYSTERECTOMY, TOTAL, VAGINAL;  Surgeon: Julian Parker M.D.;  Location: SURGERY SAME DAY [facility];  Service: Gynecology   • CYSTOSCOPY  1/27/2020    Procedure: CYSTOSCOPY;  Surgeon: Julian Parker M.D.;  Location: SURGERY SAME DAY [facility];  Service: Gynecology   • TUBAL COAGULATION LAPAROSCOPIC BILATERAL  7/11/2014    Performed by Julian Parker M.D. at SURGERY SAME DAY [facility] ORS   • SEPTOPLASTY  10/28/2009    Performed by ANNETTE FORDE at SURGERY [facility] ORS   • SOMNOPLASTY  10/28/2009    Performed by ANNETTE FORDE at SURGERY [facility] ORS   • NASAL POLYPECTOMY  10/28/2009    Performed by ANNETTE FORDE at SURGERY [facility] ORS       Family history:   No family history on file.    Social history:   Social History     Socioeconomic History   • Marital status:      Spouse name: Not on file   • Number of children: Not on file   • Years of education: Not on file   • Highest education level: Not on file   Occupational History   • Not on file   Tobacco Use   • Smoking status: Never Smoker   • Smokeless tobacco: Never Used   Substance and Sexual Activity   • Alcohol use: Not Currently     Comment: rarely   • Drug use: No   • Sexual activity: Yes     Partners: Male   Other  Topics Concern   • Not on file   Social History Narrative   • Not on file     Social Determinants of Health     Financial Resource Strain:    • Difficulty of Paying Living Expenses:    Food Insecurity:    • Worried About Running Out of Food in the Last Year:    • Ran Out of Food in the Last Year:    Transportation Needs:    • Lack of Transportation (Medical):    • Lack of Transportation (Non-Medical):    Physical Activity:    • Days of Exercise per Week:    • Minutes of Exercise per Session:    Stress:    • Feeling of Stress :    Social Connections:    • Frequency of Communication with Friends and Family:    • Frequency of Social Gatherings with Friends and Family:    • Attends [employer] Services:    • Active Member of Clubs or Organizations:    • Attends Club or Organization Meetings:    • Marital Status:    Intimate Partner Violence:    • Fear of Current or Ex-Partner:    • Emotionally Abused:    • Physically Abused:    • Sexually Abused:        Current medications:   Current Facility-Administered Medications   Medication Dose   • magnesium sulfate IVPB premix 2 g  2 g   • midazolam (Versed) 2 MG/2ML injection 1 mg  1 mg   • topiramate (TOPAMAX) tablet 100 mg  100 mg   • zonisamide (ZONEGRAN) capsule 200 mg  200 mg   • Pharmacy consult request - Allow for permissive hypertension: SBP up to 220 mmHg/DBP up to 120 mmHg x 48 hours     • senna-docusate (PERICOLACE or SENOKOT S) 8.6-50 MG per tablet 2 tablet  2 tablet    And   • polyethylene glycol/lytes (MIRALAX) PACKET 1 Packet  1 Packet    And   • magnesium hydroxide (MILK OF MAGNESIA) suspension 30 mL  30 mL    And   • bisacodyl (DULCOLAX) suppository 10 mg  10 mg   • NS infusion     • acetaminophen (Tylenol) tablet 650 mg  650 mg   • labetalol (NORMODYNE/TRANDATE) injection 10 mg  10 mg    Or   • hydrALAZINE (APRESOLINE) injection 10 mg  10 mg   • aspirin (ASA) tablet 325 mg  325 mg    Or   • aspirin (ASA) chewable tab 324 mg  324 mg    Or   • aspirin (ASA)  "suppository 300 mg  300 mg       Medication Allergy:  Allergies   Allergen Reactions   • Amitriptyline Unspecified     Suicidal   • Clarithromycin Hives   • Sulfa Drugs Anaphylaxis   • Cantaloupe Itching   • Honey Dew Itching   • Latex Itching   • Lorazepam Unspecified     Hallucinations     • Metoclopramide Unspecified     Muscle spasms   • Penicillin G Potassium Vomiting   • Rizatriptan Unspecified     Tremors, confusion     • Sumatriptan Unspecified     \"Makes my head pins and needles\"   • Tape Rash   • Morphine Hives and Itching       Review of systems:   Constitutional: denies fever, night sweats, weight loss.   Eyes: denies acute vision change, eye pain or secretion.   Ears, Nose, Mouth, Throat: denies nasal secretion, nasal bleeding, difficulty swallowing, hearing loss, tinnitus, vertigo, ear pain, acute dental problems, oral ulcers or lesions.   Endocrine: denies recent weight changes, heat or cold intolerance, polyuria, polydypsia, polyphagia,abnormal hair growth.  Cardiovascular: denies new onset of chest pain, palpitations, syncope, or dyspnea of exertion.  Pulmonary: denies shortness of breath, new onset of cough, hemoptysis, wheezing, chest pain or flu-like symptoms.   GI: denies nausea, vomiting, diarrhea, GI bleeding, change in appetite, abdominal pain, and change in bowel habits.  : denies dysuria, urinary incontinence, hematuria.  Heme/oncology: denies history of easy bruising or bleeding. No history of cancer, DVTor PE.  Allergy/immunology: denies hives/urticaria, or itching.   Dermatologic: denies new rash, or new skin lesions.  Musculoskeletal:denies joint swelling or pain, muscle pain, neck and back pain.   Neurologic: As noted in detail above.   Psychiatric: denies symptoms of depression, anxiety, hallucinations, mood swings or changes, suicidal or homicidal thoughts.     Physical examination:   Vitals:    03/05/21 1446 03/05/21 1600 03/05/21 1630 03/05/21 1730   BP:  106/66 104/67 114/67 " "  Pulse:  61 61 65   Resp:  18 17 14   Temp:       TempSrc:       SpO2:  98% 99% 100%   Weight: 79.3 kg (174 lb 13.2 oz)      Height: 1.6 m (5' 3\")        General: Patient in no acute distress, pleasant and cooperative.  HEENT: Normocephalic, no signs of acute trauma.   Neck: supple, no meningeal signs or carotid bruits. There is normal range of motion. No tenderness on exam.   Chest: clear to auscultation. No cough.   CV: RRR, no murmurs.   Skin: no signs of acute rashes or trauma.   Musculoskeletal: joints exhibit full range of motion, without any pain to palpation. There are no signs of joint or muscle swelling. There is no tenderness to deep palpation of muscles.   Psychiatric: No hallucinatory behavior. Denies symptoms of depression or suicidal ideation. Mood and affect appear normal on exam.     NEUROLOGICAL EXAM:   Mental status, orientation: Awake, alert and fully oriented.   Speech and language: speech is clear and fluent. The patient is able to name, repeat and comprehend.   Memory: There is intact recollection of recent and remote events.   Cranial nerve exam: Pupils are 3-4 mm bilaterally and equally reactive to light. Visual fields are intact by confrontation. There is no nystagmus on primary or secondary gaze. Intact full EOM in all directions of gaze. Face appears symmetric. Decreased/reported absent sensation to left upper and lower face (V1/V2 distribution). Uvula is midline. Palate elevates symmetrically. Tongue is midline and without any signs of tongue biting or fasciculations. Sternocleidomastoid muscles exhibit is normal strength bilaterally. Shoulder shrug is intact bilaterally; somewhat weaker on the Left.    Motor exam: Strength is 5/5 in RUE/RLE; 4+/5 to LUE proximal and distal; 4/5 to LLE proximally, 4+/5 distally. Tone is normal. No overt bradykinesia. No abnormal movements were seen on exam.   Sensory exam Right reveals normal sense of light touch, proprioception, vibration and pinprick " in all extremities. Left with decreased sensation to light touch and nox stim (UE/LE)  Deep tendon reflexes:  3+ throughout. Plantar responses are mute bilaterally. There is no clonus.   Coordination: shows a normal finger-nose-finger. No ataxia/dysmetria.   Gait: Not assessed at this time as patient is a fall risk.       ANCILLARY DATA REVIEWED:     Lab Data Review:  Recent Results (from the past 24 hour(s))   ACCU-CHEK GLUCOSE    Collection Time: 03/05/21  3:02 PM   Result Value Ref Range    Glucose - Accu-Ck 87 65 - 99 mg/dL   CBC WITH DIFFERENTIAL    Collection Time: 03/05/21  3:02 PM   Result Value Ref Range    WBC 6.8 4.8 - 10.8 K/uL    RBC 4.97 4.20 - 5.40 M/uL    Hemoglobin 15.9 12.0 - 16.0 g/dL    Hematocrit 47.5 (H) 37.0 - 47.0 %    MCV 95.6 81.4 - 97.8 fL    MCH 32.0 27.0 - 33.0 pg    MCHC 33.5 (L) 33.6 - 35.0 g/dL    RDW 49.0 35.9 - 50.0 fL    Platelet Count 259 164 - 446 K/uL    MPV 10.7 9.0 - 12.9 fL    Neutrophils-Polys 74.10 (H) 44.00 - 72.00 %    Lymphocytes 10.70 (L) 22.00 - 41.00 %    Monocytes 10.80 0.00 - 13.40 %    Eosinophils 3.70 0.00 - 6.90 %    Basophils 0.40 0.00 - 1.80 %    Immature Granulocytes 0.30 0.00 - 0.90 %    Nucleated RBC 0.00 /100 WBC    Neutrophils (Absolute) 5.07 2.00 - 7.15 K/uL    Lymphs (Absolute) 0.73 (L) 1.00 - 4.80 K/uL    Monos (Absolute) 0.74 0.00 - 0.85 K/uL    Eos (Absolute) 0.25 0.00 - 0.51 K/uL    Baso (Absolute) 0.03 0.00 - 0.12 K/uL    Immature Granulocytes (abs) 0.02 0.00 - 0.11 K/uL    NRBC (Absolute) 0.00 K/uL   SARS-CoV-2 PCR (24 hour In-House): Collect NP swab in VTM    Collection Time: 03/05/21  5:15 PM    Specimen: Respirate   Result Value Ref Range    SARS-CoV-2 Source NP Swab        Labs reviewed by me.     Records reviewed:   Reviewed record in detailed/reviewed previous encounters at [facility].       Imaging reviewed by me:     DX-CHEST-LIMITED (1 VIEW)   Final Result         No acute cardiopulmonary abnormalities are identified.      CT-CEREBRAL  PERFUSION ANALYSIS   Final Result      1.  Cerebral blood flow less than 30% likely representing completed infarct = 0 mL.      2.  T Max more than 6 seconds likely representing combination of completed infarct and ischemia = 0 mL.      3.  Mismatched volume likely representing ischemic brain/penumbra = None      4.  Please note that the cerebral perfusion was performed on the limited brain tissue around the basal ganglia region. Infarct/ischemia outside the CT perfusion sections can be missed in this study.      CT-CTA NECK WITH & W/O-POST PROCESSING   Final Result      CT angiogram of the neck within normal limits.      CT-CTA HEAD WITH & W/O-POST PROCESS   Final Result      1.  CT angiogram of the [place] within normal limits.      2.  CT head without and with contrast within normal limits      CT-HEAD W/O   Final Result      No acute intracranial abnormality is identified.      MR-BRAIN-WITH & W/O    (Results Pending)   MR-CERVICAL SPINE-WITH & W/O    (Results Pending)   MR-THORACIC SPINE-WITH & W/O    (Results Pending)       Modified [place] Scale (MRS): 0 = No symptoms      ASSESSMENT AND PLAN:  35-year old female with PMhx significant for multiple sclerosis (diagnosed January 2021; patient of Dr. 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 She additionally admits to LLE weakness as well. Differential diagnoses include exacerbation of multiple sclerosis or complex migraine; MRI Brain, C and T spine w/wo contrast are currently pending.     Recommendations/Plan:     -q4h and PRN neuro assessment.VS per nursing/unit protocol.  -As was noted above, obtain MRI  Brain, C and T spine w/wo contrast. Following MR, will then determine if IV glucocorticoid therapy is appropriate.   -Check UA, UDS, CXR to rule out possible sources of recrudescence/provocation of symptoms.   -Give one time doses of Mg 2 g IV, Benedryl 25 mg IV, and Compazine 10 mg IV for migraine.   -PT/OT eval and treat.   -Will follow up with results of the above and make additional recommendations accordingly.    -DVT PPX: SCDs.     The plan of care above has been discussed with Dr. Miranda.     Ermelinda Davis, [email or web address].  [place] [facility]

## 2021-03-06 NOTE — PROGRESS NOTES
Assessment completed. Pt A&Ox 4. Respirations are even and unlabored on room air. Pt denies/reports pain at this time. Monitors applied, VS stable, call light and belongings within reach. POC updated (Inpatient). Pt educated on room and call light, pt verbalized understanding. Communication board updated. Needs met.

## 2021-03-07 DIAGNOSIS — G35 MULTIPLE SCLEROSIS (HCC): ICD-10-CM

## 2021-03-07 LAB
ANION GAP SERPL CALC-SCNC: 12 MMOL/L (ref 7–16)
BASOPHILS # BLD AUTO: 0.1 % (ref 0–1.8)
BASOPHILS # BLD: 0.02 K/UL (ref 0–0.12)
BUN SERPL-MCNC: 11 MG/DL (ref 8–22)
CALCIUM SERPL-MCNC: 9.2 MG/DL (ref 8.5–10.5)
CHLORIDE SERPL-SCNC: 111 MMOL/L (ref 96–112)
CO2 SERPL-SCNC: 13 MMOL/L (ref 20–33)
CREAT SERPL-MCNC: 0.56 MG/DL (ref 0.5–1.4)
EOSINOPHIL # BLD AUTO: 0 K/UL (ref 0–0.51)
EOSINOPHIL NFR BLD: 0 % (ref 0–6.9)
ERYTHROCYTE [DISTWIDTH] IN BLOOD BY AUTOMATED COUNT: 49.8 FL (ref 35.9–50)
GLUCOSE BLD-MCNC: 128 MG/DL (ref 65–99)
GLUCOSE BLD-MCNC: 166 MG/DL (ref 65–99)
GLUCOSE BLD-MCNC: 94 MG/DL (ref 65–99)
GLUCOSE BLD-MCNC: 96 MG/DL (ref 65–99)
GLUCOSE SERPL-MCNC: 133 MG/DL (ref 65–99)
HCT VFR BLD AUTO: 45.1 % (ref 37–47)
HGB BLD-MCNC: 14.5 G/DL (ref 12–16)
IMM GRANULOCYTES # BLD AUTO: 0.09 K/UL (ref 0–0.11)
IMM GRANULOCYTES NFR BLD AUTO: 0.6 % (ref 0–0.9)
LYMPHOCYTES # BLD AUTO: 0.64 K/UL (ref 1–4.8)
LYMPHOCYTES NFR BLD: 4.2 % (ref 22–41)
MAGNESIUM SERPL-MCNC: 2.2 MG/DL (ref 1.5–2.5)
MCH RBC QN AUTO: 31.3 PG (ref 27–33)
MCHC RBC AUTO-ENTMCNC: 32.2 G/DL (ref 33.6–35)
MCV RBC AUTO: 97.2 FL (ref 81.4–97.8)
MONOCYTES # BLD AUTO: 1.13 K/UL (ref 0–0.85)
MONOCYTES NFR BLD AUTO: 7.4 % (ref 0–13.4)
NEUTROPHILS # BLD AUTO: 13.35 K/UL (ref 2–7.15)
NEUTROPHILS NFR BLD: 87.7 % (ref 44–72)
NRBC # BLD AUTO: 0 K/UL
NRBC BLD-RTO: 0 /100 WBC
PLATELET # BLD AUTO: 258 K/UL (ref 164–446)
PMV BLD AUTO: 10 FL (ref 9–12.9)
POTASSIUM SERPL-SCNC: 3.9 MMOL/L (ref 3.6–5.5)
RBC # BLD AUTO: 4.64 M/UL (ref 4.2–5.4)
SODIUM SERPL-SCNC: 136 MMOL/L (ref 135–145)
WBC # BLD AUTO: 15.2 K/UL (ref 4.8–10.8)

## 2021-03-07 PROCEDURE — 96366 THER/PROPH/DIAG IV INF ADDON: CPT

## 2021-03-07 PROCEDURE — 96372 THER/PROPH/DIAG INJ SC/IM: CPT

## 2021-03-07 PROCEDURE — 700102 HCHG RX REV CODE 250 W/ 637 OVERRIDE(OP): Performed by: HOSPITALIST

## 2021-03-07 PROCEDURE — 99214 OFFICE O/P EST MOD 30 MIN: CPT | Performed by: NURSE PRACTITIONER

## 2021-03-07 PROCEDURE — 96376 TX/PRO/DX INJ SAME DRUG ADON: CPT

## 2021-03-07 PROCEDURE — 85025 COMPLETE CBC W/AUTO DIFF WBC: CPT

## 2021-03-07 PROCEDURE — 700102 HCHG RX REV CODE 250 W/ 637 OVERRIDE(OP): Performed by: STUDENT IN AN ORGANIZED HEALTH CARE EDUCATION/TRAINING PROGRAM

## 2021-03-07 PROCEDURE — 36415 COLL VENOUS BLD VENIPUNCTURE: CPT

## 2021-03-07 PROCEDURE — G0378 HOSPITAL OBSERVATION PER HR: HCPCS

## 2021-03-07 PROCEDURE — 83735 ASSAY OF MAGNESIUM: CPT

## 2021-03-07 PROCEDURE — 700101 HCHG RX REV CODE 250: Performed by: HOSPITALIST

## 2021-03-07 PROCEDURE — A9270 NON-COVERED ITEM OR SERVICE: HCPCS | Performed by: STUDENT IN AN ORGANIZED HEALTH CARE EDUCATION/TRAINING PROGRAM

## 2021-03-07 PROCEDURE — 700111 HCHG RX REV CODE 636 W/ 250 OVERRIDE (IP): Performed by: PSYCHIATRY & NEUROLOGY

## 2021-03-07 PROCEDURE — 99226 PR SUBSEQUENT OBSERVATION CARE,LEVEL III: CPT | Performed by: STUDENT IN AN ORGANIZED HEALTH CARE EDUCATION/TRAINING PROGRAM

## 2021-03-07 PROCEDURE — 700102 HCHG RX REV CODE 250 W/ 637 OVERRIDE(OP): Performed by: PSYCHIATRY & NEUROLOGY

## 2021-03-07 PROCEDURE — 700105 HCHG RX REV CODE 258: Performed by: PSYCHIATRY & NEUROLOGY

## 2021-03-07 PROCEDURE — 82962 GLUCOSE BLOOD TEST: CPT | Mod: 91

## 2021-03-07 PROCEDURE — A9270 NON-COVERED ITEM OR SERVICE: HCPCS | Performed by: PSYCHIATRY & NEUROLOGY

## 2021-03-07 PROCEDURE — 700111 HCHG RX REV CODE 636 W/ 250 OVERRIDE (IP): Performed by: STUDENT IN AN ORGANIZED HEALTH CARE EDUCATION/TRAINING PROGRAM

## 2021-03-07 PROCEDURE — 80048 BASIC METABOLIC PNL TOTAL CA: CPT

## 2021-03-07 PROCEDURE — A9270 NON-COVERED ITEM OR SERVICE: HCPCS | Performed by: HOSPITALIST

## 2021-03-07 RX ORDER — DIPHENHYDRAMINE HYDROCHLORIDE 50 MG/ML
25 INJECTION INTRAMUSCULAR; INTRAVENOUS
Status: COMPLETED | OUTPATIENT
Start: 2021-03-07 | End: 2021-03-07

## 2021-03-07 RX ORDER — MAGNESIUM SULFATE HEPTAHYDRATE 40 MG/ML
2 INJECTION, SOLUTION INTRAVENOUS ONCE
Status: DISCONTINUED | OUTPATIENT
Start: 2021-03-07 | End: 2021-03-07 | Stop reason: DRUGHIGH

## 2021-03-07 RX ORDER — CHOLECALCIFEROL (VITAMIN D3) 125 MCG
5 CAPSULE ORAL NIGHTLY
Status: DISCONTINUED | OUTPATIENT
Start: 2021-03-07 | End: 2021-03-09 | Stop reason: HOSPADM

## 2021-03-07 RX ORDER — DIPHENHYDRAMINE HYDROCHLORIDE 50 MG/ML
25 INJECTION INTRAMUSCULAR; INTRAVENOUS ONCE
Status: DISCONTINUED | OUTPATIENT
Start: 2021-03-07 | End: 2021-03-07 | Stop reason: DRUGHIGH

## 2021-03-07 RX ORDER — PROCHLORPERAZINE EDISYLATE 5 MG/ML
10 INJECTION INTRAMUSCULAR; INTRAVENOUS ONCE
Status: DISCONTINUED | OUTPATIENT
Start: 2021-03-07 | End: 2021-03-07 | Stop reason: DRUGHIGH

## 2021-03-07 RX ORDER — PROCHLORPERAZINE EDISYLATE 5 MG/ML
10 INJECTION INTRAMUSCULAR; INTRAVENOUS
Status: COMPLETED | OUTPATIENT
Start: 2021-03-07 | End: 2021-03-07

## 2021-03-07 RX ORDER — MAGNESIUM SULFATE HEPTAHYDRATE 40 MG/ML
2 INJECTION, SOLUTION INTRAVENOUS
Status: COMPLETED | OUTPATIENT
Start: 2021-03-07 | End: 2021-03-07

## 2021-03-07 RX ADMIN — INSULIN HUMAN 1 UNITS: 100 INJECTION, SOLUTION PARENTERAL at 21:11

## 2021-03-07 RX ADMIN — Medication 5 MG: at 21:06

## 2021-03-07 RX ADMIN — FINGOLIMOD HYDROCHLORIDE 0.5 MG: 0.5 CAPSULE ORAL at 04:54

## 2021-03-07 RX ADMIN — PROCHLORPERAZINE EDISYLATE 10 MG: 5 INJECTION INTRAMUSCULAR; INTRAVENOUS at 17:16

## 2021-03-07 RX ADMIN — TOPIRAMATE 100 MG: 100 TABLET, FILM COATED ORAL at 04:52

## 2021-03-07 RX ADMIN — SODIUM CHLORIDE 1000 MG: 9 INJECTION, SOLUTION INTRAVENOUS at 13:03

## 2021-03-07 RX ADMIN — Medication 1000 UNITS: at 04:52

## 2021-03-07 RX ADMIN — MAGNESIUM SULFATE 2 G: 2 INJECTION INTRAVENOUS at 17:16

## 2021-03-07 RX ADMIN — TOPIRAMATE 100 MG: 100 TABLET, FILM COATED ORAL at 17:16

## 2021-03-07 RX ADMIN — DIPHENHYDRAMINE HYDROCHLORIDE 25 MG: 50 INJECTION INTRAMUSCULAR; INTRAVENOUS at 17:16

## 2021-03-07 RX ADMIN — ZONISAMIDE 200 MG: 50 CAPSULE ORAL at 17:15

## 2021-03-07 ASSESSMENT — ENCOUNTER SYMPTOMS
DIZZINESS: 0
TINGLING: 1
INSOMNIA: 0
DEPRESSION: 0
COUGH: 0
NAUSEA: 0
DIARRHEA: 0
NERVOUS/ANXIOUS: 0
HEADACHES: 1
FEVER: 0
ABDOMINAL PAIN: 0
SHORTNESS OF BREATH: 0
SENSORY CHANGE: 1
VOMITING: 0
FOCAL WEAKNESS: 1
CONSTIPATION: 0
SPEECH CHANGE: 0

## 2021-03-07 ASSESSMENT — PAIN DESCRIPTION - PAIN TYPE
TYPE: ACUTE PAIN
TYPE: ACUTE PAIN

## 2021-03-07 NOTE — HOSPITAL COURSE
Mrs. Meade is a 36 y/o female with a PMHx of complex migraines and MS who presented to the ED on 3/5/2021 with left sided numbness and headache. CT head was negative. 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 She was subsequently started on steroids and admitted to the clinical decision unit for further evaluation and treatment.

## 2021-03-07 NOTE — PROGRESS NOTES
Hospital Medicine Daily Progress Note    Date of Service  3/7/2021    Chief Complaint  Left sided weakness, headache    Hospital Course  Mrs. Meade is a 34 y/o female with a PMHx of complex migraines and MS who presented to the ED on 3/5/2021 with left sided numbness and headache. CT head was negative. 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 She was subsequently started on steroids and admitted to the clinical decision unit for further evaluation and treatment.     Interval Problem Update  Patient seen and examined at bedside, continues to have left sided numbness and tingling in the lower extremity as well as the left arm and left breast  -Day 3 of 5 of IV steroids, continue per neurology  -Vitals stable  -Leukocytosis secondary to steroids, no signs of infection      Consultants/Specialty  Neurology-Dr. Moeller    Code Status  Full Code    Disposition  Dissipate home on 3/9/2021    Review of Systems  Review of Systems   Constitutional: Negative for fever and malaise/fatigue.   Respiratory: Negative for cough and shortness of breath.    Cardiovascular: Negative for chest pain and leg swelling.   Gastrointestinal: Negative for abdominal pain, constipation, diarrhea, nausea and vomiting.   Genitourinary: Negative for dysuria, frequency and urgency.   Neurological: Positive for tingling, sensory change, focal weakness and headaches. Negative for dizziness and speech change.   Psychiatric/Behavioral: Negative for depression. The patient is not nervous/anxious and does not have insomnia.    All other systems reviewed and are negative.     Physical Exam  Temp:  [36.5 °C (97.7 °F)-37.2 °C (99 °F)] 37.1 °C (98.7 °F)  Pulse:  [60-81] 73  Resp:  [17] 17  BP: (101-112)/(54-68)  112/68  SpO2:  [94 %-98 %] 97 %    Physical Exam  Vitals and nursing note reviewed.   Constitutional:       General: She is awake. She is not in acute distress.     Appearance: Normal appearance. She is well-developed. She is not ill-appearing.   HENT:      Head: Normocephalic and atraumatic.      Mouth/Throat:      Lips: Pink.      Mouth: Mucous membranes are moist.   Cardiovascular:      Rate and Rhythm: Normal rate and regular rhythm.      Pulses: Normal pulses.      Heart sounds: Normal heart sounds.   Pulmonary:      Effort: Pulmonary effort is normal.      Breath sounds: Normal breath sounds.   Abdominal:      General: Bowel sounds are normal.      Palpations: Abdomen is soft.   Musculoskeletal:      Cervical back: Normal range of motion and neck supple.      Right lower leg: No edema.      Left lower leg: No edema.   Skin:     General: Skin is warm and dry.   Neurological:      General: No focal deficit present.      Mental Status: She is alert and oriented to person, place, and time.      Sensory: Sensory deficit present.      Comments: Numbness on left side diffusely.  Strength 5/ 5 in all extremities   Psychiatric:         Attention and Perception: Attention and perception normal.         Mood and Affect: Mood and affect normal.         Speech: Speech normal.         Behavior: Behavior normal. Behavior is cooperative.         Thought Content: Thought content normal.         Cognition and Memory: Cognition and memory normal.         Judgment: Judgment normal.     Fluids  No intake or output data in the 24 hours ending 03/07/21 1244    Laboratory  Recent Labs     03/05/21  1502 03/06/21  0350 03/07/21  0559   WBC 6.8 7.5 15.2*   RBC 4.97 4.72 4.64   HEMOGLOBIN 15.9 14.6 14.5   HEMATOCRIT 47.5* 45.3 45.1   MCV 95.6 96.0 97.2   MCH 32.0 30.9 31.3   MCHC 33.5* 32.2* 32.2*   RDW 49.0 49.1 49.8   PLATELETCT 259 245 258   MPV 10.7 10.1 10.0     Recent Labs     03/05/21  1645 03/06/21  0350 03/07/21  0559   SODIUM  137 137 136   POTASSIUM 3.7 3.8 3.9   CHLORIDE 108 107 111   CO2 18* 18* 13*   GLUCOSE 76 113* 133*   BUN 13 12 11   CREATININE 0.72 0.67 0.56   CALCIUM 9.1 8.9 9.2     Imaging  MR-THORACIC SPINE-WITH & W/O   Final Result      No significant abnormality is seen in the MR scan of the thoracic spine. No evidence of demyelinating disease in the thoracic cord.      MR-CERVICAL SPINE-WITH & W/O   Final Result      Focal enhancing cord lesion on the RIGHT at C5-C6, in keeping with the provided clinical history of multiple sclerosis      MR-BRAIN-WITH & W/O   Final Result      1.  Numerous supra and infratentorial enhancing and nonenhancing lesions, in keeping with multiple sclerosis   2.  Mild Chiari I malformation. No hydrocephalus.      DX-CHEST-LIMITED (1 VIEW)   Final Result         No acute cardiopulmonary abnormalities are identified.      CT-CEREBRAL PERFUSION ANALYSIS   Final Result      1.  Cerebral blood flow less than 30% likely representing completed infarct = 0 mL.      2.  T Max more than 6 seconds likely representing combination of completed infarct and ischemia = 0 mL.      3.  Mismatched volume likely representing ischemic brain/penumbra = None      4.  Please note that the cerebral perfusion was performed on the limited brain tissue around the basal ganglia region. Infarct/ischemia outside the CT perfusion sections can be missed in this study.      CT-CTA NECK WITH & W/O-POST PROCESSING   Final Result      CT angiogram of the neck within normal limits.      CT-CTA HEAD WITH & W/O-POST PROCESS   Final Result      1.  CT angiogram of the [place] within normal limits.      2.  CT head without and with contrast within normal limits      CT-HEAD W/O   Final Result      No acute intracranial abnormality is identified.         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   -Continue steroids per neuro, currently day 3 of 5  -Pepcid while on steroids.   -Accuchecks ACHS with SSI as required while on steroids.   -Continue home MS meds.   -Neurology following, appreciate recommendations    Seizure disorder (HCC)- (present on admission)  Assessment & Plan  -Continue topamax.   -Seizure precautions.     Migraines- (present on admission)  Assessment & Plan  -Continue topamax for preventative therapy.   -Fioricet or abortive therapy for acute migraine.     Asthma- (present on admission)  Assessment & Plan  -Chronic & stable. No acute exacerbation. Continue to monitor.     Anxiety- (present on admission)  Assessment & Plan  -Chronic & stable without medication.   -Continue to monitor.        VTE prophylaxis: SCDs

## 2021-03-07 NOTE — PROGRESS NOTES
Assumed pt care at 0700. Received bedside report .     Pt Alert and oriented x  4 .VSS. POC discussed and education provided on administered medications. All questions and concerns addressed. Fall precautions, seizure precautions,  hourly rounding and Q4 hour neuro checks in place.      Patient continues to state left side is numb, and so far the steroids have not helped yet. Migraine this shift and fiorocet ordered.

## 2021-03-07 NOTE — PROGRESS NOTES
"Chief Complaint   Patient presents with   • Numbness     on left foot begining last night at 2130, then moving up to left arm and left face this morning.  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   -Continue steroids per neuro.   -Pepcid while on steroids.   -Accuchecks ACHS with SSI as required while on steroids.   -Continue home MS meds.   -Transfer to neuro.   -Neurology following.          Relevant Medications    fingolimod (GILENYA) 0.5 MG Cap capsule    fingolimod (GILENYA) capsule 0.5 mg    Other Relevant Orders    REFERRAL TO PHYSIATRY (PMR)    REFERRAL TO NEUROLOGY      Other Visit Diagnoses     Left sided numbness        Left leg weakness            Neurology Progress Note     History of present illness:  This is a 35-year old female with PMhx significant for multiple sclerosis (diagnosed January 2021; patient of Dr. 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 The patient reports she had been in her usual state of health until yesterday evening; she was on the couch when she suddenly noted Left foot numbness. She did not immediately seek further medical evaluation as she thought symptoms would resolve spontaneously. This morning when she awoke, patient noted (in addition to Left foot numbness), left upper extremity and Left face numbness (\"like I just went to the dentist\"). She thus presented to the ED for " "further evaluation. Currently, patient is sitting up in stretcher; awake and alert. In additon to the above noted symptoms, patient admits to increasing LLE weakness (worse than baseline) and Left sided headache. She admits to similar symptoms in the past with complex migraine. She denies problem with vision (double vision, blurred vision, loss of vision, or photophobia), speech, swallowing, or hoarse voice/voice changes. She denies recent falls or trauma.     Neurology has been consulted by Dr. Gabriel Kramer to further evaluate the findings noted above.     Interval, 3/6/21:  Patient sitting up in bed; preparing to ambulate in hallway with PT. Patient admits to additional numbness involving Left chest and \"half of my right chest\" as well. Denies new weakness. Admits to mild persisted headache; improved overall.     MRI Brain w/wo contrast revealed new/enhancing lesions involving Right frontal lobe, among others. Patient received first dose 1g IV solumedrol last night, 3/5/21.     Interval, 3/7/21:  Patient sitting up in bed; awake and alert. Admits to persisted numbness to BL chest wall; admits to improvement in LLE weakness; otherwise no change in symptoms/deficits. Today is day #3 IV solumedrol.     No changes to HPI as was previously documented.    Past medical history:   Past Medical History:   Diagnosis Date   • Anesthesia     woke up combative   • Anxiety associated with depression    • ASTHMA    • Drug-seeking behavior    • Migraine without aura, without mention of intractable migraine without mention of status migrainosus    • Other specified symptom associated with female genital organs    • Seizure (HCC)     Last seizure was 1/2019   • Seizure cerebral (HCC) 2/15/2018   • Stroke (HCC)    • Unspecified disorder of thyroid     cyst       Past surgical history:   Past Surgical History:   Procedure Laterality Date   • VAGINAL HYSTERECTOMY TOTAL  1/27/2020    Procedure: HYSTERECTOMY, TOTAL, VAGINAL;  " Surgeon: Julian Parker M.D.;  Location: SURGERY SAME DAY [facility];  Service: Gynecology   • CYSTOSCOPY  1/27/2020    Procedure: CYSTOSCOPY;  Surgeon: Julian Parker M.D.;  Location: SURGERY SAME DAY [facility];  Service: Gynecology   • TUBAL COAGULATION LAPAROSCOPIC BILATERAL  7/11/2014    Performed by Julian Parker M.D. at SURGERY SAME DAY [facility] ORS   • SEPTOPLASTY  10/28/2009    Performed by ANNETTE FORDE at SURGERY [facility] ORS   • SOMNOPLASTY  10/28/2009    Performed by ANNETTE FORDE at SURGERY [facility] ORS   • NASAL POLYPECTOMY  10/28/2009    Performed by ANNETTE FORDE at SURGERY [facility] ORS       Family history:   History reviewed. No pertinent family history.    Social history:   Social History     Socioeconomic History   • Marital status:      Spouse name: Not on file   • Number of children: Not on file   • Years of education: Not on file   • Highest education level: Not on file   Occupational History   • Not on file   Tobacco Use   • Smoking status: Never Smoker   • Smokeless tobacco: Never Used   Substance and Sexual Activity   • Alcohol use: Not Currently     Comment: rarely   • Drug use: No   • Sexual activity: Yes     Partners: Male   Other Topics Concern   • Not on file   Social History Narrative   • Not on file     Social Determinants of Health     Financial Resource Strain:    • Difficulty of Paying Living Expenses:    Food Insecurity:    • Worried About Running Out of Food in the Last Year:    • Ran Out of Food in the Last Year:    Transportation Needs:    • Lack of Transportation (Medical):    • Lack of Transportation (Non-Medical):    Physical Activity:    • Days of Exercise per Week:    • Minutes of Exercise per Session:    Stress:    • Feeling of Stress :    Social Connections:    • Frequency of Communication with Friends and Family:    • Frequency of Social Gatherings with Friends and Family:    • Attends [employer] Services:    • Active Member of Clubs  "or Organizations:    • Attends Club or Organization Meetings:    • Marital Status:    Intimate Partner Violence:    • Fear of Current or Ex-Partner:    • Emotionally Abused:    • Physically Abused:    • Sexually Abused:        Current medications:   Current Facility-Administered Medications   Medication Dose   • magnesium sulfate IVPB premix 2 g  2 g   • diphenhydrAMINE (BENADRYL) injection 25 mg  25 mg   • prochlorperazine (COMPAZINE) injection 10 mg  10 mg   • senna-docusate (PERICOLACE or SENOKOT S) 8.6-50 MG per tablet 2 tablet  2 tablet    And   • polyethylene glycol/lytes (MIRALAX) PACKET 1 Packet  1 Packet    And   • magnesium hydroxide (MILK OF MAGNESIA) suspension 30 mL  30 mL    And   • bisacodyl (DULCOLAX) suppository 10 mg  10 mg   • fingolimod (GILENYA) capsule 0.5 mg  0.5 mg   • insulin regular (HumuLIN R,NovoLIN R) injection  1-6 Units    And   • glucose 4 g chewable tablet 16 g  16 g    And   • dextrose 50% (D50W) injection 50 mL  50 mL   • topiramate (TOPAMAX) tablet 100 mg  100 mg   • zonisamide (ZONEGRAN) capsule 200 mg  200 mg   • NS infusion     • acetaminophen (Tylenol) tablet 650 mg  650 mg   • labetalol (NORMODYNE/TRANDATE) injection 10 mg  10 mg    Or   • hydrALAZINE (APRESOLINE) injection 10 mg  10 mg   • vitamin D (Ergocalciferol) (DRISDOL) capsule 50,000 Units  50,000 Units   • vitamin D (cholecalciferol) tablet 1,000 Units  1,000 Units   • methylPREDNISolone sod succ (SOLU-MEDROL) 1,000 mg in  mL IVPB  1 g   • famotidine (PEPCID) tablet 10 mg  10 mg       Medication Allergy:  Allergies   Allergen Reactions   • Amitriptyline Unspecified     Suicidal   • Clarithromycin Hives   • Sulfa Drugs Anaphylaxis   • Cantaloupe Itching   • Honey Dew Itching   • Latex Itching   • Lorazepam Unspecified     Hallucinations     • Metoclopramide Unspecified     Muscle spasms   • Penicillin G Potassium Vomiting   • Rizatriptan Unspecified     Tremors, confusion     • Sumatriptan Unspecified     \"Makes " "my head pins and needles\"   • Tape Rash   • Morphine Hives and Itching       Review of systems:   Constitutional: denies fever, night sweats, weight loss.   Eyes: denies acute vision change, eye pain or secretion.   Ears, Nose, Mouth, Throat: denies nasal secretion, nasal bleeding, difficulty swallowing, hearing loss, tinnitus, vertigo, ear pain, acute dental problems, oral ulcers or lesions.   Endocrine: denies recent weight changes, heat or cold intolerance, polyuria, polydypsia, polyphagia,abnormal hair growth.  Cardiovascular: denies new onset of chest pain, palpitations, syncope, or dyspnea of exertion.  Pulmonary: denies shortness of breath, new onset of cough, hemoptysis, wheezing, chest pain or flu-like symptoms.   GI: denies nausea, vomiting, diarrhea, GI bleeding, change in appetite, abdominal pain, and change in bowel habits.  : denies dysuria, urinary incontinence, hematuria.  Heme/oncology: denies history of easy bruising or bleeding. No history of cancer, DVTor PE.  Allergy/immunology: denies hives/urticaria, or itching.   Dermatologic: denies new rash, or new skin lesions.  Musculoskeletal:denies joint swelling or pain, muscle pain, neck and back pain.   Neurologic: As noted in detail above.   Psychiatric: denies symptoms of depression, anxiety, hallucinations, mood swings or changes, suicidal or homicidal thoughts.     Physical examination:   Vitals:    03/06/21 1600 03/06/21 2000 03/07/21 0000 03/07/21 0400   BP: 110/60 106/63 101/54 106/60   Pulse: 80 80 70 60   Resp: 17 17 17 17   Temp: 37.1 °C (98.8 °F) 36.5 °C (97.7 °F) 36.7 °C (98 °F) 36.6 °C (97.9 °F)   TempSrc: Temporal Temporal Temporal Temporal   SpO2: 95% 94% 96% 98%   Weight:       Height:         General: Patient in no acute distress, pleasant and cooperative.  HEENT: Normocephalic, no signs of acute trauma.   Neck: supple, no meningeal signs or carotid bruits. There is normal range of motion. No tenderness on exam.   Chest: clear to " auscultation. No cough.   CV: RRR, no murmurs.   Skin: no signs of acute rashes or trauma.   Musculoskeletal: joints exhibit full range of motion, without any pain to palpation. There are no signs of joint or muscle swelling. There is no tenderness to deep palpation of muscles.   Psychiatric: No hallucinatory behavior. Denies symptoms of depression or suicidal ideation. Mood and affect appear normal on exam.     NEUROLOGICAL EXAM:   Mental status, orientation: Awake, alert and fully oriented.   Speech and language: speech is clear and fluent. The patient is able to name, repeat and comprehend.   Memory: There is intact recollection of recent and remote events.   Cranial nerve exam: Pupils are 3-4 mm bilaterally and equally reactive to light. Visual fields are intact by confrontation. There is no nystagmus on primary or secondary gaze. Intact full EOM in all directions of gaze. Face appears symmetric. Decreased/reported absent sensation to left upper and lower face (V1/V2 distribution). Uvula is midline. Palate elevates symmetrically. Tongue is midline and without any signs of tongue biting or fasciculations. Sternocleidomastoid muscles exhibit is normal strength bilaterally. Shoulder shrug is intact bilaterally; somewhat weaker on the Left.    Motor exam: Strength is 5/5 in RUE/RLE; 4+/5 to LUE proximal and distal; 4/5 to LLE proximally, 4+/5 distally. Tone is normal. No overt bradykinesia. No abnormal movements were seen on exam.   Sensory exam Right reveals normal sense of light touch, proprioception, vibration and pinprick in all extremities. Left with decreased sensation to light touch and nox stim (UE/LE)  Deep tendon reflexes:  3+ throughout. Plantar responses are mute bilaterally. There is no clonus.   Coordination: shows a normal finger-nose-finger. No ataxia/dysmetria.   Gait: Not assessed at this time as patient is a fall risk.     No significant changes to exam as was documented on 3/6/21.     ANCILLARY  DATA REVIEWED:     Lab Data Review:  Recent Results (from the past 24 hour(s))   URINE DRUG SCREEN    Collection Time: 03/06/21 12:00 PM   Result Value Ref Range    Amphetamines Urine Negative Negative    Barbiturates Negative Negative    Benzodiazepines Negative Negative    Cocaine Metabolite Negative Negative    Methadone Negative Negative    Opiates Negative Negative    Oxycodone Negative Negative    Phencyclidine -Pcp Negative Negative    Propoxyphene Negative Negative    Cannabinoid Metab Negative Negative   URINALYSIS    Collection Time: 03/06/21 12:00 PM    Specimen: Urine   Result Value Ref Range    Color Yellow     Character Cloudy (A)     Specific Gravity 1.018 <1.035    Ph 8.0 5.0 - 8.0    Glucose 250 (A) Negative mg/dL    Ketones 15 (A) Negative mg/dL    Protein Negative Negative mg/dL    Bilirubin Negative Negative    Urobilinogen, Urine 0.2 Negative    Nitrite Positive (A) Negative    Leukocyte Esterase Trace (A) Negative    Occult Blood Negative Negative    Micro Urine Req Microscopic    URINE MICROSCOPIC (W/UA)    Collection Time: 03/06/21 12:00 PM   Result Value Ref Range    WBC 2-5 /hpf    RBC 0-2 /hpf    Bacteria Many (A) None /hpf    Epithelial Cells Few /hpf    Hyaline Cast 0-2 /lpf   ACCU-CHEK GLUCOSE    Collection Time: 03/06/21 12:30 PM   Result Value Ref Range    Glucose - Accu-Ck 225 (H) 65 - 99 mg/dL   ACCU-CHEK GLUCOSE    Collection Time: 03/06/21  6:16 PM   Result Value Ref Range    Glucose - Accu-Ck 143 (H) 65 - 99 mg/dL   ACCU-CHEK GLUCOSE    Collection Time: 03/06/21  8:55 PM   Result Value Ref Range    Glucose - Accu-Ck 169 (H) 65 - 99 mg/dL   CBC WITH DIFFERENTIAL    Collection Time: 03/07/21  5:59 AM   Result Value Ref Range    WBC 15.2 (H) 4.8 - 10.8 K/uL    RBC 4.64 4.20 - 5.40 M/uL    Hemoglobin 14.5 12.0 - 16.0 g/dL    Hematocrit 45.1 37.0 - 47.0 %    MCV 97.2 81.4 - 97.8 fL    MCH 31.3 27.0 - 33.0 pg    MCHC 32.2 (L) 33.6 - 35.0 g/dL    RDW 49.8 35.9 - 50.0 fL    Platelet  Count 258 164 - 446 K/uL    MPV 10.0 9.0 - 12.9 fL    Neutrophils-Polys 87.70 (H) 44.00 - 72.00 %    Lymphocytes 4.20 (L) 22.00 - 41.00 %    Monocytes 7.40 0.00 - 13.40 %    Eosinophils 0.00 0.00 - 6.90 %    Basophils 0.10 0.00 - 1.80 %    Immature Granulocytes 0.60 0.00 - 0.90 %    Nucleated RBC 0.00 /100 WBC    Neutrophils (Absolute) 13.35 (H) 2.00 - 7.15 K/uL    Lymphs (Absolute) 0.64 (L) 1.00 - 4.80 K/uL    Monos (Absolute) 1.13 (H) 0.00 - 0.85 K/uL    Eos (Absolute) 0.00 0.00 - 0.51 K/uL    Baso (Absolute) 0.02 0.00 - 0.12 K/uL    Immature Granulocytes (abs) 0.09 0.00 - 0.11 K/uL    NRBC (Absolute) 0.00 K/uL   Basic Metabolic Panel    Collection Time: 03/07/21  5:59 AM   Result Value Ref Range    Sodium 136 135 - 145 mmol/L    Potassium 3.9 3.6 - 5.5 mmol/L    Chloride 111 96 - 112 mmol/L    Co2 13 (L) 20 - 33 mmol/L    Glucose 133 (H) 65 - 99 mg/dL    Bun 11 8 - 22 mg/dL    Creatinine 0.56 0.50 - 1.40 mg/dL    Calcium 9.2 8.5 - 10.5 mg/dL    Anion Gap 12.0 7.0 - 16.0   ESTIMATED GFR    Collection Time: 03/07/21  5:59 AM   Result Value Ref Range    GFR If African American >60 >60 mL/min/1.73 m 2    GFR If Non African American >60 >60 mL/min/1.73 m 2   ACCU-CHEK GLUCOSE    Collection Time: 03/07/21  8:53 AM   Result Value Ref Range    Glucose - Accu-Ck 94 65 - 99 mg/dL       Labs reviewed by me.     Records reviewed:   Reviewed record in detailed/reviewed previous encounters at [facility].       Imaging reviewed by me:     MR-THORACIC SPINE-WITH & W/O   Final Result      No significant abnormality is seen in the MR scan of the thoracic spine. No evidence of demyelinating disease in the thoracic cord.      MR-CERVICAL SPINE-WITH & W/O   Final Result      Focal enhancing cord lesion on the RIGHT at C5-C6, in keeping with the provided clinical history of multiple sclerosis      MR-BRAIN-WITH & W/O   Final Result      1.  Numerous supra and infratentorial enhancing and nonenhancing lesions, in keeping with multiple  sclerosis   2.  Mild Chiari I malformation. No hydrocephalus.      DX-CHEST-LIMITED (1 VIEW)   Final Result         No acute cardiopulmonary abnormalities are identified.      CT-CEREBRAL PERFUSION ANALYSIS   Final Result      1.  Cerebral blood flow less than 30% likely representing completed infarct = 0 mL.      2.  T Max more than 6 seconds likely representing combination of completed infarct and ischemia = 0 mL.      3.  Mismatched volume likely representing ischemic brain/penumbra = None      4.  Please note that the cerebral perfusion was performed on the limited brain tissue around the basal ganglia region. Infarct/ischemia outside the CT perfusion sections can be missed in this study.      CT-CTA NECK WITH & W/O-POST PROCESSING   Final Result      CT angiogram of the neck within normal limits.      CT-CTA HEAD WITH & W/O-POST PROCESS   Final Result      1.  CT angiogram of the [place] within normal limits.      2.  CT head without and with contrast within normal limits      CT-HEAD W/O   Final Result      No acute intracranial abnormality is identified.          Modified Barnwell Scale (MRS): 0 = No symptoms      ASSESSMENT AND PLAN:  35-year old female with PMhx significant for multiple sclerosis (diagnosed January 2021; patient of Dr. 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   MRI Brain, C and T spine w/wo contrast revealed numerous active/enhancing lesions consistent with exacerbation of multiple sclerosis. Patient receiving day #3 of IV  solumedrol today; given mild improvement this morning, will plan for 2 additional days for a total of 5 days/doses of IV solumedrol.     Recommendations/Plan:     -q4h and PRN neuro assessment.VS per nursing/unit protocol.  -Continue IV Solumedrol, 1 g daily x 5 days, Today (3/7/21) is day #3.  -Continue home dose Fingolimod 0.5 mg PO q day.   -Repeat one time doses of Mg 2 g IV, Benedryl 25 mg IV, and Compazine 10 mg IV for migraine as needed.   -PT/OT eval and treat.   -All other medical management per primary team. Tentative plan for discharge on Tuesday 3/9/21 morning.   -DVT PPX: SCDs.     The plan of care above has been discussed with Dr. Moeller.     QUYNH Morales.  [place] [facility]

## 2021-03-07 NOTE — CARE PLAN
Problem: Safety  Goal: Will remain free from falls  Outcome: PROGRESSING AS EXPECTED     Problem: Pain Management  Goal: Pain level will decrease to patient's comfort goal  Outcome: PROGRESSING AS EXPECTED     Problem: Knowledge Deficit  Goal: Knowledge of disease process/condition, treatment plan, diagnostic tests, and medications will improve  Outcome: PROGRESSING AS EXPECTED

## 2021-03-07 NOTE — PROGRESS NOTES
Hospital Medicine Daily Progress Note    Date of Service  3/6/2021    Chief Complaint  Left sided weakness, headache    Hospital Course  Mrs. Meade is a 36 y/o female with a PMHx of complex migraines and MS who presented to the ED on 3/5/2021 with left sided numbness and headache. CT head was negative. 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 She was subsequently started on steroids and admitted to the clinical decision unit for further evaluation and treatment.     Interval Problem Update  -Still with left sided numbness. LLE remains weak, good LUE strength.   -No UTI s/s.   -Hyperglycemic likely secondary to steroids, initiated accuchecks w/ SSI.     Consultants/Specialty  Neurology-Dr. Moeller    Code Status  Full Code    Disposition  Admit to neurology.     Review of Systems  Review of Systems   Constitutional: Negative for fever and malaise/fatigue.   Respiratory: Negative for cough and shortness of breath.    Cardiovascular: Negative for chest pain and leg swelling.   Gastrointestinal: Negative for abdominal pain, constipation, diarrhea, nausea and vomiting.   Genitourinary: Negative for dysuria, frequency and urgency.   Neurological: Positive for tingling, sensory change, focal weakness and headaches. Negative for dizziness and speech change.   Psychiatric/Behavioral: Negative for depression. The patient is not nervous/anxious and does not have insomnia.    All other systems reviewed and are negative.     Physical Exam  Temp:  [36.6 °C (97.8 °F)-37.2 °C (99 °F)] 37.2 °C (99 °F)  Pulse:  [54-81] 81  Resp:  [12-18] 17  BP: (101-109)/(59-64) 109/64  SpO2:  [95 %-98 %] 95 %    Physical Exam  Vitals and nursing note reviewed.   Constitutional:       General: She is awake.  She is not in acute distress.     Appearance: Normal appearance. She is well-developed. She is not ill-appearing.   HENT:      Head: Normocephalic and atraumatic.      Mouth/Throat:      Lips: Pink.      Mouth: Mucous membranes are moist.   Cardiovascular:      Rate and Rhythm: Normal rate and regular rhythm.      Pulses: Normal pulses.      Heart sounds: Normal heart sounds.   Pulmonary:      Effort: Pulmonary effort is normal.      Breath sounds: Normal breath sounds.   Abdominal:      General: Bowel sounds are normal.      Palpations: Abdomen is soft.   Musculoskeletal:      Cervical back: Normal range of motion and neck supple.      Right lower leg: No edema.      Left lower leg: No edema.   Skin:     General: Skin is warm and dry.   Neurological:      General: No focal deficit present.      Mental Status: She is alert and oriented to person, place, and time.      Sensory: Sensory deficit present.      Comments: Numbness on left side diffusely.   LLE 3/5 strength.    Psychiatric:         Attention and Perception: Attention and perception normal.         Mood and Affect: Mood and affect normal.         Speech: Speech normal.         Behavior: Behavior normal. Behavior is cooperative.         Thought Content: Thought content normal.         Cognition and Memory: Cognition and memory normal.         Judgment: Judgment normal.     Fluids  No intake or output data in the 24 hours ending 03/06/21 1743    Laboratory  Recent Labs     03/05/21  1502 03/06/21  0350   WBC 6.8 7.5   RBC 4.97 4.72   HEMOGLOBIN 15.9 14.6   HEMATOCRIT 47.5* 45.3   MCV 95.6 96.0   MCH 32.0 30.9   MCHC 33.5* 32.2*   RDW 49.0 49.1   PLATELETCT 259 245   MPV 10.7 10.1     Recent Labs     03/05/21  1645 03/06/21  0350   SODIUM 137 137   POTASSIUM 3.7 3.8   CHLORIDE 108 107   CO2 18* 18*   GLUCOSE 76 113*   BUN 13 12   CREATININE 0.72 0.67   CALCIUM 9.1 8.9     Imaging  MR-THORACIC SPINE-WITH & W/O   Final Result      No significant abnormality is  seen in the MR scan of the thoracic spine. No evidence of demyelinating disease in the thoracic cord.      MR-CERVICAL SPINE-WITH & W/O   Final Result      Focal enhancing cord lesion on the RIGHT at C5-C6, in keeping with the provided clinical history of multiple sclerosis      MR-BRAIN-WITH & W/O   Final Result      1.  Numerous supra and infratentorial enhancing and nonenhancing lesions, in keeping with multiple sclerosis   2.  Mild Chiari I malformation. No hydrocephalus.      DX-CHEST-LIMITED (1 VIEW)   Final Result         No acute cardiopulmonary abnormalities are identified.      CT-CEREBRAL PERFUSION ANALYSIS   Final Result      1.  Cerebral blood flow less than 30% likely representing completed infarct = 0 mL.      2.  T Max more than 6 seconds likely representing combination of completed infarct and ischemia = 0 mL.      3.  Mismatched volume likely representing ischemic brain/penumbra = None      4.  Please note that the cerebral perfusion was performed on the limited brain tissue around the basal ganglia region. Infarct/ischemia outside the CT perfusion sections can be missed in this study.      CT-CTA NECK WITH & W/O-POST PROCESSING   Final Result      CT angiogram of the neck within normal limits.      CT-CTA HEAD WITH & W/O-POST PROCESS   Final Result      1.  CT angiogram of the [place] within normal limits.      2.  CT head without and with contrast within normal limits      CT-HEAD W/O   Final Result      No acute intracranial abnormality is identified.         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   -Continue steroids per neuro.   -Pepcid while on steroids.   -Accuchecks ACHS with SSI as required while on  steroids.   -Continue home MS meds.   -Transfer to neuro.   -Neurology following.     Seizure disorder (HCC)- (present on admission)  Assessment & Plan  -Continue topamax.   -Seizure precautions.     Migraines- (present on admission)  Assessment & Plan  -Continue topamax for preventative therapy.   -Fioricet or abortive therapy for acute migraine.     Asthma- (present on admission)  Assessment & Plan  -Chronic & stable. No acute exacerbation. Continue to monitor.     Anxiety- (present on admission)  Assessment & Plan  -Chronic & stable without medication.   -Continue to monitor.        VTE prophylaxis: SCDs

## 2021-03-08 ENCOUNTER — HOSPITAL ENCOUNTER (INPATIENT)
Facility: REHABILITATION | Age: 36
End: 2021-03-08
Attending: PHYSICAL MEDICINE & REHABILITATION | Admitting: PHYSICAL MEDICINE & REHABILITATION
Payer: COMMERCIAL

## 2021-03-08 ENCOUNTER — APPOINTMENT (OUTPATIENT)
Dept: RADIOLOGY | Facility: MEDICAL CENTER | Age: 36
End: 2021-03-08
Attending: STUDENT IN AN ORGANIZED HEALTH CARE EDUCATION/TRAINING PROGRAM
Payer: COMMERCIAL

## 2021-03-08 LAB
ANION GAP SERPL CALC-SCNC: 12 MMOL/L (ref 7–16)
BUN SERPL-MCNC: 14 MG/DL (ref 8–22)
CALCIUM SERPL-MCNC: 8.9 MG/DL (ref 8.5–10.5)
CHLORIDE SERPL-SCNC: 108 MMOL/L (ref 96–112)
CO2 SERPL-SCNC: 16 MMOL/L (ref 20–33)
CREAT SERPL-MCNC: 0.59 MG/DL (ref 0.5–1.4)
GLUCOSE BLD-MCNC: 114 MG/DL (ref 65–99)
GLUCOSE BLD-MCNC: 134 MG/DL (ref 65–99)
GLUCOSE BLD-MCNC: 143 MG/DL (ref 65–99)
GLUCOSE BLD-MCNC: 191 MG/DL (ref 65–99)
GLUCOSE SERPL-MCNC: 132 MG/DL (ref 65–99)
POTASSIUM SERPL-SCNC: 3.7 MMOL/L (ref 3.6–5.5)
SODIUM SERPL-SCNC: 136 MMOL/L (ref 135–145)

## 2021-03-08 PROCEDURE — 80048 BASIC METABOLIC PNL TOTAL CA: CPT

## 2021-03-08 PROCEDURE — A9270 NON-COVERED ITEM OR SERVICE: HCPCS | Performed by: HOSPITALIST

## 2021-03-08 PROCEDURE — 700102 HCHG RX REV CODE 250 W/ 637 OVERRIDE(OP): Performed by: PSYCHIATRY & NEUROLOGY

## 2021-03-08 PROCEDURE — A9270 NON-COVERED ITEM OR SERVICE: HCPCS | Performed by: PSYCHIATRY & NEUROLOGY

## 2021-03-08 PROCEDURE — 96366 THER/PROPH/DIAG IV INF ADDON: CPT

## 2021-03-08 PROCEDURE — 700105 HCHG RX REV CODE 258: Performed by: PSYCHIATRY & NEUROLOGY

## 2021-03-08 PROCEDURE — 700102 HCHG RX REV CODE 250 W/ 637 OVERRIDE(OP): Performed by: STUDENT IN AN ORGANIZED HEALTH CARE EDUCATION/TRAINING PROGRAM

## 2021-03-08 PROCEDURE — 99225 PR SUBSEQUENT OBSERVATION CARE,LEVEL II: CPT | Performed by: STUDENT IN AN ORGANIZED HEALTH CARE EDUCATION/TRAINING PROGRAM

## 2021-03-08 PROCEDURE — 700102 HCHG RX REV CODE 250 W/ 637 OVERRIDE(OP): Performed by: HOSPITALIST

## 2021-03-08 PROCEDURE — A9270 NON-COVERED ITEM OR SERVICE: HCPCS | Performed by: NURSE PRACTITIONER

## 2021-03-08 PROCEDURE — A9270 NON-COVERED ITEM OR SERVICE: HCPCS | Performed by: STUDENT IN AN ORGANIZED HEALTH CARE EDUCATION/TRAINING PROGRAM

## 2021-03-08 PROCEDURE — 700101 HCHG RX REV CODE 250: Performed by: HOSPITALIST

## 2021-03-08 PROCEDURE — 36415 COLL VENOUS BLD VENIPUNCTURE: CPT

## 2021-03-08 PROCEDURE — 82962 GLUCOSE BLOOD TEST: CPT | Mod: 91

## 2021-03-08 PROCEDURE — G0378 HOSPITAL OBSERVATION PER HR: HCPCS

## 2021-03-08 PROCEDURE — 99214 OFFICE O/P EST MOD 30 MIN: CPT | Performed by: PSYCHIATRY & NEUROLOGY

## 2021-03-08 PROCEDURE — 700102 HCHG RX REV CODE 250 W/ 637 OVERRIDE(OP): Performed by: NURSE PRACTITIONER

## 2021-03-08 PROCEDURE — 700111 HCHG RX REV CODE 636 W/ 250 OVERRIDE (IP): Performed by: PSYCHIATRY & NEUROLOGY

## 2021-03-08 PROCEDURE — 99215 OFFICE O/P EST HI 40 MIN: CPT | Performed by: PHYSICAL MEDICINE & REHABILITATION

## 2021-03-08 PROCEDURE — 97535 SELF CARE MNGMENT TRAINING: CPT

## 2021-03-08 RX ADMIN — FINGOLIMOD HYDROCHLORIDE 0.5 MG: 0.5 CAPSULE ORAL at 05:31

## 2021-03-08 RX ADMIN — Medication 1000 UNITS: at 05:31

## 2021-03-08 RX ADMIN — SODIUM CHLORIDE 1000 MG: 9 INJECTION, SOLUTION INTRAVENOUS at 09:30

## 2021-03-08 RX ADMIN — ZONISAMIDE 200 MG: 50 CAPSULE ORAL at 18:04

## 2021-03-08 RX ADMIN — Medication 5 MG: at 22:01

## 2021-03-08 RX ADMIN — TOPIRAMATE 100 MG: 100 TABLET, FILM COATED ORAL at 18:04

## 2021-03-08 RX ADMIN — TOPIRAMATE 100 MG: 100 TABLET, FILM COATED ORAL at 05:31

## 2021-03-08 RX ADMIN — DOCUSATE SODIUM 50 MG AND SENNOSIDES 8.6 MG 2 TABLET: 8.6; 5 TABLET, FILM COATED ORAL at 06:32

## 2021-03-08 ASSESSMENT — COGNITIVE AND FUNCTIONAL STATUS - GENERAL
SUGGESTED CMS G CODE MODIFIER DAILY ACTIVITY: CJ
DRESSING REGULAR LOWER BODY CLOTHING: A LITTLE
HELP NEEDED FOR BATHING: A LITTLE
DAILY ACTIVITIY SCORE: 21
TOILETING: A LITTLE

## 2021-03-08 ASSESSMENT — ENCOUNTER SYMPTOMS
CONSTIPATION: 0
SPEECH CHANGE: 0
SHORTNESS OF BREATH: 0
COUGH: 0
VOMITING: 0
FOCAL WEAKNESS: 1
DEPRESSION: 0
TINGLING: 1
NAUSEA: 0
ABDOMINAL PAIN: 0
INSOMNIA: 0
HEADACHES: 1
SENSORY CHANGE: 1
DIZZINESS: 0
DIARRHEA: 0
FEVER: 0
NERVOUS/ANXIOUS: 0

## 2021-03-08 ASSESSMENT — PAIN DESCRIPTION - PAIN TYPE
TYPE: ACUTE PAIN
TYPE: ACUTE PAIN

## 2021-03-08 ASSESSMENT — ACTIVITIES OF DAILY LIVING (ADL): TOILETING: INDEPENDENT

## 2021-03-08 NOTE — PROGRESS NOTES
Neurology Progress Note  Neurohospitalist Service, [facility]    Referring Physician: Chantale Cavanaugh M.D.    Chief Complaint   Patient presents with   • Numbness     on left foot begining last night at 2130, then moving up to left arm and left face this morning.  Left leg weakness   • Headache     upon wakening this morning       HPI: Refer to initial documented Neurology H&P, as detailed in the patient's chart.    Interval History March 8, 2021: No new events.    Review of systems: In addition to what is detailed in the HPI and/or updated in the interval history, all other systems reviewed and are negative.    Past Medical History:    has a past medical history of Anesthesia, Anxiety associated with depression, ASTHMA, Drug-seeking behavior, Migraine without aura, without mention of intractable migraine without mention of status migrainosus, Other specified symptom associated with female genital organs, Seizure (HCC), Seizure cerebral (HCC) (2/15/2018), Stroke (HCC), and Unspecified disorder of thyroid. She also has no past medical history of CAD (coronary artery disease) or COPD.    FHx:  family history is not on file.    SHx:   reports that she has never smoked. She has never used smokeless tobacco. She reports previous alcohol use. She reports that she does not use drugs.    Medications:    Current Facility-Administered Medications:   •  methylPREDNISolone sod succ (SOLU-MEDROL) 1,000 mg in  mL IVPB, 1 g, Intravenous, DAILY, Alhaji Moeller M.D.  •  melatonin tablet 5 mg, 5 mg, Oral, Nightly, Chantale Cavanaugh M.D., 5 mg at 03/07/21 2106  •  senna-docusate (PERICOLACE or SENOKOT S) 8.6-50 MG per tablet 2 tablet, 2 tablet, Oral, BID PRN, 2 tablet at 03/08/21 0632 **AND** polyethylene glycol/lytes (MIRALAX) PACKET 1 Packet, 1 Packet, Oral, QDAY PRN **AND** magnesium hydroxide (MILK OF MAGNESIA) suspension 30 mL, 30 mL, Oral, QDAY PRN **AND** bisacodyl (DULCOLAX) suppository 10 mg,  10 mg, Rectal, QDAY PRN, Leyda Dobson, A.P.R.N.  •  fingolimod (GILENYA) capsule 0.5 mg, 0.5 mg, Oral, QAM, Leyda Dobson, A.P.R.N., 0.5 mg at 03/08/21 0531  •  insulin regular (HumuLIN R,NovoLIN R) injection, 1-6 Units, Subcutaneous, 4X/DAY ACHS, Stopped at 03/08/21 0700 **AND** POC Blood Glucose, , , Q AC AND BEDTIME(S) **AND** NOTIFY MD and PharmD, , , Once **AND** glucose 4 g chewable tablet 16 g, 16 g, Oral, Q15 MIN PRN **AND** dextrose 50% (D50W) injection 50 mL, 50 mL, Intravenous, Q15 MIN PRN, Leyda Dobson, A.P.R.N.  •  topiramate (TOPAMAX) tablet 100 mg, 100 mg, Oral, BID, Dimitri Dalal M.D., 100 mg at 03/08/21 0531  •  zonisamide (ZONEGRAN) capsule 200 mg, 200 mg, Oral, Q EVENING, Dimitri Dalal M.D., 200 mg at 03/07/21 1715  •  NS infusion, , Intravenous, Continuous, Leyda Dobson, A.P.R.N., Last Rate: 10 mL/hr at 03/06/21 1200, New Bag at 03/06/21 1200  •  acetaminophen (Tylenol) tablet 650 mg, 650 mg, Oral, Q6HRS PRN, Dimitri Dalal M.D.  •  labetalol (NORMODYNE/TRANDATE) injection 10 mg, 10 mg, Intravenous, Q4HRS PRN **OR** hydrALAZINE (APRESOLINE) injection 10 mg, 10 mg, Intravenous, Q2HRS PRN, Dimitri Dalal M.D.  •  vitamin D (Ergocalciferol) (DRISDOL) capsule 50,000 Units, 50,000 Units, Oral, Q7 DAYS, Alhaji Moeller M.D., 50,000 Units at 03/06/21 0151  •  vitamin D (cholecalciferol) tablet 1,000 Units, 1,000 Units, Oral, DAILY, Alhaji Moeller M.D., 1,000 Units at 03/08/21 0531  •  famotidine (PEPCID) tablet 10 mg, 10 mg, Oral, BID, Alhaji Moeller M.D., 10 mg at 03/06/21 0150    Physical Examination:     Vitals:    03/07/21 1600 03/07/21 2000 03/08/21 0359 03/08/21 0724   BP: 101/55 105/60 102/57 103/59   Pulse: 63 60 (!) 50 61   Resp: 17 18 16 18   Temp: 36.8 °C (98.2 °F) 36.6 °C (97.9 °F) 37.3 °C (99.2 °F) 36.6 °C (97.8 °F)   TempSrc: Temporal Temporal Temporal Temporal   SpO2: 92% 95% 94% 95%   Weight:       Height:           General: Patient  is awake and in no acute distress  Eyes: examination of optic disks not indicated at this time  CV: RRR    NEUROLOGICAL EXAM:     Mental status: Awake, alert and fully oriented, follows commands  Speech and language: speech is clear and fluent. The patient is able to name and repeat.  Cranial nerve exam: Pupils are equal, round and reactive to light bilaterally. Visual fields are full. Extraocular muscles are intact. Sensation in the face is intact to light touch. Face is symmetric. Hearing to finger rub equal. Palate elevates symmetrically. Shoulder shrug is full. Tongue is midline.  Motor exam: 5-5 except for some mild left-sided weakness 4+.    Sensory exam: No sensory deficits identified   Deep tendon reflexes:  3+ and symmetric.   Coordination: no ataxia       Objective Data:    Labs:  Lab Results   Component Value Date/Time    PROTHROMBTM 13.9 01/02/2021 12:24 PM    INR 1.03 01/02/2021 12:24 PM      Lab Results   Component Value Date/Time    WBC 15.2 (H) 03/07/2021 05:59 AM    RBC 4.64 03/07/2021 05:59 AM    HEMOGLOBIN 14.5 03/07/2021 05:59 AM    HEMATOCRIT 45.1 03/07/2021 05:59 AM    MCV 97.2 03/07/2021 05:59 AM    MCH 31.3 03/07/2021 05:59 AM    MCHC 32.2 (L) 03/07/2021 05:59 AM    MPV 10.0 03/07/2021 05:59 AM    NEUTSPOLYS 87.70 (H) 03/07/2021 05:59 AM    LYMPHOCYTES 4.20 (L) 03/07/2021 05:59 AM    MONOCYTES 7.40 03/07/2021 05:59 AM    EOSINOPHILS 0.00 03/07/2021 05:59 AM    BASOPHILS 0.10 03/07/2021 05:59 AM    HYPOCHROMIA 1+ 06/06/2014 10:49 AM      Lab Results   Component Value Date/Time    SODIUM 136 03/08/2021 06:02 AM    POTASSIUM 3.7 03/08/2021 06:02 AM    CHLORIDE 108 03/08/2021 06:02 AM    CO2 16 (L) 03/08/2021 06:02 AM    GLUCOSE 132 (H) 03/08/2021 06:02 AM    BUN 14 03/08/2021 06:02 AM    CREATININE 0.59 03/08/2021 06:02 AM    CREATININE 0.9 05/04/2009 09:58 PM      Lab Results   Component Value Date/Time    CHOLSTRLTOT 160 12/05/2019 04:02 AM    LDL 95 12/05/2019 04:02 AM    HDL 55 12/05/2019  04:02 AM    TRIGLYCERIDE 48 12/05/2019 04:02 AM       Lab Results   Component Value Date/Time    ALKPHOSPHAT 74 03/06/2021 03:50 AM    ASTSGOT 10 (L) 03/06/2021 03:50 AM    ALTSGPT 11 03/06/2021 03:50 AM    TBILIRUBIN 0.6 03/06/2021 03:50 AM        Imaging/Testing:  MR-THORACIC SPINE-WITH & W/O   Final Result      No significant abnormality is seen in the MR scan of the thoracic spine. No evidence of demyelinating disease in the thoracic cord.      MR-CERVICAL SPINE-WITH & W/O   Final Result      Focal enhancing cord lesion on the RIGHT at C5-C6, in keeping with the provided clinical history of multiple sclerosis      MR-BRAIN-WITH & W/O   Final Result      1.  Numerous supra and infratentorial enhancing and nonenhancing lesions, in keeping with multiple sclerosis   2.  Mild Chiari I malformation. No hydrocephalus.      DX-CHEST-LIMITED (1 VIEW)   Final Result         No acute cardiopulmonary abnormalities are identified.      CT-CEREBRAL PERFUSION ANALYSIS   Final Result      1.  Cerebral blood flow less than 30% likely representing completed infarct = 0 mL.      2.  T Max more than 6 seconds likely representing combination of completed infarct and ischemia = 0 mL.      3.  Mismatched volume likely representing ischemic brain/penumbra = None      4.  Please note that the cerebral perfusion was performed on the limited brain tissue around the basal ganglia region. Infarct/ischemia outside the CT perfusion sections can be missed in this study.      CT-CTA NECK WITH & W/O-POST PROCESSING   Final Result      CT angiogram of the neck within normal limits.      CT-CTA HEAD WITH & W/O-POST PROCESS   Final Result      1.  CT angiogram of the [place] within normal limits.      2.  CT head without and with contrast within normal limits      CT-HEAD W/O   Final Result      No acute intracranial abnormality is identified.            Assessment and Plan:    35-year-old female admitted for MS flare.  She has what  appears to be aggressive new onset MS.  She has new lesions on MRI brain including right tye.  Today's day #4 of Solu-Medrol 1 g.  Plan for 1 more dose tomorrow morning and then discharge.  Patient will follow up  in the MS clinic.    Plan:  1.  Solu-Medrol 1 g daily today's day #4 of 5.    Plan to discharge patient tomorrow after Solu-Medrol infusion.  Would recommend upping the start time tomorrow morning so the patient get out earlier today.      The evaluation of the patient, and recommended management, was discussed with the resident staff. I have performed a physical exam and reviewed and updated ROS and Plan today (3/8/2021). In review of yesterday's note (3/7/2021), there are no changes except as documented above.    This chart was partially generated using voice recognition technology and sound alike word replacement may be present, best efforts were made to make the chart accurate.    Jp Hearn MD  Board Certified Neurology, ABPN  (t) [phone]

## 2021-03-08 NOTE — PROGRESS NOTES
Hospital Medicine Daily Progress Note    Date of Service  3/8/2021    Chief Complaint  Left sided weakness, headache    Hospital Course  Mrs. Meade is a 36 y/o female with a PMHx of complex migraines and MS who presented to the ED on 3/5/2021 with left sided numbness and headache. CT head was negative. 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 She was subsequently started on steroids and admitted to the clinical decision unit for further evaluation and treatment.     Interval Problem Update  Patient seen and examined at bedside, continues to have left sided numbness and tingling in the lower extremity however this is improved, and has now resolved in the left arm  -Day 4 of 5 of IV steroids, continue per neurology, plan for discharge tomorrow following last dose  -Vitals stable  -Leukocytosis secondary to steroids, no signs of infection      Consultants/Specialty  Neurology    Code Status  Full Code    Disposition  Anticipate home on 3/9/2021    Review of Systems  Review of Systems   Constitutional: Negative for fever and malaise/fatigue.   Respiratory: Negative for cough and shortness of breath.    Cardiovascular: Negative for chest pain and leg swelling.   Gastrointestinal: Negative for abdominal pain, constipation, diarrhea, nausea and vomiting.   Genitourinary: Negative for dysuria, frequency and urgency.   Neurological: Positive for tingling, sensory change, focal weakness and headaches. Negative for dizziness and speech change.   Psychiatric/Behavioral: Negative for depression. The patient is not nervous/anxious and does not have insomnia.    All other systems reviewed and are negative.     Physical Exam  Temp:  [36.6 °C (97.8 °F)-37.3 °C (99.2 °F)] 36.6 °C (97.8  °F)  Pulse:  [50-63] 61  Resp:  [16-18] 18  BP: (101-105)/(55-60) 103/59  SpO2:  [92 %-95 %] 95 %    Physical Exam  Vitals and nursing note reviewed.   Constitutional:       General: She is awake. She is not in acute distress.     Appearance: Normal appearance. She is well-developed. She is not ill-appearing.   HENT:      Head: Normocephalic and atraumatic.      Mouth/Throat:      Lips: Pink.      Mouth: Mucous membranes are moist.   Cardiovascular:      Rate and Rhythm: Normal rate and regular rhythm.      Pulses: Normal pulses.      Heart sounds: Normal heart sounds.   Pulmonary:      Effort: Pulmonary effort is normal.      Breath sounds: Normal breath sounds.   Abdominal:      General: Bowel sounds are normal.      Palpations: Abdomen is soft.   Musculoskeletal:      Cervical back: Normal range of motion and neck supple.      Right lower leg: No edema.      Left lower leg: No edema.   Skin:     General: Skin is warm and dry.   Neurological:      General: No focal deficit present.      Mental Status: She is alert and oriented to person, place, and time.      Sensory: Sensory deficit present.      Comments: Numbness in left leg and left breast, improved in left arm   Psychiatric:         Attention and Perception: Attention and perception normal.         Mood and Affect: Mood and affect normal.         Speech: Speech normal.         Behavior: Behavior normal. Behavior is cooperative.         Thought Content: Thought content normal.         Cognition and Memory: Cognition and memory normal.         Judgment: Judgment normal.     Fluids  No intake or output data in the 24 hours ending 03/08/21 1545    Laboratory  Recent Labs     03/06/21  0350 03/07/21  0559   WBC 7.5 15.2*   RBC 4.72 4.64   HEMOGLOBIN 14.6 14.5   HEMATOCRIT 45.3 45.1   MCV 96.0 97.2   MCH 30.9 31.3   MCHC 32.2* 32.2*   RDW 49.1 49.8   PLATELETCT 245 258   MPV 10.1 10.0     Recent Labs     03/06/21  0350 03/07/21  0559 03/08/21  0602   SODIUM 137 136  136   POTASSIUM 3.8 3.9 3.7   CHLORIDE 107 111 108   CO2 18* 13* 16*   GLUCOSE 113* 133* 132*   BUN 12 11 14   CREATININE 0.67 0.56 0.59   CALCIUM 8.9 9.2 8.9     Imaging  IR-US GUIDED PIV   Final Result    Ultrasound-guided PERIPHERAL IV INSERTION performed by    qualified nursing staff as above.      MR-THORACIC SPINE-WITH & W/O   Final Result      No significant abnormality is seen in the MR scan of the thoracic spine. No evidence of demyelinating disease in the thoracic cord.      MR-CERVICAL SPINE-WITH & W/O   Final Result      Focal enhancing cord lesion on the RIGHT at C5-C6, in keeping with the provided clinical history of multiple sclerosis      MR-BRAIN-WITH & W/O   Final Result      1.  Numerous supra and infratentorial enhancing and nonenhancing lesions, in keeping with multiple sclerosis   2.  Mild Chiari I malformation. No hydrocephalus.      DX-CHEST-LIMITED (1 VIEW)   Final Result         No acute cardiopulmonary abnormalities are identified.      CT-CEREBRAL PERFUSION ANALYSIS   Final Result      1.  Cerebral blood flow less than 30% likely representing completed infarct = 0 mL.      2.  T Max more than 6 seconds likely representing combination of completed infarct and ischemia = 0 mL.      3.  Mismatched volume likely representing ischemic brain/penumbra = None      4.  Please note that the cerebral perfusion was performed on the limited brain tissue around the basal ganglia region. Infarct/ischemia outside the CT perfusion sections can be missed in this study.      CT-CTA NECK WITH & W/O-POST PROCESSING   Final Result      CT angiogram of the neck within normal limits.      CT-CTA HEAD WITH & W/O-POST PROCESS   Final Result      1.  CT angiogram of the [place] within normal limits.      2.  CT head without and with contrast within normal limits      CT-HEAD W/O   Final Result      No acute intracranial abnormality is identified.         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  Concerning for rapidly progressive multiple sclerosis  -Symptoms suggestive of MS exacerbation  -Continue steroids per neuro, currently day 4 of 5  -Pepcid while on steroids.   -Accuchecks ACHS with SSI as required while on steroids.   -Continue home MS meds.   -Neurology following, appreciate recommendations    Seizure disorder (HCC)- (present on admission)  Assessment & Plan  -Continue topamax.   -Seizure precautions.     Migraines- (present on admission)  Assessment & Plan  -Continue topamax for preventative therapy.   -Fioricet or abortive therapy for acute migraine.     Asthma- (present on admission)  Assessment & Plan  -Chronic & stable. No acute exacerbation. Continue to monitor.     Anxiety- (present on admission)  Assessment & Plan  -Chronic & stable without medication.   -Continue to monitor.        VTE prophylaxis: SCDs

## 2021-03-08 NOTE — CONSULTS
Physical Medicine and Rehabilitation Consultation              Date of initial consultation: 3/8/2021  Consulting provider: Dimitri Dalal MD  Reason for consultation: assess for acute inpatient rehab appropriateness  LOS: 0 Day(s)    Chief complaint: MS    HPI: The patient is a 35 y.o. right hand dominant female with a past medical history of multiple sclerosis, migraines, anxiety/depression, seizure disorder, asthma;  who presented on 3/5/2021  2:58 PM with complaints of left-sided weakness affecting the arm, leg, face as well as left-sided headache.      Patient had a recent admission in January 2021 for similar symptoms which ultimately ended in the diagnosis of multiple sclerosis, now under the care of Dr. Jasen MD. patient appears to have an aggressive case of new onset MS, now with new lesions on MRI brain including right tye.  Current plan is to continue Solu-Medrol, dose #4 of 5 today, then discharge    The patient currently reports feeling well.  She clarifies that she just her weakness was just in the left lower leg, however the numbness was the whole left side.  Patient states she is doing much better now after steroids.  She does feel safe to go home, she is modifying her house for future events.    Social Hx:  2 SH  0 JOSE  With: Spouse and roommate.  Patient lives in the first floor    THERAPY:  PT: Functional mobility   3/6: Supervised for gait walking 80 feet    OT: ADLs  3/8: Patient declined OT    SLP:   None    IMAGING:  3/5 MRI Brain   1.  Numerous supra and infratentorial enhancing and nonenhancing lesions, in keeping with multiple sclerosis  2.  Mild Chiari I malformation. No hydrocephalus    PROCEDURES:  None    PMH:  Past Medical History:   Diagnosis Date   • Anesthesia     woke up combative   • Anxiety associated with depression    • ASTHMA    • Drug-seeking behavior    • Migraine without aura, without mention of intractable migraine without mention of status migrainosus    •  Other specified symptom associated with female genital organs    • Seizure (HCC)     Last seizure was 1/2019   • Seizure cerebral (HCC) 2/15/2018   • Stroke (HCC)    • Unspecified disorder of thyroid     cyst       PSH:  Past Surgical History:   Procedure Laterality Date   • VAGINAL HYSTERECTOMY TOTAL  1/27/2020    Procedure: HYSTERECTOMY, TOTAL, VAGINAL;  Surgeon: Julian Parker M.D.;  Location: SURGERY SAME DAY [facility];  Service: Gynecology   • CYSTOSCOPY  1/27/2020    Procedure: CYSTOSCOPY;  Surgeon: Julian Parker M.D.;  Location: SURGERY SAME DAY [facility];  Service: Gynecology   • TUBAL COAGULATION LAPAROSCOPIC BILATERAL  7/11/2014    Performed by Julian Parker M.D. at SURGERY SAME DAY [facility]   • SEPTOPLASTY  10/28/2009    Performed by ANNETTE FORDE at SURGERY [facility]   • SOMNOPLASTY  10/28/2009    Performed by ANNETTE FORDE at SURGERY [facility]   • NASAL POLYPECTOMY  10/28/2009    Performed by ANNETTE FORDE at SURGERY [facility]       FHX:  Non-pertinent to today's issues    Medications:  Current Facility-Administered Medications   Medication Dose   • methylPREDNISolone sod succ (SOLU-MEDROL) 1,000 mg in  mL IVPB  1 g   • melatonin tablet 5 mg  5 mg   • senna-docusate (PERICOLACE or SENOKOT S) 8.6-50 MG per tablet 2 tablet  2 tablet    And   • polyethylene glycol/lytes (MIRALAX) PACKET 1 Packet  1 Packet    And   • magnesium hydroxide (MILK OF MAGNESIA) suspension 30 mL  30 mL    And   • bisacodyl (DULCOLAX) suppository 10 mg  10 mg   • fingolimod (GILENYA) capsule 0.5 mg  0.5 mg   • insulin regular (HumuLIN R,NovoLIN R) injection  1-6 Units    And   • glucose 4 g chewable tablet 16 g  16 g    And   • dextrose 50% (D50W) injection 50 mL  50 mL   • topiramate (TOPAMAX) tablet 100 mg  100 mg   • zonisamide (ZONEGRAN) capsule 200 mg  200 mg   • NS infusion     • acetaminophen (Tylenol) tablet 650 mg  650 mg   • labetalol (NORMODYNE/TRANDATE) injection 10 mg   "10 mg    Or   • hydrALAZINE (APRESOLINE) injection 10 mg  10 mg   • vitamin D (Ergocalciferol) (DRISDOL) capsule 50,000 Units  50,000 Units   • vitamin D (cholecalciferol) tablet 1,000 Units  1,000 Units   • famotidine (PEPCID) tablet 10 mg  10 mg       Allergies:  Allergies   Allergen Reactions   • Amitriptyline Unspecified     Suicidal   • Clarithromycin Hives   • Sulfa Drugs Anaphylaxis   • Cantaloupe Itching   • Honey Dew Itching   • Latex Itching   • Lorazepam Unspecified     Hallucinations     • Metoclopramide Unspecified     Muscle spasms   • Penicillin G Potassium Vomiting   • Rizatriptan Unspecified     Tremors, confusion     • Sumatriptan Unspecified     \"Makes my head pins and needles\"   • Tape Rash   • Morphine Hives and Itching       Physical Exam:  Vitals: /59   Pulse 61   Temp 36.6 °C (97.8 °F) (Temporal)   Resp 18   Ht 1.6 m (5' 3\")   Wt 79 kg (174 lb 2.6 oz)   SpO2 95%   Gen: NAD  Head: NC/AT  Eyes/ Nose/ Mouth: PERRLA, moist mucous membranes  Cardio: RRR, good distal perfusion, warm extremities  Pulm: normal respiratory effort, no cyanosis   Abd: Soft NTND, negative borborygmi   Ext: No peripheral edema. No calf tenderness. No clubbing.    Mental status:  A&Ox4 (person, place, date, situation) answers questions appropriately follows commands  Speech: fluent, no aphasia or dysarthria    CRANIAL NERVES:  2,3: visual acuity grossly intact, PERRL  3,4,6: EOMI bilaterally, no nystagmus or diplopia  5: sensation intact to light touch bilaterally and symmetric  7: no facial asymmetry  8: hearing grossly intact  9,10: symmetric palate elevation  11: SCM/Trapezius strength 5/5 bilaterally  12: tongue protrudes midline    Motor:      Upper Extremity  Myotome R L   Shoulder flexion C5 5 5   Elbow flexion C5 5 5   Wrist extension C6 5 5   Elbow extension C7 5 5   Finger flexion C8 5 5   Finger abduction T1 5 5     Lower Extremity Myotome R L   Hip flexion L2 5 5   Knee extension L3 5 5   Ankle " dorsiflexion L4 5 5   Toe extension L5 5 5   Ankle plantarflexion S1 5 5     Sensory:   Altered sensation to light touch in the left lower leg    DTRs:  Right  Left    Brachioradialis  2+  2+   Patella tendon  2+ 2+     Tone: no spasticity noted, no cogwheeling noted    Labs: Reviewed and significant for   Recent Labs     03/05/21  1502 03/06/21  0350 03/07/21  0559   RBC 4.97 4.72 4.64   HEMOGLOBIN 15.9 14.6 14.5   HEMATOCRIT 47.5* 45.3 45.1   PLATELETCT 259 245 258     Recent Labs     03/06/21  0350 03/07/21  0559 03/08/21  0602   SODIUM 137 136 136   POTASSIUM 3.8 3.9 3.7   CHLORIDE 107 111 108   CO2 18* 13* 16*   GLUCOSE 113* 133* 132*   BUN 12 11 14   CREATININE 0.67 0.56 0.59   CALCIUM 8.9 9.2 8.9     Recent Results (from the past 24 hour(s))   ACCU-CHEK GLUCOSE    Collection Time: 03/07/21 11:55 AM   Result Value Ref Range    Glucose - Accu-Ck 96 65 - 99 mg/dL   ACCU-CHEK GLUCOSE    Collection Time: 03/07/21  5:10 PM   Result Value Ref Range    Glucose - Accu-Ck 128 (H) 65 - 99 mg/dL   ACCU-CHEK GLUCOSE    Collection Time: 03/07/21  9:06 PM   Result Value Ref Range    Glucose - Accu-Ck 166 (H) 65 - 99 mg/dL   ACCU-CHEK GLUCOSE    Collection Time: 03/08/21  5:28 AM   Result Value Ref Range    Glucose - Accu-Ck 143 (H) 65 - 99 mg/dL   Basic Metabolic Panel    Collection Time: 03/08/21  6:02 AM   Result Value Ref Range    Sodium 136 135 - 145 mmol/L    Potassium 3.7 3.6 - 5.5 mmol/L    Chloride 108 96 - 112 mmol/L    Co2 16 (L) 20 - 33 mmol/L    Glucose 132 (H) 65 - 99 mg/dL    Bun 14 8 - 22 mg/dL    Creatinine 0.59 0.50 - 1.40 mg/dL    Calcium 8.9 8.5 - 10.5 mg/dL    Anion Gap 12.0 7.0 - 16.0   ESTIMATED GFR    Collection Time: 03/08/21  6:02 AM   Result Value Ref Range    GFR If African American >60 >60 mL/min/1.73 m 2    GFR If Non African American >60 >60 mL/min/1.73 m 2         ASSESSMENT:  Patient is a 35 y.o. female admitted with left-sided numbness and weakness in the lower leg due to MS exacerbation now  s/p steroids and feeling better.    Rehabilitation: Impaired ADLs and mobility  No need for IPR at this time.     Additional Recommendations:  - Patient does not need IPR at this time but appreciate the consult for tracking purposes. At this time she has 5/5 strength throughout and responded well to steroids. She has follow up with Dr. Aggarwal (MS clinic) and Dr. Gallegos (PMR clinic). We discussed her home set up and her plans for future events which are appropriate.   - Patient is safe to DC home with close neurology and PMR follow up  - No medication changes at this time     Medical Complexity:  MS       DVT PPX: SCDs      Thank you for allowing us to participate in the care of this patient.     Patient was seen for 82 minutes on unit/floor of which > 50% of time was spent on counseling and coordination of care regarding the above, including prognosis, risk reduction, benefits of treatment, and options for next stage of care.    Richmond Castro, DO   Physical Medicine and Rehabilitation

## 2021-03-08 NOTE — CARE PLAN
Problem: Communication  Goal: The ability to communicate needs accurately and effectively will improve  Outcome: PROGRESSING AS EXPECTED     Problem: Safety  Goal: Will remain free from injury  Outcome: PROGRESSING AS EXPECTED  Goal: Will remain free from falls  Outcome: PROGRESSING AS EXPECTED  Note: Patient A+Ox4, verbalizes understanding of calling for assistance before getting out of bed. Bed locked and in low position, call light within reach. Non slid socks on patient. Patient demonstrates appropriate use of call light.       Problem: Pain Management  Goal: Pain level will decrease to patient's comfort goal  Outcome: PROGRESSING AS EXPECTED  Note: Patient currently states no headache. Headache well controlled with migraine cocktail given during day shift.

## 2021-03-08 NOTE — THERAPY
Occupational Therapy Contact Note    OT consult received. She is known to this therapist from admit in January 2021 when she received her diagnosis of MS. Since then she has been able to modify her ADL/IADL routine to adjust her needs. Discussed energy conservation, activity modification, and home modification. She has employed multiple strategies already. She was scheduled to have an outpatient appointment with Dr. Jacki Gallegos with physiatry tomorrow, Tuesday 3/9 at 10AM, which she is worried that she is going to miss. Edu on importance of trying to reschedule this. She declined acute OT evaluation at this time as she has been doing ADL routine w/ nursing w/ supv. Please reorder should formal evaluation be warranted.    Mely Dupree, OTR/L

## 2021-03-09 ENCOUNTER — PATIENT OUTREACH (OUTPATIENT)
Dept: HEALTH INFORMATION MANAGEMENT | Facility: OTHER | Age: 36
End: 2021-03-09

## 2021-03-09 ENCOUNTER — APPOINTMENT (OUTPATIENT)
Dept: PHYSICAL MEDICINE AND REHAB | Facility: REHABILITATION | Age: 36
End: 2021-03-09
Payer: COMMERCIAL

## 2021-03-09 VITALS
RESPIRATION RATE: 16 BRPM | DIASTOLIC BLOOD PRESSURE: 62 MMHG | WEIGHT: 174.16 LBS | TEMPERATURE: 97 F | SYSTOLIC BLOOD PRESSURE: 106 MMHG | HEIGHT: 63 IN | HEART RATE: 79 BPM | OXYGEN SATURATION: 97 % | BODY MASS INDEX: 30.86 KG/M2

## 2021-03-09 LAB
ALBUMIN SERPL BCP-MCNC: 3.4 G/DL (ref 3.2–4.9)
ALBUMIN/GLOB SERPL: 1.3 G/DL
ALP SERPL-CCNC: 62 U/L (ref 30–99)
ALT SERPL-CCNC: 12 U/L (ref 2–50)
ANION GAP SERPL CALC-SCNC: 8 MMOL/L (ref 7–16)
AST SERPL-CCNC: 7 U/L (ref 12–45)
BASOPHILS # BLD AUTO: 0 % (ref 0–1.8)
BASOPHILS # BLD: 0 K/UL (ref 0–0.12)
BILIRUB SERPL-MCNC: 0.6 MG/DL (ref 0.1–1.5)
BUN SERPL-MCNC: 14 MG/DL (ref 8–22)
CALCIUM SERPL-MCNC: 8.8 MG/DL (ref 8.5–10.5)
CHLORIDE SERPL-SCNC: 109 MMOL/L (ref 96–112)
CO2 SERPL-SCNC: 17 MMOL/L (ref 20–33)
CREAT SERPL-MCNC: 0.67 MG/DL (ref 0.5–1.4)
EKG IMPRESSION: NORMAL
EOSINOPHIL # BLD AUTO: 0 K/UL (ref 0–0.51)
EOSINOPHIL NFR BLD: 0 % (ref 0–6.9)
ERYTHROCYTE [DISTWIDTH] IN BLOOD BY AUTOMATED COUNT: 50.4 FL (ref 35.9–50)
GLOBULIN SER CALC-MCNC: 2.6 G/DL (ref 1.9–3.5)
GLUCOSE BLD-MCNC: 108 MG/DL (ref 65–99)
GLUCOSE SERPL-MCNC: 124 MG/DL (ref 65–99)
HCT VFR BLD AUTO: 42.4 % (ref 37–47)
HGB BLD-MCNC: 14.1 G/DL (ref 12–16)
IMM GRANULOCYTES # BLD AUTO: 0.06 K/UL (ref 0–0.11)
IMM GRANULOCYTES NFR BLD AUTO: 0.5 % (ref 0–0.9)
LYMPHOCYTES # BLD AUTO: 0.57 K/UL (ref 1–4.8)
LYMPHOCYTES NFR BLD: 4.7 % (ref 22–41)
MCH RBC QN AUTO: 32.3 PG (ref 27–33)
MCHC RBC AUTO-ENTMCNC: 33.3 G/DL (ref 33.6–35)
MCV RBC AUTO: 97 FL (ref 81.4–97.8)
MONOCYTES # BLD AUTO: 0.58 K/UL (ref 0–0.85)
MONOCYTES NFR BLD AUTO: 4.8 % (ref 0–13.4)
NEUTROPHILS # BLD AUTO: 10.86 K/UL (ref 2–7.15)
NEUTROPHILS NFR BLD: 90 % (ref 44–72)
NRBC # BLD AUTO: 0 K/UL
NRBC BLD-RTO: 0 /100 WBC
PLATELET # BLD AUTO: 239 K/UL (ref 164–446)
PMV BLD AUTO: 10.4 FL (ref 9–12.9)
POTASSIUM SERPL-SCNC: 3.6 MMOL/L (ref 3.6–5.5)
PROT SERPL-MCNC: 6 G/DL (ref 6–8.2)
RBC # BLD AUTO: 4.37 M/UL (ref 4.2–5.4)
SODIUM SERPL-SCNC: 134 MMOL/L (ref 135–145)
TROPONIN T SERPL-MCNC: <6 NG/L (ref 6–19)
WBC # BLD AUTO: 12.1 K/UL (ref 4.8–10.8)

## 2021-03-09 PROCEDURE — 700111 HCHG RX REV CODE 636 W/ 250 OVERRIDE (IP): Performed by: STUDENT IN AN ORGANIZED HEALTH CARE EDUCATION/TRAINING PROGRAM

## 2021-03-09 PROCEDURE — 36415 COLL VENOUS BLD VENIPUNCTURE: CPT

## 2021-03-09 PROCEDURE — 85025 COMPLETE CBC W/AUTO DIFF WBC: CPT

## 2021-03-09 PROCEDURE — A9270 NON-COVERED ITEM OR SERVICE: HCPCS | Performed by: INTERNAL MEDICINE

## 2021-03-09 PROCEDURE — 80053 COMPREHEN METABOLIC PANEL: CPT

## 2021-03-09 PROCEDURE — 82962 GLUCOSE BLOOD TEST: CPT

## 2021-03-09 PROCEDURE — 700111 HCHG RX REV CODE 636 W/ 250 OVERRIDE (IP): Performed by: PSYCHIATRY & NEUROLOGY

## 2021-03-09 PROCEDURE — 93010 ELECTROCARDIOGRAM REPORT: CPT | Performed by: INTERNAL MEDICINE

## 2021-03-09 PROCEDURE — 84484 ASSAY OF TROPONIN QUANT: CPT

## 2021-03-09 PROCEDURE — 700102 HCHG RX REV CODE 250 W/ 637 OVERRIDE(OP): Performed by: PSYCHIATRY & NEUROLOGY

## 2021-03-09 PROCEDURE — A9270 NON-COVERED ITEM OR SERVICE: HCPCS | Performed by: PSYCHIATRY & NEUROLOGY

## 2021-03-09 PROCEDURE — G0378 HOSPITAL OBSERVATION PER HR: HCPCS

## 2021-03-09 PROCEDURE — 96366 THER/PROPH/DIAG IV INF ADDON: CPT

## 2021-03-09 PROCEDURE — 700101 HCHG RX REV CODE 250: Performed by: HOSPITALIST

## 2021-03-09 PROCEDURE — 93005 ELECTROCARDIOGRAM TRACING: CPT | Performed by: INTERNAL MEDICINE

## 2021-03-09 PROCEDURE — 700102 HCHG RX REV CODE 250 W/ 637 OVERRIDE(OP): Performed by: INTERNAL MEDICINE

## 2021-03-09 PROCEDURE — 96376 TX/PRO/DX INJ SAME DRUG ADON: CPT

## 2021-03-09 PROCEDURE — 700105 HCHG RX REV CODE 258: Performed by: PSYCHIATRY & NEUROLOGY

## 2021-03-09 PROCEDURE — 99217 PR OBSERVATION CARE DISCHARGE: CPT | Performed by: INTERNAL MEDICINE

## 2021-03-09 RX ORDER — MAGNESIUM SULFATE 1 G/100ML
1 INJECTION INTRAVENOUS ONCE
Status: COMPLETED | OUTPATIENT
Start: 2021-03-09 | End: 2021-03-09

## 2021-03-09 RX ORDER — PROCHLORPERAZINE EDISYLATE 5 MG/ML
10 INJECTION INTRAMUSCULAR; INTRAVENOUS ONCE
Status: COMPLETED | OUTPATIENT
Start: 2021-03-09 | End: 2021-03-09

## 2021-03-09 RX ORDER — TIZANIDINE 4 MG/1
4 TABLET ORAL EVERY 6 HOURS PRN
Status: DISCONTINUED | OUTPATIENT
Start: 2021-03-09 | End: 2021-03-09 | Stop reason: HOSPADM

## 2021-03-09 RX ORDER — DIPHENHYDRAMINE HYDROCHLORIDE 50 MG/ML
25 INJECTION INTRAMUSCULAR; INTRAVENOUS ONCE
Status: COMPLETED | OUTPATIENT
Start: 2021-03-09 | End: 2021-03-09

## 2021-03-09 RX ADMIN — TIZANIDINE 4 MG: 4 TABLET ORAL at 08:29

## 2021-03-09 RX ADMIN — PROCHLORPERAZINE EDISYLATE 10 MG: 5 INJECTION INTRAMUSCULAR; INTRAVENOUS at 02:45

## 2021-03-09 RX ADMIN — FAMOTIDINE 10 MG: 20 TABLET ORAL at 05:14

## 2021-03-09 RX ADMIN — SODIUM CHLORIDE 1000 MG: 9 INJECTION, SOLUTION INTRAVENOUS at 05:34

## 2021-03-09 RX ADMIN — DIPHENHYDRAMINE HYDROCHLORIDE 25 MG: 50 INJECTION INTRAMUSCULAR; INTRAVENOUS at 02:45

## 2021-03-09 RX ADMIN — TOPIRAMATE 100 MG: 100 TABLET, FILM COATED ORAL at 05:14

## 2021-03-09 RX ADMIN — FINGOLIMOD HYDROCHLORIDE 0.5 MG: 0.5 CAPSULE ORAL at 06:00

## 2021-03-09 RX ADMIN — Medication 1000 UNITS: at 05:14

## 2021-03-09 RX ADMIN — MAGNESIUM SULFATE IN DEXTROSE 1 G: 10 INJECTION, SOLUTION INTRAVENOUS at 02:46

## 2021-03-09 ASSESSMENT — PAIN DESCRIPTION - PAIN TYPE: TYPE: CHRONIC PAIN

## 2021-03-09 NOTE — CARE PLAN
Problem: Knowledge Deficit  Goal: Knowledge of disease process/condition, treatment plan, diagnostic tests, and medications will improve  Outcome: PROGRESSING AS EXPECTED  Note: POC discussed with patient and all questions answered.      Problem: Discharge Barriers/Planning  Goal: Patient's continuum of care needs will be met  Outcome: PROGRESSING AS EXPECTED  Note: Patient anticipates discharge to home with  following completion of IV steroids.

## 2021-03-09 NOTE — CARE PLAN
Problem: Safety  Goal: Will remain free from falls  Outcome: PROGRESSING AS EXPECTED  Note: Bed in lowest, locked position. Call light and belongings within reach. Pt calls for assistance. Will continue hourly rounding.      Problem: Pain Management  Goal: Pain level will decrease to patient's comfort goal  Outcome: PROGRESSING AS EXPECTED  Note: No complaints of pain at this time. Will continue to assess and provide comfort as needed.      Problem: Psychosocial Needs:  Goal: Level of anxiety will decrease  Outcome: PROGRESSING AS EXPECTED  Note: Pt is calm and resting at this time. She is excited to be going home tomorrow. Will continue hourly rounding.

## 2021-03-09 NOTE — DISCHARGE INSTRUCTIONS
Discharge Instructions    Discharged to home by car with relative. Discharged via wheelchair, hospital escort: Yes.  Special equipment needed: Not Applicable    Be sure to schedule a follow-up appointment with your primary care doctor or any specialists as instructed.     Discharge Plan:   Influenza Vaccine Indication: Indicated: 9 to 64 years of age    I understand that a diet low in cholesterol, fat, and sodium is recommended for good health. Unless I have been given specific instructions below for another diet, I accept this instruction as my diet prescription.   Other diet: Regular    Special Instructions: None    · Is patient discharged on Warfarin / Coumadin?   No     Depression / Suicide Risk    As you are discharged from this [facility] facility, it is important to learn how to keep safe from harming yourself.    Recognize the warning signs:  · Abrupt changes in personality, positive or negative- including increase in energy   · Giving away possessions  · Change in eating patterns- significant weight changes-  positive or negative  · Change in sleeping patterns- unable to sleep or sleeping all the time   · Unwillingness or inability to communicate  · Depression  · Unusual sadness, discouragement and loneliness  · Talk of wanting to die  · Neglect of personal appearance   · Rebelliousness- reckless behavior  · Withdrawal from people/activities they love  · Confusion- inability to concentrate     If you or a loved one observes any of these behaviors or has concerns about self-harm, here's what you can do:  · Talk about it- your feelings and reasons for harming yourself  · Remove any means that you might use to hurt yourself (examples: pills, rope, extension cords, firearm)  · Get professional help from the community (Mental Health, Substance Abuse, psychological counseling)  · Do not be alone:Call your Safe Contact- someone whom you trust who will be there for you.  · Call your local CRISIS HOTLINE 175-6110  or 100-096-2114  · Call your local Children's Mobile Crisis Response Team [place] (203) 925-7221 or www.LogRhythm  · Call the toll free National Suicide Prevention Hotlines   · National Suicide Prevention Lifeline 822-621-PWEM (0158)  · [employer] Hope Line Network 800-SUICIDE (415-4740)      Multiple Sclerosis  Multiple sclerosis (MS) is a disease of the brain, spinal cord, and optic nerves (central nervous system). It causes the body's disease-fighting (immune) system to destroy the protective covering (myelin sheath) around nerves in the brain. When this happens, signals (nerve impulses) going to and from the brain and spinal cord do not get sent properly or may not get sent at all.  There are several types of MS:  · Relapsing-remitting MS. This is the most common type. This causes sudden attacks of symptoms. After an attack, you may recover completely until the next attack, or some symptoms may remain permanently.  · Secondary progressive MS. This usually develops after the onset of relapsing-remitting MS. Similar to relapsing-remitting MS, this type also causes sudden attacks of symptoms. Attacks may be less frequent, but symptoms slowly get worse (progress) over time.  · Primary progressive MS. This causes symptoms that steadily progress over time. This type of MS does not cause sudden attacks of symptoms.  The age of onset of MS varies, but it often develops between 20-40 years of age. MS is a lifelong (chronic) condition. There is no cure, but treatment can help slow down the progression of the disease.  What are the causes?  The cause of this condition is not known.  What increases the risk?  You are more likely to develop this condition if:  · You are a woman.  · You have a relative with MS. However, the condition is not passed from parent to child (inherited).  · You have a lack (deficiency) of vitamin D.  · You smoke.  MS is more common in the [place] than in the southern [place].  What are the signs or symptoms?  Relapsing-remitting and secondary progressive MS cause symptoms to occur in episodes or attacks that may last weeks to months. There may be long periods between attacks in which there are almost no symptoms. Primary progressive MS causes symptoms to steadily progress after they develop.  Symptoms of MS vary because of the many different ways it affects the central nervous system. The main symptoms include:  · Vision problems and eye pain.  · Numbness.  · Weakness.  · Inability to move your arms, hands, feet, or legs (paralysis).  · Balance problems.  · Shaking that you cannot control (tremors).  · Muscle spasms.  · Problems with thinking (cognitive changes).  MS can also cause symptoms that are associated with the disease, but are not always the direct result of an MS attack. They may include:  · Inability to control urination or bowel movements (incontinence).  · Headaches.  · Fatigue.  · Inability to tolerate heat.  · Emotional changes.  · Depression.  · Pain.  How is this diagnosed?  This condition is diagnosed based on:  · Your symptoms.  · A neurological exam. This involves checking central nervous system function, such as nerve function, reflexes, and coordination.  · MRIs of the brain and spinal cord.  · Lab tests, including a lumbar puncture that tests the fluid that surrounds the brain and spinal cord (cerebrospinal fluid).  · Tests to measure the electrical activity of the brain in response to stimulation (evoked potentials).  How is this treated?  There is no cure for MS, but medicines can help decrease the number and frequency of attacks and help relieve nuisance symptoms. Treatment options may include:  · Medicines that reduce the frequency of attacks. These medicines may be given by injection, by mouth (orally), or through an IV.  · Medicines that reduce inflammation (steroids). These may provide short-term relief of symptoms.  · Medicines to help control pain,  depression, fatigue, or incontinence.  · Vitamin D, if you have a deficiency.  · Using devices to help you move around (assistive devices), such as braces, a cane, or a walker.  · Physical therapy to strengthen and stretch your muscles.  · Occupational therapy to help you with everyday tasks.  · Alternative or complementary treatments such as exercise, massage, or acupuncture.  Follow these instructions at home:  · Take over-the-counter and prescription medicines only as told by your health care provider.  · Do not drive or use heavy machinery while taking prescription pain medicine.  · Use assistive devices as recommended by your physical therapist or your health care provider.  · Exercise as directed by your health care provider.  · Return to your normal activities as told by your health care provider. Ask your health care provider what activities are safe for you.  · Reach out for support. Share your feelings with friends, family, or a support group.  · Keep all follow-up visits as told by your health care provider and therapists. This is important.  Where to find more information  · National Multiple Sclerosis Society: https://www.nationalmssociety.org  Contact a health care provider if:  · You feel depressed.  · You develop new pain or numbness.  · You have tremors.  · You have problems with sexual function.  Get help right away if:  · You develop paralysis.  · You develop numbness.  · You have problems with your bladder or bowel function.  · You develop double vision.  · You lose vision in one or both eyes.  · You develop suicidal thoughts.  · You develop severe confusion.  If you ever feel like you may hurt yourself or others, or have thoughts about taking your own life, get help right away. You can go to your nearest emergency department or call:  · Your local emergency services (911 in the [place]  · A suicide crisis helpline, such as the National Suicide Prevention Lifeline at 1-230.952.1500. This is open  24 hours a day.  Summary  · Multiple sclerosis (MS) is a disease of the central nervous system that causes the body's immune system to destroy the protective covering (myelin sheath) around nerves in the brain.  · There are 3 types of MS: relapsing-remitting, secondary progressive, and primary progressive. Relapsing-remitting and secondary progressive MS cause symptoms to occur in episodes or attacks that may last weeks to months. Primary progressive MS causes symptoms to steadily progress after they develop.  · There is no cure for MS, but medicines can help decrease the number and frequency of attacks and help relieve nuisance symptoms. Treatment may also include physical or occupational therapy.  · If you develop numbness, paralysis, vision problems, or other neurological symptoms, get help right away.  This information is not intended to replace advice given to you by your health care provider. Make sure you discuss any questions you have with your health care provider.  Document Released: 12/15/2001 Document Revised: 11/30/2018 Document Reviewed: 02/26/2018  [employer] Patient Education © 2020 [employer]

## 2021-03-09 NOTE — DISCHARGE PLANNING
Please review the consult from Dr. Castro regarding post acute recommendations.  [facility] will no longer follow.  Please reach out to myself @ [phone] with any questions.

## 2021-03-09 NOTE — SENIOR ADMIT NOTE
Pt c/o about headache. Dr Jaramillo paged and called back and is ordering the migraine cocktail. Physician putting in orders.

## 2021-03-10 ENCOUNTER — OFFICE VISIT (OUTPATIENT)
Dept: NEUROLOGY | Facility: MEDICAL CENTER | Age: 36
End: 2021-03-10
Attending: PSYCHIATRY & NEUROLOGY
Payer: COMMERCIAL

## 2021-03-10 VITALS
DIASTOLIC BLOOD PRESSURE: 74 MMHG | SYSTOLIC BLOOD PRESSURE: 110 MMHG | WEIGHT: 174.16 LBS | BODY MASS INDEX: 30.86 KG/M2 | TEMPERATURE: 98.1 F | HEIGHT: 63 IN | HEART RATE: 85 BPM | OXYGEN SATURATION: 96 %

## 2021-03-10 DIAGNOSIS — G35 MULTIPLE SCLEROSIS (HCC): Primary | ICD-10-CM

## 2021-03-10 PROCEDURE — 99211 OFF/OP EST MAY X REQ PHY/QHP: CPT | Performed by: PSYCHIATRY & NEUROLOGY

## 2021-03-10 PROCEDURE — 99214 OFFICE O/P EST MOD 30 MIN: CPT | Performed by: PSYCHIATRY & NEUROLOGY

## 2021-03-10 RX ORDER — DIAZEPAM 5 MG/1
TABLET ORAL
Qty: 3 TABLET | Refills: 0 | Status: SHIPPED | OUTPATIENT
Start: 2021-03-10 | End: 2021-05-12

## 2021-03-10 ASSESSMENT — FIBROSIS 4 INDEX: FIB4 SCORE: 0.3

## 2021-03-10 NOTE — DISCHARGE SUMMARY
Discharge Summary    CHIEF COMPLAINT ON ADMISSION  Chief Complaint   Patient presents with   • Numbness     on left foot begining last night at 2130, then moving up to left arm and left face this morning.  Left leg weakness   • Headache     upon wakening this morning       Reason for Admission  Tingling & numbness of left fooot,*     Admission Date  3/5/2021    CODE STATUS  Prior    HPI & HOSPITAL COURSE    Mrs. Meade is a 36 y/o female with a PMHx of complex migraines and MS who presented to the ED on 3/5/2021 with left sided numbness and headache. CT head was negative. 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 She was subsequently started on steroids and admitted to the clinical decision unit for further evaluation and treatment. She was started on steroid IV for 5 days. Leukocytosis reactive to steroid improving on discharge day. She will continue to follow up as outpatient with neurology.   3/9- in the morning she felt some cramping/fluttering feeling on her chest. EKG unremarkable. Trop negative. Some relieve with zanaflex. She was cleared by PT/OT.        Therefore, she is discharged in guarded and stable condition to home with close outpatient follow-up.    The patient met 2-midnight criteria for an inpatient stay at the time of discharge.    Discharge Date  3/9/2021    FOLLOW UP ITEMS POST DISCHARGE  None     DISCHARGE DIAGNOSES  Principal Problem:    MS (multiple sclerosis) (HCC) POA: Yes  Active Problems:    Migraines POA: Yes    Seizure disorder (HCC) POA: Yes    Anxiety POA: Yes    Asthma POA: Yes  Resolved Problems:    * No resolved hospital problems. *      FOLLOW UP  Future Appointments   Date Time Provider Department Center   3/10/2021 10:40 AM Rajeev Aggarwal  "[facility] None   3/11/2021 10:00 AM Jacki Gallegos D.O. [facility] None   4/27/2021  7:40 AM Rajeev Aggarwal M.D. [facility] None     [facility], Emergency Dept  [address]  [place] [id number] [phone]    If symptoms worsen    Mack Bernard M.D.  [address] [address]  [phone]    Go on 3/24/2021  Please go to this office for your appointment with your primary care provider at 8:30am on 03/24/21.      MEDICATIONS ON DISCHARGE     Medication List      CONTINUE taking these medications      Instructions   Aimovig 70 MG/ML Soaj  Generic drug: Erenumab   Inject 70 mg as instructed Q30 DAYS.  Dose: 70 mg     Gilenya 0.5 MG Caps capsule  Generic drug: fingolimod   Take 0.5 mg by mouth every morning as needed.  Dose: 0.5 mg     topiramate 100 MG Tabs  Commonly known as: TOPAMAX   Take 100 mg by mouth 2 times a day.  Dose: 100 mg     zonisamide 100 MG Caps  Commonly known as: ZONEGRAN   Take 200 mg by mouth every evening.  Dose: 200 mg            Allergies  Allergies   Allergen Reactions   • Amitriptyline Unspecified     Suicidal   • Clarithromycin Hives   • Sulfa Drugs Anaphylaxis   • Cantaloupe Itching   • Honey Dew Itching   • Latex Itching   • Lorazepam Unspecified     Hallucinations     • Metoclopramide Unspecified     Muscle spasms   • Penicillin G Potassium Vomiting   • Rizatriptan Unspecified     Tremors, confusion     • Sumatriptan Unspecified     \"Makes my head pins and needles\"   • Tape Rash   • Morphine Hives and Itching       DIET  No orders of the defined types were placed in this encounter.      ACTIVITY  As tolerated.  Weight bearing as tolerated    CONSULTATIONS  Neurology     PROCEDURES  None     LABORATORY  Lab Results   Component Value Date    SODIUM 134 (L) 03/09/2021    POTASSIUM 3.6 03/09/2021    CHLORIDE 109 03/09/2021    CO2 17 (L) 03/09/2021    GLUCOSE 124 (H) 03/09/2021    BUN 14 03/09/2021    CREATININE 0.67 03/09/2021    CREATININE 0.9 05/04/2009 "        Lab Results   Component Value Date    WBC 12.1 (H) 03/09/2021    HEMOGLOBIN 14.1 03/09/2021    HEMATOCRIT 42.4 03/09/2021    PLATELETCT 239 03/09/2021        Total time of the discharge process exceeds 35 minutes.

## 2021-03-10 NOTE — PROGRESS NOTES
[facility]  MULTIPLE SCLEROSIS & NEUROIMMUNOLOGY  FOLLOW-UP VISIT    DISEASE SUMMARY:  Principal neurologic diagnosis: MS  Diagnosis of MS: 1/7/2021  Disease History:  - 12/5/2019: blurry vision, left facial numbness, left upper- and lower extremity numbness; MRI head unremarkable; improved over the course of 1 week  - 12/25/2020: right monocular visual loss; returned to normal over ~2 hours  - 12/29/2020: onset of bilateral lower extremity weakness  - 1/2/202021: presented to hospital; MRI w/ lesions, LP w/ OCBs present; treated with IVMP with good response  - 2/28/2021: Gilenya FDO  - 3/4/2021: left margaret-body numbness; treated with IVMP w/ good response  Disease course at onset: MS  Current disease course: MS  Previous disease therapies:  - none  Current disease therapies:  - Gilenya  Symptomatic therapies:  - none  CSF (1/3/2021)):  - RBCs: 394  - WBCs: 6  - protein: 58  - glucose: 81  - oligoclonal bands: positive (8)  - paraneoplastic autoantibody eval: negative  Other Testing:  - anti-AQP4 Ab (1/3/2021): negative  - anti-MOG IgG (1/3/2021): negative  - paraneoplastic autoantibody eval, serum (1/4/2020): negative  MRI head:  - 3/5/2021: enhancing lesions present  - 1/2/2021: juxta-cortical, periventricular, and infratentorial lesions w/ enhancement  - 12/5/2019: no visible lesions  - 5/13/2018: no visible lesions  MRI cervical spine:  - 3/5/2021: single enhancing lesion present  - 1/2/2021: no visible lesions  MRI thoracic spine:  - 3/5/2021: no visible lesions  - 1/2/2021: no visible lesions    CC: MS    INTERVAL HISTORY:  Rehana Meade is a 35 y.o. woman with MS and a history otherwise notable for migraines, aseptic meningitis, and seizure disorder.  I last saw her in the MS Clinic on 1/29/2021.  At that time we planned to start Gilenya.  Today, she was accompanied by her , and she provided the following interval history:    Rehana underwent Gilenya FDO on 2/28/2021.  A few days later  (3/4/2021) she developed numbness over the left margaret-body.  She went to the [facility] ED where she was treated with IVMP x5 days.  The numbness improved; however, she still has numbness over the left scalp and, to a lesser degree, the left upper extremity.    MEDICATIONS:  Current Outpatient Medications   Medication Sig   • fingolimod (GILENYA) 0.5 MG Cap capsule Take 0.5 mg by mouth every morning as needed.   • topiramate (TOPAMAX) 100 MG Tab Take 100 mg by mouth 2 times a day.   • AIMOVIG 70 MG/ML Solution Auto-injector Inject 70 mg as instructed Q30 DAYS.   • zonisamide (ZONEGRAN) 100 MG Cap Take 200 mg by mouth every evening.     MEDICAL, SOCIAL, AND FAMILY HISTORY:  There is no change in the patient's ROS or PFSH from their previous visit on 1/29/2021.    REVIEW OF SYSTEMS:  A ROS was completed.  Pertinent positives and negatives were included in the HPI, above.  All other systems were reviewed and are negative.    PHYSICAL EXAM:  General/Medical:  - NAD    Neuro:  MENTAL STATUS: awake and alert; no deficits of speech or language; oriented to person, place, and time; affect was appropriate to situation; pleasant, cooperative     CRANIAL NERVES:    II: acuity was: NT; fields: NT; pupils 3/3 to 2/2 without a relative afferent pupillary defect; discs: NT    III/IV/VI: versions intact without nystagmus    V: facial sensation symmetric to light touch    VII: facial expression symmetric    VIII: hearing intact to finger rub    IX/X: palate elevates symmetrically    XI: shoulder shrug symmetric    XII: tongue midline     MOTOR:  - bulk and tone were normal throughout  Upper Extremity Strength  (R/L)    NT   Elbow flexion NT   Elbow extension NT   Shoulder abduction NT      Lower Extremity Strength  (R/L)   Hip flexion NT   Knee extension NT   Knee flexion NT   Ankle plantarflexion NT   Ankle dorsiflexion NT      - no pronator drift; no abnormal movements     SENSATION:  - light touch: reduced over the left scalp and,  "to a lesser extent, the left upper extremity  - vibration (R/L, seconds): NT at the great toes  - pinprick: NT  - proprioception: NT  - Romberg: absent     COORDINATION:  - finger to nose was normal, no ataxia on exam  - finger tapping was rapid and accurate, bilaterally     REFLEXES:  Reflex Right Left   BR NT NT   Biceps NT NT   Triceps NT NT   Patellae NT NT   Achilles NT NT   Toes NT NT      GAIT:  - narrow base and normal     QUANTITATIVE SCORES:  Timed 25-foot walk (sec): 4.8 on 3/10/2021 (4.8 on 1/7/2021).  Assistive device: none    REVIEW OF IMAGING STUDIES:  MRI Brain:  Date: 3/5/2021  W/o and w/ contrast?: yes  Indication: \"MS, new event\"  Comparison: 1/2/2021  Impression:  \"1) Numerous supra and infratentorial enhancing and nonenhancing lesions, in keeping with multiple sclerosis.  2) Mild Chiari I malformation. No hydrocephalus.\"    MRI Cervical Spine:  Date: 3/5/2021  W/o and w/ contrast?: yes  Indication: \"MS\"  Comparison: 1/2/2021  Impression:  \"Focal enhancing cord lesion on the RIGHT at C5-C6, in keeping with the provided clinical history of multiple sclerosis.\"    MRI Thoracic Spine:  Date: 3/5/2021  W/o and w/ contrast?: yes  Indication: \"MS\"  Comparison: 1/2/2021  Impression:  \"No significant abnormality is seen in the MR scan of the thoracic spine. No evidence of demyelinating disease in the thoracic cord.\"    REVIEW OF LABORATORY STUDIES:  Summarized above.    ASSESSMENT:  Rehana Meade is a 35 y.o. woman with multiple sclerosis and migraine.  A few days after starting Gilenya Rehana experienced a clinical relapse which manifested as left margaret-body numbness.  She was hospitalized, and workup revealed new, enhancing lesions in the brain as well as the cervical cord.  Fortunately, her symptoms responded well to IVMP treatment.  I discussed immunotherapy plans with Rehana today.  I think the relapse occurred a bit early after immunotherapy initiation for us to consider this " "\"breakthrough\" disease or \"Gilenya failure.\"  We agreed to repeat MRI brain in ~2 months.  These images will serve as a new baseline.  If there are enhancing lesions on the follow-up scan, we will likely transition to Ocrevus.    PLAN:  Multiple Sclerosis:  - continue Gilenya for now  - repeat MRI brain w/o and w/ contrast in ~2 months    Follow-Up:  - No follow-ups on file.    Signed: Rajeev Aggarwal M.D. at 6:39 AM on 01/29/21  "

## 2021-03-11 ENCOUNTER — OFFICE VISIT (OUTPATIENT)
Dept: PHYSICAL MEDICINE AND REHAB | Facility: REHABILITATION | Age: 36
End: 2021-03-11
Payer: COMMERCIAL

## 2021-03-11 VITALS
OXYGEN SATURATION: 97 % | WEIGHT: 175.04 LBS | HEART RATE: 77 BPM | HEIGHT: 63 IN | RESPIRATION RATE: 18 BRPM | SYSTOLIC BLOOD PRESSURE: 110 MMHG | DIASTOLIC BLOOD PRESSURE: 62 MMHG | TEMPERATURE: 97.9 F | BODY MASS INDEX: 31.02 KG/M2

## 2021-03-11 DIAGNOSIS — R25.2 SPASTICITY: ICD-10-CM

## 2021-03-11 DIAGNOSIS — G35 MS (MULTIPLE SCLEROSIS) (HCC): Primary | ICD-10-CM

## 2021-03-11 DIAGNOSIS — K59.2 NEUROGENIC BOWEL: ICD-10-CM

## 2021-03-11 DIAGNOSIS — N31.9 NEUROGENIC BLADDER: ICD-10-CM

## 2021-03-11 DIAGNOSIS — M79.2 NEUROPATHIC PAIN: ICD-10-CM

## 2021-03-11 PROCEDURE — 99215 OFFICE O/P EST HI 40 MIN: CPT | Performed by: PHYSICAL MEDICINE & REHABILITATION

## 2021-03-11 ASSESSMENT — ENCOUNTER SYMPTOMS
COUGH: 0
DIARRHEA: 0
FALLS: 0
MEMORY LOSS: 0
BACK PAIN: 1
TREMORS: 0
SORE THROAT: 0
CONSTIPATION: 0
BRUISES/BLEEDS EASILY: 0
WEAKNESS: 0
SHORTNESS OF BREATH: 0
CHILLS: 0
FEVER: 0
PALPITATIONS: 0
DIZZINESS: 0
FOCAL WEAKNESS: 0

## 2021-03-11 ASSESSMENT — FIBROSIS 4 INDEX: FIB4 SCORE: 0.3

## 2021-03-11 NOTE — PROGRESS NOTES
[facility]  PM&R Neuro Rehabilitation Clinic  [facility] [place] [address]  Ph: [phone]    NEW PATIENT EVALUATION    *Patient established in PM&R practice, however, patient new to writer as patient is transferring care. Therefore, patient billed as established.     Patient Name: Rehana Meade   Patient : 1985  Patient Age: 35 y.o.     Examining Physician: Dr. Jacki Gallegos, DO    PM&R History to Date - Background Information:  Dates of Admission/Discharge to ARU: None    SUBJECTIVE:   Patient Identification: Rehana Meade is a 35 y.o. female with PMH significant for migraines, anxiety/depression, seizures, asthma and rehabilitation history significant for multiple sclerosis (diagnosed 2021) and is presenting to PM&R clinic for a NEW OUTPATIENT evaluation with the following chief complaint/s:    Chief Complaint: MS    Accompanied by Today: Self.  History of Present Illness:    -Records reviewed: Hospital discharge summary reviewed in its entirety.  -Patient states that she had loss of vision in one of her eyes in December which came back 2 hours later.  Then about 3 days later she started having weakness in her lower extremities.  Subsequently diagnosed with MS followed by neurology and started on pharmacologic management.  -Has returned back to work full-time.  She is working from home.  -She has children and a  and often gets quite fatigued.  -She does have some pain in her back as well as her left arm which she attributes to coming off of the steroids.  -Bladder: Reports urinary urgency.  Has not had incontinence since yet.  Does drink a lot of water.  -Bowel: Bowel movements are soft, but she feels as though they do not come out the way that they should.  Takes Metamucil daily.  -Denies any symptoms of spasticity.    Review of Systems:  Review of Systems   Constitutional: Negative for chills and fever.   HENT: Negative for congestion and sore throat.     Respiratory: Negative for cough and shortness of breath.    Cardiovascular: Negative for palpitations and leg swelling.   Gastrointestinal: Negative for constipation and diarrhea.        Slow transit constipation.   Genitourinary: Positive for urgency.   Musculoskeletal: Positive for back pain. Negative for falls and joint pain.   Neurological: Negative for dizziness, tremors, focal weakness and weakness.        Neuropathic pain left arm.   Endo/Heme/Allergies: Does not bruise/bleed easily.        Bruising across extremities.   Psychiatric/Behavioral: Negative for memory loss.      All other pertinent positive review of systems are noted above in HPI.   All other systems reviewed and are negative.    Past Medical History:  Past Medical History:   Diagnosis Date   • Seizure cerebral (HCC) 2/15/2018   • Anesthesia     woke up combative   • Anxiety associated with depression    • ASTHMA    • Drug-seeking behavior    • Migraine without aura, without mention of intractable migraine without mention of status migrainosus    • Other specified symptom associated with female genital organs    • Seizure (HCC)     Last seizure was 1/2019   • Stroke ([facility])    • Unspecified disorder of thyroid     cyst      Past Surgical History:   Procedure Laterality Date   • VAGINAL HYSTERECTOMY TOTAL  1/27/2020    Procedure: HYSTERECTOMY, TOTAL, VAGINAL;  Surgeon: Julian Parker M.D.;  Location: SURGERY SAME DAY [facility];  Service: Gynecology   • CYSTOSCOPY  1/27/2020    Procedure: CYSTOSCOPY;  Surgeon: Julian Parker M.D.;  Location: SURGERY SAME DAY [facility];  Service: Gynecology   • TUBAL COAGULATION LAPAROSCOPIC BILATERAL  7/11/2014    Performed by Julian Parker M.D. at SURGERY SAME DAY [facility] ORS   • SEPTOPLASTY  10/28/2009    Performed by ANNETTE FORDE at SURGERY [facility]   • SOMNOPLASTY  10/28/2009    Performed by ANNETTE FORDE at SURGERY [facility]   • NASAL POLYPECTOMY  10/28/2009    Performed by  "ANNETTE FORDE at SURGERY [facility] ORS        Current Outpatient Medications:   •  diazePAM (VALIUM) 5 MG Tab, Take 5 mg 1 hour before MRI, may repeat x1-2 times PRN., Disp: 3 tablet, Rfl: 0  •  fingolimod (GILENYA) 0.5 MG Cap capsule, Take 0.5 mg by mouth every morning as needed., Disp: , Rfl:   •  topiramate (TOPAMAX) 100 MG Tab, Take 100 mg by mouth 2 times a day., Disp: , Rfl:   •  AIMOVIG 70 MG/ML Solution Auto-injector, Inject 70 mg as instructed Q30 DAYS., Disp: , Rfl: 11  •  zonisamide (ZONEGRAN) 100 MG Cap, Take 200 mg by mouth every evening., Disp: , Rfl:   Allergies   Allergen Reactions   • Amitriptyline Unspecified     Suicidal   • Clarithromycin Hives   • Sulfa Drugs Anaphylaxis   • Cantaloupe Itching   • Honey Dew Itching   • Latex Itching   • Lorazepam Unspecified     Hallucinations     • Metoclopramide Unspecified     Muscle spasms   • Penicillin G Potassium Vomiting   • Rizatriptan Unspecified     Tremors, confusion     • Sumatriptan Unspecified     \"Makes my head pins and needles\"   • Tape Rash   • Morphine Hives and Itching        Past Social History:  Social History     Socioeconomic History   • Marital status:      Spouse name: Not on file   • Number of children: Not on file   • Years of education: Not on file   • Highest education level: Not on file   Occupational History   • Not on file   Tobacco Use   • Smoking status: Never Smoker   • Smokeless tobacco: Never Used   Substance and Sexual Activity   • Alcohol use: Not Currently     Comment: rarely   • Drug use: No   • Sexual activity: Yes     Partners: Male   Other Topics Concern   • Not on file   Social History Narrative   • Not on file     Social Determinants of Health     Financial Resource Strain:    • Difficulty of Paying Living Expenses:    Food Insecurity:    • Worried About Running Out of Food in the Last Year:    • Ran Out of Food in the Last Year:    Transportation Needs:    • Lack of Transportation (Medical):    • Lack " of Transportation (Non-Medical):    Physical Activity:    • Days of Exercise per Week:    • Minutes of Exercise per Session:    Stress:    • Feeling of Stress :    Social Connections:    • Frequency of Communication with Friends and Family:    • Frequency of Social Gatherings with Friends and Family:    • Attends [employer] Services:    • Active Member of Clubs or Organizations:    • Attends Club or Organization Meetings:    • Marital Status:    Intimate Partner Violence:    • Fear of Current or Ex-Partner:    • Emotionally Abused:    • Physically Abused:    • Sexually Abused:         Family History:  Family history was reviewed with the patient, there is no pertinent family history to report.     Depression and Opioid Screening  PHQ-9:  Depression Screen (PHQ-2/PHQ-9) 1/2/2021 1/7/2021 3/5/2021   PHQ-2 Total Score 0 - 0   PHQ-2 Total Score - 0 -   PHQ-9 Total Score - - -     Interpretation of PHQ-9 Total Score   Score Severity   1-4 No Depression   5-9 Mild Depression   10-14 Moderate Depression   15-19 Moderately Severe Depression   20-27 Severe Depression     OBJECTIVE:   Vital Signs:  Vitals:    03/11/21 1003   BP: 110/62   Pulse: 77   Resp: 18   Temp: 36.6 °C (97.9 °F)   SpO2: 97%        Pertinent Labs:  Lab Results   Component Value Date/Time    SODIUM 134 (L) 03/09/2021 07:17 AM    POTASSIUM 3.6 03/09/2021 07:17 AM    CHLORIDE 109 03/09/2021 07:17 AM    CO2 17 (L) 03/09/2021 07:17 AM    GLUCOSE 124 (H) 03/09/2021 07:17 AM    BUN 14 03/09/2021 07:17 AM    CREATININE 0.67 03/09/2021 07:17 AM    CREATININE 0.9 05/04/2009 09:58 PM       Lab Results   Component Value Date/Time    HBA1C 5.1 03/06/2021 03:50 AM       Lab Results   Component Value Date/Time    WBC 12.1 (H) 03/09/2021 07:17 AM    RBC 4.37 03/09/2021 07:17 AM    HEMOGLOBIN 14.1 03/09/2021 07:17 AM    HEMATOCRIT 42.4 03/09/2021 07:17 AM    MCV 97.0 03/09/2021 07:17 AM    MCH 32.3 03/09/2021 07:17 AM    MCHC 33.3 (L) 03/09/2021 07:17 AM    MPV 10.4  03/09/2021 07:17 AM    NEUTSPOLYS 90.00 (H) 03/09/2021 07:17 AM    LYMPHOCYTES 4.70 (L) 03/09/2021 07:17 AM    MONOCYTES 4.80 03/09/2021 07:17 AM    EOSINOPHILS 0.00 03/09/2021 07:17 AM    BASOPHILS 0.00 03/09/2021 07:17 AM    HYPOCHROMIA 1+ 06/06/2014 10:49 AM       Lab Results   Component Value Date/Time    ASTSGOT 7 (L) 03/09/2021 07:17 AM    ALTSGPT 12 03/09/2021 07:17 AM        Physical Exam:   GEN: No apparent distress, sitting comfortably on exam room table.  HEENT: Head normocephalic, atraumatic.  Sclera nonicteric bilaterally, no ocular discharge appreciated bilaterally. Mask donned.  CV: Extremities warm and well-perfused, no peripheral edema appreciated bilaterally.  PULMONARY: Breathing nonlabored on room air, no respiratory accessory muscle use.  Not requiring supplemental oxygen.  ABD: Soft, nontender.  SKIN: No appreciable skin breakdown on exposed areas of skin.  There is bruising across her bilateral upper extremities from previous IVs.  PSYCH: Mood and affect within normal limits.  NEURO: Awake alert.  Conversational.  Logical thought content.  No dysarthria or aphasia.    Motor Exam Upper Extremities   ? Myotome R L   Shoulder Abduction C5 5 5   Elbow flexion C5 5 5   Wrist extension C6 5 5   Elbow extension C7 5 5   Finger flexion C8 5 5   Finger abduction T1 5 5     Motor Exam Lower Extremities  ? Myotome R L   Hip flexion L2 5 5   Knee extension L3 5 5   Ankle dorsiflexion L4 5 5   Toe extension L5 5 5   Ankle plantarflexion S1 5 5     No significant tone on exam  Radha's negative bilaterally.    No clonus appreciated at bilateral ankles.    Imaging:   MRI cervical spine 1/2021  1. BORDERLINE LOW-LYING CEREBELLAR TONSILS EXTENDING ABOUT 2 MM BELOW THE FORAMEN MAGNUM. THIS DOES NOT SATISFY CRITERIA FOR CHIARI I MALFORMATION.  2. DEMYELINATING LESION AGAIN SEEN IN THE LEFT MIDDLE CEREBELLAR PEDUNCLE AND ADDITIONAL SUBTLE DEMYELINATING LESION IN THE LEFT PARAMEDIAN UPPER MEDULLA NEAR THE  "PONTOMEDULLARY JUNCTION. (SEE ALSO MRI BRAIN FROM TODAY'S DATE).  3. OTHERWISE, MRI OF THE CERVICAL SPINE WITHOUT AND WITH CONTRAST WITHIN NORMAL LIMITS. NO EVIDENCE OF DEMYELINATING DISEASE IN THE CERVICAL SPINAL CORD.    MRI brain 1/2021  1.  Borderline low-lying cerebellar tonsils extending about 2 mm below the foramen magnum. This does not satisfy criteria for Chiari I malformation.  2.  Multiple supratentorial white matter lesions with morphology and enhancement most consistent with active demyelinating disease such as multiple sclerosis.  3.  Posterior fossa demyelinating lesions noted in the left middle cerebellar peduncle (enhancing) and left paramedian upper medulla near the pontomedullary junction (nonenhancing).    ASSESSMENT/PLAN: Rehana Meade  is a 35 y.o. female with rehabilitation history significant for multiple sclerosis (diagnosed 1/2021), here for evaluation. The following plan was discussed with the patient who is in agreement.     Visit Diagnoses     ICD-10-CM   1. MS (multiple sclerosis) ([facility])  G35   2. Neurogenic bowel  K59.2   3. Neurogenic bladder  N31.9   4. Neuropathic pain  M79.2   5. Spasticity  R25.2        Rehab/Neuro:   1. Multiple sclerosis: Diagnosed 1/2021.  -Records reviewed as aforementioned in the HPI.  -Consultants: Established with neurology, Dr. Aggarwal.  -Therapy: Not needed at this time.  -Occupation: Working full-time from home.  -Driving status: Actively driving without issue.  -Equipment: No equipment needs at this time.    2.  Fatigue secondary to MS  -Counseled on \"energy bank\".    Spasticity: Not present on exam.  Patient denies symptoms.    Neuropathic Pain: Back pain and left upper extremity pain, patient attributing to coming off of steroids.  -Reevaluate next visit.  -Med management: Patient wishes to refrain from pharmacologic intervention if at all possible, which I think is entirely appropriate.    Neurogenic Bladder: Urinary urgency. ?  Spastic " component.  -Lab review: BUN/creatinine 3/9/2021 14/0.67  -Imaging: RBUS to evaluate renal bladder system.  -Counseling: Counseled on appropriate management of neurogenic bladder and potential complications of poorly managed neurogenic bladder.    Neurogenic Bowel: Slow transit constipation.  -Med management: Continue Metamucil as patient has previously been doing.  -Counseling: Counseled on use of mild stimulant for propulsion, recommend senna.    Endo: Vitamin D deficiency.  -Labs reviewed: Vitamin D 14 3/5/2021  -Reevaluate at next visit if patient is taking supplementation.    Follow up: 3 months    Total time spent was 42 minutes.  Included in this time is the time spent preparing for the visit including record review, my exam and evaluation, counseling and education regarding that which is aforementioned in the assessment and plan. Time was spent ordering the appropriate labs, tests, procedures, referrals, medications. Discussion involved the patient.    Please note that this dictation was created using voice recognition software. I have made every reasonable attempt to correct obvious errors but there may be errors of grammar and content that I may have overlooked prior to finalization of this note.    Dr. Jacki Gallegos DO, MS  Department of Physical Medicine & Rehabilitation  Neuro Rehabilitation Clinic  [facility]

## 2021-03-12 ENCOUNTER — TELEPHONE (OUTPATIENT)
Dept: NEUROLOGY | Facility: MEDICAL CENTER | Age: 36
End: 2021-03-12

## 2021-03-12 NOTE — TELEPHONE ENCOUNTER
"3/12/2021   Tsering from [facility] called to follow up with 's desisions on continuation of therapy. I let her know that in his note it says: \"I think the relapse occurred a bit early after immunotherapy initiation for us to consider this \"breakthrough\" disease or \"Gilenya failure.\"  We agreed to repeat MRI brain in ~2 months.  These images will serve as a new baseline.  If there are enhancing lesions on the follow-up scan, we will likely transition to Ocrevus.\" and she is to \"continue Gilenya for now\"     Tsering then let me know that a Gilenya PA now has to be started, pt was on a [employer] program. She shipped a 14-day supply that the pt received on 2/11/2021.   "

## 2021-03-14 ENCOUNTER — HOSPITAL ENCOUNTER (EMERGENCY)
Facility: MEDICAL CENTER | Age: 36
End: 2021-03-14
Attending: EMERGENCY MEDICINE
Payer: COMMERCIAL

## 2021-03-14 ENCOUNTER — APPOINTMENT (OUTPATIENT)
Dept: RADIOLOGY | Facility: MEDICAL CENTER | Age: 36
End: 2021-03-14
Attending: EMERGENCY MEDICINE
Payer: COMMERCIAL

## 2021-03-14 ENCOUNTER — NURSE TRIAGE (OUTPATIENT)
Dept: HEALTH INFORMATION MANAGEMENT | Facility: OTHER | Age: 36
End: 2021-03-14

## 2021-03-14 VITALS
OXYGEN SATURATION: 98 % | SYSTOLIC BLOOD PRESSURE: 102 MMHG | BODY MASS INDEX: 31.17 KG/M2 | TEMPERATURE: 97.1 F | HEART RATE: 78 BPM | HEIGHT: 63 IN | WEIGHT: 175.93 LBS | DIASTOLIC BLOOD PRESSURE: 67 MMHG | RESPIRATION RATE: 15 BRPM

## 2021-03-14 DIAGNOSIS — R20.0 LEFT SIDED NUMBNESS: ICD-10-CM

## 2021-03-14 DIAGNOSIS — M62.830 BACK SPASM: ICD-10-CM

## 2021-03-14 DIAGNOSIS — M62.838 MUSCLE SPASM OF LEFT LOWER EXTREMITY: ICD-10-CM

## 2021-03-14 DIAGNOSIS — N39.0 ACUTE UTI: ICD-10-CM

## 2021-03-14 DIAGNOSIS — G35 HISTORY OF MULTIPLE SCLEROSIS (HCC): ICD-10-CM

## 2021-03-14 LAB
ALBUMIN SERPL BCP-MCNC: 4 G/DL (ref 3.2–4.9)
ALBUMIN/GLOB SERPL: 1.4 G/DL
ALP SERPL-CCNC: 84 U/L (ref 30–99)
ALT SERPL-CCNC: 15 U/L (ref 2–50)
ANION GAP SERPL CALC-SCNC: 11 MMOL/L (ref 7–16)
APPEARANCE UR: CLEAR
AST SERPL-CCNC: 12 U/L (ref 12–45)
BACTERIA #/AREA URNS HPF: ABNORMAL /HPF
BASOPHILS # BLD AUTO: 0.3 % (ref 0–1.8)
BASOPHILS # BLD: 0.03 K/UL (ref 0–0.12)
BILIRUB SERPL-MCNC: 0.2 MG/DL (ref 0.1–1.5)
BILIRUB UR QL STRIP.AUTO: NEGATIVE
BUN SERPL-MCNC: 11 MG/DL (ref 8–22)
CALCIUM SERPL-MCNC: 9 MG/DL (ref 8.5–10.5)
CHLORIDE SERPL-SCNC: 106 MMOL/L (ref 96–112)
CO2 SERPL-SCNC: 21 MMOL/L (ref 20–33)
COLOR UR: YELLOW
CREAT SERPL-MCNC: 0.59 MG/DL (ref 0.5–1.4)
CRP SERPL HS-MCNC: 0.43 MG/DL (ref 0–0.75)
EOSINOPHIL # BLD AUTO: 0.48 K/UL (ref 0–0.51)
EOSINOPHIL NFR BLD: 4.6 % (ref 0–6.9)
EPI CELLS #/AREA URNS HPF: ABNORMAL /HPF
ERYTHROCYTE [DISTWIDTH] IN BLOOD BY AUTOMATED COUNT: 48.7 FL (ref 35.9–50)
ERYTHROCYTE [SEDIMENTATION RATE] IN BLOOD BY WESTERGREN METHOD: <1 MM/HOUR (ref 0–20)
GLOBULIN SER CALC-MCNC: 2.8 G/DL (ref 1.9–3.5)
GLUCOSE SERPL-MCNC: 86 MG/DL (ref 65–99)
GLUCOSE UR STRIP.AUTO-MCNC: NEGATIVE MG/DL
HCT VFR BLD AUTO: 47.5 % (ref 37–47)
HGB BLD-MCNC: 15.7 G/DL (ref 12–16)
IMM GRANULOCYTES # BLD AUTO: 0.14 K/UL (ref 0–0.11)
IMM GRANULOCYTES NFR BLD AUTO: 1.3 % (ref 0–0.9)
KETONES UR STRIP.AUTO-MCNC: NEGATIVE MG/DL
LEUKOCYTE ESTERASE UR QL STRIP.AUTO: NEGATIVE
LYMPHOCYTES # BLD AUTO: 0.79 K/UL (ref 1–4.8)
LYMPHOCYTES NFR BLD: 7.5 % (ref 22–41)
MCH RBC QN AUTO: 31.8 PG (ref 27–33)
MCHC RBC AUTO-ENTMCNC: 33.1 G/DL (ref 33.6–35)
MCV RBC AUTO: 96.2 FL (ref 81.4–97.8)
MICRO URNS: ABNORMAL
MONOCYTES # BLD AUTO: 1.2 K/UL (ref 0–0.85)
MONOCYTES NFR BLD AUTO: 11.4 % (ref 0–13.4)
NEUTROPHILS # BLD AUTO: 7.85 K/UL (ref 2–7.15)
NEUTROPHILS NFR BLD: 74.9 % (ref 44–72)
NITRITE UR QL STRIP.AUTO: POSITIVE
NRBC # BLD AUTO: 0 K/UL
NRBC BLD-RTO: 0 /100 WBC
PH UR STRIP.AUTO: 5.5 [PH] (ref 5–8)
PLATELET # BLD AUTO: 182 K/UL (ref 164–446)
PMV BLD AUTO: 10.8 FL (ref 9–12.9)
POTASSIUM SERPL-SCNC: 3.7 MMOL/L (ref 3.6–5.5)
PROT SERPL-MCNC: 6.8 G/DL (ref 6–8.2)
PROT UR QL STRIP: NEGATIVE MG/DL
RBC # BLD AUTO: 4.94 M/UL (ref 4.2–5.4)
RBC # URNS HPF: ABNORMAL /HPF
RBC UR QL AUTO: NEGATIVE
SODIUM SERPL-SCNC: 138 MMOL/L (ref 135–145)
SP GR UR STRIP.AUTO: >=1.03
UROBILINOGEN UR STRIP.AUTO-MCNC: 0.2 MG/DL
WBC # BLD AUTO: 10.5 K/UL (ref 4.8–10.8)
WBC #/AREA URNS HPF: ABNORMAL /HPF

## 2021-03-14 PROCEDURE — 80053 COMPREHEN METABOLIC PANEL: CPT

## 2021-03-14 PROCEDURE — 700102 HCHG RX REV CODE 250 W/ 637 OVERRIDE(OP): Performed by: EMERGENCY MEDICINE

## 2021-03-14 PROCEDURE — A9270 NON-COVERED ITEM OR SERVICE: HCPCS | Performed by: EMERGENCY MEDICINE

## 2021-03-14 PROCEDURE — 99285 EMERGENCY DEPT VISIT HI MDM: CPT

## 2021-03-14 PROCEDURE — 85025 COMPLETE CBC W/AUTO DIFF WBC: CPT

## 2021-03-14 PROCEDURE — 700111 HCHG RX REV CODE 636 W/ 250 OVERRIDE (IP): Performed by: EMERGENCY MEDICINE

## 2021-03-14 PROCEDURE — 93971 EXTREMITY STUDY: CPT | Mod: LT

## 2021-03-14 PROCEDURE — 85652 RBC SED RATE AUTOMATED: CPT

## 2021-03-14 PROCEDURE — 81001 URINALYSIS AUTO W/SCOPE: CPT

## 2021-03-14 PROCEDURE — 86140 C-REACTIVE PROTEIN: CPT

## 2021-03-14 PROCEDURE — 96374 THER/PROPH/DIAG INJ IV PUSH: CPT

## 2021-03-14 RX ORDER — CEFDINIR 300 MG/1
300 CAPSULE ORAL 2 TIMES DAILY
Qty: 10 CAPSULE | Refills: 0 | Status: SHIPPED | OUTPATIENT
Start: 2021-03-14 | End: 2021-03-19

## 2021-03-14 RX ORDER — HYDROMORPHONE HYDROCHLORIDE 1 MG/ML
1 INJECTION, SOLUTION INTRAMUSCULAR; INTRAVENOUS; SUBCUTANEOUS ONCE
Status: COMPLETED | OUTPATIENT
Start: 2021-03-14 | End: 2021-03-14

## 2021-03-14 RX ORDER — DIAZEPAM 2 MG/1
2 TABLET ORAL ONCE
Status: COMPLETED | OUTPATIENT
Start: 2021-03-14 | End: 2021-03-14

## 2021-03-14 RX ORDER — CEFDINIR 300 MG/1
300 CAPSULE ORAL ONCE
Status: COMPLETED | OUTPATIENT
Start: 2021-03-14 | End: 2021-03-14

## 2021-03-14 RX ORDER — METHOCARBAMOL 750 MG/1
750 TABLET, FILM COATED ORAL 4 TIMES DAILY
Qty: 20 TABLET | Refills: 0 | Status: SHIPPED | OUTPATIENT
Start: 2021-03-14 | End: 2021-09-13 | Stop reason: SDUPTHER

## 2021-03-14 RX ORDER — HYDROMORPHONE HYDROCHLORIDE 1 MG/ML
0.5 INJECTION, SOLUTION INTRAMUSCULAR; INTRAVENOUS; SUBCUTANEOUS ONCE
Status: COMPLETED | OUTPATIENT
Start: 2021-03-14 | End: 2021-03-14

## 2021-03-14 RX ADMIN — CEFDINIR 300 MG: 300 CAPSULE ORAL at 21:44

## 2021-03-14 RX ADMIN — DIAZEPAM 2 MG: 2 TABLET ORAL at 18:43

## 2021-03-14 RX ADMIN — HYDROMORPHONE HYDROCHLORIDE 0.5 MG: 1 INJECTION, SOLUTION INTRAMUSCULAR; INTRAVENOUS; SUBCUTANEOUS at 18:43

## 2021-03-14 ASSESSMENT — FIBROSIS 4 INDEX: FIB4 SCORE: 0.3

## 2021-03-14 NOTE — TELEPHONE ENCOUNTER
"Pt had a flare up of MS with Left side of body numb with left leg week 03/05 to 03/09. She is calling today with new onset of pain in left thigh and knee and entire body is shaking, as well as, exhaustion. She is also reporting numbness is coming back as well.      Instructed patient to go to ED.    Reason for Disposition  • Patient sounds very sick or weak to the triager    Additional Information  • Negative: Difficult to awaken or acting confused (e.g., disoriented, slurred speech)  • Negative: New neurologic deficit that is present NOW, sudden onset of ANY of the following: * Weakness of the face, arm, or leg on one side of the body * Numbness of the face, arm, or leg on one side of the body * Loss of speech or garbled speech  • Negative: Sounds like a life-threatening emergency to the triager  • Negative: Confusion, disorientation, or hallucinations is the main symptom  • Negative: Dizziness is the main symptom  • Negative: Followed a head injury within last 3 days  • Negative: Headache (with neurologic deficit)  • Negative: Unable to urinate (or only a few drops) and bladder feels very full  • Negative: Loss of control of bowel or bladder (i.e., incontinence) of new onset  • Negative: Back pain with numbness (loss of sensation) in groin or rectal area    Answer Assessment - Initial Assessment Questions  1. SYMPTOM: \"What is the main symptom you are concerned about?\" (e.g., weakness, numbness)      Left side of body with weakness and numbness.  Pain in left leg as sharp stabbing in the thigh and knee is aching.  Body is shaking since last night  2. ONSET: \"When did this start?\" (minutes, hours, days; while sleeping)      Left foot numb since yesterday  3. LAST NORMAL: \"When was the last time you were normal (no symptoms)?\"      No normalcy since a week ago.    4. PATTERN \"Does this come and go, or has it been constant since it started?\"  \"Is it present now?\"      no  5. CARDIAC SYMPTOMS: \"Have you had any of the " "following symptoms: chest pain, difficulty breathing, palpitations?\"      no  6. NEUROLOGIC SYMPTOMS: \"Have you had any of the following symptoms: headache, dizziness, vision loss, double vision, changes in speech, unsteady on your feet?\"      Unsteady on feet due to shaking and guarding of LEFT leg pain  7. OTHER SYMPTOMS: \"Do you have any other symptoms?\"      Shaking is new symptom  8. PREGNANCY: \"Is there any chance you are pregnant?\" \"When was your last menstrual period?\"      no    Protocols used: NEUROLOGIC DEFICIT-A-OH      "

## 2021-03-14 NOTE — ED TRIAGE NOTES
Ambulatory to triage with   Chief Complaint   Patient presents with   • Numbness     LLE   • Leg Pain     LLE     Was admitted on 3/5 for MS flair up due to LUE sided numbness. C/o re-occurrence and new numbness to LLE. Told to return to ED by MD. States symptoms started > 24 hrs ago. Limping. Also c/o neck pain and spasm.

## 2021-03-15 NOTE — ED PROVIDER NOTES
ED Provider Note    CHIEF COMPLAINT  Chief Complaint   Patient presents with   • Numbness     LLE   • Leg Pain     LLE        HPI    Primary care provider: Mack Bernard M.D.   History obtained from: Patient  History limited by: None     Rehana Meade is a 35 y.o. female who presents to the ED with brother-in-law complaining of pain in her left thigh with weakness that started around 130 this afternoon as well was pain in her spine from her neck all the way down to her lower back that started around 3:00 this afternoon.  There was no injury/trauma or inciting event.  She is feeling shaky and tired.  Patient reports history of MS with recent hospitalization for left-sided facial, upper extremity and lower extremity numbness that are still present.  She was started on a new MS medicine a week ago but her symptoms have not improved.  She also finished a course of steroid.  She denies headache, chest pain, abdominal pain, shortness of breath or difficulty breathing.  No recent fever/chills/congestion/sore throat/cough/nausea/vomiting/diarrhea.  She does have frequent urination and states that she has upcoming ultrasound of her kidneys.  She denies possibility of pregnancy with history of hysterectomy.  She has not noticed any rash.  No recent travels or known ill contacts.    REVIEW OF SYSTEMS  Please see HPI for pertinent positives/negatives.  All other systems reviewed and are negative.     PAST MEDICAL HISTORY  Past Medical History:   Diagnosis Date   • Seizure cerebral (HCC) 2/15/2018   • Anesthesia     woke up combative   • Anxiety associated with depression    • ASTHMA    • Drug-seeking behavior    • Migraine without aura, without mention of intractable migraine without mention of status migrainosus    • Other specified symptom associated with female genital organs    • Seizure (HCC)     Last seizure was 1/2019   • Stroke (HCC)    • Unspecified disorder of thyroid     cyst        SURGICAL  HISTORY  Past Surgical History:   Procedure Laterality Date   • VAGINAL HYSTERECTOMY TOTAL  1/27/2020    Procedure: HYSTERECTOMY, TOTAL, VAGINAL;  Surgeon: Julian Parker M.D.;  Location: SURGERY SAME DAY [facility];  Service: Gynecology   • CYSTOSCOPY  1/27/2020    Procedure: CYSTOSCOPY;  Surgeon: Julian Parker M.D.;  Location: SURGERY SAME DAY [facility];  Service: Gynecology   • TUBAL COAGULATION LAPAROSCOPIC BILATERAL  7/11/2014    Performed by Julian Parker M.D. at SURGERY SAME DAY [facility] ORS   • SEPTOPLASTY  10/28/2009    Performed by ANNETTE FORDE at SURGERY [facility]   • SOMNOPLASTY  10/28/2009    Performed by ANNETTE FORDE at SURGERY [facility] ORS   • NASAL POLYPECTOMY  10/28/2009    Performed by ANNETTE FORDE at SURGERY [facility]        SOCIAL HISTORY  Social History     Tobacco Use   • Smoking status: Never Smoker   • Smokeless tobacco: Never Used   Substance and Sexual Activity   • Alcohol use: Not Currently     Comment: rarely   • Drug use: No   • Sexual activity: Yes     Partners: Male        FAMILY HISTORY  No family history on file.     CURRENT MEDICATIONS  Home Medications     Reviewed by Bernarda Watters (Pharmacy Tech) on 03/14/21 at 2016  Med List Status: Complete   Medication Last Dose Status   AIMOVIG 70 MG/ML Solution Auto-injector 2/16/2021 Active   diazePAM (VALIUM) 5 MG Tab Not Taking Active   fingolimod (GILENYA) 0.5 MG Cap capsule 3/14/2021 Active   topiramate (TOPAMAX) 100 MG Tab 3/14/2021 Active   zonisamide (ZONEGRAN) 100 MG Cap 3/13/2021 Active                 ALLERGIES  Allergies   Allergen Reactions   • Amitriptyline Unspecified     Suicidal   • Clarithromycin Hives   • Sulfa Drugs Anaphylaxis   • Cantaloupe Itching   • Honey Dew Itching   • Latex Itching   • Lorazepam Unspecified     Hallucinations     • Metoclopramide Unspecified     Muscle spasms   • Penicillin G Potassium Vomiting   • Rizatriptan Unspecified     Tremors, confusion     •  "Sumatriptan Unspecified     \"Makes my head pins and needles\"   • Tape Rash   • Morphine Hives and Itching        PHYSICAL EXAM  VITAL SIGNS: /67   Pulse 78   Temp 36.2 °C (97.1 °F) (Temporal)   Resp 15   Ht 1.6 m (5' 3\")   Wt 79.8 kg (175 lb 14.8 oz)   LMP 11/16/2019   SpO2 98%   BMI 31.16 kg/m²  @CARMELO[[id number]::@     Pulse ox interpretation: 100% I interpret this pulse ox as normal     Constitutional: Well developed, well nourished, alert, nontoxic appearance    HENT: No external signs of trauma, normocephalic  Eyes: PERRL, conjunctiva without erythema, no discharge, no icterus    Neck: Soft and supple, trachea midline, no stridor, mild diffuse tenderness posteriorly, no LAD, no JVD, good ROM without apparent stiffness/restriction/discomfort  Cardiovascular: Regular rate and rhythm, no murmurs/rubs/gallops, strong distal pulses and good perfusion    Thorax & Lungs: No respiratory distress, CTAB   Abdomen: Soft, nontender, nondistended, no guarding, no rebound, normal BS    Back: Diffuse tenderness to palpation of entire spine  Extremities: No cyanosis, no edema, no gross deformity, good ROM, no tenderness, intact distal pulses with brisk cap refill    Skin: Warm, dry, no pallor/cyanosis, no rash noted    Lymphatic: No lymphadenopathy noted    Neuro: Alert and oriented to person, place, and time.  GCS 15.  CN II-XII grossly intact.  Normal speech.  Equal strength bilateral UE/LE.  Sensation intact to touch with patient reporting decreased sensation on left side of her body  Psychiatric: Cooperative, tearful and slightly anxious, normal judgement      DIAGNOSTIC STUDIES / PROCEDURES        LABS  All labs reviewed by me.     Results for orders placed or performed during the hospital encounter of 03/14/21   CBC WITH DIFFERENTIAL   Result Value Ref Range    WBC 10.5 4.8 - 10.8 K/uL    RBC 4.94 4.20 - 5.40 M/uL    Hemoglobin 15.7 12.0 - 16.0 g/dL    Hematocrit 47.5 (H) 37.0 - 47.0 %    MCV 96.2 81.4 - 97.8 fL "    MCH 31.8 27.0 - 33.0 pg    MCHC 33.1 (L) 33.6 - 35.0 g/dL    RDW 48.7 35.9 - 50.0 fL    Platelet Count 182 164 - 446 K/uL    MPV 10.8 9.0 - 12.9 fL    Neutrophils-Polys 74.90 (H) 44.00 - 72.00 %    Lymphocytes 7.50 (L) 22.00 - 41.00 %    Monocytes 11.40 0.00 - 13.40 %    Eosinophils 4.60 0.00 - 6.90 %    Basophils 0.30 0.00 - 1.80 %    Immature Granulocytes 1.30 (H) 0.00 - 0.90 %    Nucleated RBC 0.00 /100 WBC    Neutrophils (Absolute) 7.85 (H) 2.00 - 7.15 K/uL    Lymphs (Absolute) 0.79 (L) 1.00 - 4.80 K/uL    Monos (Absolute) 1.20 (H) 0.00 - 0.85 K/uL    Eos (Absolute) 0.48 0.00 - 0.51 K/uL    Baso (Absolute) 0.03 0.00 - 0.12 K/uL    Immature Granulocytes (abs) 0.14 (H) 0.00 - 0.11 K/uL    NRBC (Absolute) 0.00 K/uL   COMP METABOLIC PANEL   Result Value Ref Range    Sodium 138 135 - 145 mmol/L    Potassium 3.7 3.6 - 5.5 mmol/L    Chloride 106 96 - 112 mmol/L    Co2 21 20 - 33 mmol/L    Anion Gap 11.0 7.0 - 16.0    Glucose 86 65 - 99 mg/dL    Bun 11 8 - 22 mg/dL    Creatinine 0.59 0.50 - 1.40 mg/dL    Calcium 9.0 8.5 - 10.5 mg/dL    AST(SGOT) 12 12 - 45 U/L    ALT(SGPT) 15 2 - 50 U/L    Alkaline Phosphatase 84 30 - 99 U/L    Total Bilirubin 0.2 0.1 - 1.5 mg/dL    Albumin 4.0 3.2 - 4.9 g/dL    Total Protein 6.8 6.0 - 8.2 g/dL    Globulin 2.8 1.9 - 3.5 g/dL    A-G Ratio 1.4 g/dL   CRP QUANTITIVE (NON-CARDIAC)   Result Value Ref Range    Stat C-Reactive Protein 0.43 0.00 - 0.75 mg/dL   Sed Rate   Result Value Ref Range    Sed Rate Westergren <1 0 - 20 mm/hour   URINALYSIS (UA)    Specimen: Urine, Clean Catch   Result Value Ref Range    Color Yellow     Character Clear     Specific Gravity >=1.030 <1.035    Ph 5.5 5.0 - 8.0    Glucose Negative Negative mg/dL    Ketones Negative Negative mg/dL    Protein Negative Negative mg/dL    Bilirubin Negative Negative    Urobilinogen, Urine 0.2 Negative    Nitrite Positive (A) Negative    Leukocyte Esterase Negative Negative    Occult Blood Negative Negative    Micro Urine Req  Microscopic    ESTIMATED GFR   Result Value Ref Range    GFR If African American >60 >60 mL/min/1.73 m 2    GFR If Non African American >60 >60 mL/min/1.73 m 2   URINE MICROSCOPIC (W/UA)   Result Value Ref Range    WBC 5-10 (A) /hpf    RBC 0-2 /hpf    Bacteria Moderate (A) None /hpf    Epithelial Cells Few /hpf        RADIOLOGY  The radiologist's interpretation of all radiological studies have been reviewed by me.     US-EXTREMITY VENOUS LOWER UNILAT LEFT   Final Result             COURSE & MEDICAL DECISION MAKING  Nursing notes, VS, PMSFHx reviewed in chart.     Review of past medical records shows the patient called the nurse triage line and was told to come to the ED for her symptoms.  She had outpatient visit with neurology on March 10, 2021 and outpatient visit with physiatry on March 11, 2021 regarding her multiple sclerosis.  She was last admitted to this hospital March 5, 2021 for headache and left-sided numbness and discharged on March 9, 2021.  MRI brain on March 5, 2021 with the following findings:    1.  Numerous supra and infratentorial enhancing and nonenhancing lesions, in keeping with multiple sclerosis  2.  Mild Chiari I malformation. No hydrocephalus.    MRI cervical spine on March 5, 2021 with the following findings:    Focal enhancing cord lesion on the RIGHT at C5-C6, in keeping with the provided clinical history of multiple sclerosis    MRI thoracic spine on March 5, 2021 with the following findings:    No significant abnormality is seen in the MR scan of the thoracic spine. No evidence of demyelinating disease in the thoracic cord.    Patient also had CT head with perfusion study along with CTA of head and neck on March 5, 2021 without evidence for acute abnormality.        Differential diagnoses considered include but are not limited to: MS, strain/sprain, herniated disc, epidural abscess/mass, osteomyelitis, spinal hematoma, radiculopathy, sciatica, KS/renal colic, UTI/pyelo       2035: D/W  Dr. Vigil, neurologist.  He recommends outpatient follow-up with patient's neurologist.  Patient can be discharged with Robaxin.      History and physical exam as above.  Ultrasound of left lower extremity without evidence for DVT.  Labs are fairly unremarkable except for findings of possible UTI.  Patient has had some urinary difficulties and will be started on Omnicef for UTI.  She was treated with Dilaudid and Valium for her pain and spasms with improvement.  I discussed patient's findings with Dr. Vigil with above recommendations.  I discussed the findings and plan with the patient.  She reports feeling better and is noted to be in no acute distress and nontoxic in appearance.  Low clinical suspicion at this time for sepsis, CVA, hematoma/abscess, osteomyelitis/discitis, DVT/vascular occlusion given the history/exam/findings.  She will be prescribed Robaxin and Omnicef.  She was advised to contact her neurologist tomorrow for follow-up and given return to ED precautions.  Patient verbalized understanding and agreed with plan of care with no further questions or concerns.      The patient is referred to a primary physician for blood pressure management, diabetic screening, and for all other preventative health concerns.       FINAL IMPRESSION  1. Back spasm Active   2. Muscle spasm of left lower extremity Active   3. Left sided numbness Active   4. History of multiple sclerosis (HCC) Active   5. Acute UTI Active          DISPOSITION  Patient will be discharged home in stable condition.       FOLLOW UP  Mack Bernard M.D.  [address] [address]  [phone]    Call in 1 day      Rajeev Aggarwal M.D.  [address] [id number] [phone]    Call in 1 day      [facility], Emergency Dept  [address] [facility] [id number] [phone]    If symptoms worsen         OUTPATIENT MEDICATIONS  Discharge Medication List as of 3/14/2021  9:53 PM       START taking these medications    Details   cefdinir (OMNICEF) 300 MG Cap Take 1 capsule by mouth 2 times a day for 5 days., Disp-10 capsule, R-0, Print Rx Paper      methocarbamol (ROBAXIN) 750 MG Tab Take 1 tablet by mouth 4 times a day., Disp-20 tablet, R-0, Print Rx Paper                Electronically signed by: Nabil Giles D.O., 3/14/2021 5:54 PM      Portions of this record were made with voice recognition software.  Despite my review, spelling/grammar/context errors may still remain.  Interpretation of this chart should be taken in this context.

## 2021-03-15 NOTE — ED NOTES
"Pt discharged home with spouse. Pt is A/O x 4. Patient escorted to lobby with . IV discontinued and gauze placed, pt in possession of belongings. Pt provided discharge education and information pertaining to medications and follow up appointments. Discussed signs and symptoms to return to the ER, patient verbalized understanding. Pt received copy of discharge instructions and verbalized understanding.     /67   Pulse 78   Temp 36.2 °C (97.1 °F) (Temporal)   Resp 15   Ht 1.6 m (5' 3\")   Wt 79.8 kg (175 lb 14.8 oz)   LMP 11/16/2019   SpO2 98%   BMI 31.16 kg/m²     "

## 2021-03-15 NOTE — ED NOTES
Med rec updated and complete. Allergies reviewed. Pt denies antibiotic use in last 14 days.    Family will bring in the pts AIMOVIG and GILENYA   Aimovig shot is due on 03/15/21    [place] [facility]  [phone]

## 2021-03-15 NOTE — DISCHARGE PLANNING
"Bedside assessment completed with information obtained from patient. Pt lives in a 2 story house with her  and 4 children (6,8,10,12). Pt continues to work and drive when she can, otherwise her /family she can rely on for transportation. Pt has applied for a reynaldo through the MS society to remodel house and have her bedrooom/bathroom on the ground floor. Currently she has a walker that she uses PRN but has plans to purchase a \"purple walker\" with seat sometime this week. Pt uses the [facility] pharmacy in [facility]. Either pt's  Miguel ([phone]) or brother in law Leif Rushing ([phone]) will be available to pick pt up on DC from hospital.     Care Transition Team Assessment    Information Source  Orientation : Oriented x 4  Information Given By: Patient  Who is responsible for making decisions for patient? : Patient         Elopement Risk  Legal Hold: No  Ambulatory or Self Mobile in Wheelchair: No-Not an Elopement Risk    Interdisciplinary Discharge Planning  Does Admitting Nurse Feel This Could be a Complex Discharge?: No  Primary Care Physician: Mack Bernard MD  Lives with - Patient's Self Care Capacity: Spouse, Child Less than 18 Years of Age  Support Systems: Spouse / Significant Other, Children  Housing / Facility: [address] [facility]  Do You Take your Prescribed Medications Regularly: Yes([facility])  Able to Return to Previous ADL's: Yes  Mobility Issues: Yes(uses walker PRN)  Prior Services: None  Patient Prefers to be Discharged to:: Home  Assistance Needed: No  Durable Medical Equipment: Walker    Discharge Preparedness  What is your plan after discharge?: Uncertain - pending medical team collaboration  What are your discharge supports?: Child, Spouse  Prior Functional Level: Ambulatory, Drives Self, Independent with Activities of Daily Living, Uses Walker, Independent with Medication Management  Difficulity with ADLs: Walking(walker PRN)  Difficulity with IADLs: Driving, " cooking, laundry, shopping ( or brother in law help)    Functional Assesment  Prior Functional Level: Ambulatory, Drives Self, Independent with Activities of Daily Living, Uses Walker, Independent with Medication Management    Finances  Financial Barriers to Discharge: No  Prescription Coverage: Yes         Values / Beliefs / Concerns  Values / Beliefs Concerns : No    Advance Directive  Advance Directive?: None  Advance Directive offered?: AD Booklet refused    Domestic Abuse  Have you ever been the victim of abuse or violence?: No         Discharge Risks or Barriers  Discharge risks or barriers?: No  Patient risk factors: (ongoing MS management)    Anticipated Discharge Information  Discharge Disposition: Still a Patient (30)  Discharge Address: [address] [place] [address] [place]  Discharge Contact Phone Number: Sonia () [phone] or Leif Rushing [phone]

## 2021-03-15 NOTE — ED NOTES
Rounded on patient, VS re-taken. Patient stating pain dulled down at this time. Patient states to feel shaky. Patient states she still has numbness from last time on the L side of her face, L arm, and L leg was new today. Patient states when she walked to the bathroom, her R leg is starting to feel shaky as well.

## 2021-03-15 NOTE — ED NOTES
Patient ambulatory to the bathroom with a SBA, patient limps when she walks. UA collected and sent.

## 2021-03-16 ENCOUNTER — TELEPHONE (OUTPATIENT)
Dept: NEUROLOGY | Facility: MEDICAL CENTER | Age: 36
End: 2021-03-16

## 2021-03-16 NOTE — TELEPHONE ENCOUNTER
I spoke to Rehana.    On Saturday she experienced recurrence of left lower extremity numbness, and over the weekend she developed new pain localizing to the spine (cervcial through thoracic).  She went to the ED and was diagnosed with UTI.  She is taking antibiotics.    Some of Rehana's symptoms may be due to pseudo-exacerbation in the setting of UTI.  I will speak with Rehana again tomorrow in order to gauge her course.    Rajeev Aggarwal M.D.

## 2021-03-18 ENCOUNTER — HOSPITAL ENCOUNTER (OUTPATIENT)
Dept: RADIOLOGY | Facility: MEDICAL CENTER | Age: 36
End: 2021-03-18
Attending: PHYSICAL MEDICINE & REHABILITATION
Payer: COMMERCIAL

## 2021-03-18 DIAGNOSIS — N31.9 NEUROGENIC BLADDER: ICD-10-CM

## 2021-03-18 PROCEDURE — 76775 US EXAM ABDO BACK WALL LIM: CPT

## 2021-03-19 DIAGNOSIS — D17.71 ANGIOMYOLIPOMA OF KIDNEY: ICD-10-CM

## 2021-03-29 ENCOUNTER — HOSPITAL ENCOUNTER (OUTPATIENT)
Dept: RADIOLOGY | Facility: MEDICAL CENTER | Age: 36
End: 2021-03-29
Attending: NURSE PRACTITIONER
Payer: COMMERCIAL

## 2021-03-29 DIAGNOSIS — E04.1 NONTOXIC UNINODULAR GOITER: ICD-10-CM

## 2021-03-29 PROCEDURE — 76536 US EXAM OF HEAD AND NECK: CPT

## 2021-04-14 ENCOUNTER — TELEPHONE (OUTPATIENT)
Dept: NEUROLOGY | Facility: MEDICAL CENTER | Age: 36
End: 2021-04-14

## 2021-04-14 DIAGNOSIS — G35 MS (MULTIPLE SCLEROSIS) (HCC): Primary | ICD-10-CM

## 2021-04-14 RX ORDER — 0.9 % SODIUM CHLORIDE 0.9 %
VIAL (ML) INJECTION PRN
Status: CANCELLED | OUTPATIENT
Start: 2021-04-15

## 2021-04-14 RX ORDER — 0.9 % SODIUM CHLORIDE 0.9 %
3 VIAL (ML) INJECTION PRN
Status: CANCELLED | OUTPATIENT
Start: 2021-04-15

## 2021-04-14 RX ORDER — SODIUM CHLORIDE 9 MG/ML
INJECTION, SOLUTION INTRAVENOUS CONTINUOUS
Status: CANCELLED | OUTPATIENT
Start: 2021-04-15

## 2021-04-14 RX ORDER — HEPARIN SODIUM (PORCINE) LOCK FLUSH IV SOLN 100 UNIT/ML 100 UNIT/ML
500 SOLUTION INTRAVENOUS PRN
Status: CANCELLED | OUTPATIENT
Start: 2021-04-15

## 2021-04-14 RX ORDER — 0.9 % SODIUM CHLORIDE 0.9 %
10 VIAL (ML) INJECTION PRN
Status: CANCELLED | OUTPATIENT
Start: 2021-04-15

## 2021-04-14 NOTE — TELEPHONE ENCOUNTER
[phone]  Pt called states she has numbness on right side, this is a new symptom. Since last Friday.  Also having memory issues, trouble remembering things like her husbands name since Monday last week.   Please advise pt was not happy, she stated she had already left a message before and she has not had response back. Please advise

## 2021-04-17 ENCOUNTER — OUTPATIENT INFUSION SERVICES (OUTPATIENT)
Dept: ONCOLOGY | Facility: MEDICAL CENTER | Age: 36
End: 2021-04-17
Attending: PSYCHIATRY & NEUROLOGY
Payer: COMMERCIAL

## 2021-04-17 VITALS
TEMPERATURE: 97.2 F | WEIGHT: 173.94 LBS | DIASTOLIC BLOOD PRESSURE: 71 MMHG | HEIGHT: 64 IN | OXYGEN SATURATION: 100 % | HEART RATE: 83 BPM | BODY MASS INDEX: 29.7 KG/M2 | RESPIRATION RATE: 19 BRPM | SYSTOLIC BLOOD PRESSURE: 111 MMHG

## 2021-04-17 DIAGNOSIS — G35 MS (MULTIPLE SCLEROSIS) (HCC): ICD-10-CM

## 2021-04-17 PROCEDURE — 96365 THER/PROPH/DIAG IV INF INIT: CPT

## 2021-04-17 PROCEDURE — 700111 HCHG RX REV CODE 636 W/ 250 OVERRIDE (IP): Performed by: PSYCHIATRY & NEUROLOGY

## 2021-04-17 PROCEDURE — 700105 HCHG RX REV CODE 258: Performed by: PSYCHIATRY & NEUROLOGY

## 2021-04-17 RX ORDER — 0.9 % SODIUM CHLORIDE 0.9 %
3 VIAL (ML) INJECTION PRN
Status: CANCELLED | OUTPATIENT
Start: 2021-04-18

## 2021-04-17 RX ORDER — 0.9 % SODIUM CHLORIDE 0.9 %
10 VIAL (ML) INJECTION PRN
Status: CANCELLED | OUTPATIENT
Start: 2021-04-18

## 2021-04-17 RX ORDER — HEPARIN SODIUM (PORCINE) LOCK FLUSH IV SOLN 100 UNIT/ML 100 UNIT/ML
500 SOLUTION INTRAVENOUS PRN
Status: CANCELLED | OUTPATIENT
Start: 2021-04-18

## 2021-04-17 RX ORDER — SODIUM CHLORIDE 9 MG/ML
INJECTION, SOLUTION INTRAVENOUS CONTINUOUS
Status: CANCELLED | OUTPATIENT
Start: 2021-04-18

## 2021-04-17 RX ORDER — 0.9 % SODIUM CHLORIDE 0.9 %
VIAL (ML) INJECTION PRN
Status: CANCELLED | OUTPATIENT
Start: 2021-04-18

## 2021-04-17 RX ADMIN — SODIUM CHLORIDE 1000 MG: 9 INJECTION, SOLUTION INTRAVENOUS at 14:03

## 2021-04-17 ASSESSMENT — FIBROSIS 4 INDEX: FIB4 SCORE: 0.6

## 2021-04-17 NOTE — PROGRESS NOTES
Patient presents for day 1/5 IV Solu-Medrol infusion. Patient anxious about treatment, emotional support provided. PIV established, flushes well with brisk blood return. Solu-Medrol infused over one hour with no new patient complaints. PIV flushed per protocol and left in place. Appointment tomorrow confirmed. Patient released in no acute distress.

## 2021-04-18 ENCOUNTER — OUTPATIENT INFUSION SERVICES (OUTPATIENT)
Dept: ONCOLOGY | Facility: MEDICAL CENTER | Age: 36
End: 2021-04-18
Attending: PSYCHIATRY & NEUROLOGY
Payer: COMMERCIAL

## 2021-04-18 VITALS
TEMPERATURE: 98.4 F | SYSTOLIC BLOOD PRESSURE: 112 MMHG | RESPIRATION RATE: 18 BRPM | WEIGHT: 173.94 LBS | OXYGEN SATURATION: 100 % | DIASTOLIC BLOOD PRESSURE: 78 MMHG | BODY MASS INDEX: 29.7 KG/M2 | HEIGHT: 64 IN | HEART RATE: 85 BPM

## 2021-04-18 DIAGNOSIS — G35 MS (MULTIPLE SCLEROSIS) (HCC): ICD-10-CM

## 2021-04-18 PROCEDURE — 96365 THER/PROPH/DIAG IV INF INIT: CPT

## 2021-04-18 PROCEDURE — 700105 HCHG RX REV CODE 258: Performed by: PSYCHIATRY & NEUROLOGY

## 2021-04-18 PROCEDURE — 700111 HCHG RX REV CODE 636 W/ 250 OVERRIDE (IP): Performed by: PSYCHIATRY & NEUROLOGY

## 2021-04-18 RX ORDER — SODIUM CHLORIDE 9 MG/ML
INJECTION, SOLUTION INTRAVENOUS CONTINUOUS
Status: CANCELLED | OUTPATIENT
Start: 2021-04-19

## 2021-04-18 RX ORDER — 0.9 % SODIUM CHLORIDE 0.9 %
3 VIAL (ML) INJECTION PRN
Status: CANCELLED | OUTPATIENT
Start: 2021-04-19

## 2021-04-18 RX ORDER — 0.9 % SODIUM CHLORIDE 0.9 %
10 VIAL (ML) INJECTION PRN
Status: CANCELLED | OUTPATIENT
Start: 2021-04-19

## 2021-04-18 RX ORDER — 0.9 % SODIUM CHLORIDE 0.9 %
VIAL (ML) INJECTION PRN
Status: CANCELLED | OUTPATIENT
Start: 2021-04-19

## 2021-04-18 RX ORDER — HEPARIN SODIUM (PORCINE) LOCK FLUSH IV SOLN 100 UNIT/ML 100 UNIT/ML
500 SOLUTION INTRAVENOUS PRN
Status: CANCELLED | OUTPATIENT
Start: 2021-04-19

## 2021-04-18 RX ADMIN — SODIUM CHLORIDE 1000 MG: 9 INJECTION, SOLUTION INTRAVENOUS at 13:48

## 2021-04-18 ASSESSMENT — FIBROSIS 4 INDEX: FIB4 SCORE: 0.6

## 2021-04-18 NOTE — PROGRESS NOTES
Patient arrived ambulatory to [facility] for #2/5 Solumedrol.  She states she was not able to sleep last night and has a headache today.  She denies any other acute changes.  PIV in place, flushes well, patient denies any discomfort.  Solumedrol infused, she tolerated well.  PIV flushed and left in place for tomorrow.  Confirmed appointment time and she ambulated out of clinic in no apparent distress.

## 2021-04-19 ENCOUNTER — OUTPATIENT INFUSION SERVICES (OUTPATIENT)
Dept: ONCOLOGY | Facility: MEDICAL CENTER | Age: 36
End: 2021-04-19
Attending: PSYCHIATRY & NEUROLOGY
Payer: COMMERCIAL

## 2021-04-19 VITALS
OXYGEN SATURATION: 100 % | TEMPERATURE: 97.8 F | HEART RATE: 86 BPM | SYSTOLIC BLOOD PRESSURE: 119 MMHG | BODY MASS INDEX: 30.27 KG/M2 | DIASTOLIC BLOOD PRESSURE: 73 MMHG | HEIGHT: 63 IN | WEIGHT: 170.86 LBS | RESPIRATION RATE: 18 BRPM

## 2021-04-19 DIAGNOSIS — G35 MS (MULTIPLE SCLEROSIS) (HCC): ICD-10-CM

## 2021-04-19 PROCEDURE — 700105 HCHG RX REV CODE 258: Performed by: PSYCHIATRY & NEUROLOGY

## 2021-04-19 PROCEDURE — 700111 HCHG RX REV CODE 636 W/ 250 OVERRIDE (IP): Performed by: PSYCHIATRY & NEUROLOGY

## 2021-04-19 PROCEDURE — 96365 THER/PROPH/DIAG IV INF INIT: CPT

## 2021-04-19 RX ORDER — HEPARIN SODIUM (PORCINE) LOCK FLUSH IV SOLN 100 UNIT/ML 100 UNIT/ML
500 SOLUTION INTRAVENOUS PRN
Status: CANCELLED | OUTPATIENT
Start: 2021-04-20

## 2021-04-19 RX ORDER — 0.9 % SODIUM CHLORIDE 0.9 %
10 VIAL (ML) INJECTION PRN
Status: CANCELLED | OUTPATIENT
Start: 2021-04-20

## 2021-04-19 RX ORDER — SODIUM CHLORIDE 9 MG/ML
INJECTION, SOLUTION INTRAVENOUS CONTINUOUS
Status: CANCELLED | OUTPATIENT
Start: 2021-04-20

## 2021-04-19 RX ORDER — 0.9 % SODIUM CHLORIDE 0.9 %
3 VIAL (ML) INJECTION PRN
Status: CANCELLED | OUTPATIENT
Start: 2021-04-20

## 2021-04-19 RX ORDER — 0.9 % SODIUM CHLORIDE 0.9 %
VIAL (ML) INJECTION PRN
Status: CANCELLED | OUTPATIENT
Start: 2021-04-20

## 2021-04-19 RX ADMIN — SODIUM CHLORIDE 1000 MG: 9 INJECTION, SOLUTION INTRAVENOUS at 12:09

## 2021-04-19 ASSESSMENT — FIBROSIS 4 INDEX: FIB4 SCORE: 0.6

## 2021-04-19 NOTE — PROGRESS NOTES
Pt arrived ambulatory to IS for day 3/5 of Solumedrol for MS flare.  POc discussed. LFA PIV in place, infiltrated upon assessment and flushing.  New PIV started to LFA.  Solumedrol infused as ordered without complication.  Line flushed and left in place for return use.  Next appointment confirmed.  Pt discharged from IS in NAD under self care.

## 2021-04-20 ENCOUNTER — OUTPATIENT INFUSION SERVICES (OUTPATIENT)
Dept: ONCOLOGY | Facility: MEDICAL CENTER | Age: 36
End: 2021-04-20
Attending: PSYCHIATRY & NEUROLOGY
Payer: COMMERCIAL

## 2021-04-20 ENCOUNTER — TELEPHONE (OUTPATIENT)
Dept: NEUROLOGY | Facility: MEDICAL CENTER | Age: 36
End: 2021-04-20

## 2021-04-20 VITALS
OXYGEN SATURATION: 100 % | HEART RATE: 79 BPM | BODY MASS INDEX: 30.39 KG/M2 | HEIGHT: 63 IN | WEIGHT: 171.52 LBS | RESPIRATION RATE: 18 BRPM | SYSTOLIC BLOOD PRESSURE: 126 MMHG | DIASTOLIC BLOOD PRESSURE: 83 MMHG | TEMPERATURE: 97.6 F

## 2021-04-20 DIAGNOSIS — G35 MS (MULTIPLE SCLEROSIS) (HCC): ICD-10-CM

## 2021-04-20 PROCEDURE — 700111 HCHG RX REV CODE 636 W/ 250 OVERRIDE (IP): Performed by: PSYCHIATRY & NEUROLOGY

## 2021-04-20 PROCEDURE — 700105 HCHG RX REV CODE 258: Performed by: PSYCHIATRY & NEUROLOGY

## 2021-04-20 PROCEDURE — 96365 THER/PROPH/DIAG IV INF INIT: CPT

## 2021-04-20 RX ORDER — 0.9 % SODIUM CHLORIDE 0.9 %
3 VIAL (ML) INJECTION PRN
Status: CANCELLED | OUTPATIENT
Start: 2021-04-21

## 2021-04-20 RX ORDER — HEPARIN SODIUM (PORCINE) LOCK FLUSH IV SOLN 100 UNIT/ML 100 UNIT/ML
500 SOLUTION INTRAVENOUS PRN
Status: CANCELLED | OUTPATIENT
Start: 2021-04-21

## 2021-04-20 RX ORDER — SODIUM CHLORIDE 9 MG/ML
INJECTION, SOLUTION INTRAVENOUS CONTINUOUS
Status: CANCELLED | OUTPATIENT
Start: 2021-04-21

## 2021-04-20 RX ORDER — 0.9 % SODIUM CHLORIDE 0.9 %
VIAL (ML) INJECTION PRN
Status: CANCELLED | OUTPATIENT
Start: 2021-04-21

## 2021-04-20 RX ORDER — 0.9 % SODIUM CHLORIDE 0.9 %
10 VIAL (ML) INJECTION PRN
Status: CANCELLED | OUTPATIENT
Start: 2021-04-21

## 2021-04-20 RX ADMIN — SODIUM CHLORIDE 1000 MG: 9 INJECTION, SOLUTION INTRAVENOUS at 15:48

## 2021-04-20 ASSESSMENT — FIBROSIS 4 INDEX: FIB4 SCORE: 0.6

## 2021-04-20 NOTE — PROGRESS NOTES
Pt ambulatory to [facility] for D4 of 5 of solu-medrol for MS.  Pt w/ no s/s of infection, pt has no complaints at this time.  Pt has PIV in LFA, no blood return noted, flushes easily w/ no edema nor erythema.  Solu-medrol infused over 1 hour w/ no AE.  PIV w/ no blood return post-infusion, flushed w/ no edema nor erythema, PIV SL, wrapped in gauze and protective sleeve for tomorrow's appt.  Pt left on foot in care of self in NAD.

## 2021-04-21 ENCOUNTER — OUTPATIENT INFUSION SERVICES (OUTPATIENT)
Dept: ONCOLOGY | Facility: MEDICAL CENTER | Age: 36
End: 2021-04-21
Attending: PSYCHIATRY & NEUROLOGY
Payer: COMMERCIAL

## 2021-04-21 VITALS
HEART RATE: 95 BPM | TEMPERATURE: 99.2 F | HEIGHT: 63 IN | SYSTOLIC BLOOD PRESSURE: 114 MMHG | RESPIRATION RATE: 18 BRPM | WEIGHT: 171.08 LBS | DIASTOLIC BLOOD PRESSURE: 74 MMHG | OXYGEN SATURATION: 100 % | BODY MASS INDEX: 30.31 KG/M2

## 2021-04-21 DIAGNOSIS — G35 MS (MULTIPLE SCLEROSIS) (HCC): ICD-10-CM

## 2021-04-21 PROCEDURE — 96365 THER/PROPH/DIAG IV INF INIT: CPT

## 2021-04-21 PROCEDURE — 700105 HCHG RX REV CODE 258: Performed by: PSYCHIATRY & NEUROLOGY

## 2021-04-21 PROCEDURE — 700111 HCHG RX REV CODE 636 W/ 250 OVERRIDE (IP): Performed by: PSYCHIATRY & NEUROLOGY

## 2021-04-21 RX ORDER — 0.9 % SODIUM CHLORIDE 0.9 %
10 VIAL (ML) INJECTION PRN
Status: CANCELLED | OUTPATIENT
Start: 2021-04-21

## 2021-04-21 RX ORDER — SODIUM CHLORIDE 9 MG/ML
INJECTION, SOLUTION INTRAVENOUS CONTINUOUS
Status: CANCELLED | OUTPATIENT
Start: 2021-04-21

## 2021-04-21 RX ORDER — 0.9 % SODIUM CHLORIDE 0.9 %
3 VIAL (ML) INJECTION PRN
Status: CANCELLED | OUTPATIENT
Start: 2021-04-21

## 2021-04-21 RX ORDER — SODIUM CHLORIDE 9 MG/ML
INJECTION, SOLUTION INTRAVENOUS CONTINUOUS
Status: DISCONTINUED | OUTPATIENT
Start: 2021-04-21 | End: 2021-04-21

## 2021-04-21 RX ORDER — 0.9 % SODIUM CHLORIDE 0.9 %
VIAL (ML) INJECTION PRN
Status: CANCELLED | OUTPATIENT
Start: 2021-04-21

## 2021-04-21 RX ORDER — HEPARIN SODIUM (PORCINE) LOCK FLUSH IV SOLN 100 UNIT/ML 100 UNIT/ML
500 SOLUTION INTRAVENOUS PRN
Status: CANCELLED | OUTPATIENT
Start: 2021-04-21

## 2021-04-21 RX ADMIN — SODIUM CHLORIDE: 9 INJECTION, SOLUTION INTRAVENOUS at 16:54

## 2021-04-21 RX ADMIN — SODIUM CHLORIDE 1000 MG: 9 INJECTION, SOLUTION INTRAVENOUS at 16:54

## 2021-04-21 ASSESSMENT — FIBROSIS 4 INDEX: FIB4 SCORE: 0.6

## 2021-04-22 ENCOUNTER — TELEPHONE (OUTPATIENT)
Dept: NEUROLOGY | Facility: MEDICAL CENTER | Age: 36
End: 2021-04-22

## 2021-04-22 NOTE — PROGRESS NOTES
Patient presented to [facility] for day #5 of 5 infusion. Denied current illness/infection. PIV placed at previous appointment, flushed easily, boris slightly.  Pt received Methylprednisolone  over 60 minutes. Tolerated infusion well. PIV flushed with NS, then removed. Gauze and coban dressing to site. Pt left [facility] in NAD.

## 2021-04-22 NOTE — TELEPHONE ENCOUNTER
Pt called and left a detailed message stating Dr Aggarwal is switching Gilenya to kesimpta and she is asking for instruction in how to wean off the Gilenya from Dr Aggarwal.

## 2021-04-24 NOTE — TELEPHONE ENCOUNTER
I spoke to Rehana.    There are no well-established guidelines regarding transitioning from Gilenya to a medication like Kesimpta.  I recommended she stop Gilenya, wait 1 week, and then begin Kesimpta.  She will dose the drug along with a bit of Benadryl and acetaminophen before bed.    Rajeev Aggarwal M.D.

## 2021-04-25 ENCOUNTER — HOSPITAL ENCOUNTER (OUTPATIENT)
Dept: RADIOLOGY | Facility: MEDICAL CENTER | Age: 36
End: 2021-04-25
Attending: PHYSICIAN ASSISTANT
Payer: COMMERCIAL

## 2021-04-25 DIAGNOSIS — R10.9 FLANK PAIN: ICD-10-CM

## 2021-04-25 PROCEDURE — 74178 CT ABD&PLV WO CNTR FLWD CNTR: CPT

## 2021-04-25 PROCEDURE — 700117 HCHG RX CONTRAST REV CODE 255: Performed by: PHYSICIAN ASSISTANT

## 2021-04-25 RX ADMIN — IOHEXOL 100 ML: 350 INJECTION, SOLUTION INTRAVENOUS at 09:09

## 2021-04-27 ENCOUNTER — APPOINTMENT (OUTPATIENT)
Dept: NEPHROLOGY | Facility: MEDICAL CENTER | Age: 36
End: 2021-04-27
Payer: COMMERCIAL

## 2021-05-08 ENCOUNTER — HOSPITAL ENCOUNTER (OUTPATIENT)
Dept: RADIOLOGY | Facility: MEDICAL CENTER | Age: 36
End: 2021-05-08
Attending: PSYCHIATRY & NEUROLOGY
Payer: COMMERCIAL

## 2021-05-08 DIAGNOSIS — G35 MULTIPLE SCLEROSIS (HCC): ICD-10-CM

## 2021-05-08 PROCEDURE — 700117 HCHG RX CONTRAST REV CODE 255: Performed by: PSYCHIATRY & NEUROLOGY

## 2021-05-08 PROCEDURE — A9576 INJ PROHANCE MULTIPACK: HCPCS | Performed by: PSYCHIATRY & NEUROLOGY

## 2021-05-08 PROCEDURE — 70553 MRI BRAIN STEM W/O & W/DYE: CPT

## 2021-05-08 RX ADMIN — GADOTERIDOL 15 ML: 279.3 INJECTION, SOLUTION INTRAVENOUS at 11:26

## 2021-05-12 ENCOUNTER — OFFICE VISIT (OUTPATIENT)
Dept: NEUROLOGY | Facility: MEDICAL CENTER | Age: 36
End: 2021-05-12
Attending: PSYCHIATRY & NEUROLOGY
Payer: COMMERCIAL

## 2021-05-12 VITALS
SYSTOLIC BLOOD PRESSURE: 114 MMHG | DIASTOLIC BLOOD PRESSURE: 72 MMHG | BODY MASS INDEX: 30.86 KG/M2 | HEIGHT: 63 IN | WEIGHT: 174.16 LBS | HEART RATE: 96 BPM | TEMPERATURE: 98.6 F | OXYGEN SATURATION: 99 %

## 2021-05-12 DIAGNOSIS — G35 MS (MULTIPLE SCLEROSIS) (HCC): ICD-10-CM

## 2021-05-12 PROCEDURE — 99215 OFFICE O/P EST HI 40 MIN: CPT | Performed by: PSYCHIATRY & NEUROLOGY

## 2021-05-12 PROCEDURE — 99211 OFF/OP EST MAY X REQ PHY/QHP: CPT | Performed by: PSYCHIATRY & NEUROLOGY

## 2021-05-12 RX ORDER — OFATUMUMAB 20 MG/.4ML
INJECTION, SOLUTION SUBCUTANEOUS
COMMUNITY
End: 2021-12-01 | Stop reason: SDUPTHER

## 2021-05-12 ASSESSMENT — FIBROSIS 4 INDEX: FIB4 SCORE: 0.6

## 2021-05-12 NOTE — PROGRESS NOTES
[facility]  MULTIPLE SCLEROSIS & NEUROIMMUNOLOGY  FOLLOW-UP VISIT    DISEASE SUMMARY:  Principal neurologic diagnosis: MS  Diagnosis of MS: 1/7/2021  Disease History:  - 12/5/2019: blurry vision, left facial numbness, left upper- and lower extremity numbness; MRI head unremarkable; improved over the course of 1 week  - 12/25/2020: right monocular visual loss; returned to normal over ~2 hours  - 12/29/2020: onset of bilateral lower extremity weakness  - 1/2/202021: presented to hospital; MRI w/ lesions, LP w/ OCBs present; treated with IVMP with good response  - 2/28/2021: Gilenya FDO  - 3/4/2021: left margaret-body numbness; treated with IVMP w/ good response  - 5/1/2021: started Kesimpta  Disease course at onset: MS  Current disease course: MS  Previous disease therapies:  - Gilenya: additional clinical attack  Current disease therapies:  - Kesimpta  Symptomatic therapies:  - none  CSF (1/3/2021)):  - RBCs: 394  - WBCs: 6  - protein: 58  - glucose: 81  - oligoclonal bands: positive (8)  - paraneoplastic autoantibody eval: negative  Other Testing:  - anti-AQP4 Ab (1/3/2021): negative  - anti-MOG IgG (1/3/2021): negative  - paraneoplastic autoantibody eval, serum (1/4/2020): negative  MRI head:  - 5/8/2021: stable, no abnormal enhancement  - 3/5/2021: enhancing lesions present  - 1/2/2021: juxta-cortical, periventricular, and infratentorial lesions w/ enhancement  - 12/5/2019: no visible lesions  - 5/13/2018: no visible lesions  MRI cervical spine:  - 3/5/2021: single enhancing lesion present  - 1/2/2021: no visible lesions  MRI thoracic spine:  - 3/5/2021: no visible lesions  - 1/2/2021: no visible lesions    CC: MS    INTERVAL HISTORY:  Rehana Meade is a 35 y.o. woman with MS and a history otherwise notable for migraines, aseptic meningitis, and seizure disorder.  I last saw her in the [facility] Clinic on 3/10/2021.  At that time I recommended she continue Gilenya.  Since that time Rehana had an additional  "clinical event, so we planned to transition to Kesimpta.  Today, she was unaccompanied, and she provided the following interval history:    5/1/2021:  Nilesh started Kesimpta.  She dose this in the evening along with ibuprofen and Benadryl.  She slept through the night and did not have any side effects from the injection.    Since that time Nilesh has not experienced any new or worsened neurologic symptoms.  She does report labile mood.  For no clear reason her mood will suddenly become \"extra happy\" or \"extremely depressed.\"  She wonders if this is related to the multiple sclerosis.    Rehana has been on the same dosage of topiramate (100 mg twice daily) and zonisamide (200 mg in the evening).  For a long time.  Her last seizure occurred several years ago.    MEDICATIONS:  Current Outpatient Medications   Medication Sig   • Ofatumumab (KESIMPTA) 20 MG/0.4ML Solution Auto-injector Inject  under the skin.   • methocarbamol (ROBAXIN) 750 MG Tab Take 1 tablet by mouth 4 times a day.   • topiramate (TOPAMAX) 100 MG Tab Take 100 mg by mouth 2 times a day.   • AIMOVIG 70 MG/ML Solution Auto-injector Inject 70 mg as instructed Q30 DAYS.   • zonisamide (ZONEGRAN) 100 MG Cap Take 200 mg by mouth every evening.     MEDICAL, SOCIAL, AND FAMILY HISTORY:  There is no change in the patient's ROS or PFSH from their previous visit on 3/10/2021.    REVIEW OF SYSTEMS:  A ROS was completed.  Pertinent positives and negatives were included in the HPI, above.  All other systems were reviewed and are negative.    PHYSICAL EXAM:  General/Medical:  - NAD    Neuro:  MENTAL STATUS: awake and alert; no deficits of speech or language; oriented to person, place, and time; affect was appropriate to situation; pleasant, cooperative     CRANIAL NERVES:    II: acuity was: J1+/J1+; fields: NT; pupils 3/3 to 2/2 without a relative afferent pupillary defect; discs: NT    III/IV/VI: versions intact without nystagmus    V: facial sensation symmetric " "to light touch    VII: facial expression symmetric    VIII: hearing intact to finger rub    IX/X: palate elevates symmetrically    XI: shoulder shrug symmetric    XII: tongue midline     MOTOR:  - bulk and tone were normal throughout  Upper Extremity Strength  (R/L)    5/5   Elbow flexion 5/5   Elbow extension 5/5   Shoulder abduction 5/5      Lower Extremity Strength  (R/L)   Hip flexion 5/5   Knee extension 4/5   Knee flexion 5/5   Ankle plantarflexion 5/5   Ankle dorsiflexion 5/5      - no pronator drift; no abnormal movements     SENSATION:  - light touch: reduced over the right foot  - vibration (R/L, seconds): 10/9 at the great toes  - pinprick: NT  - proprioception: NT  - Romberg: absent     COORDINATION:  - finger to nose was normal, no ataxia on exam  - finger tapping was rapid and accurate, bilaterally     REFLEXES:  Reflex Right Left   BR 2+ 2+   Biceps 2+ 2+   Triceps 2+ 2+   Patellae 2+ 2+   Achilles 2+ 2+   Toes mute mute      GAIT:  - narrow base and normal     QUANTITATIVE SCORES:  Timed 25-foot walk (sec): 4.1 on 5/12/2021 (4.8 on 3/10/2021, 4.8 on 1/7/2021).  Assistive device: none    REVIEW OF IMAGING STUDIES:  MRI Brain:  Date: 5/8/2021  W/o and w/ contrast?: yes  Indication: \"MS, monitor\"  Comparison: 3/5/2021  Impression:  \"Stable findings of mild MS as described above. No evidence of abnormal enhancement to suggest an active demyelinating process.\"    REVIEW OF LABORATORY STUDIES:  Summarized above.    ASSESSMENT:  Rehana Meade is a 35 y.o. woman with multiple sclerosis and migraine.  Due to the emergence of new neurologic symptoms in several weeks after starting Gilenya, we transitioned to Kesimpta.  She is tolerating the injections well, so we will continue this approach.  Repeat brain imaging from earlier this month shows no new lesions, and this is extremely reassuring.  I do not have a great explanation for Rehana's mood swings.  I suspect this may be related to recent " "use of steroids.  We agreed to monitor symptoms for now.    PLAN:  Multiple Sclerosis:  - continue Kesimpta    Follow-Up:  - Return in about 3 months (around 8/12/2021).    Signed: Rajeev Aggarwal M.D    BILLING DOCUMENTATION:   I spent 45 minutes reviewing the medical record, interviewing and examining the patient, discussing my impression (see \"assessment\" above), and coordinating care.  "

## 2021-05-12 NOTE — ED TRIAGE NOTES
".Rehana Meade  .  Chief Complaint   Patient presents with   • Seizure     patient states \"I think I had another seizure last night. I woke up last night when I heard my daughter coughing and felt like I couldn't move and was super agitated and couldn't go back to sleep\". patient reports similiar episodes last week.    • Sent by MD Dr. Wu     Patient to triage with above complaint. ./66   Pulse 83   Temp 37.3 °C (99.1 °F)   Resp 16   Ht 1.6 m (5' 2.99\")   Wt 86.4 kg (190 lb 7.6 oz)   LMP 01/12/2018   SpO2 99%   BMI 33.75 kg/m²     Patient to lobby and instructed to inform staff of any needs.  " Medication refill received and approved and sent back to pharmacy.  Patient was last seen in the office on 03/03/2021.

## 2021-05-14 ENCOUNTER — TELEPHONE (OUTPATIENT)
Dept: NEUROLOGY | Facility: MEDICAL CENTER | Age: 36
End: 2021-05-14

## 2021-05-18 ENCOUNTER — TELEPHONE (OUTPATIENT)
Dept: NEUROLOGY | Facility: MEDICAL CENTER | Age: 36
End: 2021-05-18

## 2021-05-18 NOTE — TELEPHONE ENCOUNTER
I spoke to Rehana.    She reports numbness involving both hands and both feet.  She takes topiramate 100 mg BID for migraine prophylaxis.    Differential diagnosis includes new MS lesion vs mediation side effect related to topiramate.  Plan to decrease the dosage of topiramate to 100 mg nightly.  Rehana will monitor her symptoms.    Rajeev Aggarwal M.D.

## 2021-06-10 ENCOUNTER — PATIENT MESSAGE (OUTPATIENT)
Dept: NEUROLOGY | Facility: MEDICAL CENTER | Age: 36
End: 2021-06-10

## 2021-06-10 DIAGNOSIS — G35 MS (MULTIPLE SCLEROSIS) (HCC): Primary | ICD-10-CM

## 2021-06-10 RX ORDER — PREDNISONE 50 MG/1
TABLET ORAL
Qty: 62 TABLET | Refills: 0 | Status: SHIPPED | OUTPATIENT
Start: 2021-06-10 | End: 2021-06-15

## 2021-06-10 RX ORDER — PREDNISONE 10 MG/1
TABLET ORAL
Qty: 63 TABLET | Refills: 0 | Status: SHIPPED | OUTPATIENT
Start: 2021-06-16 | End: 2021-07-07

## 2021-06-11 NOTE — PROGRESS NOTES
I spoke to Rehana.  Given the severity of her symptoms we agreed upon a short course of high-dose oral steroids followed by a taper.

## 2021-06-14 ENCOUNTER — OFFICE VISIT (OUTPATIENT)
Dept: PHYSICAL MEDICINE AND REHAB | Facility: REHABILITATION | Age: 36
End: 2021-06-14
Payer: COMMERCIAL

## 2021-06-14 VITALS
RESPIRATION RATE: 18 BRPM | DIASTOLIC BLOOD PRESSURE: 84 MMHG | HEART RATE: 76 BPM | TEMPERATURE: 97.6 F | SYSTOLIC BLOOD PRESSURE: 106 MMHG | OXYGEN SATURATION: 99 %

## 2021-06-14 DIAGNOSIS — G89.29 CHRONIC BILATERAL LOW BACK PAIN WITHOUT SCIATICA: ICD-10-CM

## 2021-06-14 DIAGNOSIS — M54.50 CHRONIC BILATERAL LOW BACK PAIN WITHOUT SCIATICA: ICD-10-CM

## 2021-06-14 DIAGNOSIS — G35 MS (MULTIPLE SCLEROSIS) (HCC): Primary | ICD-10-CM

## 2021-06-14 DIAGNOSIS — G89.29 CHRONIC NOCICEPTIVE PAIN: ICD-10-CM

## 2021-06-14 DIAGNOSIS — R29.898 WEAKNESS OF RIGHT LOWER EXTREMITY: ICD-10-CM

## 2021-06-14 PROCEDURE — 99215 OFFICE O/P EST HI 40 MIN: CPT | Performed by: PHYSICAL MEDICINE & REHABILITATION

## 2021-06-14 ASSESSMENT — ENCOUNTER SYMPTOMS
CHILLS: 0
BACK PAIN: 1
FOCAL WEAKNESS: 1
FEVER: 0
CONSTIPATION: 0
FALLS: 0
COUGH: 0
SHORTNESS OF BREATH: 0
DIARRHEA: 0

## 2021-06-14 NOTE — PROGRESS NOTES
[facility], [place]  [facility]  [facility] [place] [address]  Ph: [phone]    FOLLOW UP PATIENT EVALUATION    *Patient established in PM&R practice, however, patient new to writer as patient is transferring care. Therefore, patient billed as established.     Patient Name: Rehana Meade   Patient : 1985  Patient Age: 35 y.o.     Examining Physician: Dr. Jacki Gallegos, DO    PM&R History to Date - Background Information:  Dates of Admission/Discharge to ARU: None    SUBJECTIVE:   Patient Identification: Rehana Meade is a 35 y.o. RHD female with PMH significant for migraines, anxiety/depression, seizures, asthma and rehabilitation history significant for multiple sclerosis (diagnosed 2021) and is presenting to PM&R clinic for a FOLLOW UP OUTPATIENT evaluation with the following chief complaint/s:    Chief Complaint: MS    Accompanied by Today: Self  History of Present Illness:    -Records reviewed: Presented to emergency department 3/14/2021 with leg pain and numbness of left lower extremity.  Left lower extremity ultrasound negative for DVT treated with Dilaudid and Valium for pain and spasms with improvement.   -Records reviewed: Following closely with neurology.  Takes topiramate 100 mg nightly.  Had numbness involving both hands and feet.  Reduce topiramate to 100 mg nightly.  Currently on Kesimpta.  Had no neurologic symptoms after starting Gilenya.  No new lesions seen on repeat imaging.  -Currently on steroid taper.   - Has significant weakness on her right side both in the upper and lower extremity. Pain in the RLE as well.   - Bladder: Normalized not really getting urgency or frequency.   - Pain in the low back and the RLE comes and goes. States the robaxin helps with the spasms and pain.   - Has pain bilaterally near SI joint. Very sudden and painful completely random onset. No associated provocative factors. No reliving factors. Nothing seems to  help (meds/heat/stretching/back rollers/massage).  Not been to PT.  - Was able to see nephrology. Confirmed benign findings.     Review of Systems:  Review of Systems   Constitutional: Negative for chills and fever.   Respiratory: Negative for cough and shortness of breath.    Gastrointestinal: Negative for constipation and diarrhea.   Genitourinary: Negative for dysuria, frequency and urgency.   Musculoskeletal: Positive for back pain. Negative for falls and joint pain.   Neurological: Positive for focal weakness.        + neuropathic pain.       All other pertinent positive review of systems are noted above in HPI.   All other systems reviewed and are negative.    Past Medical History:  Past Medical History:   Diagnosis Date   • Seizure cerebral (HCC) 2/15/2018   • Anesthesia     woke up combative   • Anxiety associated with depression    • ASTHMA    • Drug-seeking behavior    • Migraine without aura, without mention of intractable migraine without mention of status migrainosus    • Other specified symptom associated with female genital organs    • Seizure (HCC)     Last seizure was 1/2019   • Stroke ([facility])    • Unspecified disorder of thyroid     cyst      Past Surgical History:   Procedure Laterality Date   • VAGINAL HYSTERECTOMY TOTAL  1/27/2020    Procedure: HYSTERECTOMY, TOTAL, VAGINAL;  Surgeon: Julian Parker M.D.;  Location: SURGERY SAME DAY [facility];  Service: Gynecology   • CYSTOSCOPY  1/27/2020    Procedure: CYSTOSCOPY;  Surgeon: Julian Parker M.D.;  Location: SURGERY SAME DAY [facility];  Service: Gynecology   • TUBAL COAGULATION LAPAROSCOPIC BILATERAL  7/11/2014    Performed by Julian Parker M.D. at SURGERY SAME DAY [facility]   • SEPTOPLASTY  10/28/2009    Performed by ANNETTE FORDE at [facility]   • SOMNOPLASTY  10/28/2009    Performed by ANNETTE FORDE at [facility]   • NASAL POLYPECTOMY  10/28/2009    Performed by ANNETTE FORDE at [facility]  "TOWER ORS        Current Outpatient Medications:   •  predniSONE (DELTASONE) 50 MG Tab, Take 20 Tablets by mouth every day for 2 days, THEN 10 Tablets every day for 2 days, THEN 2 Tablets every day for 1 day., Disp: 62 tablet, Rfl: 0  •  [START ON 6/16/2021] predniSONE (DELTASONE) 10 MG Tab, Prednisone 10mg tab. 6 tabs x3days, 5 tabs x3 days, 4 tabs x3 days, 3 tabs x3day, 2 tabs x 3 days and 1 tab x3days then off. #63 with no refills, Disp: 63 tablet, Rfl: 0  •  Ofatumumab (KESIMPTA) 20 MG/0.4ML Solution Auto-injector, Inject  under the skin., Disp: , Rfl:   •  methocarbamol (ROBAXIN) 750 MG Tab, Take 1 tablet by mouth 4 times a day., Disp: 20 tablet, Rfl: 0  •  topiramate (TOPAMAX) 100 MG Tab, Take 100 mg by mouth 2 times a day., Disp: , Rfl:   •  AIMOVIG 70 MG/ML Solution Auto-injector, Inject 70 mg as instructed Q30 DAYS., Disp: , Rfl: 11  •  zonisamide (ZONEGRAN) 100 MG Cap, Take 200 mg by mouth every evening., Disp: , Rfl:   Allergies   Allergen Reactions   • Amitriptyline Unspecified     Suicidal   • Clarithromycin Hives   • Sulfa Drugs Anaphylaxis   • Cantaloupe Itching   • Honey Dew Itching   • Latex Itching   • Lorazepam Unspecified     Hallucinations     • Metoclopramide Unspecified     Muscle spasms   • Penicillin G Potassium Vomiting   • Rizatriptan Unspecified     Tremors, confusion     • Sumatriptan Unspecified     \"Makes my head pins and needles\"   • Tape Rash   • Morphine Hives and Itching        Past Social History:  Social History     Socioeconomic History   • Marital status:      Spouse name: Not on file   • Number of children: Not on file   • Years of education: Not on file   • Highest education level: Not on file   Occupational History   • Not on file   Tobacco Use   • Smoking status: Never Smoker   • Smokeless tobacco: Never Used   Vaping Use   • Vaping Use: Never used   Substance and Sexual Activity   • Alcohol use: Not Currently     Comment: rarely   • Drug use: No   • Sexual activity: " Yes     Partners: Male   Other Topics Concern   • Not on file   Social History Narrative   • Not on file     Social Determinants of Health     Financial Resource Strain:    • Difficulty of Paying Living Expenses:    Food Insecurity:    • Worried About Running Out of Food in the Last Year:    • Ran Out of Food in the Last Year:    Transportation Needs:    • Lack of Transportation (Medical):    • Lack of Transportation (Non-Medical):    Physical Activity:    • Days of Exercise per Week:    • Minutes of Exercise per Session:    Stress:    • Feeling of Stress :    Social Connections:    • Frequency of Communication with Friends and Family:    • Frequency of Social Gatherings with Friends and Family:    • Attends Sabianist Services:    • Active Member of Clubs or Organizations:    • Attends Club or Organization Meetings:    • Marital Status:    Intimate Partner Violence:    • Fear of Current or Ex-Partner:    • Emotionally Abused:    • Physically Abused:    • Sexually Abused:         Family History:  Family history was reviewed with the patient, there is no pertinent family history to report.     Depression and Opioid Screening  PHQ-9:  Depression Screen (PHQ-2/PHQ-9) 1/2/2021 1/7/2021 3/5/2021   PHQ-2 Total Score 0 - 0   PHQ-2 Total Score - 0 -   PHQ-9 Total Score - - -     Interpretation of PHQ-9 Total Score   Score Severity   1-4 No Depression   5-9 Mild Depression   10-14 Moderate Depression   15-19 Moderately Severe Depression   20-27 Severe Depression     OBJECTIVE:   Vital Signs:  Vitals:    06/14/21 1109   BP: 106/84   Pulse: 76   Resp: 18   Temp: 36.4 °C (97.6 °F)   SpO2: 99%        Pertinent Labs:  Lab Results   Component Value Date/Time    SODIUM 138 03/14/2021 06:39 PM    POTASSIUM 3.7 03/14/2021 06:39 PM    CHLORIDE 106 03/14/2021 06:39 PM    CO2 21 03/14/2021 06:39 PM    GLUCOSE 86 03/14/2021 06:39 PM    BUN 11 03/14/2021 06:39 PM    CREATININE 0.59 03/14/2021 06:39 PM    CREATININE 0.9 05/04/2009 09:58 PM        Lab Results   Component Value Date/Time    HBA1C 5.1 03/06/2021 03:50 AM       Lab Results   Component Value Date/Time    WBC 10.5 03/14/2021 06:39 PM    RBC 4.94 03/14/2021 06:39 PM    HEMOGLOBIN 15.7 03/14/2021 06:39 PM    HEMATOCRIT 47.5 (H) 03/14/2021 06:39 PM    MCV 96.2 03/14/2021 06:39 PM    MCH 31.8 03/14/2021 06:39 PM    MCHC 33.1 (L) 03/14/2021 06:39 PM    MPV 10.8 03/14/2021 06:39 PM    NEUTSPOLYS 74.90 (H) 03/14/2021 06:39 PM    LYMPHOCYTES 7.50 (L) 03/14/2021 06:39 PM    MONOCYTES 11.40 03/14/2021 06:39 PM    EOSINOPHILS 4.60 03/14/2021 06:39 PM    BASOPHILS 0.30 03/14/2021 06:39 PM    HYPOCHROMIA 1+ 06/06/2014 10:49 AM       Lab Results   Component Value Date/Time    ASTSGOT 12 03/14/2021 06:39 PM    ALTSGPT 15 03/14/2021 06:39 PM        Physical Exam:   GEN: No apparent distress  HEENT: Head normocephalic, atraumatic.  Sclera nonicteric bilaterally, no ocular discharge appreciated bilaterally.  CV: Extremities warm and well-perfused, no peripheral edema appreciated bilaterally.  PULMONARY: Breathing nonlabored on room air, no respiratory accessory muscle use.  Not requiring supplemental oxygen.  SKIN: No appreciable skin breakdown on exposed areas of skin.  PSYCH: Mood and affect within normal limits.  NEURO: Awake alert.  Conversational.  Logical thought content.  Radha's positive bilaterally  No significant spasticity on exam via modified Lazara scale  No clonus bilateral ankles.  Motor Exam Lower Extremities  ? Myotome R L   Hip flexion L2 5 5   Knee extension L3 5 5   Ankle dorsiflexion L4 5 5   Toe extension L5 5 5   Ankle plantarflexion S1 5 5   Some giveaway weakness right side.    Imaging:   US 3/18/21  1.  No focal renal scarring or parenchymal loss.  2.  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    MRI Brain 5/2021  Stable findings of mild MS as described above. No evidence of abnormal enhancement to suggest an active demyelinating  process.    ASSESSMENT/PLAN: Rehana Meade  is a 35 y.o. female with rehabilitation history significant for multiple sclerosis (diagnosed 1/2021), here for evaluation. The following plan was discussed with the patient who is in agreement.     Visit Diagnoses     ICD-10-CM   1. MS (multiple sclerosis) ([facility])  G35   2. Chronic nociceptive pain  G89.29   3. Chronic bilateral low back pain without sciatica  M54.5    G89.29   4. Weakness of right lower extremity  R29.898      Rehab/Neuro:   1. Multiple sclerosis: Diagnosed 1/2021.  -Records reviewed as aforementioned in the HPI.  -Consultants: Established with neurology, Dr. Aggarwal.  -Therapy: Referral to physical therapy for right lower extremity weakness and low back pain.  -Occupation: Working full-time from home.  Will not have to go back to the office.  -Driving status: Actively driving without issue.  -Equipment: No equipment needs at this time, continue monitoring    Neuropathic Pain: Affected the left lower extremity in the past, now more so affecting right lower extremity.  -Continue reevaluation.  -Patient wishes to abstain from pharmacologic agents at this time which I think is completely reasonable given all pharmacologic agents for neuropathic pain can have side effects.  May need in the future for baseline neuropathic pain control and we will continue to reevaluate.    Neurogenic Bladder: Urinary urgency. ?  Spastic component.  Resolved as of 6/2021.  -Imaging: RBUS normal with incidental angiolipoma.  Saw nephrology.  Confirm it is benign.  No follow-up needed.    Endo: Vitamin D deficiency.  -Labs reviewed: Vitamin D 14 3/5/2021  -May need repeat vitamin D and/or supplementation.  Discuss at next visit.    Nociceptive pain: Chronic bilateral SI versus lumbar facet mediated versus muscular. - Has pain bilaterally near SI joint. Very sudden and painful completely random onset. No associated provocative factors. No reliving factors. Nothing seems  to help (meds/heat/stretching/back rollers/massage).  Not been to PT.  -Referral: Physical therapy  -Referral: Physiatry interventional spine team.  -Imaging: X-ray pelvis/sacrum/spine    Follow up: 3 months for follow-up reevaluation    Total time spent was 41 minutes.  Included in this time is the time spent preparing for the visit including record review, my exam and evaluation, counseling and education regarding that which is aforementioned in the assessment and plan. Time was spent ordering the appropriate labs, tests, procedures, referrals, medications. Discussion involved the patient.    Please note that this dictation was created using voice recognition software. I have made every reasonable attempt to correct obvious errors but there may be errors of grammar and content that I may have overlooked prior to finalization of this note.    Dr. Jacki Gallegos DO, MS  Department of Physical Medicine & Rehabilitation  Neuro Rehabilitation Clinic  [facility]

## 2021-06-16 ENCOUNTER — TELEPHONE (OUTPATIENT)
Dept: NEUROLOGY | Facility: MEDICAL CENTER | Age: 36
End: 2021-06-16

## 2021-06-16 NOTE — TELEPHONE ENCOUNTER
Patient called to report last night she developed some sharp pains in her chest and ribs . Also felt short of breath .    Per patient she believes had a possible seizure last night when she was sleeping , patient is also stating she is having bilateral leg discomfort .    Patient is no longer complaining of shortness of breath and . Bilateral leg weakness when she is walking . Per patient she is tampering off her steroids .

## 2021-06-17 NOTE — TELEPHONE ENCOUNTER
I spoke to Rehana.  She will exercise extra caution regarding possible seizures, especially while on/tapering steroids.  She will alert me if any additional symptoms arise.

## 2021-07-06 ENCOUNTER — TELEPHONE (OUTPATIENT)
Dept: NEUROLOGY | Facility: MEDICAL CENTER | Age: 36
End: 2021-07-06

## 2021-07-07 ENCOUNTER — OFFICE VISIT (OUTPATIENT)
Dept: PHYSICAL MEDICINE AND REHAB | Facility: MEDICAL CENTER | Age: 36
End: 2021-07-07
Payer: COMMERCIAL

## 2021-07-07 VITALS
WEIGHT: 176.37 LBS | DIASTOLIC BLOOD PRESSURE: 68 MMHG | HEIGHT: 63 IN | SYSTOLIC BLOOD PRESSURE: 112 MMHG | TEMPERATURE: 98.3 F | BODY MASS INDEX: 31.25 KG/M2 | OXYGEN SATURATION: 98 % | HEART RATE: 89 BPM

## 2021-07-07 DIAGNOSIS — N94.10 DYSPAREUNIA, FEMALE: ICD-10-CM

## 2021-07-07 DIAGNOSIS — G35 MS (MULTIPLE SCLEROSIS) (HCC): ICD-10-CM

## 2021-07-07 DIAGNOSIS — M53.3 SACROILIAC JOINT DYSFUNCTION OF BOTH SIDES: Primary | ICD-10-CM

## 2021-07-07 PROCEDURE — 99214 OFFICE O/P EST MOD 30 MIN: CPT | Performed by: PHYSICAL MEDICINE & REHABILITATION

## 2021-07-07 RX ORDER — FLUCONAZOLE 150 MG/1
TABLET ORAL
COMMUNITY
Start: 2021-06-22 | End: 2023-07-20

## 2021-07-07 ASSESSMENT — PATIENT HEALTH QUESTIONNAIRE - PHQ9: CLINICAL INTERPRETATION OF PHQ2 SCORE: 0

## 2021-07-07 ASSESSMENT — FIBROSIS 4 INDEX: FIB4 SCORE: 0.6

## 2021-07-07 ASSESSMENT — PAIN SCALES - GENERAL: PAINLEVEL: 8=MODERATE-SEVERE PAIN

## 2021-07-07 NOTE — PROGRESS NOTES
"New patient note, seen by Dr. Gallegos, neurorehab clinic    Physiatry (physical medicine and  Rehabilitation), interventional spine and sports medicine    Date of Service: 7/7/2021    Chief complaint:   Chief Complaint   Patient presents with   • New Patient     Low back pain       HISTORY    HPI: Rehana Meade 35 y.o. female who presents today for evaluation of low back pain, referred by Dr. Gallegos.  Rehana reports that she has had diagnosis of multiple sclerosis since January 7, 2021.  She reports that she has had symptoms over the last 6 years but eventually this led to the diagnosis of multiple sclerosis.    She presents today for symptoms of pain in the sacroiliac and lumbosacral junction region that she has had occur intermittently for some time.  This is sharp pain that seems to occur episodically without any particular known trigger the right side is worse than left.  This seems to occur about once or twice a week and lasts for 12 hours or less and resolves.  No change in symptoms with positions or coughing or sneezing.  She feels like these symptoms are \"completely random \" She reports that she does not have any bowel or bladder changes.  No numbness and or tingling in her legs at the moment although she has had symptoms in her lower extremities related to her MS with an episode most recently leading to her new diagnosis.     We also discussed that she has some dyspareunia.  She has had tubal ligation and hysterectomy      Medical records review:  I reviewed the note from the referring provider Jacki Gallegos D.O.     Previous treatments:    Physical Therapy: No    Previous interventions: none    Previous surgeries to relieve the above pain:  None, see abdominal surgeries noted in HPI      ROS:   Red Flags ROS:   Fever, Chills, Sweats: Denies  Involuntary Weight Loss: Denies  Bladder Incontinence: Denies  Bowel Incontinence: Denies  Saddle Anesthesia: Denies    All other systems reviewed and " negative.       PMHx:   Past Medical History:   Diagnosis Date   • Anesthesia     woke up combative   • Anxiety associated with depression    • ASTHMA    • Migraine without aura, without mention of intractable migraine without mention of status migrainosus    • Other specified symptom associated with female genital organs    • Seizure (HCC)     Last seizure was 1/2019   • Seizure cerebral (HCC) 2/15/2018   • Stroke ([facility])    • Substance abuse ([facility])    • Unspecified disorder of thyroid     cyst       PSHx:   Past Surgical History:   Procedure Laterality Date   • VAGINAL HYSTERECTOMY TOTAL  1/27/2020    Procedure: HYSTERECTOMY, TOTAL, VAGINAL;  Surgeon: Julian Parker M.D.;  Location: SURGERY SAME DAY [facility];  Service: Gynecology   • CYSTOSCOPY  1/27/2020    Procedure: CYSTOSCOPY;  Surgeon: Julian Parker M.D.;  Location: SURGERY SAME DAY [facility];  Service: Gynecology   • TUBAL COAGULATION LAPAROSCOPIC BILATERAL  7/11/2014    Performed by Julian Parker M.D. at SURGERY SAME DAY [facility]   • SEPTOPLASTY  10/28/2009    Performed by ANNETTE FORDE at SURGERY [facility] ORS   • SOMNOPLASTY  10/28/2009    Performed by ANNETTE FORDE at SURGERY [facility]S   • NASAL POLYPECTOMY  10/28/2009    Performed by ANNETTE FORDE at SURGERY [facility]       Family history   Family History   Problem Relation Age of Onset   • No Known Problems Mother    • No Known Problems Father          Medications:   Current Outpatient Medications   Medication   • Ofatumumab (KESIMPTA) 20 MG/0.4ML Solution Auto-injector   • topiramate (TOPAMAX) 100 MG Tab   • AIMOVIG 70 MG/ML Solution Auto-injector   • zonisamide (ZONEGRAN) 100 MG Cap   • fluconazole (DIFLUCAN) 150 MG tablet   • methocarbamol (ROBAXIN) 750 MG Tab     No current facility-administered medications for this visit.       Allergies:   Allergies   Allergen Reactions   • Amitriptyline Unspecified     Suicidal   • Clarithromycin Hives   • Sulfa Drugs  "Anaphylaxis   • Cantaloupe Itching   • Honey Dew Itching   • Latex Itching   • Lorazepam Unspecified     Hallucinations     • Metoclopramide Unspecified     Muscle spasms   • Penicillin G Potassium Vomiting   • Rizatriptan Unspecified     Tremors, confusion     • Sumatriptan Unspecified     \"Makes my head pins and needles\"   • Tape Rash   • Morphine Hives and Itching       Social Hx:   Social History     Socioeconomic History   • Marital status:      Spouse name: Not on file   • Number of children: Not on file   • Years of education: Not on file   • Highest education level: Not on file   Occupational History   • Not on file   Tobacco Use   • Smoking status: Never Smoker   • Smokeless tobacco: Never Used   Vaping Use   • Vaping Use: Never used   Substance and Sexual Activity   • Alcohol use: Not Currently     Comment: rarely   • Drug use: No   • Sexual activity: Yes     Partners: Male   Other Topics Concern   •  Service No   • Blood Transfusions No   • Caffeine Concern No   • Occupational Exposure No   • Hobby Hazards No   • Sleep Concern Yes   • Stress Concern No   • Weight Concern No   • Special Diet No   • Back Care No   • Exercise No   • Bike Helmet No   • Seat Belt Yes   • Self-Exams Yes   Social History Narrative   • Not on file     Social Determinants of Health     Financial Resource Strain:    • Difficulty of Paying Living Expenses:    Food Insecurity:    • Worried About Running Out of Food in the Last Year:    • Ran Out of Food in the Last Year:    Transportation Needs:    • Lack of Transportation (Medical):    • Lack of Transportation (Non-Medical):    Physical Activity:    • Days of Exercise per Week:    • Minutes of Exercise per Session:    Stress:    • Feeling of Stress :    Social Connections:    • Frequency of Communication with Friends and Family:    • Frequency of Social Gatherings with Friends and Family:    • Attends [employer] Services:    • Active Member of Clubs or Organizations:  " "  • Attends Club or Organization Meetings:    • Marital Status:    Intimate Partner Violence:    • Fear of Current or Ex-Partner:    • Emotionally Abused:    • Physically Abused:    • Sexually Abused:          EXAMINATION     Physical Exam:   Vitals: /68 (BP Location: Right arm, Patient Position: Sitting, BP Cuff Size: Small adult)   Pulse 89   Temp 36.8 °C (98.3 °F) (Temporal)   Ht 1.6 m (5' 3\")   Wt 80 kg (176 lb 5.9 oz)   SpO2 98%     Constitutional:   Body Habitus: Body mass index is 31.24 kg/m².  Cooperation: Fully cooperates with exam  Appearance: Well-groomed, well-nourished, not disheveled, no acute distress    Eyes: No scleral icterus, no proptosis     ENT -no obvious auditory deficits, wearing a facemask    Skin -no rashes or lesions noted     Respiratory-  breathing comfortable on room air, no audible wheezing    Cardiovascular- capillary refills less than 2 seconds. No lower extremity edema is noted.     Gastrointestinal - no obvious abdominal masses, No tenderness to palpation in the abdomen, mild tenderness over the psoas muscles bilaterally    Psychiatric- alert and oriented ×3. Normal affect.     Gait - normal gait, no use of ambulatory device, nonantalgic. The patient can toe walk with ease. The patient can heel walk with ease..     Musculoskeletal -   Cervical spine   Inspection: No deformities of the skin over the cervical spine. No rashes or lesions.    full  A/P ROM in all directions, without  pain      Spurling’s sign: negative bilaterally    No signs of muscular atrophy in bilateral upper extremities       Thoracic/Lumbar Spine/Sacral Spine/Hips   Inspection: No evidence of atrophy in bilateral lower extremities throughout     ROM: full  AROM with flexion, extension, lateral flexion, and rotation bilaterally, without pain     Palpation:   No tenderness to palpation in midline at T1-T12 levels. No tenderness to palpation in the left and right of the midline T1-L5  palpation over SI " joint: positive bilaterally    palpation over buttock: positive bilaterally    palpation in hip or over the greater trochanters: negative bilaterally      Lumbar spine Special tests  Neuro tension  Straight leg test negative bilaterally      HIP  Range of motion in the hips is within normal limits in internal and external rotation bilaterally    SI joint tests  Observation patient sits on one buttocks: Negative  SI joint compression positive bilaterally    SI joint distraction negative bilaterally    Thigh thrust test positive bilaterally    CORIE test positive bilaterally       Neuro       Key points for the international standards for neurological classification of spinal cord injury (ISNCSCI) to light touch.     Dermatome R L   C4 2 2   C5 2 2   C6 2 2   C7 2 2   C8 2 2   T1 2 2   T2 2 2   L2 2 2   L3 2 2   L4 2 2   L5 2 2   S1 2 2   S2 2 2           Motor Exam Upper Extremities   ? Myotome R L   Shoulder flexion C5 5 5   Elbow flexion C5 5 5   Wrist extension C6 5 5   Elbow extension C7 5 5   Finger flexion C8 5 5   Finger abduction T1 5 5         Motor Exam Lower Extremities    ? Myotome R L   Hip flexion L2 5 5   Knee extension L3 5 5   Ankle dorsiflexion L4 5 5   Toe extension L5 5 5   Ankle plantarflexion S1 5 5         Shanks’s sign positive right, negative left   Babinski sign negative bilaterally   Clonus of the ankle negative bilaterally     Reflexes  ?  R L   Biceps  2+ 2+   Brachioradialis  2+ 2+   Patella  2+ 2+   Achilles   2+ 2+       MEDICAL DECISION MAKING    Medical records review: see under HPI section.     DATA    Labs:   Lab Results   Component Value Date/Time    SODIUM 138 03/14/2021 06:39 PM    POTASSIUM 3.7 03/14/2021 06:39 PM    CHLORIDE 106 03/14/2021 06:39 PM    CO2 21 03/14/2021 06:39 PM    ANION 11.0 03/14/2021 06:39 PM    GLUCOSE 86 03/14/2021 06:39 PM    BUN 11 03/14/2021 06:39 PM    CREATININE 0.59 03/14/2021 06:39 PM    CREATININE 0.9 05/04/2009 09:58 PM    CALCIUM 9.0 03/14/2021  06:39 PM    ASTSGOT 12 03/14/2021 06:39 PM    ALTSGPT 15 03/14/2021 06:39 PM    TBILIRUBIN 0.2 03/14/2021 06:39 PM    ALBUMIN 4.0 03/14/2021 06:39 PM    TOTPROTEIN 6.8 03/14/2021 06:39 PM    GLOBULIN 2.8 03/14/2021 06:39 PM    AGRATIO 1.4 03/14/2021 06:39 PM       Lab Results   Component Value Date/Time    PROTHROMBTM 13.9 01/02/2021 12:24 PM    INR 1.03 01/02/2021 12:24 PM        Lab Results   Component Value Date/Time    WBC 10.5 03/14/2021 06:39 PM    RBC 4.94 03/14/2021 06:39 PM    HEMOGLOBIN 15.7 03/14/2021 06:39 PM    HEMATOCRIT 47.5 (H) 03/14/2021 06:39 PM    MCV 96.2 03/14/2021 06:39 PM    MCH 31.8 03/14/2021 06:39 PM    MCHC 33.1 (L) 03/14/2021 06:39 PM    MPV 10.8 03/14/2021 06:39 PM    NEUTSPOLYS 74.90 (H) 03/14/2021 06:39 PM    LYMPHOCYTES 7.50 (L) 03/14/2021 06:39 PM    MONOCYTES 11.40 03/14/2021 06:39 PM    EOSINOPHILS 4.60 03/14/2021 06:39 PM    BASOPHILS 0.30 03/14/2021 06:39 PM    HYPOCHROMIA 1+ 06/06/2014 10:49 AM        Lab Results   Component Value Date/Time    HBA1C 5.1 03/06/2021 03:50 AM        Imaging: I personally reviewed following images, these are my reads  MRI lumbar spine January 2, 2021  There there is no significant disc bulge central or foraminal stenosis in the lumbar spine      IMAGING radiology reads. I reviewed the following radiology reads     Results for orders placed during the hospital encounter of 05/08/21    MR-BRAIN-WITH & W/O    Impression  Stable findings of mild MS as described above. No evidence of abnormal enhancement to suggest an active demyelinating process.               Results for orders placed during the hospital encounter of 12/28/19    MR-CERVICAL SPINE-W/O    Impression  1.  Low-lying cerebellar tonsils.    2.  No evidence of CSF flow posterior to cerebellar hemispheres on any sequence.    3.  Normal CSF flow in the cisterna magna, prepontine and premedullary cisterns, about the cervical cord, and throughout the third and fourth ventricles.    Results for  orders placed during the hospital encounter of 03/05/21    MR-CERVICAL SPINE-WITH & W/O    Impression  Focal enhancing cord lesion on the RIGHT at C5-C6, in keeping with the provided clinical history of multiple sclerosis       Results for orders placed during the hospital encounter of 01/02/21    MR-LUMBAR SPINE-WITH & W/O    Impression  1.  MRI lumbar spine without and with contrast within normal limits.     Results for orders placed during the hospital encounter of 03/05/21    MR-THORACIC SPINE-WITH & W/O    Impression  No significant abnormality is seen in the MR scan of the thoracic spine. No evidence of demyelinating disease in the thoracic cord.              Diagnosis   Visit Diagnoses     ICD-10-CM   1. Sacroiliac joint dysfunction of both sides  M53.3   2. MS (multiple sclerosis) ([facility])  G35   3. Dyspareunia, female  N94.10           ASSESSMENT:  Rehana Meade 35 y.o. female seen for above     Rehana was seen today for new patient.    Diagnoses and all orders for this visit:    Sacroiliac joint dysfunction of both sides  -     REFERRAL TO PHYSICAL THERAPY    MS (multiple sclerosis) ([facility])  -     REFERRAL TO PHYSICAL THERAPY    Dyspareunia, female  -     REFERRAL TO PHYSICAL THERAPY          We discussed a trial of physical therapy.  Given her sacral dysfunction, dyspareunia and history of pelvic surgeries we discussed a trial of pelvic floor therapy.  She would like to pursue this.    If this is unsuccessful we can consider other interventions including injections in the sacroiliac joints.    Follow-up: Return in about 2 months (around 9/7/2021).      Thank you very much for asking me to participate in Rehana Meade's care.  Please contact me with any questions or concerns.      Please note that this dictation was created using voice recognition software. I have made every reasonable attempt to correct obvious errors but there may be errors of grammar and content that I may have  overlooked prior to finalization of this note.      Winston Nicole MD  Physical Medicine and Rehabilitation  Interventional Spine and Sports Physiatry  [facility]           CC Jacki Gallegos D.O.

## 2021-07-10 ENCOUNTER — HOSPITAL ENCOUNTER (OUTPATIENT)
Dept: RADIOLOGY | Facility: MEDICAL CENTER | Age: 36
End: 2021-07-10
Attending: PHYSICAL MEDICINE & REHABILITATION
Payer: COMMERCIAL

## 2021-07-10 DIAGNOSIS — G89.29 CHRONIC NOCICEPTIVE PAIN: ICD-10-CM

## 2021-07-10 DIAGNOSIS — G35 MS (MULTIPLE SCLEROSIS) (HCC): ICD-10-CM

## 2021-07-10 DIAGNOSIS — G89.29 CHRONIC BILATERAL LOW BACK PAIN WITHOUT SCIATICA: ICD-10-CM

## 2021-07-10 DIAGNOSIS — M54.50 CHRONIC BILATERAL LOW BACK PAIN WITHOUT SCIATICA: ICD-10-CM

## 2021-07-10 PROCEDURE — 72220 X-RAY EXAM SACRUM TAILBONE: CPT

## 2021-07-10 PROCEDURE — 72100 X-RAY EXAM L-S SPINE 2/3 VWS: CPT

## 2021-07-10 PROCEDURE — 72170 X-RAY EXAM OF PELVIS: CPT

## 2021-07-23 DIAGNOSIS — G35 MS (MULTIPLE SCLEROSIS) (HCC): ICD-10-CM

## 2021-07-23 DIAGNOSIS — N94.10 DYSPAREUNIA, FEMALE: ICD-10-CM

## 2021-07-23 DIAGNOSIS — M53.3 SACROILIAC JOINT DYSFUNCTION OF BOTH SIDES: ICD-10-CM

## 2021-08-17 ENCOUNTER — OFFICE VISIT (OUTPATIENT)
Dept: NEUROLOGY | Facility: MEDICAL CENTER | Age: 36
End: 2021-08-17
Attending: PSYCHIATRY & NEUROLOGY
Payer: COMMERCIAL

## 2021-08-17 VITALS
WEIGHT: 176.15 LBS | DIASTOLIC BLOOD PRESSURE: 72 MMHG | SYSTOLIC BLOOD PRESSURE: 118 MMHG | TEMPERATURE: 97.5 F | HEIGHT: 63 IN | HEART RATE: 77 BPM | OXYGEN SATURATION: 97 % | BODY MASS INDEX: 31.21 KG/M2

## 2021-08-17 DIAGNOSIS — G35 MS (MULTIPLE SCLEROSIS) (HCC): Primary | ICD-10-CM

## 2021-08-17 PROCEDURE — 99211 OFF/OP EST MAY X REQ PHY/QHP: CPT | Performed by: PSYCHIATRY & NEUROLOGY

## 2021-08-17 PROCEDURE — 99214 OFFICE O/P EST MOD 30 MIN: CPT | Performed by: PSYCHIATRY & NEUROLOGY

## 2021-08-17 RX ORDER — DIAZEPAM 5 MG/1
TABLET ORAL
Qty: 3 TABLET | Refills: 0 | Status: SHIPPED | OUTPATIENT
Start: 2021-08-17 | End: 2021-12-17

## 2021-08-17 ASSESSMENT — FIBROSIS 4 INDEX: FIB4 SCORE: 0.6

## 2021-09-13 ENCOUNTER — OFFICE VISIT (OUTPATIENT)
Dept: PHYSICAL MEDICINE AND REHAB | Facility: REHABILITATION | Age: 36
End: 2021-09-13
Payer: COMMERCIAL

## 2021-09-13 VITALS
SYSTOLIC BLOOD PRESSURE: 112 MMHG | RESPIRATION RATE: 18 BRPM | OXYGEN SATURATION: 98 % | TEMPERATURE: 97 F | HEART RATE: 78 BPM | DIASTOLIC BLOOD PRESSURE: 60 MMHG

## 2021-09-13 DIAGNOSIS — R25.2 SPASTICITY: ICD-10-CM

## 2021-09-13 DIAGNOSIS — G35 MS (MULTIPLE SCLEROSIS) (HCC): Primary | ICD-10-CM

## 2021-09-13 DIAGNOSIS — E55.9 VITAMIN D DEFICIENCY: ICD-10-CM

## 2021-09-13 PROCEDURE — 99214 OFFICE O/P EST MOD 30 MIN: CPT | Performed by: PHYSICAL MEDICINE & REHABILITATION

## 2021-09-13 RX ORDER — METHOCARBAMOL 500 MG/1
500 TABLET, FILM COATED ORAL 3 TIMES DAILY PRN
Qty: 90 TABLET | Refills: 0 | Status: SHIPPED | OUTPATIENT
Start: 2021-09-13 | End: 2022-10-14

## 2021-09-13 NOTE — PROGRESS NOTES
[facility]  PM&R Neuro [facility]  [facility] [place] [address]  Ph: [phone]    FOLLOW UP PATIENT EVALUATION    Patient Name: Rehana Meade   Patient : 1985  Patient Age: 35 y.o.     Examining Physician: Dr. Jacki Gallegos,     PM&R History to Date - Background Information:  Dates of Admission/Discharge to ARU: None    SUBJECTIVE:   Patient Identification: Rehana Meade is a 35 y.o. RHD female with PMH significant for migraines, anxiety/depression, seizures, asthma and rehabilitation history significant for multiple sclerosis (diagnosed 2021) and is presenting to PM&R clinic for a FOLLOW UP OUTPATIENT evaluation with the following chief complaint/s:    Chief Complaint: MS     History of Present Illness:    - Was seen by Dr. Nicole and referred for pelvic floor therapy. These records reviewed. Might consider injections in the future.   - Will start on Friday.   - Has MRI in Nov.   - Still on KeSimpta.   - Has a lot of MS banding. Feels like someone is squeezing her thoracic cage. That is increasing in frequency.   - Robaxin does help.   - Will get bad bone pain on her right side. Feels like her bone is breaking. Happened a couple of months ago, but will happen again if overly tired. Feels bone in origin not in the muscle.   - No big issues with nerve pain.     Review of Systems:  All other pertinent positive review of systems are noted above in HPI.   All other systems reviewed and are negative.    Past Medical History:  Past Medical History:   Diagnosis Date   • Seizure cerebral (HCC) 2/15/2018   • Anesthesia     woke up combative   • Anxiety associated with depression    • ASTHMA    • Migraine without aura, without mention of intractable migraine without mention of status migrainosus    • Other specified symptom associated with female genital organs    • Seizure (HCC)     Last seizure was 2019   • Stroke ([facility])    • Substance abuse ([facility])    • Unspecified  disorder of thyroid     cyst      Past Surgical History:   Procedure Laterality Date   • VAGINAL HYSTERECTOMY TOTAL  1/27/2020    Procedure: HYSTERECTOMY, TOTAL, VAGINAL;  Surgeon: Julian Parker M.D.;  Location: SURGERY SAME DAY [facility];  Service: Gynecology   • CYSTOSCOPY  1/27/2020    Procedure: CYSTOSCOPY;  Surgeon: Julian Parker M.D.;  Location: SURGERY SAME DAY [facility];  Service: Gynecology   • TUBAL COAGULATION LAPAROSCOPIC BILATERAL  7/11/2014    Performed by Julian Parker M.D. at SURGERY SAME DAY [facility]   • SEPTOPLASTY  10/28/2009    Performed by ANNETTE FORDE at SURGERY [facility]   • SOMNOPLASTY  10/28/2009    Performed by ANNETTE FORDE at SURGERY [facility]   • NASAL POLYPECTOMY  10/28/2009    Performed by ANNETTE FORDE at SURGERY [facility]        Current Outpatient Medications:   •  methocarbamol (ROBAXIN) 500 MG Tab, Take 1 Tablet by mouth 3 times a day as needed (Spasms)., Disp: 90 Tablet, Rfl: 0  •  diazePAM (VALIUM) 5 MG Tab, Take 1 tablet 1 to 2 hours before morning.  Take a second tablet approximately 20 minutes before month.  May repeat x1 as needed., Disp: 3 Tablet, Rfl: 0  •  fluconazole (DIFLUCAN) 150 MG tablet, TAKE 1 TABLET BY MOUTH ONCE A MONTH FOR 90 DAYS, Disp: , Rfl:   •  Ofatumumab (KESIMPTA) 20 MG/0.4ML Solution Auto-injector, Inject  under the skin., Disp: , Rfl:   •  topiramate (TOPAMAX) 100 MG Tab, Take 100 mg by mouth every day., Disp: , Rfl:   •  AIMOVIG 70 MG/ML Solution Auto-injector, Inject 70 mg as instructed Q30 DAYS., Disp: , Rfl: 11  •  zonisamide (ZONEGRAN) 100 MG Cap, Take 300 mg by mouth every evening., Disp: , Rfl:   Allergies   Allergen Reactions   • Amitriptyline Unspecified     Suicidal   • Clarithromycin Hives   • Sulfa Drugs Anaphylaxis   • Cantaloupe Itching   • Honey Dew Itching   • Latex Itching   • Lorazepam Unspecified     Hallucinations     • Metoclopramide Unspecified     Muscle spasms   • Penicillin G  "Potassium Vomiting   • Rizatriptan Unspecified     Tremors, confusion     • Sumatriptan Unspecified     \"Makes my head pins and needles\"   • Tape Rash   • Morphine Hives and Itching        Past Social History:  Social History     Socioeconomic History   • Marital status:      Spouse name: Not on file   • Number of children: Not on file   • Years of education: Not on file   • Highest education level: Not on file   Occupational History   • Not on file   Tobacco Use   • Smoking status: Never Smoker   • Smokeless tobacco: Never Used   Vaping Use   • Vaping Use: Never used   Substance and Sexual Activity   • Alcohol use: Not Currently     Comment: rarely   • Drug use: No   • Sexual activity: Yes     Partners: Male   Other Topics Concern   •  Service No   • Blood Transfusions No   • Caffeine Concern No   • Occupational Exposure No   • Hobby Hazards No   • Sleep Concern Yes   • Stress Concern No   • Weight Concern No   • Special Diet No   • Back Care No   • Exercise No   • Bike Helmet No   • Seat Belt Yes   • Self-Exams Yes   Social History Narrative   • Not on file     Social Determinants of Health     Financial Resource Strain:    • Difficulty of Paying Living Expenses:    Food Insecurity:    • Worried About Running Out of Food in the Last Year:    • Ran Out of Food in the Last Year:    Transportation Needs:    • Lack of Transportation (Medical):    • Lack of Transportation (Non-Medical):    Physical Activity:    • Days of Exercise per Week:    • Minutes of Exercise per Session:    Stress:    • Feeling of Stress :    Social Connections:    • Frequency of Communication with Friends and Family:    • Frequency of Social Gatherings with Friends and Family:    • Attends Nondenominational Services:    • Active Member of Clubs or Organizations:    • Attends Club or Organization Meetings:    • Marital Status:    Intimate Partner Violence:    • Fear of Current or Ex-Partner:    • Emotionally Abused:    • Physically Abused: "    • Sexually Abused:         Family History:  Family history was reviewed with the patient, there is no pertinent family history to report.     Depression and Opioid Screening  PHQ-9:  Depression Screen (PHQ-2/PHQ-9) 1/7/2021 3/5/2021 7/7/2021   PHQ-2 Total Score - 0 -   PHQ-2 Total Score 0 - 0   PHQ-9 Total Score - - -     Interpretation of PHQ-9 Total Score   Score Severity   1-4 No Depression   5-9 Mild Depression   10-14 Moderate Depression   15-19 Moderately Severe Depression   20-27 Severe Depression     OBJECTIVE:   Vital Signs:  Vitals:    09/13/21 1207   BP: 112/60   Pulse: 78   Resp: 18   Temp: 36.1 °C (97 °F)   SpO2: 98%        Pertinent Labs:  Lab Results   Component Value Date/Time    SODIUM 138 03/14/2021 06:39 PM    POTASSIUM 3.7 03/14/2021 06:39 PM    CHLORIDE 106 03/14/2021 06:39 PM    CO2 21 03/14/2021 06:39 PM    GLUCOSE 86 03/14/2021 06:39 PM    BUN 11 03/14/2021 06:39 PM    CREATININE 0.59 03/14/2021 06:39 PM    CREATININE 0.9 05/04/2009 09:58 PM       Lab Results   Component Value Date/Time    HBA1C 5.1 03/06/2021 03:50 AM       Lab Results   Component Value Date/Time    WBC 10.5 03/14/2021 06:39 PM    RBC 4.94 03/14/2021 06:39 PM    HEMOGLOBIN 15.7 03/14/2021 06:39 PM    HEMATOCRIT 47.5 (H) 03/14/2021 06:39 PM    MCV 96.2 03/14/2021 06:39 PM    MCH 31.8 03/14/2021 06:39 PM    MCHC 33.1 (L) 03/14/2021 06:39 PM    MPV 10.8 03/14/2021 06:39 PM    NEUTSPOLYS 74.90 (H) 03/14/2021 06:39 PM    LYMPHOCYTES 7.50 (L) 03/14/2021 06:39 PM    MONOCYTES 11.40 03/14/2021 06:39 PM    EOSINOPHILS 4.60 03/14/2021 06:39 PM    BASOPHILS 0.30 03/14/2021 06:39 PM    HYPOCHROMIA 1+ 06/06/2014 10:49 AM       Lab Results   Component Value Date/Time    ASTSGOT 12 03/14/2021 06:39 PM    ALTSGPT 15 03/14/2021 06:39 PM        Physical Exam:   GEN: No apparent distress  HEENT: Head normocephalic, atraumatic.  Sclera nonicteric bilaterally, no ocular discharge appreciated bilaterally.  CV: Extremities warm and  well-perfused, no peripheral edema appreciated bilaterally.  PULMONARY: Breathing nonlabored on room air, no respiratory accessory muscle use.  Not requiring supplemental oxygen.  SKIN: No appreciable skin breakdown on exposed areas of skin.  PSYCH: Mood and affect within normal limits.  NEURO: Awake alert.  Conversational.  Logical thought content.  Ambulatory without assistive device.    Imaging:   X-ray sacrum and coccyx, lumbar spine, pelvis 7/12/2021: Negative for acute finding      ASSESSMENT/PLAN: Rehana Meade  is a 35 y.o. female with rehabilitation history significant for multiple sclerosis (diagnosed 1/2021), here for evaluation. The following plan was discussed with the patient who is in agreement.     Visit Diagnoses     ICD-10-CM   1. MS (multiple sclerosis) ([facility])  G35   2. Spasticity  R25.2   3. Vitamin D deficiency  E55.9        Rehab/Neuro:   1. Multiple sclerosis: Diagnosed 1/2021.  2. Fatigue  3. Lack of appetite  4. Bone pain, right side  -Consultants: Established with neurology, Dr. Aggarwal.  -Therapy: Will start physical therapy this Friday 9/17  -Occupation: Working full-time from home.   -Driving status: Actively driving without issue.  -Equipment: No current equipment needs.  -Counseled patient that her lack of appetite could be due to some of her other medications but she should consult with her neurologist regarding this.      Neuropathic Pain: Affected the left lower extremity in the past, now more so affecting right lower extremity.  Currently not an issue.    Neurogenic Bladder: Urinary urgency. ?  Spastic component.  Resolved as of 6/2021. RBUS normal with incidental angiolipoma.  Saw nephrology.  Confirm it is benign.   -Status: Voiding volitionally.    Spasticity: Developing bandlike pain around the thoracic region.  Robaxin does help.  This only happens 1-2 times per week now but is increasing in frequency.  -Med management: Prescription for Robaxin.  -Counseled on use of  other antispasmodic agents but given that she tolerates Robaxin well and it alleviates her symptoms for now we will continue this medication and reevaluate in the future.    Endo: Vitamin D deficiency.  -Labs reviewed: Vitamin D 14 3/5/2021  -Labs ordered: Vitamin D    Nociceptive pain: Chronic bilateral SI versus lumbar facet mediated versus muscular. - Has pain bilaterally near SI joint. Very sudden and painful completely random onset. No associated provocative factors. No reliving factors. Nothing seems to help (meds/heat/stretching/back rollers/massage).  Not been to PT.  -Therapy: Getting pelvic floor therapy as well as physical therapy  -Following with physiatry-interventional spine service  -Imaging: X-ray pelvis/sacrum/spine - negative for acute finding.     Follow up: 3 months for reevaluation    Total time spent was 32 minutes.  Included in this time is the time spent preparing for the visit including record review, my exam and evaluation, counseling and education regarding that which is aforementioned in the assessment and plan. Time was spent ordering the appropriate labs, tests, procedures, referrals, medications. Discussion involved the patient.    Please note that this dictation was created using voice recognition software. I have made every reasonable attempt to correct obvious errors but there may be errors of grammar and content that I may have overlooked prior to finalization of this note.    Dr. Jacki Gallegos DO, MS  Department of Physical Medicine & Rehabilitation  Neuro Rehabilitation Clinic  [facility]

## 2021-09-14 ENCOUNTER — OFFICE VISIT (OUTPATIENT)
Dept: PHYSICAL MEDICINE AND REHAB | Facility: MEDICAL CENTER | Age: 36
End: 2021-09-14
Payer: COMMERCIAL

## 2021-09-14 VITALS
WEIGHT: 175.71 LBS | DIASTOLIC BLOOD PRESSURE: 74 MMHG | BODY MASS INDEX: 31.13 KG/M2 | OXYGEN SATURATION: 97 % | HEIGHT: 63 IN | TEMPERATURE: 98.5 F | SYSTOLIC BLOOD PRESSURE: 108 MMHG | HEART RATE: 87 BPM

## 2021-09-14 DIAGNOSIS — M53.3 SACROILIAC JOINT DYSFUNCTION OF BOTH SIDES: ICD-10-CM

## 2021-09-14 DIAGNOSIS — G89.29 CHRONIC BILATERAL LOW BACK PAIN WITHOUT SCIATICA: ICD-10-CM

## 2021-09-14 DIAGNOSIS — G35 MS (MULTIPLE SCLEROSIS) (HCC): ICD-10-CM

## 2021-09-14 DIAGNOSIS — N94.10 DYSPAREUNIA, FEMALE: ICD-10-CM

## 2021-09-14 DIAGNOSIS — M54.50 CHRONIC BILATERAL LOW BACK PAIN WITHOUT SCIATICA: ICD-10-CM

## 2021-09-14 PROCEDURE — 99214 OFFICE O/P EST MOD 30 MIN: CPT | Performed by: PHYSICAL MEDICINE & REHABILITATION

## 2021-09-14 ASSESSMENT — PATIENT HEALTH QUESTIONNAIRE - PHQ9
5. POOR APPETITE OR OVEREATING: 1 - SEVERAL DAYS
CLINICAL INTERPRETATION OF PHQ2 SCORE: 0

## 2021-09-14 ASSESSMENT — FIBROSIS 4 INDEX: FIB4 SCORE: 0.6

## 2021-09-14 ASSESSMENT — PAIN SCALES - GENERAL: PAINLEVEL: NO PAIN

## 2021-09-14 NOTE — PROGRESS NOTES
Follow-up patient note, seen by Dr. Gallegos, neurorehab clinic    Physiatry (physical medicine and  Rehabilitation), interventional spine and sports medicine    Date of Service: 09/14/2021    Chief complaint:   Chief Complaint   Patient presents with   • Follow-Up     Sacroiliac joint dysfunction of both sides       HISTORY    HPI: Rehana Meade 35 y.o. female with diagnosis of multiple sclerosis who presents today for followup evaluation of low back pain.     She reports that she has started her pelvic floor therapy at [facility] and feels very hopeful about this.  Today, she is not complaining of pain, 0/10 on the NRS.    Symptoms are not changed significantly from previous visit.  Dr. Gallegos has given her robaxin and she is going to try this for sensation of banding.     We also discussed that she has some dyspareunia.  She has had tubal ligation and hysterectomy      Medical records review:  I reviewed the note from the referring provider Jacki Gallegos D.O.     Previous treatments:    Physical Therapy: No    Previous interventions: none    Previous surgeries to relieve the above pain:  None, see abdominal surgeries noted in HPI      ROS:   Red Flags ROS:   Fever, Chills, Sweats: Denies  Involuntary Weight Loss: Denies  Bladder Incontinence: Denies  Bowel Incontinence: Denies  Saddle Anesthesia: Denies    All other systems reviewed and negative.       PMHx:   Past Medical History:   Diagnosis Date   • Anesthesia     woke up combative   • Anxiety associated with depression    • ASTHMA    • Migraine without aura, without mention of intractable migraine without mention of status migrainosus    • Other specified symptom associated with female genital organs    • Seizure (HCC)     Last seizure was 1/2019   • Seizure cerebral ([facility]) 2/15/2018   • Stroke ([facility])    • Substance abuse ([facility])    • Unspecified disorder of thyroid     cyst       PSHx:   Past Surgical History:   Procedure Laterality Date   •  "VAGINAL HYSTERECTOMY TOTAL  1/27/2020    Procedure: HYSTERECTOMY, TOTAL, VAGINAL;  Surgeon: Julian Parker M.D.;  Location: SURGERY SAME DAY [facility];  Service: Gynecology   • CYSTOSCOPY  1/27/2020    Procedure: CYSTOSCOPY;  Surgeon: Julian Parker M.D.;  Location: SURGERY SAME DAY [facility];  Service: Gynecology   • TUBAL COAGULATION LAPAROSCOPIC BILATERAL  7/11/2014    Performed by Julian Parker M.D. at SURGERY SAME DAY [facility] ORS   • SEPTOPLASTY  10/28/2009    Performed by ANNETTE FORDE at SURGERY [facility]   • SOMNOPLASTY  10/28/2009    Performed by ANNETTE FORDE at SURGERY [facility] ORS   • NASAL POLYPECTOMY  10/28/2009    Performed by ANNETTE FORDE at SURGERY [facility]       Family history   Family History   Problem Relation Age of Onset   • No Known Problems Mother    • No Known Problems Father          Medications:   Current Outpatient Medications   Medication   • methocarbamol (ROBAXIN) 500 MG Tab   • diazePAM (VALIUM) 5 MG Tab   • fluconazole (DIFLUCAN) 150 MG tablet   • Ofatumumab (KESIMPTA) 20 MG/0.4ML Solution Auto-injector   • topiramate (TOPAMAX) 100 MG Tab   • AIMOVIG 70 MG/ML Solution Auto-injector   • zonisamide (ZONEGRAN) 100 MG Cap     No current facility-administered medications for this visit.       Allergies:   Allergies   Allergen Reactions   • Amitriptyline Unspecified     Suicidal   • Clarithromycin Hives   • Sulfa Drugs Anaphylaxis   • Cantaloupe Itching   • Honey Dew Itching   • Latex Itching   • Lorazepam Unspecified     Hallucinations     • Metoclopramide Unspecified     Muscle spasms   • Penicillin G Potassium Vomiting   • Rizatriptan Unspecified     Tremors, confusion     • Sumatriptan Unspecified     \"Makes my head pins and needles\"   • Tape Rash   • Morphine Hives and Itching       Social Hx:   Social History     Socioeconomic History   • Marital status:      Spouse name: Not on file   • Number of children: Not on file   • Years of " "education: Not on file   • Highest education level: Not on file   Occupational History   • Not on file   Tobacco Use   • Smoking status: Never Smoker   • Smokeless tobacco: Never Used   Vaping Use   • Vaping Use: Never used   Substance and Sexual Activity   • Alcohol use: Not Currently     Comment: rarely   • Drug use: No   • Sexual activity: Yes     Partners: Male   Other Topics Concern   •  Service No   • Blood Transfusions No   • Caffeine Concern No   • Occupational Exposure No   • Hobby Hazards No   • Sleep Concern Yes   • Stress Concern No   • Weight Concern No   • Special Diet No   • Back Care No   • Exercise No   • Bike Helmet No   • Seat Belt Yes   • Self-Exams Yes   Social History Narrative   • Not on file     Social Determinants of Health     Financial Resource Strain:    • Difficulty of Paying Living Expenses:    Food Insecurity:    • Worried About Running Out of Food in the Last Year:    • Ran Out of Food in the Last Year:    Transportation Needs:    • Lack of Transportation (Medical):    • Lack of Transportation (Non-Medical):    Physical Activity:    • Days of Exercise per Week:    • Minutes of Exercise per Session:    Stress:    • Feeling of Stress :    Social Connections:    • Frequency of Communication with Friends and Family:    • Frequency of Social Gatherings with Friends and Family:    • Attends Alevism Services:    • Active Member of Clubs or Organizations:    • Attends Club or Organization Meetings:    • Marital Status:    Intimate Partner Violence:    • Fear of Current or Ex-Partner:    • Emotionally Abused:    • Physically Abused:    • Sexually Abused:          EXAMINATION     Physical Exam:   Vitals: /74 (BP Location: Right arm, Patient Position: Sitting, BP Cuff Size: Small adult)   Pulse 87   Temp 36.9 °C (98.5 °F) (Temporal)   Ht 1.6 m (5' 3\")   Wt 79.7 kg (175 lb 11.3 oz)   SpO2 97%     Constitutional:   Body Habitus: Body mass index is 31.13 kg/m².  Cooperation: " Fully cooperates with exam  Appearance: Well-groomed, well-nourished, not disheveled, no acute distress    Eyes: No scleral icterus, no proptosis     ENT -no obvious auditory deficits, wearing a facemask    Skin -no rashes or lesions noted     Respiratory-  breathing comfortable on room air, no audible wheezing    Cardiovascular- No lower extremity edema is noted.     Psychiatric- alert and oriented ×3. Normal affect.     Gait - normal gait, no use of ambulatory device, nonantalgic.     Musculoskeletal -     Thoracic/Lumbar Spine/Sacral Spine/Hips   Inspection: No evidence of atrophy in bilateral lower extremities throughout     Minimal pain with extension and quadrant loading bilaterally    Palpation:   No tenderness to palpation in midline at T1-T12 levels. No tenderness to palpation in the left and right of the midline T1-L5  palpation over SI joint: positive bilaterally    palpation over buttock: positive bilaterally    palpation in hip or over the greater trochanters: negative bilaterally      Lumbar spine Special tests  Neuro tension  Seated straight leg test negative bilaterally      SI joint tests  Observation patient sits on one buttocks: Negative  SI joint compression positive bilaterally    Thigh thrust test positive bilaterally    CORIE test positive bilaterally       Neuro       Key points for the international standards for neurological classification of spinal cord injury (ISNCSCI) to light touch.     Dermatome R L   L2 2 2   L3 2 2   L4 2 2   L5 2 2   S1 2 2   S2 2 2       Motor Exam Lower Extremities    ? Myotome R L   Hip flexion L2 5 5   Knee extension L3 5 5   Ankle dorsiflexion L4 5 5   Toe extension L5 5 5   Ankle plantarflexion S1 5 5       Clonus of the ankle negative bilaterally     Reflexes  ?  R L   Patella  2+ 2+   Achilles   2+ 2+       MEDICAL DECISION MAKING    Medical records review: see under HPI section.     DATA    Labs:   Lab Results   Component Value Date/Time    SODIUM 138  03/14/2021 06:39 PM    POTASSIUM 3.7 03/14/2021 06:39 PM    CHLORIDE 106 03/14/2021 06:39 PM    CO2 21 03/14/2021 06:39 PM    ANION 11.0 03/14/2021 06:39 PM    GLUCOSE 86 03/14/2021 06:39 PM    BUN 11 03/14/2021 06:39 PM    CREATININE 0.59 03/14/2021 06:39 PM    CREATININE 0.9 05/04/2009 09:58 PM    CALCIUM 9.0 03/14/2021 06:39 PM    ASTSGOT 12 03/14/2021 06:39 PM    ALTSGPT 15 03/14/2021 06:39 PM    TBILIRUBIN 0.2 03/14/2021 06:39 PM    ALBUMIN 4.0 03/14/2021 06:39 PM    TOTPROTEIN 6.8 03/14/2021 06:39 PM    GLOBULIN 2.8 03/14/2021 06:39 PM    AGRATIO 1.4 03/14/2021 06:39 PM       Lab Results   Component Value Date/Time    PROTHROMBTM 13.9 01/02/2021 12:24 PM    INR 1.03 01/02/2021 12:24 PM        Lab Results   Component Value Date/Time    WBC 10.5 03/14/2021 06:39 PM    RBC 4.94 03/14/2021 06:39 PM    HEMOGLOBIN 15.7 03/14/2021 06:39 PM    HEMATOCRIT 47.5 (H) 03/14/2021 06:39 PM    MCV 96.2 03/14/2021 06:39 PM    MCH 31.8 03/14/2021 06:39 PM    MCHC 33.1 (L) 03/14/2021 06:39 PM    MPV 10.8 03/14/2021 06:39 PM    NEUTSPOLYS 74.90 (H) 03/14/2021 06:39 PM    LYMPHOCYTES 7.50 (L) 03/14/2021 06:39 PM    MONOCYTES 11.40 03/14/2021 06:39 PM    EOSINOPHILS 4.60 03/14/2021 06:39 PM    BASOPHILS 0.30 03/14/2021 06:39 PM    HYPOCHROMIA 1+ 06/06/2014 10:49 AM        Lab Results   Component Value Date/Time    HBA1C 5.1 03/06/2021 03:50 AM        Imaging: I personally reviewed following images, these are my reads  MRI lumbar spine January 2, 2021  There there is no significant disc bulge central or foraminal stenosis in the lumbar spine    Xray lumbar spine 07/12/2021  Normal lumbar spine    IMAGING radiology reads. I reviewed the following radiology reads     Xray sacrum and coccyx 07/12/2021  Impression: Negative sacrum and coccyx exam    Xray lumbar spine 07/12/2021  Impression: Normal complete lumbar spine series    Xray pelvis 07/10/2021  Impression: No posttraumatic or arthritic bone abnormalities identified      Results  for orders placed during the hospital encounter of 05/08/21  MR-BRAIN-WITH & W/O  Impression  Stable findings of mild MS as described above. No evidence of abnormal enhancement to suggest an active demyelinating process.      Results for orders placed during the hospital encounter of 03/05/21  MR-CERVICAL SPINE-WITH & W/O  Impression  Focal enhancing cord lesion on the RIGHT at C5-C6, in keeping with the provided clinical history of multiple sclerosis       Results for orders placed during the hospital encounter of 01/02/21  MR-LUMBAR SPINE-WITH & W/O  Impression  1.  MRI lumbar spine without and with contrast within normal limits.     Results for orders placed during the hospital encounter of 03/05/21  MR-THORACIC SPINE-WITH & W/O  Impression  No significant abnormality is seen in the MR scan of the thoracic spine. No evidence of demyelinating disease in the thoracic cord.              Diagnosis   Visit Diagnoses     ICD-10-CM   1. Sacroiliac joint dysfunction of both sides  M53.3   2. Chronic bilateral low back pain without sciatica  M54.5    G89.29   3. Dyspareunia, female  N94.10   4. MS (multiple sclerosis) ([facility])  G35           ASSESSMENT:  Rehana Meade 35 y.o. female seen for above     Rehana was seen today for follow-up.    Diagnoses and all orders for this visit:    Sacroiliac joint dysfunction of both sides    Chronic bilateral low back pain without sciatica    Dyspareunia, female    MS (multiple sclerosis) ([facility])        1. Continue with physical therapy.  She just started her pelvic floor therapy.  Plan to continue with PT and will reassess, she is feeling hopeful about the start of her pelvic floor therapy.  2. Discussed that she is going to try to minimize use of methocarbamol.  If she is having difficulty progressing with pelvic floor therapy, discussed that she could consider using it to assist with therapy.  3. Hold on procedures for now, will reassess.      Follow-up: Return in about 3  months (around 12/14/2021).      Thank you very much for asking me to participate in Rehana Meade's care.  Please contact me with any questions or concerns.      Please note that this dictation was created using voice recognition software. I have made every reasonable attempt to correct obvious errors but there may be errors of grammar and content that I may have overlooked prior to finalization of this note.      Winston Nicole MD  Physical Medicine and Rehabilitation  Interventional Spine and Sports Physiatry  [facility]           Jacki Zavaleta D.O.

## 2021-10-05 ENCOUNTER — TELEPHONE (OUTPATIENT)
Dept: NEUROLOGY | Facility: MEDICAL CENTER | Age: 36
End: 2021-10-05

## 2021-10-05 NOTE — TELEPHONE ENCOUNTER
Rehana called today and wanted to know what she can do for the flare ups she's been having, she states that every time she over does it her abs lock up and she cant move.     Please advise, thank you!

## 2021-10-06 ENCOUNTER — APPOINTMENT (OUTPATIENT)
Dept: RADIOLOGY | Facility: MEDICAL CENTER | Age: 36
DRG: 060 | End: 2021-10-06
Attending: EMERGENCY MEDICINE
Payer: COMMERCIAL

## 2021-10-06 ENCOUNTER — HOSPITAL ENCOUNTER (INPATIENT)
Facility: MEDICAL CENTER | Age: 36
LOS: 1 days | DRG: 060 | End: 2021-10-08
Attending: EMERGENCY MEDICINE | Admitting: STUDENT IN AN ORGANIZED HEALTH CARE EDUCATION/TRAINING PROGRAM
Payer: COMMERCIAL

## 2021-10-06 LAB
ALBUMIN SERPL BCP-MCNC: 5.3 G/DL (ref 3.2–4.9)
ALBUMIN/GLOB SERPL: 1.5 G/DL
ALP SERPL-CCNC: 118 U/L (ref 30–99)
ALT SERPL-CCNC: 9 U/L (ref 2–50)
ANION GAP SERPL CALC-SCNC: 16 MMOL/L (ref 7–16)
AST SERPL-CCNC: 11 U/L (ref 12–45)
BASOPHILS # BLD AUTO: 0.7 % (ref 0–1.8)
BASOPHILS # BLD: 0.07 K/UL (ref 0–0.12)
BILIRUB SERPL-MCNC: 0.6 MG/DL (ref 0.1–1.5)
BUN SERPL-MCNC: 10 MG/DL (ref 8–22)
CALCIUM SERPL-MCNC: 10.1 MG/DL (ref 8.5–10.5)
CHLORIDE SERPL-SCNC: 104 MMOL/L (ref 96–112)
CO2 SERPL-SCNC: 21 MMOL/L (ref 20–33)
CREAT SERPL-MCNC: 0.76 MG/DL (ref 0.5–1.4)
EOSINOPHIL # BLD AUTO: 0.31 K/UL (ref 0–0.51)
EOSINOPHIL NFR BLD: 3 % (ref 0–6.9)
ERYTHROCYTE [DISTWIDTH] IN BLOOD BY AUTOMATED COUNT: 44.5 FL (ref 35.9–50)
GLOBULIN SER CALC-MCNC: 3.5 G/DL (ref 1.9–3.5)
GLUCOSE SERPL-MCNC: 87 MG/DL (ref 65–99)
HCG SERPL QL: NEGATIVE
HCT VFR BLD AUTO: 51 % (ref 37–47)
HGB BLD-MCNC: 16.6 G/DL (ref 12–16)
IMM GRANULOCYTES # BLD AUTO: 0.05 K/UL (ref 0–0.11)
IMM GRANULOCYTES NFR BLD AUTO: 0.5 % (ref 0–0.9)
LYMPHOCYTES # BLD AUTO: 2.62 K/UL (ref 1–4.8)
LYMPHOCYTES NFR BLD: 25.5 % (ref 22–41)
MCH RBC QN AUTO: 30.6 PG (ref 27–33)
MCHC RBC AUTO-ENTMCNC: 32.5 G/DL (ref 33.6–35)
MCV RBC AUTO: 93.9 FL (ref 81.4–97.8)
MONOCYTES # BLD AUTO: 0.8 K/UL (ref 0–0.85)
MONOCYTES NFR BLD AUTO: 7.8 % (ref 0–13.4)
NEUTROPHILS # BLD AUTO: 6.43 K/UL (ref 2–7.15)
NEUTROPHILS NFR BLD: 62.5 % (ref 44–72)
NRBC # BLD AUTO: 0 K/UL
NRBC BLD-RTO: 0 /100 WBC
PLATELET # BLD AUTO: 364 K/UL (ref 164–446)
PMV BLD AUTO: 9.8 FL (ref 9–12.9)
POTASSIUM SERPL-SCNC: 3.3 MMOL/L (ref 3.6–5.5)
PROT SERPL-MCNC: 8.8 G/DL (ref 6–8.2)
RBC # BLD AUTO: 5.43 M/UL (ref 4.2–5.4)
SODIUM SERPL-SCNC: 141 MMOL/L (ref 135–145)
WBC # BLD AUTO: 10.3 K/UL (ref 4.8–10.8)

## 2021-10-06 PROCEDURE — 83735 ASSAY OF MAGNESIUM: CPT

## 2021-10-06 PROCEDURE — 36415 COLL VENOUS BLD VENIPUNCTURE: CPT

## 2021-10-06 PROCEDURE — 700111 HCHG RX REV CODE 636 W/ 250 OVERRIDE (IP): Performed by: EMERGENCY MEDICINE

## 2021-10-06 PROCEDURE — 72156 MRI NECK SPINE W/O & W/DYE: CPT

## 2021-10-06 PROCEDURE — 84703 CHORIONIC GONADOTROPIN ASSAY: CPT

## 2021-10-06 PROCEDURE — 85025 COMPLETE CBC W/AUTO DIFF WBC: CPT

## 2021-10-06 PROCEDURE — 96374 THER/PROPH/DIAG INJ IV PUSH: CPT

## 2021-10-06 PROCEDURE — 80061 LIPID PANEL: CPT

## 2021-10-06 PROCEDURE — 96375 TX/PRO/DX INJ NEW DRUG ADDON: CPT

## 2021-10-06 PROCEDURE — 700117 HCHG RX CONTRAST REV CODE 255: Performed by: EMERGENCY MEDICINE

## 2021-10-06 PROCEDURE — 99285 EMERGENCY DEPT VISIT HI MDM: CPT

## 2021-10-06 PROCEDURE — 70553 MRI BRAIN STEM W/O & W/DYE: CPT

## 2021-10-06 PROCEDURE — 80053 COMPREHEN METABOLIC PANEL: CPT

## 2021-10-06 PROCEDURE — A9576 INJ PROHANCE MULTIPACK: HCPCS | Performed by: EMERGENCY MEDICINE

## 2021-10-06 PROCEDURE — 83036 HEMOGLOBIN GLYCOSYLATED A1C: CPT

## 2021-10-06 RX ORDER — MIDAZOLAM HYDROCHLORIDE 1 MG/ML
1 INJECTION INTRAMUSCULAR; INTRAVENOUS ONCE
Status: COMPLETED | OUTPATIENT
Start: 2021-10-06 | End: 2021-10-06

## 2021-10-06 RX ADMIN — GADOTERIDOL 15 ML: 279.3 INJECTION, SOLUTION INTRAVENOUS at 23:26

## 2021-10-06 RX ADMIN — MIDAZOLAM 1 MG: 1 INJECTION INTRAMUSCULAR; INTRAVENOUS at 22:43

## 2021-10-06 ASSESSMENT — FIBROSIS 4 INDEX: FIB4 SCORE: 0.6

## 2021-10-06 NOTE — ED TRIAGE NOTES
Chief Complaint   Patient presents with   • Numbness     bilateral legs and left arm  x1wk, history of MS     Pt wheeled to triage with above complaints. Pt said that she was diagnosed with MS beginning of this year and had similar symptoms.  and strengths are equal, no facial droop, speaking full sentences.   Pt has been trying to get hold of her doctor but unable to .

## 2021-10-07 ENCOUNTER — APPOINTMENT (OUTPATIENT)
Dept: RADIOLOGY | Facility: MEDICAL CENTER | Age: 36
DRG: 060 | End: 2021-10-07
Attending: STUDENT IN AN ORGANIZED HEALTH CARE EDUCATION/TRAINING PROGRAM
Payer: COMMERCIAL

## 2021-10-07 PROBLEM — E87.6 HYPOKALEMIA: Status: ACTIVE | Noted: 2021-10-07

## 2021-10-07 PROBLEM — E66.9 OBESITY: Status: ACTIVE | Noted: 2021-10-07

## 2021-10-07 LAB
CHOLEST SERPL-MCNC: 254 MG/DL (ref 100–199)
EST. AVERAGE GLUCOSE BLD GHB EST-MCNC: 105 MG/DL
HBA1C MFR BLD: 5.3 % (ref 4–5.6)
HDLC SERPL-MCNC: 111 MG/DL
LDLC SERPL CALC-MCNC: 134 MG/DL
MAGNESIUM SERPL-MCNC: 2.4 MG/DL (ref 1.5–2.5)
TRIGL SERPL-MCNC: 44 MG/DL (ref 0–149)

## 2021-10-07 PROCEDURE — 700102 HCHG RX REV CODE 250 W/ 637 OVERRIDE(OP): Performed by: STUDENT IN AN ORGANIZED HEALTH CARE EDUCATION/TRAINING PROGRAM

## 2021-10-07 PROCEDURE — 700102 HCHG RX REV CODE 250 W/ 637 OVERRIDE(OP): Performed by: HOSPITALIST

## 2021-10-07 PROCEDURE — A9270 NON-COVERED ITEM OR SERVICE: HCPCS | Performed by: HOSPITALIST

## 2021-10-07 PROCEDURE — 770001 HCHG ROOM/CARE - MED/SURG/GYN PRIV*

## 2021-10-07 PROCEDURE — 97112 NEUROMUSCULAR REEDUCATION: CPT

## 2021-10-07 PROCEDURE — 700105 HCHG RX REV CODE 258: Performed by: STUDENT IN AN ORGANIZED HEALTH CARE EDUCATION/TRAINING PROGRAM

## 2021-10-07 PROCEDURE — 99223 1ST HOSP IP/OBS HIGH 75: CPT | Performed by: STUDENT IN AN ORGANIZED HEALTH CARE EDUCATION/TRAINING PROGRAM

## 2021-10-07 PROCEDURE — A9270 NON-COVERED ITEM OR SERVICE: HCPCS | Performed by: STUDENT IN AN ORGANIZED HEALTH CARE EDUCATION/TRAINING PROGRAM

## 2021-10-07 PROCEDURE — 700111 HCHG RX REV CODE 636 W/ 250 OVERRIDE (IP): Performed by: STUDENT IN AN ORGANIZED HEALTH CARE EDUCATION/TRAINING PROGRAM

## 2021-10-07 PROCEDURE — 700111 HCHG RX REV CODE 636 W/ 250 OVERRIDE (IP): Performed by: EMERGENCY MEDICINE

## 2021-10-07 PROCEDURE — 97162 PT EVAL MOD COMPLEX 30 MIN: CPT

## 2021-10-07 PROCEDURE — 700101 HCHG RX REV CODE 250: Performed by: STUDENT IN AN ORGANIZED HEALTH CARE EDUCATION/TRAINING PROGRAM

## 2021-10-07 PROCEDURE — 99222 1ST HOSP IP/OBS MODERATE 55: CPT | Mod: GC | Performed by: PSYCHIATRY & NEUROLOGY

## 2021-10-07 RX ORDER — PROMETHAZINE HYDROCHLORIDE 25 MG/1
12.5-25 SUPPOSITORY RECTAL EVERY 4 HOURS PRN
Status: DISCONTINUED | OUTPATIENT
Start: 2021-10-07 | End: 2021-10-08 | Stop reason: HOSPADM

## 2021-10-07 RX ORDER — FAMOTIDINE 20 MG/1
20 TABLET, FILM COATED ORAL 2 TIMES DAILY
Status: DISCONTINUED | OUTPATIENT
Start: 2021-10-07 | End: 2021-10-08 | Stop reason: HOSPADM

## 2021-10-07 RX ORDER — ACETAMINOPHEN 325 MG/1
650 TABLET ORAL EVERY 6 HOURS PRN
Status: DISCONTINUED | OUTPATIENT
Start: 2021-10-07 | End: 2021-10-08 | Stop reason: HOSPADM

## 2021-10-07 RX ORDER — PROMETHAZINE HYDROCHLORIDE 25 MG/1
12.5-25 TABLET ORAL EVERY 4 HOURS PRN
Status: DISCONTINUED | OUTPATIENT
Start: 2021-10-07 | End: 2021-10-08 | Stop reason: HOSPADM

## 2021-10-07 RX ORDER — ENALAPRILAT 1.25 MG/ML
1.25 INJECTION INTRAVENOUS EVERY 6 HOURS PRN
Status: DISCONTINUED | OUTPATIENT
Start: 2021-10-07 | End: 2021-10-08 | Stop reason: HOSPADM

## 2021-10-07 RX ORDER — ZONISAMIDE 50 MG/1
300 CAPSULE ORAL EVERY EVENING
Status: DISCONTINUED | OUTPATIENT
Start: 2021-10-07 | End: 2021-10-08 | Stop reason: HOSPADM

## 2021-10-07 RX ORDER — DIAZEPAM 5 MG/ML
5 INJECTION, SOLUTION INTRAMUSCULAR; INTRAVENOUS EVERY 6 HOURS PRN
Status: DISCONTINUED | OUTPATIENT
Start: 2021-10-07 | End: 2021-10-08 | Stop reason: HOSPADM

## 2021-10-07 RX ORDER — LABETALOL HYDROCHLORIDE 5 MG/ML
10 INJECTION, SOLUTION INTRAVENOUS EVERY 4 HOURS PRN
Status: DISCONTINUED | OUTPATIENT
Start: 2021-10-07 | End: 2021-10-08 | Stop reason: HOSPADM

## 2021-10-07 RX ORDER — PROCHLORPERAZINE EDISYLATE 5 MG/ML
5-10 INJECTION INTRAMUSCULAR; INTRAVENOUS EVERY 4 HOURS PRN
Status: DISCONTINUED | OUTPATIENT
Start: 2021-10-07 | End: 2021-10-08 | Stop reason: HOSPADM

## 2021-10-07 RX ORDER — POLYETHYLENE GLYCOL 3350 17 G/17G
1 POWDER, FOR SOLUTION ORAL
Status: DISCONTINUED | OUTPATIENT
Start: 2021-10-07 | End: 2021-10-08 | Stop reason: HOSPADM

## 2021-10-07 RX ORDER — CHOLECALCIFEROL (VITAMIN D3) 125 MCG
5 CAPSULE ORAL NIGHTLY
Status: DISCONTINUED | OUTPATIENT
Start: 2021-10-07 | End: 2021-10-08 | Stop reason: HOSPADM

## 2021-10-07 RX ORDER — AMOXICILLIN 250 MG
2 CAPSULE ORAL 2 TIMES DAILY
Status: DISCONTINUED | OUTPATIENT
Start: 2021-10-07 | End: 2021-10-08 | Stop reason: HOSPADM

## 2021-10-07 RX ORDER — BUTALBITAL, ACETAMINOPHEN AND CAFFEINE 50; 325; 40 MG/1; MG/1; MG/1
1 TABLET ORAL EVERY 6 HOURS PRN
Status: DISCONTINUED | OUTPATIENT
Start: 2021-10-07 | End: 2021-10-08 | Stop reason: HOSPADM

## 2021-10-07 RX ORDER — ONDANSETRON 4 MG/1
4 TABLET, ORALLY DISINTEGRATING ORAL EVERY 4 HOURS PRN
Status: DISCONTINUED | OUTPATIENT
Start: 2021-10-07 | End: 2021-10-08 | Stop reason: HOSPADM

## 2021-10-07 RX ORDER — ONDANSETRON 2 MG/ML
4 INJECTION INTRAMUSCULAR; INTRAVENOUS EVERY 4 HOURS PRN
Status: DISCONTINUED | OUTPATIENT
Start: 2021-10-07 | End: 2021-10-08 | Stop reason: HOSPADM

## 2021-10-07 RX ORDER — KETOROLAC TROMETHAMINE 30 MG/ML
30 INJECTION, SOLUTION INTRAMUSCULAR; INTRAVENOUS EVERY 6 HOURS PRN
Status: DISCONTINUED | OUTPATIENT
Start: 2021-10-07 | End: 2021-10-08 | Stop reason: HOSPADM

## 2021-10-07 RX ORDER — BISACODYL 10 MG
10 SUPPOSITORY, RECTAL RECTAL
Status: DISCONTINUED | OUTPATIENT
Start: 2021-10-07 | End: 2021-10-08 | Stop reason: HOSPADM

## 2021-10-07 RX ORDER — POTASSIUM CHLORIDE 20 MEQ/1
40 TABLET, EXTENDED RELEASE ORAL ONCE
Status: COMPLETED | OUTPATIENT
Start: 2021-10-07 | End: 2021-10-07

## 2021-10-07 RX ORDER — METHOCARBAMOL 500 MG/1
500 TABLET, FILM COATED ORAL 3 TIMES DAILY PRN
Status: DISCONTINUED | OUTPATIENT
Start: 2021-10-07 | End: 2021-10-08 | Stop reason: HOSPADM

## 2021-10-07 RX ORDER — SODIUM CHLORIDE, SODIUM LACTATE, POTASSIUM CHLORIDE, CALCIUM CHLORIDE 600; 310; 30; 20 MG/100ML; MG/100ML; MG/100ML; MG/100ML
INJECTION, SOLUTION INTRAVENOUS CONTINUOUS
Status: ACTIVE | OUTPATIENT
Start: 2021-10-07 | End: 2021-10-07

## 2021-10-07 RX ORDER — METHYLPREDNISOLONE SODIUM SUCCINATE 1 G/16ML
1000 INJECTION, POWDER, LYOPHILIZED, FOR SOLUTION INTRAMUSCULAR; INTRAVENOUS ONCE
Status: COMPLETED | OUTPATIENT
Start: 2021-10-07 | End: 2021-10-07

## 2021-10-07 RX ORDER — CLONIDINE HYDROCHLORIDE 0.1 MG/1
0.1 TABLET ORAL EVERY 6 HOURS PRN
Status: DISCONTINUED | OUTPATIENT
Start: 2021-10-07 | End: 2021-10-08 | Stop reason: HOSPADM

## 2021-10-07 RX ORDER — TOPIRAMATE 100 MG/1
100 TABLET, FILM COATED ORAL DAILY
Status: DISCONTINUED | OUTPATIENT
Start: 2021-10-07 | End: 2021-10-08 | Stop reason: HOSPADM

## 2021-10-07 RX ADMIN — DIAZEPAM 5 MG: 5 INJECTION, SOLUTION INTRAMUSCULAR; INTRAVENOUS at 02:06

## 2021-10-07 RX ADMIN — BUTALBITAL, ACETAMINOPHEN, AND CAFFEINE 1 TABLET: 50; 325; 40 TABLET ORAL at 20:12

## 2021-10-07 RX ADMIN — DOCUSATE SODIUM 50 MG AND SENNOSIDES 8.6 MG 2 TABLET: 8.6; 5 TABLET, FILM COATED ORAL at 05:49

## 2021-10-07 RX ADMIN — FAMOTIDINE 20 MG: 20 TABLET, FILM COATED ORAL at 17:32

## 2021-10-07 RX ADMIN — METHOCARBAMOL 500 MG: 500 TABLET ORAL at 20:15

## 2021-10-07 RX ADMIN — POTASSIUM CHLORIDE 40 MEQ: 1500 TABLET, EXTENDED RELEASE ORAL at 02:13

## 2021-10-07 RX ADMIN — SODIUM CHLORIDE, POTASSIUM CHLORIDE, SODIUM LACTATE AND CALCIUM CHLORIDE: 600; 310; 30; 20 INJECTION, SOLUTION INTRAVENOUS at 00:42

## 2021-10-07 RX ADMIN — TOPIRAMATE 100 MG: 100 TABLET, FILM COATED ORAL at 03:53

## 2021-10-07 RX ADMIN — Medication 5 MG: at 02:13

## 2021-10-07 RX ADMIN — FAMOTIDINE 20 MG: 10 INJECTION INTRAVENOUS at 05:49

## 2021-10-07 RX ADMIN — ZONISAMIDE 300 MG: 50 CAPSULE ORAL at 17:32

## 2021-10-07 RX ADMIN — DIAZEPAM 5 MG: 5 INJECTION, SOLUTION INTRAMUSCULAR; INTRAVENOUS at 17:31

## 2021-10-07 RX ADMIN — Medication 5 MG: at 20:12

## 2021-10-07 RX ADMIN — ZONISAMIDE 300 MG: 50 CAPSULE ORAL at 03:52

## 2021-10-07 RX ADMIN — METHYLPREDNISOLONE SODIUM SUCCINATE 1000 MG: 1 INJECTION, POWDER, FOR SOLUTION INTRAMUSCULAR; INTRAVENOUS at 02:17

## 2021-10-07 RX ADMIN — FAMOTIDINE 20 MG: 10 INJECTION INTRAVENOUS at 00:42

## 2021-10-07 RX ADMIN — SODIUM CHLORIDE 1000 MG: 9 INJECTION, SOLUTION INTRAVENOUS at 14:06

## 2021-10-07 RX ADMIN — BUTALBITAL, ACETAMINOPHEN, AND CAFFEINE 1 TABLET: 50; 325; 40 TABLET ORAL at 02:06

## 2021-10-07 RX ADMIN — DOCUSATE SODIUM 50 MG AND SENNOSIDES 8.6 MG 2 TABLET: 8.6; 5 TABLET, FILM COATED ORAL at 17:32

## 2021-10-07 ASSESSMENT — PAIN DESCRIPTION - PAIN TYPE
TYPE: ACUTE PAIN
TYPE: CHRONIC PAIN

## 2021-10-07 ASSESSMENT — ENCOUNTER SYMPTOMS
DIARRHEA: 0
CHILLS: 0
EYE REDNESS: 1
SHORTNESS OF BREATH: 0
INSOMNIA: 0
BLURRED VISION: 1
HEADACHES: 1
EYE PAIN: 1
BRUISES/BLEEDS EASILY: 0
COUGH: 0
PALPITATIONS: 0
TINGLING: 1
SORE THROAT: 0
FEVER: 0
WHEEZING: 0
FOCAL WEAKNESS: 1
CONSTIPATION: 0
DEPRESSION: 0
LOSS OF CONSCIOUSNESS: 0
NAUSEA: 0
NECK PAIN: 0
HEARTBURN: 0
BLOOD IN STOOL: 0
DOUBLE VISION: 0
BACK PAIN: 0
VOMITING: 0
WEAKNESS: 1

## 2021-10-07 ASSESSMENT — COGNITIVE AND FUNCTIONAL STATUS - GENERAL
STANDING UP FROM CHAIR USING ARMS: A LITTLE
SUGGESTED CMS G CODE MODIFIER MOBILITY: CJ
CLIMB 3 TO 5 STEPS WITH RAILING: A LITTLE
WALKING IN HOSPITAL ROOM: A LITTLE
MOBILITY SCORE: 21

## 2021-10-07 ASSESSMENT — GAIT ASSESSMENTS
ASSISTIVE DEVICE: FRONT WHEEL WALKER
DEVIATION: INCREASED BASE OF SUPPORT;BRADYKINETIC
DISTANCE (FEET): 75
GAIT LEVEL OF ASSIST: SUPERVISED

## 2021-10-07 NOTE — PROGRESS NOTES
Patient arrived to unit with transport on gurney and was able to ambulate to bathroom and back to bed with standby assist and walker. Patient is AOx4, denies any sob or n/v but reporst numbness/tiningling to the left side of her face, left arm, and bilateral feet. Patient also reports generalize 8/10 pain all throughout her body. Patient medicated per MAR. Assessment completed, VSS, continuous pulse ox in place. Updated patient on plan of care, all need met at this time. Bed locked and in lowest position, call light and personal belongings within reach.

## 2021-10-07 NOTE — H&P
Hospital Medicine History & Physical Note    Date of Service  10/7/2021    Primary Care Physician  Mack Bernard M.D.    Consultants  Neurology    Code Status  Full Code    Chief Complaint  Chief Complaint   Patient presents with   • Numbness     bilateral legs and left arm  x1wk, history of MS       History of Presenting Illness  Rehana Meade is a 35 y.o. female who presented 10/6/2021 with chief complaint of bilateral lower extremity weakness, left upper extremity weakness, headache and left-sided blurred vision.  Patient states that she was diagnosed with multiple sclerosis in January and has had for MS flares in the past year.  She is unsure if she has primary progressive or relapsing remitting MS.  She states that she is compliant with her home medication regimen and has not been vaccinated for COVID-19 due to her monoclonal antibody that she is taking.  She states that she last had seizure-like activity in April and has not had any since.  She states she has taken high-dose steroids in the past for each flare.  She otherwise also admits to hematochezia secondary to hemorrhoids but denies the following ROS: Substernal chest pain, unintentional weight loss, cough with sputum production, anosmia, ageusia, hemoptysis, nausea, vomiting, constipation, diarrhea, melena, dysuria, hematuria, incoordination vertigo, syncope.  Patient does admit to paresthesias lower extremity.    Vital signs at time presentation were as follows: 97.2, 84, 16, 120/80, 99% room air.    MRI brain/cervical spine with and without performed and per my interpretation of brain unremarkable and mild inflammatory change in cervical spinal cord and pending evaluation of read by radiologist.    CBC was performed and reveals hemoconcentrated labs with hemoglobin 16.6, hematocrit 51.0 and otherwise unremarkable.  Beta-hCG tested negative.  CMP revealed mild hypokalemia of 3.3 and otherwise relatively unremarkable.  Magnesium level was  checked at 2.4 and urinalysis ordered and pending.    Neurology was consulted by ERP and requested admission to the hospital, IV steroids and famotidine and evaluation in a.m.  Hospitalist consulted for admission and patient agrees to full CODE STATUS and alert and oriented x4.  She agrees to admission for her multiple sclerosis flare will be optimized in ED and subsequently transferred to medical [facility] floor for further optimization of medical management.      I discussed the plan of care with patient.    Review of Systems  Review of Systems   Constitutional: Negative for chills and fever.   HENT: Negative for congestion and sore throat.    Eyes: Positive for blurred vision, pain and redness. Negative for double vision.   Respiratory: Negative for cough, shortness of breath and wheezing.    Cardiovascular: Negative for chest pain and palpitations.   Gastrointestinal: Negative for blood in stool, constipation, diarrhea, heartburn, melena, nausea and vomiting. Abdominal pain: Complains of abdominal muscle spasms.   Genitourinary: Negative for dysuria and frequency.   Musculoskeletal: Negative for back pain and neck pain.   Skin: Negative for itching and rash.   Neurological: Positive for tingling, focal weakness, weakness and headaches. Negative for loss of consciousness.   Endo/Heme/Allergies: Negative for environmental allergies. Does not bruise/bleed easily.   Psychiatric/Behavioral: Negative for depression. The patient does not have insomnia.        Past Medical History   has a past medical history of Anesthesia, Anxiety associated with depression, ASTHMA, Migraine without aura, without mention of intractable migraine without mention of status migrainosus, Other specified symptom associated with female genital organs, Seizure (HCC), Seizure cerebral (HCC) (2/15/2018), Stroke (HCC), Substance abuse (HCC), and Unspecified disorder of thyroid.    Surgical History   has a past surgical history that includes  "septoplasty (10/28/2009); somnoplasty (10/28/2009); nasal polypectomy (10/28/2009); tubal coagulation laparoscopic bilateral (7/11/2014); vaginal hysterectomy total (1/27/2020); and cystoscopy (1/27/2020).     Family History  family history includes No Known Problems in her father and mother.   Family history reviewed with patient. There is no family history that is pertinent to the chief complaint.     Social History   reports that she has never smoked. She has never used smokeless tobacco. She reports previous alcohol use. She reports that she does not use drugs.    Allergies  Allergies   Allergen Reactions   • Amitriptyline Unspecified     Suicidal   • Clarithromycin Hives   • Sulfa Drugs Anaphylaxis   • Cantaloupe Itching   • Honey Dew Itching   • Latex Itching   • Lorazepam Unspecified     Hallucinations     • Metoclopramide Unspecified     Muscle spasms   • Penicillin G Potassium Vomiting   • Rizatriptan Unspecified     Tremors, confusion     • Sumatriptan Unspecified     \"Makes my head pins and needles\"   • Tape Rash   • Morphine Hives and Itching       Medications  Prior to Admission Medications   Prescriptions Last Dose Informant Patient Reported? Taking?   AIMOVIG 70 MG/ML Solution Auto-injector  Patient Yes No   Sig: Inject 70 mg as instructed Q30 DAYS.   Ofatumumab (KESIMPTA) 20 MG/0.4ML Solution Auto-injector   Yes No   Sig: Inject  under the skin.   diazePAM (VALIUM) 5 MG Tab   No No   Sig: Take 1 tablet 1 to 2 hours before morning.  Take a second tablet approximately 20 minutes before month.  May repeat x1 as needed.   fluconazole (DIFLUCAN) 150 MG tablet   Yes No   Sig: TAKE 1 TABLET BY MOUTH ONCE A MONTH FOR 90 DAYS   methocarbamol (ROBAXIN) 500 MG Tab   No No   Sig: Take 1 Tablet by mouth 3 times a day as needed (Spasms).   topiramate (TOPAMAX) 100 MG Tab  Patient Yes No   Sig: Take 100 mg by mouth every day.   zonisamide (ZONEGRAN) 100 MG Cap  Patient Yes No   Sig: Take 300 mg by mouth every " evening.      Facility-Administered Medications: None       Physical Exam  Temp:  [36.2 °C (97.2 °F)] 36.2 °C (97.2 °F)  Pulse:  [61-87] 86  Resp:  [16] 16  BP: (110-121)/(71-85) 110/82  SpO2:  [97 %-100 %] 99 %  Blood Pressure: 110/82   Temperature: 36.2 °C (97.2 °F)   Pulse: 86   Respiration: 16   Pulse Oximetry: 99 %       Physical Exam  Constitutional:       Appearance: Normal appearance.   HENT:      Head: Normocephalic and atraumatic.      Mouth/Throat:      Mouth: Mucous membranes are dry.      Pharynx: Oropharynx is clear. No posterior oropharyngeal erythema.      Comments: Dry mucous membranes  Eyes:      General: No scleral icterus.     Extraocular Movements: Extraocular movements intact.      Pupils: Pupils are equal, round, and reactive to light.      Comments: Injected conjunctiva   Neck:      Vascular: No carotid bruit.   Cardiovascular:      Rate and Rhythm: Normal rate and regular rhythm.      Pulses: Normal pulses.      Heart sounds: Normal heart sounds. No murmur heard.   No friction rub. No gallop.    Pulmonary:      Effort: Pulmonary effort is normal.      Breath sounds: Normal breath sounds. No wheezing, rhonchi or rales.   Abdominal:      General: Abdomen is flat. Bowel sounds are normal. There is no distension.      Palpations: There is no mass.      Tenderness: There is no abdominal tenderness. There is no rebound.   Musculoskeletal:         General: No swelling. Normal range of motion.      Cervical back: Normal range of motion.      Right lower leg: No edema.      Left lower leg: No edema.   Lymphadenopathy:      Cervical: No cervical adenopathy.   Skin:     General: Skin is warm and dry.      Capillary Refill: Capillary refill takes less than 2 seconds.      Findings: No erythema or rash.   Neurological:      Mental Status: She is alert and oriented to person, place, and time.      Cranial Nerves: No cranial nerve deficit.      Sensory: No sensory deficit.      Motor: Weakness present.       Comments: Moves all 4 extremities, alert and oriented x4, no tremulous activity, bilateral lower extremity muscle strength 4/5 knee flexion, 4/5 knee extension bilaterally.  Left lower extremity dorsiflexion 4/5, plantar flexion 5/5.  Right plantar flexion 4/5, dorsiflexion 4/5.  Left upper extremity  strength 4/5, shoulder abduction 3/5, shoulder abduction 3/5, elbow extension 4/5, elbow flexion 3/5.  Cranial nerves II through XII intact   Psychiatric:         Mood and Affect: Mood normal.         Behavior: Behavior normal.         Laboratory:  Recent Labs     10/06/21  2225   WBC 10.3   RBC 5.43*   HEMOGLOBIN 16.6*   HEMATOCRIT 51.0*   MCV 93.9   MCH 30.6   MCHC 32.5*   RDW 44.5   PLATELETCT 364   MPV 9.8     Recent Labs     10/06/21  2225   SODIUM 141   POTASSIUM 3.3*   CHLORIDE 104   CO2 21   GLUCOSE 87   BUN 10   CREATININE 0.76   CALCIUM 10.1     Recent Labs     10/06/21  2225   ALTSGPT 9   ASTSGOT 11*   ALKPHOSPHAT 118*   TBILIRUBIN 0.6   GLUCOSE 87         No results for input(s): NTPROBNP in the last 72 hours.  Recent Labs     10/06/21  2225   TRIGLYCERIDE 44      *     No results for input(s): TROPONINT in the last 72 hours.    Imaging:  MR-BRAIN-WITH & W/O    (Results Pending)   MR-CERVICAL SPINE-WITH & W/O    (Results Pending)     I reviewed and interpreted MR I cervical spine and do appreciate mild edematous change within the spinal cord.      Assessment/Plan:  I anticipate this patient will require at least two midnights for appropriate medical management, necessitating inpatient admission.    * MS (multiple sclerosis) ([facility])- (present on admission)  Assessment & Plan  Currently MS flare  ESR CRP ordered and pending  MRI brain/cervical spine ordered and pending results  Images as above  Neurology consulted and recommends 1 g systemic steroids with GI prophylaxis  Admit to neurology  PT for evaluation  Follow-up outpatient neurology after discharge      Hypokalemia- (present on  admission)  Assessment & Plan  40 mEq K-Dur oral given x1 in ED  BMP in a.m.    Obesity- (present on admission)  Assessment & Plan  Lipid panel ordered and pending  A1c ordered and pending  Strong recommendation for follow-up with outpatient PCP for lifestyle modification including diet and exercise regimen of at least 30 minutes brisk cardiovascular exercise 3-5 times weekly.    Seizure disorder (HCC)- (present on admission)  Assessment & Plan  Seizure precaution  Fall precaution  Continue zonisamide 300 mg every evening    Migraine without aura- (present on admission)  Assessment & Plan  Fioricet/Toradol for pain control  Continue topiramate 100 mg p.o. daily      VTE prophylaxis: enoxaparin ppx

## 2021-10-07 NOTE — ED NOTES
"Triage rounding: Pt sitting up triage chair, nad. Updated with wait times, apologized for the wait. Pt said that her abdomen \" giving out on me\" . Pt still able to hold self up.   Vitals rechecked ,stable    "

## 2021-10-07 NOTE — ED NOTES
Patient vital signs rechecked and documented per [facility]. Patient denies any new needs at this time.  Patient updated on wait times, thanked for patience. Pt informed to alert triage tech or triage RN with any needs and/or changes in condition; patient verbalized understanding.

## 2021-10-07 NOTE — THERAPY
"Physical Therapy   Initial Evaluation     Patient Name: Rehana Meade  Age:  35 y.o., Sex:  female  Medical Record #: [id number]  Today's Date: 10/7/2021     Precautions  Precautions: Fall Risk    Assessment  Rehana Meade is a 35 y.o. female with a PMHx of MS, migraines, history of septic meningitis and seizure disorder.  She presented to the ED yesterday with progressive neurologic symptoms.  3 days ago, she started having abdominal spasms and numbness in lower extremities which progressed to numbness and weakness in left arm and some blurry vision in the left eye.  Patient presents to PT eval with impaired sensation, strength, balance and coordination consistent with MS flare. She reports being indep at baseline no AD but does have assist from her spouse and family as needed. She was able to demo short distance gait today with Saranya and will need to navigate stairs at home. Will continue to follow while in house.     Plan    Recommend Physical Therapy 4 times per week until therapy goals are met for the following treatments:  Bed Mobility, Gait Training, Neuro Re-Education / Balance, Stair Training, Therapeutic Activities and Therapeutic Exercises    DC Equipment Recommendations: Tub Transfer Bench  Discharge Recommendations: Recommend outpatient physical therapy services to address higher level deficits       Subjective    \"I'm a total mess\"      Objective       10/07/21 1300   Precautions   Precautions Fall Risk   Pain 0 - 10 Group   Therapist Pain Assessment 0;Post Activity Pain Same as Prior to Activity   Prior Living Situation   Prior Services Home-Independent   Housing / Facility 2 Story House   Steps Into Home 0   Steps In Home 12   Rail Left Rail (Steps in Home)   Bathroom Set up Bathtub / Shower Combination   Equipment Owned Front-Wheel Walker;4-Wheel Walker   Lives with - Patient's Self Care Capacity Spouse;Child Less than 18 Years of Age   Comments Lives with , 4 children and a " "roommate who can all assist as needed    Prior Level of Functional Mobility   Bed Mobility Independent   Transfer Status Independent   Ambulation Independent   Distance Ambulation (Feet)   (community distances )   Assistive Devices Used None   Stairs Independent   Comments when she is not having an MS flare she is indep with mobility no AD. When she's weak her  will help her up the stairs    History of Falls   History of Falls No   Cognition    Cognition / Consciousness WDL   Level of Consciousness Alert   Comments pleasant and cooperative with slightly odd affect. Expresses that her ex- is stressing her out and she is not able to slow down if she needs to.    Passive ROM Lower Body   Passive ROM Lower Body WDL   Active ROM Lower Body    Active ROM Lower Body  WDL   Strength Lower Body   Lower Body Strength  X   Comments B LEs grossly 4-/5   Sensation Lower Body   Lower Extremity Sensation   X   Comments L UE and B LEs with diminished sensation to light touch. She is able to feel pressure and has impaired propioception    Strength Upper Body   Upper Body Strength  WDL   Coordination Upper Body   Coordination X   Fine Motor Coordination L hand impaired FTN and finger opposition    Coordination Lower Body    Coordination Lower Body  WDL   Balance Assessment   Sitting Balance (Static) Good   Sitting Balance (Dynamic) Good   Standing Balance (Static) Fair -   Standing Balance (Dynamic) Fair -   Weight Shift Sitting Fair   Weight Shift Standing Fair   Comments w/FWW   Gait Analysis   Gait Level Of Assist Supervised   Assistive Device Front Wheel Walker   Distance (Feet) 75   # of Times Distance was Traveled 1   Deviation Increased Base Of Support;Bradykinetic   Comments Patient with tremulous B LEs and heavy reliance of FWW for stability. Reports she feels like her legs are \"Jelly\"    Bed Mobility    Supine to Sit Supervised   Sit to Supine Supervised   Scooting Supervised   Functional Mobility   Sit to " Stand Supervised   Bed, Chair, Wheelchair Transfer Supervised   How much difficulty does the patient currently have...   Turning over in bed (including adjusting bedclothes, sheets and blankets)? 4   Sitting down on and standing up from a chair with arms (e.g., wheelchair, bedside commode, etc.) 4   Moving from lying on back to sitting on the side of the bed? 4   How much help from another person does the patient currently need...   Moving to and from a bed to a chair (including a wheelchair)? 3   Need to walk in a hospital room? 3   Climbing 3-5 steps with a railing? 3   6 clicks Mobility Score 21   Activity Tolerance   Sitting Edge of Bed 10 min   Patient / Family Goals    Patient / Family Goal #1 to go home    Short Term Goals    Short Term Goal # 1 in 6 visits patient will ambulate 200' Jaz for safe DC home    Short Term Goal # 2 in 6 visits patient will demo all functional transfers Jaz for safe DC home    Short Term Goal # 3 in 6 visits patient will navigate 12 stairs with L rail and Saranya for safe household navigation.    Education Group   Education Provided Role of Physical Therapist;Gait Training;Use of Assistive Device  (physiology of MS)   Role of Physical Therapist Patient Response Patient;Acceptance;Explanation;Demonstration;Verbal Demonstration   Gait Training Patient Response Patient;Acceptance;Explanation;Demonstration;Verbal Demonstration;Action Demonstration   Use of Assistive Device Patient Response Patient;Acceptance;Explanation;Demonstration;Verbal Demonstration;Action Demonstration   Problem List    Problems Pain;Impaired Bed Mobility;Impaired Ambulation;Impaired Balance;Functional Strength Deficit;Impaired Coordination;Decreased Activity Tolerance   Anticipated Discharge Equipment and Recommendations   DC Equipment Recommendations Tub Transfer Bench   Discharge Recommendations Recommend outpatient physical therapy services to address higher level deficits       Jud Judd, PT, DPT, GCS

## 2021-10-07 NOTE — CONSULTS
Neurology Consult Note:  Resident:  Jimbo Lange M.D.  Attending:  Dr. Miranda    Date of Note:  10/7/2021    Referring MD:  Dr. Jaramillo    Patient ID:  Name Rehana Meade     1985   Age/Sex 35 y.o. female   MRN [id number]       Chief Complaint / Reason for Consult:  Weakness and numbness in legs and arm.    History of Presenting Illness:  Rehana Meade is a 35 y.o. female with a PMHx of MS, migraines, history of septic meningitis and seizure disorder.  She presented to the ED yesterday with progressive neurologic symptoms.  3 days ago, she started having abdominal spasms and numbness in lower extremities which progressed to numbness and weakness in left arm and some blurry vision in the left eye.  She contacted neurology clinic and her regular outpatient neurologist Dr. Aggarwal recommended her to go to the ED.  By the time she presented to the ED, she started having difficulty walking due to weakness and both legs.  She has been following up regularly with neurology clinic and currently on immunotherapy-Kesimpta (ofatumumab).  She denies any headache, speech changes, facial droop, any urinary symptoms or incontinence; denies fever/chills or any other signs of infection.  She has been admitted for the management of possible MS flare.    In the ED, she was afebrile and hemodynamically stable, vital signs were WNL.  CBC did not show leukocytosis.  CHEM panel showed low K of 3.3 and mildly elevated .  She has elevated T. Zuleyka 254 and .  MRI of the brain and C-spine with and without contrast were performed that did not show any significant lesions nor any new lesions compared to last MRI 5 months ago.      Review of Symptoms  GEN/CONST:   Denies fever, or significant weight change.   H/E:    Some blurring of vision in left eye.  ENT:   Denies nasal congestion, sore throat, or ear pain.   CARDIO:   Denies chest pain, palpitations, orthopnea, or edema.  RESP:  Denies shortness of  breath, wheezing, or coughing.   GI:    Positive for abdominal spasms.  Denies nausea, vomiting, diarrhea.  :   Denies dysuria or frequency.    SKIN:  Denies rashes, lumps/bumps, or sores.   NEURO: Positive for weakness and numbness in both legs and left arm.  Denies headache, confusion, memory loss.           Past Medical History:   Past Medical History:   Diagnosis Date   • Anesthesia     woke up combative   • Anxiety associated with depression    • ASTHMA    • Migraine without aura, without mention of intractable migraine without mention of status migrainosus    • Other specified symptom associated with female genital organs    • Seizure (HCC)     Last seizure was 1/2019   • Seizure cerebral (HCC) 2/15/2018   • Stroke (HCC)    • Substance abuse (HCC)    • Unspecified disorder of thyroid     cyst       Past Surgical History:  Past Surgical History:   Procedure Laterality Date   • VAGINAL HYSTERECTOMY TOTAL  1/27/2020    Procedure: HYSTERECTOMY, TOTAL, VAGINAL;  Surgeon: Julian Parker M.D.;  Location: SURGERY SAME DAY [facility];  Service: Gynecology   • CYSTOSCOPY  1/27/2020    Procedure: CYSTOSCOPY;  Surgeon: Julian Parker M.D.;  Location: SURGERY SAME DAY [facility];  Service: Gynecology   • TUBAL COAGULATION LAPAROSCOPIC BILATERAL  7/11/2014    Performed by Julian Parker M.D. at SURGERY SAME DAY [facility]   • SEPTOPLASTY  10/28/2009    Performed by ANNETTE FORDE at SURGERY [facility]   • SOMNOPLASTY  10/28/2009    Performed by ANNETTE FORDE at SURGERY [facility]   • NASAL POLYPECTOMY  10/28/2009    Performed by ANNETTE FORDE at SURGERY [facility]         Family History:  Family History   Problem Relation Age of Onset   • No Known Problems Mother    • No Known Problems Father        Social History:  Social History     Socioeconomic History   • Marital status:      Spouse name: Not on file   • Number of children: Not on file   • Years of education: Not on file   •  Highest education level: Not on file   Occupational History   • Not on file   Tobacco Use   • Smoking status: Never Smoker   • Smokeless tobacco: Never Used   Vaping Use   • Vaping Use: Never used   Substance and Sexual Activity   • Alcohol use: Not Currently     Comment: rarely   • Drug use: No   • Sexual activity: Yes     Partners: Male   Other Topics Concern   •  Service No   • Blood Transfusions No   • Caffeine Concern No   • Occupational Exposure No   • Hobby Hazards No   • Sleep Concern Yes   • Stress Concern No   • Weight Concern No   • Special Diet No   • Back Care No   • Exercise No   • Bike Helmet No   • Seat Belt Yes   • Self-Exams Yes   Social History Narrative   • Not on file     Social Determinants of Health     Financial Resource Strain:    • Difficulty of Paying Living Expenses:    Food Insecurity:    • Worried About Running Out of Food in the Last Year:    • Ran Out of Food in the Last Year:    Transportation Needs:    • Lack of Transportation (Medical):    • Lack of Transportation (Non-Medical):    Physical Activity:    • Days of Exercise per Week:    • Minutes of Exercise per Session:    Stress:    • Feeling of Stress :    Social Connections:    • Frequency of Communication with Friends and Family:    • Frequency of Social Gatherings with Friends and Family:    • Attends Buddhist Services:    • Active Member of Clubs or Organizations:    • Attends Club or Organization Meetings:    • Marital Status:    Intimate Partner Violence:    • Fear of Current or Ex-Partner:    • Emotionally Abused:    • Physically Abused:    • Sexually Abused:        Medication Allergy/Sensitivities:  Allergies   Allergen Reactions   • Amitriptyline Unspecified     Suicidal   • Clarithromycin Hives   • Sulfa Drugs Anaphylaxis   • Cantaloupe Itching   • Honey Dew Itching   • Latex Itching   • Lorazepam Unspecified     Hallucinations     • Metoclopramide Unspecified     Muscle spasms   • Penicillin G Potassium  "Vomiting   • Rizatriptan Unspecified     Tremors, confusion     • Sumatriptan Unspecified     \"Makes my head pins and needles\"   • Tape Rash   • Morphine Hives and Itching         Current Outpatient Medications:  Home Medications    **Home medications have not yet been reviewed for this encounter**         Current Hospital Medications:    Current Facility-Administered Medications:   •  methocarbamol (ROBAXIN) tablet 500 mg, 500 mg, Oral, TID PRN, Conner Jaramillo D.O.  •  topiramate (TOPAMAX) tablet 100 mg, 100 mg, Oral, DAILY, Conner Jaramillo, D.O., 100 mg at 10/07/21 0353  •  zonisamide (ZONEGRAN) capsule 300 mg, 300 mg, Oral, Q EVENING, Conner Jaramillo D.O., 300 mg at 10/07/21 0352  •  senna-docusate (PERICOLACE or SENOKOT S) 8.6-50 MG per tablet 2 Tablet, 2 Tablet, Oral, BID, 2 Tablet at 10/07/21 0549 **AND** polyethylene glycol/lytes (MIRALAX) PACKET 1 Packet, 1 Packet, Oral, QDAY PRN **AND** magnesium hydroxide (MILK OF MAGNESIA) suspension 30 mL, 30 mL, Oral, QDAY PRN **AND** bisacodyl (DULCOLAX) suppository 10 mg, 10 mg, Rectal, QDAY PRN, Conner Jaramillo, D.O.  •  lactated ringers infusion, , Intravenous, Continuous, Christopher Arriola M.D., Last Rate: 50 mL/hr at 10/07/21 0824, Rate Change at 10/07/21 0824  •  enoxaparin (LOVENOX) inj 40 mg, 40 mg, Subcutaneous, DAILY, Conner Jaramillo D.O.  •  acetaminophen (Tylenol) tablet 650 mg, 650 mg, Oral, Q6HRS PRN, Conner Jaramillo D.O.  •  cloNIDine (CATAPRES) tablet 0.1 mg, 0.1 mg, Oral, Q6HRS PRN, Conner Jaramillo D.O.  •  enalaprilat (Vasotec) injection 1.25 mg 1 mL, 1.25 mg, Intravenous, Q6HRS PRN, Conner Jaramillo D.O.  •  labetalol (NORMODYNE/TRANDATE) injection 10 mg, 10 mg, Intravenous, Q4HRS PRN, Conner Jaramillo D.O.  •  ketorolac (TORADOL) injection 30 mg, 30 mg, Intravenous, Q6HRS PRN, Conner Jaramillo D.O.  •  ondansetron (ZOFRAN) syringe/vial injection 4 mg, 4 mg, Intravenous, Q4HRS PRN, Conner Jaramillo D.O.  •  ondansetron " (ZOFRAN ODT) dispertab 4 mg, 4 mg, Oral, Q4HRS PRN, Conner Jaramillo D.O.  •  promethazine (PHENERGAN) tablet 12.5-25 mg, 12.5-25 mg, Oral, Q4HRS PRN, Conner Jaramillo, D.O.  •  promethazine (PHENERGAN) suppository 12.5-25 mg, 12.5-25 mg, Rectal, Q4HRS PRN, Conner Jaramillo, D.O.  •  prochlorperazine (COMPAZINE) injection 5-10 mg, 5-10 mg, Intravenous, Q4HRS PRN, Conner Jaramillo D.O.  •  methylPREDNISolone sod succ (SOLU-MEDROL) 1,000 mg in  mL IVPB, 1 g, Intravenous, DAILY, Conner Jaramillo D.O.  •  butalbital/apap/caffeine -40 mg (Fioricet) per tablet 1 Tablet, 1 Tablet, Oral, Q6HRS PRN, Conner Jaramillo D.O., 1 Tablet at 10/07/21 0206  •  diazePAM (VALIUM) injection 5 mg, 5 mg, Intravenous, Q6HRS PRN, Conner Jaramillo D.O., 5 mg at 10/07/21 0206  •  melatonin tablet 5 mg, 5 mg, Oral, Nightly, Conner Jaramillo D.O., 5 mg at 10/07/21 0213  •  famotidine (PEPCID) tablet 20 mg, 20 mg, Oral, BID, Christopher Arriola M.D.        Physical Exam     Vitals:    10/07/21 0047 10/07/21 0125 10/07/21 0359 10/07/21 0750   BP: 110/82 110/59 100/61 100/56   Pulse: 86 65 70 77   Resp:  18 16 18   Temp:  36.1 °C (97 °F) 36.4 °C (97.6 °F) 36.4 °C (97.6 °F)   TempSrc:   Temporal Temporal   SpO2: 99% 98% 94% 96%   Weight:         Body mass index is 31 kg/m².  /56   Pulse 77   Temp 36.4 °C (97.6 °F) (Temporal)   Resp 18   Wt 79.4 kg (175 lb)   LMP 11/16/2019   SpO2 96%   BMI 31.00 kg/m²   O2 therapy: Pulse Oximetry: 96 %, O2 (LPM): 0, O2 Delivery Device: None - Room Air    Physical Exam  GEN:   Alert and oriented, No apparent distress.    H / E:   Normocephalic, Atraumatic.  No scleral icterus.    ENT:   No erythema.  No exudates or discharges.   Trachea midline.  No stridor.  Supple neck.   HEART:  Regular rate and rhythm. No murmurs, rubs or gallops.   LUNGS:   Clear to auscultation and percussion bilaterally.  ABD/GI:  Benign. No rebound or guarding noted.  EXT/MSK:  No clubbing, cyanosis,  edema.    NEURO:    Mental status: Awake, alert, oriented x3.  Fund of knowledge - within normal.   Cranial nerves:  CN II-XII - intact.  PERRLA.  EOMI.  No nystagmus   Motor - UE - 5/5, bilaterally symmetrical.  LE - 4/5, bilaterally symmetrical.   Sensory - UE-  good sensation, bilaterally.  LE -  good sensation, bilaterally.    Coordination/Gait -  normal coordination        Data Review       Lab Data Review:  Recent Results (from the past 24 hour(s))   CBC WITH DIFFERENTIAL    Collection Time: 10/06/21 10:25 PM   Result Value Ref Range    WBC 10.3 4.8 - 10.8 K/uL    RBC 5.43 (H) 4.20 - 5.40 M/uL    Hemoglobin 16.6 (H) 12.0 - 16.0 g/dL    Hematocrit 51.0 (H) 37.0 - 47.0 %    MCV 93.9 81.4 - 97.8 fL    MCH 30.6 27.0 - 33.0 pg    MCHC 32.5 (L) 33.6 - 35.0 g/dL    RDW 44.5 35.9 - 50.0 fL    Platelet Count 364 164 - 446 K/uL    MPV 9.8 9.0 - 12.9 fL    Neutrophils-Polys 62.50 44.00 - 72.00 %    Lymphocytes 25.50 22.00 - 41.00 %    Monocytes 7.80 0.00 - 13.40 %    Eosinophils 3.00 0.00 - 6.90 %    Basophils 0.70 0.00 - 1.80 %    Immature Granulocytes 0.50 0.00 - 0.90 %    Nucleated RBC 0.00 /100 WBC    Neutrophils (Absolute) 6.43 2.00 - 7.15 K/uL    Lymphs (Absolute) 2.62 1.00 - 4.80 K/uL    Monos (Absolute) 0.80 0.00 - 0.85 K/uL    Eos (Absolute) 0.31 0.00 - 0.51 K/uL    Baso (Absolute) 0.07 0.00 - 0.12 K/uL    Immature Granulocytes (abs) 0.05 0.00 - 0.11 K/uL    NRBC (Absolute) 0.00 K/uL   COMP METABOLIC PANEL    Collection Time: 10/06/21 10:25 PM   Result Value Ref Range    Sodium 141 135 - 145 mmol/L    Potassium 3.3 (L) 3.6 - 5.5 mmol/L    Chloride 104 96 - 112 mmol/L    Co2 21 20 - 33 mmol/L    Anion Gap 16.0 7.0 - 16.0    Glucose 87 65 - 99 mg/dL    Bun 10 8 - 22 mg/dL    Creatinine 0.76 0.50 - 1.40 mg/dL    Calcium 10.1 8.5 - 10.5 mg/dL    AST(SGOT) 11 (L) 12 - 45 U/L    ALT(SGPT) 9 2 - 50 U/L    Alkaline Phosphatase 118 (H) 30 - 99 U/L    Total Bilirubin 0.6 0.1 - 1.5 mg/dL    Albumin 5.3 (H) 3.2 - 4.9 g/dL  "   Total Protein 8.8 (H) 6.0 - 8.2 g/dL    Globulin 3.5 1.9 - 3.5 g/dL    A-G Ratio 1.5 g/dL   BETA-HCG QUALITATIVE SERUM    Collection Time: 10/06/21 10:25 PM   Result Value Ref Range    Beta-Hcg Qualitative Serum Negative Negative   ESTIMATED GFR    Collection Time: 10/06/21 10:25 PM   Result Value Ref Range    GFR If African American >60 >60 mL/min/1.73 m 2    GFR If Non African American >60 >60 mL/min/1.73 m 2   HEMOGLOBIN A1C    Collection Time: 10/06/21 10:25 PM   Result Value Ref Range    Glycohemoglobin 5.3 4.0 - 5.6 %    Est Avg Glucose 105 mg/dL   Lipid Profile    Collection Time: 10/06/21 10:25 PM   Result Value Ref Range    Cholesterol,Tot 254 (H) 100 - 199 mg/dL    Triglycerides 44 0 - 149 mg/dL     >=40 mg/dL     (H) <100 mg/dL   MAGNESIUM    Collection Time: 10/06/21 10:25 PM   Result Value Ref Range    Magnesium 2.4 1.5 - 2.5 mg/dL       Imaging/Procedures Review:    Imaging Review: Completed     MR-CERVICAL SPINE-WITH & W/O   Final Result      1.  Patient motion artifact. This augments linear longitudinal pulsation artifacts over the cord.   2.  Borderline low-lying cerebellar tonsils extending 2 or 3 mm below the foramen magnum. This does not satisfy criteria for Chiari I malformation.   3.  Subcentimeter demyelinating lesion again seen in the right cord periphery at the C5-6 level. Best seen on the axial images.   4.  No \"active\" enhancing plaque, discrete lesion, or overall progression of demyelinating lesion burden.   5.  C3-4 minimal right paramedian disc/osteophyte complex. No central or foraminal stenosis.      MR-BRAIN-WITH & W/O   Final Result      1.  Mild supratentorial white matter disease compatible with history of multiple sclerosis. No \"active\" enhancing lesions, discrete lesion, or overall progression of lesion burden is evident.   2.  No demyelinating lesions are evident involving the brainstem or cerebellum.   3.  The case was discussed (preliminary call report) by " Dr. Hope with SANJANA ORTEGA at 11:55 PM 10/6/2021.                  Assessment & Plan       Rehana Meade is a 35 y.o. female with a PMHx of MS, migraines, history of septic meningitis and seizure disorder.  She presented to the ED yesterday with progressive neurologic symptoms.  She has been having progressive numbness as well as weakness in both legs leading to difficulty in walking and numbness in left arm, and blurry vision in left eye. She has been following up regularly with neurology clinic and currently on immunotherapy-Kesimpta (ofatumumab).  The symptoms are likely secondary to MS flare.  There are no signs and symptoms of infection.    MRI of the brain and C-spine with and without contrast were performed that did not show any significant lesions nor any new lesions compared to last MRI 5 months ago.    1.  Weakness and numbness in both legs, left arm and blurry vision secondary to MS flare.  2.  Seizure disorder  -MRI of the T-spine and vitamin D levels are pending.  -Continue high-dose IV steroid-1 g IV Solu-Medrol for 3 days.  -Continue AEDs, home dose for seizures.  -PT/OT.      Jimbo Lange M.D.

## 2021-10-07 NOTE — ASSESSMENT & PLAN NOTE
Currently MS flare  E  MRI brain/cervical spine --> no active lesions seen  Images as above  Neurology consulted and recommends 1 g systemic steroids with GI prophylaxis    PT for evaluation  Follow-up outpatient neurology after discharge

## 2021-10-07 NOTE — ED PROVIDER NOTES
ED Provider Note    CHIEF COMPLAINT  Chief Complaint   Patient presents with   • Numbness     bilateral legs and left arm  x1wk, history of MS       HPI  Rehana Meade is a 35 y.o. female who presents to the emergency department chief complaint of 3 days of worsening bilateral lower extremity numbness and now weakness as well as left upper extremity numbness left facial numbness and difficulty finding words at times.  The patient has a diagnosis of multiple sclerosis and she takes Kesimpta -she states her last dose was the 29th of last month.  The patient states that she spoke with neurology who insisted she come to the emergency department.  She does states he is feeling weaker and weaker and is getting a little bit harder to walk.  She is concerned about a flare.  No nausea no vomiting no headache.      REVIEW OF SYSTEMS  Positives as above. Pertinent negatives include nausea vomiting headache vision changes speech difficulty chest pain shortness of breath fevers chills cough congestion  All other review of systems are negative    PAST MEDICAL HISTORY   has a past medical history of Anesthesia, Anxiety associated with depression, ASTHMA, Migraine without aura, without mention of intractable migraine without mention of status migrainosus, Other specified symptom associated with female genital organs, Seizure (HCC), Seizure cerebral (HCC) (2/15/2018), Stroke (HCC), Substance abuse (HCC), and Unspecified disorder of thyroid.    SOCIAL HISTORY  Social History     Tobacco Use   • Smoking status: Never Smoker   • Smokeless tobacco: Never Used   Vaping Use   • Vaping Use: Never used   Substance and Sexual Activity   • Alcohol use: Not Currently     Comment: rarely   • Drug use: No   • Sexual activity: Yes     Partners: Male       SURGICAL HISTORY   has a past surgical history that includes septoplasty (10/28/2009); somnoplasty (10/28/2009); nasal polypectomy (10/28/2009); tubal coagulation laparoscopic  "bilateral (7/11/2014); vaginal hysterectomy total (1/27/2020); and cystoscopy (1/27/2020).    CURRENT MEDICATIONS  Home Medications    **Home medications have not yet been reviewed for this encounter**         ALLERGIES  Allergies   Allergen Reactions   • Amitriptyline Unspecified     Suicidal   • Clarithromycin Hives   • Sulfa Drugs Anaphylaxis   • Cantaloupe Itching   • Honey Dew Itching   • Latex Itching   • Lorazepam Unspecified     Hallucinations     • Metoclopramide Unspecified     Muscle spasms   • Penicillin G Potassium Vomiting   • Rizatriptan Unspecified     Tremors, confusion     • Sumatriptan Unspecified     \"Makes my head pins and needles\"   • Tape Rash   • Morphine Hives and Itching       PHYSICAL EXAM  VITAL SIGNS: /85   Pulse 85   Temp 36.2 °C (97.2 °F) (Temporal)   Resp 16   Wt 79.4 kg (175 lb)   LMP 11/16/2019   SpO2 99%   BMI 31.00 kg/m²    Pulse ox interpretation: I interpret this pulse ox as normal.  Constitutional: Alert in no apparent distress.  HENT: Normocephalic atraumatic, MMM  Eyes: PER, Conjunctiva normal, Non-icteric.   Neck: Normal range of motion, No tenderness, Supple, No stridor.   Cardiovascular: Regular rate and rhythm, no murmurs.   Thorax & Lungs: Normal breath sounds, No respiratory distress, No wheezing, No chest tenderness.   Abdomen: Bowel sounds normal, Soft, No tenderness, No pulsatile masses. No peritoneal signs.  Skin: Warm, Dry, No erythema, No rash.   Back: No bony tenderness, No CVA tenderness.   Extremities/MSK: Intact equal distal pulses, No edema, No tenderness, No cyanosis, no major deformities noted  Neurologic: Alert and oriented x3, cranial nerves II to XII within normal limits except for a subjective left-sided diminished sensation to touch of the face, she also states she has diminished hearing in the left ear, she states diminished sensation to touch in the left upper extremity although strength 5 out of 5 in the  and at the shoulders " bilaterally  Strength 5 out of 5 bilateral lower extremities, she does have a little bit of drift at the hips left compared to right she does not hit the bed.      DIFFERENTIAL DIAGNOSIS AND WORK UP PLAN    This is a 35 y.o. female who presents with history of MS compliant with her medications at 3 to 4 days of worsening symptoms was sent here for an MRI of the head and neck with and without contrast.  She is currently resting comfortably we discussed plan for laboratory analysis and imaging    DIAGNOSTIC STUDIES / PROCEDURES    LABS  Pertinent Lab Findings    Labs Reviewed   CBC WITH DIFFERENTIAL - Abnormal; Notable for the following components:       Result Value    RBC 5.43 (*)     Hemoglobin 16.6 (*)     Hematocrit 51.0 (*)     MCHC 32.5 (*)     All other components within normal limits   COMP METABOLIC PANEL - Abnormal; Notable for the following components:    Potassium 3.3 (*)     AST(SGOT) 11 (*)     Alkaline Phosphatase 118 (*)     Albumin 5.3 (*)     Total Protein 8.8 (*)     All other components within normal limits   LIPID PROFILE - Abnormal; Notable for the following components:    Cholesterol,Tot 254 (*)      (*)     All other components within normal limits   HCG QUAL SERUM   ESTIMATED GFR   HEMOGLOBIN A1C   MAGNESIUM   URINALYSIS   VITAMIN D,25 HYDROXY   CBC WITH DIFFERENTIAL   BASIC METABOLIC PANEL       RADIOLOGY  MR-BRAIN-WITH & W/O    (Results Pending)   MR-CERVICAL SPINE-WITH & W/O    (Results Pending)     The radiologist's interpretation of all radiological studies have been reviewed by me.      COURSE & MEDICAL DECISION MAKING  Pertinent Labs & Imaging studies reviewed. (See chart for details)    12:10 AM  Spoke w Dr Archibald with radiology-his preliminary read shows nothing acute in terms of major changes    12:16 AM  Spoke w Dr Wu with neurology regarding patient's symptoms she is feeling unsteady on her feet and with worsening or new changes over the last few days he agrees with  observation over the next few days with 1 g of Solu-Medrol as well as GI prophylaxis and neurology will consult later today      12:22 AM  Spoke w Dr Jaramillo with the hospitalist service and he is excepted the patient for hospitalization    I verified that the patient was wearing a mask and I was wearing appropriate PPE every time I entered the room. The patient's mask was on the patient at all times during my encounter except for a brief view of the oropharynx.          FINAL IMPRESSION  1. Bilateral leg weakness  2. LUE numbness   3. History of MS       Electronically signed by: Mirta Roy M.D., 10/6/2021 9:44 PM    This dictation has been created using voice recognition software and/or scribes. The accuracy of the dictation is limited by the abilities of the software and the expertise of the scribes. I expect there may be some errors of grammar and possibly content. I made every attempt to manually correct the errors within my dictation. However, errors related to voice recognition software and/or scribes may still exist and should be interpreted within the appropriate context.

## 2021-10-07 NOTE — PROGRESS NOTES
Neurology contacted regarding Rehana Meade's worsening MS symptoms.   She has been having increased weakness and numbness in her legs and left arm. She is now having difficulty walking. Additionally complains of facial numbness and painful abdominal spasms.  Was diagnosed with MS earlier this year and sees Dr. Rajeev Aggarwal in clinic.    MRI Brain and C-spine were performed with and without contrast. I reviewed imaging and do not see any significant lesions, nor any new lesions when compared to MRI 5 months ago.    Given that the patient cannot walk comfortably and wishes to be admitted, I think it reasonable to admit her and start IV Solumedrol 1G daily with GI ppx and PT/OT. Neurology will evaluate her in the morning and consider thoracic spine imaging, which at this point is likely to be low-yield, and decide upon a course of treatment after conferring with the patient's outpatient provider.

## 2021-10-07 NOTE — ASSESSMENT & PLAN NOTE
Strong recommendation for follow-up with outpatient PCP for lifestyle modification including diet and exercise regimen of at least 30 minutes brisk cardiovascular exercise 3-5 times weekly.

## 2021-10-07 NOTE — PROGRESS NOTES
Just admitted    Patient ordered for 5 days of high dose Iv steroids    Not cdu appropriate.. transfer ordered

## 2021-10-08 ENCOUNTER — PATIENT OUTREACH (OUTPATIENT)
Dept: HEALTH INFORMATION MANAGEMENT | Facility: OTHER | Age: 36
End: 2021-10-08

## 2021-10-08 VITALS
RESPIRATION RATE: 16 BRPM | HEART RATE: 83 BPM | DIASTOLIC BLOOD PRESSURE: 58 MMHG | OXYGEN SATURATION: 98 % | WEIGHT: 175 LBS | BODY MASS INDEX: 31 KG/M2 | TEMPERATURE: 96.9 F | SYSTOLIC BLOOD PRESSURE: 103 MMHG

## 2021-10-08 PROBLEM — E87.6 HYPOKALEMIA: Status: RESOLVED | Noted: 2021-10-07 | Resolved: 2021-10-08

## 2021-10-08 LAB
25(OH)D3 SERPL-MCNC: 30 NG/ML (ref 30–100)
ANION GAP SERPL CALC-SCNC: 11 MMOL/L (ref 7–16)
BASOPHILS # BLD AUTO: 0.1 % (ref 0–1.8)
BASOPHILS # BLD: 0.02 K/UL (ref 0–0.12)
BUN SERPL-MCNC: 13 MG/DL (ref 8–22)
CALCIUM SERPL-MCNC: 9.5 MG/DL (ref 8.5–10.5)
CHLORIDE SERPL-SCNC: 111 MMOL/L (ref 96–112)
CO2 SERPL-SCNC: 16 MMOL/L (ref 20–33)
CREAT SERPL-MCNC: 0.77 MG/DL (ref 0.5–1.4)
EOSINOPHIL # BLD AUTO: 0 K/UL (ref 0–0.51)
EOSINOPHIL NFR BLD: 0 % (ref 0–6.9)
ERYTHROCYTE [DISTWIDTH] IN BLOOD BY AUTOMATED COUNT: 42.9 FL (ref 35.9–50)
GLUCOSE SERPL-MCNC: 167 MG/DL (ref 65–99)
HCT VFR BLD AUTO: 42.8 % (ref 37–47)
HGB BLD-MCNC: 14.7 G/DL (ref 12–16)
IMM GRANULOCYTES # BLD AUTO: 0.14 K/UL (ref 0–0.11)
IMM GRANULOCYTES NFR BLD AUTO: 0.7 % (ref 0–0.9)
LYMPHOCYTES # BLD AUTO: 0.71 K/UL (ref 1–4.8)
LYMPHOCYTES NFR BLD: 3.7 % (ref 22–41)
MCH RBC QN AUTO: 31.7 PG (ref 27–33)
MCHC RBC AUTO-ENTMCNC: 34.3 G/DL (ref 33.6–35)
MCV RBC AUTO: 92.4 FL (ref 81.4–97.8)
MONOCYTES # BLD AUTO: 0.66 K/UL (ref 0–0.85)
MONOCYTES NFR BLD AUTO: 3.5 % (ref 0–13.4)
NEUTROPHILS # BLD AUTO: 17.43 K/UL (ref 2–7.15)
NEUTROPHILS NFR BLD: 92 % (ref 44–72)
NRBC # BLD AUTO: 0 K/UL
NRBC BLD-RTO: 0 /100 WBC
PLATELET # BLD AUTO: 278 K/UL (ref 164–446)
PMV BLD AUTO: 10.3 FL (ref 9–12.9)
POTASSIUM SERPL-SCNC: 4.1 MMOL/L (ref 3.6–5.5)
RBC # BLD AUTO: 4.63 M/UL (ref 4.2–5.4)
SODIUM SERPL-SCNC: 138 MMOL/L (ref 135–145)
WBC # BLD AUTO: 19 K/UL (ref 4.8–10.8)

## 2021-10-08 PROCEDURE — 700101 HCHG RX REV CODE 250: Performed by: STUDENT IN AN ORGANIZED HEALTH CARE EDUCATION/TRAINING PROGRAM

## 2021-10-08 PROCEDURE — A9576 INJ PROHANCE MULTIPACK: HCPCS | Performed by: STUDENT IN AN ORGANIZED HEALTH CARE EDUCATION/TRAINING PROGRAM

## 2021-10-08 PROCEDURE — 99232 SBSQ HOSP IP/OBS MODERATE 35: CPT | Performed by: NURSE PRACTITIONER

## 2021-10-08 PROCEDURE — 99239 HOSP IP/OBS DSCHRG MGMT >30: CPT | Performed by: HOSPITALIST

## 2021-10-08 PROCEDURE — A9270 NON-COVERED ITEM OR SERVICE: HCPCS | Performed by: STUDENT IN AN ORGANIZED HEALTH CARE EDUCATION/TRAINING PROGRAM

## 2021-10-08 PROCEDURE — 700102 HCHG RX REV CODE 250 W/ 637 OVERRIDE(OP): Performed by: STUDENT IN AN ORGANIZED HEALTH CARE EDUCATION/TRAINING PROGRAM

## 2021-10-08 PROCEDURE — 72157 MRI CHEST SPINE W/O & W/DYE: CPT

## 2021-10-08 PROCEDURE — 82306 VITAMIN D 25 HYDROXY: CPT

## 2021-10-08 PROCEDURE — 85025 COMPLETE CBC W/AUTO DIFF WBC: CPT

## 2021-10-08 PROCEDURE — 700117 HCHG RX CONTRAST REV CODE 255: Performed by: STUDENT IN AN ORGANIZED HEALTH CARE EDUCATION/TRAINING PROGRAM

## 2021-10-08 PROCEDURE — 80048 BASIC METABOLIC PNL TOTAL CA: CPT

## 2021-10-08 PROCEDURE — 700105 HCHG RX REV CODE 258: Performed by: STUDENT IN AN ORGANIZED HEALTH CARE EDUCATION/TRAINING PROGRAM

## 2021-10-08 PROCEDURE — 700111 HCHG RX REV CODE 636 W/ 250 OVERRIDE (IP): Performed by: STUDENT IN AN ORGANIZED HEALTH CARE EDUCATION/TRAINING PROGRAM

## 2021-10-08 RX ADMIN — SODIUM CHLORIDE 1000 MG: 9 INJECTION, SOLUTION INTRAVENOUS at 13:24

## 2021-10-08 RX ADMIN — METHOCARBAMOL 500 MG: 500 TABLET ORAL at 04:56

## 2021-10-08 RX ADMIN — GADOTERIDOL 15 ML: 279.3 INJECTION, SOLUTION INTRAVENOUS at 00:44

## 2021-10-08 RX ADMIN — DIAZEPAM 5 MG: 5 INJECTION, SOLUTION INTRAMUSCULAR; INTRAVENOUS at 00:16

## 2021-10-08 RX ADMIN — TOPIRAMATE 100 MG: 100 TABLET, FILM COATED ORAL at 04:55

## 2021-10-08 RX ADMIN — DOCUSATE SODIUM 50 MG AND SENNOSIDES 8.6 MG 2 TABLET: 8.6; 5 TABLET, FILM COATED ORAL at 04:55

## 2021-10-08 ASSESSMENT — ENCOUNTER SYMPTOMS
CHILLS: 0
PALPITATIONS: 0
HEMOPTYSIS: 0
FEVER: 0
SENSORY CHANGE: 1
ORTHOPNEA: 0
GASTROINTESTINAL NEGATIVE: 1
COUGH: 0
MUSCULOSKELETAL NEGATIVE: 1
WEAKNESS: 1
SHORTNESS OF BREATH: 0

## 2021-10-08 ASSESSMENT — PAIN DESCRIPTION - PAIN TYPE
TYPE: ACUTE PAIN
TYPE: CHRONIC PAIN

## 2021-10-08 NOTE — DISCHARGE INSTRUCTIONS
Discharge Instructions    Discharged to home by car with escort. Discharged via wheelchair, hospital escort: Yes.  Special equipment needed: Not Applicable    Be sure to schedule a follow-up appointment with your primary care doctor or any specialists as instructed.     Discharge Plan:   Diet Plan: Discussed  Activity Level: Discussed  Confirmed Follow up Appointment: Appointment Scheduled  Confirmed Symptoms Management: Discussed  Medication Reconciliation Updated: Yes  Influenza Vaccine Indication: Patient Refuses    I understand that a diet low in cholesterol, fat, and sodium is recommended for good health. Unless I have been given specific instructions below for another diet, I accept this instruction as my diet prescription.   Other diet: Regular    Special Instructions: None    · Is patient discharged on Warfarin / Coumadin?   No     Depression / Suicide Risk    As you are discharged from this [facility] facility, it is important to learn how to keep safe from harming yourself.    Recognize the warning signs:  · Abrupt changes in personality, positive or negative- including increase in energy   · Giving away possessions  · Change in eating patterns- significant weight changes-  positive or negative  · Change in sleeping patterns- unable to sleep or sleeping all the time   · Unwillingness or inability to communicate  · Depression  · Unusual sadness, discouragement and loneliness  · Talk of wanting to die  · Neglect of personal appearance   · Rebelliousness- reckless behavior  · Withdrawal from people/activities they love  · Confusion- inability to concentrate     If you or a loved one observes any of these behaviors or has concerns about self-harm, here's what you can do:  · Talk about it- your feelings and reasons for harming yourself  · Remove any means that you might use to hurt yourself (examples: pills, rope, extension cords, firearm)  · Get professional help from the community (Mental Health, Substance  Abuse, psychological counseling)  · Do not be alone:Call your Safe Contact- someone whom you trust who will be there for you.  · Call your local CRISIS HOTLINE 750-9379 or 597-903-0872  · Call your local Children's Mobile Crisis Response Team [place] (854) 311-0609 or www.CoachMePlus  · Call the toll free National Suicide Prevention Hotlines   · National Suicide Prevention Lifeline 829-121-NZGC (5069)  · National Hope Line Network 800-SUICIDE (736-9864)

## 2021-10-08 NOTE — PROGRESS NOTES
Hospital Medicine Daily Progress Note    Date of Service  10/8/2021    Chief Complaint  Rehana Meade is a 35 y.o. female admitted 10/6/2021 with leg weakness and numbness    Hospital Course  No notes on file    Interval Problem Update  Afebrile    Able to ambulate with walker    Still has weakness and tingling numbness in lower extrmity    On high dose Iv steroids.. planned for 3 daily doses    I have personally seen and examined the patient at bedside. I discussed the plan of care with patient.    Consultants/Specialty  Neuro md    Code Status  Full Code    Disposition  Patient is not medically cleared.   Anticipate discharge to to home with close outpatient follow-up.  I have placed the appropriate orders for post-discharge needs.    Review of Systems  Review of Systems   Constitutional: Negative for chills, fever and malaise/fatigue.   Respiratory: Negative for cough, hemoptysis and shortness of breath.    Cardiovascular: Negative for chest pain, palpitations and orthopnea.   Gastrointestinal: Negative.    Genitourinary: Negative.    Musculoskeletal: Negative.    Neurological: Positive for sensory change and weakness.        Physical Exam  Temp:  [36.2 °C (97.2 °F)-36.6 °C (97.9 °F)] 36.5 °C (97.7 °F)  Pulse:  [71-79] 71  Resp:  [14-18] 16  BP: ()/(55-67) 99/55  SpO2:  [91 %-97 %] 96 %    Physical Exam  Constitutional:       General: She is not in acute distress.     Appearance: Normal appearance. She is not ill-appearing, toxic-appearing or diaphoretic.   HENT:      Head: Normocephalic.      Nose: Nose normal.      Mouth/Throat:      Mouth: Mucous membranes are moist.   Eyes:      General: No scleral icterus.        Left eye: No discharge.      Extraocular Movements: Extraocular movements intact.      Pupils: Pupils are equal, round, and reactive to light.   Cardiovascular:      Rate and Rhythm: Normal rate and regular rhythm.      Pulses: Normal pulses.   Pulmonary:      Effort: Pulmonary  "effort is normal. No respiratory distress.      Breath sounds: No wheezing.   Abdominal:      General: Abdomen is flat. There is no distension.      Palpations: There is no mass.      Tenderness: There is no abdominal tenderness. There is no guarding.      Hernia: No hernia is present.   Musculoskeletal:         General: No swelling.      Cervical back: Normal range of motion and neck supple.   Skin:     General: Skin is warm.      Capillary Refill: Capillary refill takes 2 to 3 seconds.   Neurological:      Mental Status: She is alert.      Comments: B/l lower ext weakness         Fluids  No intake or output data in the 24 hours ending 10/08/21 1038    Laboratory  Recent Labs     10/06/21  2225 10/08/21  0447   WBC 10.3 19.0*   RBC 5.43* 4.63   HEMOGLOBIN 16.6* 14.7   HEMATOCRIT 51.0* 42.8   MCV 93.9 92.4   MCH 30.6 31.7   MCHC 32.5* 34.3   RDW 44.5 42.9   PLATELETCT 364 278   MPV 9.8 10.3     Recent Labs     10/06/21  2225 10/08/21  0447   SODIUM 141 138   POTASSIUM 3.3* 4.1   CHLORIDE 104 111   CO2 21 16*   GLUCOSE 87 167*   BUN 10 13   CREATININE 0.76 0.77   CALCIUM 10.1 9.5             Recent Labs     10/06/21  2225   TRIGLYCERIDE 44      *       Imaging  MR-THORACIC SPINE-WITH & W/O   Final Result      MRI of the thoracic spine without and with contrast within normal limits except for incidentally noted T9 vertebral body hemangioma.      MR-CERVICAL SPINE-WITH & W/O   Final Result      1.  Patient motion artifact. This augments linear longitudinal pulsation artifacts over the cord.   2.  Borderline low-lying cerebellar tonsils extending 2 or 3 mm below the foramen magnum. This does not satisfy criteria for Chiari I malformation.   3.  Subcentimeter demyelinating lesion again seen in the right cord periphery at the C5-6 level. Best seen on the axial images.   4.  No \"active\" enhancing plaque, discrete lesion, or overall progression of demyelinating lesion burden.   5.  C3-4 minimal right " "paramedian disc/osteophyte complex. No central or foraminal stenosis.      MR-BRAIN-WITH & W/O   Final Result      1.  Mild supratentorial white matter disease compatible with history of multiple sclerosis. No \"active\" enhancing lesions, discrete lesion, or overall progression of lesion burden is evident.   2.  No demyelinating lesions are evident involving the brainstem or cerebellum.   3.  The case was discussed (preliminary call report) by Dr. Hope with SANJANA ORTEGA at 11:55 PM 10/6/2021.           Assessment/Plan  * MS (multiple sclerosis) (HCC)- (present on admission)  Assessment & Plan  Currently MS flare  E  MRI brain/cervical spine --> no active lesions seen  Images as above  Neurology consulted and recommends 1 g systemic steroids with GI prophylaxis    PT for evaluation  Follow-up outpatient neurology after discharge      Hypokalemia- (present on admission)  Assessment & Plan  40 mEq K-Dur oral given x1 in ED  BMP in a.m.    Obesity- (present on admission)  Assessment & Plan    Strong recommendation for follow-up with outpatient PCP for lifestyle modification including diet and exercise regimen of at least 30 minutes brisk cardiovascular exercise 3-5 times weekly.    Seizure disorder (HCC)- (present on admission)  Assessment & Plan  Seizure precaution  Fall precaution  Continue zonisamide 300 mg every evening    Migraine without aura- (present on admission)  Assessment & Plan  Fioricet/Toradol for pain control  Continue topiramate 100 mg p.o. daily       VTE prophylaxis: enoxaparin ppx    I have performed a physical exam and reviewed and updated ROS and Plan today (10/8/2021). In review of yesterday's note (10/7/2021), there are no changes except as documented above.      "

## 2021-10-08 NOTE — PROGRESS NOTES
"Chief Complaint   Patient presents with   • Numbness     bilateral legs and left arm  x1wk, history of MS       Problem List Items Addressed This Visit     None        Neurology Progress Note    Brief History of present illness:  35 year old female, known to our neurology service, with PMhx significant for multiple sclerosis (diagnosed January 2021; patient of Dr. Aggarwal; on Ofatumumab), anxiety/depression, seizure disorder (unclear etiology; suspect mixed epileptic and non-epileptic events; on/compliant with Zonegran and Topamax), asthma, and migraine/complex migraine who presented to [facility]21 for a chief complaint of \"abdominal spasm,\" Left eye blurred vision, and BLE weakness/numbness x 3 days. On arrival here, patient underwent MRI Brain, C and T spine w/wo contrast; no acute/active or enhancing lesions appreciated; imaging largely unchanged from previous studies in May 2021. Patient receiving IV Methylprednisolone x3 days for pseudo-exacerbation of MS (possibly provoked by stress, mild hypokalemia).     Interval, 10/8/21:  Patient sitting up in bed; awake and alert. States that she feels somewhat improved, able to ambulate with minimal assistance. Receiving dose #3 of 3 IV steroid this afternoon, with plan to discharge thereafter. No new complaints/symptoms/deficits.     No changes to HPI as was previously documented.     Past medical history:   Past Medical History:   Diagnosis Date   • Anesthesia     woke up combative   • Anxiety associated with depression    • ASTHMA    • Migraine without aura, without mention of intractable migraine without mention of status migrainosus    • Other specified symptom associated with female genital organs    • Seizure (HCC)     Last seizure was 1/2019   • Seizure cerebral (HCC) 2/15/2018   • St[facility] ([facility])    • Substance abuse ([facility])    • Unspecified disorder of thyroid     cyst       Past surgical history:   Past Surgical History:   Procedure Laterality " Date   • VAGINAL HYSTERECTOMY TOTAL  1/27/2020    Procedure: HYSTERECTOMY, TOTAL, VAGINAL;  Surgeon: Julian Parker M.D.;  Location: SURGERY SAME DAY [facility];  Service: Gynecology   • CYSTOSCOPY  1/27/2020    Procedure: CYSTOSCOPY;  Surgeon: Julian Parker M.D.;  Location: SURGERY SAME DAY [facility];  Service: Gynecology   • TUBAL COAGULATION LAPAROSCOPIC BILATERAL  7/11/2014    Performed by Julian Parker M.D. at SURGERY SAME DAY [facility]   • SEPTOPLASTY  10/28/2009    Performed by ANNETTE FORDE at SURGERY [facility]   • SOMNOPLASTY  10/28/2009    Performed by ANNETTE FORDE at SURGERY [facility]   • NASAL POLYPECTOMY  10/28/2009    Performed by ANNETTE FORDE at SURGERY [facility]       Family history:   Family History   Problem Relation Age of Onset   • No Known Problems Mother    • No Known Problems Father        Social history:   Social History     Socioeconomic History   • Marital status:      Spouse name: Not on file   • Number of children: Not on file   • Years of education: Not on file   • Highest education level: Not on file   Occupational History   • Not on file   Tobacco Use   • Smoking status: Never Smoker   • Smokeless tobacco: Never Used   Vaping Use   • Vaping Use: Never used   Substance and Sexual Activity   • Alcohol use: Not Currently     Comment: rarely   • Drug use: No   • Sexual activity: Yes     Partners: Male   Other Topics Concern   •  Service No   • Blood Transfusions No   • Caffeine Concern No   • Occupational Exposure No   • Hobby Hazards No   • Sleep Concern Yes   • Stress Concern No   • Weight Concern No   • Special Diet No   • Back Care No   • Exercise No   • Bike Helmet No   • Seat Belt Yes   • Self-Exams Yes   Social History Narrative   • Not on file     Social Determinants of Health     Financial Resource Strain:    • Difficulty of Paying Living Expenses:    Food Insecurity:    • Worried About Running Out of Food in the Last  Year:    • Ran Out of Food in the Last Year:    Transportation Needs:    • Lack of Transportation (Medical):    • Lack of Transportation (Non-Medical):    Physical Activity:    • Days of Exercise per Week:    • Minutes of Exercise per Session:    Stress:    • Feeling of Stress :    Social Connections:    • Frequency of Communication with Friends and Family:    • Frequency of Social Gatherings with Friends and Family:    • Attends Islam Services:    • Active Member of Clubs or Organizations:    • Attends Club or Organization Meetings:    • Marital Status:    Intimate Partner Violence:    • Fear of Current or Ex-Partner:    • Emotionally Abused:    • Physically Abused:    • Sexually Abused:        Current medications:   Current Facility-Administered Medications   Medication Dose   • methocarbamol (ROBAXIN) tablet 500 mg  500 mg   • topiramate (TOPAMAX) tablet 100 mg  100 mg   • zonisamide (ZONEGRAN) capsule 300 mg  300 mg   • senna-docusate (PERICOLACE or SENOKOT S) 8.6-50 MG per tablet 2 Tablet  2 Tablet    And   • polyethylene glycol/lytes (MIRALAX) PACKET 1 Packet  1 Packet    And   • magnesium hydroxide (MILK OF MAGNESIA) suspension 30 mL  30 mL    And   • bisacodyl (DULCOLAX) suppository 10 mg  10 mg   • enoxaparin (LOVENOX) inj 40 mg  40 mg   • acetaminophen (Tylenol) tablet 650 mg  650 mg   • cloNIDine (CATAPRES) tablet 0.1 mg  0.1 mg   • enalaprilat (Vasotec) injection 1.25 mg 1 mL  1.25 mg   • labetalol (NORMODYNE/TRANDATE) injection 10 mg  10 mg   • ketorolac (TORADOL) injection 30 mg  30 mg   • ondansetron (ZOFRAN) syringe/vial injection 4 mg  4 mg   • ondansetron (ZOFRAN ODT) dispertab 4 mg  4 mg   • promethazine (PHENERGAN) tablet 12.5-25 mg  12.5-25 mg   • promethazine (PHENERGAN) suppository 12.5-25 mg  12.5-25 mg   • prochlorperazine (COMPAZINE) injection 5-10 mg  5-10 mg   • methylPREDNISolone sod succ (SOLU-MEDROL) 1,000 mg in  mL IVPB  1 g   • butalbital/apap/caffeine -40 mg  "(Fioricet) per tablet 1 Tablet  1 Tablet   • diazePAM (VALIUM) injection 5 mg  5 mg   • melatonin tablet 5 mg  5 mg   • famotidine (PEPCID) tablet 20 mg  20 mg       Medication Allergy:  Allergies   Allergen Reactions   • Amitriptyline Unspecified     Suicidal   • Clarithromycin Hives   • Sulfa Drugs Anaphylaxis   • Cantaloupe Itching   • Honey Dew Itching   • Latex Itching   • Lorazepam Unspecified     Hallucinations     • Metoclopramide Unspecified     Muscle spasms   • Penicillin G Potassium Vomiting   • Rizatriptan Unspecified     Tremors, confusion     • Sumatriptan Unspecified     \"Makes my head pins and needles\"   • Tape Rash   • Morphine Hives and Itching       Review of systems:   Constitutional: denies fever, night sweats, weight loss.   Eyes: denies acute vision change, eye pain or secretion.   Ears, Nose, Mouth, Throat: denies nasal secretion, nasal bleeding, difficulty swallowing, hearing loss, tinnitus, vertigo, ear pain, acute dental problems, oral ulcers or lesions.   Endocrine: denies recent weight changes, heat or cold intolerance, polyuria, polydypsia, polyphagia,abnormal hair growth.  Cardiovascular: denies new onset of chest pain, palpitations, syncope, or dyspnea of exertion.  Pulmonary: denies shortness of breath, new onset of cough, hemoptysis, wheezing, chest pain or flu-like symptoms.   GI: denies nausea, vomiting, diarrhea, GI bleeding, change in appetite, abdominal pain, and change in bowel habits.  : denies dysuria, urinary incontinence, hematuria.  Heme/oncology: denies history of easy bruising or bleeding. No history of cancer, DVTor PE.  Allergy/immunology: denies hives/urticaria, or itching.   Dermatologic: denies new rash, or new skin lesions.  Musculoskeletal:denies joint swelling or pain, muscle pain, neck and back pain.   Neurologic: As noted above.   Psychiatric: denies symptoms of depression, anxiety, hallucinations, mood swings or changes, suicidal or homicidal thoughts. "     Physical examination:   Vitals:    10/07/21 1621 10/07/21 1943 10/08/21 0414 10/08/21 0719   BP: 103/67 108/63 100/59 (!) 99/55   Pulse: 76 73 79 71   Resp: 18 15 14 16   Temp: 36.6 °C (97.9 °F) 36.6 °C (97.8 °F) 36.2 °C (97.2 °F) 36.5 °C (97.7 °F)   TempSrc: Temporal Temporal Temporal Temporal   SpO2: 96% 91% 97% 96%   Weight:         General: Patient in no acute distress, pleasant and cooperative.  HEENT: Normocephalic, no signs of acute trauma.   Neck: supple, no meningeal signs or carotid bruits. There is normal range of motion. No tenderness on exam.   Chest: clear to auscultation. No cough.   CV: RRR, no murmurs.   Skin: no signs of acute rashes or trauma.   Musculoskeletal: joints exhibit full range of motion, without any pain to palpation. There are no signs of joint or muscle swelling. There is no tenderness to deep palpation of muscles.   Psychiatric: No hallucinatory behavior. Denies symptoms of depression or suicidal ideation. Mood and affect appear normal on exam.     NEUROLOGICAL EXAM:   Mental status, orientation: Awake, alert and fully oriented.   Speech and language: speech is clear and fluent. The patient is able to name, repeat and comprehend.   Memory: There is intact recollection of recent and remote events.   Cranial nerve exam: Pupils are 3-4 mm bilaterally and equally reactive to light. Visual fields are intact by confrontation. There is no nystagmus on primary or secondary gaze. Intact full EOM in all directions of gaze. Face appears symmetric. Sensation in the face is intact to light touch. Uvula is midline. Palate elevates symmetrically. Tongue is midline and without any signs of tongue biting or fasciculations.Shoulder shrug is intact bilaterally.   Motor exam: Strength is 5/5 in RUE/RLE; 4+/5 to LUE proximal and distal; 4+/5 to LLE proximally, distally. Tone is normal. No overt bradykinesia. No abnormal movements were seen on exam.   Sensory exam Right reveals normal sense of light  touch and pinprick in all extremities. Left with decreased sensation to light touch and nox stim (UE/LE)  Deep tendon reflexes:  3+ throughout. Plantar responses are flexor. There is no clonus.   Coordination: shows a normal finger-nose-finger. Normal rapidly alternating movements.   Gait: Not assessed at this time as patient is a fall risk.         ANCILLARY DATA REVIEWED:     Lab Data Review:  Recent Results (from the past 24 hour(s))   VITAMIN D,25 HYDROXY    Collection Time: 10/08/21  4:47 AM   Result Value Ref Range    25-Hydroxy   Vitamin D 25 30 30 - 100 ng/mL   CBC with Differential    Collection Time: 10/08/21  4:47 AM   Result Value Ref Range    WBC 19.0 (H) 4.8 - 10.8 K/uL    RBC 4.63 4.20 - 5.40 M/uL    Hemoglobin 14.7 12.0 - 16.0 g/dL    Hematocrit 42.8 37.0 - 47.0 %    MCV 92.4 81.4 - 97.8 fL    MCH 31.7 27.0 - 33.0 pg    MCHC 34.3 33.6 - 35.0 g/dL    RDW 42.9 35.9 - 50.0 fL    Platelet Count 278 164 - 446 K/uL    MPV 10.3 9.0 - 12.9 fL    Neutrophils-Polys 92.00 (H) 44.00 - 72.00 %    Lymphocytes 3.70 (L) 22.00 - 41.00 %    Monocytes 3.50 0.00 - 13.40 %    Eosinophils 0.00 0.00 - 6.90 %    Basophils 0.10 0.00 - 1.80 %    Immature Granulocytes 0.70 0.00 - 0.90 %    Nucleated RBC 0.00 /100 WBC    Neutrophils (Absolute) 17.43 (H) 2.00 - 7.15 K/uL    Lymphs (Absolute) 0.71 (L) 1.00 - 4.80 K/uL    Monos (Absolute) 0.66 0.00 - 0.85 K/uL    Eos (Absolute) 0.00 0.00 - 0.51 K/uL    Baso (Absolute) 0.02 0.00 - 0.12 K/uL    Immature Granulocytes (abs) 0.14 (H) 0.00 - 0.11 K/uL    NRBC (Absolute) 0.00 K/uL   Basic Metabolic Panel (BMP)    Collection Time: 10/08/21  4:47 AM   Result Value Ref Range    Sodium 138 135 - 145 mmol/L    Potassium 4.1 3.6 - 5.5 mmol/L    Chloride 111 96 - 112 mmol/L    Co2 16 (L) 20 - 33 mmol/L    Glucose 167 (H) 65 - 99 mg/dL    Bun 13 8 - 22 mg/dL    Creatinine 0.77 0.50 - 1.40 mg/dL    Calcium 9.5 8.5 - 10.5 mg/dL    Anion Gap 11.0 7.0 - 16.0   ESTIMATED GFR    Collection Time:  "10/08/21  4:47 AM   Result Value Ref Range    GFR If African American >60 >60 mL/min/1.73 m 2    GFR If Non African American >60 >60 mL/min/1.73 m 2       Labs reviewed by me.     Records reviewed:   Reviewed records from patient's previous hospitalisations and Neurology encounters at [facility].        Imaging reviewed by me:     MR-THORACIC SPINE-WITH & W/O   Final Result      MRI of the thoracic spine without and with contrast within normal limits except for incidentally noted T9 vertebral body hemangioma.      MR-CERVICAL SPINE-WITH & W/O   Final Result      1.  Patient motion artifact. This augments linear longitudinal pulsation artifacts over the cord.   2.  Borderline low-lying cerebellar tonsils extending 2 or 3 mm below the foramen magnum. This does not satisfy criteria for Chiari I malformation.   3.  Subcentimeter demyelinating lesion again seen in the right cord periphery at the C5-6 level. Best seen on the axial images.   4.  No \"active\" enhancing plaque, discrete lesion, or overall progression of demyelinating lesion burden.   5.  C3-4 minimal right paramedian disc/osteophyte complex. No central or foraminal stenosis.      MR-BRAIN-WITH & W/O   Final Result      1.  Mild supratentorial white matter disease compatible with history of multiple sclerosis. No \"active\" enhancing lesions, discrete lesion, or overall progression of lesion burden is evident.   2.  No demyelinating lesions are evident involving the brainstem or cerebellum.   3.  The case was discussed (preliminary call report) by Dr. Hope with SANJANA ORTEGA at 11:55 PM 10/6/2021.            Modified [place] Scale (MRS): 1 = No significant disability, despite symptoms; able to perform all usual duties and activities      ASSESSMENT AND PLAN:  35 year old female, known to our neurology service, with PMhx significant for multiple sclerosis (diagnosed January 2021; patient of Dr. Aggarwal; on Ofatumumab), anxiety/depression, seizure disorder " "(unclear etiology; suspect mixed epileptic and non-epileptic events; on/compliant with Zonegran and Topamax), asthma, and migraine/complex migraine who presented to [facility] on 10/6/21 for a chief complaint of \"abdominal spasm,\" Left eye blurred vision, and BLE weakness/numbness x 3 days; imaging of entire neuro axis with/without contrast did not reveal any new or enhancing lesions; imaging largely unchanged from previous in May 2021. Patient is receiving IV methylprednisolone 1g daily x 3 days for pseudo exacerbation of MS; today is day #3; patient admits to significant improvement and endorses her readiness to be discharged home today. Note, PT eval with no obvious inpatient therapy needs; recommend ongoing PT to be arranged as outpatient.     Patient is thus clear for discharge from a neurological perspective following her third and final dose of Methylprednisolone this afternoon; plan for outpatient follow up with Dr. Aggarwal-- patient reports that she has follow up scheduled for December. Continue home regimen Ofatumumab, migraine prophylaxis (Topamax, Aimovig) and Zonegran.     The plan of care above has been discussed with Dr. Miranda. Other than the above, no further recommendations from a neurological perspective, please call with questions.     Ermelinda Davis, [email or web address].  [employer]      "

## 2021-10-08 NOTE — PROGRESS NOTES
IV dc'd.  Discharge instructions given to patient; patient verbalizes understanding, all questions answered.  Copy of DC summary provided, signed copy in chart.  0 prescriptions electronically sent to pt's pharmacy/provided to patient, copies in chart.  Pt states personal belongings are in possession.  Pt escorted off unit by this tech without incident.

## 2021-10-08 NOTE — DISCHARGE SUMMARY
Discharge Summary    CHIEF COMPLAINT ON ADMISSION  Chief Complaint   Patient presents with   • Numbness     bilateral legs and left arm  x1wk, history of MS       Reason for Admission  Numbness     Admission Date  10/6/2021    CODE STATUS  Full Code    HPI & HOSPITAL COURSE  This is a 35 y.o. female here with history of MS, seizure disorder, obesity who presents with complaints of leg weakness and increased tingling and numbness in her extremities patient was seen by neurology Dr. Miranda  and he recommended MRI scan of the spine and initiation of high-dose steroids for 72 hours on a daily basis.  Patient received 3 doses of 1 g of Solu-Medrol and the MRIs did not reveal any active lesions.  Patient was cleared by neurology for discharge    Therefore, she is discharged in good and stable condition to home with close outpatient follow-up.    The patient met 2-midnight criteria for an inpatient stay at the time of discharge.    Discharge Date  10/8    FOLLOW UP ITEMS POST DISCHARGE      DISCHARGE DIAGNOSES  Principal Problem:    MS (multiple sclerosis) (HCC) POA: Yes  Active Problems:    Seizure disorder (HCC) POA: Yes    Obesity POA: Yes  Resolved Problems:    Migraine without aura POA: Yes      Overview: Failed (SE): Maxalt (increased HA)      Failed (ineffective): Inderal      ICD-10 transition    Hypokalemia POA: Yes      FOLLOW UP  Future Appointments   Date Time Provider Department Center   11/13/2021  9:00 AM [facility] [address] [facility] [address]   11/13/2021 10:00 AM [facility] MRI [address] [facility] [address]   12/14/2021 10:50 AM Winston Nicole M.D. [facility] None   12/16/2021 10:00 AM Rajeev Aggarwal M.D. [facility] None   12/20/2021 12:00 PM Jacki Gallegos D.O. [facility] None     No follow-up provider specified.    MEDICATIONS ON DISCHARGE     Medication List      CONTINUE taking these medications      Instructions   Aimovig 70 MG/ML Soaj  Generic drug: Erenumab   Inject 70 mg as instructed Q30 DAYS.  Dose: 70 mg     diazePAM 5 MG  "Tabs  Commonly known as: VALIUM   Take 1 tablet 1 to 2 hours before morning.  Take a second tablet approximately 20 minutes before month.  May repeat x1 as needed.     fluconazole 150 MG tablet  Commonly known as: DIFLUCAN   TAKE 1 TABLET BY MOUTH ONCE A MONTH FOR 90 DAYS     Kesimpta 20 MG/0.4ML Soaj  Generic drug: Ofatumumab   Inject  under the skin.     methocarbamol 500 MG Tabs  Commonly known as: ROBAXIN   Take 1 Tablet by mouth 3 times a day as needed (Spasms).  Dose: 500 mg     topiramate 100 MG Tabs  Commonly known as: TOPAMAX   Take 100 mg by mouth every day.  Dose: 100 mg     zonisamide 100 MG Caps  Commonly known as: ZONEGRAN   Take 300 mg by mouth every evening.  Dose: 300 mg            Allergies  Allergies   Allergen Reactions   • Amitriptyline Unspecified     Suicidal   • Clarithromycin Hives   • Sulfa Drugs Anaphylaxis   • Cantaloupe Itching   • Honey Dew Itching   • Latex Itching   • Lorazepam Unspecified     Hallucinations     • Metoclopramide Unspecified     Muscle spasms   • Penicillin G Potassium Vomiting   • Rizatriptan Unspecified     Tremors, confusion     • Sumatriptan Unspecified     \"Makes my head pins and needles\"   • Tape Rash   • Morphine Hives and Itching       DIET  Orders Placed This Encounter   Procedures   • Diet Order Diet: Regular     Standing Status:   Standing     Number of Occurrences:   1     Order Specific Question:   Diet:     Answer:   Regular [1]       ACTIVITY  As tolerated.  Weight bearing as tolerated    CONSULTATIONS  Dr armstrong neuro    PROCEDURES  Miller Ro M.D.  [phone] 10/7/2021 STAT   Narrative & Impression     10/6/2021 10:54 PM     HISTORY/REASON FOR EXAM:  Demyelinating disease.  Numbness bilateral legs and left arm for one week. History of multiple sclerosis.     TECHNIQUE/EXAM DESCRIPTION:   MRI of the brain without and with contrast.     T1 sagittal, T2 fast spin-echo axial, T1 coronal, FLAIR coronal, diffusion-weighted and apparent diffusion " "coefficient (ADC map) axial images were obtained of the whole brain. T1 postcontrast BRAVO axial and T1 postcontrast coronal images were obtained.   Additional FLAIR axial images were obtained.     The study was performed on a [employer] Signa 1.5 Breana MRI scanner.     15 mL ProHance gadolinium contrast was administered intravenously.     COMPARISON:  MRI brain 5/8/2021     FINDINGS:  The calvariae are unremarkable.  There are no extra-axial fluid collections.     The ventricular system and basal cisterns are within normal limits.     There are a few scattered subcentimeter foci of FLAIR hyperintensity in the periventricular and deep white matter primarily at the posterior ventricular angles along with a few juxtacortical lesions. No \"active\" enhancing lesions are evident. No discrete   new lesions or overall progression of lesion burden since prior exam. At least one lesion involving the corpus callosum (left paramedian callosal body) is noted (FLAIR coronal image 19, series 8).     There are no mass effects or shift of midline structures.  There are no hemorrhagic lesions.  The diffusion weighted axial images show no evidence of acute cerebral infarction.     The postcontrast images show no areas of abnormal parenchymal or meningeal enhancement.     The brainstem and posterior fossa structures show borderline low-lying cerebellar tonsils which extend about 3 mm below the foramen magnum. This does not satisfy criteria for Chiari I malformation. No demyelinating lesions are evident involving the   brainstem or cerebellum.     Vascular flow voids in the vertebrobasilar and carotid arteries, [place], and dural venous sinuses are intact. There is a diminutive vertebro-basilar system consistent with normal variation likely associated with carotid origin of one or both   posterior cerebral arteries.     The paranasal sinuses in the field of view are unremarkable.     The mastoids are clear.     IMPRESSION:     1.  " "Mild supratentorial white matter disease compatible with history of multiple sclerosis. No \"active\" enhancing lesions, discrete lesion, or overall progression of lesion burden is evident.  2.  No demyelinating lesions are evident involving the brainstem or cerebellum.  3.  The case was discussed (preliminary call report) by Dr. Hope with MIRTA ORTEGA at 11:55 PM 10/6/2021.        Exam Ended: 10/06/21 11:49 PM Last Resulted: 10/07/21  8:15 AM        Order Details      View Encounter      Lab and Collection Details      Routing      Result History           Result Care Coordination      Patient Communication     Add Comments   Seen Back to Top         MR-BRAIN-WITH & W/O [[id number]]    Electronically signed by: Mirta Ortega M.D. on 10/06/21 2156 Status: Completed   Ordering user: Mirta Ortega M.D. 10/06/21 2156 Ordering provider: Mirta Ortega M.D.   Authorized by: Mirta Ortega M.D.               LABORATORY  Lab Results   Component Value Date    SODIUM 138 10/08/2021    POTASSIUM 4.1 10/08/2021    CHLORIDE 111 10/08/2021    CO2 16 (L) 10/08/2021    GLUCOSE 167 (H) 10/08/2021    BUN 13 10/08/2021    CREATININE 0.77 10/08/2021    CREATININE 0.9 05/04/2009        Lab Results   Component Value Date    WBC 19.0 (H) 10/08/2021    HEMOGLOBIN 14.7 10/08/2021    HEMATOCRIT 42.8 10/08/2021    PLATELETCT 278 10/08/2021        Total time of the discharge process exceeds 37  minutes.  "

## 2021-10-08 NOTE — PROGRESS NOTES
Assessment completed. Pt A&Ox 4. Respirations are even and unlabored on room air. Pt denies pain at this time. Monitors applied, VS stable, call light and belongings within reach. POC updated (D/C). Pt educated on room and call light, pt verbalized understanding. Communication board updated. Needs met.

## 2021-10-08 NOTE — PROGRESS NOTES
Assessment completed. Pt A&Ox 4. Respirations are even and unlabored on room air. Pt denies pain at this time. Medical patient, VS stable, call light and belongings within reach. POC updated (D/C). Pt educated on room and call light, pt verbalized understanding. Communication board updated. Needs met.

## 2021-10-13 ENCOUNTER — TELEMEDICINE (OUTPATIENT)
Dept: NEUROLOGY | Facility: MEDICAL CENTER | Age: 36
End: 2021-10-13
Attending: PSYCHIATRY & NEUROLOGY
Payer: COMMERCIAL

## 2021-10-13 VITALS — BODY MASS INDEX: 31.01 KG/M2 | HEIGHT: 63 IN | WEIGHT: 175 LBS

## 2021-10-13 DIAGNOSIS — G35 MS (MULTIPLE SCLEROSIS) (HCC): ICD-10-CM

## 2021-10-13 PROCEDURE — 99442 PR PHYSICIAN TELEPHONE EVALUATION 11-20 MIN: CPT | Mod: 95 | Performed by: PSYCHIATRY & NEUROLOGY

## 2021-10-13 ASSESSMENT — FIBROSIS 4 INDEX: FIB4 SCORE: 0.46

## 2021-10-13 NOTE — PROGRESS NOTES
"Telephone Appointment Visit   This visit was conducted by telephone. This telephone visit was initiated by the patient and they verbally consented.    Time at start of call: 16:32    Reason for Call:  FMLA paperwork.    HPI:    Rehana completed 3 doses of high-dose IVMP.  She \"took a lot of things off of her plate.\"  Today, she feels completely back to normal.    Labs / Images Reviewed:   Most recent MRI brain, cervical spine, and thoracic spine reviewed.     Assessment and Plan:     There are no diagnoses linked to this encounter.    Follow-up: ~4 months    Time at end of call: 16:45  Total Time Spent: 11-20 minutes    Rajeev Aggarwal M.D.  "

## 2021-10-14 DIAGNOSIS — R25.2 SPASTICITY: ICD-10-CM

## 2021-10-14 DIAGNOSIS — G35 MS (MULTIPLE SCLEROSIS) (HCC): ICD-10-CM

## 2021-10-18 RX ORDER — METHOCARBAMOL 500 MG/1
500 TABLET, FILM COATED ORAL 3 TIMES DAILY PRN
Qty: 90 TABLET | Refills: 0 | OUTPATIENT
Start: 2021-10-18

## 2021-10-18 NOTE — TELEPHONE ENCOUNTER
Robaxin rx not refilled. Recently in hospital. Unclear if still indicated. Will need re-evaluation.

## 2021-12-01 DIAGNOSIS — G35 MS (MULTIPLE SCLEROSIS) (HCC): Primary | ICD-10-CM

## 2021-12-01 NOTE — TELEPHONE ENCOUNTER
Received request via: Pharmacy    Was the patient seen in the last year in this department? Yes    [facility]   8/17/21  [facility]   12/16/21    Does the patient have an active prescription (recently filled or refills available) for medication(s) requested? yes

## 2021-12-02 RX ORDER — OFATUMUMAB 20 MG/.4ML
0.4 INJECTION, SOLUTION SUBCUTANEOUS
Qty: 0.4 ML | Refills: 11 | Status: SHIPPED | OUTPATIENT
Start: 2021-12-02 | End: 2021-12-06 | Stop reason: SDUPTHER

## 2021-12-06 DIAGNOSIS — G35 MS (MULTIPLE SCLEROSIS) (HCC): ICD-10-CM

## 2021-12-06 RX ORDER — OFATUMUMAB 20 MG/.4ML
0.4 INJECTION, SOLUTION SUBCUTANEOUS
Qty: 0.4 ML | Refills: 11 | Status: SHIPPED | OUTPATIENT
Start: 2021-12-06 | End: 2021-12-07

## 2021-12-06 NOTE — TELEPHONE ENCOUNTER
Received request via: Pharmacy    Was the patient seen in the last year in this department? Yes    [facility]   10/13/21  [facility]    12/16/21    Does the patient have an active prescription (recently filled or refills available) for medication(s) requested? yes

## 2022-01-03 ENCOUNTER — TELEMEDICINE (OUTPATIENT)
Dept: PHYSICAL MEDICINE AND REHAB | Facility: REHABILITATION | Age: 37
End: 2022-01-03
Payer: COMMERCIAL

## 2022-01-03 DIAGNOSIS — R63.0 POOR APPETITE: ICD-10-CM

## 2022-01-03 DIAGNOSIS — R25.2 SPASTICITY: ICD-10-CM

## 2022-01-03 DIAGNOSIS — Z72.820 POOR SLEEP: ICD-10-CM

## 2022-01-03 DIAGNOSIS — G35 MS (MULTIPLE SCLEROSIS) (HCC): Primary | ICD-10-CM

## 2022-01-03 PROCEDURE — 99215 OFFICE O/P EST HI 40 MIN: CPT | Mod: 95 | Performed by: PHYSICAL MEDICINE & REHABILITATION

## 2022-01-03 RX ORDER — MIRTAZAPINE 7.5 MG/1
15 TABLET, FILM COATED ORAL
Qty: 60 TABLET | Refills: 1 | Status: SHIPPED | OUTPATIENT
Start: 2022-01-03 | End: 2022-02-15 | Stop reason: SINTOL

## 2022-01-03 NOTE — PROGRESS NOTES
"[facility]  PM&R Neuro Rehabilitation Clinic  [facility] [facility] [place] [address]  Ph: [phone]    FOLLOW UP PATIENT EVALUATION    This evaluation was conducted via Zoom using secure and encrypted videoconferencing technology. The patient was in a private location in the [facility].  The patient's identity was confirmed and verbal consent was obtained for this virtual visit.    Patient Name: Rehana Meade   Patient : 1985  Patient Age: 36 y.o.     Examining Physician: Dr. Jacki Gallegos, DO    PM&R History to Date - Background Information:  Dates of Admission/Discharge to ARU: None    SUBJECTIVE:   Patient Identification: Rehana Meade is a 36 y.o. RHD female with PMH significant for migraines, anxiety/depression, seizures, asthma and rehabilitation history significant for multiple sclerosis (diagnosed 2021) and is presenting to PM&R clinic for a FOLLOW UP OUTPATIENT evaluation with the following chief complaint/s:    Chief Complaint: MS     History of Present Illness:    - Pain had gotten worse since holidays were really stressful and she is now one year post diagnosis.   - MRI's were normal.   - Will be staying on the [place].   - She is having to learn her walls. It fluctuates significantly.   - Sleep contributes to her not being able to do as much if she is not sleeping well.   - Going to focus on diet and limits.   - Sleep has been a huge issues for her.   - Tried Melatonin and didn't help.   - Refuses to take Ambien.   - Has no appetite. Does get nauseous, but has Zofran.   - Has follow up with Dr. Nicole   - Terese helps with the \"hugs\".   -  noticed she is spasming at night.     Review of Systems:  All other pertinent positive review of systems are noted above in HPI.   All other systems reviewed and are negative.    Past Medical History:  Past Medical History:   Diagnosis Date   • Seizure cerebral (HCC) 2/15/2018   • Anesthesia     woke up combative   • " Anxiety associated with depression    • ASTHMA    • Migraine without aura, without mention of intractable migraine without mention of status migrainosus    • Other specified symptom associated with female genital organs    • Seizure (HCC)     Last seizure was 1/2019   • Stroke (HCC)    • Substance abuse (HCC)    • Unspecified disorder of thyroid     cyst      Past Surgical History:   Procedure Laterality Date   • VAGINAL HYSTERECTOMY TOTAL  1/27/2020    Procedure: HYSTERECTOMY, TOTAL, VAGINAL;  Surgeon: Julian Parker M.D.;  Location: SURGERY SAME DAY [facility];  Service: Gynecology   • CYSTOSCOPY  1/27/2020    Procedure: CYSTOSCOPY;  Surgeon: Julian Parker M.D.;  Location: SURGERY SAME DAY [facility];  Service: Gynecology   • TUBAL COAGULATION LAPAROSCOPIC BILATERAL  7/11/2014    Performed by Julian Parker M.D. at SURGERY SAME DAY [facility]   • SEPTOPLASTY  10/28/2009    Performed by ANNETTE FORDE at SURGERY [facility]   • SOMNOPLASTY  10/28/2009    Performed by ANNETTE FORDE at SURGERY [facility]   • NASAL POLYPECTOMY  10/28/2009    Performed by ANNETTE FORDE at SURGERY [facility]        Current Outpatient Medications:   •  mirtazapine (REMERON) 7.5 MG tablet, Take 2 Tablets by mouth at bedtime., Disp: 60 Tablet, Rfl: 1  •  methocarbamol (ROBAXIN) 500 MG Tab, Take 1 Tablet by mouth 3 times a day as needed (Spasms)., Disp: 90 Tablet, Rfl: 0  •  fluconazole (DIFLUCAN) 150 MG tablet, TAKE 1 TABLET BY MOUTH ONCE A MONTH FOR 90 DAYS, Disp: , Rfl:   •  topiramate (TOPAMAX) 100 MG Tab, Take 100 mg by mouth every day., Disp: , Rfl:   •  AIMOVIG 70 MG/ML Solution Auto-injector, Inject 70 mg as instructed Q30 DAYS., Disp: , Rfl: 11  •  zonisamide (ZONEGRAN) 100 MG Cap, Take 300 mg by mouth every evening., Disp: , Rfl:   Allergies   Allergen Reactions   • Amitriptyline Unspecified     Suicidal   • Clarithromycin Hives   • Sulfa Drugs Anaphylaxis   • Cantaloupe Itching   • Honey Dew  "Itching   • Latex Itching   • Lorazepam Unspecified     Hallucinations     • Metoclopramide Unspecified     Muscle spasms   • Penicillin G Potassium Vomiting   • Rizatriptan Unspecified     Tremors, confusion     • Sumatriptan Unspecified     \"Makes my head pins and needles\"   • Tape Rash   • Morphine Hives and Itching        Past Social History:  Social History     Socioeconomic History   • Marital status:      Spouse name: Not on file   • Number of children: Not on file   • Years of education: Not on file   • Highest education level: Not on file   Occupational History   • Not on file   Tobacco Use   • Smoking status: Never Smoker   • Smokeless tobacco: Never Used   Vaping Use   • Vaping Use: Never used   Substance and Sexual Activity   • Alcohol use: Not Currently     Comment: rarely   • Drug use: No   • Sexual activity: Yes     Partners: Male   Other Topics Concern   •  Service No   • Blood Transfusions No   • Caffeine Concern No   • Occupational Exposure No   • Hobby Hazards No   • Sleep Concern Yes   • Stress Concern No   • Weight Concern No   • Special Diet No   • Back Care No   • Exercise No   • Bike Helmet No   • Seat Belt Yes   • Self-Exams Yes   Social History Narrative   • Not on file     Social Determinants of Health     Financial Resource Strain:    • Difficulty of Paying Living Expenses: Not on file   Food Insecurity:    • Worried About Running Out of Food in the Last Year: Not on file   • Ran Out of Food in the Last Year: Not on file   Transportation Needs:    • Lack of Transportation (Medical): Not on file   • Lack of Transportation (Non-Medical): Not on file   Physical Activity:    • Days of Exercise per Week: Not on file   • Minutes of Exercise per Session: Not on file   Stress:    • Feeling of Stress : Not on file   Social Connections:    • Frequency of Communication with Friends and Family: Not on file   • Frequency of Social Gatherings with Friends and Family: Not on file   • " Attends Bahai Services: Not on file   • Active Member of Clubs or Organizations: Not on file   • Attends Club or Organization Meetings: Not on file   • Marital Status: Not on file   Intimate Partner Violence:    • Fear of Current or Ex-Partner: Not on file   • Emotionally Abused: Not on file   • Physically Abused: Not on file   • Sexually Abused: Not on file   Housing Stability:    • Unable to Pay for Housing in the Last Year: Not on file   • Number of Places Lived in the Last Year: Not on file   • Unstable Housing in the Last Year: Not on file        Family History:  Family history was reviewed with the patient, there is no pertinent family history to report.     Depression and Opioid Screening  PHQ-9:  Depression Screen (PHQ-2/PHQ-9) 3/5/2021 7/7/2021 9/14/2021   PHQ-2 Total Score 0 - -   PHQ-2 Total Score - 0 0   PHQ-9 Total Score - - -     Interpretation of PHQ-9 Total Score   Score Severity   1-4 No Depression   5-9 Mild Depression   10-14 Moderate Depression   15-19 Moderately Severe Depression   20-27 Severe Depression     OBJECTIVE:   Vital Signs:  There were no vitals filed for this visit.     Pertinent Labs:  Lab Results   Component Value Date/Time    SODIUM 138 10/08/2021 04:47 AM    POTASSIUM 4.1 10/08/2021 04:47 AM    CHLORIDE 111 10/08/2021 04:47 AM    CO2 16 (L) 10/08/2021 04:47 AM    GLUCOSE 167 (H) 10/08/2021 04:47 AM    BUN 13 10/08/2021 04:47 AM    CREATININE 0.77 10/08/2021 04:47 AM    CREATININE 0.9 05/04/2009 09:58 PM       Lab Results   Component Value Date/Time    HBA1C 5.3 10/06/2021 10:25 PM       Lab Results   Component Value Date/Time    WBC 19.0 (H) 10/08/2021 04:47 AM    RBC 4.63 10/08/2021 04:47 AM    HEMOGLOBIN 14.7 10/08/2021 04:47 AM    HEMATOCRIT 42.8 10/08/2021 04:47 AM    MCV 92.4 10/08/2021 04:47 AM    MCH 31.7 10/08/2021 04:47 AM    MCHC 34.3 10/08/2021 04:47 AM    MPV 10.3 10/08/2021 04:47 AM    NEUTSPOLYS 92.00 (H) 10/08/2021 04:47 AM    LYMPHOCYTES 3.70 (L) 10/08/2021  "04:47 AM    MONOCYTES 3.50 10/08/2021 04:47 AM    EOSINOPHILS 0.00 10/08/2021 04:47 AM    BASOPHILS 0.10 10/08/2021 04:47 AM    HYPOCHROMIA 1+ 06/06/2014 10:49 AM       Lab Results   Component Value Date/Time    ASTSGOT 11 (L) 10/06/2021 10:25 PM    ALTSGPT 9 10/06/2021 10:25 PM        Physical Exam:   GEN: No apparent distress  HEENT: Head normocephalic, atraumatic.  Sclera nonicteric bilaterally, no ocular discharge appreciated bilaterally.  PULMONARY: Breathing nonlabored on room air, no respiratory accessory muscle use.  Not requiring supplemental oxygen.  SKIN: No appreciable skin breakdown on exposed areas of skin.  PSYCH: Mood and affect within normal limits.  NEURO: Awake alert.  Conversational.  Logical thought content.    Imaging:  MRI Brain 10/7/21  1.  Mild supratentorial white matter disease compatible with history of multiple sclerosis. No \"active\" enhancing lesions, discrete lesion, or overall progression of lesion burden is evident.  2.  No demyelinating lesions are evident involving the brainstem or cerebellum.  3.  The case was discussed (preliminary call report) by Dr. Hope with SANJANA ORTEGA at 11:55 PM 10/6/2021.    MRI C Spine 10/7/21  1.  Patient motion artifact. This augments linear longitudinal pulsation artifacts over the cord.  2.  Borderline low-lying cerebellar tonsils extending 2 or 3 mm below the foramen magnum. This does not satisfy criteria for Chiari I malformation.  3.  Subcentimeter demyelinating lesion again seen in the right cord periphery at the C5-6 level. Best seen on the axial images.  4.  No \"active\" enhancing plaque, discrete lesion, or overall progression of demyelinating lesion burden.  5.  C3-4 minimal right paramedian disc/osteophyte complex. No central or foraminal stenosis.    MRI Thoracic Spine 10/8/21  MRI of the thoracic spine without and with contrast within normal limits except for incidentally noted T9 vertebral body hemangioma.    ASSESSMENT/PLAN: " Rehana Meade  is a 35 y.o. female with rehabilitation history significant for multiple sclerosis (diagnosed 1/2021), here for evaluation. The following plan was discussed with the patient who is in agreement.     Visit Diagnoses     ICD-10-CM   1. MS (multiple sclerosis) (HCC)  G35   2. Poor appetite  R63.0   3. Spasticity  R25.2   4. Poor sleep  Z72.820      Rehab/Neuro:   1. Multiple sclerosis: Diagnosed 1/2021.  2. Fatigue  3. Lack of appetite  4. Bone pain, right side  -Established with urology.  Recent MRIs reviewed and were negative for new active lesions.  -Therapy: Established with physical therapy and pelvic floor therapy.  -Occupation: Works full-time from home.  -Currently has no equipment needs.    Neuropathic Pain: Affected the left lower extremity in the past, now more so affecting right lower extremity.  Currently not an issue. 1/3/2022 stable.  No issues.    Neurogenic Bladder: Urinary urgency. ?  Spastic component.  Resolved as of 6/2021. RBUS normal with incidental angiolipoma.  Saw nephrology.  Confirm it is benign.   -Status: Voiding volitionally.  -Established with pelvic floor therapy.  Will try to get back into it now that it is the new year.    Spasticity: Developed bandlike pain around the thoracic region.  Robaxin does help.  This only happens 1-2 times per week now but is increasing in frequency.  -Med management: Continue Robaxin as needed.  Patient really does not have to use very often and uses sparingly.  -Counseled that there are other antispasmodic agents that she could use as her  reports that she still spasms as she is trying to fall asleep and when she is asleep.  Could be contributing to her reduced sleep.    Endo: Vitamin D deficiency.  -Labs reviewed: Vitamin D 30 10/8/21  -Vit D normal limits recommend repeat within next 6 months has had been low at 14.     Nociceptive pain: Chronic bilateral SI versus lumbar facet mediated versus muscular. - Has pain  bilaterally near SI joint. Very sudden and painful completely random onset. No associated provocative factors. No reliving factors. Nothing seems to help (meds/heat/stretching/back rollers/massage).  Not been to PT.  -Therapy: Getting pelvic floor therapy as well as physical therapy  -Follows with physiatry-interventional pain and spine clinic    GI: Poor appetite.  Ongoing issue.  -Med management: Discussed all possible options.  Prescription for mirtazapine 7.5 mg nightly to be started and increased within 3 to 4 weeks.    Poor sleep: Failed melatonin.  Failed Ambien, significant hallucinogenic side effects.  -Med management: Discussed all possible options.  Prescription for mirtazapine as aforementioned.  -Could try trazodone in the future.    Situational depression: Related to being 1 year post diagnosis.  -Med management: Prescription for mirtazapine as aforementioned.    Follow up: 6-8 weeks for medication reevaluation    Total time spent was 41 minutes.  Included in this time is the time spent preparing for the visit including record review, my exam and evaluation, counseling and education regarding that which is aforementioned in the assessment and plan. Time was spent ordering the appropriate labs, tests, procedures, referrals, medications. Discussion involved the patient.    Please note that this dictation was created using voice recognition software. I have made every reasonable attempt to correct obvious errors but there may be errors of grammar and content that I may have overlooked prior to finalization of this note.    Dr. Jacki Gallegos DO, MS  Department of Physical Medicine & Rehabilitation  Neuro Rehabilitation Clinic  [facility]

## 2022-01-28 ENCOUNTER — OFFICE VISIT (OUTPATIENT)
Dept: NEUROLOGY | Facility: MEDICAL CENTER | Age: 37
End: 2022-01-28
Attending: PSYCHIATRY & NEUROLOGY
Payer: COMMERCIAL

## 2022-01-28 VITALS
SYSTOLIC BLOOD PRESSURE: 110 MMHG | HEIGHT: 63 IN | WEIGHT: 182.98 LBS | HEART RATE: 74 BPM | DIASTOLIC BLOOD PRESSURE: 68 MMHG | BODY MASS INDEX: 32.42 KG/M2 | TEMPERATURE: 98.7 F | OXYGEN SATURATION: 99 %

## 2022-01-28 DIAGNOSIS — R56.9 OBSERVED SEIZURE-LIKE ACTIVITY (HCC): Primary | ICD-10-CM

## 2022-01-28 PROBLEM — G40.209 PARTIAL SYMPTOMATIC EPILEPSY WITH COMPLEX PARTIAL SEIZURES, NOT INTRACTABLE, WITHOUT STATUS EPILEPTICUS (HCC): Status: ACTIVE | Noted: 2021-11-30

## 2022-01-28 PROCEDURE — 99212 OFFICE O/P EST SF 10 MIN: CPT | Performed by: PSYCHIATRY & NEUROLOGY

## 2022-01-28 PROCEDURE — 99214 OFFICE O/P EST MOD 30 MIN: CPT | Performed by: PSYCHIATRY & NEUROLOGY

## 2022-01-28 RX ORDER — TOPIRAMATE 25 MG/1
TABLET ORAL
Qty: 84 TABLET | Refills: 0 | Status: SHIPPED | OUTPATIENT
Start: 2022-01-28 | End: 2022-03-11

## 2022-01-28 RX ORDER — OFATUMUMAB 20 MG/.4ML
INJECTION, SOLUTION SUBCUTANEOUS
COMMUNITY
Start: 2022-01-06 | End: 2022-11-08

## 2022-01-28 ASSESSMENT — FIBROSIS 4 INDEX: FIB4 SCORE: 0.47

## 2022-01-28 ASSESSMENT — PATIENT HEALTH QUESTIONNAIRE - PHQ9: CLINICAL INTERPRETATION OF PHQ2 SCORE: 0

## 2022-01-28 NOTE — PROGRESS NOTES
[facility]  MULTIPLE SCLEROSIS & NEUROIMMUNOLOGY  FOLLOW-UP VISIT    DISEASE SUMMARY:  Principal neurologic diagnosis: MS  Diagnosis of MS: 1/7/2021  Disease History:  - 12/5/2019: blurry vision, left facial numbness, left upper- and lower extremity numbness; MRI head unremarkable; improved over the course of 1 week  - 12/25/2020: right monocular visual loss; returned to normal over ~2 hours  - 12/29/2020: onset of bilateral lower extremity weakness  - 1/2/202021: presented to hospital; MRI w/ lesions, LP w/ OCBs present; treated with IVMP with good response  - 2/28/2021: Gilenya FDO  - 3/4/2021: left margaret-body numbness; treated with IVMP w/ good response  - 5/1/2021: started Kesimpta  Disease course at onset: MS  Current disease course: MS  Previous disease therapies:  - Gilenya: additional clinical attack  Current disease therapies:  - Kesimpta  Symptomatic therapies:  - none  CSF (1/3/2021)):  - RBCs: 394  - WBCs: 6  - protein: 58  - glucose: 81  - oligoclonal bands: positive (8)  - paraneoplastic autoantibody eval: negative  Other Testing:  - anti-AQP4 Ab (1/3/2021): negative  - anti-MOG IgG (1/3/2021): negative  - paraneoplastic autoantibody eval, serum (1/4/2020): negative  MRI head:  - 10/6/2021: stable, no abnormal enhancement  - 5/8/2021: stable, no abnormal enhancement  - 3/5/2021: enhancing lesions present  - 1/2/2021: juxta-cortical, periventricular, and infratentorial lesions w/ enhancement  - 12/5/2019: no visible lesions  - 5/13/2018: no visible lesions  MRI cervical spine:  - 10/6/2021: stable, no abnormal enhancement  - 3/5/2021: single enhancing lesion present  - 1/2/2021: no visible lesions  MRI thoracic spine:  - 10/6/2021: no visible lesions  - 3/5/2021: no visible lesions  - 1/2/2021: no visible lesions    CC: MS    INTERVAL HISTORY:  Rehana Meade is a 36 y.o. woman with MS and a history otherwise notable for migraines, aseptic meningitis, and seizure disorder.  I last saw her in  "the [facility] on 8/17/2021.  At that time I recommended she continue Kesimpta.  Today, she was unaccompanied, and she provided the following interval history:    MS:  Rehana caught a \"cold\" from her chldren.  Her illness involved a fever of 103.  Later, this evolved into pharyngitis and cough.  Fortunately, she is feeling better lately.    Rehana has poor appetite.  Sometimes she has to \"force herself\" to eat.    Seizures:  No additional seizures since summer, 2021.  She is stable on zonisamide 300 mg qHS and topiramate 100 mg daily.    Migraine:  Her headaches are well-controlled on Aimovig.  She tolerates this well.    MEDICATIONS:  Current Outpatient Medications   Medication Sig   • KESIMPTA 20 MG/0.4ML Solution Auto-injector    • topiramate (TOPAMAX) 25 MG Tab Take 3 Tablets by mouth every day for 14 days, THEN 2 Tablets every day for 14 days, THEN 1 Tablet every day for 14 days.   • mirtazapine (REMERON) 7.5 MG tablet Take 2 Tablets by mouth at bedtime.   • methocarbamol (ROBAXIN) 500 MG Tab Take 1 Tablet by mouth 3 times a day as needed (Spasms).   • fluconazole (DIFLUCAN) 150 MG tablet TAKE 1 TABLET BY MOUTH ONCE A MONTH FOR 90 DAYS   • AIMOVIG 70 MG/ML Solution Auto-injector Inject 140 mg under the skin Q30 DAYS.   • zonisamide (ZONEGRAN) 100 MG Cap Take 300 mg by mouth every evening.     MEDICAL, SOCIAL, AND FAMILY HISTORY:  There is no change in the patient's ROS or PFSH from their previous visit on 5/12/2021.    REVIEW OF SYSTEMS:  A ROS was completed.  Pertinent positives and negatives were included in the HPI, above.  All other systems were reviewed and are negative.    PHYSICAL EXAM:  General/Medical:  - NAD    Neuro:  MENTAL STATUS: awake and alert; no deficits of speech or language; oriented to person, place, and time; affect was appropriate to situation; pleasant, cooperative     CRANIAL NERVES:    II: acuity: was: NT/NT without glasses, fields: NT, pupils: NT without a relative afferent " "pupillary defect, discs: discs sharp    III/IV/VI: versions: intact without nystagmus    V: facial sensation: NT    VII: facial expression: symmetric    VIII: hearing: intact to voice    IX/X: palate: NT    XI: shoulder shrug: NT    XII: tongue: NT     MOTOR:  - bulk: normal  - tone: normal in the upper and lower extremities  Upper Extremity Strength  (R/L)    NT   Elbow flexion NT   Elbow extension NT   Shoulder abduction NT      Lower Extremity Strength  (R/L)   Hip flexion NT   Knee extension NT   Knee flexion NT   Ankle plantarflexion NT   Ankle dorsiflexion NT      - pronator drift: NT  - abnormal movements: none     SENSATION:  - light touch: NT  - vibration (R/L, seconds): NT at the great toes  - pinprick: NT  - proprioception: NT  - Romberg: absent     COORDINATION:  - finger to nose: NT  - finger tapping: NT     REFLEXES:  Reflex Right Left   BR NT NT   Biceps NT NT   Triceps NT NT   Patellae NT NT   Achilles NT NT   Toes NT NT      GAIT:  - narrow base and normal     QUANTITATIVE SCORES:  Timed 25-foot walk (sec): NT today, (4.7 on 8/17/2021, 4.1 on 5/12/2021, 4.8 on 3/10/2021, 4.8 on 1/7/2021).  Assistive device: none    REVIEW OF IMAGING STUDIES:  No additional images since the last visit.    REVIEW OF LABORATORY STUDIES:  No additional data since the last visit.    ASSESSMENT:  Rehana Meade is a 36 y.o. woman with multiple sclerosis and migraine.  She is clinically stable on Kesimpta which she tolerates well without side effects.  We will continue this regimen for now.  I wonder about the accuracy of Rehana's diagnosis of epilepsy.  We agreed to repeat EEG now and, assuming the results are normal, slowly taper off topiramate (which may be causing her to have side effects).  She will pay close attention to her migraine frequency since this medication may be assisting with this as well.    PLAN:  Multiple Sclerosis:  - continue Kesimpta    Migraine:  - continue Aimovig    \"Seizures\":  - " repeat EEG  - taper off topiramate  - continue zonisamide 300 mg daily for now    Follow-Up:  - Return in about 2 months (around 3/28/2022).    Signed: Rajeev Aggarwal M.D

## 2022-02-03 ENCOUNTER — TELEPHONE (OUTPATIENT)
Dept: NEUROLOGY | Facility: MEDICAL CENTER | Age: 37
End: 2022-02-03

## 2022-02-04 ENCOUNTER — NON-PROVIDER VISIT (OUTPATIENT)
Dept: NEUROLOGY | Facility: MEDICAL CENTER | Age: 37
End: 2022-02-04
Attending: PSYCHIATRY & NEUROLOGY
Payer: COMMERCIAL

## 2022-02-04 DIAGNOSIS — R56.9 OBSERVED SEIZURE-LIKE ACTIVITY (HCC): ICD-10-CM

## 2022-02-04 PROCEDURE — 99999 PR NO CHARGE: CPT | Performed by: PSYCHIATRY & NEUROLOGY

## 2022-02-04 PROCEDURE — 95819 EEG AWAKE AND ASLEEP: CPT | Performed by: STUDENT IN AN ORGANIZED HEALTH CARE EDUCATION/TRAINING PROGRAM

## 2022-02-04 PROCEDURE — 95819 EEG AWAKE AND ASLEEP: CPT | Mod: 26 | Performed by: STUDENT IN AN ORGANIZED HEALTH CARE EDUCATION/TRAINING PROGRAM

## 2022-02-04 NOTE — PROCEDURES
OUTPATIENT ROUTINE VIDEO ELECTROENCEPHALOGRAM REPORT      Referring provider:   Rajeev Aggarwal M.D.  [address] [address]  [place] [address]      DOS: 02/04/2022  (0 hours and 25 minutes of total recording time).     INDICATION:  Rehana Meade 36 y.o. female presenting with paroxysmal weakness, AMS    CURRENT ANTIEPILEPTIC AND/OR SEDATING REGIMEN:   Current Outpatient Medications   Medication Instructions   • Aimovig 140 mg, Subcutaneous, EVERY 30 DAYS   • fluconazole (DIFLUCAN) 150 MG tablet TAKE 1 TABLET BY MOUTH ONCE A MONTH FOR 90 DAYS   • KESIMPTA 20 MG/0.4ML Solution Auto-injector No dose, route, or frequency recorded.   • methocarbamol (ROBAXIN) 500 mg, Oral, 3 TIMES DAILY PRN   • mirtazapine (REMERON) 15 mg, Oral, EVERY BEDTIME   • topiramate (TOPAMAX) 25 MG Tab Take 3 Tablets by mouth every day for 14 days, THEN 2 Tablets every day for 14 days, THEN 1 Tablet every day for 14 days.   • zonisamide (ZONEGRAN) 300 mg, Oral, EVERY EVENING        TECHNIQUE: Routine VEEG was set up by a Neurodiagnostic technologist who performed education to the patient and staff. A minimum of 23 electrodes and 23 channel recording was setup and performed by Neurodiagnostic technologist, in accordance with the international 10-20 system. The study was reviewed in bipolar and referential montages. The recording examined the patient in the  awake, drowsy, and sleep state(s).     DESCRIPTION OF THE RECORD:  During wakefulness, the background was continuous and showed a 10 Hz posterior dominant rhythm.  There was reactivity to eye closure/opening.  An anterior-posterior gradient was noted with faster beta frequencies seen anteriorly.  During drowsiness, theta/delta frequencies were seen.    EEG Sleep: Sleep was captured and was characterized by diffuse background delta/theta activity with a loss of myogenic artifact.  N2 sleep transients in the form of sleep spindles and vertex waves were seen in the leads over the  central regions.     ACTIVATION PROCEDURES:   Hyperventilation was performed by the patient for a total of 3 minutes. The technician performing the test noted good effort. This technique produced electroencephalographic changes in keeping with the expected bilaterally synchronous, frontally predominant, high amplitude slow waves build up.      Photic driving was performed in a stepwise fashion from 1-30 Hz and did not elicit any abnormalities on EEG.    ICTAL AND INTERICTAL FINDINGS:   No focal or generalized epileptiform activity noted.     No regional slowing was seen during this routine study.      No clinical events or seizures were reported or recorded during the study.     EKG: Sampling of the EKG recording did not demonstrate any abnormalities      EVENTS:  None    INTERPRETATION:   Normal video EEG recording in the awake, drowsy, and sleep state(s):  - No persistent focal asymmetries seen.  - No epileptiform discharges seen   - No seizures. Clinical correlation is recommended.      Note: A normal EEG does not rule out epilepsy.  If the clinical suspicion remains high for seizures, a prolonged recording to capture clinical or subclinical events may be helpful.        Johnny Maddox MD  Epilepsy and General Neurology  Department of Neurology  Instructor of Clinical Neurology [facility].   Office: [phone]  Fax: [phone]

## 2022-02-04 NOTE — TELEPHONE ENCOUNTER
I spoke to Rehana about her symptoms.  I have a low suspicion for optic neuritis at this point.  I recommended lubricating eye drops.  She will alert the clinic if symptoms worsen (especially if she develops visual symptoms).

## 2022-02-04 NOTE — TELEPHONE ENCOUNTER
----- Message from Kolby Vaughn Ass't sent at 2/3/2022  8:00 AM PST -----  Regarding: FW: Eye pain  Please advise.     Thank you!   ----- Message -----  From: Rehana Meade  Sent: 2/2/2022   5:44 PM PST  To: Neurology [facility]  Subject: Eye pain                                         Hi Dr Aggarwal,    My eyes are bugging me. They burn and feel like there is a ton of pressure behind them. I was thinking I should possibly call my ophthalmologist. What do you think.

## 2022-02-15 ENCOUNTER — TELEMEDICINE (OUTPATIENT)
Dept: PHYSICAL MEDICINE AND REHAB | Facility: REHABILITATION | Age: 37
End: 2022-02-15
Payer: COMMERCIAL

## 2022-02-15 DIAGNOSIS — R63.0 POOR APPETITE: ICD-10-CM

## 2022-02-15 DIAGNOSIS — M25.571 CHRONIC PAIN OF RIGHT ANKLE: ICD-10-CM

## 2022-02-15 DIAGNOSIS — Z72.820 POOR SLEEP: ICD-10-CM

## 2022-02-15 DIAGNOSIS — G89.29 CHRONIC PAIN OF RIGHT ANKLE: ICD-10-CM

## 2022-02-15 DIAGNOSIS — G35 MS (MULTIPLE SCLEROSIS) (HCC): Primary | ICD-10-CM

## 2022-02-15 PROCEDURE — 99214 OFFICE O/P EST MOD 30 MIN: CPT | Mod: 95 | Performed by: PHYSICAL MEDICINE & REHABILITATION

## 2022-02-15 NOTE — PROGRESS NOTES
[facility]  PM&R Neuro Rehabilitation Clinic  [facility] [place] [address]  Ph: [phone]    FOLLOW UP PATIENT EVALUATION    This evaluation was conducted via Zoom using secure and encrypted videoconferencing technology. The patient was in a private location in the [facility].  The patient's identity was confirmed and verbal consent was obtained for this virtual visit.    Patient Name: Rehana Meade   Patient : 1985  Patient Age: 36 y.o.     Examining Physician: Dr. Jacki Gallegos, DO    PM&R History to Date - Background Information:  Dates of Admission/Discharge to ARU: None    SUBJECTIVE:   Patient Identification: Rehana Meade is a 36 y.o. RHD female with PMH significant for migraines, anxiety/depression, seizures, asthma and rehabilitation history significant for multiple sclerosis (diagnosed 2021) and is presenting to PM&R clinic for a FOLLOW UP OUTPATIENT evaluation with the following chief complaint/s:    Chief Complaint: Medication Follow Up    History of Present Illness:    - Mirtazapine works well for sleep, but works too well.   - Unfortunately, had nightmares.  - Stopped taking Mirtazapine.   - Would like to wait to try any more medications.   - Will have EEG and would like to get off of the Topamax.   - Would also like to get off of Zonegran.    - Once she weans off of her medications she will re-evaluate how she is sleeping.   - Had COVID last month and is recovering from that.   - Has been eating better on Mirtazapine but did not want to have the nightmares.   - Also has intermittent deep aching right ankle pain where it feels like it is breaking, but hasnt had any trauma.   - 2 weeks ago today was laid off and now has new job on Monday.     Review of Systems:  All other pertinent positive review of systems are noted above in HPI.   All other systems reviewed and are negative.    Past Medical History:  Past Medical History:   Diagnosis Date   •  Seizure cerebral (HCC) 2/15/2018   • Anesthesia     woke up combative   • Anxiety associated with depression    • ASTHMA    • Migraine without aura, without mention of intractable migraine without mention of status migrainosus    • Other specified symptom associated with female genital organs    • Seizure (HCC)     Last seizure was 1/2019   • Stroke (HCC)    • Substance abuse (HCC)    • Unspecified disorder of thyroid     cyst      Past Surgical History:   Procedure Laterality Date   • VAGINAL HYSTERECTOMY TOTAL  1/27/2020    Procedure: HYSTERECTOMY, TOTAL, VAGINAL;  Surgeon: Julian Parker M.D.;  Location: SURGERY SAME DAY [facility];  Service: Gynecology   • CYSTOSCOPY  1/27/2020    Procedure: CYSTOSCOPY;  Surgeon: Julian Parker M.D.;  Location: SURGERY SAME DAY [facility];  Service: Gynecology   • TUBAL COAGULATION LAPAROSCOPIC BILATERAL  7/11/2014    Performed by Julian Parker M.D. at SURGERY SAME DAY [facility]   • SEPTOPLASTY  10/28/2009    Performed by ANNETTE FORDE at SURGERY [facility] ORS   • SOMNOPLASTY  10/28/2009    Performed by ANNETTE FORDE at SURGERY [facility] ORS   • NASAL POLYPECTOMY  10/28/2009    Performed by ANNETTE FORDE at SURGERY [facility] [place]        Current Outpatient Medications:   •  diclofenac sodium (VOLTAREN) 1 % Gel, Apply 2 g topically 4 times a day as needed., Disp: 100 g, Rfl: 2  •  KESIMPTA 20 MG/0.4ML Solution Auto-injector, , Disp: , Rfl:   •  topiramate (TOPAMAX) 25 MG Tab, Take 3 Tablets by mouth every day for 14 days, THEN 2 Tablets every day for 14 days, THEN 1 Tablet every day for 14 days., Disp: 84 Tablet, Rfl: 0  •  methocarbamol (ROBAXIN) 500 MG Tab, Take 1 Tablet by mouth 3 times a day as needed (Spasms)., Disp: 90 Tablet, Rfl: 0  •  fluconazole (DIFLUCAN) 150 MG tablet, TAKE 1 TABLET BY MOUTH ONCE A MONTH FOR 90 DAYS, Disp: , Rfl:   •  AIMOVIG 70 MG/ML Solution Auto-injector, Inject 140 mg under the skin Q30 DAYS., Disp: , Rfl: 11  •   "zonisamide (ZONEGRAN) 100 MG Cap, Take 300 mg by mouth every evening., Disp: , Rfl:   Allergies   Allergen Reactions   • Amitriptyline Unspecified     Suicidal   • Clarithromycin Hives   • Sulfa Drugs Anaphylaxis   • Cantaloupe Itching   • Honey Dew Itching   • Latex Itching   • Lorazepam Unspecified     Hallucinations     • Metoclopramide Unspecified     Muscle spasms   • Penicillin G Potassium Vomiting   • Rizatriptan Unspecified     Tremors, confusion     • Sumatriptan Unspecified     \"Makes my head pins and needles\"   • Tape Rash   • Morphine Hives and Itching        Past Social History:  Social History     Socioeconomic History   • Marital status:      Spouse name: Not on file   • Number of children: Not on file   • Years of education: Not on file   • Highest education level: Not on file   Occupational History   • Not on file   Tobacco Use   • Smoking status: Never Smoker   • Smokeless tobacco: Never Used   Vaping Use   • Vaping Use: Never used   Substance and Sexual Activity   • Alcohol use: Not Currently     Comment: rarely   • Drug use: No   • Sexual activity: Yes     Partners: Male   Other Topics Concern   •  Service No   • Blood Transfusions No   • Caffeine Concern No   • Occupational Exposure No   • Hobby Hazards No   • Sleep Concern Yes   • Stress Concern No   • Weight Concern No   • Special Diet No   • Back Care No   • Exercise No   • Bike Helmet No   • Seat Belt Yes   • Self-Exams Yes   Social History Narrative   • Not on file     Social Determinants of Health     Financial Resource Strain:    • Difficulty of Paying Living Expenses: Not on file   Food Insecurity:    • Worried About Running Out of Food in the Last Year: Not on file   • Ran Out of Food in the Last Year: Not on file   Transportation Needs:    • Lack of Transportation (Medical): Not on file   • Lack of Transportation (Non-Medical): Not on file   Physical Activity:    • Days of Exercise per Week: Not on file   • Minutes of " Exercise per Session: Not on file   Stress:    • Feeling of Stress : Not on file   Social Connections:    • Frequency of Communication with Friends and Family: Not on file   • Frequency of Social Gatherings with Friends and Family: Not on file   • Attends Rastafarian Services: Not on file   • Active Member of Clubs or Organizations: Not on file   • Attends Club or Organization Meetings: Not on file   • Marital Status: Not on file   Intimate Partner Violence:    • Fear of Current or Ex-Partner: Not on file   • Emotionally Abused: Not on file   • Physically Abused: Not on file   • Sexually Abused: Not on file   Housing Stability:    • Unable to Pay for Housing in the Last Year: Not on file   • Number of Places Lived in the Last Year: Not on file   • Unstable Housing in the Last Year: Not on file        Family History:  Family history was reviewed with the patient, there is no pertinent family history to report.     Depression and Opioid Screening  PHQ-9:  Depression Screen (PHQ-2/PHQ-9) 7/7/2021 9/14/2021 1/28/2022   PHQ-2 Total Score - - -   PHQ-2 Total Score 0 0 0   PHQ-9 Total Score - - -     Interpretation of PHQ-9 Total Score   Score Severity   1-4 No Depression   5-9 Mild Depression   10-14 Moderate Depression   15-19 Moderately Severe Depression   20-27 Severe Depression     OBJECTIVE:   Vital Signs:  There were no vitals filed for this visit.     Pertinent Labs:  Lab Results   Component Value Date/Time    SODIUM 138 10/08/2021 04:47 AM    POTASSIUM 4.1 10/08/2021 04:47 AM    CHLORIDE 111 10/08/2021 04:47 AM    CO2 16 (L) 10/08/2021 04:47 AM    GLUCOSE 167 (H) 10/08/2021 04:47 AM    BUN 13 10/08/2021 04:47 AM    CREATININE 0.77 10/08/2021 04:47 AM    CREATININE 0.9 05/04/2009 09:58 PM       Lab Results   Component Value Date/Time    HBA1C 5.3 10/06/2021 10:25 PM       Lab Results   Component Value Date/Time    WBC 19.0 (H) 10/08/2021 04:47 AM    RBC 4.63 10/08/2021 04:47 AM    HEMOGLOBIN 14.7 10/08/2021 04:47 AM     HEMATOCRIT 42.8 10/08/2021 04:47 AM    MCV 92.4 10/08/2021 04:47 AM    MCH 31.7 10/08/2021 04:47 AM    MCHC 34.3 10/08/2021 04:47 AM    MPV 10.3 10/08/2021 04:47 AM    NEUTSPOLYS 92.00 (H) 10/08/2021 04:47 AM    LYMPHOCYTES 3.70 (L) 10/08/2021 04:47 AM    MONOCYTES 3.50 10/08/2021 04:47 AM    EOSINOPHILS 0.00 10/08/2021 04:47 AM    BASOPHILS 0.10 10/08/2021 04:47 AM    HYPOCHROMIA 1+ 06/06/2014 10:49 AM       Lab Results   Component Value Date/Time    ASTSGOT 11 (L) 10/06/2021 10:25 PM    ALTSGPT 9 10/06/2021 10:25 PM        Physical Exam:   GEN: No apparent distress  HEENT: Head normocephalic, atraumatic.  Sclera nonicteric bilaterally, no ocular discharge appreciated bilaterally.  CV: Extremities warm and well-perfused, no peripheral edema appreciated bilaterally.  PULMONARY: Breathing nonlabored on room air, no respiratory accessory muscle use.  Not requiring supplemental oxygen.  SKIN: No appreciable skin breakdown on exposed areas of skin.  PSYCH: Mood and affect within normal limits.  NEURO: Awake alert.  Conversational.  Logical thought content.      ASSESSMENT/PLAN: Rehana Meade  is a 35 y.o. female with rehabilitation history significant for multiple sclerosis (diagnosed 1/2021), here for evaluation. The following plan was discussed with the patient who is in agreement.     Visit Diagnoses     ICD-10-CM   1. MS (multiple sclerosis) (HCC)  G35   2. Chronic pain of right ankle  M25.571    G89.29   3. Poor appetite  R63.0   4. Poor sleep  Z72.820        Rehab/Neuro:   1. Multiple sclerosis: Diagnosed 1/2021.  2. Fatigue  3. Lack of appetite  4. Bone pain, right side  -Established with neurology  -Neuro status: Stable  -Neurology working to consolidate her medications which I think is a great idea.  Hoping to get off antiseizure medications as well as Topamax.    Spasticity:   -Med management: Continue Robaxin for bandlike pain around the thoracic region.    Nociceptive pain: Chronic,  intermittent right ankle pain.  Associated with stress.  -Counseled that we can pursue work-up if she would like, but if stress related we could wait and see as she states that her symptoms likely will resolve.  -Prescription for topical Voltaren gel    GI: Appetite improved on mirtazapine but had side effect of nightmares.  -Stop mirtazapine.  -Counseled on continuing to be cognizant of eating 3 meals per day.    Poor sleep: Failed melatonin.  Failed Ambien due to hallucinogenic effects.  Failed mirtazapine due to nightmares.  -Counseled can trial trazodone in the future if needed.    Follow up: As needed    Please note that this dictation was created using voice recognition software. I have made every reasonable attempt to correct obvious errors but there may be errors of grammar and content that I may have overlooked prior to finalization of this note.    Dr. Jacki Gallegos DO, MS  Department of Physical Medicine & Rehabilitation  Neuro Rehabilitation Clinic  [facility]

## 2022-03-30 ENCOUNTER — TELEMEDICINE (OUTPATIENT)
Dept: NEUROLOGY | Facility: MEDICAL CENTER | Age: 37
End: 2022-03-30
Attending: PSYCHIATRY & NEUROLOGY
Payer: COMMERCIAL

## 2022-03-30 VITALS — HEIGHT: 63 IN | BODY MASS INDEX: 32.25 KG/M2 | WEIGHT: 182 LBS

## 2022-03-30 DIAGNOSIS — G35 MS (MULTIPLE SCLEROSIS) (HCC): Primary | ICD-10-CM

## 2022-03-30 PROCEDURE — 99214 OFFICE O/P EST MOD 30 MIN: CPT | Mod: 95 | Performed by: PSYCHIATRY & NEUROLOGY

## 2022-03-30 ASSESSMENT — FIBROSIS 4 INDEX: FIB4 SCORE: 0.47

## 2022-03-30 NOTE — PROGRESS NOTES
formerly [facility]  MULTIPLE SCLEROSIS & NEUROIMMUNOLOGY  FOLLOW-UP VISIT  2  This evaluation was conducted via Zoom using secure and encrypted videoconferencing technology. The patient was in their home in the [facility].    The patient's identity was confirmed and verbal consent was obtained for this virtual visit.    DISEASE SUMMARY:  Principal neurologic diagnosis: MS  Diagnosis of MS: 1/7/2021  Disease History:  - 12/5/2019: blurry vision, left facial numbness, left upper- and lower extremity numbness; MRI head unremarkable; improved over the course of 1 week  - 12/25/2020: right monocular visual loss; returned to normal over ~2 hours  - 12/29/2020: onset of bilateral lower extremity weakness  - 1/2/202021: presented to hospital; MRI w/ lesions, LP w/ OCBs present; treated with IVMP with good response  - 2/28/2021: Gilenya FDO  - 3/4/2021: left margaret-body numbness; treated with IVMP w/ good response  - 5/1/2021: started Kesimpta  Disease course at onset: MS  Current disease course: MS  Previous disease therapies:  - Gilenya: additional clinical attack  Current disease therapies:  - Kesimpta  Symptomatic therapies:  - none  CSF (1/3/2021)):  - RBCs: 394  - WBCs: 6  - protein: 58  - glucose: 81  - oligoclonal bands: positive (8)  - paraneoplastic autoantibody eval: negative  Other Testing:  - anti-AQP4 Ab (1/3/2021): negative  - anti-MOG IgG (1/3/2021): negative  - paraneoplastic autoantibody eval, serum (1/4/2020): negative  MRI head:  - 10/6/2021: stable, no abnormal enhancement  - 5/8/2021: stable, no abnormal enhancement  - 3/5/2021: enhancing lesions present  - 1/2/2021: juxta-cortical, periventricular, and infratentorial lesions w/ enhancement  - 12/5/2019: no visible lesions  - 5/13/2018: no visible lesions  MRI cervical spine:  - 10/6/2021: stable, no abnormal enhancement  - 3/5/2021: single enhancing lesion present  - 1/2/2021: no visible lesions  MRI thoracic spine:  - 10/6/2021: no visible lesions  -  "3/5/2021: no visible lesions  - 1/2/2021: no visible lesions    CC: MS    INTERVAL HISTORY:  Rehana Meade is a 36 y.o. woman with MS and a history otherwise notable for migraines, aseptic meningitis, and seizure disorder.  I last saw her in the [facility] on 1/28/2022.  At that time I recommended she continue Kesimpta and Aimovig.  Today, I followed up with her via Zoom, and she provided the following interval history:    MS:  Rehana continues on Kesimpta (ofatumumab).  She tolerates the medication well without significant side effects.  She has noticed a \"wearing off\" period beginning a few days before her next scheduled injection and lasting a few days afterward.  During this time she feels generally unwell with pain in the spine, etc.    Since the last visit she has developed some pain in the left calf.    Seizures:  Rehana hasn't experienced any additional events concerning for seizure since the last visit.  She continues on zonisamide 300 mg/day.    Migraine:  Her headaches are well-controlled on Aimovig.  She tolerates this well.    MEDICATIONS:  Current Outpatient Medications   Medication Sig   • diclofenac sodium (VOLTAREN) 1 % Gel Apply 2 g topically 4 times a day as needed.   • KESIMPTA 20 MG/0.4ML Solution Auto-injector    • methocarbamol (ROBAXIN) 500 MG Tab Take 1 Tablet by mouth 3 times a day as needed (Spasms).   • fluconazole (DIFLUCAN) 150 MG tablet TAKE 1 TABLET BY MOUTH ONCE A MONTH FOR 90 DAYS   • AIMOVIG 70 MG/ML Solution Auto-injector Inject 140 mg under the skin Q30 DAYS.   • zonisamide (ZONEGRAN) 100 MG Cap Take 300 mg by mouth every evening.     MEDICAL, SOCIAL, AND FAMILY HISTORY:  There is no change in the patient's ROS or PFSH from their previous visit on 5/12/2021.    REVIEW OF SYSTEMS:  A ROS was completed.  Pertinent positives and negatives were included in the HPI, above.  All other systems were reviewed and are negative.    PHYSICAL EXAM:  General/Medical:  - " NAD    Neuro:  MENTAL STATUS: awake and alert; no deficits of speech or language; oriented to person, place, and time; affect was appropriate to situation; pleasant, cooperative    CRANIAL NERVES:    II: acuity: NT, fields: NT, pupils: NT, discs: NT    III/IV/VI: versions: grossly intact    V: facial sensation: NT    VII: facial expression: symmetric    VIII: hearing: intact to voice    IX/X: palate: NT    XI: shoulder shrug: NT    XII: tongue: NT    MOTOR:  - bulk: NT  - tone: NT  Upper Extremity Strength  (R/L)    NT   Elbow flexion NT   Elbow extension NT   Shoulder abduction NT     Lower Extremity Strength  (R/L)   Hip flexion NT   Knee extension NT   Knee flexion NT   Ankle plantarflexion NT   Ankle dorsiflexion NT     - pronator drift: NT  - abnormal movements: none    SENSATION:  - light touch: NT  - vibration (R/L, seconds): NT at the great toes  - pinprick: NT  - proprioception: NT  - Romberg: absent    COORDINATION:  - finger to nose: NT  - finger tapping: NT    REFLEXES:  Reflex Right Left   BR NT NT   Biceps NT NT   Triceps NT NT   Patellae NT NT   Achilles NT NT   Toes NT NT     GAIT:  - NT     QUANTITATIVE SCORES:  Timed 25-foot walk (sec): NT today, (4.7 on 8/17/2021, 4.1 on 5/12/2021, 4.8 on 3/10/2021, 4.8 on 1/7/2021).  Assistive device: none    REVIEW OF IMAGING STUDIES:  No additional images since the last visit.    REVIEW OF LABORATORY STUDIES:  No additional data since the last visit.    ASSESSMENT:  Rehana Meade is a 36 y.o. woman with multiple sclerosis, observed seizure-like activity, and migraine.  Rehana remains fairly clinically stable on Kesimpta (ofatumumab).  Plan to continue this medication for now, and obtain monitoring labs (summarized below).  Given the normal EEG results, plan to wean off zonisamide by 100 mg/day/week.  She will continue Aimovig for migraine prevention.    PLAN:  Multiple Sclerosis:  - continue Kesimpta  Orders Placed This Encounter   •  MR-BRAIN-WITH & W/O   • CBC with differential   • LFTs   • lymphocyte subsets (B & T cells)   • IgA   • IgG   • IgM     Observed Seizure-Like Activity:  - taper off zonisamide by 100 mg/day/week    Migraine:  - continue Aimovig    Follow-Up:  - Return in about 6 months (around 9/30/2022).    Signed: Rajeev Aggarwal M.D

## 2022-05-21 ENCOUNTER — PATIENT MESSAGE (OUTPATIENT)
Dept: NEUROLOGY | Facility: MEDICAL CENTER | Age: 37
End: 2022-05-21
Payer: COMMERCIAL

## 2022-05-26 NOTE — PROGRESS NOTES
Sp[sharifa with patient.  Offered a referral to derm for a rash on her bilateral calves.  She states she does established care with dermmatology.    For now I asked her to place OTC cortisone cream to bilateral calves and see if that helps.  The rash is not generaliuzed throughout her body and very specific to both calves.  I am suspecting an allergic reaction but she will f/u with dermatology.      Gladys PASTOR-C

## 2022-05-31 ENCOUNTER — TELEPHONE (OUTPATIENT)
Dept: NEUROLOGY | Facility: MEDICAL CENTER | Age: 37
End: 2022-05-31
Payer: COMMERCIAL

## 2022-05-31 NOTE — TELEPHONE ENCOUNTER
Pt called and said she has had really bad headache and pain in neck, no energy what so ever. Slurring words and confused yesterday. Legs are giving up.     Please advise. I scheduled her for tomorrow afternoon

## 2022-06-01 ENCOUNTER — TELEMEDICINE (OUTPATIENT)
Dept: NEUROLOGY | Facility: MEDICAL CENTER | Age: 37
End: 2022-06-01
Attending: PSYCHIATRY & NEUROLOGY
Payer: COMMERCIAL

## 2022-06-01 VITALS — WEIGHT: 182 LBS | BODY MASS INDEX: 32.25 KG/M2 | HEIGHT: 63 IN

## 2022-06-01 DIAGNOSIS — G35 MS (MULTIPLE SCLEROSIS) (HCC): Primary | ICD-10-CM

## 2022-06-01 PROCEDURE — 99215 OFFICE O/P EST HI 40 MIN: CPT | Mod: 95 | Performed by: PSYCHIATRY & NEUROLOGY

## 2022-06-01 RX ORDER — BACLOFEN 10 MG/1
5 TABLET ORAL NIGHTLY PRN
Qty: 30 TABLET | Refills: 0 | Status: SHIPPED | OUTPATIENT
Start: 2022-06-01 | End: 2022-08-04

## 2022-06-01 ASSESSMENT — FIBROSIS 4 INDEX: FIB4 SCORE: 0.47

## 2022-06-01 NOTE — PROGRESS NOTES
Formerly [facility]  MULTIPLE SCLEROSIS & NEUROIMMUNOLOGY  FOLLOW-UP VISIT    This evaluation was conducted via Zoom using secure and encrypted videoconferencing technology. The patient was in their home in the [facility].    The patient's identity was confirmed and verbal consent was obtained for this virtual visit.    DISEASE SUMMARY:  Principal neurologic diagnosis: MS  Diagnosis of MS: 1/7/2021  Disease History:  - 12/5/2019: blurry vision, left facial numbness, left upper- and lower extremity numbness; MRI head unremarkable; improved over the course of 1 week  - 12/25/2020: right monocular visual loss; returned to normal over ~2 hours  - 12/29/2020: onset of bilateral lower extremity weakness  - 1/2/202021: presented to hospital; MRI w/ lesions, LP w/ OCBs present; treated with IVMP with good response  - 2/28/2021: Gilenya FDO  - 3/4/2021: left margaret-body numbness; treated with IVMP w/ good response  - 5/1/2021: started Kesimpta  Disease course at onset: MS  Current disease course: MS  Previous disease therapies:  - Gilenya: additional clinical attack  Current disease therapies:  - Kesimpta  Symptomatic therapies:  - none  CSF (1/3/2021):  - RBCs: 394  - WBCs: 6  - protein: 58  - glucose: 81  - oligoclonal bands: positive (8)  - paraneoplastic autoantibody eval: negative  Other Testing:  - anti-AQP4 Ab (1/3/2021): negative  - anti-MOG IgG (1/3/2021): negative  - paraneoplastic autoantibody eval, serum (1/4/2020): negative  MRI head:  - 10/6/2021: stable, no abnormal enhancement  - 5/8/2021: stable, no abnormal enhancement  - 3/5/2021: enhancing lesions present  - 1/2/2021: juxta-cortical, periventricular, and infratentorial lesions w/ enhancement  - 12/5/2019: no visible lesions  - 5/13/2018: no visible lesions  MRI cervical spine:  - 10/6/2021: stable, no abnormal enhancement  - 3/5/2021: single enhancing lesion present  - 1/2/2021: no visible lesions  MRI thoracic spine:  - 10/6/2021: no visible lesions  -  "3/5/2021: no visible lesions  - 1/2/2021: no visible lesions    CC: MS    INTERVAL HISTORY:  Rehana Meade is a 36 y.o. woman with MS and a history otherwise notable for migraines, aseptic meningitis, and seizure disorder.  I last saw her via Zoom on 3/30/2022.  At that time I recommended she continue Kesimpta and Aimovig.  Today, I followed up with her via Zoom, and she provided the following interval history:    5/27/2022:  Rehana developed a headache.  This localized to the occiput at the base of the skull.  The pain radiates superiorly and anteriorly to the frontal area.  Within an hour of pain onset her neck became stiff.  She has difficulty flexing her neck to touch her chin to her chest.  She has been treating the pain with ibuprofen and methocarbamol.    Around the same time she noticed transient episodes of double vision.  She has not history of double vision.  Images are horizontally displaced.  The longest episode of double vision was about 1 hour.  She also developed weakness in the bilateral lower extremities (R>L).  She has not fallen.    She continues on Kesimpta.  She last dosed 5/29/2022.    Otherwise, Rehana has noticed \"confusion,\" which she describes as difficulty with language.    She continues on Aimovog.  The last dose was administered 5/16/2022.    MEDICATIONS:  Current Outpatient Medications   Medication Sig   • baclofen (LIORESAL) 10 MG Tab Take 0.5 Tablets by mouth at bedtime as needed (neck stiffness) for up to 30 days.   • diclofenac sodium (VOLTAREN) 1 % Gel Apply 2 g topically 4 times a day as needed.   • KESIMPTA 20 MG/0.4ML Solution Auto-injector    • methocarbamol (ROBAXIN) 500 MG Tab Take 1 Tablet by mouth 3 times a day as needed (Spasms).   • fluconazole (DIFLUCAN) 150 MG tablet TAKE 1 TABLET BY MOUTH ONCE A MONTH FOR 90 DAYS   • AIMOVIG 70 MG/ML Solution Auto-injector Inject 140 mg under the skin Q30 DAYS.   • zonisamide (ZONEGRAN) 100 MG Cap Take 300 mg by mouth " every evening.     MEDICAL, SOCIAL, AND FAMILY HISTORY:  There is no change in the patient's ROS or PFSH from their previous visit on 5/12/2021.    REVIEW OF SYSTEMS:  A ROS was completed.  Pertinent positives and negatives were included in the HPI, above.  All other systems were reviewed and are negative.    PHYSICAL EXAM:  General/Medical:  - NAD    Neuro:  MENTAL STATUS: awake and alert; no deficits of speech or language; oriented to person, place, and time; affect was appropriate to situation; pleasant, cooperative    CRANIAL NERVES:    II: acuity: NT, fields: NT, pupils: NT, discs: NT    III/IV/VI: versions: grossly intact    V: facial sensation: NT    VII: facial expression: symmetric    VIII: hearing: intact to voice    IX/X: palate: NT    XI: shoulder shrug: NT    XII: tongue: NT    MOTOR:  - bulk: NT  - tone: NT  Upper Extremity Strength  (R/L)    NT   Elbow flexion NT   Elbow extension NT   Shoulder abduction NT     Lower Extremity Strength  (R/L)   Hip flexion NT   Knee extension NT   Knee flexion NT   Ankle plantarflexion NT   Ankle dorsiflexion NT     - pronator drift: NT  - abnormal movements: none    SENSATION:  - light touch: NT  - vibration (R/L, seconds): NT at the great toes  - pinprick: NT  - proprioception: NT  - Romberg: absent    COORDINATION:  - finger to nose: NT  - finger tapping: NT    REFLEXES:  Reflex Right Left   BR NT NT   Biceps NT NT   Triceps NT NT   Patellae NT NT   Achilles NT NT   Toes NT NT     GAIT:  - NT     QUANTITATIVE SCORES:  Timed 25-foot walk (sec): NT today, (4.7 on 8/17/2021, 4.1 on 5/12/2021, 4.8 on 3/10/2021, 4.8 on 1/7/2021).  Assistive device: none    REVIEW OF IMAGING STUDIES:  No additional images since the last visit.    REVIEW OF LABORATORY STUDIES:  No additional data since the last visit.    ASSESSMENT:  Rehana Meade is a 36 y.o. woman with multiple sclerosis, observed seizure-like activity, and migraine.  Rehana reports some new symptoms  "(neck stiffness [could be related to meningismus, low suspicion for bacterial cause], double vision [though episodes are intermittent and fairly brief]).  Plan for repeat imaging of the head and neck in order to assess for MS relapse (though my suspicion is low given adherence to Kesimpta).  In the meantime, I have prescribed low-dose baclofen at night to assist with symptoms.    PLAN:  Multiple Sclerosis:  - continue Kesimpta  Orders Placed This Encounter   • MR-BRAIN-WITH & W/O   • MR-CERVICAL SPINE-WITH & W/O   • baclofen (LIORESAL) 10 MG Tab     Follow-Up:  - 6/16/2022 at 4:00    Signed: Rajeev Aggarwal M.D.    BILLING DOCUMENTATION:   I spent 46 minutes reviewing the medical record, interviewing and examining the patient, discussing my impression (see \"assessment\" above), and coordinating care.  "

## 2022-06-02 ENCOUNTER — PATIENT MESSAGE (OUTPATIENT)
Dept: NEUROLOGY | Facility: MEDICAL CENTER | Age: 37
End: 2022-06-02

## 2022-06-02 ENCOUNTER — HOSPITAL ENCOUNTER (OUTPATIENT)
Dept: RADIOLOGY | Facility: MEDICAL CENTER | Age: 37
End: 2022-06-02
Attending: PSYCHIATRY & NEUROLOGY
Payer: COMMERCIAL

## 2022-06-02 DIAGNOSIS — G35 MS (MULTIPLE SCLEROSIS) (HCC): ICD-10-CM

## 2022-06-02 PROCEDURE — 70553 MRI BRAIN STEM W/O & W/DYE: CPT

## 2022-06-02 PROCEDURE — 72156 MRI NECK SPINE W/O & W/DYE: CPT

## 2022-06-02 PROCEDURE — A9576 INJ PROHANCE MULTIPACK: HCPCS | Performed by: PSYCHIATRY & NEUROLOGY

## 2022-06-02 PROCEDURE — 700117 HCHG RX CONTRAST REV CODE 255: Performed by: PSYCHIATRY & NEUROLOGY

## 2022-06-02 RX ADMIN — GADOTERIDOL 16 ML: 279.3 INJECTION, SOLUTION INTRAVENOUS at 18:18

## 2022-06-17 ENCOUNTER — TELEMEDICINE (OUTPATIENT)
Dept: NEUROLOGY | Facility: MEDICAL CENTER | Age: 37
End: 2022-06-17
Attending: PSYCHIATRY & NEUROLOGY
Payer: COMMERCIAL

## 2022-06-17 VITALS — HEIGHT: 63 IN | WEIGHT: 182 LBS | BODY MASS INDEX: 32.25 KG/M2

## 2022-06-17 DIAGNOSIS — G47.00 INSOMNIA, UNSPECIFIED TYPE: Primary | ICD-10-CM

## 2022-06-17 PROCEDURE — 99214 OFFICE O/P EST MOD 30 MIN: CPT | Mod: 95 | Performed by: PSYCHIATRY & NEUROLOGY

## 2022-06-17 ASSESSMENT — FIBROSIS 4 INDEX: FIB4 SCORE: 0.47

## 2022-06-17 NOTE — PROGRESS NOTES
[facility]  MULTIPLE SCLEROSIS & NEUROIMMUNOLOGY  FOLLOW-UP VISIT    This evaluation was conducted via Zoom using secure and encrypted videoconferencing technology. The patient was in their home in the [facility].    The patient's identity was confirmed and verbal consent was obtained for this virtual visit.    DISEASE SUMMARY:  Principal neurologic diagnosis: MS  Diagnosis of MS: 1/7/2021  Disease History:  - 12/5/2019: blurry vision, left facial numbness, left upper- and lower extremity numbness; MRI head unremarkable; improved over the course of 1 week  - 12/25/2020: right monocular visual loss; returned to normal over ~2 hours  - 12/29/2020: onset of bilateral lower extremity weakness  - 1/2/202021: presented to hospital; MRI w/ lesions, LP w/ OCBs present; treated with IVMP with good response  - 2/28/2021: Gilenya FDO  - 3/4/2021: left margaret-body numbness; treated with IVMP w/ good response  - 5/1/2021: started Kesimpta  Disease course at onset: MS  Current disease course: MS  Previous disease therapies:  - Gilenya: additional clinical attack  Current disease therapies:  - Kesimpta  Symptomatic therapies:  - none  CSF (1/3/2021):  - RBCs: 394  - WBCs: 6  - protein: 58  - glucose: 81  - oligoclonal bands: positive (8)  - paraneoplastic autoantibody eval: negative  Other Testing:  - anti-AQP4 Ab (1/3/2021): negative  - anti-MOG IgG (1/3/2021): negative  - paraneoplastic autoantibody eval, serum (1/4/2020): negative  MRI head:  - 6/2/2022: stable, no abnormal enhancement  - 10/6/2021: stable, no abnormal enhancement  - 5/8/2021: stable, no abnormal enhancement  - 3/5/2021: enhancing lesions present  - 1/2/2021: juxta-cortical, periventricular, and infratentorial lesions w/ enhancement  - 12/5/2019: no visible lesions  - 5/13/2018: no visible lesions  MRI cervical spine:  - 6/2/2022: stable, no abnormal enhancement  - 10/6/2021: stable, no abnormal enhancement  - 3/5/2021: single enhancing lesion present  -  "1/2/2021: no visible lesions  MRI thoracic spine:  - 10/6/2021: no visible lesions  - 3/5/2021: no visible lesions  - 1/2/2021: no visible lesions    CC: MS    INTERVAL HISTORY:  Rehana Meade is a 36 y.o. woman with MS and a history otherwise notable for migraines, aseptic meningitis, and seizure disorder.  I last saw her via Zoom on 3/30/2022.  At that time I recommended she continue Kesimpta and Aimovig.  Today, I followed up with her via Zoom, and she provided the following interval history:    Rehana underwent repeat imaging studies.  The results are summarized above.    Stiff Neck:  This symptom has \"calmed down.\"    Fatigue:  Rehana reports debilitating fatigue.  She feels \"tired all of the time.\"  She often has difficulty getting out of bed.    Insomnia:  Her sleep \"sucks.\"  She falls asleep without too much difficulty, but she cannot stay asleep.  She wakes up 3-4 times/night.  Her brain \"doesn't want to shut off.\"  Her  reports that she only snores when she is congested.  She practices good sleep hygiene.    MEDICATIONS:  Current Outpatient Medications   Medication Sig   • baclofen (LIORESAL) 10 MG Tab Take 0.5 Tablets by mouth at bedtime as needed (neck stiffness) for up to 30 days.   • KESIMPTA 20 MG/0.4ML Solution Auto-injector    • methocarbamol (ROBAXIN) 500 MG Tab Take 1 Tablet by mouth 3 times a day as needed (Spasms).   • fluconazole (DIFLUCAN) 150 MG tablet TAKE 1 TABLET BY MOUTH ONCE A MONTH FOR 90 DAYS   • AIMOVIG 70 MG/ML Solution Auto-injector Inject 140 mg under the skin Q30 DAYS.     MEDICAL, SOCIAL, AND FAMILY HISTORY:  There is no change in the patient's ROS or PFSH from their previous visit on 6/2/2022.    REVIEW OF SYSTEMS:  A ROS was completed.  Pertinent positives and negatives were included in the HPI, above.  All other systems were reviewed and are negative.    PHYSICAL EXAM:  General/Medical:  - NAD    Neuro:  MENTAL STATUS: awake and alert; no deficits of " speech or language; oriented to person, place, and time; affect was appropriate to situation; pleasant, cooperative    CRANIAL NERVES:    II: acuity: NT, fields: NT, pupils: NT, discs: NT    III/IV/VI: versions: grossly intact    V: facial sensation: NT    VII: facial expression: symmetric    VIII: hearing: intact to voice    IX/X: palate: NT    XI: shoulder shrug: NT    XII: tongue: NT    MOTOR:  - bulk: NT  - tone: NT  Upper Extremity Strength  (R/L)    NT   Elbow flexion NT   Elbow extension NT   Shoulder abduction NT     Lower Extremity Strength  (R/L)   Hip flexion NT   Knee extension NT   Knee flexion NT   Ankle plantarflexion NT   Ankle dorsiflexion NT     - pronator drift: NT  - abnormal movements: none    SENSATION:  - light touch: NT  - vibration (R/L, seconds): NT at the great toes  - pinprick: NT  - proprioception: NT  - Romberg: absent    COORDINATION:  - finger to nose: NT  - finger tapping: NT    REFLEXES:  Reflex Right Left   BR NT NT   Biceps NT NT   Triceps NT NT   Patellae NT NT   Achilles NT NT   Toes NT NT     GAIT:  - NT     QUANTITATIVE SCORES:  Timed 25-foot walk (sec): NT today, (4.7 on 8/17/2021, 4.1 on 5/12/2021, 4.8 on 3/10/2021, 4.8 on 1/7/2021).  Assistive device: none    REVIEW OF IMAGING STUDIES:  I have summarized the imaging results above.    REVIEW OF LABORATORY STUDIES:  No additional data since the last visit.    ASSESSMENT:  Rehana Meade is a 36 y.o. woman with multiple sclerosis, observed seizure-like activity, and migraine.  She remains radiologically stable on an immunotherapy regimen of Kesimpta, which she tolerates well.  Rehana reports fatigue and insomnia.  We discussed her sleep behaviors at length, and her behaviors seem to be well-optimized.  I have placed a referral to Sleep Medicine for a second opinion regarding her inability to stay asleep.    PLAN:  Multiple Sclerosis:  - continue Kesimpta    Insomnia:  Orders Placed This Encounter   • Referral  "to Pulmonary and Sleep Medicine     Follow-Up:  No follow-ups on file.    Signed: Rajeev Aggarwla M.D.    BILLING DOCUMENTATION:   I spent 34 minutes reviewing the medical record, interviewing and examining the patient, discussing my impression (see \"assessment\" above), and coordinating care.  "

## 2022-06-26 ENCOUNTER — PATIENT MESSAGE (OUTPATIENT)
Dept: NEUROLOGY | Facility: MEDICAL CENTER | Age: 37
End: 2022-06-26
Payer: COMMERCIAL

## 2022-07-02 ENCOUNTER — HOSPITAL ENCOUNTER (OUTPATIENT)
Dept: LAB | Facility: MEDICAL CENTER | Age: 37
End: 2022-07-02
Attending: PSYCHIATRY & NEUROLOGY
Payer: COMMERCIAL

## 2022-07-02 LAB
ALBUMIN SERPL BCP-MCNC: 4.7 G/DL (ref 3.2–4.9)
ALP SERPL-CCNC: 91 U/L (ref 30–99)
ALT SERPL-CCNC: 15 U/L (ref 2–50)
AST SERPL-CCNC: 14 U/L (ref 12–45)
BASOPHILS # BLD AUTO: 0.6 % (ref 0–1.8)
BASOPHILS # BLD: 0.04 K/UL (ref 0–0.12)
BILIRUB CONJ SERPL-MCNC: <0.2 MG/DL (ref 0.1–0.5)
BILIRUB INDIRECT SERPL-MCNC: NORMAL MG/DL (ref 0–1)
BILIRUB SERPL-MCNC: 0.4 MG/DL (ref 0.1–1.5)
EOSINOPHIL # BLD AUTO: 0.27 K/UL (ref 0–0.51)
EOSINOPHIL NFR BLD: 4.3 % (ref 0–6.9)
ERYTHROCYTE [DISTWIDTH] IN BLOOD BY AUTOMATED COUNT: 43.6 FL (ref 35.9–50)
HCT VFR BLD AUTO: 46.6 % (ref 37–47)
HGB BLD-MCNC: 15.6 G/DL (ref 12–16)
IMM GRANULOCYTES # BLD AUTO: 0.01 K/UL (ref 0–0.11)
IMM GRANULOCYTES NFR BLD AUTO: 0.2 % (ref 0–0.9)
LYMPHOCYTES # BLD AUTO: 1.84 K/UL (ref 1–4.8)
LYMPHOCYTES NFR BLD: 29.3 % (ref 22–41)
MCH RBC QN AUTO: 30.2 PG (ref 27–33)
MCHC RBC AUTO-ENTMCNC: 33.5 G/DL (ref 33.6–35)
MCV RBC AUTO: 90.1 FL (ref 81.4–97.8)
MONOCYTES # BLD AUTO: 0.52 K/UL (ref 0–0.85)
MONOCYTES NFR BLD AUTO: 8.3 % (ref 0–13.4)
NEUTROPHILS # BLD AUTO: 3.59 K/UL (ref 2–7.15)
NEUTROPHILS NFR BLD: 57.3 % (ref 44–72)
NRBC # BLD AUTO: 0 K/UL
NRBC BLD-RTO: 0 /100 WBC
PLATELET # BLD AUTO: 285 K/UL (ref 164–446)
PMV BLD AUTO: 10.2 FL (ref 9–12.9)
PROT SERPL-MCNC: 7.7 G/DL (ref 6–8.2)
RBC # BLD AUTO: 5.17 M/UL (ref 4.2–5.4)
WBC # BLD AUTO: 6.3 K/UL (ref 4.8–10.8)

## 2022-07-02 PROCEDURE — 82784 ASSAY IGA/IGD/IGG/IGM EACH: CPT

## 2022-07-02 PROCEDURE — 36415 COLL VENOUS BLD VENIPUNCTURE: CPT

## 2022-07-02 PROCEDURE — 86357 NK CELLS TOTAL COUNT: CPT

## 2022-07-02 PROCEDURE — 86359 T CELLS TOTAL COUNT: CPT

## 2022-07-02 PROCEDURE — 85025 COMPLETE CBC W/AUTO DIFF WBC: CPT

## 2022-07-02 PROCEDURE — 80076 HEPATIC FUNCTION PANEL: CPT

## 2022-07-02 PROCEDURE — 86355 B CELLS TOTAL COUNT: CPT

## 2022-07-02 PROCEDURE — 86360 T CELL ABSOLUTE COUNT/RATIO: CPT

## 2022-07-04 LAB
ANNOTATION COMMENT IMP: ABNORMAL
CD19 CELLS NFR SPEC: 0 % (ref 6–23)
CD3 CELLS # BLD: 1606 CELLS/UL (ref 570–2400)
CD3 CELLS NFR SPEC: 87 % (ref 62–87)
CD3+CD4+ CELLS # BLD: 834 CELLS/UL (ref 430–1800)
CD3+CD4+ CELLS NFR BLD: 45 % (ref 32–64)
CD3+CD4+ CELLS/CD3+CD8+ CLL BLD: 1.32 RATIO (ref 0.8–3.9)
CD3+CD8+ CELLS # BLD: 633 CELLS/UL (ref 210–1200)
CD3+CD8+ CELLS NFR SPEC: 34 % (ref 15–46)
CD3-CD16+CD56+ CELLS # SPEC: 237 CELLS/UL (ref 78–470)
CD3-CD16+CD56+ CELLS NFR SPEC: 13 % (ref 4–26)
CELLS.CD3-CD19+ [#/VOLUME] IN BLOOD: 0 CELLS/UL (ref 91–610)
IGA SERPL-MCNC: 330 MG/DL (ref 68–408)
IGG SERPL-MCNC: 1031 MG/DL (ref 768–1632)
IGM SERPL-MCNC: 93 MG/DL (ref 35–263)

## 2022-07-08 DIAGNOSIS — G47.00 INSOMNIA, UNSPECIFIED TYPE: ICD-10-CM

## 2022-07-08 DIAGNOSIS — R41.89 COGNITIVE CHANGES: ICD-10-CM

## 2022-07-08 NOTE — PROGRESS NOTES
Spoke with patient and discussed MRI results.  Also not sleeping at all and very concerned about lack of sleep and her MS.  Will place ref to pulmonary STAT.  Also verbalizes cognitive concerns.  Trouble word finding and forgetfulness, troubles following conversations.  Referral sent to Neuropsychology.    Gladys GR, FNP-C, MSCNP

## 2022-07-21 ENCOUNTER — OFFICE VISIT (OUTPATIENT)
Dept: SLEEP MEDICINE | Facility: MEDICAL CENTER | Age: 37
End: 2022-07-21
Payer: COMMERCIAL

## 2022-07-21 VITALS
RESPIRATION RATE: 14 BRPM | HEART RATE: 73 BPM | OXYGEN SATURATION: 97 % | SYSTOLIC BLOOD PRESSURE: 100 MMHG | WEIGHT: 187 LBS | HEIGHT: 63 IN | DIASTOLIC BLOOD PRESSURE: 78 MMHG | BODY MASS INDEX: 33.13 KG/M2

## 2022-07-21 DIAGNOSIS — R06.83 SNORING: ICD-10-CM

## 2022-07-21 DIAGNOSIS — G47.30 BREATHING-RELATED SLEEP DISORDER: ICD-10-CM

## 2022-07-21 DIAGNOSIS — G47.8 POOR SLEEP PATTERN: ICD-10-CM

## 2022-07-21 PROCEDURE — 99204 OFFICE O/P NEW MOD 45 MIN: CPT | Performed by: PREVENTIVE MEDICINE

## 2022-07-21 ASSESSMENT — FIBROSIS 4 INDEX: FIB4 SCORE: 0.46

## 2022-07-23 NOTE — PROGRESS NOTES
"CHIEF COMPLIANT: \"I guess I need a sleep study.\"  HISTORY OF PRESENT ILLNESS:  Rehana Meade is a 36 y.o. female kindly referred by Mack Bernard M.D..  She is here for a sleep medicine consultation.Past medical history includes anxiety, asthma, hx of aseptic meningitis, hx of seizure disorder, and obesity.     Sleep History: EPWORTH 12/24 c/w EDS. She has never had a sleep study and she was told by  that she snores intermittently especially if sick. She reports maintenance insomnia, poor sleep quality, and nightmares. She goes to bed at 10 and wakes up at 630. She does not nap or wake up refreshed. She wakes up 2-3x and sometimes takes her hours to fall asleep. She denies using Tobacco, THC, or CBD. She does not drink alcohol, but does have 1 cup of coffee per day. And works as a . She is chronically irritable when she does not get good sleep.       Symptom Summary:Answers for HPI/ROS submitted by the patient on 7/21/2022  Year of your last physical exam: Don't Remember  Occupation :   Height: 5'3  Current weight: 180  6 months ago: 175  At age 20: 140  What is the reason for your visit today?: Not able to stay asleep  Name of person referring you to the Sleep Center: Dr Aggarwal  Have you ever been hospitalized?: Yes  Reason, year, and hospital in which you were hospitalized:: 2021 diagnosed with MS  Have you ever had problems with anesthesia?: Yes  Have you experienced post-operative delirium?: Yes  Any complications with surgery?: No  What year did you receive your last Flu shot?: 2015  What year did you receive you last Pneumonia shot?: 2015  Have you had a TB skin test? If so, please list the year and result:: No  Have you had Allergy skin testing? If so, please list the year and result:: Yes I was 11 or 12 allergic to all plants on the test  Please briefly describe your sleep problem and how old you were when it began.: I don't remember I " haven't slept through the night in years  How does this affect your daily life and activities? Please also rate how serious of a problem this is (1 = Not at all, 10 = Very Serious).: 4  Have you had any previous evaluations, examinations, or treatment for this sleep problem or any other problems with your sleep? If so, please describe the evaluation, treatment, and results.: No  Have you used any medications (prescribed or otherwise) to help your sleep problem? If yes, include name, amount, frequency, and the prescribing physician.: No  If employed, what time do you usually start and end work?: 8:30 to 5  Do you ever change work shifts? If yes, describe how often (never, infrequently, regularly).: Nope  What time do you usually go to bed and wake up on: Weekdays? Weekends?: Between 9 and 10 sometimes 11, wake up Between 6:30 and 7  Do you have a regular bed partner?: Yes  How many minutes does it usually take to fall asleep at night after turning off the lights?: It depends on the day it could be 10 minutes or over an hour  What do you ordinarily do just prior to turning out the lights and attempting to go to sleep (e.g., reading, TV, baths, etc.)?: Play a game on my phone or read  On average, how many times do you wake up during the night?: 2  On average, how many times do you wake up to use the bathroom?: 1  Do you often wake up too early in the morning and are unable to return to sleep?: Yes  On average, how many hours of sleep do you get per night?: 6-7 marcy  How do you usually awaken?  Alarm, spontaneously, or other?: Both  Is it difficult for you to awaken and get out of bed after sleeping? (Not at all, Sometimes, Very): Sometimes  Do you nap or return to bed after arising?: No  Are you bothered by sleepiness during the day?: Sometimes  Do you feel that you get too much sleep at night?: No  Do you feel that you get too little sleep at night?: Yes  Do you usually feel tired during the day? If so, what do you  "attribute this to?: Yes, waking up at night and MS  Do you find yourself falling asleep when you don't mean to? : Yes  If yes, how long does your sleep episode last?: Seconds  Do you feel rested or refreshed after the sleep episode?: No  Have you ever suddenly fallen?: Yes  Have you ever experienced sudden body weakness?: Yes  If yes, were you aware of the things around you?: Yes  Was the weakness brought on by any particular event or feeling? If so, briefly describe.: I have Ms and my legs hate me  Have you ever experienced weakness or paralysis upon going to sleep?: Yes  Have you ever experienced weakness or paralysis upon awakening from sleep?: Yes  If yes for either of the above two questions, please indicate how many times per week does this occur?: 1-2  Have you ever experienced seeing things or hearing voices/noises: That weren't real? On going to sleep? During the night? On awakening from sleep? During the day?: Yes  Do you have difficulty breathing at night? If yes, briefly describe.: No  Have you been told you snore while asleep? If so, does it disturb a bed partner (or someone in the same room), or someone in the next room?: Only when congested  Have you ever experienced doing something without being aware of the action? If yes, please describe.: Yes  How many times per week does this occur?: Less than one it is very rare  Have you ever experienced upon lying in bed before sleep or on awakening from sleep: Restlessness of legs, \"nervous legs\", \"creeping crawling\" sensation of legs, or twitching of legs?: I have full body spasms  How many times per week does this occur, and how many minutes does the sensation last?: 1  Does anything relieve the sensations (e.g., getting out of bed, medication, massage)?: No  At what age did this first occur, and how many years has this occurred?: It's been going on for 6 years  Have you ever been told that your arms or legs jerk or twitch while you are asleep? If yes, how " many times per night does this occur?: No  Do you know, or have you ever been told that you do any of the following while sleeping: talk, walk, grit teeth, wet the bed, wake up screaming or seemingly afraid, have disturbing dreams, have unusual movements, wake up with headaches, (males) have erections? If yes to any of these, please indicate how many times per week, age started, last occurrence, treatment received.: Talk  Has anyone in your family been known to have any sleep problems? If yes, please list the type of problem (e.g., trouble getting to sleep, too sleepy, bed wetting, etc.), the relationship of this person to you, and the treatment received.: Dad has sleep apnea    Significant comorbidities and modifying factors: see HPI  PROBLEM LIST:  Patient Active Problem List    Diagnosis Date Noted   • Partial symptomatic epilepsy with complex partial seizures, not intractable, without status epilepticus ([facility]) 11/30/2021   • Obesity 10/07/2021   • Nausea 01/06/2021   • Multiple sclerosis ([facility]) 01/02/2021   • History of Chiari malformation 01/02/2021   • Seizure disorder ([facility]) 12/04/2019   • Seizure cerebral ([facility]) 02/15/2018   • Other chest pain 01/08/2018   • AF (amaurosis fugax) 01/08/2018   • Leukocytosis 04/13/2017   • Intractable headache 04/12/2017   • Headache 09/03/2015   • Aseptic meningitis 09/03/2015   • Migraine without aura and without status migrainosus, not intractable 09/03/2015   • Anxiety 08/29/2015   • Asthma 08/29/2015   • Paresthesia 08/28/2015   • Aseptic meningitis 08/28/2015   • Encounter for sterilization 07/11/2014   • Indication for care in labor or delivery 04/22/2014     PAST MEDICAL HISTORY:  Past Medical History:   Diagnosis Date   • Abdominal pain    • Anesthesia     woke up combative   • Anxiety associated with depression    • ASTHMA    • Back pain    • Chest pain    • Chest tightness    • Chickenpox    • Constipation    • Daytime sleepiness    • Difficulty swallowing    • Eye pain     • Frequent headaches    • Frequent urination    • Hearing difficulty    • Migraine without aura, without mention of intractable migraine without mention of status migrainosus    • Morning headache    • Nausea    • Other specified symptom associated with female genital organs    • Painful breathing    • Painful joint    • Ringing in ears    • Seizure ([facility])     Last seizure was 1/2019   • Seizure cerebral ([facility]) 02/15/2018   • Shortness of breath    • Sore muscles    • Stroke ([facility])    • Substance abuse ([facility])    • Unspecified disorder of thyroid     cyst   • Vision loss    • Weakness    • Whooping cough       PAST SOCIAL HISTORY:  Past Surgical History:   Procedure Laterality Date   • VAGINAL HYSTERECTOMY TOTAL  01/27/2020    Procedure: HYSTERECTOMY, TOTAL, VAGINAL;  Surgeon: Julian Parker M.D.;  Location: SURGERY SAME DAY [facility];  Service: Gynecology   • CYSTOSCOPY  01/27/2020    Procedure: CYSTOSCOPY;  Surgeon: Julian Parker M.D.;  Location: SURGERY SAME DAY [facility];  Service: Gynecology   • TUBAL COAGULATION LAPAROSCOPIC BILATERAL  07/11/2014    Performed by Julian Parker M.D. at SURGERY SAME DAY [facility] ORS   • SEPTOPLASTY  10/28/2009    Performed by ANNETTE FORDE at SURGERY [facility] ORS   • SOMNOPLASTY  10/28/2009    Performed by ANNETTE FORDE at SURGERY [facility] ORS   • NASAL POLYPECTOMY  10/28/2009    Performed by ANNETTE FORDE at SURGERY [facility] ORS   • HYSTERECTOMY LAPAROSCOPY       PAST FAMILY HISTORY:  Family History   Problem Relation Age of Onset   • No Known Problems Mother    • Sleep Apnea Father    • Prostate cancer Paternal Grandfather         [place]     SOCIAL HISTORY:  Social History     Socioeconomic History   • Marital status:      Spouse name: Not on file   • Number of children: Not on file   • Years of education: Not on file   • Highest education level: Not on file   Occupational History   • Not on file   Tobacco Use   • Smoking status: Never  "Smoker   • Smokeless tobacco: Never Used   Vaping Use   • Vaping Use: Never used   Substance and Sexual Activity   • Alcohol use: Not Currently     Comment: rarely   • Drug use: Never   • Sexual activity: Yes     Partners: Male   Other Topics Concern   •  Service No   • Blood Transfusions No   • Caffeine Concern No   • Occupational Exposure No   • Hobby Hazards No   • Sleep Concern Yes   • Stress Concern No   • Weight Concern No   • Special Diet No   • Back Care No   • Exercise No   • Bike Helmet No   • Seat Belt Yes   • Self-Exams Yes   Social History Narrative   • Not on file     Social Determinants of Health     Financial Resource Strain: Not on file   Food Insecurity: Not on file   Transportation Needs: Not on file   Physical Activity: Not on file   Stress: Not on file   Social Connections: Not on file   Intimate Partner Violence: Not on file   Housing Stability: Not on file     ALLERGIES: Amitriptyline, Clarithromycin, Sulfa drugs, Cantaloupe, Honey dew, Latex, Lorazepam, Metoclopramide, Penicillin g potassium, Rizatriptan, Sumatriptan, Tape, and Morphine  MEDICATIONS:  Current Outpatient Medications   Medication Sig Dispense Refill   • KESIMPTA 20 MG/0.4ML Solution Auto-injector      • methocarbamol (ROBAXIN) 500 MG Tab Take 1 Tablet by mouth 3 times a day as needed (Spasms). 90 Tablet 0   • fluconazole (DIFLUCAN) 150 MG tablet TAKE 1 TABLET BY MOUTH ONCE A MONTH FOR 90 DAYS     • AIMOVIG 70 MG/ML Solution Auto-injector Inject 140 mg under the skin Q30 DAYS.  11     No current facility-administered medications for this visit.    \"CURRENT RX\"    REVIEW OF SYSTEMS:  Constitutional: Denies weight loss, endorses chronic daytime fatigue  Eyes: Denies vision changes  Ears/Nose/Mouth/Throat: Denies rhinitis/nasal congestion, injury, decayed teeth/toothaches.  Cardiovascular: Denies chest pain, tightness, palpitations, swelling in legs/feet, difficulty breathing when lying down but gets better when sitting up. " "  Respiratory: Denies shortness of breath while awake,  Sleep: per HPI  Gastrointestinal: Denies  difficulty swallowing,  heartburn.  Genitourinary: REPORTS nocturia  Musculoskeletal: Denies painful joints, sore muscles, back pain.   Neurological: Denies frequent headaches,weakness, dizziness.    PHYSICAL EXAM/VITALS:  /78 (BP Location: Left arm, Patient Position: Sitting, BP Cuff Size: Adult)   Pulse 73   Resp 14   Ht 1.6 m (5' 3\")   Wt 84.8 kg (187 lb)   LMP 11/16/2019   SpO2 97%   BMI 33.13 kg/m²   Neck circumference (inches): 14  Appearance: Well-nourished, well-developed,  looks stated age, no acute distress  Eyes:   EOMI  ENMT: MASKED Mallampati:4    Neck: Supple, trachea midline  Respiratory effort:  No intercostal retractions or use of accessory muscles  Lung auscultation:  No wheezes rhonchi rubs or rales  Cardiac: No murmurs, rubs, or gallops; regular rhythm, normal rate; no edema  Musculoskeletal:  Grossly normal; gait and station normal  Neurologic:  oriented to person, time, place, and purpose; judgement intact  Psychiatric:  No depression, anxiety, agitation    MEDICAL DECISION MAKING:  • The medical record was reviewed in its as pertains to this referral. This includes records from primary care, consultants notes,  referral request, hospital records, labs, imaging. Any available diagnostic and titration nocturnal polysomnograms, home sleep apnea tests, continuous nocturnal oximetry results, multiple sleep latency tests, and compliance reports were reviewed with the patient.    ASSESSMENT/PLAN:  1. Breathing-related sleep disorder  - Overnight Home Sleep Study; Future    2. Snoring  - Overnight Home Sleep Study; Future    3. Poor sleep pattern  - Overnight Home Sleep Study; Future    • The patient has signs and symptoms consistent with obstructive sleep apnea hypopnea syndrome. Will schedule a nocturnal polysomnogram or a home sleep apnea test (please see plan).  • The risks of untreated " sleep apnea were discussed with the patient at length. Patients with KRISTI are at increased risk of cardiovascular disease including coronary artery disease, systemic arterial hypertension, pulmonary arterial hypertension, cardiac arrhythmias, and stroke. KRISTI patients have an increased risk of motor vehicle accidents, type 2 diabetes, chronic kidney disease, and non-alcoholic liver disease. The patient was advised to avoid driving a motor vehicle when drowsy.  • Have advised the patient to follow up with the appropriate healthcare practitioners for all other medical problems and issues.    RETURN TO CLINIC: Return for 2 wks after to discuss sleep study with Dr. Batista.  My total time spent caring for the patient on the day of the encounter was 45minutes. This includes time time spent on a thorough chart review including other physician notes, any type of sleep study, as well as critical labs and pulmonary and cardiac studies.  Additionally it includes discussions of good sleep hygiene, stimulus control and going over the need for consistency in terms of sleep preparation and practice.     Please note that this dictation was created using voice recognition software.  I have made every reasonable attempt to correct obvious errors, I expect that there are errors of grammar and possibly content that I did not discover before finalizing this note.

## 2022-07-24 ENCOUNTER — APPOINTMENT (OUTPATIENT)
Dept: RADIOLOGY | Facility: MEDICAL CENTER | Age: 37
End: 2022-07-24
Attending: EMERGENCY MEDICINE
Payer: COMMERCIAL

## 2022-07-24 ENCOUNTER — HOSPITAL ENCOUNTER (EMERGENCY)
Facility: MEDICAL CENTER | Age: 37
End: 2022-07-24
Attending: EMERGENCY MEDICINE
Payer: COMMERCIAL

## 2022-07-24 VITALS
RESPIRATION RATE: 16 BRPM | BODY MASS INDEX: 31.92 KG/M2 | TEMPERATURE: 97.5 F | HEART RATE: 68 BPM | DIASTOLIC BLOOD PRESSURE: 60 MMHG | SYSTOLIC BLOOD PRESSURE: 102 MMHG | OXYGEN SATURATION: 94 % | HEIGHT: 64 IN | WEIGHT: 186.95 LBS

## 2022-07-24 DIAGNOSIS — R53.1 WEAKNESS: ICD-10-CM

## 2022-07-24 DIAGNOSIS — T67.9XXA HEAT EXPOSURE, INITIAL ENCOUNTER: ICD-10-CM

## 2022-07-24 DIAGNOSIS — G35 HISTORY OF MULTIPLE SCLEROSIS (HCC): ICD-10-CM

## 2022-07-24 LAB
ALBUMIN SERPL BCP-MCNC: 4.6 G/DL (ref 3.2–4.9)
ALBUMIN/GLOB SERPL: 1.4 G/DL
ALP SERPL-CCNC: 100 U/L (ref 30–99)
ALT SERPL-CCNC: 18 U/L (ref 2–50)
AMPHET UR QL SCN: NEGATIVE
ANION GAP SERPL CALC-SCNC: 15 MMOL/L (ref 7–16)
APPEARANCE UR: CLEAR
AST SERPL-CCNC: 17 U/L (ref 12–45)
BARBITURATES UR QL SCN: NEGATIVE
BASOPHILS # BLD AUTO: 0.6 % (ref 0–1.8)
BASOPHILS # BLD: 0.07 K/UL (ref 0–0.12)
BENZODIAZ UR QL SCN: NEGATIVE
BILIRUB SERPL-MCNC: 0.5 MG/DL (ref 0.1–1.5)
BILIRUB UR QL STRIP.AUTO: NEGATIVE
BUN SERPL-MCNC: 12 MG/DL (ref 8–22)
BZE UR QL SCN: NEGATIVE
CALCIUM SERPL-MCNC: 9.8 MG/DL (ref 8.5–10.5)
CANNABINOIDS UR QL SCN: NEGATIVE
CHLORIDE SERPL-SCNC: 102 MMOL/L (ref 96–112)
CO2 SERPL-SCNC: 22 MMOL/L (ref 20–33)
COLOR UR: YELLOW
CREAT SERPL-MCNC: 0.81 MG/DL (ref 0.5–1.4)
EOSINOPHIL # BLD AUTO: 0.28 K/UL (ref 0–0.51)
EOSINOPHIL NFR BLD: 2.5 % (ref 0–6.9)
ERYTHROCYTE [DISTWIDTH] IN BLOOD BY AUTOMATED COUNT: 45.9 FL (ref 35.9–50)
ETHANOL BLD-MCNC: <10.1 MG/DL
GFR SERPLBLD CREATININE-BSD FMLA CKD-EPI: 96 ML/MIN/1.73 M 2
GLOBULIN SER CALC-MCNC: 3.3 G/DL (ref 1.9–3.5)
GLUCOSE SERPL-MCNC: 77 MG/DL (ref 65–99)
GLUCOSE UR STRIP.AUTO-MCNC: NEGATIVE MG/DL
HCG SERPL QL: NEGATIVE
HCT VFR BLD AUTO: 46.4 % (ref 37–47)
HGB BLD-MCNC: 15.4 G/DL (ref 12–16)
IMM GRANULOCYTES # BLD AUTO: 0.06 K/UL (ref 0–0.11)
IMM GRANULOCYTES NFR BLD AUTO: 0.5 % (ref 0–0.9)
KETONES UR STRIP.AUTO-MCNC: ABNORMAL MG/DL
LEUKOCYTE ESTERASE UR QL STRIP.AUTO: NEGATIVE
LYMPHOCYTES # BLD AUTO: 2.28 K/UL (ref 1–4.8)
LYMPHOCYTES NFR BLD: 20.5 % (ref 22–41)
MCH RBC QN AUTO: 30.6 PG (ref 27–33)
MCHC RBC AUTO-ENTMCNC: 33.2 G/DL (ref 33.6–35)
MCV RBC AUTO: 92.1 FL (ref 81.4–97.8)
METHADONE UR QL SCN: NEGATIVE
MICRO URNS: ABNORMAL
MONOCYTES # BLD AUTO: 0.97 K/UL (ref 0–0.85)
MONOCYTES NFR BLD AUTO: 8.7 % (ref 0–13.4)
NEUTROPHILS # BLD AUTO: 7.46 K/UL (ref 2–7.15)
NEUTROPHILS NFR BLD: 67.2 % (ref 44–72)
NITRITE UR QL STRIP.AUTO: NEGATIVE
NRBC # BLD AUTO: 0 K/UL
NRBC BLD-RTO: 0 /100 WBC
OPIATES UR QL SCN: NEGATIVE
OXYCODONE UR QL SCN: NEGATIVE
PCP UR QL SCN: NEGATIVE
PH UR STRIP.AUTO: 5.5 [PH] (ref 5–8)
PLATELET # BLD AUTO: 335 K/UL (ref 164–446)
PMV BLD AUTO: 9.6 FL (ref 9–12.9)
POTASSIUM SERPL-SCNC: 3.8 MMOL/L (ref 3.6–5.5)
PROPOXYPH UR QL SCN: NEGATIVE
PROT SERPL-MCNC: 7.9 G/DL (ref 6–8.2)
PROT UR QL STRIP: NEGATIVE MG/DL
RBC # BLD AUTO: 5.04 M/UL (ref 4.2–5.4)
RBC UR QL AUTO: NEGATIVE
SODIUM SERPL-SCNC: 139 MMOL/L (ref 135–145)
SP GR UR STRIP.AUTO: 1.02
UROBILINOGEN UR STRIP.AUTO-MCNC: 0.2 MG/DL
WBC # BLD AUTO: 11.1 K/UL (ref 4.8–10.8)

## 2022-07-24 PROCEDURE — 82077 ASSAY SPEC XCP UR&BREATH IA: CPT

## 2022-07-24 PROCEDURE — 80307 DRUG TEST PRSMV CHEM ANLYZR: CPT

## 2022-07-24 PROCEDURE — 81003 URINALYSIS AUTO W/O SCOPE: CPT

## 2022-07-24 PROCEDURE — 36415 COLL VENOUS BLD VENIPUNCTURE: CPT

## 2022-07-24 PROCEDURE — 82962 GLUCOSE BLOOD TEST: CPT

## 2022-07-24 PROCEDURE — 99284 EMERGENCY DEPT VISIT MOD MDM: CPT

## 2022-07-24 PROCEDURE — 700105 HCHG RX REV CODE 258: Performed by: EMERGENCY MEDICINE

## 2022-07-24 PROCEDURE — 84703 CHORIONIC GONADOTROPIN ASSAY: CPT

## 2022-07-24 PROCEDURE — 80053 COMPREHEN METABOLIC PANEL: CPT

## 2022-07-24 PROCEDURE — 85025 COMPLETE CBC W/AUTO DIFF WBC: CPT

## 2022-07-24 RX ORDER — SODIUM CHLORIDE 9 MG/ML
1000 INJECTION, SOLUTION INTRAVENOUS ONCE
Status: COMPLETED | OUTPATIENT
Start: 2022-07-24 | End: 2022-07-24

## 2022-07-24 RX ADMIN — SODIUM CHLORIDE 1000 ML: 9 INJECTION, SOLUTION INTRAVENOUS at 18:33

## 2022-07-24 ASSESSMENT — FIBROSIS 4 INDEX: FIB4 SCORE: 0.46

## 2022-07-25 ENCOUNTER — TELEPHONE (OUTPATIENT)
Dept: NEUROLOGY | Facility: MEDICAL CENTER | Age: 37
End: 2022-07-25
Payer: COMMERCIAL

## 2022-07-25 DIAGNOSIS — M60.009 MUSCLE ABSCESS: ICD-10-CM

## 2022-07-25 LAB — GLUCOSE BLD STRIP.AUTO-MCNC: 65 MG/DL (ref 65–99)

## 2022-07-25 RX ORDER — TIZANIDINE 4 MG/1
4 TABLET ORAL
Qty: 30 TABLET | Refills: 11 | Status: SHIPPED | OUTPATIENT
Start: 2022-07-25 | End: 2022-08-26

## 2022-07-25 NOTE — ED NOTES
Pt discharged, all appropriate hospital equipment removed (IV, monitor, pulse ox, etc.). Pt left unit via wc with partner to vehcile for home. Personal belongings with pt when leaving unit. Pt given discharge instructions prior to leaving unit including where to  prescriptions and when to follow-up; verbalizes understanding. Pt informed to return to ED if symptoms worsen/return or altered status develop. Copy of discharge instructions signed and turned into DC basket and copy sent with pt. F/u with pcp

## 2022-07-25 NOTE — DISCHARGE INSTRUCTIONS
Follow-up with your doctor for recheck later this week if no improvement.  Return if worse or for any concerns

## 2022-07-25 NOTE — ED NOTES
Report received from prior RN. Pt alert. Resp normal/unlabored. Bed side rails up/in low position. Pt updated to POC and all questions answered.     Family at bedside, NS infusing.

## 2022-07-25 NOTE — ED TRIAGE NOTES
"Chief Complaint   Patient presents with   • Heat Exposure     Pt BIB  and he states she has a history of MS and was playing mini golf with her family. PT has history of easily over heating and following mini golf pt became very lethargic and is now having difficulty moving all of her extremities and is feeling \"brain fog.\" Pt GCS 15, VSS. Pt's  believes that this is an MS flare or overheating.       Wheelchair with  to triage for above complaint.   Educated on triage process, encourage to inform staff of any changes.     BP (!) 133/94   Pulse 87   Temp 36.4 °C (97.6 °F) (Temporal)   Resp 20   Ht 1.626 m (5' 4\")   Wt 84.8 kg (186 lb 15.2 oz)   LMP 11/16/2019   SpO2 100%   BMI 32.09 kg/m²     "

## 2022-07-25 NOTE — ED PROVIDER NOTES
"ED Provider Note    CHIEF COMPLAINT  Chief Complaint   Patient presents with   • Heat Exposure     Pt BIB  and he states she has a history of MS and was playing mini golf with her family. PT has history of easily over heating and following mini golf pt became very lethargic and is now having difficulty moving all of her extremities and is feeling \"brain fog.\" Pt GCS 15, VSS. Pt's  believes that this is an MS flare or overheating. FSBG in triage 65       HPI  Rehana Meade is a 36 y.o. female who presents complaining of generalized weakness, near syncope while out in the heat.  Her  states they went miniature golfing, only made it 3 holes.  In route home he had to take frequent sips of water, he felt like she may have overheated.  He states that her neurologist told him to \"avoid the extremes\" worried it may exacerbate her multiple sclerosis.  Patient states she has never felt this way either from overheating or from multiple sclerosis.  No chest pain, no new abdominal pain.  No vomiting or diarrhea.  She states she is taking her medication as prescribed.   states he took her home, put cold packs over her, felt like there was no good improvement therefore brought her to the emergency department.  Patient is able to move all extremities, has feeling all extremities however her effort attempting to move her arms and legs is extremely weak.  No headache, no vision change.  No speech difficulty.  Patient reported feeling brain fog during the episode.  She states she has not improved much since onset 4 hours ago.    REVIEW OF SYSTEMS    Constitutional: Generalized weakness, no fever  Respiratory: No shortness of breath or cough  Cardiac: No chest pain or syncope  Gastrointestinal: No acute abdominal pain.   reports the patient has had abdominal cramping over the last 1 week, unchanged today  Musculoskeletal: No acute neck or back pain  Neurologic: Multiple sclerosis.  No new " numbness.  Generalized weakness.  No headache  Psychiatric: History of anxiety with depression       All other systems are negative.       PAST MEDICAL HISTORY  Past Medical History:   Diagnosis Date   • Abdominal pain    • Anesthesia     woke up combative   • Anxiety associated with depression    • ASTHMA    • Back pain    • Chest pain    • Chest tightness    • Chickenpox    • Constipation    • Daytime sleepiness    • Difficulty swallowing    • Eye pain    • Frequent headaches    • Frequent urination    • Hearing difficulty    • Migraine without aura, without mention of intractable migraine without mention of status migrainosus    • Morning headache    • Nausea    • Other specified symptom associated with female genital organs    • Painful breathing    • Painful joint    • Ringing in ears    • Seizure ([facility])     Last seizure was 1/2019   • Seizure cerebral ([facility]) 02/15/2018   • Shortness of breath    • Sore muscles    • Stroke ([facility])    • Substance abuse ([facility])    • Unspecified disorder of thyroid     cyst   • Vision loss    • Weakness    • Whooping cough        FAMILY HISTORY  Family History   Problem Relation Age of Onset   • No Known Problems Mother    • Sleep Apnea Father    • Prostate cancer Paternal Grandfather         [place]       SOCIAL HISTORY  Social History     Socioeconomic History   • Marital status:    Tobacco Use   • Smoking status: Never Smoker   • Smokeless tobacco: Never Used   Vaping Use   • Vaping Use: Never used   Substance and Sexual Activity   • Alcohol use: Not Currently     Comment: rarely   • Drug use: Never   • Sexual activity: Yes     Partners: Male   Other Topics Concern   •  Service No   • Blood Transfusions No   • Caffeine Concern No   • Occupational Exposure No   • Hobby Hazards No   • Sleep Concern Yes   • Stress Concern No   • Weight Concern No   • Special Diet No   • Back Care No   • Exercise No   • Bike Helmet No   • Seat Belt Yes   • Self-Exams Yes       SURGICAL  "HISTORY  Past Surgical History:   Procedure Laterality Date   • VAGINAL HYSTERECTOMY TOTAL  01/27/2020    Procedure: HYSTERECTOMY, TOTAL, VAGINAL;  Surgeon: Julian Parker M.D.;  Location: SURGERY SAME DAY [facility];  Service: Gynecology   • CYSTOSCOPY  01/27/2020    Procedure: CYSTOSCOPY;  Surgeon: Julian Parker M.D.;  Location: SURGERY SAME DAY [facility];  Service: Gynecology   • TUBAL COAGULATION LAPAROSCOPIC BILATERAL  07/11/2014    Performed by Julian Parker M.D. at SURGERY SAME DAY [facility] ORS   • SEPTOPLASTY  10/28/2009    Performed by ANNETTE FORDE at SURGERY [facility] ORS   • SOMNOPLASTY  10/28/2009    Performed by ANNETTE FORDE at SURGERY [facility] ORS   • NASAL POLYPECTOMY  10/28/2009    Performed by ANNETTE FORDE at SURGERY [facility] ORS   • HYSTERECTOMY LAPAROSCOPY         CURRENT MEDICATIONS  Home Medications     Reviewed by Jordyn Harris R.N. (Registered Nurse) on 07/24/22 at 1722  Med List Status: Not Addressed   Medication Last Dose Status   AIMOVIG 70 MG/ML Solution Auto-injector  Active   fluconazole (DIFLUCAN) 150 MG tablet  Active   KESIMPTA 20 MG/0.4ML Solution Auto-injector  Active   methocarbamol (ROBAXIN) 500 MG Tab  Active                ALLERGIES  Allergies   Allergen Reactions   • Amitriptyline Unspecified     Suicidal   • Clarithromycin Hives   • Sulfa Drugs Anaphylaxis   • Cantaloupe Itching   • Honey Dew Itching   • Latex Itching   • Lorazepam Unspecified     Hallucinations     • Metoclopramide Unspecified     Muscle spasms   • Penicillin G Potassium Vomiting   • Rizatriptan Unspecified     Tremors, confusion     • Sumatriptan Unspecified     \"Makes my head pins and needles\"   • Tape Rash   • Morphine Hives and Itching       PHYSICAL EXAM  VITAL SIGNS: BP (!) 133/94   Pulse 87   Temp 36.4 °C (97.6 °F) (Temporal)   Resp 20   Ht 1.626 m (5' 4\")   Wt 84.8 kg (186 lb 15.2 oz)   LMP 11/16/2019   SpO2 100%   BMI 32.09 kg/m²   Constitutional:  " Non-toxic appearance.  Well-nourished  HENT: No facial swelling, no facial droop  Eyes: Anicteric, no conjunctivitis.   Extraocular motions are intact.  Pupils are equal.  Cardiovascular: Normal heart rate, Normal rhythm  Pulmonary:  No wheezing, No rales.   Gastrointestinal: Soft, No tenderness, No distention.  Mild periumbilical tenderness, patient states this is chronic.  Skin: Warm, Dry, No cyanosis.  No asymmetric edema, no signs of trauma  Neurologic: Speech is clear but quiet, follows commands, facial expression is symmetrical.  Moves all extremities, equal weakness all extremities.  Sensation in the extremities appears intact today  Psychiatric: Affect flat,  Mood dysthymic.  Patient is calm and cooperative  Musculoskeletal: No extremity deformity    EKG/Labs  Results for orders placed or performed during the hospital encounter of 07/24/22   CBC WITH DIFFERENTIAL   Result Value Ref Range    WBC 11.1 (H) 4.8 - 10.8 K/uL    RBC 5.04 4.20 - 5.40 M/uL    Hemoglobin 15.4 12.0 - 16.0 g/dL    Hematocrit 46.4 37.0 - 47.0 %    MCV 92.1 81.4 - 97.8 fL    MCH 30.6 27.0 - 33.0 pg    MCHC 33.2 (L) 33.6 - 35.0 g/dL    RDW 45.9 35.9 - 50.0 fL    Platelet Count 335 164 - 446 K/uL    MPV 9.6 9.0 - 12.9 fL    Neutrophils-Polys 67.20 44.00 - 72.00 %    Lymphocytes 20.50 (L) 22.00 - 41.00 %    Monocytes 8.70 0.00 - 13.40 %    Eosinophils 2.50 0.00 - 6.90 %    Basophils 0.60 0.00 - 1.80 %    Immature Granulocytes 0.50 0.00 - 0.90 %    Nucleated RBC 0.00 /100 WBC    Neutrophils (Absolute) 7.46 (H) 2.00 - 7.15 K/uL    Lymphs (Absolute) 2.28 1.00 - 4.80 K/uL    Monos (Absolute) 0.97 (H) 0.00 - 0.85 K/uL    Eos (Absolute) 0.28 0.00 - 0.51 K/uL    Baso (Absolute) 0.07 0.00 - 0.12 K/uL    Immature Granulocytes (abs) 0.06 0.00 - 0.11 K/uL    NRBC (Absolute) 0.00 K/uL   COMP METABOLIC PANEL   Result Value Ref Range    Sodium 139 135 - 145 mmol/L    Potassium 3.8 3.6 - 5.5 mmol/L    Chloride 102 96 - 112 mmol/L    Co2 22 20 - 33 mmol/L  "   Anion Gap 15.0 7.0 - 16.0    Glucose 77 65 - 99 mg/dL    Bun 12 8 - 22 mg/dL    Creatinine 0.81 0.50 - 1.40 mg/dL    Calcium 9.8 8.5 - 10.5 mg/dL    AST(SGOT) 17 12 - 45 U/L    ALT(SGPT) 18 2 - 50 U/L    Alkaline Phosphatase 100 (H) 30 - 99 U/L    Total Bilirubin 0.5 0.1 - 1.5 mg/dL    Albumin 4.6 3.2 - 4.9 g/dL    Total Protein 7.9 6.0 - 8.2 g/dL    Globulin 3.3 1.9 - 3.5 g/dL    A-G Ratio 1.4 g/dL   HCG QUAL SERUM   Result Value Ref Range    Beta-Hcg Qualitative Serum Negative Negative   DIAGNOSTIC ALCOHOL   Result Value Ref Range    Diagnostic Alcohol <10.1 <10.1 mg/dL   URINE DRUG SCREEN (TRIAGE)   Result Value Ref Range    Amphetamines Urine Negative Negative    Barbiturates Negative Negative    Benzodiazepines Negative Negative    Cocaine Metabolite Negative Negative    Methadone Negative Negative    Opiates Negative Negative    Oxycodone Negative Negative    Phencyclidine -Pcp Negative Negative    Propoxyphene Negative Negative    Cannabinoid Metab Negative Negative   ESTIMATED GFR   Result Value Ref Range    GFR (CKD-EPI) 96 >60 mL/min/1.73 m 2           COURSE & MEDICAL DECISION MAKING  Pertinent Labs & Imaging studies reviewed. (See chart for details)  Patient arrives feeling weak, difficulty moving arms and legs after heat exposure while playing miniature golf.   and patient concerned about multiple sclerosis exacerbation versus heatstroke.  There were no focal neurologic findings rather global weakness.  She denies vision change.  Urinalysis drug screen negative, alcohol negative, pregnancy test negative, normal electrolyte balance, no renal failure.  She is not anemic.  Leukocytosis is minimal at 11.1 and nonspecific.  Here normal vital signs.  After IV fluid hydration, patient felt improved.  Her  states she is now moving her arms and legs well feeling better.  The patient states she is not completely better but \"I am tired and I just want to go home to sleep\".  With symptoms " improving, normal vital signs, unremarkable laboratory evaluation, patient stable for discharge.  She is willing to return should symptoms worsen, I have also recommended they follow-up with her doctor for recheck this week unless completely better.    FINAL IMPRESSION     1. Weakness     2. Heat exposure, initial encounter     3. History of multiple sclerosis (HCC)                     Electronically signed by: Gabriel Kramer M.D., 7/24/2022 6:21 PM

## 2022-07-25 NOTE — ED NOTES
Pt wheeled to room from [facility]. Changed into gown. Connected to monitor. Agree with triage note. Chart up for ERP. Call light in reach.

## 2022-08-18 DIAGNOSIS — M60.009 MUSCLE ABSCESS: ICD-10-CM

## 2022-08-26 ENCOUNTER — TELEMEDICINE (OUTPATIENT)
Dept: NEUROLOGY | Facility: MEDICAL CENTER | Age: 37
End: 2022-08-26
Attending: REGISTERED NURSE
Payer: COMMERCIAL

## 2022-08-26 VITALS — WEIGHT: 186 LBS | BODY MASS INDEX: 31.93 KG/M2

## 2022-08-26 DIAGNOSIS — M79.2 NERVE PAIN: ICD-10-CM

## 2022-08-26 DIAGNOSIS — G35 MULTIPLE SCLEROSIS (HCC): ICD-10-CM

## 2022-08-26 PROCEDURE — 99215 OFFICE O/P EST HI 40 MIN: CPT | Mod: 95 | Performed by: REGISTERED NURSE

## 2022-08-26 RX ORDER — ERENUMAB-AOOE 140 MG/ML
INJECTION, SOLUTION SUBCUTANEOUS
COMMUNITY
Start: 2022-08-12 | End: 2023-02-01

## 2022-08-26 RX ORDER — TIZANIDINE 4 MG/1
TABLET ORAL
Qty: 30 TABLET | Refills: 11 | Status: SHIPPED | OUTPATIENT
Start: 2022-08-26 | End: 2023-07-20

## 2022-08-26 RX ORDER — GABAPENTIN 100 MG/1
100 CAPSULE ORAL 3 TIMES DAILY
Qty: 90 CAPSULE | Refills: 1 | Status: SHIPPED | OUTPATIENT
Start: 2022-08-26 | End: 2023-02-01

## 2022-08-26 ASSESSMENT — FIBROSIS 4 INDEX: FIB4 SCORE: 0.43

## 2022-08-26 NOTE — PROGRESS NOTES
Virtual Visit: Established Patient   This visit was conducted via Zoom using secure and encrypted videoconferencing technology.   The patient was in their home in the state of [place].    The patient's identity was confirmed and verbal consent was obtained for this virtual visit.     Subjective:   CC: Multiple Sclerosis     Rehana Meade is a 36 y.o. female presenting for evaluation and management of: MS.      MRI says everything is stable but has a lot of nerve like pain/symptoms    Works from home.   and four daughter  Right stabbing nerve pain to right leg and then causes spasm, abdominal muscles cramp  Stabbing pains to right shoulder joint.      Hard time holding self up sometimes and would like a rollator    Vision black 2 minutes when first diagnosed     Ophthalmology  appointment on the 13th of Sept.    MS History:  Diagnosed: 2021   Lumbar puncture:  LP  First presentation: Couldn't walk, Left leg gave out, right couldn't move , couldn't walk  Disease modifying treatment: Kesimpta              Former or other neurologist: Gildardo         ROS:   Constitutional: No fevers or chills.  Eyes: No blurry vision or eye pain.  ENT: No dysphagia or hearing loss.  Respiratory: No cough or shortness of breath.  Cardiovascular: No chest pain or palpitations.  GI: No nausea, vomiting, or diarrhea.  : No urinary incontinence or dysuria.  Musculoskeletal: No joint swelling or arthralgias.  Skin: No skin rashes.  Neuro: +headaches once3 a week, dizziness, or tremors.  Endocrine: No heat or cold intolerance. No polydipsia or polyuria.  Psych: No depression or anxiety.  Heme/Lymph: No easy bruising or swollen lymph nodes.  All other review of systems were reviewed and were negative      Current medicines (including changes today)  Current Outpatient Medications   Medication Sig Dispense Refill    tizanidine (ZANAFLEX) 4 MG Tab TAKE 1 TABLET BY MOUTH EVERYDAY AT BEDTIME 30 Tablet 11    baclofen (LIORESAL) 10  MG Tab Take 0.5 Tablets by mouth at bedtime as needed (neck stiffness) for up to 90 days. 30 Tablet 11    KESIMPTA 20 MG/0.4ML Solution Auto-injector       methocarbamol (ROBAXIN) 500 MG Tab Take 1 Tablet by mouth 3 times a day as needed (Spasms). 90 Tablet 0    fluconazole (DIFLUCAN) 150 MG tablet TAKE 1 TABLET BY MOUTH ONCE A MONTH FOR 90 DAYS      AIMOVIG 70 MG/ML Solution Auto-injector Inject 140 mg under the skin Q30 DAYS.  11     No current facility-administered medications for this visit.       Patient Active Problem List    Diagnosis Date Noted    Partial symptomatic epilepsy with complex partial seizures, not intractable, without status epilepticus ([facility]) 11/30/2021    Obesity 10/07/2021    Nausea 01/06/2021    Multiple sclerosis ([facility]) 01/02/2021    History of Chiari malformation 01/02/2021    Seizure disorder ([facility]) 12/04/2019    Seizure cerebral ([facility]) 02/15/2018    Other chest pain 01/08/2018    AF (amaurosis fugax) 01/08/2018    Leukocytosis 04/13/2017    Intractable headache 04/12/2017    Headache 09/03/2015    Aseptic meningitis 09/03/2015    Migraine without aura and without status migrainosus, not intractable 09/03/2015    Anxiety 08/29/2015    Asthma 08/29/2015    Paresthesia 08/28/2015    Aseptic meningitis 08/28/2015    Encounter for sterilization 07/11/2014    Indication for care in labor or delivery 04/22/2014        Objective:   LMP 11/16/2019     Physical Exam:  Constitutional: Alert, no distress, well-groomed.  Skin: No rashes in visible areas.  Eye: Round. Conjunctiva clear, lids normal. No icterus.   ENMT: Lips pink without lesions, good dentition, moist mucous membranes. Phonation normal.  Neck: No masses, no thyromegaly. Moves freely without pain.  Respiratory: Unlabored respiratory effort, no cough or audible wheeze  Psych: Alert and oriented x3, normal affect and mood.     Assessment and Plan:   Rehana Meade is a 36 year old female with a history of MS.  She had an appointment  with Dr. Aggarwal today but thought it was virtual.  So she missed it and was then placed on my schedule.  She is currently on kesimpta.  C/o of stabbing like nerve pain at intervals scattered in various places throughout her body.  Also feels like she has mood swings and a bit depressed at intervals.  I offered her cymbalta for both pain/depression but she states she has tried anti -depressants in the past and was suicidal on amitriptyline and zoloft.  She states she is in need of a rollator as her walking is hard when she is standing too long.  Will be taking her daughters to [facility] in the future and feels she will need assistive rollator so she doesn't cut her days out with family short.    For now will try neurontin at [facility] for next two weeks and will touch base in two weeks        Rollator    1. Nerve pain    [facility] paperwork sent    - gabapentin (NEURONTIN) 100 MG Cap; Take 1 Capsule by mouth 3 times a day.  Dispense: 90 Capsule; Refill: 1      2. Multiple sclerosis (HCC)    - DME Other Rollator [employer] will follow through with faxing this order.     Follow-up:     Two weeks    EDUCATION AND COUNSELIN minutes was spent of which greater than 5% was invloved with education and counseling.  The patient/family educated on diagnosis and prognosis. They understand MS is a chronic progressive disorder for which there is currently no cure. Despite best efforts in management, the condition is likely to progress and carries significant risk for disability including physical and cognitive.   -Effectiveness, indications, and safety profile of available Disease Modifying Agents (DMA’s) reviewed.   -Side effects of DMA’s were discussed, including: flu like symptoms, myalgias, injection site (infection, pain, bleeding), abnormal LFT’s, pancreatitis, weight changes, development of autoantibodies which may decrease effective of the medication, panic/anxiety attacks, abnormal blood counts (increase risk for bleeding,  infections, cancer), increased risk for fetal complications (including congenital malformations, developmental/intellectual disability).    -The patient will require frequent follow up and monitoring.   -Laboratory monitoring every 3 months: CBC, CMP, thyroid panel, vitamin D, UA.   -Imaging of entire neuro-axis (brain and spinal cord) with MRI’s w/wo contrast, every six months.   -Patient/family counseled on medication compliance.

## 2022-09-07 ENCOUNTER — HOME STUDY (OUTPATIENT)
Dept: SLEEP MEDICINE | Facility: MEDICAL CENTER | Age: 37
End: 2022-09-07
Attending: PREVENTIVE MEDICINE
Payer: COMMERCIAL

## 2022-09-07 DIAGNOSIS — G47.8 POOR SLEEP PATTERN: ICD-10-CM

## 2022-09-07 DIAGNOSIS — G47.30 BREATHING-RELATED SLEEP DISORDER: ICD-10-CM

## 2022-09-07 DIAGNOSIS — R06.83 SNORING: ICD-10-CM

## 2022-09-07 PROCEDURE — 95806 SLEEP STUDY UNATT&RESP EFFT: CPT | Performed by: INTERNAL MEDICINE

## 2022-09-21 ENCOUNTER — OFFICE VISIT (OUTPATIENT)
Dept: SLEEP MEDICINE | Facility: MEDICAL CENTER | Age: 37
End: 2022-09-21
Payer: COMMERCIAL

## 2022-09-21 VITALS
HEART RATE: 75 BPM | HEIGHT: 63 IN | RESPIRATION RATE: 14 BRPM | DIASTOLIC BLOOD PRESSURE: 74 MMHG | SYSTOLIC BLOOD PRESSURE: 116 MMHG | WEIGHT: 192 LBS | OXYGEN SATURATION: 97 % | BODY MASS INDEX: 34.02 KG/M2

## 2022-09-21 DIAGNOSIS — G40.909 SEIZURE DISORDER (HCC): Primary | ICD-10-CM

## 2022-09-21 DIAGNOSIS — G35 MULTIPLE SCLEROSIS (HCC): ICD-10-CM

## 2022-09-21 DIAGNOSIS — G40.209 PARTIAL SYMPTOMATIC EPILEPSY WITH COMPLEX PARTIAL SEIZURES, NOT INTRACTABLE, WITHOUT STATUS EPILEPTICUS (HCC): ICD-10-CM

## 2022-09-21 DIAGNOSIS — G47.34 NOCTURNAL HYPOXIA: ICD-10-CM

## 2022-09-21 DIAGNOSIS — F51.04 CHRONIC INSOMNIA: ICD-10-CM

## 2022-09-21 PROCEDURE — 99214 OFFICE O/P EST MOD 30 MIN: CPT | Performed by: STUDENT IN AN ORGANIZED HEALTH CARE EDUCATION/TRAINING PROGRAM

## 2022-09-21 ASSESSMENT — FIBROSIS 4 INDEX: FIB4 SCORE: 0.43

## 2022-09-21 NOTE — PROGRESS NOTES
[facility] Follow-up Visit    CC: Follow-up to discuss sleep study results      HPI:  Rehana Meade is a 36 y.o.female  with migraines, multiple sclerosis, seizure disorder, anxiety, chronic insomnia and nocturnal hypoxia.  Presents today to discuss sleep study results.    She recently underwent a home sleep study to evaluate for sleep apnea.  She reports night of the study was not the best night sleep.  She did sleep a few hours at night but she is unsure exactly how much.    She continues to have trouble with sleep maintenance.  States she has no trouble falling asleep her main complaint is staying asleep.  She will often wake up and have difficulty getting back to sleep.  This is been happening for years.  Due to her troubles with sleep she is extremely tired during the day.  She has low energy throughout the day.  This has been impacting her daily functioning.    She has significant medical history.  She reports history of seizures with the last seizure being approximately a year ago that she is aware of.  States most of her seizures occur during sleep.  She continues to take all her medications as prescribed.    Patient Active Problem List    Diagnosis Date Noted    Partial symptomatic epilepsy with complex partial seizures, not intractable, without status epilepticus (HCC) 11/30/2021    Obesity 10/07/2021    Nausea 01/06/2021    Multiple sclerosis (HCC) 01/02/2021    History of Chiari malformation 01/02/2021    Seizure disorder (HCC) 12/04/2019    Seizure cerebral ([facility]) 02/15/2018    Other chest pain 01/08/2018    AF (amaurosis fugax) 01/08/2018    Leukocytosis 04/13/2017    Intractable headache 04/12/2017    Headache 09/03/2015    Aseptic meningitis 09/03/2015    Migraine without aura and without status migrainosus, not intractable 09/03/2015    Anxiety 08/29/2015    Asthma 08/29/2015    Paresthesia 08/28/2015    Aseptic meningitis 08/28/2015    Encounter for sterilization 07/11/2014     Indication for care in labor or delivery 04/22/2014       Past Medical History:   Diagnosis Date    Abdominal pain     Anesthesia     woke up combative    Anxiety associated with depression     ASTHMA     Back pain     Chest pain     Chest tightness     Chickenpox     Constipation     Daytime sleepiness     Difficulty swallowing     Eye pain     Frequent headaches     Frequent urination     Hearing difficulty     Migraine without aura, without mention of intractable migraine without mention of status migrainosus     Morning headache     Nausea     Other specified symptom associated with female genital organs     Painful breathing     Painful joint     Ringing in ears     Seizure ([facility])     Last seizure was 1/2019    Seizure cerebral ([facility]) 02/15/2018    Shortness of breath     Sore muscles     Stroke ([facility])     Substance abuse ([facility])     Unspecified disorder of thyroid     cyst    Vision loss     Weakness     Whooping cough         Past Surgical History:   Procedure Laterality Date    VAGINAL HYSTERECTOMY TOTAL  01/27/2020    Procedure: HYSTERECTOMY, TOTAL, VAGINAL;  Surgeon: Julian Parker M.D.;  Location: SURGERY SAME DAY [facility];  Service: Gynecology    CYSTOSCOPY  01/27/2020    Procedure: CYSTOSCOPY;  Surgeon: Julian Parker M.D.;  Location: SURGERY SAME DAY [facility];  Service: Gynecology    TUBAL COAGULATION LAPAROSCOPIC BILATERAL  07/11/2014    Performed by Julian Parker M.D. at SURGERY SAME DAY [facility]    SEPTOPLASTY  10/28/2009    Performed by ANNETTE FORDE at SURGERY [facility]    SOMNOPLASTY  10/28/2009    Performed by ANNETTE FORDE at SURGERY [facility]    NASAL POLYPECTOMY  10/28/2009    Performed by ANNETTE FORDE at SURGERY [facility]    HYSTERECTOMY LAPAROSCOPY         Family History   Problem Relation Age of Onset    No Known Problems Mother     Sleep Apnea Father     Prostate cancer Paternal Grandfather         [place]       Social History     Socioeconomic  History    Marital status:      Spouse name: Not on file    Number of children: Not on file    Years of education: Not on file    Highest education level: Not on file   Occupational History    Not on file   Tobacco Use    Smoking status: Never    Smokeless tobacco: Never   Vaping Use    Vaping Use: Never used   Substance and Sexual Activity    Alcohol use: Not Currently     Comment: rarely    Drug use: Never    Sexual activity: Yes     Partners: Male   Other Topics Concern     Service No    Blood Transfusions No    Caffeine Concern No    Occupational Exposure No    Hobby Hazards No    Sleep Concern Yes    Stress Concern No    Weight Concern No    Special Diet No    Back Care No    Exercise No    Bike Helmet No    Seat Belt Yes    Self-Exams Yes   Social History Narrative    Not on file     Social Determinants of Health     Financial Resource Strain: Not on file   Food Insecurity: Not on file   Transportation Needs: Not on file   Physical Activity: Not on file   Stress: Not on file   Social Connections: Not on file   Intimate Partner Violence: Not on file   Housing Stability: Not on file       Current Outpatient Medications   Medication Sig Dispense Refill    tizanidine (ZANAFLEX) 4 MG Tab TAKE 1 TABLET BY MOUTH EVERYDAY AT BEDTIME 30 Tablet 11    gabapentin (NEURONTIN) 100 MG Cap Take 1 Capsule by mouth 3 times a day. 90 Capsule 1    AIMOVIG 140 MG/ML Solution Auto-injector INJECT 140 MG UNDER THE SKIN EVERYDAY FOR 30 DAYS      baclofen (LIORESAL) 10 MG Tab Take 0.5 Tablets by mouth at bedtime as needed (neck stiffness) for up to 90 days. 30 Tablet 11    KESIMPTA 20 MG/0.4ML Solution Auto-injector       methocarbamol (ROBAXIN) 500 MG Tab Take 1 Tablet by mouth 3 times a day as needed (Spasms). 90 Tablet 0    fluconazole (DIFLUCAN) 150 MG tablet TAKE 1 TABLET BY MOUTH ONCE A MONTH FOR 90 DAYS      AIMOVIG 70 MG/ML Solution Auto-injector Inject 140 mg under the skin Q30 DAYS.  11     No current  "facility-administered medications for this visit.        ALLERGIES: Amitriptyline, Clarithromycin, Sulfa drugs, Cantaloupe, Honey dew, Latex, Lorazepam, Metoclopramide, Penicillin g potassium, Rizatriptan, Sumatriptan, Tape, and Morphine    ROS  Constitutional: Denies fevers, Denies weight changes  Ears/Nose/Throat/Mouth: Denies nasal congestion or sore throat   Cardiovascular: Denies chest pain  Respiratory: Denies shortness of breath, Denies cough  Gastrointestinal/Hepatic: Denies nausea, vomiting  Sleep: see HPI      PHYSICAL EXAM  /74 (BP Location: Left arm, Patient Position: Sitting, BP Cuff Size: Large adult)   Pulse 75   Resp 14   Ht 1.6 m (5' 3\")   Wt 87.1 kg (192 lb)   LMP 11/16/2019   SpO2 97%   BMI 34.01 kg/m²   Appearance: Well-nourished, well-developed, no acute distress  Eyes:  No scleral icterus , EOMI  ENMT: masked  Musculoskeletal:  Grossly normal; gait and station normal; digits and nails normal  Skin:  No rashes, petechiae, cyanosis  Neurologic: without focal signs; oriented to person, time, place, and purpose; judgement intact      Medical Decision Making   Assessment and Plan  Rehana Meade is a 36 y.o.female  with migraines, multiple sclerosis, seizure disorder, anxiety, chronic insomnia and nocturnal hypoxia.  Presents today to discuss sleep study results.    Chronic Insomnia   With frequent awakenings with difficulty falling back asleep and feeling this is impacting daily functioning for years meets criteria for chronic insomnia. Discussed potential causes of insomnia and importance of having a routine around bedtime.   Reviewed cognitive behavioral therapy for insomnia.  Discussed stimulus control.  Discussed that it would be important to rule out any physiologic disturbances in sleep.  Advised that untreated sleep apnea can awaken patients and make it harder to get back to sleep.  Other options could be periodic limb movement disorder as well as potential nocturnal " seizures given her history.    RECOMMENDATIONS  -Maintain a regular sleep schedule  -Avoid caffeinated beverages after lunch, and alcohol in late afternoon and evening  -Avoid prolonged use of light-emitting screens before bedtime  -Exercise regularly for at least 20 minutes, preferably more than four to five hours prior to bedtime  -Avoid daytime naps, especially if they are longer than 20 to 30 minutes or occur late in the day  -Advised to contact our office or myself with any questions via [employer]    Nocturnal hypoxia  Reviewed recent HST results with patient showing she does not meet criteria for obstructive sleep apnea based off of home study.  However home studies can show a false negative.  Patient did have mild nocturnal hypoxia with time spent at or below 88% saturation of 10 minutes.    Plan  -Discussed potential causes of mild nocturnal hypoxia.  Patient would benefit from undergoing a in lab sleep study to further evaluate potential causes with respiration and oxygen level    Seizure disorder  She has a known seizure disorder.  Last known seizure was approximately a year ago.  Patient reports most of her seizures occur during sleep.  Due to current complaints of daytime sleepiness and interruptions to her sleep patient would benefit from undergoing a in lab sleep study with extra EEG leads to assess for nocturnal seizures.    Plan  -Patient will be scheduled for a in lab sleep study with extra EEG leads to assess for nocturnal seizures.      Have advised the patient to follow up with the appropriate healthcare practitioners for all other medical problems and issues.    Return for after sleep study.      Please note portions of this record was created using voice recognition software. I have made every reasonable attempt to correct obvious errors, but I expect that there are errors of grammar and possibly content I did not discover before finalizing the note.

## 2022-09-23 ENCOUNTER — TELEPHONE (OUTPATIENT)
Dept: NEUROLOGY | Facility: MEDICAL CENTER | Age: 37
End: 2022-09-23
Payer: COMMERCIAL

## 2022-09-23 NOTE — TELEPHONE ENCOUNTER
VOICEMAIL  1. Caller Name: Rehana Meade                          Call Back Number: [phone]    2. Message: Patient called states that she has been dealing with left side of body pain (back, abdomen, leg, arm) starting on Sunday. Patient states that only that she was recently started on Gabapentin on 08/26/22 and that this has been the only significant change. Pt states that these symptoms have started improving since yesterday but that her  has been having to stay home from work with her. I notified patient that we would like to see her for an office visit and that I did send a message to Dr. Aggarwal already regarding getting a note for why her  has had to be home with her during this time. Please scheduled patient for earliest appointment.    3. Patient approves office to leave a detailed voicemail/MyChart message: yes

## 2022-10-14 ENCOUNTER — OFFICE VISIT (OUTPATIENT)
Dept: URGENT CARE | Facility: PHYSICIAN GROUP | Age: 37
End: 2022-10-14
Payer: COMMERCIAL

## 2022-10-14 VITALS
DIASTOLIC BLOOD PRESSURE: 64 MMHG | BODY MASS INDEX: 33.7 KG/M2 | WEIGHT: 190.2 LBS | RESPIRATION RATE: 20 BRPM | TEMPERATURE: 99.5 F | HEART RATE: 81 BPM | HEIGHT: 63 IN | SYSTOLIC BLOOD PRESSURE: 122 MMHG | OXYGEN SATURATION: 94 %

## 2022-10-14 DIAGNOSIS — H66.001 NON-RECURRENT ACUTE SUPPURATIVE OTITIS MEDIA OF RIGHT EAR WITHOUT SPONTANEOUS RUPTURE OF TYMPANIC MEMBRANE: ICD-10-CM

## 2022-10-14 DIAGNOSIS — J02.9 SORE THROAT: ICD-10-CM

## 2022-10-14 DIAGNOSIS — J02.0 STREP PHARYNGITIS: ICD-10-CM

## 2022-10-14 LAB
EXTERNAL QUALITY CONTROL: NORMAL
INT CON NEG: NEGATIVE
INT CON NEG: NEGATIVE
INT CON POS: POSITIVE
INT CON POS: POSITIVE
S PYO AG THROAT QL: POSITIVE
SARS-COV+SARS-COV-2 AG RESP QL IA.RAPID: NEGATIVE

## 2022-10-14 PROCEDURE — 99214 OFFICE O/P EST MOD 30 MIN: CPT | Performed by: PHYSICIAN ASSISTANT

## 2022-10-14 PROCEDURE — 87880 STREP A ASSAY W/OPTIC: CPT | Performed by: PHYSICIAN ASSISTANT

## 2022-10-14 PROCEDURE — 87426 SARSCOV CORONAVIRUS AG IA: CPT | Performed by: PHYSICIAN ASSISTANT

## 2022-10-14 RX ORDER — CEPHALEXIN 500 MG/1
500 CAPSULE ORAL 2 TIMES DAILY
Qty: 20 CAPSULE | Refills: 0 | Status: SHIPPED | OUTPATIENT
Start: 2022-10-14 | End: 2022-10-24

## 2022-10-14 ASSESSMENT — ENCOUNTER SYMPTOMS
HEADACHES: 1
SORE THROAT: 1

## 2022-10-14 ASSESSMENT — FIBROSIS 4 INDEX: FIB4 SCORE: 0.43

## 2022-10-14 NOTE — PROGRESS NOTES
Subjective:   Rehana Meade is a 36 y.o. female who presents today with   Chief Complaint   Patient presents with    Pharyngitis     Headache, nasal congestion, ear is clogging, x1 week        Pharyngitis   This is a new problem. The current episode started in the past 7 days. The problem has been unchanged. There has been no fever. The pain is mild. Associated symptoms include congestion, ear pain and headaches. She has tried NSAIDs for the symptoms. The treatment provided mild relief.     PMH:  has a past medical history of Abdominal pain, Anesthesia, Anxiety associated with depression, ASTHMA, Back pain, Chest pain, Chest tightness, Chickenpox, Constipation, Daytime sleepiness, Difficulty swallowing, Eye pain, Frequent headaches, Frequent urination, Hearing difficulty, Migraine without aura, without mention of intractable migraine without mention of status migrainosus, Morning headache, Nausea, Other specified symptom associated with female genital organs, Painful breathing, Painful joint, Ringing in ears, Seizure (HCC), Seizure cerebral (HCC) (02/15/2018), Shortness of breath, Sore muscles, Stroke ([facility]), Substance abuse (HCC), Unspecified disorder of thyroid, Vision loss, Weakness, and Whooping cough.    She has no past medical history of CAD (coronary artery disease) or COPD.  MEDS:   Current Outpatient Medications:     cephALEXin (KEFLEX) 500 MG Cap, Take 1 Capsule by mouth 2 times a day for 10 days., Disp: 20 Capsule, Rfl: 0    tizanidine (ZANAFLEX) 4 MG Tab, TAKE 1 TABLET BY MOUTH EVERYDAY AT BEDTIME, Disp: 30 Tablet, Rfl: 11    gabapentin (NEURONTIN) 100 MG Cap, Take 1 Capsule by mouth 3 times a day., Disp: 90 Capsule, Rfl: 1    AIMOVIG 140 MG/ML Solution Auto-injector, INJECT 140 MG UNDER THE SKIN EVERYDAY FOR 30 DAYS, Disp: , Rfl:     baclofen (LIORESAL) 10 MG Tab, Take 0.5 Tablets by mouth at bedtime as needed (neck stiffness) for up to 90 days., Disp: 30 Tablet, Rfl: 11    KESIMPTA 20  "MG/0.4ML Solution Auto-injector, , Disp: , Rfl:     fluconazole (DIFLUCAN) 150 MG tablet, TAKE 1 TABLET BY MOUTH ONCE A MONTH FOR 90 DAYS, Disp: , Rfl:   ALLERGIES:   Allergies   Allergen Reactions    Amitriptyline Unspecified     Suicidal    Clarithromycin Hives    Sulfa Drugs Anaphylaxis    Cantaloupe Itching    Honey Dew Itching    Latex Itching    Lorazepam Unspecified     Hallucinations      Metoclopramide Unspecified     Muscle spasms    Penicillin G Potassium Vomiting    Rizatriptan Unspecified     Tremors, confusion      Sumatriptan Unspecified     \"Makes my head pins and needles\"    Tape Rash    Morphine Hives and Itching     SURGHX:   Past Surgical History:   Procedure Laterality Date    VAGINAL HYSTERECTOMY TOTAL  01/27/2020    Procedure: HYSTERECTOMY, TOTAL, VAGINAL;  Surgeon: Julian Parker M.D.;  Location: SURGERY SAME DAY [facility];  Service: Gynecology    CYSTOSCOPY  01/27/2020    Procedure: CYSTOSCOPY;  Surgeon: Julian Parker M.D.;  Location: SURGERY SAME DAY [facility];  Service: Gynecology    TUBAL COAGULATION LAPAROSCOPIC BILATERAL  07/11/2014    Performed by Julian Parker M.D. at SURGERY SAME DAY [facility]    SEPTOPLASTY  10/28/2009    Performed by ANNETTE FORDE at SURGERY [facility]    SOMNOPLASTY  10/28/2009    Performed by ANNETTE FORDE at SURGERY [facility]    NASAL POLYPECTOMY  10/28/2009    Performed by ANNETTE FORDE at SURGERY [facility]    HYSTERECTOMY LAPAROSCOPY       SOCHX:  reports that she has never smoked. She has never used smokeless tobacco. She reports that she does not currently use alcohol. She reports that she does not use drugs.  FH: Reviewed with patient, not pertinent to this visit.       Review of Systems   HENT:  Positive for congestion, ear pain and sore throat.    Neurological:  Positive for headaches.      Objective:   /64 (BP Location: Right arm, Patient Position: Sitting, BP Cuff Size: Adult)   Pulse 81   Temp 37.5 °C " "(99.5 °F) (Temporal)   Resp 20   Ht 1.6 m (5' 3\")   Wt 86.3 kg (190 lb 3.2 oz)   LMP 11/16/2019   SpO2 94%   BMI 33.69 kg/m²   Physical Exam  Vitals and nursing note reviewed.   Constitutional:       General: She is not in acute distress.     Appearance: Normal appearance. She is well-developed. She is not ill-appearing or toxic-appearing.   HENT:      Head: Normocephalic and atraumatic.      Right Ear: Hearing and ear canal normal. A middle ear effusion is present. Tympanic membrane is erythematous.      Left Ear: Hearing, tympanic membrane and ear canal normal.      Mouth/Throat:      Pharynx: Uvula midline. Posterior oropharyngeal erythema present. No oropharyngeal exudate or uvula swelling.      Tonsils: No tonsillar exudate or tonsillar abscesses.   Cardiovascular:      Rate and Rhythm: Normal rate and regular rhythm.      Heart sounds: Normal heart sounds.   Pulmonary:      Effort: Pulmonary effort is normal.      Breath sounds: Normal breath sounds. No stridor. No wheezing, rhonchi or rales.   Musculoskeletal:      Comments: Normal movement in all 4 extremities   Skin:     General: Skin is warm and dry.   Neurological:      Mental Status: She is alert.      Coordination: Coordination normal.   Psychiatric:         Mood and Affect: Mood normal.       STREP A +  COVID -    Assessment/Plan:   Assessment    1. Sore throat  - POCT Rapid Strep A  - POCT SARS-COV Antigen NAHUN (Symptomatic only)    2. Strep pharyngitis  - cephALEXin (KEFLEX) 500 MG Cap; Take 1 Capsule by mouth 2 times a day for 10 days.  Dispense: 20 Capsule; Refill: 0    3. Non-recurrent acute suppurative otitis media of right ear without spontaneous rupture of tympanic membrane  Symptoms and presentation consistent with strep and right-sided ear infection we   will treat accordingly with antibiotics.  Patient is understanding to switch out her toothbrush after being on antibiotics for a couple of days.    Differential diagnosis, natural " history, supportive care, and indications for immediate follow-up discussed.   Patient given instructions and understanding of medications and treatment.    If not improving in 3-5 days, F/U with PCP or return to UC if symptoms worsen.    Patient agreeable to plan.      Please note that this dictation was created using voice recognition software. I have made every reasonable attempt to correct obvious errors, but I expect that there are errors of grammar and possibly content that I did not discover before finalizing the note.    Prudencio Canada PA-C

## 2022-10-19 ENCOUNTER — OFFICE VISIT (OUTPATIENT)
Dept: NEUROLOGY | Facility: MEDICAL CENTER | Age: 37
End: 2022-10-19
Attending: REGISTERED NURSE
Payer: COMMERCIAL

## 2022-10-19 VITALS
BODY MASS INDEX: 34.44 KG/M2 | OXYGEN SATURATION: 98 % | SYSTOLIC BLOOD PRESSURE: 102 MMHG | DIASTOLIC BLOOD PRESSURE: 68 MMHG | WEIGHT: 194.45 LBS | HEART RATE: 85 BPM | TEMPERATURE: 98 F

## 2022-10-19 DIAGNOSIS — Z51.81 THERAPEUTIC DRUG MONITORING: ICD-10-CM

## 2022-10-19 DIAGNOSIS — G35 MULTIPLE SCLEROSIS (HCC): ICD-10-CM

## 2022-10-19 PROCEDURE — 99417 PROLNG OP E/M EACH 15 MIN: CPT | Performed by: REGISTERED NURSE

## 2022-10-19 PROCEDURE — 99212 OFFICE O/P EST SF 10 MIN: CPT | Performed by: REGISTERED NURSE

## 2022-10-19 PROCEDURE — 99215 OFFICE O/P EST HI 40 MIN: CPT | Performed by: REGISTERED NURSE

## 2022-10-19 RX ORDER — PREDNISONE 10 MG/1
TABLET ORAL
Qty: 21 TABLET | Refills: 0 | Status: SHIPPED | OUTPATIENT
Start: 2022-10-19 | End: 2022-10-20

## 2022-10-19 ASSESSMENT — FIBROSIS 4 INDEX: FIB4 SCORE: 0.43

## 2022-10-19 NOTE — PROGRESS NOTES
[facility]  MULTIPLE SCLEROSIS & NEUROIMMUNOLOGY  FOLLOW-UP VISIT    DISEASE SUMMARY:  MS History:  Diagnosed: 2021  Lumbar puncture:  yes  First presentation: couldn't walk left side leg dragging and right gave out  Disease modifying treatment: Tia had more lesions              Current:               Previous:   Former or other Neurologist Rebecca Whyte Louie, Peng  Last attack:   Last MRI:       CC: MS    INTERVAL HISTORY:  Rehana Meade is a 36 y.o.  with Multiple Sclerosis  and migraines.   Dr. Aggarwal [facility] her  [facility] on .     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    Since Monday feels she is dragging her right foot.  Also c/o ankle pain. She also has strep throat that she is recovering from.          A review of MS-related symptoms was notable for the following:    Fatigue: same all the time  Weakness: yes; right ankle pain and feels she is dragging her feet  Numbness: no  Incoordination: yes; clumsy  Spasms/Spasticity: no  Vision Impairment: no  Walking/Balance Problems: yes; in need of a rollator  Neuralgia: no  Bowel Symptoms:  sometimes constipated  Bladder Symptoms: no  Heat Sensitivity: no  Depression: no  Cognitive/Memory Problems: no  Sexual Dysfunction: no  Anxiety: no    MEDICATIONS:  Current Outpatient Medications   Medication Sig    cephALEXin (KEFLEX) 500 MG Cap Take 1 Capsule by mouth 2 times a day for 10 days.    tizanidine (ZANAFLEX) 4 MG Tab TAKE 1 TABLET BY MOUTH EVERYDAY AT BEDTIME    gabapentin (NEURONTIN) 100 MG Cap Take 1 Capsule by mouth 3 times a day.    AIMOVIG 140 MG/ML Solution Auto-injector INJECT 140 MG UNDER THE SKIN EVERYDAY FOR 30 DAYS    baclofen (LIORESAL) 10 MG Tab Take 0.5 Tablets by mouth at bedtime as needed (neck stiffness) for up to 90 days.    KESIMPTA 20 MG/0.4ML Solution Auto-injector     fluconazole (DIFLUCAN) 150 MG tablet TAKE 1 TABLET BY MOUTH ONCE A MONTH FOR 90 DAYS     MEDICAL, SOCIAL,  AND FAMILY HISTORY:  There is no change in the patient's ROS or medical, social, or family histories since the previous visit.    REVIEW OF SYSTEMS:  A ROS was completed.  Pertinent positives and negatives were included in the HPI, above.  All other systems were reviewed and are negative.    PHYSICAL EXAM:  General/Medical:  - NAD  - hair, skin, nails, and joints were normal  - neck was supple without Lhermitte's phenomenon  - heart rate and rhythm were regular, no carotid bruits appreciated    Neuro:  MENTAL STATUS: awake and alert; no deficits of speech or language; oriented to person, place, and time; affect was appropriate to situation    CRANIAL NERVES:    II: fields: intact to confrontation, pupils: 3/3 to 2/2 without a relative afferent pupillary defect, discs: sharp, no red desaturation noted    III/IV/VI: versions: intact without nystagmus    V: facial sensation: symmetric to light touch    VII: facial expression: symmetric    VIII: hearing: intact to finger rub    IX/X: palate: elevates symmetrically    XI: shoulder shrug: symmetric    XII: tongue: midline    MOTOR:  - bulk: normal throughout  - tone: normal throughout  Upper Extremity Strength  (R/L)    5/5   Elbow flexion 5/5   Elbow extension 5/5   Shoulder abduction 5/5     Lower Extremity Strength  (R/L)   Hip flexion 5/5   Knee extension 5/5   Knee flexion 5/5   Ankle plantarflexion 5/5   Ankle dorsiflexion 5/5     -  can walk on toes and heels  - pronator drift: absent  - abnormal movements: none    -  COORDINATION:  - finger to nose: normal, no ataxia on exam  - finger tapping: rapid and accurate, bilaterally    REFLEXES:  Reflex Right Left   BR 2+ 2+   Biceps 2+ 2+   Triceps 2+ 2+   Patellae 2+ 2+   Achilles 2+ 2+   Toes down down     GAIT:  - narrow base and normal  - heel-raised/toe-raised gait: intact/intact  - tandem gait: intact    QUANTITATIVE SCORES:  Timed 25-foot walk (sec): 5.6.  Assistive device: none        ASSESSMENT:  Rehana  Alix Meade is a 36 y.o. female  with relapse like complaints.  Could be pseudo relapse as she is just getting over strep throat and is on antibiotics.  C/O that she is dragging her right foot.  She is asking for steroids but not IV as she cannot afford them at this time.  I wrote for a steroid taper starting at 60mg. Will touch base Friday with her.    PLAN:  [facility]  Prednisone 60 mg taper given.      -   -     - My total time spent caring for the patient on the day of the encounter was 60 minutes.   This does not include time spent on separately billable procedures/tests.    Follow-Up:  - No follow-ups on file.    Signed: PETER Hampton

## 2022-10-20 ENCOUNTER — TELEPHONE (OUTPATIENT)
Dept: NEUROLOGY | Facility: MEDICAL CENTER | Age: 37
End: 2022-10-20
Payer: COMMERCIAL

## 2022-10-20 DIAGNOSIS — M25.571 CHRONIC PAIN OF RIGHT ANKLE: ICD-10-CM

## 2022-10-20 DIAGNOSIS — G89.29 CHRONIC PAIN OF RIGHT ANKLE: ICD-10-CM

## 2022-10-20 DIAGNOSIS — G35 MULTIPLE SCLEROSIS (HCC): ICD-10-CM

## 2022-10-20 RX ORDER — PREDNISONE 50 MG/1
1000 TABLET ORAL DAILY
Qty: 100 TABLET | Refills: 0 | Status: SHIPPED | OUTPATIENT
Start: 2022-10-20 | End: 2022-10-25

## 2022-10-20 NOTE — PROGRESS NOTES
Patient called and states her symptoms are worse then yesterday.  Started first dose of steroid this am.  Cannot hold up a cup now with right hand.  She believes she needs 500mg of steroids.  I don't feel comfortable giving 500mg dose.  Will have Dr. Aggarwal follow up.    Gladys GR, FNP-C, MSCNP'      Spoke with Dr. Aggarwal who stated he would like to give 1000mg of prednisone 3-5 days.      Gladys PASTOR-C

## 2022-10-20 NOTE — TELEPHONE ENCOUNTER
Patient called this morning at 8:22am stating that since appointment yesterday she is experiencing severe right arm weakness to the point where it is uncomfortable to lift her phone.

## 2022-10-24 DIAGNOSIS — G35 MS (MULTIPLE SCLEROSIS) (HCC): ICD-10-CM

## 2022-10-24 DIAGNOSIS — G89.29 CHRONIC NOCICEPTIVE PAIN: ICD-10-CM

## 2022-10-27 DIAGNOSIS — G35 MULTIPLE SCLEROSIS (HCC): Primary | ICD-10-CM

## 2022-10-27 RX ORDER — PREDNISONE 50 MG/1
TABLET ORAL
Qty: 10 TABLET | Refills: 0 | Status: SHIPPED | OUTPATIENT
Start: 2022-10-27 | End: 2022-11-02

## 2022-10-27 RX ORDER — PREDNISONE 20 MG/1
TABLET ORAL
Qty: 21 TABLET | Refills: 0 | Status: SHIPPED | OUTPATIENT
Start: 2022-10-27 | End: 2022-10-27

## 2022-11-07 DIAGNOSIS — G35 MULTIPLE SCLEROSIS (HCC): Primary | ICD-10-CM

## 2022-11-08 RX ORDER — OFATUMUMAB 20 MG/.4ML
0.4 INJECTION, SOLUTION SUBCUTANEOUS
Qty: 0.04 ML | Refills: 11 | Status: SHIPPED | OUTPATIENT
Start: 2022-11-08 | End: 2022-11-09

## 2022-11-30 ENCOUNTER — OFFICE VISIT (OUTPATIENT)
Dept: NEUROLOGY | Facility: MEDICAL CENTER | Age: 37
End: 2022-11-30
Attending: CLINICAL NEUROPSYCHOLOGIST
Payer: COMMERCIAL

## 2022-11-30 DIAGNOSIS — G35 MULTIPLE SCLEROSIS (HCC): ICD-10-CM

## 2022-11-30 DIAGNOSIS — R41.89 COGNITIVE CHANGES: ICD-10-CM

## 2022-11-30 DIAGNOSIS — G35 MULTIPLE SCLEROSIS (HCC): Primary | ICD-10-CM

## 2022-11-30 PROCEDURE — 96116 NUBHVL XM PHYS/QHP 1ST HR: CPT | Performed by: CLINICAL NEUROPSYCHOLOGIST

## 2022-11-30 PROCEDURE — 96121 NUBHVL XM PHY/QHP EA ADDL HR: CPT | Performed by: CLINICAL NEUROPSYCHOLOGIST

## 2022-11-30 PROCEDURE — 96137 PSYCL/NRPSYC TST PHY/QHP EA: CPT | Performed by: CLINICAL NEUROPSYCHOLOGIST

## 2022-11-30 PROCEDURE — 96136 PSYCL/NRPSYC TST PHY/QHP 1ST: CPT | Performed by: CLINICAL NEUROPSYCHOLOGIST

## 2022-11-30 ASSESSMENT — ANXIETY QUESTIONNAIRES
3. WORRYING TOO MUCH ABOUT DIFFERENT THINGS: NOT AT ALL
5. BEING SO RESTLESS THAT IT IS HARD TO SIT STILL: NOT AT ALL
IF YOU CHECKED OFF ANY PROBLEMS ON THIS QUESTIONNAIRE, HOW DIFFICULT HAVE THESE PROBLEMS MADE IT FOR YOU TO DO YOUR WORK, TAKE CARE OF THINGS AT HOME, OR GET ALONG WITH OTHER PEOPLE: NOT DIFFICULT AT ALL
1. FEELING NERVOUS, ANXIOUS, OR ON EDGE: SEVERAL DAYS
6. BECOMING EASILY ANNOYED OR IRRITABLE: SEVERAL DAYS
7. FEELING AFRAID AS IF SOMETHING AWFUL MIGHT HAPPEN: SEVERAL DAYS
2. NOT BEING ABLE TO STOP OR CONTROL WORRYING: SEVERAL DAYS
GAD7 TOTAL SCORE: 4
4. TROUBLE RELAXING: NOT AT ALL

## 2022-11-30 ASSESSMENT — PATIENT HEALTH QUESTIONNAIRE - PHQ9: CLINICAL INTERPRETATION OF PHQ2 SCORE: 0

## 2022-11-30 NOTE — PATIENT INSTRUCTIONS
Thank you for your effort during today's evaluation. We will have a feedback session to discuss your results on 12/23/22 at 11 AM.

## 2022-11-30 NOTE — PROGRESS NOTES
Neurobehavioral Status Exam and Neuropsychological Testing    Patient name: Rehana Meade  Referral source: Gladys FOSTER PETER Randolph   MRN: [id number]  Evaluation date: 11/30/2022   YOB: 1985  Neuropsychologist: Janel Nguyen, Ph.D.         This evaluation was conducted for clinical treatment planning and may not be valid for other purposes. Potential risks and benefits, limits of confidentiality, and test procedures were discussed. Following this discussion, the patient consented to complete the evaluation. Information for this section was obtained from the medical record and a clinical interview with the patient and her  conducted on 11/30/2022. Information included in this section is intended as a reference and should not be interpreted in the absence of the complete neuropsychological report. Please refer to the neuropsychological report for additional information including test results, impressions, and recommendations.     Background and Referral Information: The patient is a 36-year-old, , right-handed, White, female, with 12 years of education, who has experienced cognitive difficulties in the context of multiple sclerosis. Hue MARYPETER Grayson referred the patient for a neuropsychological evaluation to characterize her cognitive concerns, aid in differential diagnosis, and treatment planning.    Patient Active Problem List   Diagnosis    Indication for care in labor or delivery    Encounter for sterilization    Paresthesia    Aseptic meningitis    Anxiety    Asthma    Headache    Aseptic meningitis    Migraine without aura and without status migrainosus, not intractable    Intractable headache    Leukocytosis    Other chest pain    AF (amaurosis fugax)    Seizure cerebral (HCC)    Seizure disorder (HCC)    Multiple sclerosis (HCC)    History of Chiari malformation    Nausea    Obesity    Partial symptomatic epilepsy with complex partial seizures, not  intractable, without status epilepticus (HCC)     Past Medical History:   Diagnosis Date    Abdominal pain     Anesthesia     woke up combative    Anxiety associated with depression     ASTHMA     Back pain     Chest pain     Chest tightness     Chickenpox     Constipation     Daytime sleepiness     Difficulty swallowing     Eye pain     Frequent headaches     Frequent urination     Hearing difficulty     Migraine without aura, without mention of intractable migraine without mention of status migrainosus     Morning headache     Nausea     Other specified symptom associated with female genital organs     Painful breathing     Painful joint     Ringing in ears     Seizure ([facility])     Last seizure was 1/2019    Seizure cerebral ([facility]) 02/15/2018    Shortness of breath     Sore muscles     Stroke ([facility])     Substance abuse ([facility])     Unspecified disorder of thyroid     cyst    Vision loss     Weakness     Whooping cough       Past Surgical History:   Procedure Laterality Date    VAGINAL HYSTERECTOMY TOTAL  01/27/2020    Procedure: HYSTERECTOMY, TOTAL, VAGINAL;  Surgeon: Julian Parker M.D.;  Location: SURGERY SAME DAY [facility];  Service: Gynecology    CYSTOSCOPY  01/27/2020    Procedure: CYSTOSCOPY;  Surgeon: Julian Parker M.D.;  Location: SURGERY SAME DAY [facility];  Service: Gynecology    TUBAL COAGULATION LAPAROSCOPIC BILATERAL  07/11/2014    Performed by Julian Parker M.D. at SURGERY SAME DAY [facility] ORS    SEPTOPLASTY  10/28/2009    Performed by ANNETTE FORDE at SURGERY [facility] ORS    SOMNOPLASTY  10/28/2009    Performed by ANNETTE FORDE at SURGERY [facility] ORS    NASAL POLYPECTOMY  10/28/2009    Performed by ANNETTE FORDE at SURGERY [facility]    HYSTERECTOMY LAPAROSCOPY        Family History   Problem Relation Age of Onset    No Known Problems Mother     Sleep Apnea Father     Prostate cancer Paternal Grandfather         [place]        Current Outpatient Medications:      diclofenac sodium (VOLTAREN) 1 % Gel, Apply 2 g topically 4 times a day as needed (Pain)., Disp: 100 g, Rfl: 2    tizanidine (ZANAFLEX) 4 MG Tab, TAKE 1 TABLET BY MOUTH EVERYDAY AT BEDTIME, Disp: 30 Tablet, Rfl: 11    gabapentin (NEURONTIN) 100 MG Cap, Take 1 Capsule by mouth 3 times a day., Disp: 90 Capsule, Rfl: 1    AIMOVIG 140 MG/ML Solution Auto-injector, INJECT 140 MG UNDER THE SKIN EVERYDAY FOR 30 DAYS, Disp: , Rfl:     fluconazole (DIFLUCAN) 150 MG tablet, TAKE 1 TABLET BY MOUTH ONCE A MONTH FOR 90 DAYS, Disp: , Rfl:       Social History     Tobacco Use    Smoking status: Never    Smokeless tobacco: Never   Vaping Use    Vaping Use: Never used   Substance and Sexual Activity    Alcohol use: Not Currently     Comment: rarely    Drug use: Never    Sexual activity: Yes     Partners: Male      Measures Administered: [place] Naming Test (BNT); Brief Visuospatial Memory Test - Revised (BVMT-R); Mary-Raza Executive Functioning System (DKEFS): Color-Word Interference (CWI) & Verbal Fluency (VF); Generalized Anxiety Disorder 7 Item Scale (FARHANA-7); Kramer Verbal Learning Test - Revised (HVLT-R); Mini-Mental State Examination - 2nd Ed. Orientation (MMSE-2); Neuropsychological Assessment Battery (NAB): Numbers and Letters (N&L); Patient Health Questionnaire (PHQ-9); Repeatable Battery for the Assessment of Neuropsychological Status Line Orientation (RBANS LO); Daron-Osterrieth Complex Figure Test - Copy Trial (RCFT CT); Test of Premorbid Functioning (TOPF); Trail Making Test A & B (TMT); Wechsler Adult Intelligence Scale - 4th Ed. (WAIS-IV): Block Design (BD), Digit Span (DS), Matrix Reasoning (MR), Similarities (SI), Vocabulary (VC), & Coding (CD); Wechsler Memory Scale - 4th Ed. Logical Memory (WMS-IV LM); and [place] Card Sorting Test 128 (WCST-128). Informant Questionnaires: Activities of Daily Living Questionnaire (ADLQ) and Neuropsychiatric Inventory Questionnaire (NPI-Q).    Behavioral Observations: The patient  arrived at the clinic on time and was accompanied by her , who participated in the clinical interview. Everybody wore facemasks given the COVID-19 pandemic. She was well groomed and dressed. She was alert and fully oriented to situation, person, place, and time (MMSE-2 Orientation = 10/10). She was polite and always maintained appropriate interpersonal boundaries. Rapport was easily established. Gait was unremarkable. No gross abnormal movements or motor symptoms were observed. She exhibited occasional word finding problems during he interview that did not significantly interfere with the conversation. Comprehension was intact for conversation and test instructions. Speech rate, volume, articulation, and prosody were normal. Speech content was logical and appropriate to context. Expressive and receptive language appeared intact. Mood was euthymic. Affect was mood-congruent and appropriate to the situation. Insight into cognitive and emotional functioning was intact. There was no indication of hallucinations, delusions, or thought disorder. Vision and hearing were adequate for the evaluation. She was attentive throughout the evaluation and had no difficulties with retention of task demands. She worked efficiently for the duration of the evaluation. Response style was unremarkable. Overall, she was engaged and cooperative throughout testing.      Janel Nguyen, Ph.D.          Clinical Neuropsychologist          Department of Neurology          [facility]           One hour and 43 minutes were spent interviewing the patient (neurobehavioral status exam: 51889 = 1; 67806 = 1). Test administration and scoring were completed in 4 hours and 31 minutes (93395 = 1, 03818 = 8).

## 2022-12-02 ENCOUNTER — SLEEP STUDY (OUTPATIENT)
Dept: SLEEP MEDICINE | Facility: MEDICAL CENTER | Age: 37
End: 2022-12-02
Payer: COMMERCIAL

## 2022-12-02 DIAGNOSIS — G40.909 SEIZURE DISORDER (HCC): ICD-10-CM

## 2022-12-02 DIAGNOSIS — G35 MULTIPLE SCLEROSIS (HCC): ICD-10-CM

## 2022-12-02 DIAGNOSIS — G47.34 NOCTURNAL HYPOXIA: ICD-10-CM

## 2022-12-02 DIAGNOSIS — G40.209 PARTIAL SYMPTOMATIC EPILEPSY WITH COMPLEX PARTIAL SEIZURES, NOT INTRACTABLE, WITHOUT STATUS EPILEPTICUS (HCC): ICD-10-CM

## 2022-12-02 PROCEDURE — 95810 POLYSOM 6/> YRS 4/> PARAM: CPT | Performed by: STUDENT IN AN ORGANIZED HEALTH CARE EDUCATION/TRAINING PROGRAM

## 2022-12-06 NOTE — PROCEDURES
MONTAGE: Standard  STUDY TYPE: Diagnostic    RECORDING TECHNIQUE:   After the scalp was prepared, gold plated electrodes were applied to the scalp according to the International 10-20 System. EEG (electroencephalogram) was continuously monitored from the O1-M2, O2-M1, C3-M2, C4-M1, F3-M2, and F4-M1. EOGs (electrooculograms) were monitored by electrodes placed at the left and right outer canthi. Chin EMG (electromyogram) was monitored by electrodes placed on the mentalis and sub-mentalis muscles. Nasal and oral airflow were monitored using a triple port thermocouple as well as oronasal pressure transducer. Respiratory effort was measured by inductive plethysmography technology employing abdominal and thoracic belts. Blood oxygen saturation and pulse were monitored by pulse oximetry. Heart rhythm was monitored by surface electrocardiogram. Leg EMG was studied using surface electrodes placed on left and right anterior tibialis. A microphone was used to monitor tracheal sounds and snoring. Body position was monitored and documented by technician observation.   SCORING CRITERIA:   A modification of the AASM manual for scoring of sleep and associated events was used. Obstructive apneas were scored by cessation of airflow for at least 10 seconds with continuing respiratory effort. Central apneas were scored by cessation of airflow for at least 10 seconds with no respiratory effort. Hypopneas were scored by a 30% or more reduction in airflow for at least 10 seconds accompanied by arterial oxygen desaturation of 3% or an arousal. For CMS (Medicare) patients, per AASM rule 1B, hypopneas are scored by 30% with mild reduction in airflow for at least 10 seconds accompanied by arterial saturation decreased at 4%.    Study start time was 09:07:02 PM. Diagnostic recording time was 8h 50.5m with a total sleep time of 6h 57.5m resulting in a sleep efficiency of 78.70%%. Sleep latency from the start of the study was 66 minutes and the  latency from sleep to REM was 162 minutes. In total,82 arousals were scored for an arousal index of 11.8.  Respiratory:  There were a total of 0 apneas consisting of 0 obstructive apneas, 0 mixed apneas, and 0 central apneas. A total of 67 hypopneas were scored. The apnea index was 0.00 per hour and the hypopnea index was 9.63 per hour resulting in an overall AHI of 9.63. AHI during REM was 31.4 and AHI while supine was 11.11.  Oximetry:  There was a mean oxygen saturation of 91.0%. The minimum oxygen saturation in NREM was 85.0 % and in REM was 83.0%. The patient spent 16.9 minutes of TST with SaO2 <88%.  Cardiac:  The highest heart rate seen while awake was 103 BPM while the highest heart rate during sleep was 102 BPM with an average sleeping heart rate of 83 BPM.  Limb Movements:  There were a total of 0 PLMs during sleep which resulted in a PLMS index of 0.0. Of these, 2 were associated with arousals which resulted in a PLMS arousal index of 0.3.    Impression:  1.  Mild obstructive sleep apnea with overall AHI 9.6, respiratory events significantly worse during REM sleep with a REM AHI of 31.4  2.  May have positional aspect of sleep apnea, spent 99 minutes on left side AHI 4.8  3.  Mild nocturnal hypoxia likely secondary to untreated sleep apnea minimum oxygen saturation 83%, time at or below 88% saturation 17 minutes  4.  No abnormal movements or seizure activity seen during night of sleep.    Recommendations:  I recommend the patient should consider return for a CPAP/BiPAP titration.  May be a candidate for home empiric auto CPAP therapy.   Patient's respiratory events were worse during REM sleep in supine sleep.  If reluctant to start CPAP therapy may consider alternative therapies as listed below.     In some cases alternative treatment options may be proven effective in resolving sleep apnea. These options include upper airway surgery, the use of a dental orthotic, weight loss, or positional therapy.  Clinical correlation is required. In general patients with sleep apnea are advised to avoid alcohol, sedatives and not to operate a motor vehicle while drowsy.  Untreated sleep apnea increases the risk for cardiovascular and neurovascular disease.

## 2022-12-23 ENCOUNTER — OFFICE VISIT (OUTPATIENT)
Dept: NEUROLOGY | Facility: MEDICAL CENTER | Age: 37
End: 2022-12-23
Attending: CLINICAL NEUROPSYCHOLOGIST
Payer: COMMERCIAL

## 2022-12-23 DIAGNOSIS — R41.89 COGNITIVE CHANGES: ICD-10-CM

## 2022-12-23 DIAGNOSIS — G35 MULTIPLE SCLEROSIS (HCC): ICD-10-CM

## 2022-12-23 PROCEDURE — 96132 NRPSYC TST EVAL PHYS/QHP 1ST: CPT | Performed by: CLINICAL NEUROPSYCHOLOGIST

## 2022-12-23 NOTE — PROGRESS NOTES
NEUROPSYCHOLOGICAL FEEDBACK    Name: Rehana Meade    MRN: [id number]   YOB: 1985   Date of Feedback:12/23/2022  Referred by: PETER Sow   Evaluated by: Janel Nguyen, Ph.D.        The patient was seen for an interactive feedback session regarding her neuropsychological evaluation on 11/30/2022. She reported she has been stable in terms of cognitive, functional, emotional, and physical functioning since the neuropsychological evaluation. I discussed the results of the evaluation and the recommendations in detail with the patient and she expressed understanding. The discussion included psychoeducation about cognitive decline related to multiple sclerosis, mild cognitive impairment, and different types of dementia. Psychoeducation was also provided regarding how depression, poor sleep, elevated stress, medication effects, migraines, and chronic pain can affect cognition. Additional information was provided regarding lifestyle factors associated with brain health. The patient was afforded time to ask questions and all her questions were addressed.    Janel Nguyen, Ph.D.                                                                             Clinical Neuropsychologist                                                                               Department of Neurology                                                                      [facility]             Forty-five minutes were spent conducting an interactive feedback session with the patient ([id number]; billed as part of the neuropsychological evaluation).

## 2022-12-23 NOTE — PROGRESS NOTES
CONFIDENTIAL NEUROPSYCHOLOGICAL EVALUATION REPORT    Patient name: Rehana Meade  Referral source: Gladys FOSTER QUYNH Randolph.   MRN: [id number]  Evaluation date: 2022   YOB: 1985  Neuropsychologist: Janel Nguyen, Ph.D.         This evaluation was conducted for clinical treatment planning and may not be valid for other purposes. Potential risks and benefits, limits of confidentiality, and test procedures were discussed. Following this discussion, the patient consented to complete the evaluation. Information for this report was obtained from the medical record, neuropsychological testing, and a clinical interview with the patient and her  conducted on 2022. Feedback, including review of results and recommendations, was conducted on 2022. Neurobehavioral status exam: 67470 = 1, 40701 = 1. Test administration and scorin = 1, 97132 = 8. Test evaluation services: 38453 = 1, 76225 = 2.    Background and Referral Information: The patient is a 36-year-old, , right-handed, White, female, with 12 years of education, who has experienced cognitive difficulties in the context of multiple sclerosis (MS). Additional medical history is relevant for depression, anxiety, seizure disorder (possibly psychogenic and currently resolved), mild obstructive sleep apnea (KRISTI), migraines, chronic pain, asthma, aseptic meningitis (in ), Chiari malformation, leukocytosis, thyroid cyst, and fatigue. Hue PETER Emmanuel referred the patient for a neuropsychological evaluation to characterize her cognitive concerns, aid in differential diagnosis, and treatment planning.    Per her medical record, the patient was formally diagnosed with MS in 2021, though she noted she experienced symptoms for approximately the past 3 to 5 years. She stated her current symptoms include daytime fatigue, weakness (more prominent in legs and including reduced  strength),  "muscle spasms, paresthesia, fluctuations in body temperature, balance problems, and generalized joint pain (more prominent in the legs). She reported her last MS exacerbation occurred last month, but her symptoms have been well controlled more recently with medications including Kesimpta.    Previous Studies: Neurological exam (10/19/2022) was largely unremarkable. MRI of the brain (06/02/2022) documented: \"1. There are multifocal abnormal T2 hyperintensities in the subcortical and periventricular white matter consistent with chronic demyelinating plaques of multiple sclerosis. None of the lesions demonstrates contrast enhancement. 2. Mild low-lying cerebellar tonsils.\" MRI of the cervical spine (06/02/2022) documented: \"1. There is questionable abnormal intramedullary T2 signal intensity in these cervical spinal cord at the level of C5-6. This is unchanged since the previous study. 2. There are no new lesions. 3. There are no enhancing lesions. 4. Mild low-lying cerebellar tonsils. 5. There is mild diffuse disc bulge at C3-4 without significant spinal or neural foraminal stenosis.\" EEG (25 minutes; 02/04/2022) revealed: \"Normal video EEG recording in the awake, drowsy, and sleep state(s):- No persistent focal asymmetries seen.- No epileptiform discharges seen - No seizures. Clinical correlation is recommended. Note: A normal EEG does not rule out epilepsy.  If the clinical suspicion remains high for seizures, a prolonged recording to capture clinical or subclinical events may be helpful.\" Polysomnography (12/02/2022) documented: \"1. Mild obstructive sleep apnea with overall AHI 9.6, respiratory events significantly worse during REM sleep with a REM AHI of 31.4. 2.  May have positional aspect of sleep apnea, spent 99 minutes on left side AHI 4.8. 3.  Mild nocturnal hypoxia likely secondary to untreated sleep apnea minimum oxygen saturation 83%, time at or below 88% saturation 17 minutes. 4. No abnormal movements or " "seizure activity seen during night of sleep.\" CT-CTA of the head (03/05/2021) was unremarkable. CT of the head (03/05/2021) was also unremarkable.    Neuropsychological Findings and Impressions    Presenting Concerns Summary: The patient reported onset of mild cognitive problems coupled with decreased energy following an episode of meningitis in 2015. She noted she experienced further cognitive decline due to exacerbation of her migraines in 2018. In 2021, her cognitive difficulties worsened following her diagnosis of MS, though she stated this was likely due to depression. Since then, she has continued to experience cognitive problems that fluctuate daily. Specifically, the patient has difficulties with short-term memory, attention, word finding, navigation, and aspects of executive function. She is independent in all instrumental and basic activities of daily living (ADLs). She reported she was laid off her position as  due to a company restructuring in October. 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 She reported mild to moderate symptoms of depression during the clinical interview and on a self-report questionnaire. She explained these symptoms are mostly related to her MS diagnosis and stress associated with her cognitive difficulties, noting they are not significantly interfering with daily functioning. She also reported notable sleep maintenance problems since 2015.      Results Summary/Interpretation: The patient's current general intellectual ability was in the high average range, with above average visuospatial/perceptual reasoning and average verbal comprehension/reasoning. This is above her estimated premorbid ability. Remaining test results revealed one isolated below average performance on rote verbal memory (wordlist) delayed recall, while encoding and delayed " recognition were within normal limits. All other aspects of memory were intact including encoding, delayed recall, and recognition of contextual verbal information (short stories) and visual information (simple figures). Performance on all the remaining measures across cognitive domains was within normal limits including attention/working memory, processing speed, language, visual perception/construction, and executive functioning. Visuospatial reasoning, visuomotor set shifting, and semantic verbal set shifting were relative cognitive strengths.    Impression: The patient's cognitive profile revealed an isolated inefficiency in rote verbal memory delayed recall. Otherwise, cognitive functioning was intact with multiple strengths. Considering her high level of intellectual ability, a decline from a previous level of cognitive functioning cannot be ruled out. However, given the mild and isolated nature of her memory inefficiency, the magnitude of this decline does not reach the level of a neurocognitive disorder at this time. Normal testing variability may also contribute to isolated below expectation scores. Possible contributing factors to her subjective cognitive difficulties include mood disturbance, elevated stress, sleep maintenance problems, mild obstructive sleep apnea, daytime fatigue, migraines, chronic pain, and medication effects (e.g., baclofen). Amelioration of these factors may lead to a relative improvement in perceived daily cognitive functioning. This evaluation may serve as a baseline for longitudinal tracking.     Recommendations:    Based on the present results, a repeat neuropsychological evaluation is not currently indicated. If there is a considerable change in cognitive or emotional status, another evaluation may be warranted. At that time, diagnostic impressions and recommendations will be revised and updated.   The patient should be reassured that her overall performance was largely average  to above average compared to similarly aged peers. As such, from a neuropsychological standpoint, she has the cognitive capacity to continue to be successful in her current and future occupational/academic endeavors.  Considering her reported symptoms of depression and elevated stress, if interested, the patient will likely benefit from the initiation of individual counseling sessions to further assess and treat these symptoms. This was discussed during the feedback session, and she may reach out to me to place a referral as needed. The following are options for psychotherapy in the community:  [facility] (https://www.[employer].org/health-services/behavioral-health; 163.281.8801 or [phone])  [facility] (https://www.[employer]; 239.679.8839)  [facility] (https://[employer]; 129.797.8316)  [facility] (https://www.[employer]/index.html; 415.932.8177)  [facility] (https://[employer]; 175.410.5226)   A directory of providers can also be found on the Psychology Today website (www.psychologytoday.com)  The patient reported significant sleep maintenance problems and was recently diagnosed with mild KRISTI, which are likely contributing to her subjective cognitive difficulties and daytime fatigue. As such, she is encouraged to continue following up with sleep medicine. She may also benefit from education regarding sleep hygiene and healthy sleep habits, including maintenance of a regular sleep/wake cycle and integrating relaxation exercises before bed. This was provided during the feedback session.  Given her report of chronic pain, muscle spasms, and migraines, which may contribute to subjective cognitive problems, she is encouraged to continue with pain management treatment. She may also benefit from a discussion regarding the possible cognitive burden of her medications with her prescribing physicians.  Continued use of  cognitive remediation strategies is recommended to reduce cognitive demands. This may include setting scheduled routines, having an established location for essential items (e.g., wallet, glasses, and keys), completing tasks when there are no time demands, completing one task at a time, minimizing external distractions, paraphrasing and repeating to be learned information, and using external aids for reminders (e.g., planner, calendar, setting alarms, labels, to-do lists) consistently.   Energy conservation strategies are recommended to manage mental and physical fatigue, such as taking scheduled breaks, breaking down demanding tasks into smaller components, working on projects for shorter periods, maximizing time when she feels the most alert, and working on cognitively demanding projects at her best time of the day.  The patient is encouraged to regularly engage in social and physical recreation, as well as cognitively stimulating activities (e.g., puzzles, games, reading, exercise, social gatherings, [employer]. https://www.[employer]) to promote brain health and emotional well-being.  The books Biohack Your Brain: How to Boost Cognitive Health, Performance & Power by Dr. Rebeka Salinas, Undo It!: How Simple Lifestyle Changes Can Reverse Most Chronic Diseases by Angel Marquis and Mell Marquis, Your Memory: How It Works and How to Improve It by Fausto Forrester, and [employer] Guide to Achieving Optimal Memory by Brennen Klein and Lana Mendieta may be helpful resources for The patient and her family.  If not already done so, the patient and her family members are encouraged to discuss legal and financial affairs, such as establishing a will and power of , to assist her with future financial and healthcare decisions. Information regarding these issues can be found at www.caringinfo.org or www.agingwithdignity.org/5wishes.html.   Multiple sclerosis can often be associated with significant  adjustment difficulties for both the patient and family members. The patient and her family may benefit from resources available through the National Multiple Sclerosis Society (NMSS), which offers psychoeducational information and services for individuals with MS and their families (www.nationalmssociety.org; [facility] [place] [facility]: 707.467.6175).    Past Medical History:   Diagnosis Date    Abdominal pain     Anesthesia     woke up combative    Anxiety associated with depression     ASTHMA     Back pain     Chest pain     Chest tightness     Chickenpox     Constipation     Daytime sleepiness     Difficulty swallowing     Eye pain     Frequent headaches     Frequent urination     Hearing difficulty     Migraine without aura, without mention of intractable migraine without mention of status migrainosus     Morning headache     Nausea     Other specified symptom associated with female genital organs     Painful breathing     Painful joint     Ringing in ears     Seizure (HCC)     Last seizure was 1/2019    Se[facility] 02/15/2018    Shortness of breath     Sore muscles     Stroke (HCC)     Substance abuse (HCC)     Unspecified disorder of thyroid     cyst    Vision loss     Weakness     Whooping cough      Patient Active Problem List   Diagnosis    Indication for care in labor or delivery    Encounter for sterilization    Paresthesia    Aseptic meningitis    Anxiety    Asthma    Headache    Aseptic meningitis    Migraine without aura and without status migrainosus, not intractable    Intractable headache    Leukocytosis    Other chest pain    AF (amaurosis fugax)    Seizure cerebral (HCC)    Seizure disorder (HCC)    Multiple sclerosis (HCC)    History of Chiari malformation    Nausea    Obesity    Partial symptomatic epilepsy with complex partial seizures, not intractable, without status epilepticus (HCC)      Past Surgical History:   Procedure Laterality Date    VAGINAL HYSTERECTOMY TOTAL   01/27/2020    Procedure: HYSTERECTOMY, TOTAL, VAGINAL;  Surgeon: Julian Parker M.D.;  Location: SURGERY SAME DAY [facility];  Service: Gynecology    CYSTOSCOPY  01/27/2020    Procedure: CYSTOSCOPY;  Surgeon: Julian Parker M.D.;  Location: SURGERY SAME DAY [facility];  Service: Gynecology    TUBAL COAGULATION LAPAROSCOPIC BILATERAL  07/11/2014    Performed by Julian Parker M.D. at SURGERY SAME DAY [facility]    SEPTOPLASTY  10/28/2009    Performed by ANNETTE FORDE at SURGERY [facility]    SOMNOPLASTY  10/28/2009    Performed by ANNETTE FORDE at SURGERY [facility]    NASAL POLYPECTOMY  10/28/2009    Performed by ANNETTE FORDE at SURGERY [facility]    HYSTERECTOMY LAPAROSCOPY        Family History   Problem Relation Age of Onset    No Known Problems Mother     Sleep Apnea Father     Prostate cancer Paternal Grandfather         [place]      Current Outpatient Medications:     diclofenac sodium (VOLTAREN) 1 % Gel, Apply 2 g topically 4 times a day as needed (Pain)., Disp: 100 g, Rfl: 2    tizanidine (ZANAFLEX) 4 MG Tab, TAKE 1 TABLET BY MOUTH EVERYDAY AT BEDTIME, Disp: 30 Tablet, Rfl: 11    gabapentin (NEURONTIN) 100 MG Cap, Take 1 Capsule by mouth 3 times a day., Disp: 90 Capsule, Rfl: 1    AIMOVIG 140 MG/ML Solution Auto-injector, INJECT 140 MG UNDER THE SKIN EVERYDAY FOR 30 DAYS, Disp: , Rfl:     fluconazole (DIFLUCAN) 150 MG tablet, TAKE 1 TABLET BY MOUTH ONCE A MONTH FOR 90 DAYS, Disp: , Rfl:    Baclofen  Kesimpta    Psychosocial History:  reports that she has never smoked. She has never used smokeless tobacco. She reports that she does not currently use alcohol. She reports that she does not use drugs.     Measures Administered: Marion Naming Test (BNT); Brief Visuospatial Memory Test - Revised (BVMT-R); Mary-Raza Executive Functioning System (DKEFS): Color-Word Interference (CWI) & Verbal Fluency (VF); Generalized Anxiety Disorder 7 Item Scale (FARHANA-7); Cordova Verbal  Learning Test - Revised (HVLT-R); Mini-Mental State Examination - 2nd Ed. Orientation (MMSE-2); Neuropsychological Assessment Battery (NAB): Numbers and Letters (N&L); Patient Health Questionnaire (PHQ-9); Repeatable Battery for the Assessment of Neuropsychological Status Line Orientation (RBANS LO); Daron-Osterrieth Complex Figure Test - Copy Trial (RCFT CT); Test of Premorbid Functioning (TOPF); Trail Making Test A & B (TMT); Wechsler Adult Intelligence Scale - 4th Ed. (WAIS-IV): Block Design (BD), Digit Span (DS), Matrix Reasoning (MR), Similarities (SI), Vocabulary (VC), & Coding (CD); Wechsler Memory Scale - 4th Ed. Logical Memory (WMS-IV LM); and [place] Card Sorting Test 128 (WCST-128). Informant Questionnaires: Activities of Daily Living Questionnaire (ADLQ) and Neuropsychiatric Inventory Questionnaire (NPI-Q).    Behavioral Observations: The patient arrived at the clinic on time and was accompanied by her , who participated in the clinical interview. Everybody wore facemasks given the COVID-19 pandemic. She was well groomed and dressed. She was alert and fully oriented to situation, person, place, and time (MMSE-2 Orientation = 10/10). She was polite and always maintained appropriate interpersonal boundaries. Rapport was easily established. Gait was unremarkable. No gross abnormal movements or motor symptoms were observed. She exhibited occasional word finding problems during he interview that did not significantly interfere with the conversation. Comprehension was intact for conversation and test instructions. Speech rate, volume, articulation, and prosody were normal. Speech content was logical and appropriate to context. Expressive and receptive language appeared intact. Mood was euthymic. Affect was mood-congruent and appropriate to the situation. Insight into cognitive and emotional functioning was intact. There was no indication of hallucinations, delusions, or thought disorder. Vision and  hearing were adequate for the evaluation. She was attentive throughout the evaluation and had no difficulties with retention of task demands. She worked efficiently for the duration of the evaluation. Response style was unremarkable. Overall, she was engaged and cooperative throughout testing.    Results & Key Findings: Please note that scores reported are for professional use only. For diagnostic purposes, a performance score that falls below the 9th percentile of the reference group for that measure may be considered a cognitive deficit depending on the overall pattern of performance. The following clinical descriptors identify performance within the range of percentile scores indicated in the parentheses: Exceptionally High Score (>98th), Above Average Score (91st-97th), High Average Score (75th-90th), Average Score (25th-74th), Low Average Score (9th-24th), Below Average Score (3rd-8th), and Exceptionally Low Score (<2nd). Please see the attached test results summary table in the appendix for a list of measures administered as well as raw and normative scores, which are listed in the designated columns. All such scores are based on age-corrected norms and certain scores may be adjusted for education and/or other demographic factors as appropriate.     Data Validity: The patient's performance on embedded and freestanding validity measures was within the valid range, suggesting she appropriately engaged in testing. The following test results are considered an accurate representation of her current level of cognitive functioning.            Thank you for allowing me to participate in the patient's care. If I can be of further assistance, please do not hesitate to contact me.    Janel Nguyen, Ph.D.          Clinical Neuropsychologist          Department of Neurology          [facility]             This report was created using voice recognition software. I have made every reasonable attempt to avoid dictation  errors, but this document may contain an error not identified before finalizing. If the error changes the accuracy of the document, I would appreciate it being brought to my attention. Thank you.    One hour and 43 minutes were spent interviewing the patient (11/30/2022; neurobehavioral status exam: 11706 = 1; 07492 = 1). Test administration and scoring were completed in 4 hours and 31 minutes (11/30/2022; 84038 = 1, 78872 = 8). Three hours and 27 minutes were spent in clinical decision-making, chart review, records reviews, interpretation of test results and clinical data, integration of patient data, interactive feedback to the patient, and report preparation (11/30/2022 - 12/30/2022; test evaluation services: 95468 = 1, 48805 = 2).

## 2023-01-27 ENCOUNTER — TELEPHONE (OUTPATIENT)
Dept: NEUROLOGY | Facility: MEDICAL CENTER | Age: 38
End: 2023-01-27
Payer: COMMERCIAL

## 2023-02-01 ENCOUNTER — OFFICE VISIT (OUTPATIENT)
Dept: NEUROLOGY | Facility: MEDICAL CENTER | Age: 38
End: 2023-02-01
Attending: PSYCHIATRY & NEUROLOGY
Payer: COMMERCIAL

## 2023-02-01 VITALS
HEIGHT: 63 IN | BODY MASS INDEX: 37.03 KG/M2 | OXYGEN SATURATION: 98 % | RESPIRATION RATE: 17 BRPM | WEIGHT: 209 LBS | DIASTOLIC BLOOD PRESSURE: 76 MMHG | TEMPERATURE: 98 F | HEART RATE: 86 BPM | SYSTOLIC BLOOD PRESSURE: 114 MMHG

## 2023-02-01 DIAGNOSIS — G43.109 MIGRAINE WITH AURA AND WITHOUT STATUS MIGRAINOSUS, NOT INTRACTABLE: ICD-10-CM

## 2023-02-01 DIAGNOSIS — G35 MULTIPLE SCLEROSIS (HCC): ICD-10-CM

## 2023-02-01 DIAGNOSIS — G35 MS (MULTIPLE SCLEROSIS) (HCC): Primary | ICD-10-CM

## 2023-02-01 PROCEDURE — 99215 OFFICE O/P EST HI 40 MIN: CPT | Performed by: PSYCHIATRY & NEUROLOGY

## 2023-02-01 PROCEDURE — 99212 OFFICE O/P EST SF 10 MIN: CPT | Performed by: PSYCHIATRY & NEUROLOGY

## 2023-02-01 RX ORDER — DIAZEPAM 5 MG/1
TABLET ORAL
Qty: 3 TABLET | Refills: 0 | Status: SHIPPED | OUTPATIENT
Start: 2023-02-01 | End: 2023-07-01

## 2023-02-01 RX ORDER — ERENUMAB-AOOE 140 MG/ML
140 INJECTION, SOLUTION SUBCUTANEOUS
Qty: 1.12 ML | Refills: 11 | Status: SHIPPED | OUTPATIENT
Start: 2023-02-01 | End: 2023-02-02

## 2023-02-01 RX ORDER — OFATUMUMAB 20 MG/.4ML
20 INJECTION, SOLUTION SUBCUTANEOUS
COMMUNITY
End: 2023-11-03

## 2023-02-01 RX ORDER — BACLOFEN 10 MG/1
10 TABLET ORAL
COMMUNITY

## 2023-02-01 ASSESSMENT — FIBROSIS 4 INDEX: FIB4 SCORE: 0.44

## 2023-02-01 ASSESSMENT — PATIENT HEALTH QUESTIONNAIRE - PHQ9: CLINICAL INTERPRETATION OF PHQ2 SCORE: 0

## 2023-02-01 NOTE — PROGRESS NOTES
[facility]  MULTIPLE SCLEROSIS & NEUROIMMUNOLOGY  FOLLOW-UP VISIT    DISEASE SUMMARY:  Principal neurologic diagnosis: MS  Diagnosis of MS: 1/7/2021  Disease History:  - 12/5/2019: blurry vision, left facial numbness, left upper- and lower extremity numbness; MRI head unremarkable; improved over the course of 1 week  - 12/25/2020: right monocular visual loss; returned to normal over ~2 hours  - 12/29/2020: onset of bilateral lower extremity weakness  - 1/2/202021: presented to hospital; MRI w/ lesions, LP w/ OCBs present; treated with IVMP with good response  - 2/28/2021: Gilenya FDO  - 3/4/2021: left margaret-body numbness; treated with IVMP w/ good response  - 5/1/2021: started Kesimpta  Disease course at onset: MS  Current disease course: MS  Previous disease therapies:  - Gilenya: additional clinical attack  Current disease therapies:  - Kesimpta  Symptomatic therapies:  - baclofen: effective for abdominal spasms (doesn't cause drowsiness)  - tizanidine: effective, also helped promote sleep (probably more helpful than baclofen)  CSF (1/3/2021):  - RBCs: 394  - WBCs: 6  - protein: 58  - glucose: 81  - oligoclonal bands: positive (8)  - paraneoplastic autoantibody eval: negative  Other Testing:  - anti-AQP4 Ab (1/3/2021): negative  - anti-MOG IgG (1/3/2021): negative  - paraneoplastic autoantibody eval, serum (1/4/2020): negative  MRI head:  - 6/2/2022: stable, no abnormal enhancement  - 10/6/2021: stable, no abnormal enhancement  - 5/8/2021: stable, no abnormal enhancement  - 3/5/2021: enhancing lesions present  - 1/2/2021: juxta-cortical, periventricular, and infratentorial lesions w/ enhancement  - 12/5/2019: no visible lesions  - 5/13/2018: no visible lesions  MRI cervical spine:  - 6/2/2022: stable, no abnormal enhancement  - 10/6/2021: stable, no abnormal enhancement  - 3/5/2021: single enhancing lesion present  - 1/2/2021: no visible lesions  MRI thoracic spine:  - 10/6/2021: no visible lesions  - 3/5/2021:  no visible lesions  - 1/2/2021: no visible lesions    CC: MS    INTERVAL HISTORY:  Rehana Meade is a 37 y.o. woman with MS and a history otherwise notable for migraines, aseptic meningitis, and seizure disorder.  I last saw her via Zoom on 3/30/2022.  At that time I recommended she continue Kesimpta and Aimovig.  Today, I followed up with her via Zoom, and she provided the following interval history:    MS:  Rehana continues on Kesimpta.  She tolerates this medication well and doesn't experience any side effects.  She completed neuropsychological evaluation with Dr. Nguyen.    Migraine w/ Aura:  Her headaches are well-controlled with Aimovig 140 mg/month.    MEDICATIONS:  Current Outpatient Medications   Medication Sig    KESIMPTA 20 MG/0.4ML Solution Auto-injector Inject 20 mg under the skin Q30 DAYS.    baclofen (LIORESAL) 10 MG Tab Take 0.5 Tablets by mouth as needed.    diazePAM (VALIUM) 5 MG Tab Take 1 tablet 60 minutes prior to MRI. May repeat x 1 if needed. Do not drive to/from MRI.    Erenumab-aooe (AIMOVIG) 140 MG/ML Solution Auto-injector Inject 140 mg under the skin every 30 (thirty) days for 1 dose.    diclofenac sodium (VOLTAREN) 1 % Gel Apply 2 g topically 4 times a day as needed (Pain).    tizanidine (ZANAFLEX) 4 MG Tab TAKE 1 TABLET BY MOUTH EVERYDAY AT BEDTIME    fluconazole (DIFLUCAN) 150 MG tablet TAKE 1 TABLET BY MOUTH ONCE A MONTH FOR 90 DAYS    gabapentin (NEURONTIN) 100 MG Cap Take 1 Capsule by mouth 3 times a day.     MEDICAL, SOCIAL, AND FAMILY HISTORY:  There is no change in the patient's ROS or PFSH from their previous visit on 6/2/2022.    REVIEW OF SYSTEMS:  A ROS was completed.  Pertinent positives and negatives were included in the HPI, above.  All other systems were reviewed and are negative.    PHYSICAL EXAM:  General/Medical:  - NAD    Neuro:  MENTAL STATUS: awake and alert; no deficits of speech or language; oriented to conversation; affect was appropriate to situation;  pleasant, cooperative    CRANIAL NERVES:    II: acuity: NT, fields: NT, pupils: NT, discs: NT    III/IV/VI: versions: grossly intact    V: facial sensation: NT    VII: facial expression: symmetric    VIII: hearing: intact to voice    IX/X: palate: NT    XI: shoulder shrug: NT    XII: tongue: NT    MOTOR:  - bulk: NT  - tone: NT  Upper Extremity Strength  (R/L)    NT   Elbow flexion NT   Elbow extension NT   Shoulder abduction NT     Lower Extremity Strength  (R/L)   Hip flexion NT   Knee extension NT   Knee flexion NT   Ankle plantarflexion NT   Ankle dorsiflexion NT     - pronator drift: NT  - abnormal movements: none    SENSATION:  - light touch: NT  - vibration (R/L, seconds): NT at the great toes  - pinprick: NT  - proprioception: NT  - Romberg: absent    COORDINATION:  - finger to nose: NT  - finger tapping: NT    REFLEXES:  Reflex Right Left   BR NT NT   Biceps NT NT   Triceps NT NT   Patellae NT NT   Achilles NT NT   Toes NT NT     GAIT:  - NT     QUANTITATIVE SCORES:  Timed 25-foot walk (sec): 3.6 on 2/1/2023, (4.7 on 8/17/2021, 4.1 on 5/12/2021, 4.8 on 3/10/2021, 4.8 on 1/7/2021).  Assistive device: none    REVIEW OF IMAGING STUDIES:  I have summarized the imaging results above.    REVIEW OF LABORATORY STUDIES:  No additional data since the last visit.    ASSESSMENT:  Rehana Meade is a 37 y.o. woman with multiple sclerosis and migraine w/ aura.  She remains clinically stable on Kesimpra, which she tolerates well.  Plan to continue this regimen for now.  Also plan for repeat MRI brain and cervical spine as well as monitoring labs (listed below).    PLAN:  Multiple Sclerosis:  - continue Kesimpta  Orders Placed This Encounter    MR-BRAIN-WITH & W/O    MR-CERVICAL SPINE-WITH & W/O    CBC with differential    LFTs    lymphocyte subsets (B & T cells)    IgA    IgG    IgM    Referral to Physical Therapy    KESIMPTA 20 MG/0.4ML Solution Auto-injector    baclofen (LIORESAL) 10 MG Tab    diazePAM  "(VALIUM) 5 MG Tab    Erenumab-aooe (AIMOVIG) 140 MG/ML Solution Auto-injector     Migraine w/ Aura:  - continue Aimovig 140 mg/month    Follow-Up:  Return in about 6 months (around 8/1/2023).    Signed: Rajeev Aggarwal M.D.    BILLING DOCUMENTATION:   I spent 54 minutes reviewing the medical record, interviewing and examining the patient, discussing my impression (see \"assessment\" above), and coordinating care.  "

## 2023-02-14 ENCOUNTER — TELEMEDICINE (OUTPATIENT)
Dept: SLEEP MEDICINE | Facility: MEDICAL CENTER | Age: 38
End: 2023-02-14
Payer: COMMERCIAL

## 2023-02-14 VITALS — BODY MASS INDEX: 35.44 KG/M2 | WEIGHT: 200 LBS | HEIGHT: 63 IN

## 2023-02-14 DIAGNOSIS — G47.34 NOCTURNAL HYPOXIA: ICD-10-CM

## 2023-02-14 DIAGNOSIS — G47.33 OSA (OBSTRUCTIVE SLEEP APNEA): Primary | ICD-10-CM

## 2023-02-14 PROCEDURE — 99213 OFFICE O/P EST LOW 20 MIN: CPT | Mod: 95 | Performed by: STUDENT IN AN ORGANIZED HEALTH CARE EDUCATION/TRAINING PROGRAM

## 2023-02-14 ASSESSMENT — FIBROSIS 4 INDEX: FIB4 SCORE: 0.44

## 2023-02-14 NOTE — PATIENT INSTRUCTIONS
May try positional therapy  Devices for positional therapy includes slumber bump, sleep noodle, wedge pillow and Rematee.     Continue to work on weight loss and exercise.    We will plan to follow-up in 3 to 4 months at which time we will repeat either a home sleep study or overnight pulse oximetry to assess your oxygen levels.

## 2023-02-14 NOTE — PROGRESS NOTES
[facility] Virtual Follow Up Note     Date: 2023 / Time: 9:05 AM    CC: Telemedicine follow-up to discuss sleep study results    This sleep consultation is provided using Telemedicine and secure encrypted software. Consent was obtained.    Consulting MD: Miguel Malone M.D.  Requesting Physician: Mack Bernard M.D.  Patient name:      Rehana Meade  : 1985  MRN: [id number]     This visit was conducted via Zoom using secure and encrypted videoconferencing technology.   The patient was in a private location at home in the [facility].    The patient's identity was confirmed and verbal consent was obtained for this virtual visit.      HISTORY OF PRESENT ILLNESS:     Rehana Meade is a 37 y.o. female with migraines, multiple sclerosis, seizure disorder, anxiety, depression, chronic insomnia and mild obstructive sleep apnea with nocturnal hypoxia.  Presents today to discuss sleep study results.      She reports she did have difficulty sleeping in the sleep lab with instrumentation.    She continues to have difficulty with staying asleep at night.  She continues to be tired at times during the day with low energy.  She states she did have an episode of depression around the holidays and gained weight due to losing her employment.    She reports history of claustrophobia and does not feel she can tolerate a CPAP machine.    MEDICAL HISTORY  Past Medical History:   Diagnosis Date    Abdominal pain     Anesthesia     woke up combative    Anxiety associated with depression     ASTHMA     Back pain     Chest pain     Chest tightness     Chickenpox     Constipation     Daytime sleepiness     Difficulty swallowing     Eye pain     Frequent headaches     Frequent urination     Hearing difficulty     Migraine without aura, without mention of intractable migraine without mention of status migrainosus     Morning headache     Nausea     Other specified symptom associated  with female genital organs     Painful breathing     Painful joint     Ringing in ears     Seizure (HCC)     Last seizure was 1/2019    Seizure cerebral ([facility]) 02/15/2018    Shortness of breath     Sore muscles     Stroke ([facility])     Substance abuse ([facility])     Unspecified disorder of thyroid     cyst    Vision loss     Weakness     Whooping cough         SURGICAL HISTORY  Past Surgical History:   Procedure Laterality Date    VAGINAL HYSTERECTOMY TOTAL  01/27/2020    Procedure: HYSTERECTOMY, TOTAL, VAGINAL;  Surgeon: Julian Parker M.D.;  Location: SURGERY SAME DAY [facility];  Service: Gynecology    CYSTOSCOPY  01/27/2020    Procedure: CYSTOSCOPY;  Surgeon: Julian Parker M.D.;  Location: SURGERY SAME DAY [facility];  Service: Gynecology    TUBAL COAGULATION LAPAROSCOPIC BILATERAL  07/11/2014    Performed by Julian Parker M.D. at SURGERY SAME DAY [facility] ORS    SEPTOPLASTY  10/28/2009    Performed by ANNETTE FORDE at SURGERY [facility]    SOMNOPLASTY  10/28/2009    Performed by ANNETTE FORDE at SURGERY [facility] ORS    NASAL POLYPECTOMY  10/28/2009    Performed by ANNETTE FORDE at SURGERY [facility] ORS    HYSTERECTOMY LAPAROSCOPY          FAMILY HISTORY  Family History   Problem Relation Age of Onset    No Known Problems Mother     Sleep Apnea Father     Prostate cancer Paternal Grandfather         [place]       SOCIAL HISTORY  Social History     Socioeconomic History    Marital status:    Tobacco Use    Smoking status: Never    Smokeless tobacco: Never   Vaping Use    Vaping Use: Never used   Substance and Sexual Activity    Alcohol use: Not Currently     Comment: rarely    Drug use: Never    Sexual activity: Yes     Partners: Male   Other Topics Concern     Service No    Blood Transfusions No    Caffeine Concern No    Occupational Exposure No    Hobby Hazards No    Sleep Concern Yes    Stress Concern No    Weight Concern No    Special Diet No    Back Care No    Exercise  "No    Bike Helmet No    Seat Belt Yes    Self-Exams Yes        CURRENT MEDICATIONS  Current Outpatient Medications   Medication Sig Dispense Refill    KESIMPTA 20 MG/0.4ML Solution Auto-injector Inject 20 mg under the skin Q30 DAYS.      baclofen (LIORESAL) 10 MG Tab Take 0.5 Tablets by mouth as needed.      diazePAM (VALIUM) 5 MG Tab Take 1 tablet 60 minutes prior to MRI. May repeat x 1 if needed. Do not drive to/from MRI. 3 Tablet 0    diclofenac sodium (VOLTAREN) 1 % Gel Apply 2 g topically 4 times a day as needed (Pain). 100 g 2    tizanidine (ZANAFLEX) 4 MG Tab TAKE 1 TABLET BY MOUTH EVERYDAY AT BEDTIME 30 Tablet 11    fluconazole (DIFLUCAN) 150 MG tablet TAKE 1 TABLET BY MOUTH ONCE A MONTH FOR 90 DAYS       No current facility-administered medications for this visit.       REVIEW OF SYSTEMS  Constitutional: Denies fevers, Denies weight changes  Ears/Nose/Throat/Mouth: Denies nasal congestion or sore throat   Cardiovascular: Denies chest pain or palpitations   Respiratory: Denies shortness of breath, Denies cough  Gastrointestinal/Hepatic: Denies abdominal pain, nausea, vomiting, diarrhea  Musculoskeletal/Rheum: Denies  joint pain and swelling   Sleep: See HPI      Physical Examination:  Vitals/ General Appearance:   Weight/BMI: Body mass index is 35.43 kg/m².  Ht 1.6 m (5' 3\")   Wt 90.7 kg (200 lb)   Vitals:    02/14/23 0902   Weight: 90.7 kg (200 lb)   Height: 1.6 m (5' 3\")       General: alert and oriented to time, place and person. Cooperative and in no apparent distress.   Constitutional: Alert, no distress, well-groomed.  Voice: normal volume and betty  Head: normocephalic   Pulmonary: Voice normal volume and betty. No sounds or signs of increased work of breathing.   Neurologic: No involuntary movements     Assessment and Plan  Rehana Meade is a 37 y.o. female with migraines, multiple sclerosis, seizure disorder, anxiety, depression, chronic insomnia and mild obstructive sleep apnea with " nocturnal hypoxia.  Presents today to discuss sleep study results.      The medical record was reviewed.    Obstructive sleep apnea   Reviewed recent PSG with patient showing an AHI of 9.6 and Min Oxygen saturation of 83%.  Time at or below 88% saturation 16.9 minutes.  Respiratory events worse during REM sleep with a REM AHI of 31.4  Based on sleep study and symptoms meets criteria for mild obstructive sleep apnea.   We discussed the pathophysiology of obstructive sleep apnea (KRISTI) and risk factors for the disease. We also discussed possible consequences of untreated KRISTI, including excessive daytime sleepiness and fatigue, cognitive dysfunction, cardiovascular complications such as elevated blood pressure, heart attacks, cardiac arrhythmias, and strokes. We discussed how KRISTI typically gets worse with age. We discussed treatment options for KRISTI, including the gold standard therapy (PAP), alternative options such as a mandibular advancement device (custom-made oral appliances) and surgeries.     She would like to proceed with positional therapy.  She states she will work on diet and exercise.  She is hopeful that she will be able to lose weight which will improve her sleep apnea.  Reviewed positional therapy devices.  Advised once stable and positional therapy would benefit from undergoing a overnight pulse oximetry study to assess for nocturnal hypoxia improvement.    Discussed that likely mild obstructive sleep apnea is the cause of her nocturnal hypoxia.    RECOMMENDATIONS  -Start positional therapy for sleep apnea  -Patient counseled to avoid driving when sleepy. Encouraged to anticipate sleepiness, consider taking a 10 min nap prior to driving, alternate with another , or pull over if sleepy while driving  -Advised to contact our office or myself with any questions via MyChart  -Follow up in 3-4 months or sooner if needed      Have advised the patient to follow up with the appropriate healthcare  practitioners for all other medical problems and issues.    Return in about 4 months (around 6/14/2023).      Please note portions of this record was created using voice recognition software. I have made every reasonable attempt to correct obvious errors, but I expect that there are errors of grammar and possibly content I did not discover before finalizing the note.

## 2023-02-24 NOTE — CARE PLAN
Attempted to call patient but mailbox is full. Left SMS message for patient to call the office so we could let her know that medication was sent to pharmacy. Problem: Bowel/Gastric:  Goal: Normal bowel function is maintained or improved  Outcome: PROGRESSING AS EXPECTED  Last BM: 4/12 pt reports bowel movements are regular.     Problem: Mobility  Goal: Risk for activity intolerance will decrease  Intervention: Assess and monitor signs of activity intolerance  Pt ambulatory, steady gait.

## 2023-03-23 DIAGNOSIS — G35 MS (MULTIPLE SCLEROSIS) (HCC): Primary | ICD-10-CM

## 2023-04-22 ENCOUNTER — HOSPITAL ENCOUNTER (OUTPATIENT)
Dept: LAB | Facility: MEDICAL CENTER | Age: 38
End: 2023-04-22
Attending: PSYCHIATRY & NEUROLOGY
Payer: COMMERCIAL

## 2023-04-22 ENCOUNTER — HOSPITAL ENCOUNTER (OUTPATIENT)
Dept: RADIOLOGY | Facility: MEDICAL CENTER | Age: 38
End: 2023-04-22
Attending: PSYCHIATRY & NEUROLOGY
Payer: COMMERCIAL

## 2023-04-22 ENCOUNTER — HOSPITAL ENCOUNTER (OUTPATIENT)
Dept: LAB | Facility: MEDICAL CENTER | Age: 38
End: 2023-04-22
Attending: REGISTERED NURSE
Payer: COMMERCIAL

## 2023-04-22 DIAGNOSIS — G35 MS (MULTIPLE SCLEROSIS) (HCC): ICD-10-CM

## 2023-04-22 DIAGNOSIS — Z51.81 THERAPEUTIC DRUG MONITORING: ICD-10-CM

## 2023-04-22 DIAGNOSIS — G35 MULTIPLE SCLEROSIS (HCC): ICD-10-CM

## 2023-04-22 LAB
25(OH)D3 SERPL-MCNC: 55 NG/ML (ref 30–100)
ALBUMIN SERPL BCP-MCNC: 4.1 G/DL (ref 3.2–4.9)
ALBUMIN SERPL BCP-MCNC: 4.2 G/DL (ref 3.2–4.9)
ALBUMIN/GLOB SERPL: 1.4 G/DL
ALP SERPL-CCNC: 97 U/L (ref 30–99)
ALP SERPL-CCNC: 98 U/L (ref 30–99)
ALT SERPL-CCNC: 15 U/L (ref 2–50)
ALT SERPL-CCNC: 16 U/L (ref 2–50)
ANION GAP SERPL CALC-SCNC: 13 MMOL/L (ref 7–16)
AST SERPL-CCNC: 11 U/L (ref 12–45)
AST SERPL-CCNC: 11 U/L (ref 12–45)
BASOPHILS # BLD AUTO: 0.6 % (ref 0–1.8)
BASOPHILS # BLD AUTO: 0.7 % (ref 0–1.8)
BASOPHILS # BLD: 0.06 K/UL (ref 0–0.12)
BASOPHILS # BLD: 0.07 K/UL (ref 0–0.12)
BILIRUB CONJ SERPL-MCNC: <0.2 MG/DL (ref 0.1–0.5)
BILIRUB INDIRECT SERPL-MCNC: ABNORMAL MG/DL (ref 0–1)
BILIRUB SERPL-MCNC: 0.4 MG/DL (ref 0.1–1.5)
BILIRUB SERPL-MCNC: 0.4 MG/DL (ref 0.1–1.5)
BUN SERPL-MCNC: 19 MG/DL (ref 8–22)
CALCIUM ALBUM COR SERPL-MCNC: 9.4 MG/DL (ref 8.5–10.5)
CALCIUM SERPL-MCNC: 9.6 MG/DL (ref 8.5–10.5)
CHLORIDE SERPL-SCNC: 106 MMOL/L (ref 96–112)
CO2 SERPL-SCNC: 21 MMOL/L (ref 20–33)
CREAT SERPL-MCNC: 0.81 MG/DL (ref 0.5–1.4)
EOSINOPHIL # BLD AUTO: 0.23 K/UL (ref 0–0.51)
EOSINOPHIL # BLD AUTO: 0.24 K/UL (ref 0–0.51)
EOSINOPHIL NFR BLD: 2.3 % (ref 0–6.9)
EOSINOPHIL NFR BLD: 2.4 % (ref 0–6.9)
ERYTHROCYTE [DISTWIDTH] IN BLOOD BY AUTOMATED COUNT: 45 FL (ref 35.9–50)
ERYTHROCYTE [DISTWIDTH] IN BLOOD BY AUTOMATED COUNT: 45.8 FL (ref 35.9–50)
GFR SERPLBLD CREATININE-BSD FMLA CKD-EPI: 96 ML/MIN/1.73 M 2
GLOBULIN SER CALC-MCNC: 3 G/DL (ref 1.9–3.5)
GLUCOSE SERPL-MCNC: 96 MG/DL (ref 65–99)
HCT VFR BLD AUTO: 45.2 % (ref 37–47)
HCT VFR BLD AUTO: 45.3 % (ref 37–47)
HGB BLD-MCNC: 15 G/DL (ref 12–16)
HGB BLD-MCNC: 15.1 G/DL (ref 12–16)
IMM GRANULOCYTES # BLD AUTO: 0.04 K/UL (ref 0–0.11)
IMM GRANULOCYTES # BLD AUTO: 0.05 K/UL (ref 0–0.11)
IMM GRANULOCYTES NFR BLD AUTO: 0.4 % (ref 0–0.9)
IMM GRANULOCYTES NFR BLD AUTO: 0.5 % (ref 0–0.9)
LYMPHOCYTES # BLD AUTO: 1.48 K/UL (ref 1–4.8)
LYMPHOCYTES # BLD AUTO: 1.55 K/UL (ref 1–4.8)
LYMPHOCYTES NFR BLD: 14.9 % (ref 22–41)
LYMPHOCYTES NFR BLD: 15.4 % (ref 22–41)
MCH RBC QN AUTO: 29.8 PG (ref 27–33)
MCH RBC QN AUTO: 30.1 PG (ref 27–33)
MCHC RBC AUTO-ENTMCNC: 33.2 G/DL (ref 33.6–35)
MCHC RBC AUTO-ENTMCNC: 33.3 G/DL (ref 33.6–35)
MCV RBC AUTO: 89.7 FL (ref 81.4–97.8)
MCV RBC AUTO: 90.2 FL (ref 81.4–97.8)
MONOCYTES # BLD AUTO: 0.64 K/UL (ref 0–0.85)
MONOCYTES # BLD AUTO: 0.67 K/UL (ref 0–0.85)
MONOCYTES NFR BLD AUTO: 6.3 % (ref 0–13.4)
MONOCYTES NFR BLD AUTO: 6.7 % (ref 0–13.4)
NEUTROPHILS # BLD AUTO: 7.43 K/UL (ref 2–7.15)
NEUTROPHILS # BLD AUTO: 7.55 K/UL (ref 2–7.15)
NEUTROPHILS NFR BLD: 74.9 % (ref 44–72)
NEUTROPHILS NFR BLD: 74.9 % (ref 44–72)
NRBC # BLD AUTO: 0 K/UL
NRBC # BLD AUTO: 0 K/UL
NRBC BLD-RTO: 0 /100 WBC
NRBC BLD-RTO: 0 /100 WBC
PLATELET # BLD AUTO: 342 K/UL (ref 164–446)
PLATELET # BLD AUTO: 349 K/UL (ref 164–446)
PMV BLD AUTO: 10.2 FL (ref 9–12.9)
PMV BLD AUTO: 10.3 FL (ref 9–12.9)
POTASSIUM SERPL-SCNC: 4.3 MMOL/L (ref 3.6–5.5)
PROT SERPL-MCNC: 7.2 G/DL (ref 6–8.2)
PROT SERPL-MCNC: 7.2 G/DL (ref 6–8.2)
RBC # BLD AUTO: 5.02 M/UL (ref 4.2–5.4)
RBC # BLD AUTO: 5.04 M/UL (ref 4.2–5.4)
SODIUM SERPL-SCNC: 140 MMOL/L (ref 135–145)
WBC # BLD AUTO: 10.1 K/UL (ref 4.8–10.8)
WBC # BLD AUTO: 9.9 K/UL (ref 4.8–10.8)

## 2023-04-22 PROCEDURE — 700117 HCHG RX CONTRAST REV CODE 255: Performed by: PSYCHIATRY & NEUROLOGY

## 2023-04-22 PROCEDURE — 86360 T CELL ABSOLUTE COUNT/RATIO: CPT

## 2023-04-22 PROCEDURE — 85025 COMPLETE CBC W/AUTO DIFF WBC: CPT

## 2023-04-22 PROCEDURE — 36415 COLL VENOUS BLD VENIPUNCTURE: CPT

## 2023-04-22 PROCEDURE — 70553 MRI BRAIN STEM W/O & W/DYE: CPT

## 2023-04-22 PROCEDURE — 80076 HEPATIC FUNCTION PANEL: CPT

## 2023-04-22 PROCEDURE — 86359 T CELLS TOTAL COUNT: CPT

## 2023-04-22 PROCEDURE — 82784 ASSAY IGA/IGD/IGG/IGM EACH: CPT

## 2023-04-22 PROCEDURE — 86355 B CELLS TOTAL COUNT: CPT

## 2023-04-22 PROCEDURE — 86357 NK CELLS TOTAL COUNT: CPT

## 2023-04-22 PROCEDURE — 85025 COMPLETE CBC W/AUTO DIFF WBC: CPT | Mod: 91

## 2023-04-22 PROCEDURE — A9579 GAD-BASE MR CONTRAST NOS,1ML: HCPCS | Performed by: PSYCHIATRY & NEUROLOGY

## 2023-04-22 PROCEDURE — 82306 VITAMIN D 25 HYDROXY: CPT

## 2023-04-22 PROCEDURE — 80053 COMPREHEN METABOLIC PANEL: CPT

## 2023-04-22 PROCEDURE — 72157 MRI CHEST SPINE W/O & W/DYE: CPT

## 2023-04-22 PROCEDURE — 72156 MRI NECK SPINE W/O & W/DYE: CPT

## 2023-04-22 RX ADMIN — GADOTERIDOL 18 ML: 279.3 INJECTION, SOLUTION INTRAVENOUS at 09:52

## 2023-04-25 LAB
ANNOTATION COMMENT IMP: ABNORMAL
CD19 CELLS NFR SPEC: 0 % (ref 6–23)
CD3 CELLS # BLD: 1165 CELLS/UL (ref 570–2400)
CD3 CELLS NFR SPEC: 88 % (ref 62–87)
CD3+CD4+ CELLS # BLD: 665 CELLS/UL (ref 430–1800)
CD3+CD4+ CELLS NFR BLD: 50 % (ref 32–64)
CD3+CD4+ CELLS/CD3+CD8+ CLL BLD: 1.61 RATIO (ref 0.8–3.9)
CD3+CD8+ CELLS # BLD: 416 CELLS/UL (ref 210–1200)
CD3+CD8+ CELLS NFR SPEC: 31 % (ref 15–46)
CD3-CD16+CD56+ CELLS # SPEC: 152 CELLS/UL (ref 78–470)
CD3-CD16+CD56+ CELLS NFR SPEC: 12 % (ref 4–26)
CELLS.CD3-CD19+ [#/VOLUME] IN BLOOD: 0 CELLS/UL (ref 91–610)
IGA SERPL-MCNC: 341 MG/DL (ref 68–408)
IGG SERPL-MCNC: 1068 MG/DL (ref 768–1632)
IGM SERPL-MCNC: 84 MG/DL (ref 35–263)

## 2023-07-14 DIAGNOSIS — G35 MS (MULTIPLE SCLEROSIS) (HCC): Primary | ICD-10-CM

## 2023-07-14 RX ORDER — CARBAMAZEPINE 200 MG/1
200 TABLET ORAL 2 TIMES DAILY
Qty: 60 TABLET | Refills: 0 | Status: SHIPPED | OUTPATIENT
Start: 2023-07-14 | End: 2023-08-08

## 2023-07-20 ENCOUNTER — APPOINTMENT (OUTPATIENT)
Dept: RADIOLOGY | Facility: MEDICAL CENTER | Age: 38
End: 2023-07-20
Attending: EMERGENCY MEDICINE
Payer: COMMERCIAL

## 2023-07-20 ENCOUNTER — HOSPITAL ENCOUNTER (OUTPATIENT)
Facility: MEDICAL CENTER | Age: 38
End: 2023-07-22
Attending: EMERGENCY MEDICINE | Admitting: INTERNAL MEDICINE
Payer: COMMERCIAL

## 2023-07-20 DIAGNOSIS — R53.1 WEAKNESS: ICD-10-CM

## 2023-07-20 DIAGNOSIS — G35 MS (MULTIPLE SCLEROSIS) (HCC): ICD-10-CM

## 2023-07-20 DIAGNOSIS — R07.81 PLEURITIC CHEST PAIN: ICD-10-CM

## 2023-07-20 DIAGNOSIS — G35 MULTIPLE SCLEROSIS (HCC): ICD-10-CM

## 2023-07-20 LAB
ALBUMIN SERPL BCP-MCNC: 4.8 G/DL (ref 3.2–4.9)
ALBUMIN/GLOB SERPL: 1.3 G/DL
ALP SERPL-CCNC: 121 U/L (ref 30–99)
ALT SERPL-CCNC: 17 U/L (ref 2–50)
ANION GAP SERPL CALC-SCNC: 13 MMOL/L (ref 7–16)
APPEARANCE UR: CLEAR
AST SERPL-CCNC: 21 U/L (ref 12–45)
BASOPHILS # BLD AUTO: 0.8 % (ref 0–1.8)
BASOPHILS # BLD: 0.06 K/UL (ref 0–0.12)
BILIRUB SERPL-MCNC: 0.4 MG/DL (ref 0.1–1.5)
BILIRUB UR QL STRIP.AUTO: NEGATIVE
BUN SERPL-MCNC: 12 MG/DL (ref 8–22)
CALCIUM ALBUM COR SERPL-MCNC: 9.1 MG/DL (ref 8.5–10.5)
CALCIUM SERPL-MCNC: 9.7 MG/DL (ref 8.5–10.5)
CHLORIDE SERPL-SCNC: 101 MMOL/L (ref 96–112)
CO2 SERPL-SCNC: 25 MMOL/L (ref 20–33)
COLOR UR: YELLOW
CREAT SERPL-MCNC: 0.72 MG/DL (ref 0.5–1.4)
D DIMER PPP IA.FEU-MCNC: <0.27 UG/ML (FEU) (ref 0–0.5)
EOSINOPHIL # BLD AUTO: 0.15 K/UL (ref 0–0.51)
EOSINOPHIL NFR BLD: 2 % (ref 0–6.9)
ERYTHROCYTE [DISTWIDTH] IN BLOOD BY AUTOMATED COUNT: 45.9 FL (ref 35.9–50)
GFR SERPLBLD CREATININE-BSD FMLA CKD-EPI: 110 ML/MIN/1.73 M 2
GLOBULIN SER CALC-MCNC: 3.7 G/DL (ref 1.9–3.5)
GLUCOSE SERPL-MCNC: 82 MG/DL (ref 65–99)
GLUCOSE UR STRIP.AUTO-MCNC: NEGATIVE MG/DL
HCT VFR BLD AUTO: 47.6 % (ref 37–47)
HGB BLD-MCNC: 15.8 G/DL (ref 12–16)
IMM GRANULOCYTES # BLD AUTO: 0.02 K/UL (ref 0–0.11)
IMM GRANULOCYTES NFR BLD AUTO: 0.3 % (ref 0–0.9)
KETONES UR STRIP.AUTO-MCNC: NEGATIVE MG/DL
LEUKOCYTE ESTERASE UR QL STRIP.AUTO: NEGATIVE
LYMPHOCYTES # BLD AUTO: 2.31 K/UL (ref 1–4.8)
LYMPHOCYTES NFR BLD: 30.2 % (ref 22–41)
MCH RBC QN AUTO: 30.5 PG (ref 27–33)
MCHC RBC AUTO-ENTMCNC: 33.2 G/DL (ref 32.2–35.5)
MCV RBC AUTO: 91.9 FL (ref 81.4–97.8)
MICRO URNS: NORMAL
MONOCYTES # BLD AUTO: 0.72 K/UL (ref 0–0.85)
MONOCYTES NFR BLD AUTO: 9.4 % (ref 0–13.4)
NEUTROPHILS # BLD AUTO: 4.4 K/UL (ref 1.82–7.42)
NEUTROPHILS NFR BLD: 57.3 % (ref 44–72)
NITRITE UR QL STRIP.AUTO: NEGATIVE
NRBC # BLD AUTO: 0 K/UL
NRBC BLD-RTO: 0 /100 WBC (ref 0–0.2)
PH UR STRIP.AUTO: 7 [PH] (ref 5–8)
PLATELET # BLD AUTO: 326 K/UL (ref 164–446)
PMV BLD AUTO: 9.8 FL (ref 9–12.9)
POTASSIUM SERPL-SCNC: 4.2 MMOL/L (ref 3.6–5.5)
PROT SERPL-MCNC: 8.5 G/DL (ref 6–8.2)
PROT UR QL STRIP: NEGATIVE MG/DL
RBC # BLD AUTO: 5.18 M/UL (ref 4.2–5.4)
RBC UR QL AUTO: NEGATIVE
SODIUM SERPL-SCNC: 139 MMOL/L (ref 135–145)
SP GR UR STRIP.AUTO: 1.02
TROPONIN T SERPL-MCNC: <6 NG/L (ref 6–19)
UROBILINOGEN UR STRIP.AUTO-MCNC: 0.2 MG/DL
WBC # BLD AUTO: 7.7 K/UL (ref 4.8–10.8)

## 2023-07-20 PROCEDURE — 80053 COMPREHEN METABOLIC PANEL: CPT

## 2023-07-20 PROCEDURE — G0378 HOSPITAL OBSERVATION PER HR: HCPCS

## 2023-07-20 PROCEDURE — 85379 FIBRIN DEGRADATION QUANT: CPT

## 2023-07-20 PROCEDURE — 99285 EMERGENCY DEPT VISIT HI MDM: CPT

## 2023-07-20 PROCEDURE — 96375 TX/PRO/DX INJ NEW DRUG ADDON: CPT

## 2023-07-20 PROCEDURE — A9270 NON-COVERED ITEM OR SERVICE: HCPCS | Performed by: INTERNAL MEDICINE

## 2023-07-20 PROCEDURE — 99223 1ST HOSP IP/OBS HIGH 75: CPT | Performed by: INTERNAL MEDICINE

## 2023-07-20 PROCEDURE — 700102 HCHG RX REV CODE 250 W/ 637 OVERRIDE(OP): Performed by: INTERNAL MEDICINE

## 2023-07-20 PROCEDURE — 85025 COMPLETE CBC W/AUTO DIFF WBC: CPT

## 2023-07-20 PROCEDURE — 71045 X-RAY EXAM CHEST 1 VIEW: CPT

## 2023-07-20 PROCEDURE — 96374 THER/PROPH/DIAG INJ IV PUSH: CPT

## 2023-07-20 PROCEDURE — 81003 URINALYSIS AUTO W/O SCOPE: CPT

## 2023-07-20 PROCEDURE — 36415 COLL VENOUS BLD VENIPUNCTURE: CPT

## 2023-07-20 PROCEDURE — 84484 ASSAY OF TROPONIN QUANT: CPT

## 2023-07-20 PROCEDURE — 700111 HCHG RX REV CODE 636 W/ 250 OVERRIDE (IP): Mod: JZ | Performed by: EMERGENCY MEDICINE

## 2023-07-20 RX ORDER — TIZANIDINE 4 MG/1
4 TABLET ORAL
COMMUNITY

## 2023-07-20 RX ORDER — CARBAMAZEPINE 200 MG/1
200 TABLET ORAL 2 TIMES DAILY
Status: DISCONTINUED | OUTPATIENT
Start: 2023-07-20 | End: 2023-07-22 | Stop reason: HOSPADM

## 2023-07-20 RX ORDER — PROCHLORPERAZINE EDISYLATE 5 MG/ML
10 INJECTION INTRAMUSCULAR; INTRAVENOUS ONCE
Status: COMPLETED | OUTPATIENT
Start: 2023-07-20 | End: 2023-07-20

## 2023-07-20 RX ORDER — ONDANSETRON 2 MG/ML
4 INJECTION INTRAMUSCULAR; INTRAVENOUS EVERY 4 HOURS PRN
Status: DISCONTINUED | OUTPATIENT
Start: 2023-07-20 | End: 2023-07-22 | Stop reason: HOSPADM

## 2023-07-20 RX ORDER — BUTALBITAL, ACETAMINOPHEN AND CAFFEINE 50; 325; 40 MG/1; MG/1; MG/1
1 TABLET ORAL EVERY 6 HOURS PRN
Status: DISCONTINUED | OUTPATIENT
Start: 2023-07-20 | End: 2023-07-22 | Stop reason: HOSPADM

## 2023-07-20 RX ORDER — PROCHLORPERAZINE EDISYLATE 5 MG/ML
5-10 INJECTION INTRAMUSCULAR; INTRAVENOUS EVERY 4 HOURS PRN
Status: DISCONTINUED | OUTPATIENT
Start: 2023-07-20 | End: 2023-07-22 | Stop reason: HOSPADM

## 2023-07-20 RX ORDER — BACLOFEN 10 MG/1
10 TABLET ORAL
Status: DISCONTINUED | OUTPATIENT
Start: 2023-07-20 | End: 2023-07-22 | Stop reason: HOSPADM

## 2023-07-20 RX ORDER — BISACODYL 10 MG
10 SUPPOSITORY, RECTAL RECTAL
Status: DISCONTINUED | OUTPATIENT
Start: 2023-07-20 | End: 2023-07-22 | Stop reason: HOSPADM

## 2023-07-20 RX ORDER — KETOROLAC TROMETHAMINE 30 MG/ML
15 INJECTION, SOLUTION INTRAMUSCULAR; INTRAVENOUS ONCE
Status: COMPLETED | OUTPATIENT
Start: 2023-07-20 | End: 2023-07-20

## 2023-07-20 RX ORDER — PROMETHAZINE HYDROCHLORIDE 12.5 MG/1
12.5-25 SUPPOSITORY RECTAL EVERY 4 HOURS PRN
Status: DISCONTINUED | OUTPATIENT
Start: 2023-07-20 | End: 2023-07-22 | Stop reason: HOSPADM

## 2023-07-20 RX ORDER — ACETAMINOPHEN 325 MG/1
650 TABLET ORAL EVERY 6 HOURS PRN
Status: DISCONTINUED | OUTPATIENT
Start: 2023-07-20 | End: 2023-07-22 | Stop reason: HOSPADM

## 2023-07-20 RX ORDER — PROMETHAZINE HYDROCHLORIDE 25 MG/1
12.5-25 TABLET ORAL EVERY 4 HOURS PRN
Status: DISCONTINUED | OUTPATIENT
Start: 2023-07-20 | End: 2023-07-22 | Stop reason: HOSPADM

## 2023-07-20 RX ORDER — ONDANSETRON 4 MG/1
4 TABLET, ORALLY DISINTEGRATING ORAL EVERY 4 HOURS PRN
Status: DISCONTINUED | OUTPATIENT
Start: 2023-07-20 | End: 2023-07-22 | Stop reason: HOSPADM

## 2023-07-20 RX ORDER — AMOXICILLIN 250 MG
2 CAPSULE ORAL 2 TIMES DAILY
Status: DISCONTINUED | OUTPATIENT
Start: 2023-07-20 | End: 2023-07-22 | Stop reason: HOSPADM

## 2023-07-20 RX ORDER — ENOXAPARIN SODIUM 100 MG/ML
40 INJECTION SUBCUTANEOUS DAILY
Status: DISCONTINUED | OUTPATIENT
Start: 2023-07-20 | End: 2023-07-22 | Stop reason: HOSPADM

## 2023-07-20 RX ORDER — ONDANSETRON 2 MG/ML
4 INJECTION INTRAMUSCULAR; INTRAVENOUS ONCE
Status: COMPLETED | OUTPATIENT
Start: 2023-07-20 | End: 2023-07-20

## 2023-07-20 RX ORDER — POLYETHYLENE GLYCOL 3350 17 G/17G
1 POWDER, FOR SOLUTION ORAL
Status: DISCONTINUED | OUTPATIENT
Start: 2023-07-20 | End: 2023-07-22 | Stop reason: HOSPADM

## 2023-07-20 RX ADMIN — CARBAMAZEPINE 200 MG: 200 TABLET ORAL at 18:48

## 2023-07-20 RX ADMIN — KETOROLAC TROMETHAMINE 15 MG: 30 INJECTION, SOLUTION INTRAMUSCULAR; INTRAVENOUS at 12:43

## 2023-07-20 RX ADMIN — ONDANSETRON 4 MG: 2 INJECTION INTRAMUSCULAR; INTRAVENOUS at 12:44

## 2023-07-20 RX ADMIN — PROCHLORPERAZINE EDISYLATE 10 MG: 5 INJECTION INTRAMUSCULAR; INTRAVENOUS at 12:44

## 2023-07-20 ASSESSMENT — COPD QUESTIONNAIRES
DO YOU EVER COUGH UP ANY MUCUS OR PHLEGM?: NO/ONLY WITH OCCASIONAL COLDS OR INFECTIONS
DURING THE PAST 4 WEEKS HOW MUCH DID YOU FEEL SHORT OF BREATH: SOME OF THE TIME
HAVE YOU SMOKED AT LEAST 100 CIGARETTES IN YOUR ENTIRE LIFE: NO/DON'T KNOW
COPD SCREENING SCORE: 1

## 2023-07-20 ASSESSMENT — ENCOUNTER SYMPTOMS
WEAKNESS: 1
SENSORY CHANGE: 1

## 2023-07-20 ASSESSMENT — PATIENT HEALTH QUESTIONNAIRE - PHQ9
2. FEELING DOWN, DEPRESSED, IRRITABLE, OR HOPELESS: NOT AT ALL
SUM OF ALL RESPONSES TO PHQ9 QUESTIONS 1 AND 2: 0
1. LITTLE INTEREST OR PLEASURE IN DOING THINGS: NOT AT ALL

## 2023-07-20 ASSESSMENT — PAIN DESCRIPTION - PAIN TYPE
TYPE: ACUTE PAIN
TYPE: ACUTE PAIN

## 2023-07-20 ASSESSMENT — FIBROSIS 4 INDEX
FIB4 SCORE: 0.58
FIB4 SCORE: 0.29

## 2023-07-20 NOTE — ED NOTES
Pt ambulatory to restroom with steady gait. Urine sample collected and sent.   Assist RN contacted for US PIV.

## 2023-07-20 NOTE — ED TRIAGE NOTES
"Chief Complaint   Patient presents with    Weakness     Generalized weakness, difficulty walking x 1 week.   Hx of MS, concerned for MS flare up.     Headache     This morning.    Eye Pain     LT eye since this morning with AUGILLON.      Pt to triage via w/c for above.     /81   Pulse 74   Temp 36.5 °C (97.7 °F) (Temporal)   Resp 18   Ht 1.6 m (5' 3\")   Wt 84.4 kg (186 lb)   LMP 11/16/2019   SpO2 97%   BMI 32.95 kg/m²     "

## 2023-07-20 NOTE — ED NOTES
Med rec completed per patient and spouse at bedside.  Allergies reviewed with patient.  No outpatient antibiotics within the last 30 days.  Patient's preferred pharmacy: [facility] on [address].    Patient states that she alternates between using baclofen and tizanidine as needed for muscle spasms.

## 2023-07-20 NOTE — ED NOTES
Pt resting in gurney with eyes closed, stable vitals and even respirations.  at bedside. No other needs.

## 2023-07-20 NOTE — ED PROVIDER NOTES
"  ER Provider Note    Scribed for Wilver Martin M.d. by Mp Michelle. 7/20/2023  11:05 AM    Primary Care Provider: Mack Bernard M.D.    CHIEF COMPLAINT  Chief Complaint   Patient presents with    Weakness     Generalized weakness, difficulty walking x 1 week.   Hx of MS, concerned for MS flare up.     Headache     This morning.    Eye Pain     LT eye since this morning with AGUILLON.      EXTERNAL RECORDS REVIEWED  Outpatient Notes shows that the patient has a history of MS. She had followed up with Dr. Aggarwal (Neurology) on 2/1/23 and was started on Kesimpta. She had an MRI of her brain and thoracic spine in 4/2023 which shower no lesions.      HPI/ROS  LIMITATION TO HISTORY   Select: : None  OUTSIDE HISTORIAN(S):  None    Rehana Meade is a 37 y.o. female with a history of MS who presents to the ED complaining of generalized weakness onset one week ago. Patient reports that she has bilateral leg weakness with some arm numbness. Patient reports worsening symptoms since onset, stating that she \"can barely walk\" now, prompting her to present tot  ED today for further evaluation. Currently int he ED, she rates her pain a 5/10. Patient has associated headache and left eye pain. Patient has a history of migraines, but notes that her current headache feels different than her current headache, stating that her current headache feels like a \"headband wrapping around the front of her head.\" Patient also has associated left sided rib pain that started two weeks ago, no trauma. Rib pain is exacerbated with deep inspirations. Denies any vision changes, fevers, chills, dysuria, chest pain, or cough. No alleviating or exacerbating factors reported. Denies history of blood clots. Patient takes daily medications, and notes compliancy with her medication. Denies chance of pregnancy. Extensive drug allergy list.       PAST MEDICAL HISTORY  Past Medical History:   Diagnosis Date    Abdominal pain     " Anesthesia     woke up combative    Anxiety associated with depression     ASTHMA     Back pain     Chest pain     Chest tightness     Chickenpox     Constipation     Daytime sleepiness     Difficulty swallowing     Eye pain     Frequent headaches     Frequent urination     Hearing difficulty     Migraine without aura, without mention of intractable migraine without mention of status migrainosus     Morning headache     Nausea     Other specified symptom associated with female genital organs     Painful breathing     Painful joint     Ringing in ears     Seizure (HCC)     Last seizure was 1/2019    Seizure cerebral ([facility]) 02/15/2018    Shortness of breath     Sore muscles     Stroke ([facility])     Substance abuse ([facility])     Unspecified disorder of thyroid     cyst    Vision loss     Weakness     Whooping cough        SURGICAL HISTORY  Past Surgical History:   Procedure Laterality Date    VAGINAL HYSTERECTOMY TOTAL  01/27/2020    Procedure: HYSTERECTOMY, TOTAL, VAGINAL;  Surgeon: Julian Parker M.D.;  Location: SURGERY SAME DAY [facility];  Service: Gynecology    CYSTOSCOPY  01/27/2020    Procedure: CYSTOSCOPY;  Surgeon: Julian Parker M.D.;  Location: SURGERY SAME DAY [facility];  Service: Gynecology    TUBAL COAGULATION LAPAROSCOPIC BILATERAL  07/11/2014    Performed by Julian Parker M.D. at SURGERY SAME DAY [facility] ORS    SEPTOPLASTY  10/28/2009    Performed by ANNETTE FORDE at SURGERY [facility] ORS    SOMNOPLASTY  10/28/2009    Performed by ANNETTE FORDE at SURGERY [facility] ORS    NASAL POLYPECTOMY  10/28/2009    Performed by ANNETTE FORDE at SURGERY [facility] ORS    HYSTERECTOMY LAPAROSCOPY         FAMILY HISTORY  Family History   Problem Relation Age of Onset    No Known Problems Mother     Sleep Apnea Father     Prostate cancer Paternal Grandfather         George       SOCIAL HISTORY   reports that she has never smoked. She has never used smokeless tobacco. She reports that she does not  "currently use alcohol. She reports that she does not use drugs.    CURRENT MEDICATIONS  Previous Medications    BACLOFEN (LIORESAL) 10 MG TAB    Take 0.5 Tablets by mouth as needed.    CARBAMAZEPINE (TEGRETOL) 200 MG TAB    Take 1 Tablet by mouth 2 times a day for 30 days.    DICLOFENAC SODIUM (VOLTAREN) 1 % GEL    Apply 2 g topically 4 times a day as needed (Pain).    FLUCONAZOLE (DIFLUCAN) 150 MG TABLET    TAKE 1 TABLET BY MOUTH ONCE A MONTH FOR 90 DAYS    KESIMPTA 20 MG/0.4ML SOLUTION AUTO-INJECTOR    Inject 20 mg under the skin Q30 DAYS.    TIZANIDINE (ZANAFLEX) 4 MG TAB    TAKE 1 TABLET BY MOUTH EVERYDAY AT BEDTIME       ALLERGIES  Amitriptyline, Clarithromycin, Sulfa drugs, Cantaloupe, Honey dew, Latex, Lorazepam, Metoclopramide, Penicillin g potassium, Rizatriptan, Sumatriptan, Tape, and Morphine    PHYSICAL EXAM  /75   Pulse 63   Temp 36.5 °C (97.7 °F) (Temporal)   Resp 16   Ht 1.6 m (5' 3\")   Wt 84.4 kg (186 lb)   LMP 11/16/2019   SpO2 98%   BMI 32.95 kg/m²   Constitutional: Well developed, Well nourished, Mild distress.   HENT: Normocephalic, Atraumatic, Oropharynx moist, No oral exudates.   Eyes: Conjunctiva normal, No discharge.   Neck: Supple, No stridor   Cardiovascular: Normal heart rate, Normal rhythm, No murmurs, equal pulses.   Pulmonary: Normal breath sounds, No respiratory distress, No wheezing, No rales, No rhonchi.  Chest: No chest wall deformity. Left chest is tender to palpation.    Abdomen:Soft, No tenderness, No masses, no rebound, no guarding.   Back: No CVA tenderness.   Musculoskeletal: No major deformities noted, No tenderness.   Skin: Warm, Dry, No erythema, No rash.   Neurologic: Alert & oriented x 3, Normal motor function. There is 4/5 strength with hip flexion, dorsiflexion, and plantarflexion of the feet as well as flexion-extension of the knees. There is 5/5 strength in the upper and lower extremities. No facial droop.   Psychiatric: Affect normal, Judgment normal, " Mood normal.       DIAGNOSTIC STUDIES    Labs:   Results for orders placed or performed during the hospital encounter of 07/20/23   CBC WITH DIFFERENTIAL   Result Value Ref Range    WBC 7.7 4.8 - 10.8 K/uL    RBC 5.18 4.20 - 5.40 M/uL    Hemoglobin 15.8 12.0 - 16.0 g/dL    Hematocrit 47.6 (H) 37.0 - 47.0 %    MCV 91.9 81.4 - 97.8 fL    MCH 30.5 27.0 - 33.0 pg    MCHC 33.2 32.2 - 35.5 g/dL    RDW 45.9 35.9 - 50.0 fL    Platelet Count 326 164 - 446 K/uL    MPV 9.8 9.0 - 12.9 fL    Neutrophils-Polys 57.30 44.00 - 72.00 %    Lymphocytes 30.20 22.00 - 41.00 %    Monocytes 9.40 0.00 - 13.40 %    Eosinophils 2.00 0.00 - 6.90 %    Basophils 0.80 0.00 - 1.80 %    Immature Granulocytes 0.30 0.00 - 0.90 %    Nucleated RBC 0.00 0.00 - 0.20 /100 WBC    Neutrophils (Absolute) 4.40 1.82 - 7.42 K/uL    Lymphs (Absolute) 2.31 1.00 - 4.80 K/uL    Monos (Absolute) 0.72 0.00 - 0.85 K/uL    Eos (Absolute) 0.15 0.00 - 0.51 K/uL    Baso (Absolute) 0.06 0.00 - 0.12 K/uL    Immature Granulocytes (abs) 0.02 0.00 - 0.11 K/uL    NRBC (Absolute) 0.00 K/uL   COMP METABOLIC PANEL   Result Value Ref Range    Sodium 139 135 - 145 mmol/L    Potassium 4.2 3.6 - 5.5 mmol/L    Chloride 101 96 - 112 mmol/L    Co2 25 20 - 33 mmol/L    Anion Gap 13.0 7.0 - 16.0    Glucose 82 65 - 99 mg/dL    Bun 12 8 - 22 mg/dL    Creatinine 0.72 0.50 - 1.40 mg/dL    Calcium 9.7 8.5 - 10.5 mg/dL    Correct Calcium 9.1 8.5 - 10.5 mg/dL    AST(SGOT) 21 12 - 45 U/L    ALT(SGPT) 17 2 - 50 U/L    Alkaline Phosphatase 121 (H) 30 - 99 U/L    Total Bilirubin 0.4 0.1 - 1.5 mg/dL    Albumin 4.8 3.2 - 4.9 g/dL    Total Protein 8.5 (H) 6.0 - 8.2 g/dL    Globulin 3.7 (H) 1.9 - 3.5 g/dL    A-G Ratio 1.3 g/dL   URINALYSIS (UA)    Specimen: Urine   Result Value Ref Range    Color Yellow     Character Clear     Specific Gravity 1.016 <1.035    Ph 7.0 5.0 - 8.0    Glucose Negative Negative mg/dL    Ketones Negative Negative mg/dL    Protein Negative Negative mg/dL    Bilirubin Negative  Negative    Urobilinogen, Urine 0.2 Negative    Nitrite Negative Negative    Leukocyte Esterase Negative Negative    Occult Blood Negative Negative    Micro Urine Req see below    D-DIMER   Result Value Ref Range    D-Dimer <0.27 0.00 - 0.50 ug/mL (FEU)   TROPONIN   Result Value Ref Range    Troponin T <6 6 - 19 ng/L   ESTIMATED GFR   Result Value Ref Range    GFR (CKD-EPI) 110 >60 mL/min/1.73 m 2       Radiology:   The attending emergency physician has independently interpreted the diagnostic imaging associated with this visit and am waiting the final reading from the radiologist.   Preliminary interpretation is a follows: Chest x-ray does not show any pneumonia.  Radiologist interpretation:   DX-CHEST-PORTABLE (1 VIEW)   Final Result      No acute cardiopulmonary abnormality.              COURSE & MEDICAL DECISION MAKING     ED Observation Status? Yes; I am placing the patient in to an observation status due to a diagnostic uncertainty as well as therapeutic intensity. Patient placed in observation status at 11:10 AM, 7/20/2023.     Observation plan is as follows: We will manage their symptoms, evaluate with lab work and imaging, and then reassess after results are reviewed      Upon Reevaluation, the patient's condition has: not improved; and will be escalated to hospitalization.    Patient discharged from ED Observation status at 1:27 PM (Time) 7/20/2023  (Date).     INITIAL ASSESSMENT, COURSE AND PLAN  Care Narrative:     11:05 AM - Patient was seen and evaluated at bedside. Patient presents to the ED for evaluation of weakness that started about one week ago. After my exam, I discussed with the patient the plan of care, which includes treating the patient with medication for their symptoms, as well as obtaining lab work and imaging for further evaluation. Patient understands and verbalizes agreement to plan of care. Patient will be treated with Toradol 15 mg, Zofran 4 mg, and Compazine 10 mg for her symptoms.  Ordered DX-chest, Troponin, D-Dimer, CBC with diff, CMP, and UA to evaluate.      1:27 PM  -Paged Hospitalist    1:33 PM - Patient was reevaluated at bedside. Discussed lab and radiology results with the patient and informed them of the plan for admission.  Patient understands and verbalizes agreement to plan of care.     1:45 PM PM - Hospitalist responded. I discussed the patient's case and the above findings with Dr. Hein (Hospitalist) who agrees to evaluate the patient for hospitalization.       PROBLEM LIST  Problem #1 weakness patient presents with increasing weakness in her legs with new paresthesias with a history of MS.  Patient does not show any signs of a electrolyte or infectious process at this point time.  Given the weakness I think the patient would benefit from hospitalization with MRI to look for new lesions to suggest a MS flare.    Problem #2 chest pain patient presents with pleuritic chest pain at this point time the exact etiology is unclear she has been coughing this may be more pleurisy.  patient's had this pain for over a week she has a negative troponin I do not think she is having myocardial infarction.  D-dimer was done and this is also negative I do not think she has a pulmonary embolism.  Will be able to treat the pain with NSAIDs I think.        DISPOSITION AND DISCUSSIONS  I have discussed management of the patient with the following physicians and TRACIE's:  Hospitalist    Discussion of management with other QHP or appropriate source(s): None         Decision tools and prescription drugs considered including, but not limited to: HEART Score 2 .    Patient will be hospitalized by Dr. Hein    FINAL DIAGNOSIS  1. Weakness    2. MS (multiple sclerosis) (HCC)    3. Pleuritic chest pain         Mp HOLT (Justina), am scribing for, and in the presence of, ARUN Gray*.    Electronically signed by: Mp Schmidt), 7/20/2023    Wilver HOLT M.*  personally performed the services described in this documentation, as scribed by Mp Michelle in my presence, and it is both accurate and complete.      The note accurately reflects work and decisions made by me.  Wilver Martin M.D.  7/20/2023  3:17 PM

## 2023-07-20 NOTE — ED NOTES
Pt brought to [facility] via w/c by . Settled into [facility], connected to vitals. Warm blankets provided. Chart up for ERP review.

## 2023-07-20 NOTE — H&P
[facility] Medicine History & Physical Note    Date of Service  7/20/2023    Primary Care Physician  Mack Bernard M.D.    Consultants  none    Code Status  Full Code    Chief Complaint  Chief Complaint   Patient presents with    Weakness     Generalized weakness, difficulty walking x 1 week.   Hx of MS, concerned for MS flare up.     Headache     This morning.    Eye Pain     LT eye since this morning with HA.        History of Presenting Illness  Rehana Meade is a 37 y.o. female with a history of MS who presented 7/20/2023 with weakness that started 1 week ago.  Patient reports bilateral lower extremity weakness and some bilateral upper extremity numbness.  Patient has a history of MS and is followed by Dr. Aggarwal.  She is on Kesimpta. She also reports some left retro-orbital headache.  She is concerned about a MS flareup.  Denies any fevers, chills, shortness of breath, abdominal pain, diarrhea or dysuria.    I discussed the plan of care with patient, bedside RN, and ERP .    Review of Systems  Review of Systems   Neurological:  Positive for sensory change and weakness.       Past Medical History   has a past medical history of Abdominal pain, Anesthesia, Anxiety associated with depression, ASTHMA, Back pain, Chest pain, Chest tightness, Chickenpox, Constipation, Daytime sleepiness, Difficulty swallowing, Eye pain, Frequent headaches, Frequent urination, Hearing difficulty, Migraine without aura, without mention of intractable migraine without mention of status migrainosus, Morning headache, Nausea, Other specified symptom associated with female genital organs, Painful breathing, Painful joint, Ringing in ears, Seizure (HCC), Seizure cerebral (HCC) (02/15/2018), Shortness of breath, Sore muscles, Stroke (HCC), Substance abuse (HCC), Unspecified disorder of thyroid, Vision loss, Weakness, and Whooping cough.    Surgical History   has a past surgical history that includes septoplasty (10/28/2009);  "somnoplasty (10/28/2009); nasal polypectomy (10/28/2009); tubal coagulation laparoscopic bilateral (07/11/2014); vaginal hysterectomy total (01/27/2020); cystoscopy (01/27/2020); and hysterectomy laparoscopy.     Family History  family history includes No Known Problems in her mother; Prostate cancer in her paternal grandfather; Sleep Apnea in her father.   Family history reviewed with patient. There is no family history that is pertinent to the chief complaint.     Social History   reports that she has never smoked. She has never used smokeless tobacco. She reports that she does not currently use alcohol. She reports that she does not use drugs.    Allergies  Allergies   Allergen Reactions    Amitriptyline Unspecified     Suicidal    Clarithromycin Hives    Sulfa Drugs Anaphylaxis    Cantaloupe Itching    Honey Dew Itching    Latex Itching    Lorazepam Unspecified     Hallucinations      Metoclopramide Unspecified     Muscle spasms    Penicillin G Potassium Vomiting    Rizatriptan Unspecified     Tremors, confusion      Sumatriptan Unspecified     \"Makes my head pins and needles\"    Tape Rash    Morphine Hives and Itching       Medications  Prior to Admission Medications   Prescriptions Last Dose Informant Patient Reported? Taking?   Erenumab (AIMOVIG, 140 MG DOSE,) 70 MG/ML Solution Auto-injector 7/19/2023 at PM Patient, Significant Other Yes Yes   Sig: Inject 140 mg under the skin Q30 DAYS.   KESIMPTA 20 MG/0.4ML Solution Auto-injector 7/2/2023 at UNK. Patient, Significant Other Yes No   Sig: Inject 20 mg under the skin Q30 DAYS.   baclofen (LIORESAL) 10 MG Tab COUPLE DAYS AGO at PRN Patient, Significant Other Yes No   Sig: Take 10 mg by mouth at bedtime as needed (Muscle Spasms).   carBAMazepine (TEGRETOL) 200 MG Tab 7/19/2023 at PM Patient, Significant Other No No   Sig: Take 1 Tablet by mouth 2 times a day for 30 days.   diclofenac sodium (VOLTAREN) 1 % Gel ABOUT 1 WEEK AGO at PRN Patient, Significant Other No " No   Sig: Apply 2 g topically 4 times a day as needed (Pain).   tizanidine (ZANAFLEX) 4 MG Tab COUPLE DAYS AGO at PRN Patient, Significant Other Yes Yes   Sig: Take 4 mg by mouth at bedtime as needed (Muscle Spasms).      Facility-Administered Medications: None       Physical Exam  Temp:  [36.3 °C (97.3 °F)-36.5 °C (97.7 °F)] 36.3 °C (97.3 °F)  Pulse:  [56-74] 60  Resp:  [14-18] 16  BP: ()/(55-81) 106/55  SpO2:  [90 %-98 %] 96 %  Blood Pressure: 106/55   Temperature: 36.3 °C (97.3 °F)   Pulse: 60   Respiration: 16   Pulse Oximetry: 96 %       Physical Exam  Vitals and nursing note reviewed.   Constitutional:       General: She is not in acute distress.     Appearance: Normal appearance.   HENT:      Head: Normocephalic and atraumatic.      Nose: Nose normal.      Mouth/Throat:      Mouth: Mucous membranes are moist.   Eyes:      Extraocular Movements: Extraocular movements intact.      Conjunctiva/sclera: Conjunctivae normal.      Pupils: Pupils are equal, round, and reactive to light.   Cardiovascular:      Rate and Rhythm: Normal rate and regular rhythm.      Pulses: Normal pulses.      Heart sounds: Normal heart sounds.   Pulmonary:      Effort: Pulmonary effort is normal. No respiratory distress.      Breath sounds: Normal breath sounds. No wheezing, rhonchi or rales.   Abdominal:      General: Bowel sounds are normal. There is no distension.      Palpations: Abdomen is soft.      Tenderness: There is no abdominal tenderness.   Musculoskeletal:         General: No swelling or tenderness. Normal range of motion.      Cervical back: Normal range of motion and neck supple.   Lymphadenopathy:      Cervical: No cervical adenopathy.   Skin:     General: Skin is warm.      Coloration: Skin is not jaundiced.      Findings: No rash.   Neurological:      General: No focal deficit present.      Mental Status: She is alert and oriented to person, place, and time.      Cranial Nerves: No cranial nerve deficit.       Motor: No weakness.      Comments: Bilateral upper extremity strength intact  Bilateral lower extremity strength 4/5   Psychiatric:         Mood and Affect: Mood normal.         Behavior: Behavior normal.         Laboratory:  Recent Labs     07/20/23  1210   WBC 7.7   RBC 5.18   HEMOGLOBIN 15.8   HEMATOCRIT 47.6*   MCV 91.9   MCH 30.5   MCHC 33.2   RDW 45.9   PLATELETCT 326   MPV 9.8     Recent Labs     07/20/23  1210   SODIUM 139   POTASSIUM 4.2   CHLORIDE 101   CO2 25   GLUCOSE 82   BUN 12   CREATININE 0.72   CALCIUM 9.7     Recent Labs     07/20/23  1210   ALTSGPT 17   ASTSGOT 21   ALKPHOSPHAT 121*   TBILIRUBIN 0.4   GLUCOSE 82         No results for input(s): NTPROBNP in the last 72 hours.      Recent Labs     07/20/23  1210   TROPONINT <6       Imaging:  DX-CHEST-PORTABLE (1 VIEW)   Final Result      No acute cardiopulmonary abnormality.         MR-BRAIN-WITH & W/O    (Results Pending)         Assessment/Plan:  Justification for Admission Status  I anticipate this patient is appropriate for observation status at this time because      Patient will need a Med/Surg bed on MEDICAL service .  The need is secondary to .    * Weakness- (present on admission)  Assessment & Plan  Concern for MS exacerbation  Have ordered MRI brain with and without IV contrast for further evaluation  If MRI is positive for new lesions we will consider starting on high-dose steroids  PT OT      Multiple sclerosis (HCC)- (present on admission)  Assessment & Plan  Patient is followed by Dr. Walsh  She is on Kesimpta    Seizure disorder (HCC)- (present on admission)  Assessment & Plan  Continue Tegretol    Headache- (present on admission)  Assessment & Plan  IV Toradol and.  Stat as needed    Asthma- (present on admission)  Assessment & Plan  Albuterol as needed        VTE prophylaxis: enoxaparin ppx    I independently reviewed pertinent clinical lab tests since admission and ordered additional follow up clinical lab tests.  I  independently reviewed pertinent radiographic images and the radiologist's reports since admission and ordered additional follow up radiographic studies.  The details of the available patient records in [facility] (including laboratory tests, culture data, medications, imaging, and other pertinent diagnostic tests) and that information was utilized as warranted in today's medical decision making for this patient.  I personally evaluated the patient condition at bedside.     Care interventions include:   Review of interval medical and surgical history, current medications and outpatient medication reconciliation, interval imaging studies and radiologist interpretation, and interval laboratory values.     Aggregated care time spent evaluating, reassessing, reviewing documentation, providing care, counseling, coordinating care and managing this patient exclusive of procedures: 71 minutes

## 2023-07-20 NOTE — ASSESSMENT & PLAN NOTE
Concern for MS exacerbation  Have ordered MRI brain with and without IV contrast for further evaluation  If MRI is positive for active lesions we will consider starting on high-dose steroids  PT OT

## 2023-07-21 ENCOUNTER — APPOINTMENT (OUTPATIENT)
Dept: RADIOLOGY | Facility: MEDICAL CENTER | Age: 38
End: 2023-07-21
Attending: INTERNAL MEDICINE
Payer: COMMERCIAL

## 2023-07-21 LAB
ANION GAP SERPL CALC-SCNC: 10 MMOL/L (ref 7–16)
BUN SERPL-MCNC: 14 MG/DL (ref 8–22)
CALCIUM SERPL-MCNC: 8.9 MG/DL (ref 8.5–10.5)
CHLORIDE SERPL-SCNC: 105 MMOL/L (ref 96–112)
CO2 SERPL-SCNC: 24 MMOL/L (ref 20–33)
CREAT SERPL-MCNC: 0.75 MG/DL (ref 0.5–1.4)
ERYTHROCYTE [DISTWIDTH] IN BLOOD BY AUTOMATED COUNT: 44.8 FL (ref 35.9–50)
GFR SERPLBLD CREATININE-BSD FMLA CKD-EPI: 105 ML/MIN/1.73 M 2
GLUCOSE SERPL-MCNC: 90 MG/DL (ref 65–99)
HCT VFR BLD AUTO: 43.8 % (ref 37–47)
HGB BLD-MCNC: 14.7 G/DL (ref 12–16)
MCH RBC QN AUTO: 30.3 PG (ref 27–33)
MCHC RBC AUTO-ENTMCNC: 33.6 G/DL (ref 32.2–35.5)
MCV RBC AUTO: 90.3 FL (ref 81.4–97.8)
PLATELET # BLD AUTO: 318 K/UL (ref 164–446)
PMV BLD AUTO: 9.7 FL (ref 9–12.9)
POTASSIUM SERPL-SCNC: 3.8 MMOL/L (ref 3.6–5.5)
RBC # BLD AUTO: 4.85 M/UL (ref 4.2–5.4)
SODIUM SERPL-SCNC: 139 MMOL/L (ref 135–145)
WBC # BLD AUTO: 8.2 K/UL (ref 4.8–10.8)

## 2023-07-21 PROCEDURE — 72156 MRI NECK SPINE W/O & W/DYE: CPT

## 2023-07-21 PROCEDURE — 99232 SBSQ HOSP IP/OBS MODERATE 35: CPT | Performed by: INTERNAL MEDICINE

## 2023-07-21 PROCEDURE — 70553 MRI BRAIN STEM W/O & W/DYE: CPT

## 2023-07-21 PROCEDURE — 700102 HCHG RX REV CODE 250 W/ 637 OVERRIDE(OP): Performed by: INTERNAL MEDICINE

## 2023-07-21 PROCEDURE — 80048 BASIC METABOLIC PNL TOTAL CA: CPT

## 2023-07-21 PROCEDURE — G0378 HOSPITAL OBSERVATION PER HR: HCPCS

## 2023-07-21 PROCEDURE — A9270 NON-COVERED ITEM OR SERVICE: HCPCS

## 2023-07-21 PROCEDURE — A9579 GAD-BASE MR CONTRAST NOS,1ML: HCPCS | Performed by: INTERNAL MEDICINE

## 2023-07-21 PROCEDURE — 85027 COMPLETE CBC AUTOMATED: CPT

## 2023-07-21 PROCEDURE — 700117 HCHG RX CONTRAST REV CODE 255: Performed by: INTERNAL MEDICINE

## 2023-07-21 PROCEDURE — 700102 HCHG RX REV CODE 250 W/ 637 OVERRIDE(OP)

## 2023-07-21 PROCEDURE — A9270 NON-COVERED ITEM OR SERVICE: HCPCS | Performed by: INTERNAL MEDICINE

## 2023-07-21 RX ORDER — DIAZEPAM 5 MG/1
5 TABLET ORAL
Status: COMPLETED | OUTPATIENT
Start: 2023-07-21 | End: 2023-07-21

## 2023-07-21 RX ORDER — HYDROMORPHONE HYDROCHLORIDE 1 MG/ML
0.25 INJECTION, SOLUTION INTRAMUSCULAR; INTRAVENOUS; SUBCUTANEOUS
Status: DISCONTINUED | OUTPATIENT
Start: 2023-07-21 | End: 2023-07-22 | Stop reason: HOSPADM

## 2023-07-21 RX ORDER — OXYCODONE HYDROCHLORIDE 5 MG/1
2.5 TABLET ORAL
Status: DISCONTINUED | OUTPATIENT
Start: 2023-07-21 | End: 2023-07-22 | Stop reason: HOSPADM

## 2023-07-21 RX ORDER — OXYCODONE HYDROCHLORIDE 5 MG/1
5 TABLET ORAL
Status: DISCONTINUED | OUTPATIENT
Start: 2023-07-21 | End: 2023-07-22 | Stop reason: HOSPADM

## 2023-07-21 RX ORDER — DIPHENHYDRAMINE HCL 25 MG
25 TABLET ORAL EVERY 6 HOURS PRN
Status: DISCONTINUED | OUTPATIENT
Start: 2023-07-21 | End: 2023-07-22 | Stop reason: HOSPADM

## 2023-07-21 RX ORDER — TIZANIDINE 4 MG/1
4 TABLET ORAL EVERY 6 HOURS PRN
Status: DISCONTINUED | OUTPATIENT
Start: 2023-07-21 | End: 2023-07-22 | Stop reason: HOSPADM

## 2023-07-21 RX ADMIN — BUTALBITAL, ACETAMINOPHEN AND CAFFEINE 1 TABLET: 325; 50; 40 TABLET ORAL at 02:41

## 2023-07-21 RX ADMIN — BACLOFEN 10 MG: 10 TABLET ORAL at 18:35

## 2023-07-21 RX ADMIN — GADOTERIDOL 17 ML: 279.3 INJECTION, SOLUTION INTRAVENOUS at 16:04

## 2023-07-21 RX ADMIN — OXYCODONE HYDROCHLORIDE 5 MG: 5 TABLET ORAL at 18:37

## 2023-07-21 RX ADMIN — CARBAMAZEPINE 200 MG: 200 TABLET ORAL at 18:36

## 2023-07-21 RX ADMIN — DIAZEPAM 5 MG: 5 TABLET ORAL at 15:02

## 2023-07-21 RX ADMIN — SENNOSIDES AND DOCUSATE SODIUM 2 TABLET: 50; 8.6 TABLET ORAL at 04:26

## 2023-07-21 RX ADMIN — DIPHENHYDRAMINE HYDROCHLORIDE 25 MG: 25 TABLET ORAL at 21:39

## 2023-07-21 RX ADMIN — CARBAMAZEPINE 200 MG: 200 TABLET ORAL at 04:25

## 2023-07-21 ASSESSMENT — ENCOUNTER SYMPTOMS
WEAKNESS: 1
FOCAL WEAKNESS: 1
SENSORY CHANGE: 1
HEADACHES: 1

## 2023-07-21 ASSESSMENT — LIFESTYLE VARIABLES
EVER HAD A DRINK FIRST THING IN THE MORNING TO STEADY YOUR NERVES TO GET RID OF A HANGOVER: NO
HAVE PEOPLE ANNOYED YOU BY CRITICIZING YOUR DRINKING: NO
HAVE YOU EVER FELT YOU SHOULD CUT DOWN ON YOUR DRINKING: NO
CONSUMPTION TOTAL: NEGATIVE
TOTAL SCORE: 0
DOES PATIENT WANT TO STOP DRINKING: CANNOT ASSESS
EVER FELT BAD OR GUILTY ABOUT YOUR DRINKING: NO
ON A TYPICAL DAY WHEN YOU DRINK ALCOHOL HOW MANY DRINKS DO YOU HAVE: 0
ALCOHOL_USE: NO
HOW MANY TIMES IN THE PAST YEAR HAVE YOU HAD 5 OR MORE DRINKS IN A DAY: 0
AVERAGE NUMBER OF DAYS PER WEEK YOU HAVE A DRINK CONTAINING ALCOHOL: 0

## 2023-07-21 ASSESSMENT — PAIN DESCRIPTION - PAIN TYPE
TYPE: CHRONIC PAIN
TYPE: CHRONIC PAIN
TYPE: ACUTE PAIN

## 2023-07-21 NOTE — PROGRESS NOTES
Hospital Medicine Daily Progress Note    Date of Service  7/21/2023    Chief Complaint  Rehana Meade is a 37 y.o. female admitted 7/20/2023 with weakness    Hospital Course  Rehana Meade is a 37 y.o. female with a history of MS who presented 7/20/2023 with weakness that started 1 week ago.  Patient reports bilateral lower extremity weakness and some bilateral upper extremity numbness.  Patient has a history of MS and is followed by Dr. Aggarwal.  She is on Kesimpta. She also reports some left retro-orbital headache.  She is concerned about a MS flareup.  Denies any fevers, chills, shortness of breath, abdominal pain, diarrhea or dysuria.     Interval Problem Update  No acute events. MRI brain is pending. Once MRI results are back will discuss case with Dr Aggarwal.    I have discussed this patient's plan of care and discharge plan at IDT rounds today with Case Management, Nursing, Nursing leadership, and other members of the IDT team.    Consultants/Specialty  none    Code Status  Full Code    Disposition  The patient is not medically cleared for discharge to home or a post-acute facility.  Anticipate discharge to: home with close outpatient follow-up    I have placed the appropriate orders for post-discharge needs.    Review of Systems  Review of Systems   Neurological:  Positive for sensory change, focal weakness, weakness and headaches.        Physical Exam  Temp:  [36.2 °C (97.1 °F)-36.8 °C (98.3 °F)] 36.8 °C (98.3 °F)  Pulse:  [80-83] 80  Resp:  [16-18] 16  BP: (107-108)/(62-70) 107/63  SpO2:  [93 %-96 %] 96 %    Physical Exam  Vitals and nursing note reviewed.   Constitutional:       General: She is not in acute distress.     Appearance: Normal appearance.   HENT:      Head: Normocephalic and atraumatic.      Nose: Nose normal.      Mouth/Throat:      Mouth: Mucous membranes are moist.   Eyes:      Extraocular Movements: Extraocular movements intact.      Conjunctiva/sclera: Conjunctivae  normal.      Pupils: Pupils are equal, round, and reactive to light.   Cardiovascular:      Rate and Rhythm: Normal rate and regular rhythm.      Pulses: Normal pulses.      Heart sounds: Normal heart sounds.   Pulmonary:      Effort: Pulmonary effort is normal. No respiratory distress.      Breath sounds: Normal breath sounds. No wheezing, rhonchi or rales.   Abdominal:      General: Bowel sounds are normal. There is no distension.      Palpations: Abdomen is soft.      Tenderness: There is no abdominal tenderness.   Musculoskeletal:         General: No swelling or tenderness. Normal range of motion.      Cervical back: Normal range of motion and neck supple.   Lymphadenopathy:      Cervical: No cervical adenopathy.   Skin:     General: Skin is warm.      Coloration: Skin is not jaundiced.      Findings: No rash.   Neurological:      General: No focal deficit present.      Mental Status: She is alert and oriented to person, place, and time.      Cranial Nerves: No cranial nerve deficit.      Motor: No weakness.      Comments: LE strength 4/5 bilaterally    Psychiatric:         Mood and Affect: Mood normal.         Behavior: Behavior normal.         Fluids    Intake/Output Summary (Last 24 hours) at 7/21/2023 1555  Last data filed at 7/21/2023 0235  Gross per 24 hour   Intake 300 ml   Output --   Net 300 ml       Laboratory  Recent Labs     07/20/23  1210 07/21/23  0230   WBC 7.7 8.2   RBC 5.18 4.85   HEMOGLOBIN 15.8 14.7   HEMATOCRIT 47.6* 43.8   MCV 91.9 90.3   MCH 30.5 30.3   MCHC 33.2 33.6   RDW 45.9 44.8   PLATELETCT 326 318   MPV 9.8 9.7     Recent Labs     07/20/23  1210 07/21/23  0230   SODIUM 139 139   POTASSIUM 4.2 3.8   CHLORIDE 101 105   CO2 25 24   GLUCOSE 82 90   BUN 12 14   CREATININE 0.72 0.75   CALCIUM 9.7 8.9                   Imaging  DX-CHEST-PORTABLE (1 VIEW)   Final Result      No acute cardiopulmonary abnormality.         MR-CERVICAL SPINE-WITH & W/O    (Results Pending)   MR-BRAIN-WITH & W/O     (Results Pending)        Assessment/Plan  * Weakness- (present on admission)  Assessment & Plan  Concern for MS exacerbation  Have ordered MRI brain with and without IV contrast for further evaluation  If MRI is positive for active lesions we will consider starting on high-dose steroids  PT OT      Multiple sclerosis (HCC)- (present on admission)  Assessment & Plan  Patient is followed by Dr. Walsh  She is on Kesimpta    Seizure disorder (HCC)- (present on admission)  Assessment & Plan  Continue Tegretol    Headache- (present on admission)  Assessment & Plan  IV Toradol and.  Stat as needed    Asthma- (present on admission)  Assessment & Plan  Albuterol as needed         VTE prophylaxis: enoxaparin ppx    I have performed a physical exam and reviewed and updated ROS and Plan today (7/21/2023). In review of yesterday's note (7/20/2023), there are no changes except as documented above.

## 2023-07-21 NOTE — PROGRESS NOTES
Report received from night shift RN. Patient A&O4, in bed resting comfortably. VSS, 4/10 headache pain, denies need for treatment, bed in lowest position and locked, call light in reach,  in room to check in with pt.

## 2023-07-21 NOTE — PROGRESS NOTES
4 Eyes Skin Assessment Completed by RONAL Cunningham and RONAL Scruggs.    Head WDL  Ears WDL  Nose WDL  Mouth WDL  Neck WDL  Breast/Chest WDL  Shoulder Blades WDL  Spine WDL  (R) Arm/Elbow/Hand WDL  (L) Arm/Elbow/Hand WDL  Abdomen WDL  Groin WDL  Scrotum/Coccyx/Buttocks WDL  (R) Leg WDL  (L) Leg WDL  (R) Heel/Foot/Toe WDL  (L) Heel/Foot/Toe WDL          Devices In Places Blood Pressure Cuff and Pulse Ox      Interventions In Place Pressure Redistribution Mattress    Possible Skin Injury No    Pictures Uploaded Into Epic N/A  Wound Consult Placed N/A  RN Wound Prevention Protocol Ordered No

## 2023-07-21 NOTE — CARE PLAN
The patient is Stable - Low risk of patient condition declining or worsening    Shift Goals  Clinical Goals: Neuro c/s, pain control  Patient Goals: Rest  Family Goals: LESLIE      Problem: Pain - Standard  Goal: Alleviation of pain or a reduction in pain to the patient’s comfort goal  Outcome: Progressing     Problem: Knowledge Deficit - Standard  Goal: Patient and family/care givers will demonstrate understanding of plan of care, disease process/condition, diagnostic tests and medications  Outcome: Progressing

## 2023-07-22 ENCOUNTER — PHARMACY VISIT (OUTPATIENT)
Dept: PHARMACY | Facility: MEDICAL CENTER | Age: 38
End: 2023-07-22
Payer: COMMERCIAL

## 2023-07-22 VITALS
DIASTOLIC BLOOD PRESSURE: 62 MMHG | SYSTOLIC BLOOD PRESSURE: 107 MMHG | HEIGHT: 63 IN | BODY MASS INDEX: 33.55 KG/M2 | RESPIRATION RATE: 16 BRPM | OXYGEN SATURATION: 95 % | WEIGHT: 189.38 LBS | HEART RATE: 82 BPM | TEMPERATURE: 98.7 F

## 2023-07-22 PROBLEM — R53.1 WEAKNESS: Status: RESOLVED | Noted: 2023-07-20 | Resolved: 2023-07-22

## 2023-07-22 PROCEDURE — 700102 HCHG RX REV CODE 250 W/ 637 OVERRIDE(OP): Performed by: INTERNAL MEDICINE

## 2023-07-22 PROCEDURE — RXMED WILLOW AMBULATORY MEDICATION CHARGE: Performed by: INTERNAL MEDICINE

## 2023-07-22 PROCEDURE — G0378 HOSPITAL OBSERVATION PER HR: HCPCS

## 2023-07-22 PROCEDURE — 99239 HOSP IP/OBS DSCHRG MGMT >30: CPT | Performed by: INTERNAL MEDICINE

## 2023-07-22 PROCEDURE — A9270 NON-COVERED ITEM OR SERVICE: HCPCS

## 2023-07-22 PROCEDURE — 700102 HCHG RX REV CODE 250 W/ 637 OVERRIDE(OP)

## 2023-07-22 PROCEDURE — A9270 NON-COVERED ITEM OR SERVICE: HCPCS | Performed by: INTERNAL MEDICINE

## 2023-07-22 RX ORDER — PREDNISONE 20 MG/1
60 TABLET ORAL DAILY
Qty: 21 TABLET | Refills: 0 | Status: SHIPPED | OUTPATIENT
Start: 2023-07-22 | End: 2023-07-29

## 2023-07-22 RX ORDER — METHYLPREDNISOLONE 4 MG/1
TABLET ORAL
Qty: 21 TABLET | Refills: 0 | Status: SHIPPED | OUTPATIENT
Start: 2023-07-30 | End: 2023-08-07

## 2023-07-22 RX ADMIN — CARBAMAZEPINE 200 MG: 200 TABLET ORAL at 05:19

## 2023-07-22 RX ADMIN — OXYCODONE HYDROCHLORIDE 5 MG: 5 TABLET ORAL at 07:27

## 2023-07-22 ASSESSMENT — PAIN DESCRIPTION - PAIN TYPE
TYPE: ACUTE PAIN
TYPE: ACUTE PAIN

## 2023-07-22 NOTE — CARE PLAN
The patient is Stable - Low risk of patient condition declining or worsening    Shift Goals  Clinical Goals: DC  Patient Goals: DC  Family Goals: DC    Progress made toward(s) clinical / shift goals:    Problem: Pain - Standard  Goal: Alleviation of pain or a reduction in pain to the patient’s comfort goal  Description: Target End Date:  Prior to discharge or change in level of care    Document on Vitals flowsheet    1.  Document pain using the appropriate pain scale per order or unit policy  2.  Educate and implement non-pharmacologic comfort measures (i.e. relaxation, distraction, massage, cold/heat therapy, etc.)  3.  Pain management medications as ordered  4.  Reassess pain after pain med administration per policy  5.  If opiods administered assess patient's response to pain medication is appropriate per POSS sedation scale  6.  Follow pain management plan developed in collaboration with patient and interdisciplinary team (including palliative care or pain specialists if applicable)  Outcome: Progressing     Problem: Knowledge Deficit - Standard  Goal: Patient and family/care givers will demonstrate understanding of plan of care, disease process/condition, diagnostic tests and medications  Description: Target End Date:  1-3 days or as soon as patient condition allows    Document in Patient Education    1.  Patient and family/caregiver oriented to unit, equipment, visitation policy and means for communicating concern  2.  Complete/review Learning Assessment  3.  Assess knowledge level of disease process/condition, treatment plan, diagnostic tests and medications  4.  Explain disease process/condition, treatment plan, diagnostic tests and medications  Outcome: Progressing       Patient is not progressing towards the following goals:

## 2023-07-22 NOTE — DISCHARGE SUMMARY
Discharge Summary    CHIEF COMPLAINT ON ADMISSION  Chief Complaint   Patient presents with    Weakness     Generalized weakness, difficulty walking x 1 week.   Hx of MS, concerned for MS flare up.     Headache     This morning.    Eye Pain     LT eye since this morning with AGUILLON.        Reason for Admission  ms flare up     Admission Date  7/20/2023    CODE STATUS  Full Code    HPI & HOSPITAL COURSE  Rehana Meade is a 37 y.o. female with a history of MS who presented 7/20/2023 with weakness that started 1 week ago.  Patient reports bilateral lower extremity weakness and some bilateral upper extremity numbness.  Patient has a history of MS and is followed by Dr. Aggarwal.  She is on Kesimpta. She also reports some left retro-orbital headache.  She is concerned about a MS flareup.  Denies any fevers, chills, shortness of breath, abdominal pain, diarrhea or dysuria.  Patient underwent an MRI brain with and without IV contrast that revealed stable appearance of demyelinating lesions involving the deep white matter in both cerebral hemispheres, no new lesions identified, no enhancing lesions are identified.  MRI cervical spine with and without IV contrast revealed no definite signal abnormality in the cervical cord to suggest a demyelinating lesion.  Results were discussed with the patient.  Case discussed with her neurologist Dr. Rajeev Aggarwal who recommends discharging patient on prednisone 60 mg daily followed by taper. She has been instructed to follow-up with her PCP and neurologist.     Therefore, she is discharged in good and stable condition to home with close outpatient follow-up.    The patient recovered much more quickly than anticipated on admission.    Discharge Date  7/22/23    FOLLOW UP ITEMS POST DISCHARGE  Follow up with PCP and NEurologist    DISCHARGE DIAGNOSES  Principal Problem (Resolved):    Weakness (POA: Yes)  Active Problems:    Asthma (POA: Yes)    Seizure disorder (HCC) (POA: Yes)     Multiple sclerosis (HCC) (POA: Yes)      Overview: Last Assessment & Plan:       Formatting of this note might be different from the original.      Stable by her MRIs although she has had symptoms of attack lately which       were attributed to stress, ultimately.  Responsive to steroids.        Apparently, stable on Kesimpta.  Tolerating Kesimpta well.             She is followed at [facility] by Dr. Aggarwal for this.  Nothing further from       my standpoint.  Resolved Problems:    Headache (POA: Yes)      FOLLOW UP  Future Appointments   Date Time Provider Department Center   8/7/2023 11:20 AM Rajeev Aggarwal M.D. [facility] None     No follow-up provider specified.    MEDICATIONS ON DISCHARGE     Medication List        START taking these medications        Instructions   methylPREDNISolone 4 MG Tbpk  Start taking on: July 30, 2023  Commonly known as: Medrol Dosepak   Follow schedule on package instructions.     predniSONE 20 MG Tabs  Commonly known as: Deltasone   Take 3 Tablets by mouth every day for 7 days.  Dose: 60 mg            CONTINUE taking these medications        Instructions   Aimovig (140 MG Dose) 70 MG/ML Soaj  Generic drug: Erenumab   Inject 140 mg under the skin Q30 DAYS.  Dose: 140 mg     baclofen 10 MG Tabs  Commonly known as: Lioresal   Take 10 mg by mouth at bedtime as needed (Muscle Spasms).  Dose: 10 mg     carBAMazepine 200 MG Tabs  Commonly known as: TEGretol   Take 1 Tablet by mouth 2 times a day for 30 days.  Dose: 200 mg     diclofenac sodium 1 % Gel  Commonly known as: Voltaren   Apply 2 g topically 4 times a day as needed (Pain).  Dose: 2 g     Kesimpta 20 MG/0.4ML Soaj  Generic drug: Ofatumumab   Inject 20 mg under the skin Q30 DAYS.  Dose: 20 mg     tizanidine 4 MG Tabs  Commonly known as: Zanaflex   Take 4 mg by mouth at bedtime as needed (Muscle Spasms).  Dose: 4 mg              Allergies  Allergies   Allergen Reactions    Amitriptyline Unspecified     Suicidal    Clarithromycin Hives     "Sulfa Drugs Anaphylaxis    Cantaloupe Itching    Honey Dew Itching    Latex Itching    Lorazepam Unspecified     Hallucinations      Metoclopramide Unspecified     Muscle spasms    Penicillin G Potassium Vomiting    Rizatriptan Unspecified     Tremors, confusion      Sumatriptan Unspecified     \"Makes my head pins and needles\"    Tape Rash    Morphine Hives and Itching       DIET  Orders Placed This Encounter   Procedures    Diet Order Diet: Regular     Standing Status:   Standing     Number of Occurrences:   1     Order Specific Question:   Diet:     Answer:   Regular [1]       ACTIVITY  As tolerated.  Weight bearing as tolerated    CONSULTATIONS  none    PROCEDURES  none    LABORATORY  Lab Results   Component Value Date    SODIUM 139 07/21/2023    POTASSIUM 3.8 07/21/2023    CHLORIDE 105 07/21/2023    CO2 24 07/21/2023    GLUCOSE 90 07/21/2023    BUN 14 07/21/2023    CREATININE 0.75 07/21/2023    CREATININE 0.9 05/04/2009        Lab Results   Component Value Date    WBC 8.2 07/21/2023    HEMOGLOBIN 14.7 07/21/2023    HEMATOCRIT 43.8 07/21/2023    PLATELETCT 318 07/21/2023        Total time of the discharge process exceeds 35 minutes.  "

## 2023-07-22 NOTE — PROGRESS NOTES
Discharge orders received.  Patient arrived to the discharge lounge.  PIV removed.  Instructions given, medications reviewed and general discharge education provided to patient.  Follow up appointments discussed.  Patient verbalized understanding of dc instructions and prescriptions.  Patient signed discharge instructions.  Patient verbalized she had all belongings with her.  Meds to beds medications given to patient.  Patient left via wheelchair to car and then to home.  Wished patient a speedy recovery.

## 2023-07-22 NOTE — CARE PLAN
Problem: Pain - Standard  Goal: Alleviation of pain or a reduction in pain to the patient’s comfort goal  Outcome: Progressing     Problem: Knowledge Deficit - Standard  Goal: Patient and family/care givers will demonstrate understanding of plan of care, disease process/condition, diagnostic tests and medications  Outcome: Progressing   The patient is Stable - Low risk of patient condition declining or worsening    Shift Goals  Clinical Goals: MRI, pain control  Patient Goals: rest, pain control  Family Goals: pain control    Progress made toward(s) clinical / shift goals:  Pt is able to voice her plan of care.    Patient is not progressing towards the following goals: N/A

## 2023-08-03 ENCOUNTER — HOSPITAL ENCOUNTER (EMERGENCY)
Facility: MEDICAL CENTER | Age: 38
End: 2023-08-03
Attending: EMERGENCY MEDICINE
Payer: COMMERCIAL

## 2023-08-03 ENCOUNTER — APPOINTMENT (OUTPATIENT)
Dept: RADIOLOGY | Facility: MEDICAL CENTER | Age: 38
End: 2023-08-03
Attending: EMERGENCY MEDICINE
Payer: COMMERCIAL

## 2023-08-03 ENCOUNTER — PATIENT MESSAGE (OUTPATIENT)
Dept: NEUROLOGY | Facility: MEDICAL CENTER | Age: 38
End: 2023-08-03
Payer: COMMERCIAL

## 2023-08-03 VITALS
TEMPERATURE: 98.2 F | HEART RATE: 78 BPM | BODY MASS INDEX: 34.78 KG/M2 | RESPIRATION RATE: 16 BRPM | OXYGEN SATURATION: 99 % | SYSTOLIC BLOOD PRESSURE: 107 MMHG | DIASTOLIC BLOOD PRESSURE: 68 MMHG | WEIGHT: 189 LBS | HEIGHT: 62 IN

## 2023-08-03 DIAGNOSIS — G44.209 TENSION HEADACHE: ICD-10-CM

## 2023-08-03 DIAGNOSIS — M79.604 RIGHT LEG PAIN: ICD-10-CM

## 2023-08-03 DIAGNOSIS — R07.89 CHEST WALL PAIN: ICD-10-CM

## 2023-08-03 LAB
ALBUMIN SERPL BCP-MCNC: 4.2 G/DL (ref 3.2–4.9)
ALBUMIN/GLOB SERPL: 1.4 G/DL
ALP SERPL-CCNC: 85 U/L (ref 30–99)
ALT SERPL-CCNC: 16 U/L (ref 2–50)
ANION GAP SERPL CALC-SCNC: 11 MMOL/L (ref 7–16)
AST SERPL-CCNC: 10 U/L (ref 12–45)
BASOPHILS # BLD AUTO: 0.3 % (ref 0–1.8)
BASOPHILS # BLD: 0.07 K/UL (ref 0–0.12)
BILIRUB SERPL-MCNC: 0.5 MG/DL (ref 0.1–1.5)
BUN SERPL-MCNC: 18 MG/DL (ref 8–22)
CALCIUM ALBUM COR SERPL-MCNC: 9.4 MG/DL (ref 8.5–10.5)
CALCIUM SERPL-MCNC: 9.6 MG/DL (ref 8.5–10.5)
CHLORIDE SERPL-SCNC: 103 MMOL/L (ref 96–112)
CO2 SERPL-SCNC: 23 MMOL/L (ref 20–33)
CREAT SERPL-MCNC: 0.56 MG/DL (ref 0.5–1.4)
D DIMER PPP IA.FEU-MCNC: <0.27 UG/ML (FEU) (ref 0–0.5)
EKG IMPRESSION: NORMAL
EOSINOPHIL # BLD AUTO: 0.17 K/UL (ref 0–0.51)
EOSINOPHIL NFR BLD: 0.8 % (ref 0–6.9)
ERYTHROCYTE [DISTWIDTH] IN BLOOD BY AUTOMATED COUNT: 46 FL (ref 35.9–50)
GFR SERPLBLD CREATININE-BSD FMLA CKD-EPI: 120 ML/MIN/1.73 M 2
GLOBULIN SER CALC-MCNC: 2.9 G/DL (ref 1.9–3.5)
GLUCOSE SERPL-MCNC: 84 MG/DL (ref 65–99)
HCT VFR BLD AUTO: 46.6 % (ref 37–47)
HGB BLD-MCNC: 15.6 G/DL (ref 12–16)
IMM GRANULOCYTES # BLD AUTO: 0.17 K/UL (ref 0–0.11)
IMM GRANULOCYTES NFR BLD AUTO: 0.8 % (ref 0–0.9)
LYMPHOCYTES # BLD AUTO: 3.52 K/UL (ref 1–4.8)
LYMPHOCYTES NFR BLD: 16.8 % (ref 22–41)
MCH RBC QN AUTO: 30.8 PG (ref 27–33)
MCHC RBC AUTO-ENTMCNC: 33.5 G/DL (ref 32.2–35.5)
MCV RBC AUTO: 92.1 FL (ref 81.4–97.8)
MONOCYTES # BLD AUTO: 1.49 K/UL (ref 0–0.85)
MONOCYTES NFR BLD AUTO: 7.1 % (ref 0–13.4)
NEUTROPHILS # BLD AUTO: 15.57 K/UL (ref 1.82–7.42)
NEUTROPHILS NFR BLD: 74.2 % (ref 44–72)
NRBC # BLD AUTO: 0 K/UL
NRBC BLD-RTO: 0 /100 WBC (ref 0–0.2)
PLATELET # BLD AUTO: 283 K/UL (ref 164–446)
PMV BLD AUTO: 9.5 FL (ref 9–12.9)
POTASSIUM SERPL-SCNC: 4 MMOL/L (ref 3.6–5.5)
PROT SERPL-MCNC: 7.1 G/DL (ref 6–8.2)
RBC # BLD AUTO: 5.06 M/UL (ref 4.2–5.4)
SODIUM SERPL-SCNC: 137 MMOL/L (ref 135–145)
TROPONIN T SERPL-MCNC: <6 NG/L (ref 6–19)
TROPONIN T SERPL-MCNC: <6 NG/L (ref 6–19)
WBC # BLD AUTO: 21 K/UL (ref 4.8–10.8)

## 2023-08-03 PROCEDURE — 85379 FIBRIN DEGRADATION QUANT: CPT

## 2023-08-03 PROCEDURE — 85025 COMPLETE CBC W/AUTO DIFF WBC: CPT

## 2023-08-03 PROCEDURE — 80053 COMPREHEN METABOLIC PANEL: CPT

## 2023-08-03 PROCEDURE — 93005 ELECTROCARDIOGRAM TRACING: CPT | Performed by: EMERGENCY MEDICINE

## 2023-08-03 PROCEDURE — 700102 HCHG RX REV CODE 250 W/ 637 OVERRIDE(OP): Performed by: EMERGENCY MEDICINE

## 2023-08-03 PROCEDURE — A9270 NON-COVERED ITEM OR SERVICE: HCPCS | Performed by: EMERGENCY MEDICINE

## 2023-08-03 PROCEDURE — 99285 EMERGENCY DEPT VISIT HI MDM: CPT

## 2023-08-03 PROCEDURE — 84484 ASSAY OF TROPONIN QUANT: CPT | Mod: 91

## 2023-08-03 PROCEDURE — 71045 X-RAY EXAM CHEST 1 VIEW: CPT

## 2023-08-03 PROCEDURE — 36415 COLL VENOUS BLD VENIPUNCTURE: CPT

## 2023-08-03 PROCEDURE — 93971 EXTREMITY STUDY: CPT | Mod: 26,RT | Performed by: INTERNAL MEDICINE

## 2023-08-03 PROCEDURE — 93005 ELECTROCARDIOGRAM TRACING: CPT

## 2023-08-03 PROCEDURE — 93971 EXTREMITY STUDY: CPT | Mod: RT

## 2023-08-03 RX ORDER — ACETAMINOPHEN 500 MG
1000 TABLET ORAL ONCE
Status: COMPLETED | OUTPATIENT
Start: 2023-08-03 | End: 2023-08-03

## 2023-08-03 RX ORDER — OXYCODONE HYDROCHLORIDE 5 MG/1
5 TABLET ORAL EVERY 4 HOURS PRN
Qty: 15 TABLET | Refills: 0 | Status: SHIPPED | OUTPATIENT
Start: 2023-08-03 | End: 2023-08-06

## 2023-08-03 RX ORDER — OXYCODONE HYDROCHLORIDE 5 MG/1
5 TABLET ORAL ONCE
Status: COMPLETED | OUTPATIENT
Start: 2023-08-03 | End: 2023-08-03

## 2023-08-03 RX ORDER — CYCLOBENZAPRINE HCL 10 MG
10 TABLET ORAL 3 TIMES DAILY PRN
Qty: 30 TABLET | Refills: 0 | Status: SHIPPED | OUTPATIENT
Start: 2023-08-03 | End: 2023-08-07

## 2023-08-03 RX ORDER — IBUPROFEN 600 MG/1
600 TABLET ORAL ONCE
Status: COMPLETED | OUTPATIENT
Start: 2023-08-03 | End: 2023-08-03

## 2023-08-03 RX ORDER — CYCLOBENZAPRINE HCL 10 MG
5 TABLET ORAL ONCE
Status: COMPLETED | OUTPATIENT
Start: 2023-08-03 | End: 2023-08-03

## 2023-08-03 RX ADMIN — CYCLOBENZAPRINE 5 MG: 10 TABLET, FILM COATED ORAL at 15:07

## 2023-08-03 RX ADMIN — ACETAMINOPHEN 1000 MG: 500 TABLET, FILM COATED ORAL at 15:07

## 2023-08-03 RX ADMIN — OXYCODONE HYDROCHLORIDE 5 MG: 5 TABLET ORAL at 15:07

## 2023-08-03 RX ADMIN — IBUPROFEN 600 MG: 600 TABLET, FILM COATED ORAL at 15:07

## 2023-08-03 ASSESSMENT — FIBROSIS 4 INDEX: FIB4 SCORE: 0.59

## 2023-08-03 NOTE — ED PROVIDER NOTES
"ER Provider Note    Scribed for Randolph Rogers M.D. by Allie Paulino. 8/3/2023   2:03 PM    Primary Care Provider: Mack Bernard M.D.    CHIEF COMPLAINT  Chief Complaint   Patient presents with    Chest Pain     Substernal CP since last night. Reports pain in LT jaw.     Leg Pain     RT leg pain developed in middle of night.     Headache     HA today on LT side above eye.        EXTERNAL RECORDS REVIEWED  Outpatient Notes History of stroke, chest pain, seizure, and MS. The patient was last admitted on July 20th. Discharged on July 22nd for a MS flare. She was discharged on prednisone. She talked to her neurologist today, who sent her in.    HPI/ROS  LIMITATION TO HISTORY   Select: : None  OUTSIDE HISTORIAN(S):  None.    Rehana Meade is a 37 y.o. female who presents to the ED for evaluation of chest pain onset last night. The patient states she also has left sided jaw pain, headache and right leg pain, but denies any leg swelling, numbness or weakness. She describes that her headache is on the left side above her eye. She notes that she feels as though she was \"punched in the left side of her face\" and this has lasted for a day now. The patient reports that when she is stressed she will clench her teeth and have pain on both sides of her jaw, but notes that this pain is different. The patient took ibuprofen this morning with no alleviation of any pain. No alleviating or exacerbating factors were noted. She denies the use of hormones. She denies any recent surgery or trauma.     PAST MEDICAL HISTORY  Past Medical History:   Diagnosis Date    Abdominal pain     Anesthesia     woke up combative    Anxiety associated with depression     ASTHMA     Back pain     Chest pain     Chest tightness     Chickenpox     Constipation     Daytime sleepiness     Difficulty swallowing     Eye pain     Frequent headaches     Frequent urination     Hearing difficulty     Migraine without aura, without " mention of intractable migraine without mention of status migrainosus     Morning headache     Nausea     Other specified symptom associated with female genital organs     Painful breathing     Painful joint     Ringing in ears     Seizure ([facility])     Last seizure was 1/2019    Seizure cerebral ([facility]) 02/15/2018    Shortness of breath     Sore muscles     Stroke ([facility])     Substance abuse ([facility])     Unspecified disorder of thyroid     cyst    Vision loss     Weakness     Whooping cough      SURGICAL HISTORY  Past Surgical History:   Procedure Laterality Date    VAGINAL HYSTERECTOMY TOTAL  01/27/2020    Procedure: HYSTERECTOMY, TOTAL, VAGINAL;  Surgeon: Julian Parker M.D.;  Location: [facility]GERY [facility];  Service: Gynecology    CYSTOSCOPY  01/27/2020    Procedure: CYSTOSCOPY;  Surgeon: Julian Parker M.D.;  Location: SURGERY [facility];  Service: Gynecology    TUBAL COAGULATION LAPAROSCOPIC BILATERAL  07/11/2014    Performed by Julian Parker M.D. at SURGERY [facility]    SEPTOPLASTY  10/28/2009    Performed by ANNETTE FORDE at SURGERY [facility]    SOMNOPLASTY  10/28/2009    Performed by ANNETTE FORDE at SURGERY [facility]    NASAL POLYPECTOMY  10/28/2009    Performed by ANNETTE FORDE at SURGERY [facility]    HYSTERECTOMY LAPAROSCOPY       FAMILY HISTORY  Family History   Problem Relation Age of Onset    No Known Problems Mother     Sleep Apnea Father     Prostate cancer Paternal Grandfather         George     SOCIAL HISTORY   reports that she has never smoked. She has never used smokeless tobacco. She reports that she does not currently use alcohol. She reports that she does not use drugs.    CURRENT MEDICATIONS  Previous Medications    BACLOFEN (LIORESAL) 10 MG TAB    Take 10 mg by mouth at bedtime as needed (Muscle Spasms).    CARBAMAZEPINE (TEGRETOL) 200 MG TAB    Take 1 Tablet by mouth 2 times a day for 30 days.    DICLOFENAC SODIUM (VOLTAREN) 1 % GEL  "   Apply 2 g topically 4 times a day as needed (Pain).    ERENUMAB (AIMOVIG, 140 MG DOSE,) 70 MG/ML SOLUTION AUTO-INJECTOR    Inject 140 mg under the skin Q30 DAYS.    KESIMPTA 20 MG/0.4ML SOLUTION AUTO-INJECTOR    Inject 20 mg under the skin Q30 DAYS.    METHYLPREDNISOLONE (MEDROL DOSEPAK) 4 MG TABLET THERAPY PACK    Follow schedule on package instructions.    TIZANIDINE (ZANAFLEX) 4 MG TAB    Take 4 mg by mouth at bedtime as needed (Muscle Spasms).     ALLERGIES  Allergies   Allergen Reactions    Amitriptyline Unspecified     Suicidal    Clarithromycin Hives    Sulfa Drugs Anaphylaxis    Cantaloupe Itching    Honey Dew Itching    Latex Itching    Lorazepam Unspecified     Hallucinations      Metoclopramide Unspecified     Muscle spasms    Penicillin G Potassium Vomiting    Rizatriptan Unspecified     Tremors, confusion      Sumatriptan Unspecified     \"Makes my head pins and needles\"    Tape Rash    Morphine Hives and Itching      PHYSICAL EXAM  /76   Pulse 78   Temp 36.8 °C (98.2 °F) (Temporal)   Resp 16   Ht 1.575 m (5' 2\")   Wt 85.7 kg (189 lb)   LMP 11/16/2019   SpO2 97%   BMI 34.57 kg/m²      Constitutional: Well developed, Well nourished, mild distress,    HENT: Normocephalic, Atraumatic. Tenderness on her left masseter.  Eyes: PERRLA, EOMI, Conjunctiva normal, No discharge.   Neck: Supple, No stridor. Tenderness on left paraspinal cervical area.  Cardiovascular: Normal heart rate, Normal rhythm.   Thorax & Lungs: Clear to auscultation bilaterally, No respiratory distress, No wheezing, No crackles. Tenderness in anterior chest wall.  Abdomen: Soft, No tenderness, No masses, No pulsatile masses.   Skin: Warm, Dry, No erythema, No rash.    Extremities:  Slight tenderness of the right leg.   Musculoskeletal: No major deformities noted.  Intact distal pulses  Neurologic: Awake, alert. Moves all extremities spontaneously.  Psychiatric: Affect normal, Judgment normal, Mood normal.      DIAGNOSTIC " STUDIES    Labs:   Results for orders placed or performed during the hospital encounter of 08/03/23   CBC with Differential   Result Value Ref Range    WBC 21.0 (H) 4.8 - 10.8 K/uL    RBC 5.06 4.20 - 5.40 M/uL    Hemoglobin 15.6 12.0 - 16.0 g/dL    Hematocrit 46.6 37.0 - 47.0 %    MCV 92.1 81.4 - 97.8 fL    MCH 30.8 27.0 - 33.0 pg    MCHC 33.5 32.2 - 35.5 g/dL    RDW 46.0 35.9 - 50.0 fL    Platelet Count 283 164 - 446 K/uL    MPV 9.5 9.0 - 12.9 fL    Neutrophils-Polys 74.20 (H) 44.00 - 72.00 %    Lymphocytes 16.80 (L) 22.00 - 41.00 %    Monocytes 7.10 0.00 - 13.40 %    Eosinophils 0.80 0.00 - 6.90 %    Basophils 0.30 0.00 - 1.80 %    Immature Granulocytes 0.80 0.00 - 0.90 %    Nucleated RBC 0.00 0.00 - 0.20 /100 WBC    Neutrophils (Absolute) 15.57 (H) 1.82 - 7.42 K/uL    Lymphs (Absolute) 3.52 1.00 - 4.80 K/uL    Monos (Absolute) 1.49 (H) 0.00 - 0.85 K/uL    Eos (Absolute) 0.17 0.00 - 0.51 K/uL    Baso (Absolute) 0.07 0.00 - 0.12 K/uL    Immature Granulocytes (abs) 0.17 (H) 0.00 - 0.11 K/uL    NRBC (Absolute) 0.00 K/uL   Complete Metabolic Panel (CMP)   Result Value Ref Range    Sodium 137 135 - 145 mmol/L    Potassium 4.0 3.6 - 5.5 mmol/L    Chloride 103 96 - 112 mmol/L    Co2 23 20 - 33 mmol/L    Anion Gap 11.0 7.0 - 16.0    Glucose 84 65 - 99 mg/dL    Bun 18 8 - 22 mg/dL    Creatinine 0.56 0.50 - 1.40 mg/dL    Calcium 9.6 8.5 - 10.5 mg/dL    Correct Calcium 9.4 8.5 - 10.5 mg/dL    AST(SGOT) 10 (L) 12 - 45 U/L    ALT(SGPT) 16 2 - 50 U/L    Alkaline Phosphatase 85 30 - 99 U/L    Total Bilirubin 0.5 0.1 - 1.5 mg/dL    Albumin 4.2 3.2 - 4.9 g/dL    Total Protein 7.1 6.0 - 8.2 g/dL    Globulin 2.9 1.9 - 3.5 g/dL    A-G Ratio 1.4 g/dL   Troponins NOW   Result Value Ref Range    Troponin T <6 6 - 19 ng/L   ESTIMATED GFR   Result Value Ref Range    GFR (CKD-EPI) 120 >60 mL/min/1.73 m 2   D-DIMER   Result Value Ref Range    D-Dimer <0.27 0.00 - 0.50 ug/mL (FEU)   EKG   Result Value Ref Range    Report       [facility] Emergency Dept.    Test Date:  2023  Pt Name:    ETELVINA BROWN             Department: ER  MRN:        [id number]                      Room:  Gender:     Female                       Technician: [id number]  :        1985                   Requested By:ER TRIAGE PROTOCOL  Order #:    [id number]                    Reading MD: LEONORA PERALTA MD    Measurements  Intervals                                Axis  Rate:       77                           P:          40  IL:         146                          QRS:        0  QRSD:       98                           T:          42  QT:         409  QTc:        463    Interpretive Statements  Sinus rhythm  Compared to ECG 2021 08:28:23  Sinus bradycardia no longer present  Electronically Signed On 2023 16:05:01 PDT by LEONORA PERALTA MD       EKG:   I have independently interpreted this EKG as detailed above.  Results for orders placed or performed during the hospital encounter of 23   EKG   Result Value Ref Range    Report       [facility] Emergency Dept.    Test Date:  2023  Pt Name:    ETELVINA BROWN             Department: ER  MRN:        [id number]                      Room:  Gender:     Female                       Technician: [id number]  :        1985                   Requested By:ER TRIAGE PROTOCOL  Order #:    [id number]                    Reading MD: LEONORA PERALTA MD    Measurements  Intervals                                Axis  Rate:       77                           P:          40  IL:         146                          QRS:        0  QRSD:       98                           T:          42  QT:         409  QTc:        463    Interpretive Statements  Sinus rhythm  Compared to ECG 2021 08:28:23  Sinus bradycardia no longer present  Electronically Signed On 2023 16:05:01 PDT by LEONORA PERALTA MD         Radiology:   This attending emergency physician has  independently interpreted the diagnostic imaging associated with this visit and is awaiting the final reading from the radiologist.   Preliminary interpretation is a follows: No pneumonia  Radiologist interpretation:   US-EXTREMITY VENOUS LOWER UNILAT RIGHT   Final Result      DX-CHEST-PORTABLE (1 VIEW)   Final Result      No acute cardiopulmonary disease evident.         COURSE & MEDICAL DECISION MAKING     ED Observation Status? Yes; I am placing the patient in to an observation status due to a diagnostic uncertainty as well as therapeutic intensity. Patient placed in observation status at 2:16 PM, 8/3/2023.     Observation plan is as follows: Will perform an EKG, lab work, and imaging for further evaluation of her symptoms.     Upon Reevaluation, the patient's condition has: Improved; and will be discharged.    Patient discharged from ED Observation status at 4:09 PM (Time) 8/3/2023 (Date).     INITIAL ASSESSMENT, COURSE AND PLAN  Care Narrative: Patient with history of MS who is coming in with nonspecific left-sided neck pain tension headache left jaw pain, left leg pain, anterior chest wall pain.  Seems to be very musculoskeletal in nature, patient is not tachycardic or hypoxic, D-dimer is negative, chest x-ray is negative, DVT study is negative.  The patient is feeling somewhat improved after some pain medicine and muscle relaxer.  We will discharge the patient home with pain medicines and muscle relaxers, have the patient follow-up with her neurologist, have the patient return with worsening symptoms.    2:08 PM - Patient was evaluated at bedside. Ordered for an EKG, Troponins every 2 hours, CMP, CBC w/ Diff., D-Dimer, DX-Chest, and US-Extremity Venous Lower Unilat to evaluate. Patient verbalizes understanding and support with my plan of care. I informed the patient that so far her chest x-ray and her heart enzyme are reassuring.     Differential diagnoses include but not limited to: Musculoskeletal pain.  ASC. PE.      2:16 PM - The patient will be treated with Motrin 600 mg, Tylenol 1,000 mg, Flexeril 5 mg and Roxicodone 5 mg for her symptoms.    4:09 PM - Patient was reevaluated at bedside. She reports that her pain is improved. Discussed lab and radiology results with the patient and informed them that there is no blood clot. I recommended that the patient take ibuprofen and Tylenol together for pain management at home. Discussed discharge instructions and return precautions with the patient and they were cleared for discharge. Patient was given the opportunity to ask any further questions. She is comfortable with discharge at this time.       DISPOSITION AND DISCUSSIONS    I have discussed management of the patient with the following physicians and TRACIE's:  None.    Discussion of management with other QHP or appropriate source(s): None     Barriers to care at this time, including but not limited to:  None known .     Decision tools and prescription drugs considered including, but not limited to: Pain Medications   .    The patient will return for new or worsening symptoms and is stable at the time of discharge.    The patient is referred to a primary physician for blood pressure management, diabetic screening, and for all other preventative health concerns.    I have queried the patient in the prescription monitoring program, I do not see any evidence of prescription abuse.      In prescribing controlled substances to this patient, I certify that I have obtained and reviewed the medical history of Rehana Meade. I have also made a good donald effort to obtain applicable records from other providers who have treated the patient and records did not demonstrate any increased risk of substance abuse that would prevent me from prescribing controlled substances.     I have conducted a physical exam and documented it. I have reviewed Ms. Meade’s prescription history as maintained by the [place] Prescription  Monitoring Program.     I have assessed the patient’s risk for abuse, dependency, and addiction using the validated Opioid Risk Tool available at https://www.mdcalc.com/wgqsho-nwnu-olqq-ort-narcotic-abuse.     Given the above, I believe the benefits of controlled substance therapy outweigh the risks. The reasons for prescribing controlled substances include non-narcotic, oral analgesic alternatives have been inadequate for pain control. Accordingly, I have discussed the risk and benefits, treatment plan, and alternative therapies with the patient.      DISPOSITION:  Patient will be discharged home in stable condition.    FOLLOW UP:  [facility], Emergency Dept  [address] [facility] [address] [phone]    If symptoms worsen    OUTPATIENT MEDICATIONS:  New Prescriptions    CYCLOBENZAPRINE (FLEXERIL) 10 MG TAB    Take 1 Tablet by mouth 3 times a day as needed for Moderate Pain.    OXYCODONE IMMEDIATE-RELEASE (ROXICODONE) 5 MG TAB    Take 1 Tablet by mouth every four hours as needed for Severe Pain for up to 3 days.      FINAL DIAGNOSIS  1. Chest wall pain    2. Tension headache    3. Right leg pain       Allie HOLT (Scribe), am scribing for, and in the presence of, Randolph Rogers M.D..    Electronically signed by: Allie Paulino (Scribe), 8/3/2023    Randolph HOLT M.D. personally performed the services described in this documentation, as scribed by Allie Paulino in my presence, and it is both accurate and complete.      The note accurately reflects work and decisions made by me.  Randolph Rogers M.D.  8/3/2023  9:33 PM

## 2023-08-03 NOTE — ED NOTES
Pt provided d/c instructions and verbalizes understanding with no further questions. Rx sent to pt's requested pharmacy. Home care instructions explained. Pt ambulatory to ED layo

## 2023-08-03 NOTE — PROGRESS NOTES
I spoke to Rehana.  Given her constellation of symptoms (which sound worrisome for DVT/PE), I recommended she go to the ED for expedited evaluation.

## 2023-08-03 NOTE — ED TRIAGE NOTES
"Chief Complaint   Patient presents with    Chest Pain     Substernal CP since last night. Reports pain in LT jaw.     Leg Pain     RT leg pain developed in middle of night.     Headache     HA today on LT side above eye.        Ambulatory to triage for above.   Hasn't had ASA.    /76   Pulse 78   Temp 36.8 °C (98.2 °F) (Temporal)   Resp 16   Ht 1.575 m (5' 2\")   Wt 85.7 kg (189 lb)   LMP 11/16/2019   SpO2 97%   BMI 34.57 kg/m²     "

## 2023-08-03 NOTE — ED NOTES
Assisting primary RN:     Pt ambulated back to [place] [id number], connected to monitor and changed into gown. X-ray at bedside.

## 2023-08-07 ENCOUNTER — OFFICE VISIT (OUTPATIENT)
Dept: NEUROLOGY | Facility: MEDICAL CENTER | Age: 38
End: 2023-08-07
Attending: PSYCHIATRY & NEUROLOGY
Payer: COMMERCIAL

## 2023-08-07 VITALS
BODY MASS INDEX: 34.18 KG/M2 | SYSTOLIC BLOOD PRESSURE: 122 MMHG | WEIGHT: 192.9 LBS | OXYGEN SATURATION: 97 % | DIASTOLIC BLOOD PRESSURE: 80 MMHG | HEART RATE: 94 BPM | TEMPERATURE: 99.2 F | HEIGHT: 63 IN

## 2023-08-07 DIAGNOSIS — G35 MS (MULTIPLE SCLEROSIS) (HCC): ICD-10-CM

## 2023-08-07 DIAGNOSIS — G43.109 MIGRAINE WITH AURA AND WITHOUT STATUS MIGRAINOSUS, NOT INTRACTABLE: ICD-10-CM

## 2023-08-07 PROCEDURE — 99215 OFFICE O/P EST HI 40 MIN: CPT | Performed by: PSYCHIATRY & NEUROLOGY

## 2023-08-07 PROCEDURE — 99212 OFFICE O/P EST SF 10 MIN: CPT | Performed by: PSYCHIATRY & NEUROLOGY

## 2023-08-07 PROCEDURE — 3074F SYST BP LT 130 MM HG: CPT | Performed by: PSYCHIATRY & NEUROLOGY

## 2023-08-07 PROCEDURE — 3079F DIAST BP 80-89 MM HG: CPT | Performed by: PSYCHIATRY & NEUROLOGY

## 2023-08-07 RX ORDER — OXYCODONE AND ACETAMINOPHEN TABLETS 5; 300 MG/1; MG/1
1 TABLET ORAL EVERY 4 HOURS PRN
COMMUNITY
End: 2023-11-16

## 2023-08-07 ASSESSMENT — FIBROSIS 4 INDEX: FIB4 SCORE: 0.33

## 2023-08-07 NOTE — PROGRESS NOTES
[facility]  MULTIPLE SCLEROSIS & NEUROIMMUNOLOGY  FOLLOW-UP VISIT    DISEASE SUMMARY:  Principal neurologic diagnosis: MS  Diagnosis of MS: 1/7/2021  Disease History:  - 12/5/2019: blurry vision, left facial numbness, left upper- and lower extremity numbness; MRI head unremarkable; improved over the course of 1 week  - 12/25/2020: right monocular visual loss; returned to normal over ~2 hours  - 12/29/2020: onset of bilateral lower extremity weakness  - 1/2/202021: presented to hospital; MRI w/ lesions, LP w/ OCBs present; treated with IVMP with good response  - 2/28/2021: Gilenya FDO  - 3/4/2021: left margaret-body numbness; treated with IVMP w/ good response  - 5/1/2021: started Kesimpta  Disease course at onset: MS  Current disease course: MS  Previous disease therapies:  - Gilenya: additional clinical attack  Current disease therapies:  - Kesimpta  Symptomatic therapies:  - baclofen: effective for abdominal spasms (doesn't cause drowsiness)  - carbamazepine: unknown effectiveness  - cyclobenzaprine: ineffective  - tizanidine: effective, also helped promote sleep (probably more helpful than baclofen)  CSF (1/3/2021):  - RBCs: 394  - WBCs: 6  - protein: 58  - glucose: 81  - oligoclonal bands: positive (8)  - paraneoplastic autoantibody eval: negative  Other Testing:  - anti-AQP4 Ab (1/3/2021): negative  - anti-MOG IgG (1/3/2021): negative  - paraneoplastic autoantibody eval, serum (1/4/2020): negative  MRI head:  - 7/20/2023: stable, no abnormal enhancement  - 4/22/2023: stable, no abnormal enhancement  - 6/2/2022: stable, no abnormal enhancement  - 10/6/2021: stable, no abnormal enhancement  - 5/8/2021: stable, no abnormal enhancement  - 3/5/2021: enhancing lesions present  - 1/2/2021: juxta-cortical, periventricular, and infratentorial lesions w/ enhancement  - 12/5/2019: no visible lesions  - 5/13/2018: no visible lesions  MRI cervical spine:  - 7/20/2023: motion artifact limits evaluation  - 4/22/2023: stable,  "no abnormal enhancement  - 6/2/2022: stable, no abnormal enhancement  - 10/6/2021: stable, no abnormal enhancement  - 3/5/2021: single enhancing lesion present  - 1/2/2021: no visible lesions  MRI thoracic spine:  - 4/22/2023: no visible lesions  - 10/6/2021: no visible lesions  - 3/5/2021: no visible lesions  - 1/2/2021: no visible lesions  Immunizations:  - pneumonia: Prevnar 20 ordered 8/7/2023  Cancer Screenings:  - PAP Smears: UTD as of 8/7/0223    CC: MS    INTERVAL HISTORY:  Rehana eMade is a 37 y.o. woman with MS and a history otherwise notable for migraines, aseptic meningitis, and seizure disorder.  I last saw her in the clinic on 2/1/2023.  At that time I recommended she continue Kesimpta and Aimovig.  Today, she was unaccompanied, and she provided the following interval history:    MS:  Rehana continues on Kesimpta.  She tolerates this medication well and doesn't experience any side effects.  Since the last visit she experienced symptoms which prompted evaluation in the ED.  Repeat imagine was always stable.    A review of MS-related symptoms was notable for the following:    Fatigue: she sleeps 7-8 hours/night interrupted, she is tired \"all of the time\"  Weakness:   Numbness:   Incoordination:   Spasms/Spasticity:   Vision Impairment: there is intermittent double vision  Walking/Balance Problems: there is pain in the right lower extremity, her walking is \"wobbly,\" sometimes her right knee \"gives out.\"  Neuralgia: there is an \"achy\" pain in the posterior cervical neck, she  experiences intermittent \"hug\"  Bowel Symptoms:   Bladder Symptoms: frequency: yes, urgency: yes, hesitancy: no, incomplete emptying: no, incontinence: stress (laugh, sneeze, cough)  Heat Sensitivity:   Depression:   Cognitive/Memory Problems:   Sexual Dysfunction:   Anxiety:     Migraine w/ Aura:  Her headaches are well-controlled with Aimovig 140 mg/month.    MEDICATIONS:  Current Outpatient Medications   Medication " Sig    oxycodone-acetaminophen (LYNOX) 5-300 MG per tablet Take 1 Tablet by mouth every four hours as needed for Severe Pain.    tizanidine (ZANAFLEX) 4 MG Tab Take 4 mg by mouth at bedtime as needed (Muscle Spasms).    Erenumab (AIMOVIG, 140 MG DOSE,) 70 MG/ML Solution Auto-injector Inject 140 mg under the skin Q30 DAYS.    carBAMazepine (TEGRETOL) 200 MG Tab Take 1 Tablet by mouth 2 times a day for 30 days.    KESIMPTA 20 MG/0.4ML Solution Auto-injector Inject 20 mg under the skin Q30 DAYS.    baclofen (LIORESAL) 10 MG Tab Take 10 mg by mouth at bedtime as needed (Muscle Spasms).    diclofenac sodium (VOLTAREN) 1 % Gel Apply 2 g topically 4 times a day as needed (Pain).    methylPREDNISolone (MEDROL DOSEPAK) 4 MG Tablet Therapy Pack Follow schedule on package instructions.     MEDICAL, SOCIAL, AND FAMILY HISTORY:  There is no change in the patient's ROS or PFSH from their previous visit on 2/1/2023.    REVIEW OF SYSTEMS:  A ROS was completed.  Pertinent positives and negatives were included in the HPI, above.  All other systems were reviewed and are negative.    PHYSICAL EXAM:  General/Medical:  - NAD    Neuro:  MENTAL STATUS: awake and alert; no deficits of speech or language; oriented to conversation; affect was appropriate to situation; pleasant, cooperative    CRANIAL NERVES:    II: acuity: J1+/J1+, fields: intact to confrontation, pupils: equal and reactive, discs: sharp    III/IV/VI: versions: grossly intact    V: facial sensation: intact to light touch    VII: facial expression: symmetric    VIII: hearing: intact to voice    IX/X: palate: elevates symmetrically    XI: shoulder shrug: symmetric    XII: tongue: midline    MOTOR:  - bulk: normal throughout  - tone: normal in the upper extremities  Upper Extremity Strength  (R/L)    5/5   Elbow flexion 5/5   Elbow extension 5/5   Shoulder abduction 5/5     Lower Extremity Strength  (R/L)   Hip flexion 4(giveaway)/5   Knee extension 4/5   Knee flexion 4/5  "  Ankle plantarflexion NT   Ankle dorsiflexion NT     - pronator drift: NT  - abnormal movements: none    SENSATION:  - light touch: slightly reduced over the left upper extremity  - vibration (R/L, seconds): NT at the great toes  - pinprick: NT  - proprioception: NT  - Romberg: absent    COORDINATION:  - finger to nose: intact  - finger tapping: rapid and accurate, bilaterally    REFLEXES:  Reflex Right Left   BR 2+ 2+   Biceps 3+ 2+   Triceps 2+ 2+   Patellae 2+ 2+   Achilles NT NT   Toes NT NT     GAIT:  - narrow base  - heel walk: intact  - toe walk: intact  - tandem: intact     QUANTITATIVE SCORES:  Timed 25-foot walk (sec): 4.2 on 8/7/2023 (3.6 on 2/1/2023, 4.7 on 8/17/2021, 4.1 on 5/12/2021, 4.8 on 3/10/2021, 4.8 on 1/7/2021).  Assistive device: none    REVIEW OF IMAGING STUDIES:  I have summarized interval data above.    REVIEW OF LABORATORY STUDIES:  Reviewed.    ASSESSMENT:  Rehana Meade is a 37 y.o. woman with multiple sclerosis and migraine w/ aura.  She remains radiologically stable on Kesimpta, which she tolerates well.  In the absence of clear provoking factors or stressors (e.g., heat, dehydration, hunger, sleep deprivation, psychological stress, etc.) I do not have a confident explanation for Rehana's myriad of symptoms.  Plan to continue Kesimpta for now.    PLAN:  Multiple Sclerosis:  - continue Kesimpta    Migraine w/ Aura:  - continue Aimovig 140 mg/month    Follow-Up:  Return in about 6 months (around 2/7/2024).    Signed: Rajeev Aggarwal M.D.    BILLING DOCUMENTATION:   I spent 54 minutes reviewing the medical record, interviewing and examining the patient, discussing my impression (see \"assessment\" above), and coordinating care.  "

## 2023-08-08 DIAGNOSIS — G35 MS (MULTIPLE SCLEROSIS) (HCC): ICD-10-CM

## 2023-08-08 RX ORDER — CARBAMAZEPINE 200 MG/1
200 TABLET ORAL 2 TIMES DAILY
Qty: 60 TABLET | Refills: 0 | Status: SHIPPED | OUTPATIENT
Start: 2023-08-08 | End: 2023-09-11

## 2023-09-05 ENCOUNTER — PATIENT MESSAGE (OUTPATIENT)
Dept: NEUROLOGY | Facility: MEDICAL CENTER | Age: 38
End: 2023-09-05
Payer: COMMERCIAL

## 2023-09-05 DIAGNOSIS — G35 MS (MULTIPLE SCLEROSIS) (HCC): Primary | ICD-10-CM

## 2023-09-20 RX ORDER — ONDANSETRON 4 MG/1
4 TABLET, FILM COATED ORAL EVERY 4 HOURS PRN
Qty: 20 TABLET | Refills: 11 | Status: SHIPPED | OUTPATIENT
Start: 2023-09-20 | End: 2023-10-20

## 2023-11-03 ENCOUNTER — TELEPHONE (OUTPATIENT)
Dept: NEUROLOGY | Facility: MEDICAL CENTER | Age: 38
End: 2023-11-03
Payer: COMMERCIAL

## 2023-11-03 NOTE — TELEPHONE ENCOUNTER
Kesimpta 20 MG0.4ML SOAJ     RTS - PRIOR AUTHORIZATION EXPIRES ON 12/06/23 NEXT AVAILABLE FILL DATE 20231117 LAST FILL DT 20231025 FILLED AT PHARMACY [facility] SPECIALTY [id number],PHONE #[phone]  COPAY CARD ELIGIBLE [id number]  + - 11/03/2023 3:50pm [facility]

## 2023-11-10 ENCOUNTER — TELEPHONE (OUTPATIENT)
Dept: NEUROLOGY | Facility: MEDICAL CENTER | Age: 38
End: 2023-11-10
Payer: COMMERCIAL

## 2023-11-10 NOTE — TELEPHONE ENCOUNTER
"Pt called and states that she received her tdap and pneumonia vaccines on Monday morning. She states that after the injections she has left side body pain, neck pain, headache and a reaction at the injection site. She states that it has since subsided and she now just has a headache and neck pain. She says that she figured this was MS related and that she should just \"ride it out.\" She also wanted to note that her gait has been off x2-3 weeks- feels like she is extra wobbly and her right foot drops and drags which is aggravating her left sciatic nerve. I let her know that we do have an appointment scheduled on 11/14 for her and that we can discuss these issues in further depth at that time.   -MARINO Gabriel.  "

## 2023-11-16 ENCOUNTER — OFFICE VISIT (OUTPATIENT)
Dept: NEUROLOGY | Facility: MEDICAL CENTER | Age: 38
End: 2023-11-16
Attending: PSYCHIATRY & NEUROLOGY
Payer: COMMERCIAL

## 2023-11-16 VITALS
TEMPERATURE: 98 F | DIASTOLIC BLOOD PRESSURE: 68 MMHG | SYSTOLIC BLOOD PRESSURE: 118 MMHG | WEIGHT: 199.74 LBS | HEIGHT: 63 IN | BODY MASS INDEX: 35.39 KG/M2 | OXYGEN SATURATION: 96 % | HEART RATE: 87 BPM

## 2023-11-16 DIAGNOSIS — G35 MS (MULTIPLE SCLEROSIS) (HCC): Primary | ICD-10-CM

## 2023-11-16 PROCEDURE — 99215 OFFICE O/P EST HI 40 MIN: CPT | Performed by: PSYCHIATRY & NEUROLOGY

## 2023-11-16 PROCEDURE — 99417 PROLNG OP E/M EACH 15 MIN: CPT | Performed by: PSYCHIATRY & NEUROLOGY

## 2023-11-16 PROCEDURE — 3078F DIAST BP <80 MM HG: CPT | Performed by: PSYCHIATRY & NEUROLOGY

## 2023-11-16 PROCEDURE — 99212 OFFICE O/P EST SF 10 MIN: CPT | Performed by: PSYCHIATRY & NEUROLOGY

## 2023-11-16 PROCEDURE — 3074F SYST BP LT 130 MM HG: CPT | Performed by: PSYCHIATRY & NEUROLOGY

## 2023-11-16 RX ORDER — ONDANSETRON 4 MG/1
TABLET, FILM COATED ORAL
COMMUNITY
Start: 2023-10-20

## 2023-11-16 ASSESSMENT — FIBROSIS 4 INDEX: FIB4 SCORE: 0.33

## 2023-12-07 ENCOUNTER — TELEPHONE (OUTPATIENT)
Dept: NEUROLOGY | Facility: MEDICAL CENTER | Age: 38
End: 2023-12-07
Payer: COMMERCIAL

## 2023-12-07 NOTE — TELEPHONE ENCOUNTER
Prior Authorization for Kesimpta 20 MG/0.4ML SOAJ has been approved for a quantity of 0.4ml, day supply 28 - LAST FILL DT 20231129 FILLED AT PHARMACY [facility] SPECIALTY [address],PHONE #[phone]    Prior Authorization reference number: N/A  Insurance: BCBS FEP  Effective dates: 11/02/2023 - 12/01/2024 (12/01/2025)  Copay: unsure     Is patient eligible to fill with [facility]? Yes    Next Steps:  TC RTS - NEXT AVAILABLE FILL DATE 20231221

## 2023-12-22 NOTE — PROGRESS NOTES
Assumed care of pt at 1420. Report taken from Krys Barreto. Pt A&O x 4 and care plan discussed with no questions at this time. Pt drowsy and wants to rest. Pt in no sign of distress and hourly rounding initiated.    Your COVID test was negative.    Your rapid strep test was negative. No antibiotics are needed at this time.   For sore throat you can use Cepacol lozenges, do warm salt water gargles, drink warm water with lemon or herbal teas, or use an over-the-counter throat spray (Chloraseptic).    Follow up with your PCP in 3-5 days if symptoms persist.    Go to the ER if symptoms significantly worsen.   Follow up with PCP in 3-5 days.  Proceed to  ER if symptoms worsen.

## 2024-01-07 ENCOUNTER — PATIENT MESSAGE (OUTPATIENT)
Dept: NEUROLOGY | Facility: MEDICAL CENTER | Age: 39
End: 2024-01-07

## 2024-01-07 ENCOUNTER — OFFICE VISIT (OUTPATIENT)
Dept: URGENT CARE | Facility: PHYSICIAN GROUP | Age: 39
End: 2024-01-07
Payer: COMMERCIAL

## 2024-01-07 VITALS
DIASTOLIC BLOOD PRESSURE: 76 MMHG | OXYGEN SATURATION: 96 % | WEIGHT: 209.2 LBS | TEMPERATURE: 98.3 F | RESPIRATION RATE: 16 BRPM | HEART RATE: 95 BPM | BODY MASS INDEX: 37.07 KG/M2 | SYSTOLIC BLOOD PRESSURE: 110 MMHG | HEIGHT: 63 IN

## 2024-01-07 DIAGNOSIS — J02.9 PHARYNGITIS, UNSPECIFIED ETIOLOGY: ICD-10-CM

## 2024-01-07 DIAGNOSIS — R05.1 ACUTE COUGH: ICD-10-CM

## 2024-01-07 DIAGNOSIS — G43.109 MIGRAINE WITH AURA AND WITHOUT STATUS MIGRAINOSUS, NOT INTRACTABLE: Primary | ICD-10-CM

## 2024-01-07 DIAGNOSIS — Z87.09 HISTORY OF ASTHMA: ICD-10-CM

## 2024-01-07 DIAGNOSIS — R21 RASH: ICD-10-CM

## 2024-01-07 LAB
FLUAV RNA SPEC QL NAA+PROBE: NEGATIVE
FLUBV RNA SPEC QL NAA+PROBE: NEGATIVE
RSV RNA SPEC QL NAA+PROBE: NEGATIVE
SARS-COV-2 RNA RESP QL NAA+PROBE: NEGATIVE

## 2024-01-07 PROCEDURE — 0241U POCT CEPHEID COV-2, FLU A/B, RSV - PCR: CPT | Performed by: FAMILY MEDICINE

## 2024-01-07 PROCEDURE — 3078F DIAST BP <80 MM HG: CPT | Performed by: FAMILY MEDICINE

## 2024-01-07 PROCEDURE — 99213 OFFICE O/P EST LOW 20 MIN: CPT | Performed by: FAMILY MEDICINE

## 2024-01-07 PROCEDURE — 3074F SYST BP LT 130 MM HG: CPT | Performed by: FAMILY MEDICINE

## 2024-01-07 RX ORDER — PREDNISONE 20 MG/1
60 TABLET ORAL ONCE
Qty: 3 TABLET | Refills: 0 | Status: SHIPPED | OUTPATIENT
Start: 2024-01-07 | End: 2024-01-07

## 2024-01-07 RX ORDER — CARBAMAZEPINE 100 MG/1
100 TABLET, CHEWABLE ORAL 3 TIMES DAILY
COMMUNITY

## 2024-01-07 RX ORDER — TRIAMCINOLONE ACETONIDE 1 MG/G
OINTMENT TOPICAL
Qty: 30 G | Refills: 0 | Status: SHIPPED | OUTPATIENT
Start: 2024-01-07

## 2024-01-07 RX ORDER — DEXTROMETHORPHAN HYDROBROMIDE AND PROMETHAZINE HYDROCHLORIDE 15; 6.25 MG/5ML; MG/5ML
5 SYRUP ORAL 4 TIMES DAILY PRN
Qty: 120 ML | Refills: 0 | Status: SHIPPED | OUTPATIENT
Start: 2024-01-07

## 2024-01-07 RX ORDER — ALBUTEROL SULFATE 90 UG/1
2 AEROSOL, METERED RESPIRATORY (INHALATION) EVERY 4 HOURS PRN
Qty: 1 EACH | Refills: 0 | Status: SHIPPED | OUTPATIENT
Start: 2024-01-07

## 2024-01-07 RX ORDER — CEFDINIR 300 MG/1
300 CAPSULE ORAL 2 TIMES DAILY
COMMUNITY
Start: 2023-11-06 | End: 2024-01-07

## 2024-01-07 RX ORDER — ERENUMAB-AOOE 140 MG/ML
INJECTION, SOLUTION SUBCUTANEOUS
COMMUNITY
Start: 2023-12-18 | End: 2024-02-20

## 2024-01-07 ASSESSMENT — ENCOUNTER SYMPTOMS
EYE DISCHARGE: 0
MYALGIAS: 0
VOMITING: 0
EYE REDNESS: 0
WEIGHT LOSS: 0
NAUSEA: 0

## 2024-01-07 ASSESSMENT — FIBROSIS 4 INDEX: FIB4 SCORE: 0.34

## 2024-01-07 NOTE — PROGRESS NOTES
"Subjective     Rehana Meade is a 38 y.o. female who presents with Cough (Stiff neck, body aches, headache x4 days, rash on R hand middle finger x2 days )            4 days productive cough without blood in sputum. ST. [facility]. Myalgia. Stiff neck. No photophobia. PMH meningitis and symptoms are not the same. No SOB/wheeze. +PMH asthma out of rescue inhaler. No PMH pneumonia. Home C19 test negative. No other aggravating or alleviating factors.          Review of Systems   Constitutional:  Negative for malaise/fatigue and weight loss.   Eyes:  Negative for discharge and redness.   Gastrointestinal:  Negative for nausea and vomiting.   Musculoskeletal:  Negative for joint pain and myalgias.   Skin:  Positive for rash (tiny itching red dots to dorsum of fingers. No vesicles.). Negative for itching.              Objective     /76 (BP Location: Right arm, Patient Position: Sitting, BP Cuff Size: Adult)   Pulse 95   Temp 36.8 °C (98.3 °F) (Temporal)   Resp 16   Ht 1.6 m (5' 3\")   Wt 94.9 kg (209 lb 3.2 oz)   LMP 11/16/2019   SpO2 96%   BMI 37.06 kg/m²      Physical Exam  Constitutional:       General: She is not in acute distress.     Appearance: She is well-developed.   HENT:      Head: Normocephalic and atraumatic.      Right Ear: Tympanic membrane normal.      Left Ear: Tympanic membrane normal.      Nose: Congestion present.      Mouth/Throat:      Mouth: Mucous membranes are moist.      Pharynx: No posterior oropharyngeal erythema.   Eyes:      Conjunctiva/sclera: Conjunctivae normal.   Neck:      Comments: No meningeal signs  Cardiovascular:      Rate and Rhythm: Normal rate and regular rhythm.      Heart sounds: Normal heart sounds. No murmur heard.  Pulmonary:      Effort: Pulmonary effort is normal.      Breath sounds: Normal breath sounds. No wheezing.   Musculoskeletal:      Cervical back: Neck supple. No rigidity.   Skin:     General: Skin is warm and dry.      Findings: Rash (3-4 tiny red " prurititc papuls dorsum bilateral fingers. No vesicles.) present.   Neurological:      Mental Status: She is alert.                             Assessment & Plan        1. Acute cough  POCT CEPHEID COV-2, FLU A/B, RSV - PCR    promethazine-dextromethorphan (PROMETHAZINE-DM) 6.25-15 MG/5ML syrup      2. Pharyngitis, unspecified etiology  POCT CEPHEID COV-2, FLU A/B, RSV - PCR    predniSONE (DELTASONE) 20 MG Tab      3. History of asthma  albuterol 108 (90 Base) MCG/ACT Aero Soln inhalation aerosol      4. Rash  triamcinolone acetonide (KENALOG) 0.1 % Ointment        Differential diagnosis, natural history, supportive care, and indications for immediate follow-up were discussed.     Unclear etiology of rash.  Possible dyshidrosis.    F/u studies

## 2024-01-07 NOTE — LETTER
January 7, 2024         Patient: Rehana Meade   YOB: 1985   Date of Visit: 1/7/2024           To Whom it May Concern:    Rehana Meade was seen in my clinic on 1/7/2024. Please excuse work absences due to illness. She may return when symptoms are improving and without fever for 24 hours.     Sincerely,           Arden King M.D.  Electronically Signed      Discharged

## 2024-01-08 RX ORDER — PROCHLORPERAZINE MALEATE 10 MG
10 TABLET ORAL EVERY 6 HOURS PRN
Qty: 30 TABLET | Refills: 3 | Status: SHIPPED | OUTPATIENT
Start: 2024-01-08 | End: 2024-04-07

## 2024-01-08 RX ORDER — RIMEGEPANT SULFATE 75 MG/75MG
75 TABLET, ORALLY DISINTEGRATING ORAL
Qty: 8 TABLET | Refills: 5 | Status: SHIPPED | OUTPATIENT
Start: 2024-01-08 | End: 2024-02-07

## 2024-01-09 ENCOUNTER — TELEPHONE (OUTPATIENT)
Dept: NEUROLOGY | Facility: MEDICAL CENTER | Age: 39
End: 2024-01-09
Payer: COMMERCIAL

## 2024-01-09 NOTE — TELEPHONE ENCOUNTER
PriorPrior Authorization for NURTEC 75 MG TBDP (Quantity: 8, Days: 30) has been submitted via Cover My Meds: Key ([id number] - [place] Case ID: [id number])    Insurance: BCBS FEP    Will follow up in 24-48 business hours.

## 2024-01-10 ENCOUNTER — OFFICE VISIT (OUTPATIENT)
Dept: URGENT CARE | Facility: PHYSICIAN GROUP | Age: 39
End: 2024-01-10
Payer: COMMERCIAL

## 2024-01-10 ENCOUNTER — HOSPITAL ENCOUNTER (OUTPATIENT)
Facility: MEDICAL CENTER | Age: 39
End: 2024-01-10
Payer: COMMERCIAL

## 2024-01-10 ENCOUNTER — TELEPHONE (OUTPATIENT)
Dept: NEUROLOGY | Facility: MEDICAL CENTER | Age: 39
End: 2024-01-10
Payer: COMMERCIAL

## 2024-01-10 VITALS
RESPIRATION RATE: 16 BRPM | BODY MASS INDEX: 37.03 KG/M2 | SYSTOLIC BLOOD PRESSURE: 110 MMHG | TEMPERATURE: 98.6 F | HEART RATE: 111 BPM | HEIGHT: 63 IN | DIASTOLIC BLOOD PRESSURE: 72 MMHG | WEIGHT: 209 LBS | OXYGEN SATURATION: 96 %

## 2024-01-10 DIAGNOSIS — N30.01 ACUTE CYSTITIS WITH HEMATURIA: ICD-10-CM

## 2024-01-10 DIAGNOSIS — B96.89 BACTERIAL SINUSITIS: ICD-10-CM

## 2024-01-10 DIAGNOSIS — J32.9 BACTERIAL SINUSITIS: ICD-10-CM

## 2024-01-10 DIAGNOSIS — R05.1 ACUTE COUGH: ICD-10-CM

## 2024-01-10 LAB
APPEARANCE UR: CLEAR
BILIRUB UR STRIP-MCNC: NEGATIVE MG/DL
COLOR UR AUTO: YELLOW
GLUCOSE UR STRIP.AUTO-MCNC: NEGATIVE MG/DL
KETONES UR STRIP.AUTO-MCNC: NEGATIVE MG/DL
LEUKOCYTE ESTERASE UR QL STRIP.AUTO: NEGATIVE
NITRITE UR QL STRIP.AUTO: NEGATIVE
PH UR STRIP.AUTO: 6 [PH] (ref 5–8)
PROT UR QL STRIP: NEGATIVE MG/DL
RBC UR QL AUTO: NORMAL
SP GR UR STRIP.AUTO: 1.01
UROBILINOGEN UR STRIP-MCNC: 0.2 MG/DL

## 2024-01-10 PROCEDURE — 3074F SYST BP LT 130 MM HG: CPT

## 2024-01-10 PROCEDURE — 3078F DIAST BP <80 MM HG: CPT

## 2024-01-10 PROCEDURE — 81002 URINALYSIS NONAUTO W/O SCOPE: CPT

## 2024-01-10 PROCEDURE — 99214 OFFICE O/P EST MOD 30 MIN: CPT

## 2024-01-10 PROCEDURE — 87086 URINE CULTURE/COLONY COUNT: CPT

## 2024-01-10 RX ORDER — AMOXICILLIN AND CLAVULANATE POTASSIUM 875; 125 MG/1; MG/1
1 TABLET, FILM COATED ORAL 2 TIMES DAILY
Qty: 14 TABLET | Refills: 0 | Status: SHIPPED | OUTPATIENT
Start: 2024-01-10 | End: 2024-01-17

## 2024-01-10 RX ORDER — BENZONATATE 100 MG/1
100 CAPSULE ORAL 3 TIMES DAILY PRN
Qty: 60 CAPSULE | Refills: 0 | Status: SHIPPED | OUTPATIENT
Start: 2024-01-10

## 2024-01-10 ASSESSMENT — ENCOUNTER SYMPTOMS
FLANK PAIN: 0
COUGH: 1
FEVER: 1
SHORTNESS OF BREATH: 0
SINUS PAIN: 1

## 2024-01-10 ASSESSMENT — FIBROSIS 4 INDEX: FIB4 SCORE: 0.34

## 2024-01-10 NOTE — TELEPHONE ENCOUNTER
Prior Authorization for NURTEC 75 MG TBDP has been approved for a quantity of 16, day supply 30 - Copay $0.00 after $60.00 MFG Voucher please mention voucher to patient. OR Copay $180.00 #48/90 DS.    Prior Authorization reference number: [id number]  Insurance: [facility]  Effective dates: 01/09/2024 - 07/07/2024  Copay: $0.00     Is patient eligible to fill with [facility]? Yes    Next Steps: The Patients copay is less than $5.00. Will contact the patient to determine choice of pharmacy, if applicable.

## 2024-01-10 NOTE — PROGRESS NOTES
Subjective:     CHIEF COMPLAINT  Chief Complaint   Patient presents with    Headache     Cough, sinus pressure/congestion, bloody mucous,  not feeling better from last visit     UTI     Blood in urine, X1DAY       HPI  Rehana Meade is a very pleasant 38 y.o. female who presents with a headache, nasal congestion, and sinus pressure that has been present for over a week.  She was originally seen in the urgent care on 1/7/2024 and reports her symptoms have progressively worsened since then.  She was given Promethazine DM for her cough at that time, reports it has not been effective.  She had a temperature of 100.4 last night and reports she has had fevers off and on.     Additionally, she noticed a pinching sensation while she was urinating yesterday evening and noticed a relatively large amount of blood on the toilet paper after urinating.  She did not notice blood on the toilet paper while urinating today, although she did still experience the pinching sensation.  She has not noticed any changes in urgency/frequency from her baseline.  She denies any new onset flank pain from her baseline.      REVIEW OF SYSTEMS  Review of Systems   Constitutional:  Positive for fever and malaise/fatigue.   HENT:  Positive for congestion and sinus pain.    Respiratory:  Positive for cough. Negative for shortness of breath.    Cardiovascular:  Negative for chest pain.   Genitourinary:  Positive for dysuria (Pinching pain, brief) and hematuria. Negative for flank pain, frequency and urgency.       PAST MEDICAL HISTORY  Patient Active Problem List    Diagnosis Date Noted    Partial symptomatic epilepsy with complex partial seizures, not intractable, without status epilepticus ([facility]) 11/30/2021    Obesity 10/07/2021    Nausea 01/06/2021    Multiple sclerosis ([facility]) 01/02/2021    History of Chiari malformation 01/02/2021    Seizure disorder ([facility]) 12/04/2019    Seizure cerebral ([facility]) 02/15/2018    Other chest pain 01/08/2018     "AF (amaurosis fugax) 01/08/2018    Leukocytosis 04/13/2017    Intractable headache 04/12/2017    Aseptic meningitis 09/03/2015    Migraine without aura and without status migrainosus, not intractable 09/03/2015    Anxiety 08/29/2015    Asthma 08/29/2015    Paresthesia 08/28/2015    Aseptic meningitis 08/28/2015    Encounter for sterilization 07/11/2014    Indication for care in labor or delivery 04/22/2014       SURGICAL HISTORY   has a past surgical history that includes septoplasty (10/28/2009); somnoplasty (10/28/2009); nasal polypectomy (10/28/2009); tubal coagulation laparoscopic bilateral (07/11/2014); vaginal hysterectomy total (01/27/2020); cystoscopy (01/27/2020); and hysterectomy laparoscopy.    ALLERGIES  Allergies   Allergen Reactions    Amitriptyline Unspecified     Suicidal    Clarithromycin Hives    Sulfa Drugs Anaphylaxis    Cantaloupe Itching    Honey Dew Itching    Latex Itching    Lorazepam Unspecified     Hallucinations      Metoclopramide Unspecified     Muscle spasms    Penicillin G Potassium Vomiting    Rizatriptan Unspecified     Tremors, confusion      Sumatriptan Unspecified     \"Makes my head pins and needles\"    Tape Rash    Morphine Hives and Itching       CURRENT MEDICATIONS  Home Medications       Reviewed by RENO HerreraANupur-CNupur (Physician Assistant) on 01/10/24 at 1236  Med List Status: <None>     Medication Last Dose Status   AIMOVIG 140 MG/ML Solution Auto-injector Taking Active   albuterol 108 (90 Base) MCG/ACT Aero Soln inhalation aerosol PRN Active   baclofen (LIORESAL) 10 MG Tab Taking Active   carBAMazepine (TEGRETOL) 100 MG Chew Tab Taking Active   diclofenac sodium (VOLTAREN) 1 % Gel Taking Active   Erenumab (AIMOVIG, 140 MG DOSE,) 70 MG/ML Solution Auto-injector Taking Active   KESIMPTA 20 MG/0.4ML Solution Auto-injector Taking Active   ondansetron (ZOFRAN) 4 MG Tab tablet Taking Active   prochlorperazine (COMPAZINE) 10 MG Tab Taking Active " "  promethazine-dextromethorphan (PROMETHAZINE-DM) 6.25-15 MG/5ML syrup Taking Active   Rimegepant Sulfate (NURTEC) 75 MG TABLET DISPERSIBLE Taking Active   tizanidine (ZANAFLEX) 4 MG Tab Taking Active   triamcinolone acetonide (KENALOG) 0.1 % Ointment Taking Active                    SOCIAL HISTORY  Social History     Tobacco Use    Smoking status: Never    Smokeless tobacco: Never   Vaping Use    Vaping Use: Never used   Substance and Sexual Activity    Alcohol use: Not Currently     Comment: rarely    Drug use: Never    Sexual activity: Yes     Partners: Male       FAMILY HISTORY  Family History   Problem Relation Age of Onset    No Known Problems Mother     Sleep Apnea Father     Prostate cancer Paternal Grandfather         George          Objective:     VITAL SIGNS: /72 (BP Location: Left arm, Patient Position: Sitting, BP Cuff Size: Adult)   Pulse (!) 111   Temp 37 °C (98.6 °F) (Temporal)   Resp 16   Ht 1.6 m (5' 3\")   Wt 94.8 kg (209 lb)   LMP 11/16/2019   SpO2 96%   BMI 37.02 kg/m²     PHYSICAL EXAM  Physical Exam  Vitals reviewed.   Constitutional:       General: She is not in acute distress.     Appearance: Normal appearance. She is ill-appearing. She is not toxic-appearing.   HENT:      Head: Normocephalic and atraumatic.      Comments: Tenderness over bilateral maxillary sinuses     Nose: Congestion present.      Mouth/Throat:      Mouth: Mucous membranes are moist.      Pharynx: No oropharyngeal exudate or posterior oropharyngeal erythema.   Eyes:      Conjunctiva/sclera: Conjunctivae normal.   Cardiovascular:      Rate and Rhythm: Regular rhythm. Tachycardia present.      Heart sounds: Normal heart sounds.   Pulmonary:      Effort: Pulmonary effort is normal. No respiratory distress.      Breath sounds: Normal breath sounds. No stridor. No wheezing, rhonchi or rales.   Abdominal:      Tenderness: There is no right CVA tenderness or left CVA tenderness.   Skin:     General: Skin is warm " and dry.      Coloration: Skin is not jaundiced or pale.      Findings: No rash.   Neurological:      General: No focal deficit present.      Mental Status: She is alert.   Psychiatric:         Mood and Affect: Mood normal.         Assessment/Plan:     1. Bacterial sinusitis  - amoxicillin-clavulanate (AUGMENTIN) 875-125 MG Tab; Take 1 Tablet by mouth 2 times a day for 7 days.  Dispense: 14 Tablet; Refill: 0    2. Acute cystitis with hematuria  - URINE CULTURE(NEW); Future  - amoxicillin-clavulanate (AUGMENTIN) 875-125 MG Tab; Take 1 Tablet by mouth 2 times a day for 7 days.  Dispense: 14 Tablet; Refill: 0  - POCT Urinalysis    3. Acute cough  - benzonatate (TESSALON) 100 MG Cap; Take 1 Capsule by mouth 3 times a day as needed for Cough.  Dispense: 60 Capsule; Refill: 0  -Tylenol over-the-counter as needed for pain  -Increase water intake until UTI symptoms resolve  -Return to clinic if symptoms worsen or fail to resolve    MDM/Comments:  Patient is displaying systemic symptoms including tachycardia on physical examination. These symptoms are likely contributed to bacterial sinusitis and discomfort. I have prepared for this visit by personally reviewing the patient's prevous medical records, vitals, and labs including: most recent . Patient has stable vital signs and is non-toxic appearing. Discussed supportive care with hydration, rest, Tylenol as needed.  Recommend use of Flonase for congestion and possible allergy component of symptoms.  Discussed use of saline nasal flushes using distilled water.  Discussed that sinus infections are typically viral, yet given duration of symptoms I suspect a bacterial origin of symptoms in this case.  Antibiotic treatment started.  Urinalysis with trace intact blood.  Urine sent for culture.  Discussed that Augmentin could perform dual coverage for acute cystitis and bacterial sinusitis. Patient demonstrated understanding of treatment plan at this time and will RTC if  symptoms worsen or fail to resolve.       Differential diagnosis, natural history, supportive care, and indications for immediate follow-up discussed. All questions answered. Patient agrees with the plan of care.    Follow-up as needed if symptoms worsen or fail to improve to PCP, Urgent care or Emergency Room.    I have personally reviewed prior external notes and test results pertinent to today's visit.  I have independently reviewed and interpreted all diagnostics ordered during this urgent care acute visit.   Discussed management options (risks,benefits, and alternatives to treatment). Pt expresses understanding and the treatment plan was agreed upon. Questions were encouraged and answered to pt's satisfaction.    Please note that this dictation was created using voice recognition software. I have made a reasonable attempt to correct obvious errors, but I expect that there are errors of grammar and possibly content that I did not discover before finalizing the note.

## 2024-01-11 DIAGNOSIS — N30.01 ACUTE CYSTITIS WITH HEMATURIA: ICD-10-CM

## 2024-01-13 LAB
BACTERIA UR CULT: NORMAL
SIGNIFICANT IND 70042: NORMAL
SITE SITE: NORMAL
SOURCE SOURCE: NORMAL

## 2024-02-06 ENCOUNTER — TELEPHONE (OUTPATIENT)
Dept: NEUROLOGY | Facility: MEDICAL CENTER | Age: 39
End: 2024-02-06
Payer: COMMERCIAL

## 2024-02-06 NOTE — TELEPHONE ENCOUNTER
I spoke to Rehana.  Plan to monitor her symptoms for now.  If she does not improve over the next few days she will let me know.    -PARESH

## 2024-02-08 DIAGNOSIS — G35 MS (MULTIPLE SCLEROSIS) (HCC): Primary | ICD-10-CM

## 2024-02-08 DIAGNOSIS — F40.240 CLAUSTROPHOBIA: ICD-10-CM

## 2024-02-08 RX ORDER — 0.9 % SODIUM CHLORIDE 0.9 %
10 VIAL (ML) INJECTION PRN
Status: CANCELLED | OUTPATIENT
Start: 2024-02-08

## 2024-02-08 RX ORDER — 0.9 % SODIUM CHLORIDE 0.9 %
VIAL (ML) INJECTION PRN
Status: CANCELLED | OUTPATIENT
Start: 2024-02-08

## 2024-02-08 RX ORDER — SODIUM CHLORIDE 9 MG/ML
INJECTION, SOLUTION INTRAVENOUS CONTINUOUS
Status: CANCELLED | OUTPATIENT
Start: 2024-02-08

## 2024-02-08 RX ORDER — 0.9 % SODIUM CHLORIDE 0.9 %
3 VIAL (ML) INJECTION PRN
Status: CANCELLED | OUTPATIENT
Start: 2024-02-08

## 2024-02-08 RX ORDER — DIAZEPAM 5 MG/1
TABLET ORAL
Qty: 3 TABLET | Refills: 0 | Status: SHIPPED | OUTPATIENT
Start: 2024-02-08 | End: 2024-03-08

## 2024-02-09 ENCOUNTER — OUTPATIENT INFUSION SERVICES (OUTPATIENT)
Dept: ONCOLOGY | Facility: MEDICAL CENTER | Age: 39
End: 2024-02-09
Attending: PSYCHIATRY & NEUROLOGY
Payer: COMMERCIAL

## 2024-02-09 VITALS
RESPIRATION RATE: 17 BRPM | OXYGEN SATURATION: 100 % | WEIGHT: 199.74 LBS | TEMPERATURE: 98.8 F | DIASTOLIC BLOOD PRESSURE: 80 MMHG | HEART RATE: 89 BPM | HEIGHT: 63 IN | SYSTOLIC BLOOD PRESSURE: 118 MMHG | BODY MASS INDEX: 35.39 KG/M2

## 2024-02-09 DIAGNOSIS — G35 MULTIPLE SCLEROSIS (HCC): ICD-10-CM

## 2024-02-09 PROCEDURE — 700111 HCHG RX REV CODE 636 W/ 250 OVERRIDE (IP): Performed by: PSYCHIATRY & NEUROLOGY

## 2024-02-09 PROCEDURE — 96365 THER/PROPH/DIAG IV INF INIT: CPT

## 2024-02-09 PROCEDURE — 700105 HCHG RX REV CODE 258: Performed by: PSYCHIATRY & NEUROLOGY

## 2024-02-09 RX ORDER — 0.9 % SODIUM CHLORIDE 0.9 %
3 VIAL (ML) INJECTION PRN
Status: CANCELLED | OUTPATIENT
Start: 2024-02-10

## 2024-02-09 RX ORDER — 0.9 % SODIUM CHLORIDE 0.9 %
10 VIAL (ML) INJECTION PRN
Status: CANCELLED | OUTPATIENT
Start: 2024-02-10

## 2024-02-09 RX ORDER — 0.9 % SODIUM CHLORIDE 0.9 %
VIAL (ML) INJECTION PRN
Status: CANCELLED | OUTPATIENT
Start: 2024-02-10

## 2024-02-09 RX ORDER — SODIUM CHLORIDE 9 MG/ML
INJECTION, SOLUTION INTRAVENOUS CONTINUOUS
Status: CANCELLED | OUTPATIENT
Start: 2024-02-10

## 2024-02-09 RX ADMIN — SODIUM CHLORIDE 1000 MG: 9 INJECTION, SOLUTION INTRAVENOUS at 16:22

## 2024-02-09 ASSESSMENT — FIBROSIS 4 INDEX: FIB4 SCORE: 0.34

## 2024-02-10 ENCOUNTER — OUTPATIENT INFUSION SERVICES (OUTPATIENT)
Dept: ONCOLOGY | Facility: MEDICAL CENTER | Age: 39
End: 2024-02-10
Attending: PSYCHIATRY & NEUROLOGY
Payer: COMMERCIAL

## 2024-02-10 VITALS
SYSTOLIC BLOOD PRESSURE: 108 MMHG | OXYGEN SATURATION: 95 % | RESPIRATION RATE: 16 BRPM | TEMPERATURE: 98.9 F | DIASTOLIC BLOOD PRESSURE: 73 MMHG | HEART RATE: 67 BPM

## 2024-02-10 DIAGNOSIS — G35 MULTIPLE SCLEROSIS (HCC): ICD-10-CM

## 2024-02-10 PROCEDURE — 700105 HCHG RX REV CODE 258: Performed by: PSYCHIATRY & NEUROLOGY

## 2024-02-10 PROCEDURE — 700111 HCHG RX REV CODE 636 W/ 250 OVERRIDE (IP): Mod: JZ | Performed by: PSYCHIATRY & NEUROLOGY

## 2024-02-10 PROCEDURE — 96365 THER/PROPH/DIAG IV INF INIT: CPT

## 2024-02-10 RX ORDER — 0.9 % SODIUM CHLORIDE 0.9 %
10 VIAL (ML) INJECTION PRN
Status: CANCELLED | OUTPATIENT
Start: 2024-02-11

## 2024-02-10 RX ORDER — 0.9 % SODIUM CHLORIDE 0.9 %
VIAL (ML) INJECTION PRN
Status: CANCELLED | OUTPATIENT
Start: 2024-02-11

## 2024-02-10 RX ORDER — 0.9 % SODIUM CHLORIDE 0.9 %
3 VIAL (ML) INJECTION PRN
Status: CANCELLED | OUTPATIENT
Start: 2024-02-11

## 2024-02-10 RX ORDER — SODIUM CHLORIDE 9 MG/ML
INJECTION, SOLUTION INTRAVENOUS CONTINUOUS
Status: CANCELLED | OUTPATIENT
Start: 2024-02-11

## 2024-02-10 RX ADMIN — SODIUM CHLORIDE 1000 MG: 9 INJECTION, SOLUTION INTRAVENOUS at 15:29

## 2024-02-10 NOTE — PROGRESS NOTES
Rehana presents to infusion for day 1/5 of methylprednisolone for MS exacerbation. Reports weakness and tremors associated with exacerbation. Patient has has Solumedrol infusions before and has tolerated well.     PIV started with positive blood return. Solumedrol infused over 30 minutes, patient tolerated well with no adverse effects.     PIV flushed post infusion, no blood return obtained but flushes easily with no pain or swelling noted. PIV locked, padded and wrapped for use tomorrow.     Patient left in stable condition, knows when to return for appt tomorrow.

## 2024-02-11 ENCOUNTER — OUTPATIENT INFUSION SERVICES (OUTPATIENT)
Dept: ONCOLOGY | Facility: MEDICAL CENTER | Age: 39
End: 2024-02-11
Attending: PSYCHIATRY & NEUROLOGY
Payer: COMMERCIAL

## 2024-02-11 VITALS
TEMPERATURE: 98 F | RESPIRATION RATE: 18 BRPM | OXYGEN SATURATION: 98 % | WEIGHT: 197.09 LBS | DIASTOLIC BLOOD PRESSURE: 83 MMHG | SYSTOLIC BLOOD PRESSURE: 125 MMHG | HEIGHT: 63 IN | HEART RATE: 98 BPM | BODY MASS INDEX: 34.92 KG/M2

## 2024-02-11 DIAGNOSIS — G35 MULTIPLE SCLEROSIS (HCC): ICD-10-CM

## 2024-02-11 PROCEDURE — 96365 THER/PROPH/DIAG IV INF INIT: CPT

## 2024-02-11 PROCEDURE — 700105 HCHG RX REV CODE 258: Performed by: PSYCHIATRY & NEUROLOGY

## 2024-02-11 PROCEDURE — 700111 HCHG RX REV CODE 636 W/ 250 OVERRIDE (IP): Mod: JZ | Performed by: PSYCHIATRY & NEUROLOGY

## 2024-02-11 RX ORDER — SODIUM CHLORIDE 9 MG/ML
INJECTION, SOLUTION INTRAVENOUS CONTINUOUS
Status: CANCELLED | OUTPATIENT
Start: 2024-02-12

## 2024-02-11 RX ORDER — 0.9 % SODIUM CHLORIDE 0.9 %
3 VIAL (ML) INJECTION PRN
Status: CANCELLED | OUTPATIENT
Start: 2024-02-12

## 2024-02-11 RX ORDER — 0.9 % SODIUM CHLORIDE 0.9 %
10 VIAL (ML) INJECTION PRN
Status: CANCELLED | OUTPATIENT
Start: 2024-02-12

## 2024-02-11 RX ORDER — 0.9 % SODIUM CHLORIDE 0.9 %
VIAL (ML) INJECTION PRN
Status: CANCELLED | OUTPATIENT
Start: 2024-02-12

## 2024-02-11 RX ADMIN — SODIUM CHLORIDE 1000 MG: 9 INJECTION, SOLUTION INTRAVENOUS at 11:47

## 2024-02-11 ASSESSMENT — FIBROSIS 4 INDEX: FIB4 SCORE: 0.34

## 2024-02-11 NOTE — PROGRESS NOTES
Rehana presents to infusion for day 2/5 of Solumedrol for MS exacerbation. Reports some improvement in symptoms, states legs feel a little less heavy today.     PIV still in place from yesterday, patient reports some tenderness around IV site. PIV unlocked and flushed with NS with positive blood return.     Solumedrol infused over 1 hour, 3/4 of the way through infusion patient reports burning above IV site, area of swelling noted 2 inches above insertion site. PIV d/adelina with tip intact and new IV inserted with ultrasound. Remaining Solumedrol infused without issue.      PIV flushed post infusion with positive blood return and locked, padded and wrapped for use tomorrow. Patient left in stable condition, knows when to return for appt tomorrow.

## 2024-02-11 NOTE — PROGRESS NOTES
Rehana arrives to [facility] for day 3 (of 5) Solu-medrol for an MS exacerbation. Patient denies acute health concerns today. Reports headache, fatigue, and mood swings worsened overnight after yesterday's dose. 22g PIV to LFA, placed yesterday, continues to flush easily and have positive blood return. Solu-medrol infused over 1 hour per patient request. At the beginning of the infusion, patient c/o soreness below of PIV site. No s/s infiltration nor phlebitis noted. Warm packs applied above and below PIV site. 250 cc NS bag infused concurrently with solu-medrol. With the aforementioned interventions, patient reported some relief. Patient was able to tolerate the remainder of the infusion well without further adverse s/s. Due to continued arm soreness, patient requested the PIV be removed. PIV with brisk blood return post-infusion and removed with tip intact. Patient returns tomorrow. Discharged home to self care in no apparent distress.

## 2024-02-12 ENCOUNTER — OUTPATIENT INFUSION SERVICES (OUTPATIENT)
Dept: ONCOLOGY | Facility: MEDICAL CENTER | Age: 39
End: 2024-02-12
Attending: PSYCHIATRY & NEUROLOGY
Payer: COMMERCIAL

## 2024-02-12 VITALS
WEIGHT: 197.97 LBS | DIASTOLIC BLOOD PRESSURE: 76 MMHG | SYSTOLIC BLOOD PRESSURE: 138 MMHG | TEMPERATURE: 99 F | HEART RATE: 68 BPM | HEIGHT: 63 IN | OXYGEN SATURATION: 95 % | RESPIRATION RATE: 18 BRPM | BODY MASS INDEX: 35.08 KG/M2

## 2024-02-12 DIAGNOSIS — G35 MULTIPLE SCLEROSIS (HCC): ICD-10-CM

## 2024-02-12 PROCEDURE — 700105 HCHG RX REV CODE 258: Performed by: PSYCHIATRY & NEUROLOGY

## 2024-02-12 PROCEDURE — 700111 HCHG RX REV CODE 636 W/ 250 OVERRIDE (IP): Mod: JZ | Performed by: PSYCHIATRY & NEUROLOGY

## 2024-02-12 PROCEDURE — 96365 THER/PROPH/DIAG IV INF INIT: CPT

## 2024-02-12 RX ORDER — 0.9 % SODIUM CHLORIDE 0.9 %
10 VIAL (ML) INJECTION PRN
Status: CANCELLED | OUTPATIENT
Start: 2024-02-13

## 2024-02-12 RX ORDER — 0.9 % SODIUM CHLORIDE 0.9 %
3 VIAL (ML) INJECTION PRN
Status: CANCELLED | OUTPATIENT
Start: 2024-02-13

## 2024-02-12 RX ORDER — 0.9 % SODIUM CHLORIDE 0.9 %
VIAL (ML) INJECTION PRN
Status: CANCELLED | OUTPATIENT
Start: 2024-02-13

## 2024-02-12 RX ORDER — SODIUM CHLORIDE 9 MG/ML
INJECTION, SOLUTION INTRAVENOUS CONTINUOUS
Status: CANCELLED | OUTPATIENT
Start: 2024-02-13

## 2024-02-12 RX ADMIN — SODIUM CHLORIDE 1000 MG: 9 INJECTION, SOLUTION INTRAVENOUS at 11:23

## 2024-02-12 ASSESSMENT — FIBROSIS 4 INDEX: FIB4 SCORE: 0.34

## 2024-02-12 NOTE — PROGRESS NOTES
Pt arrived ambulatory using walker for day 4 Solumedrol.  Pt c/o trouble sleeping due to steroids, but otherwise tolerating infusions well.  PIV started in RFA with + blood return, Solumedrol infused over 60 minutes.  Pt requests NS to be run concurrently to cut down on burning sensation in vein. Pt tolerated infusion well, IV flushed, green caps applied, and left in place to return for tomorrow's infusion.  Dc'd home without incident and will return tomorrow as scheduled.

## 2024-02-13 ENCOUNTER — PATIENT MESSAGE (OUTPATIENT)
Dept: NEUROLOGY | Facility: MEDICAL CENTER | Age: 39
End: 2024-02-13

## 2024-02-13 ENCOUNTER — OUTPATIENT INFUSION SERVICES (OUTPATIENT)
Dept: ONCOLOGY | Facility: MEDICAL CENTER | Age: 39
End: 2024-02-13
Attending: PSYCHIATRY & NEUROLOGY
Payer: COMMERCIAL

## 2024-02-13 VITALS
OXYGEN SATURATION: 97 % | WEIGHT: 201.72 LBS | HEIGHT: 63 IN | RESPIRATION RATE: 18 BRPM | BODY MASS INDEX: 35.74 KG/M2 | TEMPERATURE: 98.2 F | DIASTOLIC BLOOD PRESSURE: 70 MMHG | SYSTOLIC BLOOD PRESSURE: 108 MMHG | HEART RATE: 81 BPM

## 2024-02-13 DIAGNOSIS — G35 MS (MULTIPLE SCLEROSIS) (HCC): Primary | ICD-10-CM

## 2024-02-13 DIAGNOSIS — G35 MULTIPLE SCLEROSIS (HCC): ICD-10-CM

## 2024-02-13 PROCEDURE — 96365 THER/PROPH/DIAG IV INF INIT: CPT

## 2024-02-13 PROCEDURE — 700111 HCHG RX REV CODE 636 W/ 250 OVERRIDE (IP): Mod: JZ | Performed by: PSYCHIATRY & NEUROLOGY

## 2024-02-13 PROCEDURE — 700105 HCHG RX REV CODE 258: Performed by: PSYCHIATRY & NEUROLOGY

## 2024-02-13 RX ORDER — 0.9 % SODIUM CHLORIDE 0.9 %
VIAL (ML) INJECTION PRN
Status: CANCELLED | OUTPATIENT
Start: 2024-02-13

## 2024-02-13 RX ORDER — 0.9 % SODIUM CHLORIDE 0.9 %
3 VIAL (ML) INJECTION PRN
Status: CANCELLED | OUTPATIENT
Start: 2024-02-13

## 2024-02-13 RX ORDER — 0.9 % SODIUM CHLORIDE 0.9 %
10 VIAL (ML) INJECTION PRN
Status: CANCELLED | OUTPATIENT
Start: 2024-02-13

## 2024-02-13 RX ORDER — SODIUM CHLORIDE 9 MG/ML
INJECTION, SOLUTION INTRAVENOUS CONTINUOUS
Status: CANCELLED | OUTPATIENT
Start: 2024-02-13

## 2024-02-13 RX ORDER — SODIUM CHLORIDE 9 MG/ML
INJECTION, SOLUTION INTRAVENOUS CONTINUOUS
Status: DISCONTINUED | OUTPATIENT
Start: 2024-02-13 | End: 2024-02-13 | Stop reason: HOSPADM

## 2024-02-13 RX ADMIN — SODIUM CHLORIDE: 9 INJECTION, SOLUTION INTRAVENOUS at 14:40

## 2024-02-13 RX ADMIN — SODIUM CHLORIDE 1000 MG: 9 INJECTION, SOLUTION INTRAVENOUS at 14:40

## 2024-02-13 ASSESSMENT — FIBROSIS 4 INDEX: FIB4 SCORE: 0.34

## 2024-02-13 NOTE — PROGRESS NOTES
Pt arrived ambulatory using a walker for Day 5/5 Solumedrol infusion for MS exacerbation. POC discussed with pt and she agrees with plan.     PIV in place from yesterday, positive blood return, flushed with NS. Pt medicated per MAR. Solumedrol infused over 1 hour. Pt tolerated treatment without s/s adverse reaction. PIV dc'd catheter tip intact, gauze and coban dressing applied.     Pt discharged to self care, NAD. No need for additional appointments at this time. Pt will follow up with MD.

## 2024-02-16 ENCOUNTER — HOSPITAL ENCOUNTER (OUTPATIENT)
Dept: RADIOLOGY | Facility: MEDICAL CENTER | Age: 39
End: 2024-02-16
Attending: PSYCHIATRY & NEUROLOGY
Payer: COMMERCIAL

## 2024-02-16 DIAGNOSIS — G35 MS (MULTIPLE SCLEROSIS) (HCC): ICD-10-CM

## 2024-02-16 DIAGNOSIS — G43.109 MIGRAINE WITH AURA AND WITHOUT STATUS MIGRAINOSUS, NOT INTRACTABLE: Primary | ICD-10-CM

## 2024-02-16 PROCEDURE — 70553 MRI BRAIN STEM W/O & W/DYE: CPT

## 2024-02-16 PROCEDURE — A9579 GAD-BASE MR CONTRAST NOS,1ML: HCPCS | Performed by: PSYCHIATRY & NEUROLOGY

## 2024-02-16 PROCEDURE — 700117 HCHG RX CONTRAST REV CODE 255: Performed by: PSYCHIATRY & NEUROLOGY

## 2024-02-16 PROCEDURE — 72156 MRI NECK SPINE W/O & W/DYE: CPT

## 2024-02-16 RX ADMIN — GADOTERIDOL 20 ML: 279.3 INJECTION, SOLUTION INTRAVENOUS at 15:58

## 2024-02-20 ENCOUNTER — TELEPHONE (OUTPATIENT)
Dept: NEUROLOGY | Facility: MEDICAL CENTER | Age: 39
End: 2024-02-20
Payer: COMMERCIAL

## 2024-02-20 RX ORDER — ERENUMAB-AOOE 140 MG/ML
140 INJECTION, SOLUTION SUBCUTANEOUS
Qty: 1 ML | Refills: 11 | Status: SHIPPED | OUTPATIENT
Start: 2024-02-20 | End: 2024-02-22

## 2024-02-20 NOTE — TELEPHONE ENCOUNTER
Received request via: Pharmacy    Medication Name/Dosage AIMOVIG 140 MG/ML Solution Auto-injector     When was medication last prescribed 12/18/23    How many refills were previously provided 11    How many Refills does he patient have left from last prescription 0    Was the patient seen in the last year in this department? Yes   Date of last office visit 11/16/23     Per last Neurology Office Visit, when was the date of next follow up visit set for?                            Date of office visit follow up request 6 months      Does the patient have an upcoming appointment? Yes   If yes, when 05/16/24             If no, schedule appointment N/A     Does the patient have long-term Plus and need 100 day supply (blood pressure, diabetes and cholesterol meds only)? Patient does not have SCP

## 2024-02-20 NOTE — TELEPHONE ENCOUNTER
Prior Authorization for Aimovig 140 MG/ML SOAJ (Quantity: 1ml, Days: 30) has been submitted via Cover My Meds: Key ([id number] - [place] Case ID: [id number])    Insurance: [employer] FEP     Will follow up in 24-48 business hours.

## 2024-02-21 DIAGNOSIS — G43.109 MIGRAINE WITH AURA AND WITHOUT STATUS MIGRAINOSUS, NOT INTRACTABLE: ICD-10-CM

## 2024-02-21 NOTE — TELEPHONE ENCOUNTER
Received request via: Pharmacy    Medication Name/Dosage Aimovig 140MG/ML    When was medication last prescribed 02/20/24    How many refills were previously provided 11    How many Refills does he patient have left from last prescription 11    Was the patient seen in the last year in this department? Yes   Date of last office visit 11/16/23     Per last Neurology Office Visit, when was the date of next follow up visit set for?                            Date of office visit follow up request 11/16/23     Does the patient have an upcoming appointment? Yes   If yes, when 05/06/24             If no, schedule appointment N/A    Does the patient have group home Plus and need 100 day supply (blood pressure, diabetes and cholesterol meds only)? Medication is not for cholesterol, blood pressure or diabetes    PHARMACY DID NOT RECEIVE THIS MEDICATION

## 2024-02-22 ENCOUNTER — TELEPHONE (OUTPATIENT)
Dept: NEUROLOGY | Facility: MEDICAL CENTER | Age: 39
End: 2024-02-22
Payer: COMMERCIAL

## 2024-02-22 PROCEDURE — RXMED WILLOW AMBULATORY MEDICATION CHARGE: Performed by: PSYCHIATRY & NEUROLOGY

## 2024-02-22 RX ORDER — ERENUMAB-AOOE 140 MG/ML
INJECTION, SOLUTION SUBCUTANEOUS
Qty: 1 ML | Refills: 11 | Status: SHIPPED | OUTPATIENT
Start: 2024-02-22 | End: 2024-03-20 | Stop reason: SDUPTHER

## 2024-02-22 NOTE — TELEPHONE ENCOUNTER
Received Refill PA request via [facility]  for Aimovig 140 MG/ML SOAJ. (Quantity:1ml, Day Supply:30) - Copay $60.00 (90 DS RTS - MAX QTY OF 3.000  DAYS QUANTITY REMAINING 1.000 NEXT FILL DT 20240319, LAST FILL DT 20240112 @[facility],PH# [phone])     Insurance: [facility] FEP  Member ID:  [id number]  BIN: [id number]  PCN: [id number]  Group: [id number]     Ran Test claim via [place] & medication Pays for a $60.00 copay. Will outreach to patient to offer specialty pharmacy services and or release to preferred pharmacy

## 2024-02-23 ENCOUNTER — PHARMACY VISIT (OUTPATIENT)
Dept: PHARMACY | Facility: MEDICAL CENTER | Age: 39
End: 2024-02-23
Payer: COMMERCIAL

## 2024-02-23 ENCOUNTER — DOCUMENTATION (OUTPATIENT)
Dept: PHARMACY | Facility: MEDICAL CENTER | Age: 39
End: 2024-02-23
Payer: COMMERCIAL

## 2024-02-23 NOTE — PROGRESS NOTES
PHARMACIST PRE SCREEN - Transfer Onboarding  Diagnosis: Migraine with aura and without status migrainosus, not intractable [G43.109]     Drug & Non-Drug Allergies:   EMR Reviewed. No concerning drug or non-drug allergies.     Drug Therapy (name/formulation/dose/route of admin/freq): Aimovig 140mg/mL pen, inject 1 pen under the skin every 30 days   EMR Reviewed         -Dose Appropriateness (Y/N): Y         -Renal or hepatic dose adjustments (Y/N): N         -Any comorbidities, PMH, precautions or contraindications exist that pose medication safety concerns (Y/N): N, MS, seizures, anxiety, asthma noted          -Any relevant lab(s) needed that affects the initial start date (Y/N): N  List Past Treatments: NSAIDs, phenergan, Aimovig, triptans, nurtec, ubrelvy   EMR Med List reviewed. List DDI’s:   - Cat C: Baclofen + Compazine + Diazepam + Tegretol + Tizanidine + Zofran= CNS depression, monitor for sedation     Patient’s ability to self-administer medication:   No issues identified per EMR     List Goals of Therapy:  - To reduce migraine frequency, duration, and severity  - To improve acute medication responsiveness and reduce the need for acute attacks  - To identify and modify migraine triggers           Initial Assessment   Dx: Migraine with aura and without status migrainosus, not intractable [G43.109]       Tx prescribed: Aimovig 140mg/mL pen, inject 1 pen under the skin every 30 days     - Administration: declined- has been on Aimovig for years, familiar with Aimovig and injections,  gives injections    - Proper Handling/Disposal: declined- familiar with med    - Storage/Excursion: declined - familiar with med    - Duration of therapy:  until ineffective or intolerable side effects       Adherence & Potential Barriers: was taking the Aimovig the 19th every month but late this month due to previous pharmacy used, advised to inject today when received and continue with the 23rd every month     - Missed dose  mgmt:  asap and then adjust a month out from date given, if 1-2 days late can keep same schedule        List Common, Serious SE & Mitigation Reviewed: declined- familiar with Aimovig    List Precautions Reviewed: declined - familiar with Aimovig    List Current SE Reviewed (if applicable): constipation     - Mitigation/mgmt: working with PCP to manage, tolerable for migraine benefit        S/Sx: pain but said she's used to it    How many migraine days in the past month? 7-8, said they've been worse this past month because of being late on Aimovig, being sick and MS flare    Pain severity: 7/10, used to it    Avg duration of migraine in past month: couple of days      Clinically Relevant, Abnormal Labs: none recent to review        Wellness/Lifestyle Counseling:    - Support/QOL: has been on Aimovig for years with it working well for her migraines, not missing anything because of migraines but herbert through since she is used to it and has lived with them her entire life, migraines worse this past month but late on dose because of previous pharmacy and she has been sick and has an MS flare     - Immunizations: deferred*       Med Rec/Updated drug list: EMR inaccurate, medication changes reviewed with patient. (-) Tegretol, (+) ibuprofen as needed    DI Check:     - Cat C: Baclofen + Compazine + Diazepam + Tizanidine + Zofran= CNS depression, monitor for sedation, takes baclofen during the day since it doesn't make her drowsy and Tizanidine at night since it makes her drowsy, established    Common DI & Dietary Avoidance: can call if starting any new meds to confirm no DDIs       Goals of Therapy:    - To reduce migraine frequency, duration, and severity   - To improve acute medication responsiveness and reduce the need for acute attacks   - To identify and modify migraine triggers   Patient has agreed/understands to goals of therapy during education/counseling      Additional:    Had the pleasure of speaking with  Rehana to review her Aimovig. She has been taking it for years and switched pharmacies due to issues with previous pharmacy and being late this dose. Familiar with med and injections, declined full review. She has constipation from Aimovig but is working with PCP to manage, said she can tolerate that with benefits of Aimovig for her migraines. Reported 7-8 migraine days this past month with pain 7/10 lasting a couple of days. Said this month has been worse because of being late on Aimovig as well as having MS flare and being sick. Let her know we're another line of support if she has any questions or concerns. She was appreciative of call and welcoming to future calls.

## 2024-02-26 ENCOUNTER — HOSPITAL ENCOUNTER (OUTPATIENT)
Dept: LAB | Facility: MEDICAL CENTER | Age: 39
End: 2024-02-26
Attending: NURSE PRACTITIONER
Payer: COMMERCIAL

## 2024-02-26 ENCOUNTER — HOSPITAL ENCOUNTER (OUTPATIENT)
Dept: LAB | Facility: MEDICAL CENTER | Age: 39
End: 2024-02-26
Attending: PSYCHIATRY & NEUROLOGY
Payer: COMMERCIAL

## 2024-02-26 DIAGNOSIS — G35 MS (MULTIPLE SCLEROSIS) (HCC): ICD-10-CM

## 2024-02-26 LAB
ALBUMIN SERPL BCP-MCNC: 4.1 G/DL (ref 3.2–4.9)
ALBUMIN/GLOB SERPL: 1.3 G/DL
ALP SERPL-CCNC: 105 U/L (ref 30–99)
ALT SERPL-CCNC: 26 U/L (ref 2–50)
ANION GAP SERPL CALC-SCNC: 12 MMOL/L (ref 7–16)
AST SERPL-CCNC: 19 U/L (ref 12–45)
BASOPHILS # BLD AUTO: 0.5 % (ref 0–1.8)
BASOPHILS # BLD AUTO: 0.6 % (ref 0–1.8)
BASOPHILS # BLD: 0.05 K/UL (ref 0–0.12)
BASOPHILS # BLD: 0.06 K/UL (ref 0–0.12)
BILIRUB SERPL-MCNC: 0.3 MG/DL (ref 0.1–1.5)
BUN SERPL-MCNC: 10 MG/DL (ref 8–22)
CALCIUM ALBUM COR SERPL-MCNC: 9.3 MG/DL (ref 8.5–10.5)
CALCIUM SERPL-MCNC: 9.4 MG/DL (ref 8.5–10.5)
CHLORIDE SERPL-SCNC: 102 MMOL/L (ref 96–112)
CHOLEST SERPL-MCNC: 241 MG/DL (ref 100–199)
CO2 SERPL-SCNC: 24 MMOL/L (ref 20–33)
CREAT SERPL-MCNC: 0.62 MG/DL (ref 0.5–1.4)
EOSINOPHIL # BLD AUTO: 0.34 K/UL (ref 0–0.51)
EOSINOPHIL # BLD AUTO: 0.38 K/UL (ref 0–0.51)
EOSINOPHIL NFR BLD: 3.3 % (ref 0–6.9)
EOSINOPHIL NFR BLD: 3.7 % (ref 0–6.9)
ERYTHROCYTE [DISTWIDTH] IN BLOOD BY AUTOMATED COUNT: 46.1 FL (ref 35.9–50)
ERYTHROCYTE [DISTWIDTH] IN BLOOD BY AUTOMATED COUNT: 46.5 FL (ref 35.9–50)
FASTING STATUS PATIENT QL REPORTED: NORMAL
GFR SERPLBLD CREATININE-BSD FMLA CKD-EPI: 117 ML/MIN/1.73 M 2
GLOBULIN SER CALC-MCNC: 3.2 G/DL (ref 1.9–3.5)
GLUCOSE SERPL-MCNC: 93 MG/DL (ref 65–99)
HCT VFR BLD AUTO: 44.2 % (ref 37–47)
HCT VFR BLD AUTO: 44.5 % (ref 37–47)
HDLC SERPL-MCNC: 97 MG/DL
HGB BLD-MCNC: 14.8 G/DL (ref 12–16)
HGB BLD-MCNC: 14.8 G/DL (ref 12–16)
IMM GRANULOCYTES # BLD AUTO: 0.03 K/UL (ref 0–0.11)
IMM GRANULOCYTES # BLD AUTO: 0.04 K/UL (ref 0–0.11)
IMM GRANULOCYTES NFR BLD AUTO: 0.3 % (ref 0–0.9)
IMM GRANULOCYTES NFR BLD AUTO: 0.4 % (ref 0–0.9)
LDLC SERPL CALC-MCNC: 132 MG/DL
LYMPHOCYTES # BLD AUTO: 1.76 K/UL (ref 1–4.8)
LYMPHOCYTES # BLD AUTO: 1.88 K/UL (ref 1–4.8)
LYMPHOCYTES NFR BLD: 17.1 % (ref 22–41)
LYMPHOCYTES NFR BLD: 18.2 % (ref 22–41)
MCH RBC QN AUTO: 30.3 PG (ref 27–33)
MCH RBC QN AUTO: 30.5 PG (ref 27–33)
MCHC RBC AUTO-ENTMCNC: 33.3 G/DL (ref 32.2–35.5)
MCHC RBC AUTO-ENTMCNC: 33.5 G/DL (ref 32.2–35.5)
MCV RBC AUTO: 90.9 FL (ref 81.4–97.8)
MCV RBC AUTO: 91.2 FL (ref 81.4–97.8)
MONOCYTES # BLD AUTO: 0.71 K/UL (ref 0–0.85)
MONOCYTES # BLD AUTO: 0.76 K/UL (ref 0–0.85)
MONOCYTES NFR BLD AUTO: 6.9 % (ref 0–13.4)
MONOCYTES NFR BLD AUTO: 7.3 % (ref 0–13.4)
NEUTROPHILS # BLD AUTO: 7.23 K/UL (ref 1.82–7.42)
NEUTROPHILS # BLD AUTO: 7.42 K/UL (ref 1.82–7.42)
NEUTROPHILS NFR BLD: 69.8 % (ref 44–72)
NEUTROPHILS NFR BLD: 71.9 % (ref 44–72)
NRBC # BLD AUTO: 0 K/UL
NRBC # BLD AUTO: 0 K/UL
NRBC BLD-RTO: 0 /100 WBC (ref 0–0.2)
NRBC BLD-RTO: 0 /100 WBC (ref 0–0.2)
PLATELET # BLD AUTO: 276 K/UL (ref 164–446)
PLATELET # BLD AUTO: 279 K/UL (ref 164–446)
PMV BLD AUTO: 10 FL (ref 9–12.9)
PMV BLD AUTO: 10.2 FL (ref 9–12.9)
POTASSIUM SERPL-SCNC: 3.8 MMOL/L (ref 3.6–5.5)
PROT SERPL-MCNC: 7.3 G/DL (ref 6–8.2)
RBC # BLD AUTO: 4.86 M/UL (ref 4.2–5.4)
RBC # BLD AUTO: 4.88 M/UL (ref 4.2–5.4)
RHEUMATOID FACT SER IA-ACNC: <10 IU/ML (ref 0–14)
SODIUM SERPL-SCNC: 138 MMOL/L (ref 135–145)
T3FREE SERPL-MCNC: 2.83 PG/ML (ref 2–4.4)
T4 FREE SERPL-MCNC: 0.95 NG/DL (ref 0.93–1.7)
THYROPEROXIDASE AB SERPL-ACNC: <9 IU/ML (ref 0–9)
TRIGL SERPL-MCNC: 59 MG/DL (ref 0–149)
TSH SERPL DL<=0.005 MIU/L-ACNC: 1.19 UIU/ML (ref 0.38–5.33)
URATE SERPL-MCNC: 4.5 MG/DL (ref 1.9–8.2)
WBC # BLD AUTO: 10.3 K/UL (ref 4.8–10.8)
WBC # BLD AUTO: 10.4 K/UL (ref 4.8–10.8)

## 2024-02-26 PROCEDURE — 85025 COMPLETE CBC W/AUTO DIFF WBC: CPT

## 2024-02-26 PROCEDURE — 86359 T CELLS TOTAL COUNT: CPT

## 2024-02-26 PROCEDURE — 80053 COMPREHEN METABOLIC PANEL: CPT

## 2024-02-26 PROCEDURE — 86355 B CELLS TOTAL COUNT: CPT

## 2024-02-26 PROCEDURE — 86431 RHEUMATOID FACTOR QUANT: CPT

## 2024-02-26 PROCEDURE — 82784 ASSAY IGA/IGD/IGG/IGM EACH: CPT

## 2024-02-26 PROCEDURE — 86360 T CELL ABSOLUTE COUNT/RATIO: CPT

## 2024-02-26 PROCEDURE — 84481 FREE ASSAY (FT-3): CPT

## 2024-02-26 PROCEDURE — 84550 ASSAY OF BLOOD/URIC ACID: CPT

## 2024-02-26 PROCEDURE — 86357 NK CELLS TOTAL COUNT: CPT

## 2024-02-26 PROCEDURE — 84443 ASSAY THYROID STIM HORMONE: CPT

## 2024-02-26 PROCEDURE — 86038 ANTINUCLEAR ANTIBODIES: CPT

## 2024-02-26 PROCEDURE — 80061 LIPID PANEL: CPT

## 2024-02-26 PROCEDURE — 86812 HLA TYPING A B OR C: CPT

## 2024-02-26 PROCEDURE — 85025 COMPLETE CBC W/AUTO DIFF WBC: CPT | Mod: 91

## 2024-02-26 PROCEDURE — 86800 THYROGLOBULIN ANTIBODY: CPT

## 2024-02-26 PROCEDURE — 36415 COLL VENOUS BLD VENIPUNCTURE: CPT

## 2024-02-26 PROCEDURE — 86376 MICROSOMAL ANTIBODY EACH: CPT

## 2024-02-26 PROCEDURE — 84439 ASSAY OF FREE THYROXINE: CPT

## 2024-02-28 LAB
ANNOTATION COMMENT IMP: ABNORMAL
CD19 CELLS NFR SPEC: 0 % (ref 6–23)
CD3 CELLS # BLD: 1619 CELLS/UL (ref 570–2400)
CD3 CELLS NFR SPEC: 90 % (ref 62–87)
CD3+CD4+ CELLS # BLD: 866 CELLS/UL (ref 430–1800)
CD3+CD4+ CELLS NFR BLD: 48 % (ref 32–64)
CD3+CD4+ CELLS/CD3+CD8+ CLL BLD: 1.37 RATIO (ref 0.8–3.9)
CD3+CD8+ CELLS # BLD: 635 CELLS/UL (ref 210–1200)
CD3+CD8+ CELLS NFR SPEC: 35 % (ref 15–46)
CD3-CD16+CD56+ CELLS # SPEC: 180 CELLS/UL (ref 78–470)
CD3-CD16+CD56+ CELLS NFR SPEC: 10 % (ref 4–26)
CELLS.CD3-CD19+ [#/VOLUME] IN BLOOD: 0 CELLS/UL (ref 91–610)
HLA-B27 QL FC: POSITIVE
IGA SERPL-MCNC: 281 MG/DL (ref 68–408)
IGG SERPL-MCNC: 846 MG/DL (ref 768–1632)
IGM SERPL-MCNC: 70 MG/DL (ref 35–263)
THYROGLOB AB SERPL-ACNC: <0.9 IU/ML (ref 0–4)

## 2024-02-29 LAB — NUCLEAR IGG SER QL IA: NORMAL

## 2024-03-20 ENCOUNTER — TELEPHONE (OUTPATIENT)
Dept: PHARMACY | Facility: MEDICAL CENTER | Age: 39
End: 2024-03-20
Payer: COMMERCIAL

## 2024-03-20 DIAGNOSIS — G43.109 MIGRAINE WITH AURA AND WITHOUT STATUS MIGRAINOSUS, NOT INTRACTABLE: ICD-10-CM

## 2024-03-20 NOTE — TELEPHONE ENCOUNTER
Due to the copay card not working, pt is eligible for  the Evoucher at a participating pharmacy. Please send her order to the [facility] pharmacy.      Thanks,    Armida  Rx coordinator

## 2024-03-20 NOTE — TELEPHONE ENCOUNTER
Pt unhappy her Aimovig copay card is not working and she can not afford $60 pt wants medication sent back to [facility] on [address], sent message to liaison to transfer or see if we have samples.   She would like to come back to [facility] when the copay cards get fixed for Aimovig.

## 2024-03-21 ENCOUNTER — TELEPHONE (OUTPATIENT)
Dept: NEUROLOGY | Facility: MEDICAL CENTER | Age: 39
End: 2024-03-21
Payer: COMMERCIAL

## 2024-03-21 RX ORDER — ERENUMAB-AOOE 140 MG/ML
INJECTION, SOLUTION SUBCUTANEOUS
Qty: 1 ML | Refills: 11 | Status: SHIPPED | OUTPATIENT
Start: 2024-03-21

## 2024-03-21 NOTE — TELEPHONE ENCOUNTER
Received Refill PA request via MSOT  for Aimovig 140 MG/ML SOAJ. (Quantity:1ml, Day Supply:30) - Copay $60.00 - PA approved prev end date 02/20/2025 - (90 DS - [facility] - 04/29/2024)      Insurance: [facility] FEP  Member ID:  [id number]  BIN: [id number]  PCN: [id number]  Group: [id number]     Ran Test claim via [place] & medication Pays for a $60.00 copay. Will outreach to patient to offer specialty pharmacy services and or release to preferred pharmacy

## 2024-04-08 DIAGNOSIS — G43.109 MIGRAINE WITH AURA AND WITHOUT STATUS MIGRAINOSUS, NOT INTRACTABLE: ICD-10-CM

## 2024-04-09 RX ORDER — ERENUMAB-AOOE 140 MG/ML
INJECTION, SOLUTION SUBCUTANEOUS
Qty: 1 ML | Refills: 11 | OUTPATIENT
Start: 2024-04-09

## 2024-04-11 DIAGNOSIS — G43.109 MIGRAINE WITH AURA AND WITHOUT STATUS MIGRAINOSUS, NOT INTRACTABLE: ICD-10-CM

## 2024-04-15 RX ORDER — ERENUMAB-AOOE 140 MG/ML
INJECTION, SOLUTION SUBCUTANEOUS
Qty: 1 ML | Refills: 11 | Status: SHIPPED | OUTPATIENT
Start: 2024-04-15

## 2024-04-19 ENCOUNTER — TELEPHONE (OUTPATIENT)
Dept: PHARMACY | Facility: MEDICAL CENTER | Age: 39
End: 2024-04-19
Payer: COMMERCIAL

## 2024-04-19 NOTE — TELEPHONE ENCOUNTER
Rehana was upset during the time our copay cards were down during the change health care breach and was not happy she had to transfer out her Aimovig because she could not afford her copay and we had no samples. She had first said she would like to come back when it was fixed. I spoke with her today and she has changed her mind and wants to stay at [facility]. Discontinuing [facility] services

## 2024-05-06 ENCOUNTER — TELEPHONE (OUTPATIENT)
Dept: NEUROLOGY | Facility: MEDICAL CENTER | Age: 39
End: 2024-05-06
Payer: COMMERCIAL

## 2024-05-06 NOTE — TELEPHONE ENCOUNTER
Pt called and left a VM on 5/3 @9:31am stating that she has been experiencing some new symptoms and would like for you to be aware and advise if she should do anything, Pt states that she has had partial left arm numbness, which is numb from her pinky finger and ring finger up the outside of her arm. She is also having numbness down the outside of her left leg. I tried to call patient back this morning to get some more information and she did not answer, please advise.   -MARINO Gabriel.

## 2024-05-16 ENCOUNTER — TELEPHONE (OUTPATIENT)
Dept: NEUROLOGY | Facility: MEDICAL CENTER | Age: 39
End: 2024-05-16

## 2024-05-16 ENCOUNTER — OFFICE VISIT (OUTPATIENT)
Dept: NEUROLOGY | Facility: MEDICAL CENTER | Age: 39
End: 2024-05-16
Attending: PSYCHIATRY & NEUROLOGY
Payer: COMMERCIAL

## 2024-05-16 VITALS
DIASTOLIC BLOOD PRESSURE: 64 MMHG | HEART RATE: 79 BPM | SYSTOLIC BLOOD PRESSURE: 106 MMHG | TEMPERATURE: 97 F | OXYGEN SATURATION: 96 % | HEIGHT: 63 IN | BODY MASS INDEX: 36.6 KG/M2 | WEIGHT: 206.57 LBS

## 2024-05-16 DIAGNOSIS — G43.109 MIGRAINE WITH AURA AND WITHOUT STATUS MIGRAINOSUS, NOT INTRACTABLE: Primary | ICD-10-CM

## 2024-05-16 PROCEDURE — 3078F DIAST BP <80 MM HG: CPT | Performed by: PSYCHIATRY & NEUROLOGY

## 2024-05-16 PROCEDURE — 3074F SYST BP LT 130 MM HG: CPT | Performed by: PSYCHIATRY & NEUROLOGY

## 2024-05-16 PROCEDURE — 99214 OFFICE O/P EST MOD 30 MIN: CPT | Performed by: PSYCHIATRY & NEUROLOGY

## 2024-05-16 RX ORDER — PROCHLORPERAZINE MALEATE 10 MG
10 TABLET ORAL EVERY 6 HOURS PRN
COMMUNITY

## 2024-05-16 ASSESSMENT — PATIENT HEALTH QUESTIONNAIRE - PHQ9: CLINICAL INTERPRETATION OF PHQ2 SCORE: 0

## 2024-05-16 ASSESSMENT — FIBROSIS 4 INDEX: FIB4 SCORE: 0.51

## 2024-05-16 NOTE — TELEPHONE ENCOUNTER
Aimovig 140 MG/ML DEBORAH BENDER exp 02/20/2025 - [facility] - MAX QTY OF 3.000  DAYS QUANTITY REMAINING 1.000 NEXT FILL DT [id number], LAST FILL DT [id number] @[facility],PH# [phone] NON-SPECIALTY DRUG  + - 05/16/2024 1:28pm [facility]

## 2024-05-16 NOTE — PROGRESS NOTES
"[facility]  MULTIPLE SCLEROSIS & NEUROIMMUNOLOGY  FOLLOW-UP VISIT    DISEASE SUMMARY:  Principal neurologic diagnosis: MS  Diagnosis of MS: 1/7/2021  Disease History:  - 12/5/2019: blurry vision, left facial numbness, left upper- and lower extremity numbness; MRI head unremarkable; improved over the course of 1 week  - 12/25/2020: right monocular visual loss; returned to normal over ~2 hours  - 12/29/2020: onset of bilateral lower extremity weakness  - 1/2/202021: presented to hospital; MRI w/ lesions, LP w/ OCBs present; treated with IVMP with good response  - 2/28/2021: Gilenya FDO  - 3/4/2021: left margaret-body numbness; treated with IVMP w/ good response  - 5/1/2021: started Kesimpta  - 2/2024: episode of motor (left upper- and lower extremities, poor motor skills), sensory (diffuse sense of \"vibration\"), and difficulty walking, treated w/ IVMP x5 days, repeat imaging showed no evidence of disease activity  Disease course at onset: MS  Current disease course: MS  Previous disease therapies:  - Gilenya: additional clinical attack  Current disease therapies:  - Kesimpta  Symptomatic therapies:  - baclofen: effective for abdominal spasms (doesn't cause drowsiness)  - carbamazepine: unknown effectiveness  - cyclobenzaprine: ineffective  - tizanidine: effective, also helped promote sleep (probably more helpful than baclofen)  CSF (1/3/2021):  - RBCs: 394  - WBCs: 6  - protein: 58  - glucose: 81  - oligoclonal bands: positive (8)  - paraneoplastic autoantibody eval: negative  Other Testing:  - anti-AQP4 Ab (1/3/2021): negative  - anti-MOG IgG (1/3/2021): negative  - paraneoplastic autoantibody eval, serum (1/4/2020): negative  MRI head:  - 2/16/2024: \"stable... no abnormal enhancement...\"  - 7/20/2023: stable, no abnormal enhancement  - 4/22/2023: stable, no abnormal enhancement  - 6/2/2022: stable, no abnormal enhancement  - 10/6/2021: stable, no abnormal enhancement  - 5/8/2021: stable, no abnormal enhancement  - " "3/5/2021: enhancing lesions present  - 1/2/2021: juxta-cortical, periventricular, and infratentorial lesions w/ enhancement  - 12/5/2019: no visible lesions  - 5/13/2018: no visible lesions  MRI cervical spine:  - 2/16/2024: \"no significant interval change... no new signal abnormality... no abnormal enhancement...\"  - 7/20/2023: motion artifact limits evaluation  - 4/22/2023: stable, no abnormal enhancement  - 6/2/2022: stable, no abnormal enhancement  - 10/6/2021: stable, no abnormal enhancement  - 3/5/2021: single enhancing lesion present  - 1/2/2021: no visible lesions  MRI thoracic spine:  - 4/22/2023: no visible lesions  - 10/6/2021: no visible lesions  - 3/5/2021: no visible lesions  - 1/2/2021: no visible lesions  Immunizations:  - influenza: declines this (threw up for a month after receiving this in the past)  - pneumonia: Prevnar 20 11/6/2023  Cancer Screenings:  - PAP Smears: UTD as of 8/7/0223    CC: MS    INTERVAL HISTORY:  Rehana Meade is a 38 y.o. woman with MS and a history otherwise notable for migraines, aseptic meningitis, and seizure disorder.  I last saw her in the clinic on 11/16/2023.  At that time I recommended she continue Kesimpta.  Today, she was unaccompanied, and she provided the following interval history:    Rehana continues on Kesimpta.  She tolerate this well without side effects or infections.    Since the last visit there have been some temporary worsening of previous symptoms.  She has also noticed new-onset numbness involving the medial part of the left forearm as well as digits 4-5 of the left hand.  There is also numbness over the lateral left foot and lateral left leg.    Her migraine headaches are reasonably well-controlled, though Nurtec seems to cause fatigue.    MEDICATIONS:  Current Outpatient Medications   Medication Sig    prochlorperazine (COMPAZINE) 10 MG Tab Take 10 mg by mouth every 6 hours as needed for Nausea/Vomiting.    Ubrogepant 100 MG Tab Take " 100 mg at the onset of aura/HA; may re-dose x1 after 2 hrs if HA persists; MDD: 200 mg    Erenumab-aooe (AIMOVIG) 140 MG/ML Solution Auto-injector INJECT 140 MG UNDER THE SKIN EVERY 30 (THIRTY) DAYS FOR 1 DOSE.    Erenumab-aooe (AIMOVIG) 140 MG/ML Solution Auto-injector INJECT 140 MG UNDER THE SKIN EVERY 30 (THIRTY) DAYS FOR 1 DOSE.    albuterol 108 (90 Base) MCG/ACT Aero Soln inhalation aerosol Inhale 2 Puffs every four hours as needed for Shortness of Breath.    KESIMPTA 20 MG/0.4ML Solution Auto-injector INJECT 1 PEN UNDER THE SKIN EVERY MONTH    tizanidine (ZANAFLEX) 4 MG Tab Take 4 mg by mouth at bedtime as needed (Muscle Spasms).    baclofen (LIORESAL) 10 MG Tab Take 10 mg by mouth at bedtime as needed (Muscle Spasms).    diclofenac sodium (VOLTAREN) 1 % Gel Apply 2 g topically 4 times a day as needed (Pain).     MEDICAL, SOCIAL, AND FAMILY HISTORY:  There is no change in the patient's ROS or PFSH from their previous visit on 11/16/2023.    REVIEW OF SYSTEMS:  A ROS was completed.  Pertinent positives and negatives were included in the HPI, above.  All other systems were reviewed and are negative.    PHYSICAL EXAM:  General/Medical:  - NAD    Neuro:  MENTAL STATUS: awake and alert; no deficits of speech or language; oriented to conversation; affect was appropriate to situation; pleasant, cooperative    PHYSICAL EXAM:  General/Medical:  - NAD  - brace over right knee    Neuro:  MENTAL STATUS: awake and alert; no deficits of speech or language; oriented to conversation; affect was appropriate to situation; pleasant, cooperative    CRANIAL NERVES:    II: acuity: NT, fields: NT, pupils: NT, discs: NT    III/IV/VI: versions: grossly intact    V: facial sensation: NT    VII: facial expression: symmetric    VIII: hearing: intact to voice    IX/X: palate: NT    XI: shoulder shrug: NT    XII: tongue: NT    MOTOR:  - bulk: NT  - tone: NT  Upper Extremity Strength (R/L)    NT   Elbow flexion NT   Elbow extension NT    Shoulder abduction NT     Lower Extremity Strength  (R/L)   Hip flexion NT   Knee extension NT   Knee flexion NT   Ankle dorsiflexion NT   Ankle plantarflexion NT     - pronator drift: NT  - abnormal movements: none    SENSATION:  - light touch: NT  - vibration (R/L, seconds): NT at the great toes  - pinprick: NT  - proprioception: NT  - Romberg: NT    COORDINATION:  - finger to nose: NT  - finger tapping: NT    REFLEXES:  Reflex Right Left   BR NT NT   Biceps NT NT   Triceps NT NT   Patellae NT NT   Achilles NT NT   Toes NT NT     GAIT:  - narrow base  - normal     QUANTITATIVE SCORES:  Timed 25-foot walk (sec): 4.2 on 5/16/2024 (4.3 on 11/16/2023, 4.2 on 8/7/2023, 3.6 on 2/1/2023, 4.7 on 8/17/2021, 4.1 on 5/12/2021, 4.8 on 3/10/2021, 4.8 on 1/7/2021).  Assistive device: none    REVIEW OF IMAGING STUDIES:  I have summarized interval data above.    REVIEW OF LABORATORY STUDIES:  2/26/2024:  - CMP: WNL  - immunoglobulins (A, G, M): WNL  - lymphocyte subsets: CD19: 0    ASSESSMENT:  Rehana Meade is a 38 y.o. woman with multiple sclerosis and migraine w/ aura.  She remains clinically stable on Kesimpta, which she tolerates well.  We reviewed her interval symptoms, and these are likely related to pseudo-exacerbation.  The numbness over the left medial forearm and hand is probably related to an ulnar neuropathy at the elbow.  We discussed the option of EMG/NCS, and we agreed to hold off for now.  Since Nurtec seems to cause fatigue we will try switching to Ubrelvy.    PLAN:  Multiple Sclerosis:  - continue Kesimpta  - hold off on additional imaging for now    Migraine:  - switch Nurtec to Ubrelvy    Follow-Up:  Return in about 6 months (around 11/16/2024).    Signed: Rajeev Aggarwal M.D.

## 2024-05-22 ENCOUNTER — TELEPHONE (OUTPATIENT)
Dept: NEUROLOGY | Facility: MEDICAL CENTER | Age: 39
End: 2024-05-22
Payer: COMMERCIAL

## 2024-05-22 NOTE — TELEPHONE ENCOUNTER
Prior Authorization for Ubrelvy 100 MG Tabs (Quantity: 10, Days: 30) has been submitted via CMM: (Key: [id number])     Insurance: [employer] FEP    Will follow up in 24-48 business hours.

## 2024-05-29 NOTE — TELEPHONE ENCOUNTER
Ubrelvy 100 MG Tabs    Received PA Formulary Exception form       I answered what I could on the form and having designated liaison to assist answering clincial questions and having provider to sign.    Secure emailed PA Form to designated liaison for clinical questions and provider signature.

## 2024-08-07 NOTE — TELEPHONE ENCOUNTER
Message   Received: 2 days ago   Message Contents   Mikayla Steinberg M.D. sent to Acacia Hood, Med Ass't   Caller: Unspecified (2 days ago,  4:01 PM)             Go back to keppra 250mg bid then   Wait for inpatient 5 days video EEG      Called pt and informed her, she states has not started the 500 MG BID of the Keppra yet. Still taking 250 MG BID.   No indicators present

## 2024-09-18 ENCOUNTER — PATIENT MESSAGE (OUTPATIENT)
Dept: NEUROLOGY | Facility: MEDICAL CENTER | Age: 39
End: 2024-09-18
Payer: COMMERCIAL

## 2024-09-18 DIAGNOSIS — G43.109 MIGRAINE WITH AURA AND WITHOUT STATUS MIGRAINOSUS, NOT INTRACTABLE: ICD-10-CM

## 2024-09-19 ENCOUNTER — TELEPHONE (OUTPATIENT)
Dept: NEUROLOGY | Facility: MEDICAL CENTER | Age: 39
End: 2024-09-19
Payer: COMMERCIAL

## 2024-09-19 RX ORDER — RIMEGEPANT SULFATE 75 MG/75MG
TABLET, ORALLY DISINTEGRATING ORAL
COMMUNITY
End: 2024-09-19 | Stop reason: SDUPTHER

## 2024-09-19 RX ORDER — RIMEGEPANT SULFATE 75 MG/75MG
75 TABLET, ORALLY DISINTEGRATING ORAL PRN
Qty: 8 TABLET | Refills: 5 | Status: SHIPPED | OUTPATIENT
Start: 2024-09-19

## 2024-09-19 NOTE — TELEPHONE ENCOUNTER
Received New Start PA request via MSOT  for Nurtec. (Quantity:10, Day Supply:30)     Insurance: [employer] FEP  Member ID:  [id number]  BIN: [id number]  PCN: [id number]  Group: [id number]     Ran Test claim via [place] & medication Rejects stating prior authorization is required.

## 2024-09-19 NOTE — TELEPHONE ENCOUNTER
Prior Authorization for Nurtec (Quantity: 8, Days: 30) has been submitted via [id number]    Insurance: [employer]    Will follow up in 24-48 business hours.    Review of Systems   Constitutional: Negative.    HENT: Negative.     Eyes: Negative.    Respiratory: Negative.     Cardiovascular: Negative.    Gastrointestinal: Negative.    Endocrine: Negative.    Genitourinary: Negative.    Musculoskeletal: Negative.    Skin: Negative.    Allergic/Immunologic: Negative.    Neurological:  Positive for headaches (4 x's week).   Hematological: Negative.    Psychiatric/Behavioral: Negative.

## 2024-09-20 NOTE — TELEPHONE ENCOUNTER
First PA was Denied. Resubmitting.     Prior Authorization for Nurtec (Quantity: 8, Days: 30) has been submitted via Cover My Meds: Key ([id number])    Insurance: [facility]    Will follow up in 24-48 business hours.

## 2024-09-23 NOTE — TELEPHONE ENCOUNTER
Prior Authorization for nurtec has been approved for a quantity of 8 , day supply 30    Prior Authorization reference number: [id number]  Insurance: [employer]  Effective dates: 09/20/24-09/19/25  Copay: $0     Is patient eligible to fill with [facility]? Yes    Next Steps: The Patients copay is less than $5.00. Will contact the patient to determine choice of pharmacy, if applicable.

## 2024-09-24 NOTE — PROGRESS NOTES
[facility]  MULTIPLE SCLEROSIS & NEUROIMMUNOLOGY  FOLLOW-UP VISIT    DISEASE SUMMARY:  Principal neurologic diagnosis: MS  Diagnosis of MS: 1/7/2021  Disease History:  - 12/5/2019: blurry vision, left facial numbness, left upper- and lower extremity numbness; MRI head unremarkable; improved over the course of 1 week  - 12/25/2020: right monocular visual loss; returned to normal over ~2 hours  - 12/29/2020: onset of bilateral lower extremity weakness  - 1/2/202021: presented to hospital; MRI w/ lesions, LP w/ OCBs present; treated with IVMP with good response  - 2/28/2021: Gilenya FDO  - 3/4/2021: left margaret-body numbness; treated with IVMP w/ good response  - 5/1/2021: started Kesimpta  Disease course at onset: MS  Current disease course: MS  Previous disease therapies:  - Gilenya: additional clinical attack  Current disease therapies:  - Kesimpta  Symptomatic therapies:  - baclofen: effective for abdominal spasms (doesn't cause drowsiness)  - carbamazepine: unknown effectiveness  - cyclobenzaprine: ineffective  - tizanidine: effective, also helped promote sleep (probably more helpful than baclofen)  CSF (1/3/2021):  - RBCs: 394  - WBCs: 6  - protein: 58  - glucose: 81  - oligoclonal bands: positive (8)  - paraneoplastic autoantibody eval: negative  Other Testing:  - anti-AQP4 Ab (1/3/2021): negative  - anti-MOG IgG (1/3/2021): negative  - paraneoplastic autoantibody eval, serum (1/4/2020): negative  MRI head:  - 7/20/2023: stable, no abnormal enhancement  - 4/22/2023: stable, no abnormal enhancement  - 6/2/2022: stable, no abnormal enhancement  - 10/6/2021: stable, no abnormal enhancement  - 5/8/2021: stable, no abnormal enhancement  - 3/5/2021: enhancing lesions present  - 1/2/2021: juxta-cortical, periventricular, and infratentorial lesions w/ enhancement  - 12/5/2019: no visible lesions  - 5/13/2018: no visible lesions  MRI cervical spine:  - 7/20/2023: motion artifact limits evaluation  - 4/22/2023: stable,  "no abnormal enhancement  - 6/2/2022: stable, no abnormal enhancement  - 10/6/2021: stable, no abnormal enhancement  - 3/5/2021: single enhancing lesion present  - 1/2/2021: no visible lesions  MRI thoracic spine:  - 4/22/2023: no visible lesions  - 10/6/2021: no visible lesions  - 3/5/2021: no visible lesions  - 1/2/2021: no visible lesions  Immunizations:  - influenza: declines this (threw up for a month after receiving this in the past)  - pneumonia: Prevnar 20 11/6/2023  Cancer Screenings:  - PAP Smears: UTD as of 8/7/0223    CC: MS    INTERVAL HISTORY:  Rehana Meade is a 37 y.o. woman with MS and a history otherwise notable for migraines, aseptic meningitis, and seizure disorder.  I last saw her in the clinic on 8/7/2023.  At that time I recommended she continue Kesimpta and Aimovig.  Today, she was unaccompanied, and she provided the following interval history:    Rehana continues on Kesimpta.  She tolerate this well without side effects or infections.    10/2023:  She stopped carbamazepine for treatment of MS hug.    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  She turned when she suddenly felt pain beginning at the inferior occipital region and \"shooting\" anteriorly.  The pain was maximal for ~24 hours.  Standing up provoked pain.  Today, the pain is 2/10 severity.    11/15/2023:  Rehana's left leg \"buckled\" and she nearly fell.  Today, the left leg continues to be weak.  Her gait is impaired.    Her vision has been \"off and on\" for the last few weeks.  She occasionally experiences double or triple vision.  She followed up with her ophthalmologist who said that \"everything was fine.\"    Rehana has noticed swelling of the hands and feet.  This is the most severe in the morning.  After she gets up and moves around this improves.    She hasn't required baclofen or tizanidine very often.      MEDICATIONS:  Current Outpatient Medications   Medication Sig    ondansetron (ZOFRAN) 4 MG " Tab tablet     KESIMPTA 20 MG/0.4ML Solution Auto-injector INJECT 1 PEN UNDER THE SKIN EVERY MONTH    tizanidine (ZANAFLEX) 4 MG Tab Take 4 mg by mouth at bedtime as needed (Muscle Spasms).    Erenumab (AIMOVIG, 140 MG DOSE,) 70 MG/ML Solution Auto-injector Inject 140 mg under the skin Q30 DAYS.    baclofen (LIORESAL) 10 MG Tab Take 10 mg by mouth at bedtime as needed (Muscle Spasms).    diclofenac sodium (VOLTAREN) 1 % Gel Apply 2 g topically 4 times a day as needed (Pain).     MEDICAL, SOCIAL, AND FAMILY HISTORY:  There is no change in the patient's ROS or PFSH from their previous visit on 8/7/2023.    REVIEW OF SYSTEMS:  A ROS was completed.  Pertinent positives and negatives were included in the HPI, above.  All other systems were reviewed and are negative.    PHYSICAL EXAM:  General/Medical:  - NAD    Neuro:  MENTAL STATUS: awake and alert; no deficits of speech or language; oriented to conversation; affect was appropriate to situation; pleasant, cooperative    CRANIAL NERVES:    II: acuity: J1+/J1+, fields: intact to confrontation, pupils: 6/6 to 3/3 without APD, discs: sharp    III/IV/VI: versions: grossly intact    V: facial sensation: intact to light touch    VII: facial expression: symmetric    VIII: hearing: intact to finger rub    IX/X: palate: elevates symmetrically    XI: shoulder shrug: symmetric    XII: tongue: midline    MOTOR:  - bulk: normal throughout  - tone: normal in the upper extremities  Upper Extremity Strength  (R/L)    5/5   Elbow flexion 5/5   Elbow extension 5/5   Shoulder abduction 5/5     Lower Extremity Strength  (R/L)   Hip flexion 5/4(giveaway)   Knee extension 5/5   Knee flexion 5/5   Ankle plantarflexion 5/5   Ankle dorsiflexion NT     - pronator drift: absent  - abnormal movements: none    SENSATION:  - light touch: grossly intact to light touch  - vibration (R/L, seconds): NT at the great toes  - pinprick: NT  - proprioception: NT  - Romberg: absent    COORDINATION:  -  "finger to nose: intact  - finger tapping: rapid and accurate, bilaterally    REFLEXES:  Reflex Right Left   BR 2+ 2+   Biceps 3+ 2+   Triceps 2+ 2+   Patellae 2+ 2+   Achilles 2+ 2+   Toes NT NT     GAIT:  - narrow base  - heel walk: intact  - toe walk: intact  - tandem: intact     QUANTITATIVE SCORES:  Timed 25-foot walk (sec): 4.3 on 11/16/2023 (4.2 on 8/7/2023, 3.6 on 2/1/2023, 4.7 on 8/17/2021, 4.1 on 5/12/2021, 4.8 on 3/10/2021, 4.8 on 1/7/2021).  Assistive device: none    REVIEW OF IMAGING STUDIES:  I have summarized any interval data above.    REVIEW OF LABORATORY STUDIES:  No additional data since the last visit.    ASSESSMENT:  Rehana Meade is a 37 y.o. woman with multiple sclerosis and migraine w/ aura.  She remains clinically stable on Kesimpta, which she tolerates well.  We reviewed her interval symptoms.  I have a low suspicion that these represents breakthrough disease activity.  Plan to continue Kesimpta for now.    PLAN:  Multiple Sclerosis:  - continue Kesimpta    Follow-Up:  Return in about 6 months (around 5/16/2024).    Signed: Rajeev Aggarwal M.D.    BILLING DOCUMENTATION:   I spent 66 minutes reviewing the medical record, interviewing and examining the patient, discussing my impression (see \"assessment\" above), and coordinating care.  " Detail Level: Zone Detail Level: Generalized Detail Level: Detailed

## 2024-09-28 ENCOUNTER — HOSPITAL ENCOUNTER (OUTPATIENT)
Dept: LAB | Facility: MEDICAL CENTER | Age: 39
End: 2024-09-28
Attending: NURSE PRACTITIONER
Payer: COMMERCIAL

## 2024-09-28 LAB
T3FREE SERPL-MCNC: 2.69 PG/ML (ref 2–4.4)
T4 FREE SERPL-MCNC: 1.22 NG/DL (ref 0.93–1.7)
TSH SERPL-ACNC: 0.87 UIU/ML (ref 0.35–5.5)

## 2024-09-28 PROCEDURE — 84481 FREE ASSAY (FT-3): CPT

## 2024-09-28 PROCEDURE — 36415 COLL VENOUS BLD VENIPUNCTURE: CPT

## 2024-09-28 PROCEDURE — 84443 ASSAY THYROID STIM HORMONE: CPT

## 2024-09-28 PROCEDURE — 84439 ASSAY OF FREE THYROXINE: CPT

## 2024-10-02 ENCOUNTER — PATIENT MESSAGE (OUTPATIENT)
Dept: PHYSICAL MEDICINE AND REHAB | Facility: MEDICAL CENTER | Age: 39
End: 2024-10-02
Payer: COMMERCIAL

## 2024-10-18 DIAGNOSIS — G35 MULTIPLE SCLEROSIS (HCC): ICD-10-CM

## 2024-10-21 DIAGNOSIS — G35 MS (MULTIPLE SCLEROSIS) (HCC): ICD-10-CM

## 2024-10-21 RX ORDER — OFATUMUMAB 20 MG/.4ML
0.4 INJECTION, SOLUTION SUBCUTANEOUS
Qty: 0.4 ML | Refills: 11 | Status: SHIPPED | OUTPATIENT
Start: 2024-10-21 | End: 2025-10-21

## 2024-10-21 RX ORDER — PROCHLORPERAZINE MALEATE 10 MG
TABLET ORAL
Qty: 30 TABLET | Refills: 3 | Status: SHIPPED | OUTPATIENT
Start: 2024-10-21

## 2024-11-20 ENCOUNTER — OFFICE VISIT (OUTPATIENT)
Dept: NEUROLOGY | Facility: MEDICAL CENTER | Age: 39
End: 2024-11-20
Attending: PSYCHIATRY & NEUROLOGY
Payer: COMMERCIAL

## 2024-11-20 VITALS
BODY MASS INDEX: 35.62 KG/M2 | SYSTOLIC BLOOD PRESSURE: 124 MMHG | WEIGHT: 201.06 LBS | HEART RATE: 79 BPM | HEIGHT: 63 IN | DIASTOLIC BLOOD PRESSURE: 66 MMHG | OXYGEN SATURATION: 99 % | TEMPERATURE: 96.5 F

## 2024-11-20 DIAGNOSIS — G43.109 MIGRAINE WITH AURA AND WITHOUT STATUS MIGRAINOSUS, NOT INTRACTABLE: ICD-10-CM

## 2024-11-20 DIAGNOSIS — G35 MS (MULTIPLE SCLEROSIS) (HCC): Primary | ICD-10-CM

## 2024-11-20 DIAGNOSIS — G89.29 CHRONIC NOCICEPTIVE PAIN: ICD-10-CM

## 2024-11-20 PROCEDURE — 3074F SYST BP LT 130 MM HG: CPT | Performed by: PSYCHIATRY & NEUROLOGY

## 2024-11-20 PROCEDURE — 99214 OFFICE O/P EST MOD 30 MIN: CPT | Performed by: PSYCHIATRY & NEUROLOGY

## 2024-11-20 PROCEDURE — 3078F DIAST BP <80 MM HG: CPT | Performed by: PSYCHIATRY & NEUROLOGY

## 2024-11-20 PROCEDURE — 99212 OFFICE O/P EST SF 10 MIN: CPT | Performed by: PSYCHIATRY & NEUROLOGY

## 2024-11-20 RX ORDER — TOPIRAMATE 25 MG/1
50 TABLET, FILM COATED ORAL DAILY
COMMUNITY

## 2024-11-20 RX ORDER — RIMEGEPANT SULFATE 75 MG/75MG
75 TABLET, ORALLY DISINTEGRATING ORAL PRN
Qty: 16 TABLET | Refills: 11 | Status: SHIPPED | OUTPATIENT
Start: 2024-11-20 | End: 2025-11-20

## 2024-11-20 ASSESSMENT — FIBROSIS 4 INDEX: FIB4 SCORE: 0.51

## 2024-11-20 ASSESSMENT — PATIENT HEALTH QUESTIONNAIRE - PHQ9: CLINICAL INTERPRETATION OF PHQ2 SCORE: 0

## 2024-11-20 NOTE — PROGRESS NOTES
"[facility]  MULTIPLE SCLEROSIS & NEUROIMMUNOLOGY  FOLLOW-UP VISIT    DISEASE SUMMARY:  Principal neurologic diagnosis: MS  Diagnosis of MS: 1/7/2021  Disease History:  - 12/5/2019: blurry vision, left facial numbness, left upper- and lower extremity numbness; MRI head unremarkable; improved over the course of 1 week  - 12/25/2020: right monocular visual loss; returned to normal over ~2 hours  - 12/29/2020: onset of bilateral lower extremity weakness  - 1/2/202021: presented to hospital; MRI w/ lesions, LP w/ OCBs present; treated with IVMP with good response  - 2/28/2021: Gilenya FDO  - 3/4/2021: left margaret-body numbness; treated with IVMP w/ good response  - 5/1/2021: started Kesimpta  - 2/2024: episode of motor (left upper- and lower extremities, poor motor skills), sensory (diffuse sense of \"vibration\"), and difficulty walking, treated w/ IVMP x5 days, repeat imaging showed no evidence of disease activity  Disease course at onset: MS  Current disease course: MS  Previous disease therapies:  - Gilenya: additional clinical attack  Current disease therapies:  - Kesimpta  Symptomatic therapies:  - baclofen: effective for abdominal spasms (doesn't cause drowsiness)  - carbamazepine: unknown effectiveness  - cyclobenzaprine: ineffective  - tizanidine: effective, also helped promote sleep (probably more helpful than baclofen)  CSF (1/3/2021):  - RBCs: 394  - WBCs: 6  - protein: 58  - glucose: 81  - oligoclonal bands: positive (8)  - paraneoplastic autoantibody eval: negative  Other Testing:  - anti-AQP4 Ab (1/3/2021): negative  - anti-MOG IgG (1/3/2021): negative  - paraneoplastic autoantibody eval, serum (1/4/2020): negative  MRI head:  - 2/16/2024: \"stable... no abnormal enhancement...\"  - 7/20/2023: stable, no abnormal enhancement  - 4/22/2023: stable, no abnormal enhancement  - 6/2/2022: stable, no abnormal enhancement  - 10/6/2021: stable, no abnormal enhancement  - 5/8/2021: stable, no abnormal enhancement  - " "3/5/2021: enhancing lesions present  - 1/2/2021: juxta-cortical, periventricular, and infratentorial lesions w/ enhancement  - 12/5/2019: no visible lesions  - 5/13/2018: no visible lesions  MRI cervical spine:  - 2/16/2024: \"no significant interval change... no new signal abnormality... no abnormal enhancement...\"  - 7/20/2023: motion artifact limits evaluation  - 4/22/2023: stable, no abnormal enhancement  - 6/2/2022: stable, no abnormal enhancement  - 10/6/2021: stable, no abnormal enhancement  - 3/5/2021: single enhancing lesion present  - 1/2/2021: no visible lesions  MRI thoracic spine:  - 4/22/2023: no visible lesions  - 10/6/2021: no visible lesions  - 3/5/2021: no visible lesions  - 1/2/2021: no visible lesions  Immunizations:  - influenza: declines this (threw up for a month after receiving this in the past)  - pneumonia: Prevnar 20 11/6/2023  Cancer Screenings:  - PAP Smears: UTD as of 11/20/2024    CC: MS    INTERVAL HISTORY:  Rehana Meade is a 38 y.o. woman with MS and a history otherwise notable for migraines, aseptic meningitis, and \"seizure disorder.\"  I last saw her in the clinic on 5/16/2024.  At that time we planned to continue Kesimpta and switch from Nurtec to Ubrelvy PRN.  Today, she was unaccompanied, and she provided the following interval history:    MS:  Rehana continues on Kesimpta.  She tolerate this well without side effects or infections.  She does not suspect she has had a relapse.    A review of MS-related symptoms was notable for the following:    Fatigue:   Weakness:   Numbness:   Incoordination:   Spasms/Spasticity:   Vision Impairment:   Walking/Balance Problems:   Neuralgia: there is pain which starts at the left ankle and radiates proximally to the hip; pain in the left shoulder as well; episodes occur 1-2 times/week and last 5 hours to 2 days at a time; between episodes she feels well; workup has included x-ray  Bowel Symptoms:   Bladder Symptoms:   Heat " "Sensitivity: she has difficulty with temperature control, during the summer she dl with cooling cloths and shade  Depression:   Cognitive/Memory Problems:   Sexual Dysfunction:   Anxiety:     Migraines:  The following is a summary of headache symptoms, presented in my standard format:    Family History:   Age at onset (years):   Location:   Radiation:   Frequency: baseline: daily, lately:   Duration: baseline: constant, lately:   Headache Days/Month: baseline: , lately:   Quality:   Intensity: baseline: 1-2/10, can reach 8/10, lately:   Aura: yes  Photophobia/Phonophobia/Nausea/Vomiting: sometimes/yes/sometimes/no  Provoked by Physical Activity?:   Triggers:   Associated Symptoms:   Autonomic Signs (such as ptosis, miosis, conjunctival injection, rhinorrhea, increased lacrimation):   Head Trauma:   Association with Menses:   ED Visits:   Hospitalizations:   Missed Work Days ():   Sleep (hours/night): 6-7 hours/night  Caffeine Intake: 1 green tea/day  Hydration: well hydrated  Nutrition: breakfast and dinner, sometimes misses lunch  Exercise:   Analgesic Overuse:     Current Medication Regimen:  - topiramate: 50 mg daily has been ineffective  - Aimovi mg/month has questionable benefit, she \"lives on metamucil\"  - Nurtec: effective, reduces pain from 8/10 to 4/10    Medications Tried: Response  Preventive:  - amitriptyline: made her feel \"suicidal\"  - propranolol: ineffective  - Botox: \"so much pain for days after injections,\" missed work x3 days, not a fan)    Rescue:  - triptans (rizatriptan, zolmitriptan): cause \"really bad spasms\"    Medications Not Tried:  - Ubrelvy: insurance wouldn't cover this    MEDICATIONS:  Current Outpatient Medications   Medication Sig    topiramate (TOPAMAX) 25 MG Tab Take 50 mg by mouth every day.    diclofenac sodium (VOLTAREN) 1 % Gel Apply 2 g topically 4 times a day as needed (Pain).    Rimegepant Sulfate (NURTEC) 75 MG TABLET DISPERSIBLE Take 1 Tablet by mouth as " needed (migrianes). Take 1 Tablet by mouth 1 time a day as needed (migraine) for up to 30 days.    prochlorperazine (COMPAZINE) 10 MG Tab TAKE 1 TABLET BY MOUTH EVERY 6 HOURS AS NEEDED FOR NAUSEA/VOMITING FOR UP TO 90 DAYS.    Ofatumumab (KESIMPTA) 20 MG/0.4ML Solution Auto-injector Inject 0.4 mL under the skin Q30 DAYS.    albuterol 108 (90 Base) MCG/ACT Aero Soln inhalation aerosol Inhale 2 Puffs every four hours as needed for Shortness of Breath.    tizanidine (ZANAFLEX) 4 MG Tab Take 4 mg by mouth at bedtime as needed (Muscle Spasms).    baclofen (LIORESAL) 10 MG Tab Take 10 mg by mouth at bedtime as needed (Muscle Spasms).     MEDICAL, SOCIAL, AND FAMILY HISTORY:  There is no change in the patient's ROS or PFSH from their previous visit on 5/16/2024.    REVIEW OF SYSTEMS:  A ROS was completed.  Pertinent positives and negatives were included in the HPI, above.  All other systems were reviewed and are negative.    PHYSICAL EXAM:  General/Medical:  - NAD    Neuro:  MENTAL STATUS: awake and alert; no deficits of speech or language; oriented to conversation; affect was appropriate to situation; pleasant, cooperative    PHYSICAL EXAM:  General/Medical:  - NAD  - brace over right knee    Neuro:  MENTAL STATUS: awake and alert; no deficits of speech or language; oriented to conversation; affect was appropriate to situation; pleasant, cooperative    CRANIAL NERVES:    II: acuity: NT, fields: NT, pupils: NT, discs: NT    III/IV/VI: versions: grossly intact    V: facial sensation: NT    VII: facial expression: symmetric    VIII: hearing: intact to voice    IX/X: palate: NT    XI: shoulder shrug: NT    XII: tongue: NT    MOTOR:  - bulk: NT  - tone: NT  Upper Extremity Strength (R/L)    NT   Elbow flexion NT   Elbow extension NT   Shoulder abduction NT     Lower Extremity Strength  (R/L)   Hip flexion NT   Knee extension NT   Knee flexion NT   Ankle dorsiflexion NT   Ankle plantarflexion NT     - pronator drift: NT  -  abnormal movements: none    SENSATION:  - light touch: NT  - vibration (R/L, seconds): NT at the great toes  - pinprick: NT  - proprioception: NT  - Romberg: NT    COORDINATION:  - finger to nose: NT  - finger tapping: NT    REFLEXES:  Reflex Right Left   BR NT NT   Biceps NT NT   Triceps NT NT   Patellae NT NT   Achilles NT NT   Toes NT NT     GAIT:  - narrow base  - normal     QUANTITATIVE SCORES:  Timed 25-foot walk (sec): 4.2 on 11/20/2024 (4.2 on 5/16/2024, 4.3 on 11/16/2023, 4.2 on 8/7/2023, 3.6 on 2/1/2023, 4.7 on 8/17/2021, 4.1 on 5/12/2021, 4.8 on 3/10/2021, 4.8 on 1/7/2021).  Assistive device: none    REVIEW OF IMAGING STUDIES:  No additional data since the last visit.    REVIEW OF LABORATORY STUDIES:  No additional data since the last visit.    ASSESSMENT:  Rehana Meade is a 38 y.o. woman with multiple sclerosis and chronic migraine w/ aura.  She remains clinically stable on Kesimpta, which she tolerates well.  She requested a referral to physical therapy to address weakness in the lower extremities.  Her headache symptoms are likely related to migraine.  Plans/recommendations as follows:    PLAN:  Multiple Sclerosis:  - continue Kesimpta  - hold off on additional imaging for now  - drug monitoring labs    Chronic Migraine w/ Aura:  - stop Aimovig (uncertain effectiveness)  - take Nurtec q48 hours for migraine prevention    Follow-Up:  Return in about 4 months (around 3/20/2025).    Signed: Rajeev Aggarwal M.D.

## 2024-12-19 DIAGNOSIS — G43.109 MIGRAINE WITH AURA AND WITHOUT STATUS MIGRAINOSUS, NOT INTRACTABLE: Primary | ICD-10-CM

## 2024-12-19 RX ORDER — ERENUMAB-AOOE 140 MG/ML
140 INJECTION, SOLUTION SUBCUTANEOUS
Qty: 1 ML | Refills: 11 | Status: SHIPPED | OUTPATIENT
Start: 2024-12-19 | End: 2025-12-19

## 2024-12-19 NOTE — TELEPHONE ENCOUNTER
Received request via: Pharmacy    Medication Name/Dosage Aimovig 140mg/ml    When was medication last prescribed 4/15/24    How many refills were previously provided 11    How many Refills does he patient have left from last prescription 0 RX was discontinued 11/20/20    Was the patient seen in the last year in this department? Yes   Date of last office visit 11/20/24     Per last Neurology Office Visit, when was the date of next follow up visit set for?                            Date of office visit follow up request 3/20/25     Does the patient have an upcoming appointment? Yes   If yes, when 3/20/25             If no, schedule appointment =    Does the patient have retirement Plus and need 100 day supply (blood pressure, diabetes and cholesterol meds only)? Patient does not have SCP

## 2025-01-07 ENCOUNTER — HOSPITAL ENCOUNTER (EMERGENCY)
Facility: MEDICAL CENTER | Age: 40
End: 2025-01-07
Attending: STUDENT IN AN ORGANIZED HEALTH CARE EDUCATION/TRAINING PROGRAM
Payer: COMMERCIAL

## 2025-01-07 ENCOUNTER — APPOINTMENT (OUTPATIENT)
Dept: RADIOLOGY | Facility: MEDICAL CENTER | Age: 40
End: 2025-01-07
Attending: STUDENT IN AN ORGANIZED HEALTH CARE EDUCATION/TRAINING PROGRAM
Payer: COMMERCIAL

## 2025-01-07 VITALS
SYSTOLIC BLOOD PRESSURE: 109 MMHG | DIASTOLIC BLOOD PRESSURE: 82 MMHG | BODY MASS INDEX: 34.55 KG/M2 | HEART RATE: 73 BPM | OXYGEN SATURATION: 98 % | RESPIRATION RATE: 18 BRPM | TEMPERATURE: 98.3 F | WEIGHT: 195 LBS | HEIGHT: 63 IN

## 2025-01-07 DIAGNOSIS — S39.012A STRAIN OF LUMBAR REGION, INITIAL ENCOUNTER: ICD-10-CM
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
## 2025-01-07 PROCEDURE — A9270 NON-COVERED ITEM OR SERVICE: HCPCS | Performed by: STUDENT IN AN ORGANIZED HEALTH CARE EDUCATION/TRAINING PROGRAM

## 2025-01-07 PROCEDURE — 71045 X-RAY EXAM CHEST 1 VIEW: CPT

## 2025-01-07 PROCEDURE — 72131 CT LUMBAR SPINE W/O DYE: CPT

## 2025-01-07 PROCEDURE — 99285 EMERGENCY DEPT VISIT HI MDM: CPT

## 2025-01-07 PROCEDURE — 73030 X-RAY EXAM OF SHOULDER: CPT | Mod: LT

## 2025-01-07 PROCEDURE — 700101 HCHG RX REV CODE 250: Performed by: STUDENT IN AN ORGANIZED HEALTH CARE EDUCATION/TRAINING PROGRAM

## 2025-01-07 PROCEDURE — 700102 HCHG RX REV CODE 250 W/ 637 OVERRIDE(OP): Performed by: STUDENT IN AN ORGANIZED HEALTH CARE EDUCATION/TRAINING PROGRAM

## 2025-01-07 RX ORDER — LIDOCAINE 4 G/G
1 PATCH TOPICAL ONCE
Status: DISCONTINUED | OUTPATIENT
Start: 2025-01-07 | End: 2025-01-08 | Stop reason: HOSPADM

## 2025-01-07 RX ORDER — HYDROCODONE BITARTRATE AND ACETAMINOPHEN 5; 325 MG/1; MG/1
1 TABLET ORAL ONCE
Status: COMPLETED | OUTPATIENT
Start: 2025-01-07 | End: 2025-01-07

## 2025-01-07 RX ADMIN — LIDOCAINE 1 PATCH: 4 PATCH TOPICAL at 19:43

## 2025-01-07 RX ADMIN — HYDROCODONE BITARTRATE AND ACETAMINOPHEN 1 TABLET: 5; 325 TABLET ORAL at 19:42

## 2025-01-07 ASSESSMENT — PAIN DESCRIPTION - PAIN TYPE: TYPE: ACUTE PAIN

## 2025-01-07 ASSESSMENT — FIBROSIS 4 INDEX: FIB4 SCORE: 0.52
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
## 2025-01-08 NOTE — ED NOTES
Pt discharged home, discharge and follow up care instructions given,  pt verbalized understanding. pt ambulated out of ED independently.

## 2025-03-20 ENCOUNTER — OFFICE VISIT (OUTPATIENT)
Dept: NEUROLOGY | Facility: MEDICAL CENTER | Age: 40
End: 2025-03-20
Attending: PSYCHIATRY & NEUROLOGY
Payer: COMMERCIAL

## 2025-03-20 VITALS
SYSTOLIC BLOOD PRESSURE: 112 MMHG | WEIGHT: 187.61 LBS | RESPIRATION RATE: 16 BRPM | DIASTOLIC BLOOD PRESSURE: 72 MMHG | BODY MASS INDEX: 33.24 KG/M2 | OXYGEN SATURATION: 97 % | TEMPERATURE: 98 F | HEIGHT: 63 IN | HEART RATE: 80 BPM

## 2025-03-20 DIAGNOSIS — G35 MS (MULTIPLE SCLEROSIS) (HCC): Primary | ICD-10-CM

## 2025-03-20 PROCEDURE — 99214 OFFICE O/P EST MOD 30 MIN: CPT | Performed by: PSYCHIATRY & NEUROLOGY

## 2025-03-20 PROCEDURE — 99212 OFFICE O/P EST SF 10 MIN: CPT | Performed by: PSYCHIATRY & NEUROLOGY

## 2025-03-20 PROCEDURE — 3078F DIAST BP <80 MM HG: CPT | Performed by: PSYCHIATRY & NEUROLOGY

## 2025-03-20 PROCEDURE — 3074F SYST BP LT 130 MM HG: CPT | Performed by: PSYCHIATRY & NEUROLOGY

## 2025-03-20 RX ORDER — BACLOFEN 10 MG/1
10 TABLET ORAL
Qty: 90 TABLET | Refills: 3 | Status: SHIPPED | OUTPATIENT
Start: 2025-03-20 | End: 2026-03-20

## 2025-03-20 ASSESSMENT — PATIENT HEALTH QUESTIONNAIRE - PHQ9: CLINICAL INTERPRETATION OF PHQ2 SCORE: 0

## 2025-03-20 ASSESSMENT — FIBROSIS 4 INDEX: FIB4 SCORE: 0.52
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
## 2025-04-15 DIAGNOSIS — G35 MULTIPLE SCLEROSIS (HCC): ICD-10-CM

## 2025-04-15 RX ORDER — PROCHLORPERAZINE MALEATE 10 MG
TABLET ORAL
Qty: 30 TABLET | Refills: 3 | Status: SHIPPED | OUTPATIENT
Start: 2025-04-15

## 2025-05-02 ENCOUNTER — PATIENT MESSAGE (OUTPATIENT)
Dept: NEUROLOGY | Facility: MEDICAL CENTER | Age: 40
End: 2025-05-02
Payer: COMMERCIAL

## 2025-05-27 ENCOUNTER — HOSPITAL ENCOUNTER (OUTPATIENT)
Dept: LAB | Facility: MEDICAL CENTER | Age: 40
End: 2025-05-27
Payer: COMMERCIAL

## 2025-05-27 ENCOUNTER — HOSPITAL ENCOUNTER (OUTPATIENT)
Dept: LAB | Facility: MEDICAL CENTER | Age: 40
End: 2025-05-27
Attending: PSYCHIATRY & NEUROLOGY
Payer: COMMERCIAL

## 2025-05-27 ENCOUNTER — PATIENT MESSAGE (OUTPATIENT)
Dept: NEUROLOGY | Facility: MEDICAL CENTER | Age: 40
End: 2025-05-27
Payer: COMMERCIAL

## 2025-05-27 DIAGNOSIS — G35 MS (MULTIPLE SCLEROSIS) (HCC): ICD-10-CM

## 2025-05-27 LAB
ALBUMIN SERPL BCP-MCNC: 4.4 G/DL (ref 3.2–4.9)
ALBUMIN/GLOB SERPL: 1.3 G/DL
ALP SERPL-CCNC: 93 U/L (ref 30–99)
ALT SERPL-CCNC: 15 U/L (ref 2–50)
ANION GAP SERPL CALC-SCNC: 14 MMOL/L (ref 7–16)
AST SERPL-CCNC: 18 U/L (ref 12–45)
BASOPHILS # BLD AUTO: 0.7 % (ref 0–1.8)
BASOPHILS # BLD: 0.08 K/UL (ref 0–0.12)
BILIRUB SERPL-MCNC: 0.4 MG/DL (ref 0.1–1.5)
BUN SERPL-MCNC: 17 MG/DL (ref 8–22)
CALCIUM ALBUM COR SERPL-MCNC: 9.4 MG/DL (ref 8.5–10.5)
CALCIUM SERPL-MCNC: 9.7 MG/DL (ref 8.5–10.5)
CHLORIDE SERPL-SCNC: 102 MMOL/L (ref 96–112)
CHOLEST SERPL-MCNC: 217 MG/DL (ref 100–199)
CO2 SERPL-SCNC: 22 MMOL/L (ref 20–33)
CORTIS SERPL-MCNC: 12.4 UG/DL (ref 0–23)
CREAT SERPL-MCNC: 1 MG/DL (ref 0.5–1.4)
EOSINOPHIL # BLD AUTO: 0.26 K/UL (ref 0–0.51)
EOSINOPHIL NFR BLD: 2.4 % (ref 0–6.9)
ERYTHROCYTE [DISTWIDTH] IN BLOOD BY AUTOMATED COUNT: 46.3 FL (ref 35.9–50)
EST. AVERAGE GLUCOSE BLD GHB EST-MCNC: 100 MG/DL
FASTING STATUS PATIENT QL REPORTED: NORMAL
FOLATE SERPL-MCNC: 22.7 NG/ML
GFR SERPLBLD CREATININE-BSD FMLA CKD-EPI: 73 ML/MIN/1.73 M 2
GLOBULIN SER CALC-MCNC: 3.3 G/DL (ref 1.9–3.5)
GLUCOSE SERPL-MCNC: 79 MG/DL (ref 65–99)
HBA1C MFR BLD: 5.1 % (ref 4–5.6)
HCT VFR BLD AUTO: 49.3 % (ref 37–47)
HDLC SERPL-MCNC: 90 MG/DL
HGB BLD-MCNC: 15.9 G/DL (ref 12–16)
IMM GRANULOCYTES # BLD AUTO: 0.05 K/UL (ref 0–0.11)
IMM GRANULOCYTES NFR BLD AUTO: 0.5 % (ref 0–0.9)
LDLC SERPL CALC-MCNC: 118 MG/DL
LYMPHOCYTES # BLD AUTO: 1.78 K/UL (ref 1–4.8)
LYMPHOCYTES NFR BLD: 16.6 % (ref 22–41)
MCH RBC QN AUTO: 30.9 PG (ref 27–33)
MCHC RBC AUTO-ENTMCNC: 32.3 G/DL (ref 32.2–35.5)
MCV RBC AUTO: 95.9 FL (ref 81.4–97.8)
MONOCYTES # BLD AUTO: 0.89 K/UL (ref 0–0.85)
MONOCYTES NFR BLD AUTO: 8.3 % (ref 0–13.4)
NEUTROPHILS # BLD AUTO: 7.67 K/UL (ref 1.82–7.42)
NEUTROPHILS NFR BLD: 71.5 % (ref 44–72)
NRBC # BLD AUTO: 0 K/UL
NRBC BLD-RTO: 0 /100 WBC (ref 0–0.2)
PLATELET # BLD AUTO: 328 K/UL (ref 164–446)
PMV BLD AUTO: 10 FL (ref 9–12.9)
POTASSIUM SERPL-SCNC: 4 MMOL/L (ref 3.6–5.5)
PROGEST SERPL-MCNC: 0.35 NG/ML
PROLACTIN SERPL-MCNC: 6.58 NG/ML (ref 2.8–26)
PROT SERPL-MCNC: 7.7 G/DL (ref 6–8.2)
RBC # BLD AUTO: 5.14 M/UL (ref 4.2–5.4)
SODIUM SERPL-SCNC: 138 MMOL/L (ref 135–145)
T3FREE SERPL-MCNC: 2.52 PG/ML (ref 2–4.4)
T4 FREE SERPL-MCNC: 1.02 NG/DL (ref 0.93–1.7)
THYROPEROXIDASE AB SERPL-ACNC: <9 IU/ML (ref 0–9)
TRIGL SERPL-MCNC: 43 MG/DL (ref 0–149)
TSH SERPL-ACNC: 1.48 UIU/ML (ref 0.38–5.33)
VIT B12 SERPL-MCNC: 1027 PG/ML (ref 211–911)
WBC # BLD AUTO: 10.7 K/UL (ref 4.8–10.8)

## 2025-05-27 PROCEDURE — 82671 ASSAY OF ESTROGENS: CPT

## 2025-05-27 PROCEDURE — 82746 ASSAY OF FOLIC ACID SERUM: CPT

## 2025-05-27 PROCEDURE — 80061 LIPID PANEL: CPT

## 2025-05-27 PROCEDURE — 82607 VITAMIN B-12: CPT

## 2025-05-27 PROCEDURE — 86355 B CELLS TOTAL COUNT: CPT

## 2025-05-27 PROCEDURE — 86800 THYROGLOBULIN ANTIBODY: CPT

## 2025-05-27 PROCEDURE — 84402 ASSAY OF FREE TESTOSTERONE: CPT

## 2025-05-27 PROCEDURE — 86359 T CELLS TOTAL COUNT: CPT

## 2025-05-27 PROCEDURE — 86357 NK CELLS TOTAL COUNT: CPT

## 2025-05-27 PROCEDURE — 84443 ASSAY THYROID STIM HORMONE: CPT

## 2025-05-27 PROCEDURE — 36415 COLL VENOUS BLD VENIPUNCTURE: CPT

## 2025-05-27 PROCEDURE — 84146 ASSAY OF PROLACTIN: CPT

## 2025-05-27 PROCEDURE — 86360 T CELL ABSOLUTE COUNT/RATIO: CPT

## 2025-05-27 PROCEDURE — 84144 ASSAY OF PROGESTERONE: CPT

## 2025-05-27 PROCEDURE — 84481 FREE ASSAY (FT-3): CPT

## 2025-05-27 PROCEDURE — 82784 ASSAY IGA/IGD/IGG/IGM EACH: CPT

## 2025-05-27 PROCEDURE — 85025 COMPLETE CBC W/AUTO DIFF WBC: CPT

## 2025-05-27 PROCEDURE — 84270 ASSAY OF SEX HORMONE GLOBUL: CPT

## 2025-05-27 PROCEDURE — 83525 ASSAY OF INSULIN: CPT

## 2025-05-27 PROCEDURE — 84403 ASSAY OF TOTAL TESTOSTERONE: CPT

## 2025-05-27 PROCEDURE — 86376 MICROSOMAL ANTIBODY EACH: CPT

## 2025-05-27 PROCEDURE — 84439 ASSAY OF FREE THYROXINE: CPT

## 2025-05-27 PROCEDURE — 80053 COMPREHEN METABOLIC PANEL: CPT

## 2025-05-27 PROCEDURE — 82533 TOTAL CORTISOL: CPT

## 2025-05-27 PROCEDURE — 83036 HEMOGLOBIN GLYCOSYLATED A1C: CPT

## 2025-05-29 ENCOUNTER — PATIENT MESSAGE (OUTPATIENT)
Dept: NEUROLOGY | Facility: MEDICAL CENTER | Age: 40
End: 2025-05-29
Payer: COMMERCIAL

## 2025-05-29 DIAGNOSIS — G35 MULTIPLE SCLEROSIS (HCC): Primary | ICD-10-CM

## 2025-05-29 LAB
ANNOTATION COMMENT IMP: ABNORMAL
CD19 CELLS NFR SPEC: 0 % (ref 6–23)
CD3 CELLS # BLD: 1711 CELLS/UL (ref 570–2400)
CD3 CELLS NFR SPEC: 86 % (ref 62–87)
CD3+CD4+ CELLS # BLD: 890 CELLS/UL (ref 430–1800)
CD3+CD4+ CELLS NFR BLD: 45 % (ref 32–64)
CD3+CD4+ CELLS/CD3+CD8+ CLL BLD: 1.32 RATIO (ref 0.8–3.9)
CD3+CD8+ CELLS # BLD: 674 CELLS/UL (ref 210–1200)
CD3+CD8+ CELLS NFR SPEC: 34 % (ref 15–46)
CD3-CD16+CD56+ CELLS # SPEC: 285 CELLS/UL (ref 78–470)
CD3-CD16+CD56+ CELLS NFR SPEC: 14 % (ref 4–26)
CELLS.CD3-CD19+ [#/VOLUME] IN BLOOD: 0 CELLS/UL (ref 91–610)
IGA SERPL-MCNC: 309 MG/DL (ref 68–408)
IGG SERPL-MCNC: 1031 MG/DL (ref 768–1632)
IGM SERPL-MCNC: 82 MG/DL (ref 35–263)
INSULIN P FAST SERPL-ACNC: 5 UIU/ML (ref 3–25)
THYROGLOB AB SERPL-ACNC: <1.5 IU/ML (ref 0–4)

## 2025-05-30 ENCOUNTER — TELEPHONE (OUTPATIENT)
Dept: NEUROLOGY | Facility: MEDICAL CENTER | Age: 40
End: 2025-05-30

## 2025-05-30 ENCOUNTER — OFFICE VISIT (OUTPATIENT)
Dept: NEUROLOGY | Facility: MEDICAL CENTER | Age: 40
End: 2025-05-30
Attending: PSYCHIATRY & NEUROLOGY
Payer: COMMERCIAL

## 2025-05-30 VITALS
DIASTOLIC BLOOD PRESSURE: 68 MMHG | SYSTOLIC BLOOD PRESSURE: 108 MMHG | HEART RATE: 83 BPM | HEIGHT: 63 IN | OXYGEN SATURATION: 97 % | BODY MASS INDEX: 33.55 KG/M2 | TEMPERATURE: 98.1 F | WEIGHT: 189.38 LBS | RESPIRATION RATE: 16 BRPM

## 2025-05-30 DIAGNOSIS — G43.109 MIGRAINE WITH AURA AND WITHOUT STATUS MIGRAINOSUS, NOT INTRACTABLE: Primary | ICD-10-CM

## 2025-05-30 PROCEDURE — 99212 OFFICE O/P EST SF 10 MIN: CPT | Performed by: PSYCHIATRY & NEUROLOGY

## 2025-05-30 RX ORDER — ONDANSETRON 8 MG/1
8 TABLET, ORALLY DISINTEGRATING ORAL PRN
OUTPATIENT
Start: 2025-07-02

## 2025-05-30 RX ORDER — DIPHENHYDRAMINE HYDROCHLORIDE 50 MG/ML
25 INJECTION, SOLUTION INTRAMUSCULAR; INTRAVENOUS PRN
OUTPATIENT
Start: 2025-07-02

## 2025-05-30 RX ORDER — METHYLPREDNISOLONE SODIUM SUCCINATE 125 MG/2ML
100 INJECTION, POWDER, LYOPHILIZED, FOR SOLUTION INTRAMUSCULAR; INTRAVENOUS ONCE
OUTPATIENT
Start: 2025-07-02

## 2025-05-30 RX ORDER — 0.9 % SODIUM CHLORIDE 0.9 %
3 VIAL (ML) INJECTION PRN
OUTPATIENT
Start: 2025-07-02

## 2025-05-30 RX ORDER — 0.9 % SODIUM CHLORIDE 0.9 %
VIAL (ML) INJECTION PRN
OUTPATIENT
Start: 2025-07-02

## 2025-05-30 RX ORDER — SODIUM CHLORIDE 9 MG/ML
1000 INJECTION, SOLUTION INTRAVENOUS PRN
OUTPATIENT
Start: 2025-07-02

## 2025-05-30 RX ORDER — 0.9 % SODIUM CHLORIDE 0.9 %
10 VIAL (ML) INJECTION PRN
OUTPATIENT
Start: 2025-07-02

## 2025-05-30 RX ORDER — LORAZEPAM 2 MG/ML
0.5 INJECTION INTRAMUSCULAR PRN
OUTPATIENT
Start: 2025-07-02

## 2025-05-30 RX ORDER — METHYLPREDNISOLONE SODIUM SUCCINATE 125 MG/2ML
125 INJECTION, POWDER, LYOPHILIZED, FOR SOLUTION INTRAMUSCULAR; INTRAVENOUS PRN
OUTPATIENT
Start: 2025-07-02

## 2025-05-30 RX ORDER — ALBUTEROL SULFATE 5 MG/ML
2.5 SOLUTION RESPIRATORY (INHALATION) PRN
OUTPATIENT
Start: 2025-07-02

## 2025-05-30 RX ORDER — ATOGEPANT 60 MG/1
60 TABLET ORAL DAILY
Qty: 30 TABLET | Refills: 11 | Status: SHIPPED | OUTPATIENT
Start: 2025-05-30 | End: 2026-05-30

## 2025-05-30 RX ORDER — EPINEPHRINE 1 MG/ML(1)
0.5 AMPUL (ML) INJECTION PRN
OUTPATIENT
Start: 2025-07-02

## 2025-05-30 RX ORDER — SODIUM CHLORIDE 9 MG/ML
INJECTION, SOLUTION INTRAVENOUS CONTINUOUS
OUTPATIENT
Start: 2025-07-02

## 2025-05-30 RX ORDER — ACETAMINOPHEN 325 MG/1
650 TABLET ORAL PRN
OUTPATIENT
Start: 2025-07-02

## 2025-05-30 RX ORDER — LORAZEPAM 0.5 MG/1
0.5 TABLET ORAL PRN
OUTPATIENT
Start: 2025-07-02

## 2025-05-30 RX ORDER — MEPERIDINE HYDROCHLORIDE 25 MG/ML
25 INJECTION INTRAMUSCULAR; INTRAVENOUS; SUBCUTANEOUS PRN
OUTPATIENT
Start: 2025-07-02

## 2025-05-30 RX ORDER — ONDANSETRON 2 MG/ML
8 INJECTION INTRAMUSCULAR; INTRAVENOUS PRN
OUTPATIENT
Start: 2025-07-02

## 2025-05-30 RX ORDER — DIPHENHYDRAMINE HCL 25 MG
50 TABLET ORAL ONCE
OUTPATIENT
Start: 2025-07-02 | End: 2025-07-02

## 2025-05-30 ASSESSMENT — PATIENT HEALTH QUESTIONNAIRE - PHQ9: CLINICAL INTERPRETATION OF PHQ2 SCORE: 0

## 2025-05-30 ASSESSMENT — FIBROSIS 4 INDEX: FIB4 SCORE: 0.55

## 2025-05-30 NOTE — PROGRESS NOTES
"[facility]  MULTIPLE SCLEROSIS & NEUROIMMUNOLOGY  FOLLOW-UP VISIT    DISEASE SUMMARY:  Principal neurologic diagnosis: MS  Diagnosis of MS: 1/7/2021  Disease History:  - 12/5/2019: blurry vision, left facial numbness, left upper- and lower extremity numbness; MRI head unremarkable; improved over the course of 1 week  - 12/25/2020: right monocular visual loss; returned to normal over ~2 hours  - 12/29/2020: onset of bilateral lower extremity weakness  - 1/2/202021: presented to hospital; MRI w/ lesions, LP w/ OCBs present; treated with IVMP with good response  - 2/28/2021: Gilenya FDO  - 3/4/2021: left margaret-body numbness; treated with IVMP w/ good response  - 5/1/2021: started Kesimpta  - 2/2024: episode of motor (left upper- and lower extremities, poor motor skills), sensory (diffuse sense of \"vibration\"), and difficulty walking, treated w/ IVMP x5 days, repeat imaging showed no evidence of disease activity  - 5/18/2025: episode of vision loss in the right eye (24 hours), recovered but blurry, double vision  Disease course at onset: MS  Current disease course: MS  Previous disease therapies:  - Gilenya: additional clinical attack  Current disease therapies:  - Kesimpta  Symptomatic therapies:  - baclofen: 10 mg daily PRN is effective for abdominal spasms (doesn't cause drowsiness)  - carbamazepine: unknown effectiveness  - cyclobenzaprine: ineffective  - tizanidine: 4 mg qHS PRN is effective, also helped promote sleep (probably more helpful than baclofen)  CSF (1/3/2021):  - RBCs: 394  - WBCs: 6  - protein: 58  - glucose: 81  - oligoclonal bands: positive (8)  - paraneoplastic autoantibody eval: negative  Other Testing:  - anti-AQP4 Ab (1/3/2021): negative  - anti-MOG IgG (1/3/2021): negative  - paraneoplastic autoantibody eval, serum (1/4/2020): negative  MRI head:  - 2/16/2024: \"stable... no abnormal enhancement...\"  - 7/20/2023: stable, no abnormal enhancement  - 4/22/2023: stable, no abnormal enhancement  - " "6/2/2022: stable, no abnormal enhancement  - 10/6/2021: stable, no abnormal enhancement  - 5/8/2021: stable, no abnormal enhancement  - 3/5/2021: enhancing lesions present  - 1/2/2021: juxta-cortical, periventricular, and infratentorial lesions w/ enhancement  - 12/5/2019: no visible lesions  - 5/13/2018: no visible lesions  MRI cervical spine:  - 2/16/2024: \"no significant interval change... no new signal abnormality... no abnormal enhancement...\"  - 7/20/2023: motion artifact limits evaluation  - 4/22/2023: stable, no abnormal enhancement  - 6/2/2022: stable, no abnormal enhancement  - 10/6/2021: stable, no abnormal enhancement  - 3/5/2021: single enhancing lesion present  - 1/2/2021: no visible lesions  MRI thoracic spine:  - 4/22/2023: no visible lesions  - 10/6/2021: no visible lesions  - 3/5/2021: no visible lesions  - 1/2/2021: no visible lesions  Immunizations:  - influenza: declines this (threw up for a month after receiving this in the past)  - pneumonia: Prevnar 20 11/6/2023  Cancer Screenings:  - PAP Smears: UTD as of 11/20/2024    CC: MS    INTERVAL HISTORY:  Rehana Meade is a 39 y.o. woman with MS and a history otherwise notable for migraines, aseptic meningitis, and \"seizure disorder.\"  I last saw her in the clinic on 3/20/2025.  At that time we planned to continue Kesimpta, and I ordered drug monitoring labs.  We also planned to continue Aimovig and Nurtec q48 hrs.  Today, she was unaccompanied, and she provided the following interval history:    MS:  A review of MS-related symptoms was notable for the following:    Fatigue: wakes up 4 times nightly to void, sleeps 6-7 hours/night  Weakness: yes, involving the left side (face, arm, leg)  Numbness: yes, involving the left side (face, arm, leg)  Incoordination: yes, present in both hands, worse on the left  Spasms/Spasticity: feels as if there is something \"vibrating\" in her chest  Vision Impairment: today, there is residual \"fuzziness\" in " "the vision of the right eye  Walking/Balance Problems: there is occasional left foot drop  Neuralgia: intermittent (daily, lasting 3-4 hours/episode) \"crawling\" feeling in both ears beginning in 2025, ankle pain on the left  Bowel Symptoms: Aimovig is associated with constipation  Bladder Symptoms: frequency: wakes 2-4 times nightly to void  Heat Sensitivity: yes  Depression: no  Cognitive/Memory Problems: word finding difficulty and short-term memory problems  Sexual Dysfunction: no  Anxiety: no    Migraines:  The following is a summary of headache symptoms, presented in my standard format:    Family History: mother's side  Age at onset (years):   Location: \"all over\"  Radiation:   Frequency: baseline: daily, lately:   Duration: baseline: constant, lately: 1 day  Headache Days/Month: baseline: , lately: -  Quality: \"aching\"  Intensity: baseline: 1-2/10, can reach 8/10, lately: 2/10  Aura: yes  Photophobia/Phonophobia/Nausea/Vomiting: sometimes/yes/sometimes/no  Provoked by Physical Activity?:   Triggers:   Associated Symptoms:   Autonomic Signs (such as ptosis, miosis, conjunctival injection, rhinorrhea, increased lacrimation):   Head Trauma:   Association with Menses:   ED Visits:   Hospitalizations:   Missed Work Days ():   Sleep (hours/night): 7  Caffeine Intake: 1 green tea/day  Hydration: well hydrated  Nutrition: misses meals, usually eats breakfast and lunch  Exercise: gym membership  Analgesic Overuse:     Current Medication Regimen:  - Aimovi mg/month is helpful, paused this and when she restarted it headaches improved  - Nurtec: q48 hours is effective    Medications Tried: Response  Preventive:  - amitriptyline: made her feel \"suicidal\"  - propranolol: ineffective  - Botox: \"so much pain for days after injections,\" missed work x3 days, not a fan)  - topiramate: 50 mg daily has been ineffective    Rescue:  - triptans (rizatriptan, zolmitriptan): cause \"really bad " "spasms\"    Medications Not Tried:  - Ubrelvy: insurance wouldn't cover this    MEDICATIONS:  Current Outpatient Medications   Medication Sig    Atogepant (QULIPTA) 60 MG Tab Take 60 mg by mouth every day.    prochlorperazine (COMPAZINE) 10 MG Tab TAKE 1 TABLET BY MOUTH EVERY 6 HOURS AS NEEDED FOR NAUSEA/VOMITING FOR UP TO 90 DAYS.    tizanidine (ZANAFLEX) 4 MG Tab Take 1 Tablet by mouth at bedtime as needed (Muscle Spasms).    baclofen (LIORESAL) 10 MG Tab Take 1 Tablet by mouth at bedtime as needed (Muscle Spasms).    diclofenac sodium (VOLTAREN) 1 % Gel Apply 2 g topically 4 times a day as needed (Pain).    Ofatumumab (KESIMPTA) 20 MG/0.4ML Solution Auto-injector Inject 0.4 mL under the skin Q30 DAYS.    albuterol 108 (90 Base) MCG/ACT Aero Soln inhalation aerosol Inhale 2 Puffs every four hours as needed for Shortness of Breath.     MEDICAL, SOCIAL, AND FAMILY HISTORY:  There is no change in the patient's ROS or PFSH from their previous visit on 3/20/2025.    REVIEW OF SYSTEMS:  A ROS was completed.  Pertinent positives and negatives were included in the HPI, above.  All other systems were reviewed and are negative.    PHYSICAL EXAM:  General/Medical:  - NAD    Neuro:  MENTAL STATUS: awake and alert; no deficits of speech or language; oriented to conversation; affect was appropriate to situation; pleasant, cooperative    PHYSICAL EXAM:  General/Medical:  - NAD    Neuro:  MENTAL STATUS: awake and alert; no deficits of speech or language; oriented to conversation; affect was appropriate to situation; pleasant, cooperative    CRANIAL NERVES:    II: acuity: NT, fields: intact to confrontation, pupils: equal and reactive, discs: NT    III/IV/VI: versions: grossly intact, pursuits were choppy    V: facial sensation: reduced over V1-2 on the left    VII: facial expression: symmetric    VIII: hearing: intact to voice    IX/X: palate: NT    XI: shoulder shrug: NT    XII: tongue: NT    MOTOR:  - bulk: normal  - tone: " normal in the upper extremities  Upper Extremity Strength (R/L)    5/5   Elbow flexion 5/4 giveaway   Elbow extension 5/5   Shoulder abduction 5/5     Lower Extremity Strength  (R/L)   Hip flexion 5/4 giveaway   Knee extension 5/4   Knee flexion 5/5   Ankle dorsiflexion 5/5   Ankle plantarflexion 5/5     - pronator drift: absent  - abnormal movements: none    SENSATION:  - light touch: reduced on the left side  - vibration (R/L, seconds): NT at the great toes  - pinprick: NT  - proprioception: NT  - Romberg: NT    COORDINATION:  - finger to nose: intact  - finger tapping: intact    REFLEXES:  Reflex Right Left   BR 2+ 2+   Biceps 2+ 2+   Triceps 2+ 2+   Patellae 2+ 2+   Achilles 2+ 2+   Toes down down     GAIT:  - narrow base  - normal     QUANTITATIVE SCORES:  Timed 25-foot walk (sec): 4.1 on 3/20/2025 (4.2 on 11/20/2024, 4.2 on 5/16/2024, 4.3 on 11/16/2023, 4.2 on 8/7/2023, 3.6 on 2/1/2023, 4.7 on 8/17/2021, 4.1 on 5/12/2021, 4.8 on 3/10/2021, 4.8 on 1/7/2021).  Assistive device: none    REVIEW OF IMAGING STUDIES:  No additional data since the last visit.    REVIEW OF LABORATORY STUDIES:  Reviewed.    ASSESSMENT:  Rehana Meade is a 39 y.o. woman with multiple sclerosis and chronic migraine w/ aura.  Ms. Meade seems to be experiencing wearing-off phenomenon with Kesimtpa.  We discussed treatment options and agreed to switch to Briumvi.  I will follow up the results of repeat imaging, though my suspicion of active disease is low since all of her symptoms have occurred before and they have improved quickly.  Also plan to switch her migraine prevention regimen from Aimvog + Nurtec q48 hrs to Qulipta alone given her constipation.    PLAN:  Multiple Sclerosis:  - stop Kesimpta  - start Briumvi (can start full dose when she is due for her next Kesimpta injection)  - follow up results of MRI    Chronic Migraine w/ Aura:  - stop Aimovig  - stop Nurtec q48 hrs  - start Qulipta daily    Follow-Up:  Return  in about 4 months (around 9/30/2025).    Signed: Rajeev Aggarwal M.D.

## 2025-05-30 NOTE — TELEPHONE ENCOUNTER
Prior Authorization for QULIPTA  (Quantity: 30, Days: 30) has been submitted via Cover My Meds: Key ([id number])    Insurance: [employer]     Will follow up in 24-48 business hours.

## 2025-05-31 LAB
ESTRADIOL SERPL HS-MCNC: 144.4 PG/ML
ESTROGEN SERPL CALC-MCNC: 220.8 PG/ML
ESTRONE SERPL-MCNC: 76.4 PG/ML
SHBG SERPL-SCNC: 65 NMOL/L (ref 25–122)
TESTOST FREE SERPL-MCNC: 5.5 PG/ML (ref 1.3–9.2)
TESTOST SERPL-MCNC: 50 NG/DL (ref 9–55)

## 2025-06-02 ENCOUNTER — TELEPHONE (OUTPATIENT)
Dept: NEUROLOGY | Facility: MEDICAL CENTER | Age: 40
End: 2025-06-02
Payer: COMMERCIAL

## 2025-06-02 PROCEDURE — RXMED WILLOW AMBULATORY MEDICATION CHARGE: Performed by: PSYCHIATRY & NEUROLOGY

## 2025-06-02 NOTE — TELEPHONE ENCOUNTER
Received New Start PA request via [employer]  for QULIPTA. (Quantity:30, Day Supply:30)     Insurance: [employer] [place]  Member ID:  [id number]  BIN: [id number]  PCN: [id number]  Group: [id number]     Ran Test claim via Britt & medication Rejects stating prior authorization is required.

## 2025-06-02 NOTE — TELEPHONE ENCOUNTER
Prior Authorization for QULIPTA  (Quantity: 30, Days: 30) has been submitted via Cover My Meds: Key ([id number])    Insurance: BCSARANYA Saini     Will follow up in 24-48 business hours.

## 2025-06-02 NOTE — TELEPHONE ENCOUNTER
Prior Authorization for QULIPTA  has been approved for a quantity of 30 , day supply 30    Prior Authorization reference number: [id number]  Insurance: ALEXI Saini   Effective dates: 06/02/2025 - 08/31/2025  Copay: $0     Is patient eligible to fill with [facility]? Yes    Next Steps: The Patients copay is less than $5.00. Will contact the patient to determine choice of pharmacy, if applicable.

## 2025-06-03 NOTE — TELEPHONE ENCOUNTER
Pt was wondering who did her Briumvi authorization. Notified her this was done through the Infusion center    Kol[employer] [place] Ass't

## 2025-06-04 ENCOUNTER — PHARMACY VISIT (OUTPATIENT)
Dept: PHARMACY | Facility: MEDICAL CENTER | Age: 40
End: 2025-06-04
Payer: COMMERCIAL

## 2025-06-12 RX ORDER — SODIUM CHLORIDE 9 MG/ML
INJECTION, SOLUTION INTRAVENOUS CONTINUOUS
OUTPATIENT
Start: 2025-07-02

## 2025-06-12 RX ORDER — ALBUTEROL SULFATE 5 MG/ML
2.5 SOLUTION RESPIRATORY (INHALATION) PRN
OUTPATIENT
Start: 2025-07-02

## 2025-06-12 RX ORDER — DIPHENHYDRAMINE HCL 25 MG
50 TABLET ORAL ONCE
OUTPATIENT
Start: 2025-07-02 | End: 2025-07-02

## 2025-06-12 RX ORDER — ONDANSETRON 8 MG/1
8 TABLET, ORALLY DISINTEGRATING ORAL PRN
OUTPATIENT
Start: 2025-07-02

## 2025-06-12 RX ORDER — METHYLPREDNISOLONE SODIUM SUCCINATE 125 MG/2ML
125 INJECTION, POWDER, LYOPHILIZED, FOR SOLUTION INTRAMUSCULAR; INTRAVENOUS PRN
OUTPATIENT
Start: 2025-07-02

## 2025-06-12 RX ORDER — LORAZEPAM 0.5 MG/1
0.5 TABLET ORAL PRN
OUTPATIENT
Start: 2025-07-02

## 2025-06-12 RX ORDER — 0.9 % SODIUM CHLORIDE 0.9 %
10 VIAL (ML) INJECTION PRN
OUTPATIENT
Start: 2025-07-02

## 2025-06-12 RX ORDER — 0.9 % SODIUM CHLORIDE 0.9 %
3 VIAL (ML) INJECTION PRN
OUTPATIENT
Start: 2025-07-02

## 2025-06-12 RX ORDER — EPINEPHRINE 1 MG/ML(1)
0.5 AMPUL (ML) INJECTION PRN
OUTPATIENT
Start: 2025-07-02

## 2025-06-12 RX ORDER — METHYLPREDNISOLONE SODIUM SUCCINATE 125 MG/2ML
100 INJECTION, POWDER, LYOPHILIZED, FOR SOLUTION INTRAMUSCULAR; INTRAVENOUS ONCE
OUTPATIENT
Start: 2025-07-02

## 2025-06-12 RX ORDER — LORAZEPAM 2 MG/ML
0.5 INJECTION INTRAMUSCULAR PRN
OUTPATIENT
Start: 2025-07-02

## 2025-06-12 RX ORDER — MEPERIDINE HYDROCHLORIDE 25 MG/ML
25 INJECTION INTRAMUSCULAR; INTRAVENOUS; SUBCUTANEOUS PRN
OUTPATIENT
Start: 2025-07-02

## 2025-06-12 RX ORDER — ACETAMINOPHEN 325 MG/1
650 TABLET ORAL PRN
OUTPATIENT
Start: 2025-07-02

## 2025-06-12 RX ORDER — DIPHENHYDRAMINE HYDROCHLORIDE 50 MG/ML
25 INJECTION, SOLUTION INTRAMUSCULAR; INTRAVENOUS PRN
OUTPATIENT
Start: 2025-07-02

## 2025-06-12 RX ORDER — 0.9 % SODIUM CHLORIDE 0.9 %
VIAL (ML) INJECTION PRN
OUTPATIENT
Start: 2025-07-02

## 2025-06-12 RX ORDER — ONDANSETRON 2 MG/ML
8 INJECTION INTRAMUSCULAR; INTRAVENOUS PRN
OUTPATIENT
Start: 2025-07-02

## 2025-06-12 RX ORDER — SODIUM CHLORIDE 9 MG/ML
1000 INJECTION, SOLUTION INTRAVENOUS PRN
OUTPATIENT
Start: 2025-07-02

## 2025-06-30 ENCOUNTER — APPOINTMENT (OUTPATIENT)
Dept: RADIOLOGY | Facility: MEDICAL CENTER | Age: 40
End: 2025-06-30
Attending: PSYCHIATRY & NEUROLOGY
Payer: COMMERCIAL

## 2025-06-30 DIAGNOSIS — G35 MULTIPLE SCLEROSIS (HCC): ICD-10-CM

## 2025-06-30 PROCEDURE — RXMED WILLOW AMBULATORY MEDICATION CHARGE: Performed by: PSYCHIATRY & NEUROLOGY

## 2025-07-01 ENCOUNTER — TELEPHONE (OUTPATIENT)
Dept: NEUROLOGY | Facility: MEDICAL CENTER | Age: 40
End: 2025-07-01
Payer: COMMERCIAL

## 2025-07-01 ENCOUNTER — PHARMACY VISIT (OUTPATIENT)
Dept: PHARMACY | Facility: MEDICAL CENTER | Age: 40
End: 2025-07-01
Payer: COMMERCIAL

## 2025-07-01 NOTE — TELEPHONE ENCOUNTER
Tried to call for an update on appeal. Was on hold for over 30 minutes and had to hang up. I will try again tomorrow.    Johnny Montoya, Med Ass't

## 2025-07-02 ENCOUNTER — TELEPHONE (OUTPATIENT)
Dept: NEUROLOGY | Facility: MEDICAL CENTER | Age: 40
End: 2025-07-02
Payer: COMMERCIAL

## 2025-07-02 NOTE — TELEPHONE ENCOUNTER
Called ins today for update on the appeal ([phone]) spoke with Richard CHICAS. She states the appeal is still pending and projected decision date is 7/16.  I requested expedited appeal she states this is not available.  Johnny Montoya, Med Ass't

## 2025-07-03 ENCOUNTER — HOSPITAL ENCOUNTER (OUTPATIENT)
Dept: RADIOLOGY | Facility: MEDICAL CENTER | Age: 40
End: 2025-07-03
Attending: PSYCHIATRY & NEUROLOGY
Payer: COMMERCIAL

## 2025-07-03 ENCOUNTER — PATIENT MESSAGE (OUTPATIENT)
Dept: NEUROLOGY | Facility: MEDICAL CENTER | Age: 40
End: 2025-07-03
Payer: COMMERCIAL

## 2025-07-03 PROCEDURE — 72156 MRI NECK SPINE W/O & W/DYE: CPT

## 2025-07-03 PROCEDURE — 700117 HCHG RX CONTRAST REV CODE 255: Mod: JZ | Performed by: PSYCHIATRY & NEUROLOGY

## 2025-07-03 PROCEDURE — A9579 GAD-BASE MR CONTRAST NOS,1ML: HCPCS | Mod: JZ | Performed by: PSYCHIATRY & NEUROLOGY

## 2025-07-03 PROCEDURE — 70553 MRI BRAIN STEM W/O & W/DYE: CPT

## 2025-07-03 RX ORDER — GADOTERIDOL 279.3 MG/ML
17 INJECTION INTRAVENOUS ONCE
Status: COMPLETED | OUTPATIENT
Start: 2025-07-03 | End: 2025-07-03

## 2025-07-03 RX ADMIN — GADOTERIDOL 17 ML: 279.3 INJECTION, SOLUTION INTRAVENOUS at 08:53

## 2025-07-10 ENCOUNTER — OUTPATIENT INFUSION SERVICES (OUTPATIENT)
Dept: ONCOLOGY | Facility: MEDICAL CENTER | Age: 40
End: 2025-07-10
Attending: PSYCHIATRY & NEUROLOGY
Payer: COMMERCIAL

## 2025-07-10 VITALS
BODY MASS INDEX: 32.58 KG/M2 | WEIGHT: 183.86 LBS | HEART RATE: 88 BPM | RESPIRATION RATE: 19 BRPM | OXYGEN SATURATION: 99 % | TEMPERATURE: 97.6 F | HEIGHT: 63 IN | DIASTOLIC BLOOD PRESSURE: 86 MMHG | SYSTOLIC BLOOD PRESSURE: 108 MMHG

## 2025-07-10 DIAGNOSIS — G35 MULTIPLE SCLEROSIS (HCC): Primary | ICD-10-CM

## 2025-07-10 LAB
HBV CORE AB SERPL QL IA: NONREACTIVE
HBV SURFACE AG SER QL: NORMAL

## 2025-07-10 PROCEDURE — A9270 NON-COVERED ITEM OR SERVICE: HCPCS | Performed by: PSYCHIATRY & NEUROLOGY

## 2025-07-10 PROCEDURE — 700111 HCHG RX REV CODE 636 W/ 250 OVERRIDE (IP)

## 2025-07-10 PROCEDURE — 87340 HEPATITIS B SURFACE AG IA: CPT

## 2025-07-10 PROCEDURE — 700105 HCHG RX REV CODE 258

## 2025-07-10 PROCEDURE — 96365 THER/PROPH/DIAG IV INF INIT: CPT

## 2025-07-10 PROCEDURE — 96366 THER/PROPH/DIAG IV INF ADDON: CPT

## 2025-07-10 PROCEDURE — 700111 HCHG RX REV CODE 636 W/ 250 OVERRIDE (IP): Mod: JZ | Performed by: PSYCHIATRY & NEUROLOGY

## 2025-07-10 PROCEDURE — 700102 HCHG RX REV CODE 250 W/ 637 OVERRIDE(OP): Performed by: PSYCHIATRY & NEUROLOGY

## 2025-07-10 PROCEDURE — 700105 HCHG RX REV CODE 258: Performed by: PSYCHIATRY & NEUROLOGY

## 2025-07-10 PROCEDURE — 86704 HEP B CORE ANTIBODY TOTAL: CPT

## 2025-07-10 PROCEDURE — 96375 TX/PRO/DX INJ NEW DRUG ADDON: CPT

## 2025-07-10 RX ORDER — EPINEPHRINE 1 MG/ML(1)
0.5 AMPUL (ML) INJECTION PRN
Status: DISCONTINUED | OUTPATIENT
Start: 2025-07-10 | End: 2025-07-10 | Stop reason: HOSPADM

## 2025-07-10 RX ORDER — SODIUM CHLORIDE 9 MG/ML
1000 INJECTION, SOLUTION INTRAVENOUS PRN
Status: CANCELLED | OUTPATIENT
Start: 2025-07-24

## 2025-07-10 RX ORDER — LORAZEPAM 1 MG/1
0.5 TABLET ORAL PRN
Status: CANCELLED | OUTPATIENT
Start: 2025-07-24

## 2025-07-10 RX ORDER — SODIUM CHLORIDE 9 MG/ML
INJECTION, SOLUTION INTRAVENOUS CONTINUOUS
Status: CANCELLED | OUTPATIENT
Start: 2025-07-24

## 2025-07-10 RX ORDER — 0.9 % SODIUM CHLORIDE 0.9 %
VIAL (ML) INJECTION PRN
Status: CANCELLED | OUTPATIENT
Start: 2025-07-24

## 2025-07-10 RX ORDER — METHYLPREDNISOLONE SODIUM SUCCINATE 125 MG/2ML
100 INJECTION, POWDER, LYOPHILIZED, FOR SOLUTION INTRAMUSCULAR; INTRAVENOUS ONCE
Status: CANCELLED | OUTPATIENT
Start: 2025-07-24

## 2025-07-10 RX ORDER — ONDANSETRON 2 MG/ML
8 INJECTION INTRAMUSCULAR; INTRAVENOUS PRN
Status: CANCELLED | OUTPATIENT
Start: 2025-07-24

## 2025-07-10 RX ORDER — 0.9 % SODIUM CHLORIDE 0.9 %
10 VIAL (ML) INJECTION PRN
Status: CANCELLED | OUTPATIENT
Start: 2025-07-24

## 2025-07-10 RX ORDER — LORAZEPAM 2 MG/ML
0.5 INJECTION INTRAMUSCULAR PRN
Status: CANCELLED | OUTPATIENT
Start: 2025-07-24

## 2025-07-10 RX ORDER — DIPHENHYDRAMINE HYDROCHLORIDE 50 MG/ML
25 INJECTION, SOLUTION INTRAMUSCULAR; INTRAVENOUS PRN
Status: CANCELLED | OUTPATIENT
Start: 2025-07-24

## 2025-07-10 RX ORDER — ACETAMINOPHEN 325 MG/1
650 TABLET ORAL PRN
Status: CANCELLED | OUTPATIENT
Start: 2025-07-24

## 2025-07-10 RX ORDER — DIPHENHYDRAMINE HYDROCHLORIDE 50 MG/ML
25 INJECTION, SOLUTION INTRAMUSCULAR; INTRAVENOUS PRN
Status: DISCONTINUED | OUTPATIENT
Start: 2025-07-10 | End: 2025-07-10 | Stop reason: HOSPADM

## 2025-07-10 RX ORDER — LORAZEPAM 2 MG/ML
0.5 INJECTION INTRAMUSCULAR PRN
Status: DISCONTINUED | OUTPATIENT
Start: 2025-07-10 | End: 2025-07-10 | Stop reason: HOSPADM

## 2025-07-10 RX ORDER — DIPHENHYDRAMINE HCL 25 MG
50 TABLET ORAL ONCE
Status: CANCELLED | OUTPATIENT
Start: 2025-07-24 | End: 2025-07-24

## 2025-07-10 RX ORDER — LORAZEPAM 1 MG/1
0.5 TABLET ORAL PRN
Status: DISCONTINUED | OUTPATIENT
Start: 2025-07-10 | End: 2025-07-10 | Stop reason: HOSPADM

## 2025-07-10 RX ORDER — SODIUM CHLORIDE 9 MG/ML
INJECTION, SOLUTION INTRAVENOUS CONTINUOUS
Status: DISCONTINUED | OUTPATIENT
Start: 2025-07-10 | End: 2025-07-10 | Stop reason: HOSPADM

## 2025-07-10 RX ORDER — ALBUTEROL SULFATE 5 MG/ML
2.5 SOLUTION RESPIRATORY (INHALATION) PRN
Status: DISCONTINUED | OUTPATIENT
Start: 2025-07-10 | End: 2025-07-10 | Stop reason: HOSPADM

## 2025-07-10 RX ORDER — EPINEPHRINE 1 MG/ML(1)
0.5 AMPUL (ML) INJECTION PRN
Status: CANCELLED | OUTPATIENT
Start: 2025-07-24

## 2025-07-10 RX ORDER — 0.9 % SODIUM CHLORIDE 0.9 %
3 VIAL (ML) INJECTION PRN
Status: CANCELLED | OUTPATIENT
Start: 2025-07-24

## 2025-07-10 RX ORDER — ACETAMINOPHEN 325 MG/1
650 TABLET ORAL PRN
Status: DISCONTINUED | OUTPATIENT
Start: 2025-07-10 | End: 2025-07-10 | Stop reason: HOSPADM

## 2025-07-10 RX ORDER — MEPERIDINE HYDROCHLORIDE 25 MG/ML
25 INJECTION INTRAMUSCULAR; INTRAVENOUS; SUBCUTANEOUS PRN
Status: CANCELLED | OUTPATIENT
Start: 2025-07-24

## 2025-07-10 RX ORDER — DIPHENHYDRAMINE HCL 25 MG
50 TABLET ORAL ONCE
Status: COMPLETED | OUTPATIENT
Start: 2025-07-10 | End: 2025-07-10

## 2025-07-10 RX ORDER — SODIUM CHLORIDE 9 MG/ML
1000 INJECTION, SOLUTION INTRAVENOUS PRN
Status: DISCONTINUED | OUTPATIENT
Start: 2025-07-10 | End: 2025-07-10 | Stop reason: HOSPADM

## 2025-07-10 RX ORDER — ONDANSETRON 8 MG/1
8 TABLET, ORALLY DISINTEGRATING ORAL PRN
Status: DISCONTINUED | OUTPATIENT
Start: 2025-07-10 | End: 2025-07-10 | Stop reason: HOSPADM

## 2025-07-10 RX ORDER — METHYLPREDNISOLONE SODIUM SUCCINATE 125 MG/2ML
125 INJECTION, POWDER, LYOPHILIZED, FOR SOLUTION INTRAMUSCULAR; INTRAVENOUS PRN
Status: DISCONTINUED | OUTPATIENT
Start: 2025-07-10 | End: 2025-07-10 | Stop reason: HOSPADM

## 2025-07-10 RX ORDER — ALBUTEROL SULFATE 5 MG/ML
2.5 SOLUTION RESPIRATORY (INHALATION) PRN
Status: CANCELLED | OUTPATIENT
Start: 2025-07-24

## 2025-07-10 RX ORDER — METHYLPREDNISOLONE SODIUM SUCCINATE 125 MG/2ML
100 INJECTION, POWDER, LYOPHILIZED, FOR SOLUTION INTRAMUSCULAR; INTRAVENOUS ONCE
Status: COMPLETED | OUTPATIENT
Start: 2025-07-10 | End: 2025-07-10

## 2025-07-10 RX ORDER — ONDANSETRON 8 MG/1
8 TABLET, ORALLY DISINTEGRATING ORAL PRN
Status: CANCELLED | OUTPATIENT
Start: 2025-07-24

## 2025-07-10 RX ORDER — METHYLPREDNISOLONE SODIUM SUCCINATE 125 MG/2ML
125 INJECTION, POWDER, LYOPHILIZED, FOR SOLUTION INTRAMUSCULAR; INTRAVENOUS PRN
Status: CANCELLED | OUTPATIENT
Start: 2025-07-24

## 2025-07-10 RX ORDER — ONDANSETRON 2 MG/ML
8 INJECTION INTRAMUSCULAR; INTRAVENOUS PRN
Status: DISCONTINUED | OUTPATIENT
Start: 2025-07-10 | End: 2025-07-10 | Stop reason: HOSPADM

## 2025-07-10 RX ORDER — MEPERIDINE HYDROCHLORIDE 25 MG/ML
25 INJECTION INTRAMUSCULAR; INTRAVENOUS; SUBCUTANEOUS PRN
Status: DISCONTINUED | OUTPATIENT
Start: 2025-07-10 | End: 2025-07-10 | Stop reason: HOSPADM

## 2025-07-10 RX ADMIN — SODIUM CHLORIDE 150 MG: 9 INJECTION, SOLUTION INTRAVENOUS at 09:26

## 2025-07-10 RX ADMIN — DIPHENHYDRAMINE HYDROCHLORIDE 50 MG: 25 TABLET ORAL at 08:53

## 2025-07-10 RX ADMIN — SODIUM CHLORIDE: 9 INJECTION, SOLUTION INTRAVENOUS at 09:26

## 2025-07-10 RX ADMIN — ACETAMINOPHEN 650 MG: 325 TABLET ORAL at 10:32

## 2025-07-10 RX ADMIN — METHYLPREDNISOLONE SODIUM SUCCINATE 100 MG: 125 INJECTION INTRAMUSCULAR; INTRAVENOUS at 08:53

## 2025-07-10 ASSESSMENT — FIBROSIS 4 INDEX: FIB4 SCORE: 0.55

## 2025-07-10 NOTE — PROGRESS NOTES
Rehana arrived to [facility] for first Briumvi infusion. Pt educated on plan of care, Briumvi medication information and s/s of infusion reactions. Briumvi medication pamphlet provided to patient. 22g IV started in LAC with brisk blood return. Pt Hep B panel lab sent and reviewed. Hep B non-reactive. Premeds given 30 min prior to infusion.      First infusion started at 10ml/hr for the first 30min. No reaction noted. Increased Briumvi infusion to 20ml/hr for 30min. Pt c/o of body aches at 45min into infusion. Tylenol given with mild relief. No other symptoms present.Increased Briumvi infusion to 35ml/hr for 30min. No reaction noted. Increased Briumvi infusion to 100ml/hr for the remainder of the treatment.      Pt was monitored for 1 hour post infusion for s/s of infusion reaction. Pt still had c/o of generalized bodyaches but states pain was tolerable. Discussed again with patient s/s of adverse reactions as it can happen 24hrs post infusion. Informed pt to call her MD or go to the ED if she has any reaction. Pt stated that she will keep the pamphlet and discuss with spouse.     PIV flushed with NS with brisk blood return. IV removed with tip intact. Gauze and coban applied. Pt knows when next appointment is. Pt left in no apparent distress.

## 2025-07-17 ENCOUNTER — TELEPHONE (OUTPATIENT)
Dept: NEUROLOGY | Facility: MEDICAL CENTER | Age: 40
End: 2025-07-17
Payer: COMMERCIAL

## 2025-07-17 NOTE — TELEPHONE ENCOUNTER
Called pt ins for update on Briumvi appeal. Spoke with member services reference call #[id number]. He states that for some reason the appeal was re started on 7/9/25. Therefore we have to wait another 30 days, and there is nothing he can do to expedite this. 8/8 is the expected date. Appeal case # [id number]

## 2025-07-24 ENCOUNTER — OUTPATIENT INFUSION SERVICES (OUTPATIENT)
Dept: ONCOLOGY | Facility: MEDICAL CENTER | Age: 40
End: 2025-07-24
Attending: PSYCHIATRY & NEUROLOGY
Payer: COMMERCIAL

## 2025-07-24 VITALS
SYSTOLIC BLOOD PRESSURE: 116 MMHG | HEART RATE: 102 BPM | HEIGHT: 63 IN | RESPIRATION RATE: 19 BRPM | WEIGHT: 180.56 LBS | BODY MASS INDEX: 31.99 KG/M2 | TEMPERATURE: 98.1 F | OXYGEN SATURATION: 99 % | DIASTOLIC BLOOD PRESSURE: 83 MMHG

## 2025-07-24 DIAGNOSIS — G35 MULTIPLE SCLEROSIS (HCC): Primary | ICD-10-CM

## 2025-07-24 PROCEDURE — 96375 TX/PRO/DX INJ NEW DRUG ADDON: CPT

## 2025-07-24 PROCEDURE — 700102 HCHG RX REV CODE 250 W/ 637 OVERRIDE(OP): Performed by: PSYCHIATRY & NEUROLOGY

## 2025-07-24 PROCEDURE — 700111 HCHG RX REV CODE 636 W/ 250 OVERRIDE (IP)

## 2025-07-24 PROCEDURE — A9270 NON-COVERED ITEM OR SERVICE: HCPCS | Performed by: PSYCHIATRY & NEUROLOGY

## 2025-07-24 PROCEDURE — 96365 THER/PROPH/DIAG IV INF INIT: CPT

## 2025-07-24 PROCEDURE — 700105 HCHG RX REV CODE 258: Performed by: PSYCHIATRY & NEUROLOGY

## 2025-07-24 PROCEDURE — 700111 HCHG RX REV CODE 636 W/ 250 OVERRIDE (IP): Mod: JZ | Performed by: PSYCHIATRY & NEUROLOGY

## 2025-07-24 PROCEDURE — 700105 HCHG RX REV CODE 258

## 2025-07-24 RX ORDER — ACETAMINOPHEN 325 MG/1
650 TABLET ORAL PRN
OUTPATIENT
Start: 2025-12-25

## 2025-07-24 RX ORDER — ONDANSETRON 2 MG/ML
8 INJECTION INTRAMUSCULAR; INTRAVENOUS PRN
OUTPATIENT
Start: 2025-12-25

## 2025-07-24 RX ORDER — LORAZEPAM 2 MG/ML
0.5 INJECTION INTRAMUSCULAR PRN
OUTPATIENT
Start: 2025-12-25

## 2025-07-24 RX ORDER — ONDANSETRON 2 MG/ML
8 INJECTION INTRAMUSCULAR; INTRAVENOUS PRN
Status: DISCONTINUED | OUTPATIENT
Start: 2025-07-24 | End: 2025-07-24 | Stop reason: HOSPADM

## 2025-07-24 RX ORDER — ONDANSETRON 8 MG/1
8 TABLET, ORALLY DISINTEGRATING ORAL PRN
OUTPATIENT
Start: 2025-12-25

## 2025-07-24 RX ORDER — 0.9 % SODIUM CHLORIDE 0.9 %
3 VIAL (ML) INJECTION PRN
OUTPATIENT
Start: 2025-12-25

## 2025-07-24 RX ORDER — DIPHENHYDRAMINE HYDROCHLORIDE 50 MG/ML
25 INJECTION, SOLUTION INTRAMUSCULAR; INTRAVENOUS PRN
Status: DISCONTINUED | OUTPATIENT
Start: 2025-07-24 | End: 2025-07-24 | Stop reason: HOSPADM

## 2025-07-24 RX ORDER — DIPHENHYDRAMINE HYDROCHLORIDE 50 MG/ML
25 INJECTION, SOLUTION INTRAMUSCULAR; INTRAVENOUS PRN
OUTPATIENT
Start: 2025-12-25

## 2025-07-24 RX ORDER — SODIUM CHLORIDE 9 MG/ML
INJECTION, SOLUTION INTRAVENOUS CONTINUOUS
Status: DISCONTINUED | OUTPATIENT
Start: 2025-07-24 | End: 2025-07-24 | Stop reason: HOSPADM

## 2025-07-24 RX ORDER — ONDANSETRON 8 MG/1
8 TABLET, ORALLY DISINTEGRATING ORAL PRN
Status: DISCONTINUED | OUTPATIENT
Start: 2025-07-24 | End: 2025-07-24 | Stop reason: HOSPADM

## 2025-07-24 RX ORDER — METHYLPREDNISOLONE SODIUM SUCCINATE 125 MG/2ML
125 INJECTION, POWDER, LYOPHILIZED, FOR SOLUTION INTRAMUSCULAR; INTRAVENOUS PRN
Status: DISCONTINUED | OUTPATIENT
Start: 2025-07-24 | End: 2025-07-24 | Stop reason: HOSPADM

## 2025-07-24 RX ORDER — METHYLPREDNISOLONE SODIUM SUCCINATE 125 MG/2ML
125 INJECTION, POWDER, LYOPHILIZED, FOR SOLUTION INTRAMUSCULAR; INTRAVENOUS PRN
OUTPATIENT
Start: 2025-12-25

## 2025-07-24 RX ORDER — ACETAMINOPHEN 325 MG/1
650 TABLET ORAL PRN
Status: DISCONTINUED | OUTPATIENT
Start: 2025-07-24 | End: 2025-07-24 | Stop reason: HOSPADM

## 2025-07-24 RX ORDER — DIPHENHYDRAMINE HCL 25 MG
50 TABLET ORAL ONCE
OUTPATIENT
Start: 2025-12-25 | End: 2025-12-25

## 2025-07-24 RX ORDER — EPINEPHRINE 1 MG/ML(1)
0.5 AMPUL (ML) INJECTION PRN
OUTPATIENT
Start: 2025-12-25

## 2025-07-24 RX ORDER — 0.9 % SODIUM CHLORIDE 0.9 %
VIAL (ML) INJECTION PRN
OUTPATIENT
Start: 2025-12-25

## 2025-07-24 RX ORDER — SODIUM CHLORIDE 9 MG/ML
1000 INJECTION, SOLUTION INTRAVENOUS PRN
Status: DISCONTINUED | OUTPATIENT
Start: 2025-07-24 | End: 2025-07-24 | Stop reason: HOSPADM

## 2025-07-24 RX ORDER — LORAZEPAM 2 MG/ML
0.5 INJECTION INTRAMUSCULAR PRN
Status: DISCONTINUED | OUTPATIENT
Start: 2025-07-24 | End: 2025-07-24 | Stop reason: HOSPADM

## 2025-07-24 RX ORDER — MEPERIDINE HYDROCHLORIDE 25 MG/ML
25 INJECTION INTRAMUSCULAR; INTRAVENOUS; SUBCUTANEOUS PRN
OUTPATIENT
Start: 2025-12-25

## 2025-07-24 RX ORDER — LORAZEPAM 1 MG/1
0.5 TABLET ORAL PRN
OUTPATIENT
Start: 2025-12-25

## 2025-07-24 RX ORDER — ALBUTEROL SULFATE 5 MG/ML
2.5 SOLUTION RESPIRATORY (INHALATION) PRN
OUTPATIENT
Start: 2025-12-25

## 2025-07-24 RX ORDER — LORAZEPAM 1 MG/1
0.5 TABLET ORAL PRN
Status: DISCONTINUED | OUTPATIENT
Start: 2025-07-24 | End: 2025-07-24 | Stop reason: HOSPADM

## 2025-07-24 RX ORDER — SODIUM CHLORIDE 9 MG/ML
INJECTION, SOLUTION INTRAVENOUS CONTINUOUS
OUTPATIENT
Start: 2025-12-25

## 2025-07-24 RX ORDER — EPINEPHRINE 1 MG/ML(1)
0.5 AMPUL (ML) INJECTION PRN
Status: DISCONTINUED | OUTPATIENT
Start: 2025-07-24 | End: 2025-07-24 | Stop reason: HOSPADM

## 2025-07-24 RX ORDER — DIPHENHYDRAMINE HCL 25 MG
50 TABLET ORAL ONCE
Status: COMPLETED | OUTPATIENT
Start: 2025-07-24 | End: 2025-07-24

## 2025-07-24 RX ORDER — METHYLPREDNISOLONE SODIUM SUCCINATE 125 MG/2ML
100 INJECTION, POWDER, LYOPHILIZED, FOR SOLUTION INTRAMUSCULAR; INTRAVENOUS ONCE
Status: COMPLETED | OUTPATIENT
Start: 2025-07-24 | End: 2025-07-24

## 2025-07-24 RX ORDER — MEPERIDINE HYDROCHLORIDE 25 MG/ML
25 INJECTION INTRAMUSCULAR; INTRAVENOUS; SUBCUTANEOUS PRN
Status: DISCONTINUED | OUTPATIENT
Start: 2025-07-24 | End: 2025-07-24 | Stop reason: HOSPADM

## 2025-07-24 RX ORDER — SODIUM CHLORIDE 9 MG/ML
1000 INJECTION, SOLUTION INTRAVENOUS PRN
OUTPATIENT
Start: 2025-12-25

## 2025-07-24 RX ORDER — METHYLPREDNISOLONE SODIUM SUCCINATE 125 MG/2ML
100 INJECTION, POWDER, LYOPHILIZED, FOR SOLUTION INTRAMUSCULAR; INTRAVENOUS ONCE
OUTPATIENT
Start: 2025-12-25

## 2025-07-24 RX ORDER — ALBUTEROL SULFATE 5 MG/ML
2.5 SOLUTION RESPIRATORY (INHALATION) PRN
Status: DISCONTINUED | OUTPATIENT
Start: 2025-07-24 | End: 2025-07-24 | Stop reason: HOSPADM

## 2025-07-24 RX ORDER — 0.9 % SODIUM CHLORIDE 0.9 %
10 VIAL (ML) INJECTION PRN
OUTPATIENT
Start: 2025-12-25

## 2025-07-24 RX ADMIN — METHYLPREDNISOLONE SODIUM SUCCINATE 100 MG: 125 INJECTION, POWDER, FOR SOLUTION INTRAMUSCULAR; INTRAVENOUS at 10:26

## 2025-07-24 RX ADMIN — UBLITUXIMAB 450 MG: 150 INJECTION, SOLUTION, CONCENTRATE INTRAVENOUS at 11:09

## 2025-07-24 RX ADMIN — DIPHENHYDRAMINE HYDROCHLORIDE 50 MG: 25 TABLET ORAL at 10:26

## 2025-07-24 RX ADMIN — SODIUM CHLORIDE: 9 INJECTION, SOLUTION INTRAVENOUS at 10:26

## 2025-07-24 ASSESSMENT — FIBROSIS 4 INDEX: FIB4 SCORE: 0.55

## 2025-07-24 NOTE — PROGRESS NOTES
Rehana is here for her 2nd dose of ublituximab-xiiy (Briumvi). Verified that baseline Hepatitis B panel has been completed and reviewed today. Rehana denies any live vaccines in the past 4 weeks. PIV established; brisk blood return noted. Pre-medications given per MA 30 minutes prior to administering Briumvi. Briumvi given per MAR; infusion stated at 100 ml/hr for the first 30 minutes, then increased to 400 ml/hr for the remainder of infusion. 1 hour post infusion monitoring in progress. No adverse reaction noted at this time. Report given to Eva VILLEDA. Message sent to Schedulers for future appointment.

## 2025-07-24 NOTE — PROGRESS NOTES
Report received from RONAL Diaz to assume care of the pt during observation period.  Pt completed 1 hour monitoring without adverse reaction.  PIV flushed with NS and site was removed intact.  Pt dc home to self care.

## 2025-07-28 ENCOUNTER — PHARMACY VISIT (OUTPATIENT)
Dept: PHARMACY | Facility: MEDICAL CENTER | Age: 40
End: 2025-07-28
Payer: COMMERCIAL

## 2025-07-28 PROCEDURE — RXMED WILLOW AMBULATORY MEDICATION CHARGE: Performed by: PSYCHIATRY & NEUROLOGY

## 2025-08-01 ENCOUNTER — TELEPHONE (OUTPATIENT)
Dept: NEUROLOGY | Facility: MEDICAL CENTER | Age: 40
End: 2025-08-01
Payer: COMMERCIAL

## 2025-08-05 DIAGNOSIS — G35 MULTIPLE SCLEROSIS (HCC): ICD-10-CM

## 2025-08-05 RX ORDER — PROCHLORPERAZINE MALEATE 10 MG
TABLET ORAL
Qty: 30 TABLET | Refills: 3 | Status: SHIPPED | OUTPATIENT
Start: 2025-08-05

## 2025-08-19 ENCOUNTER — SPECIALTY PHARMACY (OUTPATIENT)
Dept: NEUROLOGY | Facility: MEDICAL CENTER | Age: 40
End: 2025-08-19
Payer: COMMERCIAL

## 2025-08-25 ENCOUNTER — HOSPITAL ENCOUNTER (OUTPATIENT)
Dept: LAB | Facility: MEDICAL CENTER | Age: 40
End: 2025-08-25
Payer: COMMERCIAL

## 2025-08-25 LAB
BASOPHILS # BLD AUTO: 0.6 % (ref 0–1.8)
BASOPHILS # BLD: 0.07 K/UL (ref 0–0.12)
EOSINOPHIL # BLD AUTO: 0.23 K/UL (ref 0–0.51)
EOSINOPHIL NFR BLD: 2.1 % (ref 0–6.9)
ERYTHROCYTE [DISTWIDTH] IN BLOOD BY AUTOMATED COUNT: 44.4 FL (ref 35.9–50)
HCT VFR BLD AUTO: 45.7 % (ref 37–47)
HGB BLD-MCNC: 15.1 G/DL (ref 12–16)
IMM GRANULOCYTES # BLD AUTO: 0.02 K/UL (ref 0–0.11)
IMM GRANULOCYTES NFR BLD AUTO: 0.2 % (ref 0–0.9)
INR PPP: 1.14 (ref 0.87–1.13)
LYMPHOCYTES # BLD AUTO: 1.23 K/UL (ref 1–4.8)
LYMPHOCYTES NFR BLD: 11.4 % (ref 22–41)
MCH RBC QN AUTO: 30.4 PG (ref 27–33)
MCHC RBC AUTO-ENTMCNC: 33 G/DL (ref 32.2–35.5)
MCV RBC AUTO: 92 FL (ref 81.4–97.8)
MONOCYTES # BLD AUTO: 0.87 K/UL (ref 0–0.85)
MONOCYTES NFR BLD AUTO: 8 % (ref 0–13.4)
NEUTROPHILS # BLD AUTO: 8.39 K/UL (ref 1.82–7.42)
NEUTROPHILS NFR BLD: 77.7 % (ref 44–72)
NRBC # BLD AUTO: 0 K/UL
NRBC BLD-RTO: 0 /100 WBC (ref 0–0.2)
PLATELET # BLD AUTO: 306 K/UL (ref 164–446)
PMV BLD AUTO: 10.2 FL (ref 9–12.9)
PROTHROMBIN TIME: 14.7 SEC (ref 12–14.6)
RBC # BLD AUTO: 4.97 M/UL (ref 4.2–5.4)
WBC # BLD AUTO: 10.8 K/UL (ref 4.8–10.8)

## 2025-08-25 PROCEDURE — 82728 ASSAY OF FERRITIN: CPT

## 2025-08-25 PROCEDURE — 85025 COMPLETE CBC W/AUTO DIFF WBC: CPT

## 2025-08-25 PROCEDURE — 80053 COMPREHEN METABOLIC PANEL: CPT

## 2025-08-25 PROCEDURE — 83550 IRON BINDING TEST: CPT

## 2025-08-25 PROCEDURE — 83735 ASSAY OF MAGNESIUM: CPT

## 2025-08-25 PROCEDURE — 85610 PROTHROMBIN TIME: CPT

## 2025-08-25 PROCEDURE — 82746 ASSAY OF FOLIC ACID SERUM: CPT

## 2025-08-25 PROCEDURE — 36415 COLL VENOUS BLD VENIPUNCTURE: CPT

## 2025-08-25 PROCEDURE — 82607 VITAMIN B-12: CPT

## 2025-08-25 PROCEDURE — RXMED WILLOW AMBULATORY MEDICATION CHARGE: Performed by: PSYCHIATRY & NEUROLOGY

## 2025-08-25 PROCEDURE — 83540 ASSAY OF IRON: CPT

## 2025-08-25 PROCEDURE — 82306 VITAMIN D 25 HYDROXY: CPT

## 2025-08-26 LAB
25(OH)D3 SERPL-MCNC: 53 NG/ML (ref 30–100)
ALBUMIN SERPL BCP-MCNC: 4.3 G/DL (ref 3.2–4.9)
ALBUMIN/GLOB SERPL: 1.7 G/DL
ALP SERPL-CCNC: 82 U/L (ref 30–99)
ALT SERPL-CCNC: 13 U/L (ref 2–50)
ANION GAP SERPL CALC-SCNC: 12 MMOL/L (ref 7–16)
AST SERPL-CCNC: 11 U/L (ref 12–45)
BILIRUB SERPL-MCNC: 0.4 MG/DL (ref 0.1–1.5)
BUN SERPL-MCNC: 16 MG/DL (ref 8–22)
CALCIUM ALBUM COR SERPL-MCNC: 9.2 MG/DL (ref 8.5–10.5)
CALCIUM SERPL-MCNC: 9.4 MG/DL (ref 8.5–10.5)
CHLORIDE SERPL-SCNC: 104 MMOL/L (ref 96–112)
CO2 SERPL-SCNC: 22 MMOL/L (ref 20–33)
CREAT SERPL-MCNC: 0.7 MG/DL (ref 0.5–1.4)
FASTING STATUS PATIENT QL REPORTED: NORMAL
FERRITIN SERPL-MCNC: 86.2 NG/ML (ref 10–291)
FOLATE SERPL-MCNC: 6.2 NG/ML
GFR SERPLBLD CREATININE-BSD FMLA CKD-EPI: 112 ML/MIN/1.73 M 2
GLOBULIN SER CALC-MCNC: 2.6 G/DL (ref 1.9–3.5)
GLUCOSE SERPL-MCNC: 88 MG/DL (ref 65–99)
IRON SATN MFR SERPL: 25 % (ref 15–55)
IRON SERPL-MCNC: 72 UG/DL (ref 40–170)
MAGNESIUM SERPL-MCNC: 2.2 MG/DL (ref 1.5–2.5)
POTASSIUM SERPL-SCNC: 3.7 MMOL/L (ref 3.6–5.5)
PROT SERPL-MCNC: 6.9 G/DL (ref 6–8.2)
SODIUM SERPL-SCNC: 138 MMOL/L (ref 135–145)
TIBC SERPL-MCNC: 288 UG/DL (ref 250–450)
UIBC SERPL-MCNC: 216 UG/DL (ref 110–370)
VIT B12 SERPL-MCNC: 933 PG/ML (ref 211–911)

## 2025-08-27 ENCOUNTER — PHARMACY VISIT (OUTPATIENT)
Dept: PHARMACY | Facility: MEDICAL CENTER | Age: 40
End: 2025-08-27
Payer: COMMERCIAL

## 2025-09-25 ENCOUNTER — APPOINTMENT (OUTPATIENT)
Dept: NEUROLOGY | Facility: MEDICAL CENTER | Age: 40
End: 2025-09-25
Attending: PSYCHIATRY & NEUROLOGY
Payer: COMMERCIAL

## (undated) DEVICE — PAD BABY LAP 4X18 W/O - RINGS PREWASHED 5/PK 40PK/CS

## (undated) DEVICE — WATER IRRIG. STER 3000 ML - (4/CA)

## (undated) DEVICE — HEAD HOLDER JUNIOR/ADULT

## (undated) DEVICE — GLOVE BIOGEL SZ 6 PF LATEX - (50EA/BX 4BX/CA)

## (undated) DEVICE — SUTURE 0 VICRYL PLUS CT-1 - 8 X 18 INCH (12/BX)

## (undated) DEVICE — TRAY SRGPRP PVP IOD WT PRP - (20/CA)

## (undated) DEVICE — SUTURE GENERAL

## (undated) DEVICE — ELECTRODE DUAL RETURN W/ CORD - (50/PK)

## (undated) DEVICE — SET EXTENSION WITH 2 PORTS (48EA/CA) ***PART #2C8610 IS A SUBSTITUTE*****

## (undated) DEVICE — GLOVE SZ 8 BIOGEL PI MICRO - PF LF (50PR/BX)

## (undated) DEVICE — TUBE CONNECTING SUCTION - CLEAR PLASTIC STERILE 72 IN (50EA/CA)

## (undated) DEVICE — CANISTER SUCTION RIGID RED 1500CC (40EA/CA)

## (undated) DEVICE — KIT  I.V. START (100EA/CA)

## (undated) DEVICE — SET IRRIGATION CYSTOSCOPY TUBE L80 IN (20EA/CA)

## (undated) DEVICE — GLOVE SZ 7 BIOGEL PI MICRO - PF LF (50PR/BX 4BX/CA)

## (undated) DEVICE — MANIFOLD NEPTUNE 1 PORT (20/PK)

## (undated) DEVICE — SUTURE 0 VICRYL PLUS CT-2 - 8 X 18 INCH (12/BX)

## (undated) DEVICE — PROTECTOR ULNA NERVE - (36PR/CA)

## (undated) DEVICE — GOWN WARMING STANDARD FLEX - (30/CA)

## (undated) DEVICE — SODIUM CHL IRRIGATION 0.9% 1000ML (12EA/CA)

## (undated) DEVICE — SLEEVE, VASO, THIGH, MED

## (undated) DEVICE — SENSOR SPO2 NEO LNCS ADHESIVE (20/BX) SEE USER NOTES

## (undated) DEVICE — DRESSING TRANSPARENT FILM TEGADERM 2.375 X 2.75"  (100EA/BX)"

## (undated) DEVICE — CANISTER SUCTION 3000ML MECHANICAL FILTER AUTO SHUTOFF MEDI-VAC NONSTERILE LF DISP  (40EA/CA)

## (undated) DEVICE — TRAY FOLEY CATHETER STATLOCK 16FR SURESTEP  (10EA/CA)

## (undated) DEVICE — TUBING CLEARLINK DUO-VENT - C-FLO (48EA/CA)

## (undated) DEVICE — ELECTRODE 850 FOAM ADHESIVE - HYDROGEL RADIOTRNSPRNT (50/PK)

## (undated) DEVICE — MASK ANESTHESIA ADULT  - (100/CA)

## (undated) DEVICE — SET LEADWIRE 5 LEAD BEDSIDE DISPOSABLE ECG (1SET OF 5/EA)

## (undated) DEVICE — GLOVE SZ 6.5 BIOGEL PI MICRO - PF LF (50PR/BX)

## (undated) DEVICE — SUTURE 0 VICRYL PLUS CT-2 - 27 INCH (36/BX)

## (undated) DEVICE — LACTATED RINGERS INJ 1000 ML - (14EA/CA 60CA/PF)

## (undated) DEVICE — Device

## (undated) DEVICE — SUCTION INSTRUMENT YANKAUER BULBOUS TIP W/O VENT (50EA/CA)

## (undated) DEVICE — PAD SANITARY 11IN MAXI IND WRAPPED  (12EA/PK 24PK/CA)

## (undated) DEVICE — GLOVE BIOGEL SZ 8 SURGICAL PF LTX - (50PR/BX 4BX/CA)

## (undated) DEVICE — SUTURE 0 VICRYL PLUS CT-1 - 36 INCH (36/BX)

## (undated) DEVICE — TUBE E-T HI-LO CUFF 7.0MM (10EA/PK)

## (undated) DEVICE — CATHETER IV 20 GA X 1-1/4 ---SURG.& SDS ONLY--- (50EA/BX)

## (undated) DEVICE — DRAPE VAGINAL BIB W/ POUCH (10EA/CA)

## (undated) DEVICE — GLOVE SZ 6 BIOGEL PI MICRO - PF LF (50PR/BX 4BX/CA)

## (undated) DEVICE — GLOVE BIOGEL PI INDICATOR SZ 7.5 SURGICAL PF LF -(50/BX 4BX/CA)

## (undated) DEVICE — SUTURE 3-0 VICRYL PLUS SH - 27 INCH (36/BX)

## (undated) DEVICE — KIT ANESTHESIA W/CIRCUIT & 3/LT BAG W/FILTER (20EA/CA)